# Patient Record
Sex: FEMALE | Race: WHITE
[De-identification: names, ages, dates, MRNs, and addresses within clinical notes are randomized per-mention and may not be internally consistent; named-entity substitution may affect disease eponyms.]

---

## 2017-01-17 ENCOUNTER — HOSPITAL ENCOUNTER (OUTPATIENT)
Dept: HOSPITAL 53 - M LAB | Age: 57
End: 2017-01-17
Attending: FAMILY MEDICINE
Payer: COMMERCIAL

## 2017-01-17 DIAGNOSIS — E55.9: ICD-10-CM

## 2017-01-17 DIAGNOSIS — E78.2: ICD-10-CM

## 2017-01-17 DIAGNOSIS — I10: Primary | ICD-10-CM

## 2017-01-17 DIAGNOSIS — E03.9: ICD-10-CM

## 2017-01-17 LAB
ALBUMIN SERPL BCG-MCNC: 3.7 GM/DL (ref 3.2–5.2)
ALBUMIN/GLOB SERPL: 0.95 {RATIO} (ref 1–1.93)
ALP SERPL-CCNC: 200 U/L (ref 45–117)
ALT SERPL W P-5'-P-CCNC: 124 U/L (ref 12–78)
ANION GAP SERPL CALC-SCNC: 8 MEQ/L (ref 8–16)
AST SERPL-CCNC: 118 U/L (ref 15–37)
BILIRUB SERPL-MCNC: 1.3 MG/DL (ref 0.2–1)
BUN SERPL-MCNC: 9 MG/DL (ref 7–18)
CALCIUM SERPL-MCNC: 8.4 MG/DL (ref 8.5–10.1)
CHLORIDE SERPL-SCNC: 105 MEQ/L (ref 98–107)
CHOLEST SERPL-MCNC: 303 MG/DL (ref ?–200)
CO2 SERPL-SCNC: 29 MEQ/L (ref 21–32)
CREAT SERPL-MCNC: 0.6 MG/DL (ref 0.55–1.02)
GFR SERPL CREATININE-BSD FRML MDRD: > 60 ML/MIN/{1.73_M2} (ref 51–?)
GLUCOSE SERPL-MCNC: 86 MG/DL (ref 70–105)
POTASSIUM SERPL-SCNC: 4.2 MEQ/L (ref 3.5–5.1)
PROT SERPL-MCNC: 7.6 GM/DL (ref 6.4–8.2)
SODIUM SERPL-SCNC: 142 MEQ/L (ref 136–145)
T4 FREE SERPL-MCNC: 0.93 NG/DL (ref 0.76–1.46)
TRIGL SERPL-MCNC: 106 MG/DL (ref ?–150)

## 2017-06-27 ENCOUNTER — HOSPITAL ENCOUNTER (OUTPATIENT)
Dept: HOSPITAL 53 - M LAB REF | Age: 57
End: 2017-06-27
Attending: PHYSICIAN ASSISTANT
Payer: COMMERCIAL

## 2017-06-27 DIAGNOSIS — R30.0: Primary | ICD-10-CM

## 2017-06-27 DIAGNOSIS — M54.9: ICD-10-CM

## 2017-06-27 LAB — MICROSCOPIC INDICATED? MAN: NO

## 2017-07-08 ENCOUNTER — HOSPITAL ENCOUNTER (EMERGENCY)
Dept: HOSPITAL 53 - M ED | Age: 57
Discharge: HOME | End: 2017-07-08
Payer: COMMERCIAL

## 2017-07-08 VITALS — SYSTOLIC BLOOD PRESSURE: 120 MMHG | DIASTOLIC BLOOD PRESSURE: 62 MMHG

## 2017-07-08 VITALS — HEIGHT: 67 IN | BODY MASS INDEX: 30.93 KG/M2 | WEIGHT: 197.09 LBS

## 2017-07-08 DIAGNOSIS — G56.00: ICD-10-CM

## 2017-07-08 DIAGNOSIS — G43.909: ICD-10-CM

## 2017-07-08 DIAGNOSIS — K74.60: Primary | ICD-10-CM

## 2017-07-08 DIAGNOSIS — H40.9: ICD-10-CM

## 2017-07-08 DIAGNOSIS — R25.1: ICD-10-CM

## 2017-07-08 DIAGNOSIS — M54.9: ICD-10-CM

## 2017-07-08 DIAGNOSIS — E80.6: ICD-10-CM

## 2017-07-08 LAB
ALBUMIN SERPL BCG-MCNC: 2.7 GM/DL (ref 3.2–5.2)
ALBUMIN/GLOB SERPL: 0.56 {RATIO} (ref 1–1.93)
ALP SERPL-CCNC: 288 U/L (ref 45–117)
ALT SERPL W P-5'-P-CCNC: 59 U/L (ref 12–78)
ANION GAP SERPL CALC-SCNC: 7 MEQ/L (ref 8–16)
AST SERPL-CCNC: 131 U/L (ref 15–37)
BASOPHILS # BLD AUTO: 0 K/MM3 (ref 0–0.2)
BASOPHILS NFR BLD AUTO: 0.8 % (ref 0–1)
BILIRUB CONJ SERPL-MCNC: 5.4 MG/DL (ref 0–0.2)
BILIRUB SERPL-MCNC: 7.7 MG/DL (ref 0.2–1)
BUN SERPL-MCNC: 7 MG/DL (ref 7–18)
CALCIUM SERPL-MCNC: 8.6 MG/DL (ref 8.5–10.1)
CHLORIDE SERPL-SCNC: 101 MEQ/L (ref 98–107)
CO2 SERPL-SCNC: 26 MEQ/L (ref 21–32)
CREAT SERPL-MCNC: 0.58 MG/DL (ref 0.55–1.02)
EOSINOPHIL # BLD AUTO: 0.1 K/MM3 (ref 0–0.5)
EOSINOPHIL NFR BLD AUTO: 1.5 % (ref 0–3)
ERYTHROCYTE [DISTWIDTH] IN BLOOD BY AUTOMATED COUNT: 14 % (ref 11.5–14.5)
GFR SERPL CREATININE-BSD FRML MDRD: > 60 ML/MIN/{1.73_M2} (ref 51–?)
GLUCOSE SERPL-MCNC: 115 MG/DL (ref 70–105)
INR PPP: 1.34
LARGE UNSTAINED CELL #: 0.1 K/MM3 (ref 0–0.4)
LARGE UNSTAINED CELL %: 1.2 % (ref 0–4)
LYMPHOCYTES # BLD AUTO: 0.8 K/MM3 (ref 1.5–4.5)
LYMPHOCYTES NFR BLD AUTO: 15.4 % (ref 24–44)
MCH RBC QN AUTO: 34 PG (ref 27–33)
MCHC RBC AUTO-ENTMCNC: 32.8 G/DL (ref 32–36.5)
MCV RBC AUTO: 103.7 FL (ref 80–96)
MONOCYTES # BLD AUTO: 0.3 K/MM3 (ref 0–0.8)
MONOCYTES NFR BLD AUTO: 6.8 % (ref 0–5)
NEUTROPHILS # BLD AUTO: 3.7 K/MM3 (ref 1.8–7.7)
NEUTROPHILS NFR BLD AUTO: 74.3 % (ref 36–66)
PLATELET # BLD AUTO: 192 K/MM3 (ref 150–450)
POTASSIUM SERPL-SCNC: 4.3 MEQ/L (ref 3.5–5.1)
PROT SERPL-MCNC: 7.5 GM/DL (ref 6.4–8.2)
SODIUM SERPL-SCNC: 134 MEQ/L (ref 136–145)
WBC # BLD AUTO: 5 K/MM3 (ref 4–10)

## 2017-07-08 NOTE — REP
Clinical:  Diffuse abdominal pain.

 

Technique:  Axial contrast enhanced images from the lung bases to the pubic

symphysis using 100 ml Isovue 370 intravenous contrast material with coronal and

sagittal re-formations.

 

Comparison:  10/13/2016.

 

Findings:

The liver demonstrates innumerable sub centimeter low density nodules along with

suspicion for to relatively hyper dense mass lesions measuring 4.5 cm in the

right lobe adjacent to the gallbladder fossa (image 68) and 3 cm in the medial

left lobe adjacent to the gallbladder fossa.  There is evidence for marked

ascites as well as diffuse portosystemic collateral vasculature and varices.

Findings are most characteristic of cirrhosis with portal venous hypertension and

possible underlying malignancy including hepatocellular carcinoma.  Correlation

is required.

 

Spleen, pancreas, gallbladder, bilateral adrenal glands and kidneys are normal.

The enteric system is without obstruction or acute inflammatory process.  Pelvis

demonstrates normal bladder and age-appropriate uterus/adnexa.  Abdominal aorta

and branch vessels are normal.  Musculoskeletal structures are intact.  Lung

bases demonstrate minimal basilar atelectasis.

 

Impression:

Hepatic and abdominal findings as described above most compatible with advanced

cirrhosis.  Liver lesions as described above may represent residual normal

hepatic parenchyma/large regenerating nodules versus malignancy.  Underlying

malignancy including hepatocellular carcinoma cannot be excluded.

 

 

Signed by

Yogi Glass MD 07/08/2017 02:46 P

## 2017-07-08 NOTE — ECGEPIP
Stationary ECG Study

                           [facility] - ED

                                       

                                       Test Date:    2017

Pat Name:     JAMEL HARVEY             Department:   

Patient ID:   [id number]                 Room:         -

Gender:       F                        Technician:   tavares

:          1960               Requested By: RAUL MATTSON

Order Number: [id number]     Reading MD:   Saundra Urrutia

                                 Measurements

Intervals                              Axis          

Rate:         92                       P:            31

NH:           146                      QRS:          -22

QRSD:         87                       T:            -1

QT:           371                                    

QTc:          460                                    

                           Interpretive Statements

SINUS RHYTHM

MINIMAL VOLTAGE CRITERIA FOR LVH, CONSIDER NORMAL VARIANT

POSSIBLE ANTERIOR MYOCARDIAL INFARCTION, PROBABLY OLD

NSTTW ABNORMALITY

Electronically Signed On 2017 21:26:00 EDT by Saundra Urrutia

## 2017-07-25 ENCOUNTER — HOSPITAL ENCOUNTER (OUTPATIENT)
Dept: HOSPITAL 53 - M LAB | Age: 57
End: 2017-07-25
Attending: FAMILY MEDICINE
Payer: COMMERCIAL

## 2017-07-25 DIAGNOSIS — E55.9: ICD-10-CM

## 2017-07-25 DIAGNOSIS — E78.2: ICD-10-CM

## 2017-07-25 DIAGNOSIS — I10: Primary | ICD-10-CM

## 2017-07-25 DIAGNOSIS — E70.30: ICD-10-CM

## 2017-07-25 DIAGNOSIS — E03.9: ICD-10-CM

## 2017-07-25 LAB
ALBUMIN SERPL BCG-MCNC: 2.3 GM/DL (ref 3.2–5.2)
ALBUMIN/GLOB SERPL: 0.48 {RATIO} (ref 1–1.93)
ALP SERPL-CCNC: 189 U/L (ref 45–117)
ALT SERPL W P-5'-P-CCNC: 66 U/L (ref 12–78)
ANION GAP SERPL CALC-SCNC: 11 MEQ/L (ref 8–16)
AST SERPL-CCNC: 130 U/L (ref 15–37)
BILIRUB SERPL-MCNC: 13.2 MG/DL (ref 0.2–1)
BUN SERPL-MCNC: 12 MG/DL (ref 7–18)
CALCIUM SERPL-MCNC: 8.3 MG/DL (ref 8.5–10.1)
CHLORIDE SERPL-SCNC: 95 MEQ/L (ref 98–107)
CHOLEST SERPL-MCNC: 195 MG/DL (ref ?–200)
CO2 SERPL-SCNC: 25 MEQ/L (ref 21–32)
CREAT SERPL-MCNC: 1.1 MG/DL (ref 0.55–1.02)
GFR SERPL CREATININE-BSD FRML MDRD: 54.5 ML/MIN/{1.73_M2} (ref 51–?)
GLUCOSE SERPL-MCNC: 92 MG/DL (ref 70–105)
POTASSIUM SERPL-SCNC: 4.1 MEQ/L (ref 3.5–5.1)
PROT SERPL-MCNC: 7.1 GM/DL (ref 6.4–8.2)
SODIUM SERPL-SCNC: 131 MEQ/L (ref 136–145)
T4 FREE SERPL-MCNC: 2.08 NG/DL (ref 0.76–1.46)
TRIGL SERPL-MCNC: 268 MG/DL (ref ?–150)

## 2017-07-26 ENCOUNTER — HOSPITAL ENCOUNTER (OUTPATIENT)
Dept: HOSPITAL 53 - M LAB | Age: 57
End: 2017-07-26
Attending: INTERNAL MEDICINE
Payer: COMMERCIAL

## 2017-07-26 DIAGNOSIS — K70.30: Primary | ICD-10-CM

## 2017-07-26 DIAGNOSIS — R93.0: ICD-10-CM

## 2017-07-26 LAB — INR PPP: 1.37

## 2017-07-28 ENCOUNTER — HOSPITAL ENCOUNTER (OUTPATIENT)
Dept: HOSPITAL 53 - M RADPRO | Age: 57
End: 2017-07-28
Attending: INTERNAL MEDICINE
Payer: COMMERCIAL

## 2017-07-28 DIAGNOSIS — K70.30: Primary | ICD-10-CM

## 2017-07-28 DIAGNOSIS — R93.8: ICD-10-CM

## 2017-07-28 DIAGNOSIS — Z79.899: ICD-10-CM

## 2017-07-28 DIAGNOSIS — Z88.0: ICD-10-CM

## 2017-07-28 LAB
BF DIFF IF INDICATED?: YES
CC BF DIFF EXAM: (no result)
LDH FLD L TO P-CCNC: 58 U/L
RBC ASCITES FLUID: < 10
TNC ASCITES FLUID: 195 CELLS/UL (ref 0–20)

## 2017-07-28 NOTE — REP
PARACENTESIS:

 

The procedure was performed by YOON Colvin under the direct

supervision of Dr. Khan.

 

The procedure along with its risks, benefits, and complications were discussed

with the patient prior to the procedure. Informed consent was obtained both

written and verbal.

 

The patient was identified in the ultrasound suite and placed in a supine

position. The bilateral lower quadrants were interrogated with ultrasound. The

right lower quadrant demonstrated a moderate sized fluid collection. A procedural

"time out" was performed to ensure appropriate site, procedure and correct

patient. An appropriate site was chosen for needle entry and this area was

marked, prepped and draped in the usual sterile fashion. Local infiltrative

anesthesia was achieved with 1% lidocaine. A 19-gauge Yueh centesis catheter was

advanced through the abdominal wall under continuous negative pressure until

serous fluid was aspirated. The needle was removed and the catheter was advanced.

Approximately 8150 mL of fluid was removed. The catheter was then removed.

Hemostasis was achieved and a soft dressing was applied to the entry site.

 

The patient tolerated the procedure well and had no immediate complications. She

was observed in the interventional department for approximately an hour

postprocedure and was then discharged in good condition.

 

 

Reviewed by

YOON King 07/28/2017 04:41 PEdited and Signed by

Sushant Khan MD 07/28/2017 04:47 P

## 2017-08-25 ENCOUNTER — HOSPITAL ENCOUNTER (EMERGENCY)
Dept: HOSPITAL 53 - M ED | Age: 57
Discharge: LEFT BEFORE DISCHARGE | End: 2017-08-25
Payer: COMMERCIAL

## 2017-08-25 VITALS — DIASTOLIC BLOOD PRESSURE: 55 MMHG | SYSTOLIC BLOOD PRESSURE: 92 MMHG

## 2017-08-25 VITALS — HEIGHT: 67 IN | BODY MASS INDEX: 30.03 KG/M2 | WEIGHT: 191.36 LBS

## 2017-08-25 DIAGNOSIS — Z85.05: ICD-10-CM

## 2017-08-25 DIAGNOSIS — R18.8: Primary | ICD-10-CM

## 2017-08-25 DIAGNOSIS — M54.9: ICD-10-CM

## 2017-08-25 DIAGNOSIS — I95.9: ICD-10-CM

## 2017-08-25 DIAGNOSIS — K74.60: ICD-10-CM

## 2017-08-25 LAB
ALBUMIN SERPL BCG-MCNC: 1.9 GM/DL (ref 3.2–5.2)
ALBUMIN/GLOB SERPL: 0.33 {RATIO} (ref 1–1.93)
ALP SERPL-CCNC: 219 U/L (ref 45–117)
ALT SERPL W P-5'-P-CCNC: 30 U/L (ref 12–78)
ANION GAP SERPL CALC-SCNC: 10 MEQ/L (ref 8–16)
AST SERPL-CCNC: 52 U/L (ref 15–37)
BASOPHILS # BLD AUTO: 0.1 K/MM3 (ref 0–0.2)
BASOPHILS NFR BLD AUTO: 0.9 % (ref 0–1)
BILIRUB CONJ SERPL-MCNC: 4.4 MG/DL (ref 0–0.2)
BILIRUB SERPL-MCNC: 5.6 MG/DL (ref 0.2–1)
BUN SERPL-MCNC: 34 MG/DL (ref 7–18)
CALCIUM SERPL-MCNC: 8.4 MG/DL (ref 8.5–10.1)
CHLORIDE SERPL-SCNC: 101 MEQ/L (ref 98–107)
CO2 SERPL-SCNC: 23 MEQ/L (ref 21–32)
CREAT SERPL-MCNC: 2.34 MG/DL (ref 0.55–1.02)
EOSINOPHIL # BLD AUTO: 0.2 K/MM3 (ref 0–0.5)
EOSINOPHIL NFR BLD AUTO: 2.5 % (ref 0–3)
ERYTHROCYTE [DISTWIDTH] IN BLOOD BY AUTOMATED COUNT: 13.2 % (ref 11.5–14.5)
GFR SERPL CREATININE-BSD FRML MDRD: 22.8 ML/MIN/{1.73_M2} (ref 51–?)
GLUCOSE SERPL-MCNC: 121 MG/DL (ref 70–105)
INR PPP: 1.27
LARGE UNSTAINED CELL #: 0.1 K/MM3 (ref 0–0.4)
LARGE UNSTAINED CELL %: 2.2 % (ref 0–4)
LYMPHOCYTES # BLD AUTO: 1.4 K/MM3 (ref 1.5–4.5)
LYMPHOCYTES NFR BLD AUTO: 19.6 % (ref 24–44)
MCH RBC QN AUTO: 33.4 PG (ref 27–33)
MCHC RBC AUTO-ENTMCNC: 33.6 G/DL (ref 32–36.5)
MCV RBC AUTO: 99.4 FL (ref 80–96)
MONOCYTES # BLD AUTO: 0.5 K/MM3 (ref 0–0.8)
MONOCYTES NFR BLD AUTO: 8.6 % (ref 0–5)
NEUTROPHILS # BLD AUTO: 4.1 K/MM3 (ref 1.8–7.7)
NEUTROPHILS NFR BLD AUTO: 66.1 % (ref 36–66)
PLATELET # BLD AUTO: 262 K/MM3 (ref 150–450)
POTASSIUM SERPL-SCNC: 3.9 MEQ/L (ref 3.5–5.1)
PROT SERPL-MCNC: 7.6 GM/DL (ref 6.4–8.2)
SODIUM SERPL-SCNC: 134 MEQ/L (ref 136–145)
WBC # BLD AUTO: 6.2 K/MM3 (ref 4–10)

## 2017-08-26 ENCOUNTER — HOSPITAL ENCOUNTER (INPATIENT)
Dept: HOSPITAL 53 - M ED | Age: 57
LOS: 11 days | Discharge: HOME | DRG: 280 | End: 2017-09-06
Attending: HOSPITALIST | Admitting: HOSPITALIST
Payer: COMMERCIAL

## 2017-08-26 VITALS — HEIGHT: 67 IN | WEIGHT: 184.53 LBS | BODY MASS INDEX: 28.96 KG/M2

## 2017-08-26 VITALS — SYSTOLIC BLOOD PRESSURE: 102 MMHG | DIASTOLIC BLOOD PRESSURE: 56 MMHG

## 2017-08-26 VITALS — DIASTOLIC BLOOD PRESSURE: 55 MMHG | SYSTOLIC BLOOD PRESSURE: 94 MMHG

## 2017-08-26 VITALS — DIASTOLIC BLOOD PRESSURE: 66 MMHG | SYSTOLIC BLOOD PRESSURE: 110 MMHG

## 2017-08-26 VITALS — SYSTOLIC BLOOD PRESSURE: 91 MMHG | DIASTOLIC BLOOD PRESSURE: 51 MMHG

## 2017-08-26 VITALS — SYSTOLIC BLOOD PRESSURE: 110 MMHG | DIASTOLIC BLOOD PRESSURE: 58 MMHG

## 2017-08-26 VITALS — SYSTOLIC BLOOD PRESSURE: 83 MMHG | DIASTOLIC BLOOD PRESSURE: 59 MMHG

## 2017-08-26 VITALS — DIASTOLIC BLOOD PRESSURE: 61 MMHG | SYSTOLIC BLOOD PRESSURE: 109 MMHG

## 2017-08-26 VITALS — SYSTOLIC BLOOD PRESSURE: 94 MMHG | DIASTOLIC BLOOD PRESSURE: 55 MMHG

## 2017-08-26 DIAGNOSIS — I95.1: ICD-10-CM

## 2017-08-26 DIAGNOSIS — H40.9: ICD-10-CM

## 2017-08-26 DIAGNOSIS — I10: ICD-10-CM

## 2017-08-26 DIAGNOSIS — E03.9: ICD-10-CM

## 2017-08-26 DIAGNOSIS — K70.31: Primary | ICD-10-CM

## 2017-08-26 DIAGNOSIS — N17.9: ICD-10-CM

## 2017-08-26 DIAGNOSIS — K21.9: ICD-10-CM

## 2017-08-26 DIAGNOSIS — Z88.0: ICD-10-CM

## 2017-08-26 DIAGNOSIS — K70.11: ICD-10-CM

## 2017-08-26 DIAGNOSIS — K76.7: ICD-10-CM

## 2017-08-26 DIAGNOSIS — G25.81: ICD-10-CM

## 2017-08-26 DIAGNOSIS — Z79.899: ICD-10-CM

## 2017-08-26 DIAGNOSIS — E66.9: ICD-10-CM

## 2017-08-26 DIAGNOSIS — E88.09: ICD-10-CM

## 2017-08-26 DIAGNOSIS — R19.7: ICD-10-CM

## 2017-08-26 DIAGNOSIS — F10.20: ICD-10-CM

## 2017-08-26 LAB
ALBUMIN SERPL BCG-MCNC: 1.5 GM/DL (ref 3.2–5.2)
ALBUMIN/GLOB SERPL: 0.28 {RATIO} (ref 1–1.93)
ALP SERPL-CCNC: 200 U/L (ref 45–117)
ALT SERPL W P-5'-P-CCNC: 30 U/L (ref 12–78)
ANION GAP SERPL CALC-SCNC: 11 MEQ/L (ref 8–16)
ANION GAP SERPL CALC-SCNC: 12 MEQ/L (ref 8–16)
AST SERPL-CCNC: 54 U/L (ref 15–37)
BASOPHILS # BLD AUTO: 0.1 K/MM3 (ref 0–0.2)
BASOPHILS NFR BLD AUTO: 0.8 % (ref 0–1)
BILIRUB SERPL-MCNC: 3.9 MG/DL (ref 0.2–1)
BUN SERPL-MCNC: 33 MG/DL (ref 7–18)
BUN SERPL-MCNC: 34 MG/DL (ref 7–18)
CALCIUM SERPL-MCNC: 7.6 MG/DL (ref 8.5–10.1)
CALCIUM SERPL-MCNC: 7.8 MG/DL (ref 8.5–10.1)
CHLORIDE SERPL-SCNC: 104 MEQ/L (ref 98–107)
CHLORIDE SERPL-SCNC: 105 MEQ/L (ref 98–107)
CO2 SERPL-SCNC: 18 MEQ/L (ref 21–32)
CO2 SERPL-SCNC: 23 MEQ/L (ref 21–32)
CREAT SERPL-MCNC: 2.02 MG/DL (ref 0.55–1.02)
CREAT SERPL-MCNC: 2.23 MG/DL (ref 0.55–1.02)
EOSINOPHIL # BLD AUTO: 0.1 K/MM3 (ref 0–0.5)
EOSINOPHIL NFR BLD AUTO: 1.9 % (ref 0–3)
ERYTHROCYTE [DISTWIDTH] IN BLOOD BY AUTOMATED COUNT: 12.8 % (ref 11.5–14.5)
GFR SERPL CREATININE-BSD FRML MDRD: 24.1 ML/MIN/{1.73_M2} (ref 51–?)
GFR SERPL CREATININE-BSD FRML MDRD: 27 ML/MIN/{1.73_M2} (ref 51–?)
GLUCOSE SERPL-MCNC: 104 MG/DL (ref 70–105)
GLUCOSE SERPL-MCNC: 120 MG/DL (ref 70–105)
LARGE UNSTAINED CELL #: 0.2 K/MM3 (ref 0–0.4)
LARGE UNSTAINED CELL %: 3.2 % (ref 0–4)
LYMPHOCYTES # BLD AUTO: 1.3 K/MM3 (ref 1.5–4.5)
LYMPHOCYTES NFR BLD AUTO: 17.9 % (ref 24–44)
MCH RBC QN AUTO: 33.8 PG (ref 27–33)
MCHC RBC AUTO-ENTMCNC: 34 G/DL (ref 32–36.5)
MCV RBC AUTO: 99.6 FL (ref 80–96)
METHADONE UR QL SCN: NEGATIVE
MONOCYTES # BLD AUTO: 0.7 K/MM3 (ref 0–0.8)
MONOCYTES NFR BLD AUTO: 9.6 % (ref 0–5)
NEUTROPHILS # BLD AUTO: 4.7 K/MM3 (ref 1.8–7.7)
NEUTROPHILS NFR BLD AUTO: 66.5 % (ref 36–66)
PLATELET # BLD AUTO: 206 K/MM3 (ref 150–450)
POTASSIUM SERPL-SCNC: 4 MEQ/L (ref 3.5–5.1)
POTASSIUM SERPL-SCNC: 4.6 MEQ/L (ref 3.5–5.1)
PROT SERPL-MCNC: 6.9 GM/DL (ref 6.4–8.2)
SODIUM SERPL-SCNC: 135 MEQ/L (ref 136–145)
SODIUM SERPL-SCNC: 138 MEQ/L (ref 136–145)
WBC # BLD AUTO: 7.1 K/MM3 (ref 4–10)

## 2017-08-26 RX ADMIN — LATANOPROST SCH DROP: 50 SOLUTION OPHTHALMIC at 20:46

## 2017-08-26 RX ADMIN — BRIMONIDINE TARTRATE SCH DROP: 1.5 SOLUTION OPHTHALMIC at 08:20

## 2017-08-26 RX ADMIN — Medication SCH MG: at 08:20

## 2017-08-26 RX ADMIN — SODIUM CHLORIDE SCH UNITS: 4.5 INJECTION, SOLUTION INTRAVENOUS at 20:46

## 2017-08-26 RX ADMIN — FOLIC ACID SCH MG: 1 TABLET ORAL at 08:20

## 2017-08-26 RX ADMIN — LACTULOSE SCH ML: 10 SOLUTION ORAL at 08:20

## 2017-08-26 RX ADMIN — LEVOTHYROXINE SODIUM SCH MCG: 150 TABLET ORAL at 05:28

## 2017-08-26 RX ADMIN — MULTIPLE VITAMINS W/ MINERALS TAB SCH TAB: TAB at 08:20

## 2017-08-26 RX ADMIN — BRIMONIDINE TARTRATE SCH DROP: 1.5 SOLUTION OPHTHALMIC at 15:36

## 2017-08-26 RX ADMIN — SODIUM CHLORIDE SCH UNITS: 4.5 INJECTION, SOLUTION INTRAVENOUS at 14:41

## 2017-08-26 RX ADMIN — LACTULOSE SCH ML: 10 SOLUTION ORAL at 20:45

## 2017-08-26 RX ADMIN — BETAXOLOL HYDROCHLORIDE SCH DROP: 5 SOLUTION/ DROPS OPHTHALMIC at 20:46

## 2017-08-26 RX ADMIN — BRIMONIDINE TARTRATE SCH DROP: 1.5 SOLUTION OPHTHALMIC at 20:46

## 2017-08-26 RX ADMIN — BETAXOLOL HYDROCHLORIDE SCH DROP: 5 SOLUTION/ DROPS OPHTHALMIC at 08:20

## 2017-08-26 RX ADMIN — LACTULOSE SCH ML: 10 SOLUTION ORAL at 15:36

## 2017-08-26 RX ADMIN — SODIUM CHLORIDE SCH UNITS: 4.5 INJECTION, SOLUTION INTRAVENOUS at 05:28

## 2017-08-26 NOTE — HPEPDOC
General


Date of Admission


Aug 26, 2017 at 01:55


Primary Care Physician:  JET SOFIA MD


Attending Physician:  JOSÉ DOWNING MD


Chief Complaint


The patient is a 57-year-old female admitted with a reason for visit of Acute 

Kidney Injury.


Source:  Patient, RN notes reviewed, Old records


Exam Limitations:  No limitations


Timing/Duration:  24 hours





History of Present Illness


Ms. Taylor is a 57-year-old  female who presents to [facility]'s emergency Department with hypotension.





Past medical history significant for alcoholic cirrhosis, hypothyroidism, 

hypertension, restless leg syndrome, glaucoma, nephrolithiasis, grade 1 

diastolic dysfunction, history of urogenital Candida, history of ASCUS, history 

of HPV, history of MSSA bacteremia, history of Escherichia coli urinary tract 

infections, hyperammonemia.





Patient initially presented to the emergency department on 2017 with 

ascites and hypotension. Patient's primary care provider had called patient and 

advised her to present to the hospital to have her ascites percutaneously 

drained. Patient had approximately 6 L removed during emergency department 

evaluation. According to documentation by emergency department physician 

patient refused to remain in the hospital for observation or admission for her 

hypotension. Documentation indicates that patient was informed to temporarily 

hold taking her blood pressure medications and to take her lactulose. Patient 

return to emergency department on 2017 and reported to me that she 

returned because she was "scared." Patient also admits to nonbloody diarrhea 

with 3-4 bowel movements since yesterday. She also reports a cough productive 

of white mucus, and a hard abdomen. She does report to me that approximately 2 

weeks ago she used the restroom and noted bright red blood on the toilet paper. 

She has not noticed this since then.





Besides positive review of systems list above all other review of systems are 

negative.





Hospitalist service was consulted and patient was admitted for further medical 

management.





Home Medications


Scheduled


Amlodipine Besylate (Amlodipine Besylate) 5 Mg Tab, 5 MG PO DAILY, (Reported)


Betaxolol Hcl (Betaxolol HCl) 0.5 % Sol, 1 DROP OU BID, (Reported)


Brimonidine Tartrate 0.15% (Brimonidine Tartrate) 100 Drop/5 Ml Soln, 1 DROP OU 

TID, (Reported)


Furosemide (Lasix) 40 Mg Tab, 40 MG PO DAILY, (Reported)


Lactulose (Lactulose) 10 Gm/15 Ml Sol, 30 ML PO TID, (Reported)


Latanoprost (Latanoprost) 50 Drop/2.5 Ml Soln, 1 DROP OU QHS, (Reported)


Levothyroxine Sodium (Levoxyl) 125 Mcg Tab, 150 MCG PO DAILY, (Reported)


Spironolactone (Spironolactone) 50 Mg Tab, 50 MG PO DAILY, (Reported)





Scheduled PRN


 (Flonase Allergy Relief) 50 Mcg/Act Spr, 1 SPRAY NA DAILY PRN for CONGESTION, (

Reported)


Azelastine Hydrochloride (Azelastine HCl) 137 Mcg/[place] Spr, 2 SPRAY NA DAILY 

PRN for CONGESTION, (Reported)


Hydroxyzine Pamoate (Hydroxyzine Pamoate) 25 Mg Cap, 25 MG PO TID PRN for 

ITCHING, (Reported)


Oxycodone HCl (Oxycodone HCl) 5 Mg Tab, 5 MG PO Q6H PRN for PAIN, (Reported)





Allergies


Coded Allergies:  


     Penicillins (Verified  Allergy, Unknown, RASH, 10/16/16)


 RASH PER H&P FROM '00, "AIRWAY SWELLING" ACCORDING TO MOTHER


 10/16





Past Medical History


Medical History


1. Alcoholic cirrhosis


2. Hypothyroidism


3. Hypertension


4. Gastroesophageal reflux disease


5. Restless leg syndrome


6. Glaucoma


7. Nephrolithiasis


8. Grade 1 diastolic dysfunction


9. History of urogenital Candida


10. History of ASCUS


11. History of HPV infection


12. History of MSSA bacteremia


13. History of Escherichia coli urinary tract infection


14. Hyperammonemia


Surgical History


1. Hysteroscopy and D&C secondary to impacted intrauterine device


2. Carpal tunnel release





Family History


Father: Healthy


Mother: Brain tumor


Sister: , 38, alcoholism


Brother: Healthy





Social History


Lives independently


Has 2 adult sons, one adult daughter, 4 grandsons


Collects Social Security insurance, unemployed


Denies pets in the home


Denies illicit drug use


Denies current alcohol use, quit 


Denies environmental exposures


Denies travel, domestic or international





Review of Symptoms


Constitutional:  Denies: Chills, Fever, Night Sweats, Weakness


Eyes:  Denies: Pain, Vision change


ENT:  Denies: Head Aches, Dysphagia, Sinus Congestion, Post Nasal Drip, Sore 

Throat, Epistaxis


Skin:  Denies: Rash, Lesions, Jaundice


Pulmonary:  Reports: Cough, 


   Denies: Dyspnea


Cardiovascular:  Denies: Chest Pain, Palpitations, Lt Headedness


Gastrointestinal:  Reports: Diarrhea, 


   Denies: Nausea, Vomiting, Abdominal Pain, Constipation, Melena, Hematochezia


Genitourinary:  Denies: Dysuria, Frequency, Incontinence, Hematuria


Hematologic:  Denies: Bruising, Bleeding Excessively


Musculoskeletal:  Denies: Neck Pain, Back Pain, Joint Pain


Neurological:  Denies: Weakness, Numbness





Physical Examination


General Exam:  Positive: Alert, Cooperative, No Acute Distress


Eye Exam:  Positive: PERRLA, Conjunctiva & lids normal, EOMI, Sclera icteric


ENT Exam:  Positive: Atraumatic, Tongue Midline, Nares Patent


Neck Exam:  Positive: Supple, +2 carotid pulse wo bruit, 


   Negative: JVD, thyromegaly, Lymphadenopathy


Chest Exam:  Positive: Clear to auscultation, Normal air movement


Heart Exam:  Positive: Rate Normal, Tachycardic, Normal S1, Normal S2, 


   Negative: Gallops, Murmurs, Rubs


Telemetry:  Positive: Tachycardia


Abdomen Exam:  Positive: Normal bowel sounds, BS Hyperactive, Soft, Other (

distended)


Extremity Exam:  Positive: Normal pulses, 


   Negative: Clubbing, Cyanosis, Edema, Tenderness, Swelling


Skin Exam:  Negative: Rash, Breakdown


Neuro Exam:  Positive: Normal Gait, Normal Speech, Cranial Nerves 3-12 NL





Vital Signs





Vital Signs








  Date Time  Temp Pulse Resp B/P (MAP) Pulse Ox O2 Delivery O2 Flow Rate FiO2


 


17 04:33 98.7 99 16 102/61 (75) 97   


 


17 02:04      Room Air  








Height (in):  67


Weight (kg):  86.81


BMI (kg):  30





Laboratory Data


Labs 24H


Laboratory Tests 2


17 02:49: 


Anion Gap 11, Glomerular Filtration Rate 24.1L, Blood Urea Nitrogen 34H, 

Creatinine 2.23H, Sodium Level 138, Potassium Level 4.0, Chloride Level 104, 

Carbon Dioxide Level 23, Calcium Level 7.8L


CBC/BMP


Laboratory Tests


17 02:49








Calcium Level 7.8 L





 Assessment/Plan


This is a 57-year-old  female with a past medical history significant 

for alcoholic cirrhosis, hypothyroidism, hypertension, restless leg syndrome, 

glaucoma, nephrolithiasis, grade 1 diastolic dysfunction, history of urogenital 

Candida, history of ASCUS, history of HPV, history of MSSA bacteremia, history 

of Escherichia coli urinary tract infections, hyperammonemia who presents with 

hypotension and diarrhea and found to have acute kidney injury.





Plan / VTE


VTE Prophylaxis Ordered?:  Yes (TEDs, sequential's, knee-high compression, 

heparin 5000 units)





Plan


Plan


Acute kidney injury


Likely prerenal renal. Monitor BUN and creatinine. Hold diuretics. Provide 3 

bag of 25% albumin. Check the urinary fractional excretion of sodium, creatinine

, urea nitrogen. Obtain renal ultrasound. Obtain urinalysis.


Hypotension


Obtain orthostatic blood vital signs. Hold blood pressure medication at this 

time. Monitor with vital signs every 4 hours.


Alcoholic cirrhosis


Obtain urine toxicology and ethanol level. Start thiamine, folate. Serax as 

necessary. Patient to continue with her lactulose. Could consider a 

paracentesis secondary to patient's continued ascites. Will obtain a liver 

profile. Her meld score is 40. This equates to 71.3% estimated 3 month 

mortality.


Hypothyroidism


Continue patient on current medication regimen.


Allergies


Continue patient on current medication regimen.


DVT prophylaxis


TEDs, sequential, knee-high compression, heparin 5000 units subcutaneous every 

8 hours.


Disposition


Admit: Progressive care unit


Anticipated hospitalization: Observation


Attending: Dr. Covarrubias


IVF:  Discontinue


Diet:  Continue Current (no added salt)


Activity:  Encourage Ambulation


Diagnostics:  Check Labs, Repeat Labs in AM


Anticipated Discharge:  Home











TWAN AKHTAR Aug 26, 2017 05:08

## 2017-08-27 VITALS — DIASTOLIC BLOOD PRESSURE: 60 MMHG | SYSTOLIC BLOOD PRESSURE: 100 MMHG

## 2017-08-27 VITALS — SYSTOLIC BLOOD PRESSURE: 83 MMHG | DIASTOLIC BLOOD PRESSURE: 63 MMHG

## 2017-08-27 VITALS — SYSTOLIC BLOOD PRESSURE: 113 MMHG | DIASTOLIC BLOOD PRESSURE: 63 MMHG

## 2017-08-27 VITALS — DIASTOLIC BLOOD PRESSURE: 52 MMHG | SYSTOLIC BLOOD PRESSURE: 120 MMHG

## 2017-08-27 VITALS — SYSTOLIC BLOOD PRESSURE: 108 MMHG | DIASTOLIC BLOOD PRESSURE: 56 MMHG

## 2017-08-27 VITALS — SYSTOLIC BLOOD PRESSURE: 106 MMHG | DIASTOLIC BLOOD PRESSURE: 63 MMHG

## 2017-08-27 LAB
ALBUMIN SERPL BCG-MCNC: 1.8 GM/DL (ref 3.2–5.2)
ALBUMIN/GLOB SERPL: 0.4 {RATIO} (ref 1–1.93)
ALP SERPL-CCNC: 156 U/L (ref 45–117)
ALT SERPL W P-5'-P-CCNC: 22 U/L (ref 12–78)
ANION GAP SERPL CALC-SCNC: 11 MEQ/L (ref 8–16)
ANION GAP SERPL CALC-SCNC: 9 MEQ/L (ref 8–16)
AST SERPL-CCNC: 37 U/L (ref 15–37)
BASOPHILS # BLD AUTO: 0 K/MM3 (ref 0–0.2)
BASOPHILS NFR BLD AUTO: 1 % (ref 0–1)
BILIRUB SERPL-MCNC: 3.4 MG/DL (ref 0.2–1)
BUN SERPL-MCNC: 26 MG/DL (ref 7–18)
BUN SERPL-MCNC: 27 MG/DL (ref 7–18)
CALCIUM SERPL-MCNC: 7.6 MG/DL (ref 8.5–10.1)
CALCIUM SERPL-MCNC: 7.7 MG/DL (ref 8.5–10.1)
CHLORIDE SERPL-SCNC: 106 MEQ/L (ref 98–107)
CHLORIDE SERPL-SCNC: 108 MEQ/L (ref 98–107)
CO2 SERPL-SCNC: 21 MEQ/L (ref 21–32)
CO2 SERPL-SCNC: 22 MEQ/L (ref 21–32)
CREAT SERPL-MCNC: 1.61 MG/DL (ref 0.55–1.02)
CREAT SERPL-MCNC: 1.87 MG/DL (ref 0.55–1.02)
EOSINOPHIL # BLD AUTO: 0.2 K/MM3 (ref 0–0.5)
EOSINOPHIL NFR BLD AUTO: 3.9 % (ref 0–3)
ERYTHROCYTE [DISTWIDTH] IN BLOOD BY AUTOMATED COUNT: 13.2 % (ref 11.5–14.5)
GFR SERPL CREATININE-BSD FRML MDRD: 29.5 ML/MIN/{1.73_M2} (ref 51–?)
GFR SERPL CREATININE-BSD FRML MDRD: 35.1 ML/MIN/{1.73_M2} (ref 51–?)
GLUCOSE SERPL-MCNC: 108 MG/DL (ref 70–105)
GLUCOSE SERPL-MCNC: 94 MG/DL (ref 70–105)
INR PPP: 1.32
LARGE UNSTAINED CELL #: 0.1 K/MM3 (ref 0–0.4)
LARGE UNSTAINED CELL %: 2.5 % (ref 0–4)
LYMPHOCYTES # BLD AUTO: 1.5 K/MM3 (ref 1.5–4.5)
LYMPHOCYTES NFR BLD AUTO: 23.5 % (ref 24–44)
MCH RBC QN AUTO: 33.5 PG (ref 27–33)
MCHC RBC AUTO-ENTMCNC: 33.6 G/DL (ref 32–36.5)
MCV RBC AUTO: 99.6 FL (ref 80–96)
MONOCYTES # BLD AUTO: 0.6 K/MM3 (ref 0–0.8)
MONOCYTES NFR BLD AUTO: 9.9 % (ref 0–5)
NEUTROPHILS # BLD AUTO: 3.4 K/MM3 (ref 1.8–7.7)
NEUTROPHILS NFR BLD AUTO: 59.3 % (ref 36–66)
PLATELET # BLD AUTO: 186 K/MM3 (ref 150–450)
POTASSIUM SERPL-SCNC: 3.9 MEQ/L (ref 3.5–5.1)
POTASSIUM SERPL-SCNC: 4.2 MEQ/L (ref 3.5–5.1)
PROT SERPL-MCNC: 6.3 GM/DL (ref 6.4–8.2)
SODIUM SERPL-SCNC: 137 MEQ/L (ref 136–145)
SODIUM SERPL-SCNC: 140 MEQ/L (ref 136–145)
WBC # BLD AUTO: 5.7 K/MM3 (ref 4–10)

## 2017-08-27 RX ADMIN — BETAXOLOL HYDROCHLORIDE SCH DROP: 5 SOLUTION/ DROPS OPHTHALMIC at 08:33

## 2017-08-27 RX ADMIN — LACTULOSE SCH ML: 10 SOLUTION ORAL at 16:24

## 2017-08-27 RX ADMIN — BRIMONIDINE TARTRATE SCH DROP: 1.5 SOLUTION OPHTHALMIC at 08:32

## 2017-08-27 RX ADMIN — BETAXOLOL HYDROCHLORIDE SCH DROP: 5 SOLUTION/ DROPS OPHTHALMIC at 20:03

## 2017-08-27 RX ADMIN — BRIMONIDINE TARTRATE SCH DROP: 1.5 SOLUTION OPHTHALMIC at 20:02

## 2017-08-27 RX ADMIN — MULTIPLE VITAMINS W/ MINERALS TAB SCH TAB: TAB at 08:32

## 2017-08-27 RX ADMIN — Medication SCH MG: at 08:32

## 2017-08-27 RX ADMIN — RIFAXIMIN SCH MG: 550 TABLET ORAL at 08:32

## 2017-08-27 RX ADMIN — LATANOPROST SCH DROP: 50 SOLUTION OPHTHALMIC at 20:03

## 2017-08-27 RX ADMIN — LEVOTHYROXINE SODIUM SCH MCG: 150 TABLET ORAL at 05:26

## 2017-08-27 RX ADMIN — RIFAXIMIN SCH MG: 550 TABLET ORAL at 20:03

## 2017-08-27 RX ADMIN — LACTULOSE SCH ML: 10 SOLUTION ORAL at 08:32

## 2017-08-27 RX ADMIN — FOLIC ACID SCH MG: 1 TABLET ORAL at 08:32

## 2017-08-27 RX ADMIN — LACTULOSE SCH ML: 10 SOLUTION ORAL at 20:02

## 2017-08-27 RX ADMIN — BRIMONIDINE TARTRATE SCH DROP: 1.5 SOLUTION OPHTHALMIC at 16:21

## 2017-08-27 RX ADMIN — SODIUM CHLORIDE SCH UNITS: 4.5 INJECTION, SOLUTION INTRAVENOUS at 05:26

## 2017-08-27 NOTE — ECGEPIP
Stationary ECG Study

                           [facility] - ED

                                       

                                       Test Date:    2017

Pat Name:     JAMEL HARVEY             Department:   

Patient ID:   [id number]                 Room:         -

Gender:       F                        Technician:   NALINI

:          1960               Requested By: RAUL MATTSON

Order Number: [id number]     Reading MD:   Saundra Urrutia

                                 Measurements

Intervals                              Axis          

Rate:         89                       P:            29

NC:           151                      QRS:          -12

QRSD:         85                       T:            30

QT:           381                                    

QTc:          466                                    

                           Interpretive Statements

SINUS RHYTHM

LOW QRS VOLTAGE IN PRECORDIAL LEADS

NONSPECIFIC T-WAVE ABNORMALITY

PRWP

SIMILAR 17

Electronically Signed On 2017 8:13:54 EDT by Saundra Urrutia

## 2017-08-27 NOTE — REP
RENAL ULTRASOUND:

 

HISTORY:  Acute kidney disease.

 

The right kidney measures 4.8 cm in transverse by 4.8 cm in AP by 10.8 cm in

cephalocaudal dimensions.  The left kidney measures 4 cm in transverse by 4.5 cm

in AP by 10.5 cm in cephalocaudal dimensions.  There is no hydronephrosis or

mass.  There are no filling defects in the urinary bladder.  A moderate amount of

ascites is present.

 

IMPRESSION:

 

There is a moderate amount of ascites.

 

 

Signed by

Delroy Davidson MD 08/27/2017 08:49 A

## 2017-08-27 NOTE — IPN
DATE:  08/27/2017

 

Patient continues to be orthostatic, blood pressure sitting is 83 systolic and

supine is 103.  Urine output overnight was not documented.  Current weight is 81

kg.  Patient had improvement in abdominal distention status post paracentesis.

Had a low grade temperature of 100.1.

 

Temperature 99.1, pulse 86, respiratory rate 18, blood pressure 103/63.

Generally, awake, alert, oriented to person, place and time.  Anicteric sclera.

No jaundice.  No jugular venous distention, thyromegaly, lymphadenopathy.

Lung:  Clear to auscultation.  No wheezing, rales or rhonchi.

Heart:  S1,S2.  Sinus rhythm.

Abdomen:  Soft, hyperactive bowel sound.  Decreased distention.

Extremities: No pitting edema.

 

LAB DATA:  CBC, metabolic panel have been reviewed.  Creatinine is 1.6 improved

from 2.23 on admission.  Albumin is 1.8.

 

ASSESSMENT AND PLAN:  This is a 57-year-old female with history of alcoholic

liver cirrhosis, hypothyroidism, hypertension, restless leg syndrome,

nephrolithiasis, grade 1 diastolic dysfunction, urogenital candida, history of

ascus, Human papillomavirus, Methicillin-sensitive staphylococcus aureus (MSSA),

bacteremia, E. Coli urinary tract infection and elevated ammonia level presents

to the emergency room with low blood pressure.  Patient had 6 liters removed in

the ER,  Signed out against medical advise.  Was holding her blood pressure

medications and was admitted for hypotension orthostasis.

 

CURRENT ISSUES:

1.  Decompensated liver cirrhosis secondary to alcoholism.  Patient is requiring

albumin infusion secondary to severe orthostasis and acute kidney injury.  Will

transfuse 2 units of percent albumin, monitor input and output, renal ultrasound

shows no obstruction.

 

2.  Severe hypotension secondary to decompensated liver cirrhosis.  Had a low

grade temperature.  Monitor for now of 100.4 or higher.  Will empirically 
treated

for SVP.  Await results of patients peritoneal fluid.  May benefit from Xifaxan.

 

 

3.  Acute kidney injury secondary to third spacing  into the

peritoneal area with decrease in renal perfusion and continued use of her

anti-hypertensives.

 

4.  Avoid nephrotoxins.  Renally dose all medications.

 

5.  Alcoholism.  Delirium tremens precautions, multivitamin, thiamine and 
folate.

MTDD

## 2017-08-28 VITALS — SYSTOLIC BLOOD PRESSURE: 103 MMHG | DIASTOLIC BLOOD PRESSURE: 61 MMHG

## 2017-08-28 VITALS — DIASTOLIC BLOOD PRESSURE: 52 MMHG | SYSTOLIC BLOOD PRESSURE: 90 MMHG

## 2017-08-28 VITALS — DIASTOLIC BLOOD PRESSURE: 50 MMHG | SYSTOLIC BLOOD PRESSURE: 102 MMHG

## 2017-08-28 VITALS — DIASTOLIC BLOOD PRESSURE: 59 MMHG | SYSTOLIC BLOOD PRESSURE: 98 MMHG

## 2017-08-28 VITALS — DIASTOLIC BLOOD PRESSURE: 54 MMHG | SYSTOLIC BLOOD PRESSURE: 65 MMHG

## 2017-08-28 VITALS — SYSTOLIC BLOOD PRESSURE: 108 MMHG | DIASTOLIC BLOOD PRESSURE: 64 MMHG

## 2017-08-28 VITALS — SYSTOLIC BLOOD PRESSURE: 90 MMHG | DIASTOLIC BLOOD PRESSURE: 59 MMHG

## 2017-08-28 LAB
ALBUMIN SERPL BCG-MCNC: 2 GM/DL (ref 3.2–5.2)
ALBUMIN/GLOB SERPL: 0.53 {RATIO} (ref 1–1.93)
ALP SERPL-CCNC: 140 U/L (ref 45–117)
ALT SERPL W P-5'-P-CCNC: 22 U/L (ref 12–78)
ANION GAP SERPL CALC-SCNC: 12 MEQ/L (ref 8–16)
AST SERPL-CCNC: 33 U/L (ref 15–37)
BASOPHILS # BLD AUTO: 0 K/MM3 (ref 0–0.2)
BASOPHILS NFR BLD AUTO: 0.7 % (ref 0–1)
BILIRUB SERPL-MCNC: 3.3 MG/DL (ref 0.2–1)
BUN SERPL-MCNC: 23 MG/DL (ref 7–18)
CALCIUM SERPL-MCNC: 7.4 MG/DL (ref 8.5–10.1)
CHLORIDE SERPL-SCNC: 109 MEQ/L (ref 98–107)
CO2 SERPL-SCNC: 19 MEQ/L (ref 21–32)
CREAT SERPL-MCNC: 1.53 MG/DL (ref 0.55–1.02)
EOSINOPHIL # BLD AUTO: 0.2 K/MM3 (ref 0–0.5)
EOSINOPHIL NFR BLD AUTO: 3.8 % (ref 0–3)
ERYTHROCYTE [DISTWIDTH] IN BLOOD BY AUTOMATED COUNT: 13.2 % (ref 11.5–14.5)
GFR SERPL CREATININE-BSD FRML MDRD: 37.2 ML/MIN/{1.73_M2} (ref 51–?)
GLUCOSE SERPL-MCNC: 89 MG/DL (ref 70–105)
LARGE UNSTAINED CELL #: 0.1 K/MM3 (ref 0–0.4)
LARGE UNSTAINED CELL %: 1.9 % (ref 0–4)
LYMPHOCYTES # BLD AUTO: 1.7 K/MM3 (ref 1.5–4.5)
LYMPHOCYTES NFR BLD AUTO: 30.2 % (ref 24–44)
MCH RBC QN AUTO: 33.5 PG (ref 27–33)
MCHC RBC AUTO-ENTMCNC: 33.5 G/DL (ref 32–36.5)
MCV RBC AUTO: 100.1 FL (ref 80–96)
MONOCYTES # BLD AUTO: 0.5 K/MM3 (ref 0–0.8)
MONOCYTES NFR BLD AUTO: 9.6 % (ref 0–5)
NEUTROPHILS # BLD AUTO: 2.9 K/MM3 (ref 1.8–7.7)
NEUTROPHILS NFR BLD AUTO: 53.8 % (ref 36–66)
PLATELET # BLD AUTO: 176 K/MM3 (ref 150–450)
POTASSIUM SERPL-SCNC: 3.7 MEQ/L (ref 3.5–5.1)
PROT SERPL-MCNC: 5.8 GM/DL (ref 6.4–8.2)
SODIUM SERPL-SCNC: 140 MEQ/L (ref 136–145)
WBC # BLD AUTO: 5.4 K/MM3 (ref 4–10)

## 2017-08-28 PROCEDURE — 0W9G3ZZ DRAINAGE OF PERITONEAL CAVITY, PERCUTANEOUS APPROACH: ICD-10-PCS | Performed by: RADIOLOGY

## 2017-08-28 PROCEDURE — 30233J1 TRANSFUSION OF NONAUTOLOGOUS SERUM ALBUMIN INTO PERIPHERAL VEIN, PERCUTANEOUS APPROACH: ICD-10-PCS | Performed by: HOSPITALIST

## 2017-08-28 RX ADMIN — RIFAXIMIN SCH MG: 550 TABLET ORAL at 19:56

## 2017-08-28 RX ADMIN — BRIMONIDINE TARTRATE SCH DROP: 1.5 SOLUTION OPHTHALMIC at 15:28

## 2017-08-28 RX ADMIN — BETAXOLOL HYDROCHLORIDE SCH DROP: 5 SOLUTION/ DROPS OPHTHALMIC at 19:57

## 2017-08-28 RX ADMIN — MULTIPLE VITAMINS W/ MINERALS TAB SCH TAB: TAB at 09:06

## 2017-08-28 RX ADMIN — PANTOPRAZOLE SODIUM SCH MG: 40 TABLET, DELAYED RELEASE ORAL at 12:42

## 2017-08-28 RX ADMIN — Medication SCH MG: at 09:07

## 2017-08-28 RX ADMIN — LACTULOSE SCH ML: 10 SOLUTION ORAL at 09:06

## 2017-08-28 RX ADMIN — MIDODRINE HYDROCHLORIDE SCH MG: 5 TABLET ORAL at 12:42

## 2017-08-28 RX ADMIN — LEVOTHYROXINE SODIUM SCH MCG: 150 TABLET ORAL at 05:35

## 2017-08-28 RX ADMIN — MIDODRINE HYDROCHLORIDE SCH MG: 5 TABLET ORAL at 15:27

## 2017-08-28 RX ADMIN — BRIMONIDINE TARTRATE SCH DROP: 1.5 SOLUTION OPHTHALMIC at 09:08

## 2017-08-28 RX ADMIN — LACTULOSE SCH ML: 10 SOLUTION ORAL at 19:55

## 2017-08-28 RX ADMIN — FOLIC ACID SCH MG: 1 TABLET ORAL at 09:07

## 2017-08-28 RX ADMIN — LACTULOSE SCH ML: 10 SOLUTION ORAL at 15:27

## 2017-08-28 RX ADMIN — RIFAXIMIN SCH MG: 550 TABLET ORAL at 09:06

## 2017-08-28 RX ADMIN — BRIMONIDINE TARTRATE SCH DROP: 1.5 SOLUTION OPHTHALMIC at 19:57

## 2017-08-28 RX ADMIN — LATANOPROST SCH DROP: 50 SOLUTION OPHTHALMIC at 19:57

## 2017-08-28 RX ADMIN — BETAXOLOL HYDROCHLORIDE SCH DROP: 5 SOLUTION/ DROPS OPHTHALMIC at 09:08

## 2017-08-28 NOTE — REP
Ultrasound-guided paracentesis

 

The procedure was performed under the direct supervision of Dr. Deng.

 

The risks and benefits of the procedure were explained to the patient and

informed consent was obtained.  The largest pocket of fluid was localized in the

left flank using ultrasound guidance.  The skin was prepped and draped in a

sterile fashion.  1% lidocaine was used as a local anesthetic.  An 8-[place] multi

side-hole catheter was inserted using trocar technique.  4,800 ml of yellow

colored fluid was withdrawn and discarded.

 

The the patient tolerated the procedure well and there were no immediate

complications.  After the appropriate amount of monitored convalescence the

patient was discharged from the department.

 

 

Reviewed by

YOON Dozier 08/28/2017 03:16 PSigned by

Alistair Deng MD 08/28/2017 06:46 P

## 2017-08-28 NOTE — IPN
DATE:  08/28/2017

 

Patient seen and examined at the bedside.  Chart has been reviewed.

 

No shortness of breath, chest pain, pressure, or tightness.  Still with distended

abdomen.  No bleeding, bright red blood per rectum, melena, fever or chills

overnight.  Patient remains orthostatic.  Systolic pressure is 77 when standing,

supine of 103/61.

 

Temperature 98.7, pulse 92, respiratory rate 18, blood pressure 103/61 supine,

standing 77/44.  Generally, patient is awake, alert and oriented times three,

answering questions appropriately.  No respiratory distress.  No use of accessory

muscles.  Positive icterus and mild jaundice.  Heart S1, S2.  Sinus rhythm.

Abdomen soft, distended, positive bowel sounds.  Mild fluid wave.

 

Patient's MELD score is 18.

 

LABORATORY DATA:  CBC, CMP, imaging studies have been reviewed.

 

ASSESSMENT/PLAN:  This is a 57-year-old female with history of alcohol abuse,

liver cirrhosis secondary to alcohol use, restless leg, hypothyroidism,

hypertension, nephrolithiasis, grade 1 diastolic dysfunction, urogenital candida,

history of ASCUS human papillomavirus, Methicillin-sensitive staphylococcus

aureus (MSSA) bacteremia, E. Coli urinary tract infection and elevated ammonia

level presents to the emergency room with low blood pressure status post 6 liters

removal of ascetic fluid via paracentesis due to increasing abdominal distention.

The patient signed out against medical advise and represents to the emergency

room due to persistent orthostatic hypotension.

 

CURRENT ISSUES:

1.  Decompensated liver cirrhosis secondary to chronic alcoholic disease

currently with alcoholic hepatitis.  Patient is requiring albumin infusion due to

severe orthostasis and acute kidney injury.  Will transfuse 1 more units of 5%

albumin, monitor input and output, renal ultrasound shows no obstruction.  Send

for ultrasound of the abdomen to check for ascites and paracentesis if needed.

 

2.  Severe hypotension secondary to decompensated liver cirrhosis.  Had a low

grade temperature of 100.4.  Will give Xifaxan for now.  Patient will be given

midodrine and continue with albumin infusion.

 

3.  Acute kidney injury secondary to third spacing intraperitoneal area with

decrease in renal perfusion and continued use of her home anti-hypertensives.

Her home blood pressure medications have been discontinued.  Avoid nephrotoxins.

Renally dose all medications.  Continue to transfuse one more unit of 5% albumin

solution, midodrine to stabilize the patient's blood pressure and strict intake

and output.

 

4.  Alcoholic hepatitis.  MELD score is 18.  May benefit from low dose

prednisone.  Therefore will given PPI and low dose prednisone to be tapered as an

outpatient.

 

5.  Alcoholism.  Delirium tremens precautions, multivitamin, thiamine and folate,

Serax and Ativan as needed.

## 2017-08-28 NOTE — REP
PARACENTESIS:

 

The procedure was performed by YOON Linda under the direct

supervision of Dr. Khan.

 

The procedure along with its risks, benefits and complications were discussed

with the patient prior to the procedure.  Informed consent was obtained both

verbally and written.

 

The patient was identified in the ultrasound suite and placed in the supine

position. The bilateral lower quadrants were interrogated with ultrasound.  The

right lower quadrant demonstrated a moderate sized fluid collection.  An

appropriate site was chosen for needle entry and this area was marked, prepped

and draped in the usual sterile fashion.  Procedural "time-out" was performed to

ensure that the correct patient, site and procedure were being performed.  Local

infiltrative anesthesia was achieved using 1% Xylocaine.  A 19-gauge centesis

catheter was advanced through the abdominal wall under continuous negative

pressure until serous fluid was aspirated.  The needle was removed and the

catheter was advanced.  Approximately 6800 mL of yellow- green clear fluid was

removed.  The catheter was then removed, hemostasis was achieved and a soft

dressing was applied to the entry site.  The patient tolerated the procedure well

and had no immediate complications.  She was discharged back to the emergency

room.

 

 

Reviewed by

YOON King 08/28/2017 10:19 AEdited and Signed by

Sushant Khan MD 08/28/2017 04:35 P

## 2017-08-29 VITALS — SYSTOLIC BLOOD PRESSURE: 91 MMHG | DIASTOLIC BLOOD PRESSURE: 54 MMHG

## 2017-08-29 VITALS — SYSTOLIC BLOOD PRESSURE: 70 MMHG | DIASTOLIC BLOOD PRESSURE: 50 MMHG

## 2017-08-29 VITALS — DIASTOLIC BLOOD PRESSURE: 54 MMHG | SYSTOLIC BLOOD PRESSURE: 94 MMHG

## 2017-08-29 VITALS — SYSTOLIC BLOOD PRESSURE: 101 MMHG | DIASTOLIC BLOOD PRESSURE: 60 MMHG

## 2017-08-29 VITALS — SYSTOLIC BLOOD PRESSURE: 96 MMHG | DIASTOLIC BLOOD PRESSURE: 55 MMHG

## 2017-08-29 VITALS — DIASTOLIC BLOOD PRESSURE: 59 MMHG | SYSTOLIC BLOOD PRESSURE: 103 MMHG

## 2017-08-29 VITALS — DIASTOLIC BLOOD PRESSURE: 43 MMHG | SYSTOLIC BLOOD PRESSURE: 79 MMHG

## 2017-08-29 VITALS — DIASTOLIC BLOOD PRESSURE: 54 MMHG | SYSTOLIC BLOOD PRESSURE: 91 MMHG

## 2017-08-29 VITALS — DIASTOLIC BLOOD PRESSURE: 52 MMHG | SYSTOLIC BLOOD PRESSURE: 98 MMHG

## 2017-08-29 LAB
ALBUMIN SERPL BCG-MCNC: 2.5 GM/DL (ref 3.2–5.2)
ALBUMIN/GLOB SERPL: 0.61 {RATIO} (ref 1–1.93)
ALP SERPL-CCNC: 155 U/L (ref 45–117)
ALT SERPL W P-5'-P-CCNC: 25 U/L (ref 12–78)
ANION GAP SERPL CALC-SCNC: 10 MEQ/L (ref 8–16)
AST SERPL-CCNC: 31 U/L (ref 15–37)
BASOPHILS # BLD AUTO: 0 K/MM3 (ref 0–0.2)
BASOPHILS NFR BLD AUTO: 0.1 % (ref 0–1)
BILIRUB SERPL-MCNC: 3.2 MG/DL (ref 0.2–1)
BUN SERPL-MCNC: 28 MG/DL (ref 7–18)
CALCIUM SERPL-MCNC: 7.7 MG/DL (ref 8.5–10.1)
CHLORIDE SERPL-SCNC: 109 MEQ/L (ref 98–107)
CO2 SERPL-SCNC: 19 MEQ/L (ref 21–32)
CREAT SERPL-MCNC: 1.9 MG/DL (ref 0.55–1.02)
EOSINOPHIL # BLD AUTO: 0.1 K/MM3 (ref 0–0.5)
EOSINOPHIL NFR BLD AUTO: 0.8 % (ref 0–3)
ERYTHROCYTE [DISTWIDTH] IN BLOOD BY AUTOMATED COUNT: 13.3 % (ref 11.5–14.5)
GFR SERPL CREATININE-BSD FRML MDRD: 29 ML/MIN/{1.73_M2} (ref 51–?)
GLUCOSE SERPL-MCNC: 115 MG/DL (ref 70–105)
LARGE UNSTAINED CELL #: 0.1 K/MM3 (ref 0–0.4)
LARGE UNSTAINED CELL %: 0.8 % (ref 0–4)
LYMPHOCYTES # BLD AUTO: 1.2 K/MM3 (ref 1.5–4.5)
LYMPHOCYTES NFR BLD AUTO: 11.5 % (ref 24–44)
MCH RBC QN AUTO: 33.4 PG (ref 27–33)
MCHC RBC AUTO-ENTMCNC: 32.9 G/DL (ref 32–36.5)
MCV RBC AUTO: 101.4 FL (ref 80–96)
MONOCYTES # BLD AUTO: 0.5 K/MM3 (ref 0–0.8)
MONOCYTES NFR BLD AUTO: 5.1 % (ref 0–5)
NEUTROPHILS # BLD AUTO: 8 K/MM3 (ref 1.8–7.7)
NEUTROPHILS NFR BLD AUTO: 81.7 % (ref 36–66)
PLATELET # BLD AUTO: 196 K/MM3 (ref 150–450)
POTASSIUM SERPL-SCNC: 3.9 MEQ/L (ref 3.5–5.1)
PROT SERPL-MCNC: 6.6 GM/DL (ref 6.4–8.2)
SODIUM SERPL-SCNC: 138 MEQ/L (ref 136–145)
WBC # BLD AUTO: 9.8 K/MM3 (ref 4–10)

## 2017-08-29 RX ADMIN — Medication SCH MG: at 08:41

## 2017-08-29 RX ADMIN — PANTOPRAZOLE SODIUM SCH MG: 40 TABLET, DELAYED RELEASE ORAL at 08:41

## 2017-08-29 RX ADMIN — FOLIC ACID SCH MG: 1 TABLET ORAL at 08:41

## 2017-08-29 RX ADMIN — RIFAXIMIN SCH MG: 550 TABLET ORAL at 08:42

## 2017-08-29 RX ADMIN — BRIMONIDINE TARTRATE SCH DROP: 1.5 SOLUTION OPHTHALMIC at 16:37

## 2017-08-29 RX ADMIN — BETAXOLOL HYDROCHLORIDE SCH DROP: 5 SOLUTION/ DROPS OPHTHALMIC at 08:44

## 2017-08-29 RX ADMIN — BRIMONIDINE TARTRATE SCH DROP: 1.5 SOLUTION OPHTHALMIC at 08:44

## 2017-08-29 RX ADMIN — BRIMONIDINE TARTRATE SCH DROP: 1.5 SOLUTION OPHTHALMIC at 19:58

## 2017-08-29 RX ADMIN — LATANOPROST SCH DROP: 50 SOLUTION OPHTHALMIC at 19:58

## 2017-08-29 RX ADMIN — LEVOTHYROXINE SODIUM SCH MCG: 150 TABLET ORAL at 05:52

## 2017-08-29 RX ADMIN — MIDODRINE HYDROCHLORIDE SCH MG: 5 TABLET ORAL at 16:37

## 2017-08-29 RX ADMIN — MIDODRINE HYDROCHLORIDE SCH MG: 5 TABLET ORAL at 12:23

## 2017-08-29 RX ADMIN — RIFAXIMIN SCH MG: 550 TABLET ORAL at 19:57

## 2017-08-29 RX ADMIN — LACTULOSE SCH ML: 10 SOLUTION ORAL at 16:37

## 2017-08-29 RX ADMIN — BETAXOLOL HYDROCHLORIDE SCH DROP: 5 SOLUTION/ DROPS OPHTHALMIC at 19:58

## 2017-08-29 RX ADMIN — MIDODRINE HYDROCHLORIDE SCH MG: 5 TABLET ORAL at 08:42

## 2017-08-29 RX ADMIN — MULTIPLE VITAMINS W/ MINERALS TAB SCH TAB: TAB at 08:41

## 2017-08-29 RX ADMIN — LACTULOSE SCH ML: 10 SOLUTION ORAL at 19:57

## 2017-08-29 RX ADMIN — LACTULOSE SCH ML: 10 SOLUTION ORAL at 08:41

## 2017-08-29 NOTE — IPN
DATE:  08/29/2017

 

57-year-old female seen at bedside resting she denies chest pain, nausea,

vomiting and did state that she underwent paracentesis yesterday, which felt 
that

it did help relieve the abdominal distension.  She is to receive another

transfusion of albumin today.  She continues on midodrine

 

OBJECTIVE:

Temperature is 98.2, pulse 77, respiratory rate is 17 nonlabored, blood pressure

117/59.  She does appear to be less orthostatic today, SPO2 is 97% on room air.

Paracentesis by ultrasound done yesterday was able to remove 4800 mL of yellow

colored fluid.

 

ASSESSMENT/PLAN:

1.  Decompensated liver cirrhosis secondary to chronic alcohol and alcoholic

hepatitis.  Her orthostasis does appear to be improving. Her discriminate

function was calculated 20.2 which is below 32 and does not indicate the need.

MELD score on admission was 22.  Currently her calculated MELD score is 20 and

she continues on midodrine daily albumin. I am concerned for the possibility of

hepatorenal syndrome.  Will see how she does over the next 24 hours.  We may 
need

to consider whether or not she would be a candidate for TIPS procedure.  However
,

due to the fact that she continues to drink alcohol that she would need to be

alcohol free for a minimum of 6 months before being considered for liver

transplant.

 

2.  Acute kidney injury secondary to third spacing possible hepatorenal 
syndrome.

Again she continues on albumen midodrine

 

3.  DVT prophylaxis:  Thromboembolic deterrent stockings (TEDS) and sequentials.

 

DISPOSITION:

The patient's prognosis is guarded.  I did notice that Dr. Covarrubias had started

her on  prednisone taper as well as proton pump inhibitor (PPI) with Protonix,

ceftriaxone,  multivitamin, thiamine, folic acid.  I had the opportunity to 
discuss this case further with GI and at present it is not felt that the 
patient is a candidate for TIPS. She likely has some degree of hepatorenal 
syndrome, but it appears her liver function is showing signs of improvement. 
Will continue with the current course of action.

PEYTON

## 2017-08-30 VITALS — DIASTOLIC BLOOD PRESSURE: 62 MMHG | SYSTOLIC BLOOD PRESSURE: 92 MMHG

## 2017-08-30 VITALS — DIASTOLIC BLOOD PRESSURE: 62 MMHG | SYSTOLIC BLOOD PRESSURE: 108 MMHG

## 2017-08-30 VITALS — DIASTOLIC BLOOD PRESSURE: 52 MMHG | SYSTOLIC BLOOD PRESSURE: 84 MMHG

## 2017-08-30 VITALS — DIASTOLIC BLOOD PRESSURE: 54 MMHG | SYSTOLIC BLOOD PRESSURE: 92 MMHG

## 2017-08-30 VITALS — SYSTOLIC BLOOD PRESSURE: 116 MMHG | DIASTOLIC BLOOD PRESSURE: 59 MMHG

## 2017-08-30 VITALS — SYSTOLIC BLOOD PRESSURE: 92 MMHG | DIASTOLIC BLOOD PRESSURE: 54 MMHG

## 2017-08-30 VITALS — DIASTOLIC BLOOD PRESSURE: 59 MMHG | SYSTOLIC BLOOD PRESSURE: 133 MMHG

## 2017-08-30 LAB
ALBUMIN SERPL BCG-MCNC: 2.1 GM/DL (ref 3.2–5.2)
ALBUMIN/GLOB SERPL: 0.54 {RATIO} (ref 1–1.93)
ALP SERPL-CCNC: 142 U/L (ref 45–117)
ALT SERPL W P-5'-P-CCNC: 28 U/L (ref 12–78)
ANION GAP SERPL CALC-SCNC: 11 MEQ/L (ref 8–16)
AST SERPL-CCNC: 30 U/L (ref 15–37)
BASOPHILS # BLD AUTO: 0 K/MM3 (ref 0–0.2)
BASOPHILS NFR BLD AUTO: 0.2 % (ref 0–1)
BILIRUB SERPL-MCNC: 2.6 MG/DL (ref 0.2–1)
BUN SERPL-MCNC: 30 MG/DL (ref 7–18)
CALCIUM SERPL-MCNC: 7.8 MG/DL (ref 8.5–10.1)
CHLORIDE SERPL-SCNC: 112 MEQ/L (ref 98–107)
CO2 SERPL-SCNC: 19 MEQ/L (ref 21–32)
CREAT SERPL-MCNC: 1.85 MG/DL (ref 0.55–1.02)
EOSINOPHIL # BLD AUTO: 0.1 K/MM3 (ref 0–0.5)
EOSINOPHIL NFR BLD AUTO: 0.7 % (ref 0–3)
ERYTHROCYTE [DISTWIDTH] IN BLOOD BY AUTOMATED COUNT: 13.3 % (ref 11.5–14.5)
GFR SERPL CREATININE-BSD FRML MDRD: 29.9 ML/MIN/{1.73_M2} (ref 51–?)
GLUCOSE SERPL-MCNC: 93 MG/DL (ref 70–105)
INR PPP: 1.35
LARGE UNSTAINED CELL #: 0.1 K/MM3 (ref 0–0.4)
LARGE UNSTAINED CELL %: 1.2 % (ref 0–4)
LYMPHOCYTES # BLD AUTO: 1.5 K/MM3 (ref 1.5–4.5)
LYMPHOCYTES NFR BLD AUTO: 14.7 % (ref 24–44)
MCH RBC QN AUTO: 33 PG (ref 27–33)
MCHC RBC AUTO-ENTMCNC: 33.3 G/DL (ref 32–36.5)
MCV RBC AUTO: 98.9 FL (ref 80–96)
MONOCYTES # BLD AUTO: 0.6 K/MM3 (ref 0–0.8)
MONOCYTES NFR BLD AUTO: 6.6 % (ref 0–5)
NEUTROPHILS # BLD AUTO: 7 K/MM3 (ref 1.8–7.7)
NEUTROPHILS NFR BLD AUTO: 76.5 % (ref 36–66)
PLATELET # BLD AUTO: 221 K/MM3 (ref 150–450)
POTASSIUM SERPL-SCNC: 4 MEQ/L (ref 3.5–5.1)
PROT SERPL-MCNC: 6 GM/DL (ref 6.4–8.2)
SODIUM SERPL-SCNC: 142 MEQ/L (ref 136–145)
WBC # BLD AUTO: 9.1 K/MM3 (ref 4–10)

## 2017-08-30 RX ADMIN — MIDODRINE HYDROCHLORIDE SCH MG: 5 TABLET ORAL at 16:10

## 2017-08-30 RX ADMIN — LACTULOSE SCH ML: 10 SOLUTION ORAL at 20:40

## 2017-08-30 RX ADMIN — LATANOPROST SCH DROP: 50 SOLUTION OPHTHALMIC at 20:41

## 2017-08-30 RX ADMIN — RIFAXIMIN SCH MG: 550 TABLET ORAL at 20:40

## 2017-08-30 RX ADMIN — BRIMONIDINE TARTRATE SCH DROP: 1.5 SOLUTION OPHTHALMIC at 20:41

## 2017-08-30 RX ADMIN — MULTIPLE VITAMINS W/ MINERALS TAB SCH TAB: TAB at 08:05

## 2017-08-30 RX ADMIN — MIDODRINE HYDROCHLORIDE SCH MG: 5 TABLET ORAL at 08:05

## 2017-08-30 RX ADMIN — Medication SCH MG: at 08:05

## 2017-08-30 RX ADMIN — BETAXOLOL HYDROCHLORIDE SCH DROP: 5 SOLUTION/ DROPS OPHTHALMIC at 20:41

## 2017-08-30 RX ADMIN — FOLIC ACID SCH MG: 1 TABLET ORAL at 08:05

## 2017-08-30 RX ADMIN — BRIMONIDINE TARTRATE SCH DROP: 1.5 SOLUTION OPHTHALMIC at 16:10

## 2017-08-30 RX ADMIN — LEVOTHYROXINE SODIUM SCH MCG: 150 TABLET ORAL at 05:30

## 2017-08-30 RX ADMIN — BRIMONIDINE TARTRATE SCH DROP: 1.5 SOLUTION OPHTHALMIC at 08:06

## 2017-08-30 RX ADMIN — MIDODRINE HYDROCHLORIDE SCH MG: 5 TABLET ORAL at 12:09

## 2017-08-30 RX ADMIN — LACTULOSE SCH ML: 10 SOLUTION ORAL at 16:10

## 2017-08-30 RX ADMIN — PANTOPRAZOLE SODIUM SCH MG: 40 TABLET, DELAYED RELEASE ORAL at 08:05

## 2017-08-30 RX ADMIN — LACTULOSE SCH ML: 10 SOLUTION ORAL at 08:05

## 2017-08-30 RX ADMIN — RIFAXIMIN SCH MG: 550 TABLET ORAL at 08:05

## 2017-08-30 RX ADMIN — BETAXOLOL HYDROCHLORIDE SCH DROP: 5 SOLUTION/ DROPS OPHTHALMIC at 08:05

## 2017-08-30 NOTE — IPN
DATE:  08/30/2017

 

57-year-old female seen at bedside.  She is sitting reading the paper today and

waiting for breakfast.  She denies any overnight issues.  No chest pain, nausea,

vomiting.  No abdominal pain.  No shortness of breath.

 

OBJECTIVE:

Temperature is 97.7, pulse 75 and regular, respiratory rate 18 nonlabored, blood

pressure is 92/54 with mean arterial pressure of 67, SpO2 is 97% on room air.

GENERAL:  The patient appears to be in no acute distress.  She is alert,

pleasant.  HEENT:  Unremarkable.  Eyes are not icteric.  Throat is clear.

LUNGS:  Clear.

HEART:  Regular rate and rhythm.

ABDOMEN:  Obese, soft, nontender.  Positive bowel sounds.  No masses or rebound.

EXTREMITIES:  No edema and no calf tenderness.

 

LABORATORY DATA:

White count 9.1, hemoglobin 12.1, platelets are 221,000.  Sodium is 142,

potassium 4.0, chloride 112, bicarbonate 19, anion gap 11, BUN is 30, creatinine

1.85 down from 1.9, glucose is 93, total bilirubin 2.6 down from 3.2, AST 30, ALT

28, alkaline phosphatase 142, albumin 2.1, INR 1.35.

 

ASSESSMENT AND PLAN:

1.  Decompensated liver cirrhosis secondary to chronic alcohol and alcoholic

hepatitis.  Symptomatically, she does appear to be stable.  We will go ahead and

continue midodrine and another transfusion of albumin today.  Had the opportunity

to discuss the case with gastroenterology yesterday, who did not feel that the

patient was indicated for TIPS procedure at this point and again she is showing

some gradual improvement.

 

2.  Acute kidney injury, likely secondary to third spacing from low albumin and

hepatorenal syndrome.  She will receive another dose of albumin.  Continue with

midodrine.  She has shown some gradual improvement with her creatinine.  We will

continue to follow.

 

3.  Abdominal ascites.  Currently not indicated for further paracentesis.  We

will continue to follow.

 

4.  Obesity, which complicates medical care.

 

5.  History of alcohol abuse.  We will continue to monitor for withdrawal

symptoms.  Continue on folic acid, thiamine, multivitamin daily, as well as

Atarax and Serax for anxiety, agitation.

 

6.  Alcoholic hepatitis, as indicated above.  She is continuing on a prednisone

taper, as well as rifaximin, Lactulose three times a day.  We will check an

ammonia level in the morning.

 

7.  Deep vein thrombosis (DVT) prophylaxis.  TEDs and sequential.  Encouraged to

ambulate with assistance.  Appreciate physical therapy's input.

 

DISPOSITION:  Anticipate the patient will likely be ready for home discharge by

this weekend.

## 2017-08-31 VITALS — DIASTOLIC BLOOD PRESSURE: 72 MMHG | SYSTOLIC BLOOD PRESSURE: 125 MMHG

## 2017-08-31 VITALS — DIASTOLIC BLOOD PRESSURE: 53 MMHG | SYSTOLIC BLOOD PRESSURE: 110 MMHG

## 2017-08-31 VITALS — SYSTOLIC BLOOD PRESSURE: 109 MMHG | DIASTOLIC BLOOD PRESSURE: 66 MMHG

## 2017-08-31 VITALS — DIASTOLIC BLOOD PRESSURE: 85 MMHG | SYSTOLIC BLOOD PRESSURE: 196 MMHG

## 2017-08-31 VITALS — SYSTOLIC BLOOD PRESSURE: 96 MMHG | DIASTOLIC BLOOD PRESSURE: 53 MMHG

## 2017-08-31 VITALS — SYSTOLIC BLOOD PRESSURE: 119 MMHG | DIASTOLIC BLOOD PRESSURE: 60 MMHG

## 2017-08-31 LAB
ALBUMIN SERPL BCG-MCNC: 2.2 GM/DL (ref 3.2–5.2)
ALBUMIN/GLOB SERPL: 0.52 {RATIO} (ref 1–1.93)
ALP SERPL-CCNC: 133 U/L (ref 45–117)
ALT SERPL W P-5'-P-CCNC: 33 U/L (ref 12–78)
ANION GAP SERPL CALC-SCNC: 11 MEQ/L (ref 8–16)
AST SERPL-CCNC: 34 U/L (ref 15–37)
BASOPHILS # BLD AUTO: 0 K/MM3 (ref 0–0.2)
BASOPHILS NFR BLD AUTO: 0.2 % (ref 0–1)
BILIRUB SERPL-MCNC: 2.6 MG/DL (ref 0.2–1)
BUN SERPL-MCNC: 33 MG/DL (ref 7–18)
CALCIUM SERPL-MCNC: 7.9 MG/DL (ref 8.5–10.1)
CHLORIDE SERPL-SCNC: 110 MEQ/L (ref 98–107)
CO2 SERPL-SCNC: 20 MEQ/L (ref 21–32)
CREAT SERPL-MCNC: 1.88 MG/DL (ref 0.55–1.02)
EOSINOPHIL # BLD AUTO: 0.1 K/MM3 (ref 0–0.5)
EOSINOPHIL NFR BLD AUTO: 1.1 % (ref 0–3)
ERYTHROCYTE [DISTWIDTH] IN BLOOD BY AUTOMATED COUNT: 13.2 % (ref 11.5–14.5)
GFR SERPL CREATININE-BSD FRML MDRD: 29.4 ML/MIN/{1.73_M2} (ref 51–?)
GLUCOSE SERPL-MCNC: 89 MG/DL (ref 70–105)
INR PPP: 1.27
LARGE UNSTAINED CELL #: 0.2 K/MM3 (ref 0–0.4)
LARGE UNSTAINED CELL %: 1.6 % (ref 0–4)
LYMPHOCYTES # BLD AUTO: 2.1 K/MM3 (ref 1.5–4.5)
LYMPHOCYTES NFR BLD AUTO: 19.4 % (ref 24–44)
MCH RBC QN AUTO: 33.6 PG (ref 27–33)
MCHC RBC AUTO-ENTMCNC: 33.9 G/DL (ref 32–36.5)
MCV RBC AUTO: 99.1 FL (ref 80–96)
MONOCYTES # BLD AUTO: 0.8 K/MM3 (ref 0–0.8)
MONOCYTES NFR BLD AUTO: 7.9 % (ref 0–5)
NEUTROPHILS # BLD AUTO: 6.9 K/MM3 (ref 1.8–7.7)
NEUTROPHILS NFR BLD AUTO: 69.8 % (ref 36–66)
PLATELET # BLD AUTO: 224 K/MM3 (ref 150–450)
POTASSIUM SERPL-SCNC: 4.1 MEQ/L (ref 3.5–5.1)
PROT SERPL-MCNC: 6.4 GM/DL (ref 6.4–8.2)
SODIUM SERPL-SCNC: 141 MEQ/L (ref 136–145)
WBC # BLD AUTO: 9.8 K/MM3 (ref 4–10)

## 2017-08-31 RX ADMIN — LEVOTHYROXINE SODIUM SCH MCG: 150 TABLET ORAL at 05:57

## 2017-08-31 RX ADMIN — FOLIC ACID SCH MG: 1 TABLET ORAL at 08:10

## 2017-08-31 RX ADMIN — LATANOPROST SCH DROP: 50 SOLUTION OPHTHALMIC at 20:28

## 2017-08-31 RX ADMIN — MULTIPLE VITAMINS W/ MINERALS TAB SCH TAB: TAB at 08:10

## 2017-08-31 RX ADMIN — BETAXOLOL HYDROCHLORIDE SCH DROP: 5 SOLUTION/ DROPS OPHTHALMIC at 10:16

## 2017-08-31 RX ADMIN — RIFAXIMIN SCH MG: 550 TABLET ORAL at 08:10

## 2017-08-31 RX ADMIN — LACTULOSE SCH ML: 10 SOLUTION ORAL at 20:28

## 2017-08-31 RX ADMIN — BRIMONIDINE TARTRATE SCH DROP: 1.5 SOLUTION OPHTHALMIC at 10:16

## 2017-08-31 RX ADMIN — PANTOPRAZOLE SODIUM SCH MG: 40 TABLET, DELAYED RELEASE ORAL at 08:10

## 2017-08-31 RX ADMIN — MIDODRINE HYDROCHLORIDE SCH MG: 5 TABLET ORAL at 16:00

## 2017-08-31 RX ADMIN — BETAXOLOL HYDROCHLORIDE SCH DROP: 5 SOLUTION/ DROPS OPHTHALMIC at 20:28

## 2017-08-31 RX ADMIN — RIFAXIMIN SCH MG: 550 TABLET ORAL at 20:27

## 2017-08-31 RX ADMIN — LACTULOSE SCH ML: 10 SOLUTION ORAL at 18:31

## 2017-08-31 RX ADMIN — Medication SCH MG: at 08:10

## 2017-08-31 RX ADMIN — MIDODRINE HYDROCHLORIDE SCH MG: 5 TABLET ORAL at 08:10

## 2017-08-31 RX ADMIN — BRIMONIDINE TARTRATE SCH DROP: 1.5 SOLUTION OPHTHALMIC at 20:28

## 2017-08-31 RX ADMIN — MIDODRINE HYDROCHLORIDE SCH MG: 5 TABLET ORAL at 12:21

## 2017-08-31 RX ADMIN — LACTULOSE SCH ML: 10 SOLUTION ORAL at 08:10

## 2017-08-31 RX ADMIN — BRIMONIDINE TARTRATE SCH DROP: 1.5 SOLUTION OPHTHALMIC at 18:32

## 2017-09-01 VITALS — SYSTOLIC BLOOD PRESSURE: 110 MMHG | DIASTOLIC BLOOD PRESSURE: 64 MMHG

## 2017-09-01 VITALS — SYSTOLIC BLOOD PRESSURE: 108 MMHG | DIASTOLIC BLOOD PRESSURE: 78 MMHG

## 2017-09-01 VITALS — SYSTOLIC BLOOD PRESSURE: 108 MMHG | DIASTOLIC BLOOD PRESSURE: 85 MMHG

## 2017-09-01 VITALS — SYSTOLIC BLOOD PRESSURE: 111 MMHG | DIASTOLIC BLOOD PRESSURE: 63 MMHG

## 2017-09-01 VITALS — DIASTOLIC BLOOD PRESSURE: 65 MMHG | SYSTOLIC BLOOD PRESSURE: 104 MMHG

## 2017-09-01 VITALS — DIASTOLIC BLOOD PRESSURE: 76 MMHG | SYSTOLIC BLOOD PRESSURE: 119 MMHG

## 2017-09-01 VITALS — DIASTOLIC BLOOD PRESSURE: 63 MMHG | SYSTOLIC BLOOD PRESSURE: 108 MMHG

## 2017-09-01 LAB
ALBUMIN SERPL BCG-MCNC: 2.5 GM/DL (ref 3.2–5.2)
ALBUMIN/GLOB SERPL: 0.68 {RATIO} (ref 1–1.93)
ALP SERPL-CCNC: 164 U/L (ref 45–117)
ALT SERPL W P-5'-P-CCNC: 39 U/L (ref 12–78)
ANION GAP SERPL CALC-SCNC: 10 MEQ/L (ref 8–16)
AST SERPL-CCNC: 42 U/L (ref 15–37)
BASOPHILS # BLD AUTO: 0 K/MM3 (ref 0–0.2)
BASOPHILS NFR BLD AUTO: 0.5 % (ref 0–1)
BILIRUB SERPL-MCNC: 2.4 MG/DL (ref 0.2–1)
BUN SERPL-MCNC: 37 MG/DL (ref 7–18)
CALCIUM SERPL-MCNC: 7.8 MG/DL (ref 8.5–10.1)
CHLORIDE SERPL-SCNC: 109 MEQ/L (ref 98–107)
CO2 SERPL-SCNC: 20 MEQ/L (ref 21–32)
CREAT SERPL-MCNC: 1.91 MG/DL (ref 0.55–1.02)
EOSINOPHIL # BLD AUTO: 0.3 K/MM3 (ref 0–0.5)
EOSINOPHIL NFR BLD AUTO: 2.7 % (ref 0–3)
ERYTHROCYTE [DISTWIDTH] IN BLOOD BY AUTOMATED COUNT: 13.2 % (ref 11.5–14.5)
GFR SERPL CREATININE-BSD FRML MDRD: 28.8 ML/MIN/{1.73_M2} (ref 51–?)
GLUCOSE SERPL-MCNC: 104 MG/DL (ref 70–105)
INR PPP: 1.29
LARGE UNSTAINED CELL #: 0.2 K/MM3 (ref 0–0.4)
LARGE UNSTAINED CELL %: 1.7 % (ref 0–4)
LYMPHOCYTES # BLD AUTO: 2.1 K/MM3 (ref 1.5–4.5)
LYMPHOCYTES NFR BLD AUTO: 21.4 % (ref 24–44)
MCH RBC QN AUTO: 33.2 PG (ref 27–33)
MCHC RBC AUTO-ENTMCNC: 33 G/DL (ref 32–36.5)
MCV RBC AUTO: 100.5 FL (ref 80–96)
MONOCYTES # BLD AUTO: 0.6 K/MM3 (ref 0–0.8)
MONOCYTES NFR BLD AUTO: 6.3 % (ref 0–5)
NEUTROPHILS # BLD AUTO: 6.6 K/MM3 (ref 1.8–7.7)
NEUTROPHILS NFR BLD AUTO: 67.4 % (ref 36–66)
PLATELET # BLD AUTO: 251 K/MM3 (ref 150–450)
POTASSIUM SERPL-SCNC: 4 MEQ/L (ref 3.5–5.1)
PROT SERPL-MCNC: 6.2 GM/DL (ref 6.4–8.2)
SODIUM SERPL-SCNC: 139 MEQ/L (ref 136–145)
WBC # BLD AUTO: 9.7 K/MM3 (ref 4–10)

## 2017-09-01 RX ADMIN — BETAXOLOL HYDROCHLORIDE SCH DROP: 5 SOLUTION/ DROPS OPHTHALMIC at 09:38

## 2017-09-01 RX ADMIN — BETAXOLOL HYDROCHLORIDE SCH DROP: 5 SOLUTION/ DROPS OPHTHALMIC at 20:01

## 2017-09-01 RX ADMIN — PANTOPRAZOLE SODIUM SCH MG: 40 TABLET, DELAYED RELEASE ORAL at 09:35

## 2017-09-01 RX ADMIN — Medication SCH MG: at 09:35

## 2017-09-01 RX ADMIN — LACTULOSE SCH ML: 10 SOLUTION ORAL at 09:35

## 2017-09-01 RX ADMIN — LATANOPROST SCH DROP: 50 SOLUTION OPHTHALMIC at 20:01

## 2017-09-01 RX ADMIN — MULTIPLE VITAMINS W/ MINERALS TAB SCH TAB: TAB at 09:35

## 2017-09-01 RX ADMIN — LEVOTHYROXINE SODIUM SCH MCG: 150 TABLET ORAL at 06:07

## 2017-09-01 RX ADMIN — MIDODRINE HYDROCHLORIDE SCH MG: 5 TABLET ORAL at 09:35

## 2017-09-01 RX ADMIN — BRIMONIDINE TARTRATE SCH DROP: 1.5 SOLUTION OPHTHALMIC at 20:01

## 2017-09-01 RX ADMIN — FOLIC ACID SCH MG: 1 TABLET ORAL at 09:35

## 2017-09-01 RX ADMIN — MIDODRINE HYDROCHLORIDE SCH MG: 5 TABLET ORAL at 12:25

## 2017-09-01 RX ADMIN — BRIMONIDINE TARTRATE SCH DROP: 1.5 SOLUTION OPHTHALMIC at 09:38

## 2017-09-01 RX ADMIN — MIDODRINE HYDROCHLORIDE SCH MG: 5 TABLET ORAL at 15:44

## 2017-09-01 RX ADMIN — BRIMONIDINE TARTRATE SCH DROP: 1.5 SOLUTION OPHTHALMIC at 15:44

## 2017-09-01 RX ADMIN — RIFAXIMIN SCH MG: 550 TABLET ORAL at 20:01

## 2017-09-01 RX ADMIN — RIFAXIMIN SCH MG: 550 TABLET ORAL at 09:35

## 2017-09-01 NOTE — IPN
DATE:  09/01/2017

 

57-year-old female seen at bedside.  She does appear to be a little tearful

today, a little anxious and does have some tremulousness and some asterixis, but

does not appear  to have full delirium tremens.

 

OBJECTIVE:  Temperature 98.9, pulse 85, respiratory rate 16, blood pressure

111/63, SpO2 99% on room air.

GENERAL:  The patient appears to be in no acute distress.  She is slightly

anxious and tearful.

HEENT:  Unremarkable.

LUNGS:  Clear.

HEART:  Regular rate and rhythm.

ABDOMEN:  Soft, distended with positive bowel sounds.  No rebound.

EXTREMITIES:  No edema.  No calf tenderness.

 

LABORATORY DATA:

White count is 9.7, hemoglobin 12.8, platelets 251,000.  Sodium 139, potassium

4.0, chloride 109, bicarbonate 20, anion gap 10, BUN 37, creatinine 1.91, glucose

104, AST 42, ALT 39, alkaline phosphatase 164, albumin 2.5, PT 16.4, INR is

1.29.

 

ASSESSMENT AND PLAN:

1.  Decompensated/acute liver failure secondary to alcoholic hepatitis.  Her

transaminases do appear to be improving.  Albumin showed some slight improvement.

We will go ahead and give her another transfusion of albumin today.  Encourage

current diet.

 

2.  Acute kidney injury with what appears to be hepatorenal syndrome.  She has

shown some mild improvement with her creatinine.  We will continue to follow.

 

3.  Abdominal ascites.  No indication for further paracentesis at this point.

 

4.  Diarrhea likely related to lactulose.  We will go ahead and recheck an

ammonia level on her tomorrow.

 

5.  Obesity, which complicates medical care.

 

6.  History of alcohol abuse.  We will continue to monitor for withdrawal.  She

does appear to be tremulous and anxious today.  We will give her a dose of Serax,

Atarax for breakthrough anxiety.  Continue with multivitamin, thiamine, folic

acid.

 

7.  Alcoholic hepatitis, as indicated above.  She was originally started on

prednisone taper, rifaximin.  We will hold the lactulose as indicated and check

ammonia level tomorrow.

 

8.  Deep vein thrombosis (DVT) prophylaxis.  TEDs and sequential.

 

DISPOSITION:  Continue with physical therapy.  Appreciate their input and we will

see how she does over the next 24-48 hours.

## 2017-09-02 VITALS — DIASTOLIC BLOOD PRESSURE: 62 MMHG | SYSTOLIC BLOOD PRESSURE: 98 MMHG

## 2017-09-02 VITALS — DIASTOLIC BLOOD PRESSURE: 56 MMHG | SYSTOLIC BLOOD PRESSURE: 97 MMHG

## 2017-09-02 VITALS — SYSTOLIC BLOOD PRESSURE: 102 MMHG | DIASTOLIC BLOOD PRESSURE: 62 MMHG

## 2017-09-02 VITALS — DIASTOLIC BLOOD PRESSURE: 70 MMHG | SYSTOLIC BLOOD PRESSURE: 122 MMHG

## 2017-09-02 VITALS — DIASTOLIC BLOOD PRESSURE: 61 MMHG | SYSTOLIC BLOOD PRESSURE: 111 MMHG

## 2017-09-02 VITALS — DIASTOLIC BLOOD PRESSURE: 66 MMHG | SYSTOLIC BLOOD PRESSURE: 106 MMHG

## 2017-09-02 LAB
ALBUMIN SERPL BCG-MCNC: 2.3 GM/DL (ref 3.2–5.2)
ALBUMIN/GLOB SERPL: 0.68 {RATIO} (ref 1–1.93)
ALP SERPL-CCNC: 152 U/L (ref 45–117)
ALT SERPL W P-5'-P-CCNC: 35 U/L (ref 12–78)
ANION GAP SERPL CALC-SCNC: 11 MEQ/L (ref 8–16)
AST SERPL-CCNC: 36 U/L (ref 15–37)
BILIRUB SERPL-MCNC: 2.2 MG/DL (ref 0.2–1)
BUN SERPL-MCNC: 37 MG/DL (ref 7–18)
CALCIUM SERPL-MCNC: 7.8 MG/DL (ref 8.5–10.1)
CHLORIDE SERPL-SCNC: 112 MEQ/L (ref 98–107)
CO2 SERPL-SCNC: 18 MEQ/L (ref 21–32)
CREAT SERPL-MCNC: 1.79 MG/DL (ref 0.55–1.02)
ERYTHROCYTE [DISTWIDTH] IN BLOOD BY AUTOMATED COUNT: 13.3 % (ref 11.5–14.5)
GFR SERPL CREATININE-BSD FRML MDRD: 31.1 ML/MIN/{1.73_M2} (ref 51–?)
GLUCOSE SERPL-MCNC: 112 MG/DL (ref 70–105)
MCH RBC QN AUTO: 33.4 PG (ref 27–33)
MCHC RBC AUTO-ENTMCNC: 33.4 G/DL (ref 32–36.5)
MCV RBC AUTO: 100 FL (ref 80–96)
PLATELET # BLD AUTO: 204 K/MM3 (ref 150–450)
POTASSIUM SERPL-SCNC: 3.9 MEQ/L (ref 3.5–5.1)
PROT SERPL-MCNC: 5.7 GM/DL (ref 6.4–8.2)
SODIUM SERPL-SCNC: 141 MEQ/L (ref 136–145)
WBC # BLD AUTO: 9.6 K/MM3 (ref 4–10)

## 2017-09-02 RX ADMIN — MIDODRINE HYDROCHLORIDE SCH MG: 5 TABLET ORAL at 10:30

## 2017-09-02 RX ADMIN — MULTIPLE VITAMINS W/ MINERALS TAB SCH TAB: TAB at 10:30

## 2017-09-02 RX ADMIN — FOLIC ACID SCH MG: 1 TABLET ORAL at 10:30

## 2017-09-02 RX ADMIN — LEVOTHYROXINE SODIUM SCH MCG: 150 TABLET ORAL at 05:41

## 2017-09-02 RX ADMIN — RIFAXIMIN SCH MG: 550 TABLET ORAL at 10:30

## 2017-09-02 RX ADMIN — BETAXOLOL HYDROCHLORIDE SCH DROP: 5 SOLUTION/ DROPS OPHTHALMIC at 21:23

## 2017-09-02 RX ADMIN — PANTOPRAZOLE SODIUM SCH MG: 40 TABLET, DELAYED RELEASE ORAL at 10:30

## 2017-09-02 RX ADMIN — MIDODRINE HYDROCHLORIDE SCH MG: 5 TABLET ORAL at 17:18

## 2017-09-02 RX ADMIN — BETAXOLOL HYDROCHLORIDE SCH DROP: 5 SOLUTION/ DROPS OPHTHALMIC at 10:30

## 2017-09-02 RX ADMIN — BRIMONIDINE TARTRATE SCH DROP: 1.5 SOLUTION OPHTHALMIC at 10:30

## 2017-09-02 RX ADMIN — MIDODRINE HYDROCHLORIDE SCH MG: 5 TABLET ORAL at 13:17

## 2017-09-02 RX ADMIN — RIFAXIMIN SCH MG: 550 TABLET ORAL at 21:22

## 2017-09-02 RX ADMIN — BRIMONIDINE TARTRATE SCH DROP: 1.5 SOLUTION OPHTHALMIC at 17:19

## 2017-09-02 RX ADMIN — Medication SCH MG: at 10:30

## 2017-09-02 RX ADMIN — BRIMONIDINE TARTRATE SCH DROP: 1.5 SOLUTION OPHTHALMIC at 21:23

## 2017-09-02 RX ADMIN — LATANOPROST SCH DROP: 50 SOLUTION OPHTHALMIC at 21:23

## 2017-09-02 NOTE — IPN
DATE:  09/02/2017

 

57-year-old female seen at bedside resting.  No overnight complaints or issues.

She denies chest pain, lightheadedness, shortness of breath, productive sputum

cough.  No nausea or vomiting.

 

OBJECTIVE:

Temperature is 98.2, pulse 87 and regular, respiratory rate 18, blood pressure is

98/62, SpO2  100% on room air.

GENERAL:  The patient appears to be in no acute distress.  She is alert,

pleasant.

HEENT:  Unremarkable.

LUNGS:  Clear.

HEART:  Regular rate and rhythm.

ABDOMEN:  Slightly distended.  Positive bowel sounds.  No masses.

EXTREMITIES:  No edema or calf tenderness.

 

LABORATORIES:

White count 9.6, hemoglobin 11.8, platelets 304,000.  Sodium is 141, potassium

3.9, chloride 112, bicarb 18, anion gap is 11, BUN is 37, creatinine 1.79 down

from 1.9, glucose is 112, total bilirubin 2.2, AST 36, ALT 35, alkaline

phosphatase is 152, ammonia is 47, albumin 2.3.

 

ASSESSMENT/PLAN:

1.  Acute liver failure secondary to alcoholic hepatitis.  Her transaminases are

trending downward nicely.  Her albumin however still is low, which we will give

her an albumin transfusion again today.  Continue to encourage diet.

 

2.  Hypoalbuminemia.  Albumin transfusion will be given.

 

3.  Acute kidney injury, likely hepatorenal syndrome.  Showing some improvement

today with her creatinine, will continue to follow.

 

4.  Abdominal ascites.  No indication for a paracentesis at this time.

 

5.  Diarrhea likely related to lactulose.  That appears to be improved.  Her

ammonia levels is still slightly high.  We will put her on once daily dosing of

lactulose.

 

6.  Obesity.  Complicates medical care.

 

7.  History of alcohol abuse.  Continue to monitor for withdrawal symptoms.  She

appears to be less tremulous today.  Continue with multivitamin, thiamine, folic

acid, Serax and Atarax for breakthrough anxiety.

 

8.  Alcoholic hepatitis.  As indicated above.  She was started on a prednisone

taper, rifaximin.  Again will start on once daily lactulose.

 

9.  Deep vein thrombosis (DVT) prophylaxis.  Thromboembolic deterrent stockings

(TEDS) and sequentials.

 

DISPOSITION:  She does appear to be improving slowly.  I do anticipate she will

be here may be another couple of days.

## 2017-09-03 VITALS — SYSTOLIC BLOOD PRESSURE: 110 MMHG | DIASTOLIC BLOOD PRESSURE: 66 MMHG

## 2017-09-03 VITALS — SYSTOLIC BLOOD PRESSURE: 90 MMHG | DIASTOLIC BLOOD PRESSURE: 64 MMHG

## 2017-09-03 VITALS — DIASTOLIC BLOOD PRESSURE: 64 MMHG | SYSTOLIC BLOOD PRESSURE: 108 MMHG

## 2017-09-03 VITALS — SYSTOLIC BLOOD PRESSURE: 104 MMHG | DIASTOLIC BLOOD PRESSURE: 72 MMHG

## 2017-09-03 VITALS — DIASTOLIC BLOOD PRESSURE: 63 MMHG | SYSTOLIC BLOOD PRESSURE: 105 MMHG

## 2017-09-03 VITALS — DIASTOLIC BLOOD PRESSURE: 65 MMHG | SYSTOLIC BLOOD PRESSURE: 119 MMHG

## 2017-09-03 VITALS — SYSTOLIC BLOOD PRESSURE: 108 MMHG | DIASTOLIC BLOOD PRESSURE: 59 MMHG

## 2017-09-03 LAB
ALBUMIN SERPL BCG-MCNC: 2.5 GM/DL (ref 3.2–5.2)
ALBUMIN/GLOB SERPL: 0.68 {RATIO} (ref 1–1.93)
ALP SERPL-CCNC: 147 U/L (ref 45–117)
ALT SERPL W P-5'-P-CCNC: 36 U/L (ref 12–78)
ANION GAP SERPL CALC-SCNC: 12 MEQ/L (ref 8–16)
AST SERPL-CCNC: 35 U/L (ref 15–37)
BILIRUB SERPL-MCNC: 2.3 MG/DL (ref 0.2–1)
BUN SERPL-MCNC: 39 MG/DL (ref 7–18)
CALCIUM SERPL-MCNC: 7.7 MG/DL (ref 8.5–10.1)
CHLORIDE SERPL-SCNC: 112 MEQ/L (ref 98–107)
CO2 SERPL-SCNC: 16 MEQ/L (ref 21–32)
CREAT SERPL-MCNC: 1.66 MG/DL (ref 0.55–1.02)
ERYTHROCYTE [DISTWIDTH] IN BLOOD BY AUTOMATED COUNT: 13.4 % (ref 11.5–14.5)
GFR SERPL CREATININE-BSD FRML MDRD: 33.9 ML/MIN/{1.73_M2} (ref 51–?)
GLUCOSE SERPL-MCNC: 81 MG/DL (ref 70–105)
MCH RBC QN AUTO: 32.9 PG (ref 27–33)
MCHC RBC AUTO-ENTMCNC: 33 G/DL (ref 32–36.5)
MCV RBC AUTO: 99.6 FL (ref 80–96)
PLATELET # BLD AUTO: 224 K/MM3 (ref 150–450)
POTASSIUM SERPL-SCNC: 3.8 MEQ/L (ref 3.5–5.1)
PROT SERPL-MCNC: 6.2 GM/DL (ref 6.4–8.2)
SODIUM SERPL-SCNC: 140 MEQ/L (ref 136–145)
WBC # BLD AUTO: 11.6 K/MM3 (ref 4–10)

## 2017-09-03 RX ADMIN — FOLIC ACID SCH MG: 1 TABLET ORAL at 09:31

## 2017-09-03 RX ADMIN — MULTIPLE VITAMINS W/ MINERALS TAB SCH TAB: TAB at 09:31

## 2017-09-03 RX ADMIN — BRIMONIDINE TARTRATE SCH DROP: 1.5 SOLUTION OPHTHALMIC at 16:41

## 2017-09-03 RX ADMIN — BRIMONIDINE TARTRATE SCH DROP: 1.5 SOLUTION OPHTHALMIC at 20:28

## 2017-09-03 RX ADMIN — MIDODRINE HYDROCHLORIDE SCH MG: 5 TABLET ORAL at 13:03

## 2017-09-03 RX ADMIN — LEVOTHYROXINE SODIUM SCH MCG: 150 TABLET ORAL at 05:45

## 2017-09-03 RX ADMIN — BETAXOLOL HYDROCHLORIDE SCH DROP: 5 SOLUTION/ DROPS OPHTHALMIC at 20:28

## 2017-09-03 RX ADMIN — RIFAXIMIN SCH MG: 550 TABLET ORAL at 20:27

## 2017-09-03 RX ADMIN — PANTOPRAZOLE SODIUM SCH MG: 40 TABLET, DELAYED RELEASE ORAL at 09:30

## 2017-09-03 RX ADMIN — MIDODRINE HYDROCHLORIDE SCH MG: 5 TABLET ORAL at 09:30

## 2017-09-03 RX ADMIN — BRIMONIDINE TARTRATE SCH DROP: 1.5 SOLUTION OPHTHALMIC at 09:31

## 2017-09-03 RX ADMIN — MIDODRINE HYDROCHLORIDE SCH MG: 5 TABLET ORAL at 16:41

## 2017-09-03 RX ADMIN — RIFAXIMIN SCH MG: 550 TABLET ORAL at 09:30

## 2017-09-03 RX ADMIN — LATANOPROST SCH DROP: 50 SOLUTION OPHTHALMIC at 20:27

## 2017-09-03 RX ADMIN — Medication SCH MG: at 09:30

## 2017-09-03 RX ADMIN — BETAXOLOL HYDROCHLORIDE SCH DROP: 5 SOLUTION/ DROPS OPHTHALMIC at 09:31

## 2017-09-03 NOTE — IPN
DATE:  09/03/2017

 

57-year-old female seen at bedside.  No overnight issues reported.  She is

resting comfortably.  She has had a shower today.  She has ate breakfast.  She

denies headache, lightheadedness, dizziness, chest pain, nausea, vomiting.  Feels

her abdominal distention is much improved.

 

OBJECTIVE:  Temperature 99.3, pulse 78, respiratory rate 16, blood pressure (BP)

119/65, SpO2 is 100% on room air.

GENERAL:  The patient appears to be in no acute distress, is alert and oriented.

HEENT:  Unremarkable.

LUNGS:  Clear.

HEART:  Regular rate and rhythm.

ABDOMEN:  Soft.

EXTREMITIES:  No edema.  No calf tenderness.

 

LABORATORY DATA:  White count 11.6, hemoglobin 12.4 and platelets 224,000.

Sodium 140, potassium 3.8, chloride 121, bicarbonate 16, anion gap 12, BUN 39,

creatinine 1.66 which is the best it has been since she has been here, glucose

81, AST 35, ALT 36, alkaline phosphatase 141, total bilirubin 2.3, albumin 2.5.

 

 

ASSESSMENT AND PLAN:

1.  Abdominal ascites secondary to acute liver failure related to alcoholic

hepatitis.  Her transaminases are trending downward.  Total bilirubin appears to

be much improved as well.  Will continue with her current diet and do another

albumin transfusion today.

2.  Hypoalbuminemia.  Albumin transfusion given.

3.  Acute kidney injury likely hepatorenal syndrome.  She is showing some

improvement in her creatinine.  Will continue to follow.

4.  Abdominal ascites.  No indication for paracentesis at this time.

5.  Diarrhea, likely secondary to lactulose.  This is much improved after

stopping the lactulose for a couple of days.  Will put her back on once daily

lactulose and repeat ammonia level on her tomorrow.

6.  Obesity complicates medical care.

7.  History of alcohol abuse.  Continue to monitor for withdrawal.  She appears

less tremulous than yesterday.  Continue multivitamin, thiamine, folic acid,

Serax and Ativan for breakthrough anxiety.

8.  Alcoholic hepatitis as indicated above.  She continues on prednisone taper

and Rifaximin.  Will need outpatient support.

9.  Deep vein thrombosis (DVT) prophylaxis.  Thromboembolic deterrent stockings

and sequential compression devices.

 

DISPOSITION:  Anticipate home discharge in the next 24 to 48 hours.

## 2017-09-04 VITALS — SYSTOLIC BLOOD PRESSURE: 133 MMHG | DIASTOLIC BLOOD PRESSURE: 79 MMHG

## 2017-09-04 VITALS — DIASTOLIC BLOOD PRESSURE: 82 MMHG | SYSTOLIC BLOOD PRESSURE: 131 MMHG

## 2017-09-04 VITALS — SYSTOLIC BLOOD PRESSURE: 109 MMHG | DIASTOLIC BLOOD PRESSURE: 62 MMHG

## 2017-09-04 VITALS — DIASTOLIC BLOOD PRESSURE: 67 MMHG | SYSTOLIC BLOOD PRESSURE: 118 MMHG

## 2017-09-04 VITALS — SYSTOLIC BLOOD PRESSURE: 106 MMHG | DIASTOLIC BLOOD PRESSURE: 61 MMHG

## 2017-09-04 VITALS — DIASTOLIC BLOOD PRESSURE: 71 MMHG | SYSTOLIC BLOOD PRESSURE: 114 MMHG

## 2017-09-04 VITALS — DIASTOLIC BLOOD PRESSURE: 56 MMHG | SYSTOLIC BLOOD PRESSURE: 99 MMHG

## 2017-09-04 VITALS — SYSTOLIC BLOOD PRESSURE: 118 MMHG | DIASTOLIC BLOOD PRESSURE: 70 MMHG

## 2017-09-04 VITALS — SYSTOLIC BLOOD PRESSURE: 117 MMHG | DIASTOLIC BLOOD PRESSURE: 67 MMHG

## 2017-09-04 VITALS — SYSTOLIC BLOOD PRESSURE: 111 MMHG | DIASTOLIC BLOOD PRESSURE: 59 MMHG

## 2017-09-04 LAB
ALBUMIN SERPL BCG-MCNC: 2.4 GM/DL (ref 3.2–5.2)
ALBUMIN/GLOB SERPL: 0.65 {RATIO} (ref 1–1.93)
ALP SERPL-CCNC: 139 U/L (ref 45–117)
ALT SERPL W P-5'-P-CCNC: 35 U/L (ref 12–78)
ANION GAP SERPL CALC-SCNC: 10 MEQ/L (ref 8–16)
AST SERPL-CCNC: 33 U/L (ref 15–37)
BILIRUB SERPL-MCNC: 2.1 MG/DL (ref 0.2–1)
BUN SERPL-MCNC: 39 MG/DL (ref 7–18)
CALCIUM SERPL-MCNC: 7.8 MG/DL (ref 8.5–10.1)
CHLORIDE SERPL-SCNC: 113 MEQ/L (ref 98–107)
CO2 SERPL-SCNC: 19 MEQ/L (ref 21–32)
CREAT SERPL-MCNC: 1.6 MG/DL (ref 0.55–1.02)
ERYTHROCYTE [DISTWIDTH] IN BLOOD BY AUTOMATED COUNT: 13.8 % (ref 11.5–14.5)
GFR SERPL CREATININE-BSD FRML MDRD: 35.4 ML/MIN/{1.73_M2} (ref 51–?)
GLUCOSE SERPL-MCNC: 111 MG/DL (ref 70–105)
MCH RBC QN AUTO: 33 PG (ref 27–33)
MCHC RBC AUTO-ENTMCNC: 33.5 G/DL (ref 32–36.5)
MCV RBC AUTO: 98.6 FL (ref 80–96)
PLATELET # BLD AUTO: 205 K/MM3 (ref 150–450)
POTASSIUM SERPL-SCNC: 3.6 MEQ/L (ref 3.5–5.1)
PROT SERPL-MCNC: 6.1 GM/DL (ref 6.4–8.2)
SODIUM SERPL-SCNC: 142 MEQ/L (ref 136–145)
WBC # BLD AUTO: 11.3 K/MM3 (ref 4–10)

## 2017-09-04 RX ADMIN — RIFAXIMIN SCH MG: 550 TABLET ORAL at 20:39

## 2017-09-04 RX ADMIN — MIDODRINE HYDROCHLORIDE SCH MG: 5 TABLET ORAL at 08:09

## 2017-09-04 RX ADMIN — BRIMONIDINE TARTRATE SCH DROP: 1.5 SOLUTION OPHTHALMIC at 08:10

## 2017-09-04 RX ADMIN — MIDODRINE HYDROCHLORIDE SCH MG: 5 TABLET ORAL at 16:32

## 2017-09-04 RX ADMIN — BETAXOLOL HYDROCHLORIDE SCH DROP: 5 SOLUTION/ DROPS OPHTHALMIC at 20:40

## 2017-09-04 RX ADMIN — BRIMONIDINE TARTRATE SCH DROP: 1.5 SOLUTION OPHTHALMIC at 20:39

## 2017-09-04 RX ADMIN — BETAXOLOL HYDROCHLORIDE SCH DROP: 5 SOLUTION/ DROPS OPHTHALMIC at 08:10

## 2017-09-04 RX ADMIN — PANTOPRAZOLE SODIUM SCH MG: 40 TABLET, DELAYED RELEASE ORAL at 08:09

## 2017-09-04 RX ADMIN — MULTIPLE VITAMINS W/ MINERALS TAB SCH TAB: TAB at 08:10

## 2017-09-04 RX ADMIN — FOLIC ACID SCH MG: 1 TABLET ORAL at 08:10

## 2017-09-04 RX ADMIN — RIFAXIMIN SCH MG: 550 TABLET ORAL at 08:09

## 2017-09-04 RX ADMIN — BRIMONIDINE TARTRATE SCH DROP: 1.5 SOLUTION OPHTHALMIC at 16:32

## 2017-09-04 RX ADMIN — Medication SCH MG: at 08:09

## 2017-09-04 RX ADMIN — LEVOTHYROXINE SODIUM SCH MCG: 150 TABLET ORAL at 05:40

## 2017-09-04 RX ADMIN — MIDODRINE HYDROCHLORIDE SCH MG: 5 TABLET ORAL at 12:07

## 2017-09-04 RX ADMIN — LATANOPROST SCH DROP: 50 SOLUTION OPHTHALMIC at 20:40

## 2017-09-04 NOTE — IPN
DATE OF SERVICE:  09/04/2017

 

A 57-year-old female, resting comfortably.  No overnight issues.  She is

tolerating breakfast.  She denies chest pain, nausea, vomiting, shortness of

breath.  No abdominal pain.

 

Temperature is 98.1, pulse 86, respiratory rate 17, blood pressure (BP) 118/67,

SpO2 is 98% on room air.

General:  The patient appears to be in no acute distress.  She is alert,

oriented, pleasant.

HEENT:  Unremarkable.

Lungs:  Clear.

Heart:  Regular rate and rhythm.

Abdomen is slightly protuberant but soft and nontender.  Positive bowel sounds.

Lower extremity:  No edema.  No calf tenderness.

 

LABORATORY DATA:

White count 11.3, hemoglobin 11.6, platelets 205.

 

Sodium is 142, potassium 3.6, chloride 113, bicarbonate 19, anion gap 10, BUN is

39, creatinine 1.6 down from 1.66, glucose 111, total bilirubin 2.1, AST 33, ALT

is 35, alkaline phosphatase 139, ammonia is 24, albumin 2.4.

 

ASSESSMENT AND PLAN:

 

1.  Abdominal ascites, secondary acute liver failure, alcoholic hepatitis.

Transaminases are trending towards normal.  Bilirubin is much improved.  Continue

with diet.  We will do another albumin transfusion today.

 

2.  Hypoalbuminemia.  Albumin transfusion will be given.

 

3.  Acute kidney injury, likely hepatorenal syndrome, showing some improvement.

 

4.  Abdominal ascites.  No indication for paracentesis at this time.

 

5.  Diarrhea, likely secondary to lactulose, which we have reduced her dose.

 

6.  Obesity, which complicates her medical care.

 

7.  History of alcohol abuse.  Monitor for withdrawal.  Continue on thiamine,

folic acid, multivitamin, Serax, Ativan for breakthrough symptoms.

 

8.  Alcoholic hepatitis, as indicated above.  Continue with prednisone taper and

rifaximin.

 

9.  Deep venous thrombosis (DVT) prophylaxis.  Thromboembolic deterrents (TEDs)

and sequentials.

 

DISPOSITION:  Unclear home discharge at this time until her creatinine is closer

to baseline.  Would prefer to wait until patient and family services (PFS) is

back tomorrow to make sure that she has outpatient resources available.

## 2017-09-05 VITALS — DIASTOLIC BLOOD PRESSURE: 72 MMHG | SYSTOLIC BLOOD PRESSURE: 116 MMHG

## 2017-09-05 VITALS — DIASTOLIC BLOOD PRESSURE: 88 MMHG | SYSTOLIC BLOOD PRESSURE: 106 MMHG

## 2017-09-05 VITALS — DIASTOLIC BLOOD PRESSURE: 74 MMHG | SYSTOLIC BLOOD PRESSURE: 122 MMHG

## 2017-09-05 VITALS — SYSTOLIC BLOOD PRESSURE: 108 MMHG | DIASTOLIC BLOOD PRESSURE: 63 MMHG

## 2017-09-05 VITALS — SYSTOLIC BLOOD PRESSURE: 110 MMHG | DIASTOLIC BLOOD PRESSURE: 62 MMHG

## 2017-09-05 VITALS — DIASTOLIC BLOOD PRESSURE: 58 MMHG | SYSTOLIC BLOOD PRESSURE: 107 MMHG

## 2017-09-05 VITALS — SYSTOLIC BLOOD PRESSURE: 128 MMHG | DIASTOLIC BLOOD PRESSURE: 84 MMHG

## 2017-09-05 VITALS — DIASTOLIC BLOOD PRESSURE: 76 MMHG | SYSTOLIC BLOOD PRESSURE: 122 MMHG

## 2017-09-05 VITALS — SYSTOLIC BLOOD PRESSURE: 112 MMHG | DIASTOLIC BLOOD PRESSURE: 70 MMHG

## 2017-09-05 LAB
ALBUMIN SERPL BCG-MCNC: 2.8 GM/DL (ref 3.2–5.2)
ALBUMIN/GLOB SERPL: 0.7 {RATIO} (ref 1–1.93)
ALP SERPL-CCNC: 148 U/L (ref 45–117)
ALT SERPL W P-5'-P-CCNC: 43 U/L (ref 12–78)
ANION GAP SERPL CALC-SCNC: 10 MEQ/L (ref 8–16)
AST SERPL-CCNC: 44 U/L (ref 15–37)
BILIRUB SERPL-MCNC: 2.4 MG/DL (ref 0.2–1)
BUN SERPL-MCNC: 34 MG/DL (ref 7–18)
CALCIUM SERPL-MCNC: 8.6 MG/DL (ref 8.5–10.1)
CHLORIDE SERPL-SCNC: 110 MEQ/L (ref 98–107)
CO2 SERPL-SCNC: 21 MEQ/L (ref 21–32)
CREAT SERPL-MCNC: 1.43 MG/DL (ref 0.55–1.02)
ERYTHROCYTE [DISTWIDTH] IN BLOOD BY AUTOMATED COUNT: 14 % (ref 11.5–14.5)
GFR SERPL CREATININE-BSD FRML MDRD: 40.3 ML/MIN/{1.73_M2} (ref 51–?)
GLUCOSE SERPL-MCNC: 94 MG/DL (ref 70–105)
MCH RBC QN AUTO: 32.8 PG (ref 27–33)
MCHC RBC AUTO-ENTMCNC: 33.1 G/DL (ref 32–36.5)
MCV RBC AUTO: 99.2 FL (ref 80–96)
PLATELET # BLD AUTO: 224 K/MM3 (ref 150–450)
POTASSIUM SERPL-SCNC: 3.6 MEQ/L (ref 3.5–5.1)
PROT SERPL-MCNC: 6.8 GM/DL (ref 6.4–8.2)
SODIUM SERPL-SCNC: 141 MEQ/L (ref 136–145)
WBC # BLD AUTO: 9.9 K/MM3 (ref 4–10)

## 2017-09-05 RX ADMIN — RIFAXIMIN SCH MG: 550 TABLET ORAL at 08:48

## 2017-09-05 RX ADMIN — MIDODRINE HYDROCHLORIDE SCH MG: 5 TABLET ORAL at 12:27

## 2017-09-05 RX ADMIN — BETAXOLOL HYDROCHLORIDE SCH DROP: 5 SOLUTION/ DROPS OPHTHALMIC at 08:48

## 2017-09-05 RX ADMIN — LATANOPROST SCH DROP: 50 SOLUTION OPHTHALMIC at 20:45

## 2017-09-05 RX ADMIN — FOLIC ACID SCH MG: 1 TABLET ORAL at 08:48

## 2017-09-05 RX ADMIN — BRIMONIDINE TARTRATE SCH DROP: 1.5 SOLUTION OPHTHALMIC at 17:02

## 2017-09-05 RX ADMIN — BRIMONIDINE TARTRATE SCH DROP: 1.5 SOLUTION OPHTHALMIC at 08:49

## 2017-09-05 RX ADMIN — BRIMONIDINE TARTRATE SCH DROP: 1.5 SOLUTION OPHTHALMIC at 20:45

## 2017-09-05 RX ADMIN — Medication SCH MG: at 08:48

## 2017-09-05 RX ADMIN — PANTOPRAZOLE SODIUM SCH MG: 40 TABLET, DELAYED RELEASE ORAL at 08:48

## 2017-09-05 RX ADMIN — MIDODRINE HYDROCHLORIDE SCH MG: 5 TABLET ORAL at 08:48

## 2017-09-05 RX ADMIN — MULTIPLE VITAMINS W/ MINERALS TAB SCH TAB: TAB at 08:48

## 2017-09-05 RX ADMIN — RIFAXIMIN SCH MG: 550 TABLET ORAL at 20:45

## 2017-09-05 RX ADMIN — BETAXOLOL HYDROCHLORIDE SCH DROP: 5 SOLUTION/ DROPS OPHTHALMIC at 20:45

## 2017-09-05 RX ADMIN — MIDODRINE HYDROCHLORIDE SCH MG: 5 TABLET ORAL at 17:00

## 2017-09-05 RX ADMIN — LEVOTHYROXINE SODIUM SCH MCG: 150 TABLET ORAL at 05:33

## 2017-09-05 NOTE — IPN
DATE:  09/05/2017

 

SUBJECTIVE:  The patient tells me she feels great.  She has no complaints.  She

denies any chest pain, shortness of breath, fevers, chills, nausea, vomiting, or

diarrhea.

 

OBJECTIVE:

VITAL SIGNS:  Maximum temperature (T-max) 100.1, current temperature 98.7, pulse

78, respiratory rate 17, blood pressure 110/62, oxygen saturation 98% on room

air.

GENERAL:  She is a disheveled, middle aged,  female, sitting up in bed

using her laptop.  She does not appear to be in any acute distress.

HEENT:  Cranial nerves II-XII are grossly intact.  She has moist mucous

membranes.  No elevation in central venous pressure (CVP).

CARDIOVASCULAR:  S1, S2, regular.

RESPIRATORY EXAM:  Clear.

ABDOMINAL EXAM:  Bowel sounds are present.  The abdomen is soft, it is distended,

but no tense ascites or shifting dullness, there is a positive fluid wave.

EXTREMITIES:  There is trace edema bilaterally.

 

LABORATORY STUDIES:

WBC 9.9, hemoglobin 13.1, hematocrit 39.4, platelet count 224.

 

Chemistry panel:  Sodium 141, potassium 3.6, chloride 110, bicarbonate 21, BUN

34, creatinine 1.4, continues to trend down from 2.2 at the time of admission.

AST is slightly elevated at 44, total bilirubin is elevated at 2.4, alkaline

phosphatase elevated at 148.  INR is 1.29.

 

The patient received three transfusions of albumin while here.

 

ASSESSMENT AND PLAN:  This is a 57-year-old female with decompensated liver

cirrhosis.

 

1.  Decompensated liver cirrhosis and abdominal ascites.  The patient has known

alcoholic hepatitis.  Her transaminitis has resolved to what is likely her

baseline.  She is on prednisone for an elevated discriminant factor.  She is on

midodrine likely with hypotension related to her liver cirrhosis.  She is on

rifaximin.  Serax as needed.  Her blood pressure does not tolerate a beta

blocker.

 

2.  Hypoalbuminemia.  Albumin infusions have been given.  I will hold off on any

further transfusions as she is hemodynamically stable.  This is related to liver

cirrhosis.

 

3.  Acute kidney injury, possibly prerenal azotemia, while potentially

hepatorenal syndrome, does appear to be improving however.  If her renal function

continues to improve, I will likely discharge her and recommend outpatient

followup with nephrology.  However, should it fail to improve or worsen, will

consider inpatient nephrology consultation.  A diagnosis of hepatorenal syndrome

would certainly worsen her overall prognosis.

 

4.  Abdominal ascites.  Stable.  No indication for paracentesis at this time.

 

5.  Diarrhea, resolved.

 

6.  Obesity, complicating medical care.

 

7.  History of alcohol abuse.  No signs or symptoms of withdrawal.  She is on

thiamine, folic acid, and multivitamin.

 

8.  Alcoholic hepatitis as outlined above.

 

9.  Deep venous thrombosis (DVT) prophylaxis.  She is on sequential compression

device and thromboembolism deterrents (TEDs).

 

10.  Hypothyroidism.  Continue with Synthroid.

 

11.  Seasonal allergies.  Continue with Flonase, azelastine as needed.

 

DISPOSITION:  Pending renal function.

## 2017-09-06 VITALS — DIASTOLIC BLOOD PRESSURE: 72 MMHG | SYSTOLIC BLOOD PRESSURE: 125 MMHG

## 2017-09-06 VITALS — SYSTOLIC BLOOD PRESSURE: 114 MMHG | DIASTOLIC BLOOD PRESSURE: 71 MMHG

## 2017-09-06 VITALS — SYSTOLIC BLOOD PRESSURE: 122 MMHG | DIASTOLIC BLOOD PRESSURE: 75 MMHG

## 2017-09-06 LAB
ALBUMIN SERPL BCG-MCNC: 2.6 GM/DL (ref 3.2–5.2)
ALBUMIN/GLOB SERPL: 0.67 {RATIO} (ref 1–1.93)
ALP SERPL-CCNC: 145 U/L (ref 45–117)
ALT SERPL W P-5'-P-CCNC: 43 U/L (ref 12–78)
ANION GAP SERPL CALC-SCNC: 13 MEQ/L (ref 8–16)
AST SERPL-CCNC: 44 U/L (ref 15–37)
BILIRUB SERPL-MCNC: 2.8 MG/DL (ref 0.2–1)
BUN SERPL-MCNC: 33 MG/DL (ref 7–18)
CALCIUM SERPL-MCNC: 8.1 MG/DL (ref 8.5–10.1)
CHLORIDE SERPL-SCNC: 108 MEQ/L (ref 98–107)
CO2 SERPL-SCNC: 18 MEQ/L (ref 21–32)
CREAT SERPL-MCNC: 1.39 MG/DL (ref 0.55–1.02)
ERYTHROCYTE [DISTWIDTH] IN BLOOD BY AUTOMATED COUNT: 13.9 % (ref 11.5–14.5)
GFR SERPL CREATININE-BSD FRML MDRD: 41.6 ML/MIN/{1.73_M2} (ref 51–?)
GLUCOSE SERPL-MCNC: 91 MG/DL (ref 70–105)
MCH RBC QN AUTO: 33 PG (ref 27–33)
MCHC RBC AUTO-ENTMCNC: 33.9 G/DL (ref 32–36.5)
MCV RBC AUTO: 97.4 FL (ref 80–96)
PLATELET # BLD AUTO: 211 K/MM3 (ref 150–450)
POTASSIUM SERPL-SCNC: 3.7 MEQ/L (ref 3.5–5.1)
PROT SERPL-MCNC: 6.5 GM/DL (ref 6.4–8.2)
SODIUM SERPL-SCNC: 139 MEQ/L (ref 136–145)
WBC # BLD AUTO: 12 K/MM3 (ref 4–10)

## 2017-09-06 RX ADMIN — BRIMONIDINE TARTRATE SCH DROP: 1.5 SOLUTION OPHTHALMIC at 08:32

## 2017-09-06 RX ADMIN — LEVOTHYROXINE SODIUM SCH MCG: 150 TABLET ORAL at 05:32

## 2017-09-06 RX ADMIN — BETAXOLOL HYDROCHLORIDE SCH DROP: 5 SOLUTION/ DROPS OPHTHALMIC at 08:31

## 2017-09-06 RX ADMIN — FOLIC ACID SCH MG: 1 TABLET ORAL at 08:31

## 2017-09-06 RX ADMIN — PANTOPRAZOLE SODIUM SCH MG: 40 TABLET, DELAYED RELEASE ORAL at 08:31

## 2017-09-06 RX ADMIN — MIDODRINE HYDROCHLORIDE SCH MG: 5 TABLET ORAL at 11:53

## 2017-09-06 RX ADMIN — RIFAXIMIN SCH MG: 550 TABLET ORAL at 08:31

## 2017-09-06 RX ADMIN — Medication SCH MG: at 08:31

## 2017-09-06 RX ADMIN — MULTIPLE VITAMINS W/ MINERALS TAB SCH TAB: TAB at 08:31

## 2017-09-06 RX ADMIN — MIDODRINE HYDROCHLORIDE SCH MG: 5 TABLET ORAL at 08:31

## 2017-09-06 NOTE — DSES
DATE OF ADMISSION:  08/28/2017

DATE OF DISCHARGE:  09/06/2017

 

DISCHARGE DIAGNOSIS:  Decompensated liver cirrhosis.

 

SECONDARY DIAGNOSES:

1.  Alcoholic hepatitis.

2.  Acute kidney injury.

3.  Hypoalbuminemia.

4.  Abdominal ascites.

5.  Diarrhea.

6.  History of alcohol abuse.

7.  Seasonal allergies.

 

HOSPITAL COURSE:  The patient is a 57-year-old female with a known history of

alcoholic hepatitis and alcoholic liver cirrhosis who stopped drinking early July

and presented with decompensated liver cirrhosis.  She was admitted to the

medical surgical floor and did undergo a paracentesis during her stay here.  Her

transaminitis gradually did improve.  She did receive several days of albumin

infusions which did help her renal function.  There was concern that her acute

kidney injury may be related to hepatorenal syndrome.  She had been started on

diuretics recently by a gastroenterologist in [place] which she said did not

benefit her.  With cessation of these diuretics and antihypertensives and

initiation of midodrine and prednisone for an elevated discriminant factor, her

renal function and liver function appeared to improve.  She has been seen by

physical therapy and cleared for discharge.  At this time, she does appear to be

quite stable.

 

SUBJECTIVE:  The patient has no complaints.  She tells me that she is feeling

well, better than she has in months and wants to go home.

 

OBJECTIVE:

VITAL SIGNS:  Temperature 98.8, pulse 88, respiratory rate 17, blood pressure

122/75, oxygen saturation 97% on room air.

GENERAL:  She is a disheveled, anxious appearing,  female, who appears

older than her stated age.  She is using a laptop and wearing reading glasses and

does not appear to be in any acute distress.

HEENT:  Cranial nerves II-XII are grossly intact.  She has moist mucous

membranes, poor dentition, a labile affect.

CARDIOVASCULAR EXAM:  S1, S2, regular.

RESPIRATORY EXAM:  Clear.

ABDOMINAL EXAM:  There is a positive fluid wave and shifting dullness but no

tense ascites.

EXTREMITIES:  No clubbing, cyanosis, or appreciable edema.

 

LABORATORY STUDIES:

WBC 12.0, hemoglobin 12.8, platelet count 211.

 

Chemistry panel:  Sodium 139, potassium 3.7, chloride 108, bicarbonate 18, BUN

33, creatinine 1.3, improved from 2.2 at the time of admission, and improved from

1.6 over the last 48 hours.  Her total bilirubin is elevated at 2.8, AST is

elevated at 44, alkaline phosphatase is elevated at 145.  INR is 1.29 on

09/01/2017.

 

A urinalysis was essentially unremarkable.  Urine toxicology was negative for

alcohol at the time of admission.

 

She did receive 13 infusions of albumin throughout her hospital course and did

undergo paracentesis on 08/28/2017, and a renal ultrasound on 08/26/2017, that

did not reveal any abnormality with her kidneys.

 

ASSESSMENT AND PLAN:  This is a 57-year-old female with decompensated liver

cirrhosis.

 

1.  Decompensated liver cirrhosis and ascites status post paracentesis with

improvement in her symptoms.  At this time, it does not appear as though she is

able to tolerate a diuretic regimen or significant antihypertensives.  As such,

she is on prednisone for an elevated discriminant factor and on midodrine and her

hepatic function appears to be improving.

 

2.  Acute kidney injury, likely prerenal azotemia.  She was on diuretics and

antihypertensives and she presented with an elevated creatinine.  With cessation

of these and initiation of midodrine, her kidneys did respond and her renal

function appears to be improving.  I do recommend she followup outpatient with a

referral to nephrology.  She may have early hepatorenal syndrome which would

significantly worsen her overall fairly poor prognosis.  At this point, she has

only been alcohol free for approximately 2 months and has not been a candidate

for referral for transplant evaluation.

 

3.  Hypoalbuminemia.  Her renal function did improve with albumin infusions.  We

held off on any further infusions and her renal function did continue to

improve.

 

4.  Diarrhea, likely related to lactulose.  Advised her to take the medication

and titrate for 3-4 bowel movements per day.

 

5.  Obesity, complicating care.

 

6.  History of alcohol abuse.  No evidence of withdrawal.  She was on thiamine,

folic acid, and multivitamin while hospitalized.

 

7.  Seasonal allergies.  She is on azelastine.

 

DISPOSITION:  The patient is being discharged home.  She has been cleared by

physical therapy.  She is at her functional baseline.  Her clinical status has

improved.  She is to followup with her primary care provider in 7 days and

followup with her gastroenterologist as soon as possible.  Her activity is as

tolerated.  Her diet is a 2 gram sodium.  She should consider a referral to

nephrologist.

 

MEDICATIONS AT THE TIME OF DISCHARGE:

- folic acid 1 mg daily

- midodrine 5 mg three times a day

- multivitamin one tablet daily

- prednisone 30 mg daily to complete a 28 day course and then a taper at the

discretion of her gastroenterologist

- thiamine 100 mg daily

- azelastine 137 mcg two sprays nasally as needed for congestion

- betaxolol 0.5% solution to each eye one drop twice a day

- brimonidine tartrate 0.15% one drop each eye three times a day

- Flonase 50 mcg nasally daily as needed for congestion

- hydroxyzine 25 mg three times a day as needed for itching

- lactulose 30 mL by mouth three times a day, titrate for 3-4 bowel movements per

day

- latanoprost one drop each eye at bedtime

- levothyroxine 50 mcg daily

- oxycodone 5 mg every 6 hours as needed for pain

 

She is to stop taking amlodipine, Lasix, and Aldactone for the time being.

 

Greater than 30 minutes spent organizing disposition.

## 2017-09-14 ENCOUNTER — HOSPITAL ENCOUNTER (OUTPATIENT)
Dept: HOSPITAL 53 - M RADPRO | Age: 57
End: 2017-09-14
Attending: INTERNAL MEDICINE
Payer: COMMERCIAL

## 2017-09-14 DIAGNOSIS — K70.31: Primary | ICD-10-CM

## 2017-09-14 DIAGNOSIS — Z79.899: ICD-10-CM

## 2017-09-14 DIAGNOSIS — Z88.0: ICD-10-CM

## 2017-09-14 LAB
ALBUMIN SERPL BCG-MCNC: 3 GM/DL (ref 3.2–5.2)
ALBUMIN/GLOB SERPL: 0.75 {RATIO} (ref 1–1.93)
ALP SERPL-CCNC: 182 U/L (ref 45–117)
ALT SERPL W P-5'-P-CCNC: 66 U/L (ref 12–78)
ANION GAP SERPL CALC-SCNC: 12 MEQ/L (ref 8–16)
AST SERPL-CCNC: 45 U/L (ref 15–37)
BILIRUB CONJ SERPL-MCNC: 1.7 MG/DL (ref 0–0.2)
BILIRUB SERPL-MCNC: 2.3 MG/DL (ref 0.2–1)
BUN SERPL-MCNC: 33 MG/DL (ref 7–18)
CALCIUM SERPL-MCNC: 9.1 MG/DL (ref 8.5–10.1)
CHLORIDE SERPL-SCNC: 108 MEQ/L (ref 98–107)
CO2 SERPL-SCNC: 21 MEQ/L (ref 21–32)
CREAT SERPL-MCNC: 1.15 MG/DL (ref 0.55–1.02)
ERYTHROCYTE [DISTWIDTH] IN BLOOD BY AUTOMATED COUNT: 14.9 % (ref 11.5–14.5)
GFR SERPL CREATININE-BSD FRML MDRD: 51.8 ML/MIN/{1.73_M2} (ref 51–?)
GLUCOSE SERPL-MCNC: 128 MG/DL (ref 70–105)
INR PPP: 1.23
MCH RBC QN AUTO: 31.7 PG (ref 27–33)
MCHC RBC AUTO-ENTMCNC: 31.7 G/DL (ref 32–36.5)
MCV RBC AUTO: 100.2 FL (ref 80–96)
PLATELET # BLD AUTO: 223 K/MM3 (ref 150–450)
POTASSIUM SERPL-SCNC: 4.2 MEQ/L (ref 3.5–5.1)
PROT SERPL-MCNC: 7 GM/DL (ref 6.4–8.2)
SODIUM SERPL-SCNC: 141 MEQ/L (ref 136–145)
WBC # BLD AUTO: 11.1 K/MM3 (ref 4–10)

## 2017-09-14 PROCEDURE — 80048 BASIC METABOLIC PNL TOTAL CA: CPT

## 2017-09-14 PROCEDURE — 49083 ABD PARACENTESIS W/IMAGING: CPT

## 2017-09-14 PROCEDURE — 36415 COLL VENOUS BLD VENIPUNCTURE: CPT

## 2017-09-14 PROCEDURE — 80076 HEPATIC FUNCTION PANEL: CPT

## 2017-09-14 PROCEDURE — 96374 THER/PROPH/DIAG INJ IV PUSH: CPT

## 2017-09-14 PROCEDURE — 85610 PROTHROMBIN TIME: CPT

## 2017-09-14 PROCEDURE — 85027 COMPLETE CBC AUTOMATED: CPT

## 2017-09-14 PROCEDURE — 82140 ASSAY OF AMMONIA: CPT

## 2017-09-14 NOTE — REP
Ultrasound-guided paracentesis

 

The procedure was performed under the direct supervision of Dr. Deng.

 

The risks and benefits of the procedure were explained to the patient and

informed consent was obtained.  The largest pocket of fluid was localized in the

left upper quadrant using ultrasound guidance.  The skin was prepped and draped

in a sterile fashion.  1% lidocaine was used as a local anesthetic.  An 8-[place]

multi side-hole catheter was inserted using trocar technique.  4100 ml of yellow

colored fluid withdrawn and discarded.

 

After the sterile dressing was applied over the insertion site the patient

experienced a copious amount of leakage from the insertion site.  The nurse held

pressure over the site for approximately 15 minutes and then I held pressure over

the site for another 15 minutes. We had the patient then lay on her right side

for another 15 minutes. After that time there was no apparent leaking from the

site.

 

 

 

The the patient tolerated the procedure well and there were no immediate

complications.  After the appropriate amount of monitored convalescence the

patient was discharged from the department.

 

 

Reviewed by

YOON Dozier 09/14/2017 04:12 PSigned by

Alistair Deng MD 09/14/2017 04:45 P

## 2017-09-15 ENCOUNTER — HOSPITAL ENCOUNTER (EMERGENCY)
Dept: HOSPITAL 53 - M ED | Age: 57
LOS: 1 days | Discharge: HOME | End: 2017-09-16
Payer: COMMERCIAL

## 2017-09-15 VITALS — HEIGHT: 66 IN | WEIGHT: 201.94 LBS | BODY MASS INDEX: 32.45 KG/M2

## 2017-09-15 DIAGNOSIS — Z79.899: ICD-10-CM

## 2017-09-15 DIAGNOSIS — Z79.52: ICD-10-CM

## 2017-09-15 DIAGNOSIS — Z88.0: ICD-10-CM

## 2017-09-15 DIAGNOSIS — R18.8: Primary | ICD-10-CM

## 2017-09-16 VITALS — SYSTOLIC BLOOD PRESSURE: 134 MMHG | DIASTOLIC BLOOD PRESSURE: 81 MMHG

## 2017-09-18 ENCOUNTER — HOSPITAL ENCOUNTER (OUTPATIENT)
Dept: HOSPITAL 53 - M RAD | Age: 57
End: 2017-09-18
Attending: INTERNAL MEDICINE
Payer: COMMERCIAL

## 2017-09-18 DIAGNOSIS — D64.9: ICD-10-CM

## 2017-09-18 DIAGNOSIS — E07.9: ICD-10-CM

## 2017-09-18 DIAGNOSIS — F10.21: ICD-10-CM

## 2017-09-18 DIAGNOSIS — R18.8: Primary | ICD-10-CM

## 2017-09-18 DIAGNOSIS — Z88.0: ICD-10-CM

## 2017-09-18 DIAGNOSIS — Z79.899: ICD-10-CM

## 2017-09-18 DIAGNOSIS — K74.60: ICD-10-CM

## 2017-09-18 DIAGNOSIS — Z86.59: ICD-10-CM

## 2017-09-18 DIAGNOSIS — F32.9: ICD-10-CM

## 2017-09-18 DIAGNOSIS — K73.9: ICD-10-CM

## 2017-09-18 DIAGNOSIS — F41.9: ICD-10-CM

## 2017-09-18 DIAGNOSIS — Z79.891: ICD-10-CM

## 2017-09-19 NOTE — REP
Ultrasound-guided paracentesis

 

The procedure was performed under the direct supervision of Dr. Deng.

 

The risks and benefits of the procedure were explained to the patient and

informed consent was obtained.  The largest pocket of fluid was localized in the

right flank using ultrasound guidance.  The skin was prepped and draped in a

sterile fashion.  1% lidocaine was used as a local anesthetic.  An 8-[place] multi

side-hole catheter was inserted using trocar technique.  3100 ml of yellow fluid

was withdrawn and discarded.

 

The the patient tolerated the procedure well and there were no immediate

complications.  After the appropriate amount of monitored convalescence the

patient was discharged from the department.

 

 

Reviewed by

YOON Dozier 09/18/2017 05:27 PSigned by

Sushant Khan MD 09/19/2017 01:42 P

## 2017-09-25 ENCOUNTER — HOSPITAL ENCOUNTER (OUTPATIENT)
Dept: HOSPITAL 53 - M RADPRO | Age: 57
End: 2017-09-25
Attending: FAMILY MEDICINE
Payer: COMMERCIAL

## 2017-09-25 DIAGNOSIS — Z88.0: ICD-10-CM

## 2017-09-25 DIAGNOSIS — Z79.899: ICD-10-CM

## 2017-09-25 DIAGNOSIS — R18.8: Primary | ICD-10-CM

## 2017-09-25 DIAGNOSIS — K74.60: ICD-10-CM

## 2017-09-25 PROCEDURE — 49083 ABD PARACENTESIS W/IMAGING: CPT

## 2017-09-25 PROCEDURE — 96365 THER/PROPH/DIAG IV INF INIT: CPT

## 2017-09-25 NOTE — REP
PARACENTESIS:

 

The procedure was performed by YOON Linda under the direct

supervision of Dr. Khan.

 

The procedure along with its risks benefits and complications were discussed with

the patient prior to the examination. Informed consent was obtained both verbally

and written.

 

The patient was identified in the ultrasound suite and placed in a supine

position. A procedural "time out" was performed to ensure that the correct

patient, site, and procedure were being performed. The bilateral lower quadrants

were interrogated with ultrasound. The left lower quadrant demonstrated a

moderate sized fluid pocket. An appropriate site was chosen for needle entry and

this area was marked, prepped, and draped in the usual sterile fashion. Local

infiltrative anesthesia was achieved using 1% Lidocaine. A 19-gauge centesis

catheter was advanced through the abdominal wall under continuous negative

pressure until serous fluid was aspirated. The needle was then removed and the

catheter was advanced. Approximately 7,000 mL of cloudy, green-yellow fluid were

removed. The catheter was then removed,  hemostasis was achieved and a soft

dressing was applied to the entry site. Per ordering physician request, Dermabond

was placed at the site of entry due to patient leaking fluid continually after

their last paracentesis.

 

The patient tolerated the procedure well and had no immediate complications.

 

IMPRESSION:

 

Uncomplicated left side paracentesis yielding 7,000 mL of fluid.

 

 

Reviewed by

YOON King 09/25/2017 05:58 PEdited and Signed by

Sushant Khan MD 09/26/2017 05:09 P

## 2017-09-28 ENCOUNTER — HOSPITAL ENCOUNTER (EMERGENCY)
Dept: HOSPITAL 53 - M ED | Age: 57
Discharge: HOME | End: 2017-09-28
Payer: COMMERCIAL

## 2017-09-28 VITALS — WEIGHT: 191.36 LBS | HEIGHT: 67 IN | BODY MASS INDEX: 30.03 KG/M2

## 2017-09-28 VITALS — DIASTOLIC BLOOD PRESSURE: 67 MMHG | SYSTOLIC BLOOD PRESSURE: 127 MMHG
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
## 2017-09-28 DIAGNOSIS — F10.10: ICD-10-CM

## 2017-09-28 DIAGNOSIS — K76.7: ICD-10-CM

## 2017-09-28 DIAGNOSIS — E03.9: ICD-10-CM

## 2017-09-28 DIAGNOSIS — E78.00: ICD-10-CM

## 2017-09-28 DIAGNOSIS — Z79.899: ICD-10-CM

## 2017-09-28 DIAGNOSIS — G43.909: ICD-10-CM

## 2017-09-28 DIAGNOSIS — I10: ICD-10-CM
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
## 2017-09-28 DIAGNOSIS — K74.69: ICD-10-CM

## 2017-09-28 DIAGNOSIS — T81.89XA: Primary | ICD-10-CM

## 2017-09-28 DIAGNOSIS — F33.9: ICD-10-CM

## 2017-09-28 DIAGNOSIS — Z88.0: ICD-10-CM

## 2017-09-28 DIAGNOSIS — F41.9: ICD-10-CM

## 2017-10-02 ENCOUNTER — HOSPITAL ENCOUNTER (OUTPATIENT)
Dept: HOSPITAL 53 - M RADPRO | Age: 57
End: 2017-10-02
Attending: FAMILY MEDICINE
Payer: COMMERCIAL

## 2017-10-02 DIAGNOSIS — Z88.0: ICD-10-CM

## 2017-10-02 DIAGNOSIS — R18.8: Primary | ICD-10-CM

## 2017-10-02 DIAGNOSIS — Z79.899: ICD-10-CM

## 2017-10-02 DIAGNOSIS — K74.60: ICD-10-CM

## 2017-10-02 PROCEDURE — 96365 THER/PROPH/DIAG IV INF INIT: CPT

## 2017-10-02 PROCEDURE — 49083 ABD PARACENTESIS W/IMAGING: CPT

## 2017-10-02 NOTE — REP
Ultrasound-guided paracentesis

 

The procedure was performed under the direct supervision of Dr. Deng.

 

The risks and benefits of the procedure were explained to the patient and

informed consent was obtained.  The largest pocket of fluid was localized in the

right upper quadrant using ultrasound guidance.  The skin was prepped and draped

in a sterile fashion.  1% lidocaine was used as a local anesthetic.  An 8-Slovenian

multi side-hole catheter was inserted using trocar technique.  6,550 ml of cloudy

yellow fluid was withdrawn and discarded.

 

The patient tolerated the procedure well and there were no immediate

complications.  After the appropriate amount of monitored convalescence the

patient was discharged from the department.

 

 

Reviewed by

YOON Dozier 10/02/2017 02:59 PSigned by

Alistair Deng MD 10/02/2017 05:00 P

## 2017-10-09 ENCOUNTER — HOSPITAL ENCOUNTER (OUTPATIENT)
Dept: HOSPITAL 53 - M RADPRO | Age: 57
End: 2017-10-09
Attending: INTERNAL MEDICINE
Payer: COMMERCIAL

## 2017-10-09 DIAGNOSIS — F41.9: ICD-10-CM

## 2017-10-09 DIAGNOSIS — M19.90: ICD-10-CM

## 2017-10-09 DIAGNOSIS — D64.9: ICD-10-CM

## 2017-10-09 DIAGNOSIS — F32.9: ICD-10-CM

## 2017-10-09 DIAGNOSIS — M54.6: ICD-10-CM

## 2017-10-09 DIAGNOSIS — Z86.59: ICD-10-CM

## 2017-10-09 DIAGNOSIS — R18.8: Primary | ICD-10-CM

## 2017-10-09 DIAGNOSIS — Z79.891: ICD-10-CM

## 2017-10-09 DIAGNOSIS — Z79.899: ICD-10-CM

## 2017-10-09 DIAGNOSIS — Z88.0: ICD-10-CM

## 2017-10-09 PROCEDURE — 96365 THER/PROPH/DIAG IV INF INIT: CPT

## 2017-10-09 PROCEDURE — 49083 ABD PARACENTESIS W/IMAGING: CPT

## 2017-10-09 NOTE — REP
PARACENTESIS:

 

the procedure was performed by YOON Linda under the direct

supervision of Dr. Deng.

 

The procedure along with its risks, benefits and complications were discussed

with the patient prior to the examination.  Informed consent was obtained both

verbally and written.

 

The patient was identified in the ultrasound suite and placed in the supine

position. The bilateral lower quadrants were interrogated with ultrasound.  The

right lower quadrant demonstrated a moderate-sized fluid collection.  An

appropriate site was chosen for needle entry and this area was marked, prepped

and draped in the usual sterile fashion.  A procedural time-out was performed to

ensure that the correct patient, site and procedure were being performed. Local

infiltrate of anesthesia was achieved using 1% Lidocaine.  An #8-Spanish centesis

catheter was advanced through the abdominal wall under continuous negative

pressure until serous fluid was aspirated.  The needle was removed and the

catheter was advanced.  Approximately 4000 mL of cloudy yellow fluid was removed.

The catheter was then removed. Hemostasis was achieved and a soft dressing was

applied to the entry site.

 

The patient tolerated the procedure well and had no immediate complications.

 

 

Reviewed by

YOON King 10/09/2017 11:16 AEdited and Signed by

Alistair Deng MD 10/09/2017 12:15 P

## 2017-10-13 ENCOUNTER — HOSPITAL ENCOUNTER (INPATIENT)
Dept: HOSPITAL 53 - M ED | Age: 57
LOS: 7 days | Discharge: HOME | DRG: 720 | End: 2017-10-20
Attending: HOSPITALIST | Admitting: HOSPITALIST
Payer: COMMERCIAL

## 2017-10-13 VITALS — WEIGHT: 169.76 LBS | BODY MASS INDEX: 26.64 KG/M2 | HEIGHT: 67 IN

## 2017-10-13 DIAGNOSIS — K70.31: ICD-10-CM

## 2017-10-13 DIAGNOSIS — G25.81: ICD-10-CM

## 2017-10-13 DIAGNOSIS — I50.32: ICD-10-CM

## 2017-10-13 DIAGNOSIS — I13.0: ICD-10-CM

## 2017-10-13 DIAGNOSIS — E87.2: ICD-10-CM

## 2017-10-13 DIAGNOSIS — N18.9: ICD-10-CM

## 2017-10-13 DIAGNOSIS — K65.2: ICD-10-CM

## 2017-10-13 DIAGNOSIS — K21.9: ICD-10-CM

## 2017-10-13 DIAGNOSIS — D64.9: ICD-10-CM

## 2017-10-13 DIAGNOSIS — H40.9: ICD-10-CM

## 2017-10-13 DIAGNOSIS — M79.1: ICD-10-CM

## 2017-10-13 DIAGNOSIS — N17.9: ICD-10-CM

## 2017-10-13 DIAGNOSIS — K72.90: ICD-10-CM

## 2017-10-13 DIAGNOSIS — E03.9: ICD-10-CM

## 2017-10-13 DIAGNOSIS — K76.7: ICD-10-CM

## 2017-10-13 DIAGNOSIS — Z79.899: ICD-10-CM

## 2017-10-13 DIAGNOSIS — Z88.0: ICD-10-CM

## 2017-10-13 DIAGNOSIS — R65.20: ICD-10-CM

## 2017-10-13 DIAGNOSIS — E87.6: ICD-10-CM

## 2017-10-13 DIAGNOSIS — E87.5: ICD-10-CM

## 2017-10-13 DIAGNOSIS — G89.29: ICD-10-CM

## 2017-10-13 DIAGNOSIS — A41.9: Primary | ICD-10-CM

## 2017-10-13 LAB
ALBUMIN SERPL BCG-MCNC: 2.7 GM/DL (ref 3.2–5.2)
ALBUMIN/GLOB SERPL: 0.66 {RATIO} (ref 1–1.93)
ALP SERPL-CCNC: 155 U/L (ref 45–117)
ALT SERPL W P-5'-P-CCNC: 22 U/L (ref 12–78)
ANION GAP SERPL CALC-SCNC: 9 MEQ/L (ref 8–16)
AST SERPL-CCNC: 29 U/L (ref 15–37)
BASOPHILS # BLD AUTO: 0.1 10^3/UL (ref 0–0.2)
BASOPHILS NFR BLD AUTO: 0.5 % (ref 0–1)
BILIRUB CONJ SERPL-MCNC: 1.8 MG/DL (ref 0–0.2)
BILIRUB SERPL-MCNC: 3.2 MG/DL (ref 0.2–1)
BUN SERPL-MCNC: 20 MG/DL (ref 7–18)
CALCIUM SERPL-MCNC: 8.4 MG/DL (ref 8.5–10.1)
CHLORIDE SERPL-SCNC: 107 MEQ/L (ref 98–107)
CO2 SERPL-SCNC: 20 MEQ/L (ref 21–32)
CREAT SERPL-MCNC: 1.29 MG/DL (ref 0.55–1.02)
EOSINOPHIL # BLD AUTO: 0 10^3/UL (ref 0–0.5)
EOSINOPHIL NFR BLD AUTO: 0 % (ref 0–3)
ERYTHROCYTE [DISTWIDTH] IN BLOOD BY AUTOMATED COUNT: 14.5 % (ref 11.5–14.5)
GFR SERPL CREATININE-BSD FRML MDRD: 45.3 ML/MIN/{1.73_M2} (ref 51–?)
GLUCOSE SERPL-MCNC: 118 MG/DL (ref 70–105)
IMM GRANULOCYTES NFR BLD: 0.5 % (ref 0–0)
INR PPP: 1.42
LYMPHOCYTES # BLD AUTO: 0.8 10^3/UL (ref 1.5–4.5)
LYMPHOCYTES NFR BLD AUTO: 6.1 % (ref 24–44)
MCH RBC QN AUTO: 30.9 PG (ref 27–33)
MCHC RBC AUTO-ENTMCNC: 34 G/DL (ref 32–36.5)
MCV RBC AUTO: 90.8 FL (ref 80–96)
MONOCYTES # BLD AUTO: 0.9 10^3/UL (ref 0–0.8)
MONOCYTES NFR BLD AUTO: 6.9 % (ref 0–5)
NEUTROPHILS # BLD AUTO: 11.1 10^3/UL (ref 1.8–7.7)
NEUTROPHILS NFR BLD AUTO: 86 % (ref 36–66)
NRBC BLD AUTO-RTO: 0 % (ref 0–0)
PLATELET # BLD AUTO: 297 10^3/UL (ref 150–450)
POTASSIUM SERPL-SCNC: 5.3 MEQ/L (ref 3.5–5.1)
PROT SERPL-MCNC: 6.8 GM/DL (ref 6.4–8.2)
SODIUM SERPL-SCNC: 136 MEQ/L (ref 136–145)
T3RU NFR SERPL: 38 % (ref 30–39)
T4 SERPL-MCNC: 9.5 UG/DL (ref 4.5–12)
WBC # BLD AUTO: 13 10^3/UL (ref 4–10)

## 2017-10-13 RX ADMIN — LATANOPROST SCH DROP: 50 SOLUTION OPHTHALMIC at 21:00

## 2017-10-14 VITALS — SYSTOLIC BLOOD PRESSURE: 106 MMHG | DIASTOLIC BLOOD PRESSURE: 62 MMHG

## 2017-10-14 VITALS — DIASTOLIC BLOOD PRESSURE: 69 MMHG | SYSTOLIC BLOOD PRESSURE: 119 MMHG

## 2017-10-14 VITALS — SYSTOLIC BLOOD PRESSURE: 96 MMHG | DIASTOLIC BLOOD PRESSURE: 57 MMHG

## 2017-10-14 VITALS — SYSTOLIC BLOOD PRESSURE: 98 MMHG | DIASTOLIC BLOOD PRESSURE: 56 MMHG

## 2017-10-14 VITALS — DIASTOLIC BLOOD PRESSURE: 51 MMHG | SYSTOLIC BLOOD PRESSURE: 84 MMHG

## 2017-10-14 VITALS — SYSTOLIC BLOOD PRESSURE: 98 MMHG | DIASTOLIC BLOOD PRESSURE: 60 MMHG

## 2017-10-14 LAB
ALBUMIN SERPL BCG-MCNC: 2.6 GM/DL (ref 3.2–5.2)
ALBUMIN/GLOB SERPL: 0.65 {RATIO} (ref 1–1.93)
ALP SERPL-CCNC: 133 U/L (ref 45–117)
ALT SERPL W P-5'-P-CCNC: 20 U/L (ref 12–78)
ANION GAP SERPL CALC-SCNC: 13 MEQ/L (ref 8–16)
AST SERPL-CCNC: 23 U/L (ref 15–37)
BF DIFF IF INDICATED?: YES
BILIRUB SERPL-MCNC: 3.2 MG/DL (ref 0.2–1)
BUN SERPL-MCNC: 22 MG/DL (ref 7–18)
CALCIUM SERPL-MCNC: 8.1 MG/DL (ref 8.5–10.1)
CHLORIDE SERPL-SCNC: 109 MEQ/L (ref 98–107)
CO2 SERPL-SCNC: 17 MEQ/L (ref 21–32)
CREAT SERPL-MCNC: 1.67 MG/DL (ref 0.55–1.02)
ERYTHROCYTE [DISTWIDTH] IN BLOOD BY AUTOMATED COUNT: 14.2 % (ref 11.5–14.5)
GFR SERPL CREATININE-BSD FRML MDRD: 33.7 ML/MIN/{1.73_M2} (ref 51–?)
GLUCOSE SERPL-MCNC: 217 MG/DL (ref 70–105)
GRANULOCYTES NFR FLD MANUAL: 13.2 CELLS/UL (ref 0–0)
MAGNESIUM SERPL-MCNC: 2 MG/DL (ref 1.8–2.4)
MCH RBC QN AUTO: 30.9 PG (ref 27–33)
MCHC RBC AUTO-ENTMCNC: 33.9 G/DL (ref 32–36.5)
MCV RBC AUTO: 91.3 FL (ref 80–96)
MONONUC CELLS NFR FLD: 86.8 % (ref 0–0)
NRBC BLD AUTO-RTO: 0 % (ref 0–0)
PLATELET # BLD AUTO: 264 10^3/UL (ref 150–450)
POTASSIUM SERPL-SCNC: 3.8 MEQ/L (ref 3.5–5.1)
PROT SERPL-MCNC: 6.6 GM/DL (ref 6.4–8.2)
RBC # FLD: < 2000 CELLS/UL (ref ?–2000)
SODIUM SERPL-SCNC: 139 MEQ/L (ref 136–145)
SP GR FLD REFRACTOMETRY: 1.01
WBC # BLD AUTO: 14 10^3/UL (ref 4–10)

## 2017-10-14 PROCEDURE — 0W9G3ZZ DRAINAGE OF PERITONEAL CAVITY, PERCUTANEOUS APPROACH: ICD-10-PCS | Performed by: HOSPITALIST

## 2017-10-14 RX ADMIN — SODIUM CHLORIDE SCH UNITS: 4.5 INJECTION, SOLUTION INTRAVENOUS at 21:54

## 2017-10-14 RX ADMIN — RIFAXIMIN SCH MG: 550 TABLET ORAL at 21:54

## 2017-10-14 RX ADMIN — Medication SCH MG: at 10:21

## 2017-10-14 RX ADMIN — LACTULOSE SCH ML: 10 SOLUTION ORAL at 15:02

## 2017-10-14 RX ADMIN — LACTULOSE SCH ML: 10 SOLUTION ORAL at 06:44

## 2017-10-14 RX ADMIN — OXAZEPAM SCH MG: 10 CAPSULE, GELATIN COATED ORAL at 13:07

## 2017-10-14 RX ADMIN — OXAZEPAM SCH MG: 10 CAPSULE, GELATIN COATED ORAL at 06:44

## 2017-10-14 RX ADMIN — BETAXOLOL HYDROCHLORIDE SCH DROP: 5 SOLUTION/ DROPS OPHTHALMIC at 10:20

## 2017-10-14 RX ADMIN — BRIMONIDINE TARTRATE SCH DROP: 1.5 SOLUTION OPHTHALMIC at 10:20

## 2017-10-14 RX ADMIN — LATANOPROST SCH DROP: 50 SOLUTION OPHTHALMIC at 21:54

## 2017-10-14 RX ADMIN — SODIUM CHLORIDE SCH MLS/HR: 9 INJECTION, SOLUTION INTRAVENOUS at 20:01

## 2017-10-14 RX ADMIN — OXAZEPAM SCH MG: 10 CAPSULE, GELATIN COATED ORAL at 23:27

## 2017-10-14 RX ADMIN — BRIMONIDINE TARTRATE SCH DROP: 1.5 SOLUTION OPHTHALMIC at 21:54

## 2017-10-14 RX ADMIN — BETAXOLOL HYDROCHLORIDE SCH DROP: 5 SOLUTION/ DROPS OPHTHALMIC at 21:54

## 2017-10-14 RX ADMIN — PANTOPRAZOLE SODIUM SCH MG: 40 TABLET, DELAYED RELEASE ORAL at 10:21

## 2017-10-14 RX ADMIN — RIFAXIMIN SCH MG: 550 TABLET ORAL at 10:21

## 2017-10-14 RX ADMIN — LACTULOSE SCH ML: 10 SOLUTION ORAL at 21:54

## 2017-10-14 RX ADMIN — DEXTROSE MONOHYDRATE SCH MLS/HR: 50 INJECTION, SOLUTION INTRAVENOUS at 18:34

## 2017-10-14 RX ADMIN — LEVOTHYROXINE SODIUM SCH MCG: 150 TABLET ORAL at 06:44

## 2017-10-14 RX ADMIN — MULTIPLE VITAMINS W/ MINERALS TAB SCH TAB: TAB at 10:21

## 2017-10-14 RX ADMIN — SODIUM CHLORIDE SCH UNITS: 4.5 INJECTION, SOLUTION INTRAVENOUS at 10:20

## 2017-10-14 RX ADMIN — BRIMONIDINE TARTRATE SCH DROP: 1.5 SOLUTION OPHTHALMIC at 15:02

## 2017-10-14 RX ADMIN — SODIUM CHLORIDE SCH MLS/HR: 9 INJECTION, SOLUTION INTRAVENOUS at 07:30

## 2017-10-14 RX ADMIN — FOLIC ACID SCH MG: 1 TABLET ORAL at 10:21

## 2017-10-14 RX ADMIN — OXAZEPAM SCH MG: 10 CAPSULE, GELATIN COATED ORAL at 18:34

## 2017-10-14 NOTE — CR
DATE OF CONSULTATION: 10/14/2017

 

REFERRING PHYSICIAN: Dr. Glenis Rice MD.

 

REASON FOR CONSULTATION:

Acute kidney injury in this lady with hepatic cirrhosis and recurrent ascites.

 

HISTORY OF PRESENT ILLNESS:

Ms. Taylor is a 57-year-old female with known history of alcoholic cirrhosis and

ascites requiring weekly paracentesis.  Her other medical problems include

hypothyroidism, history of nephrolithiasis, diastolic congestive heart failure,

prior history of methicillin-sensitive Staphylococcus aureus (MSSA) bacteremia

and urinary tract infections.  She is admitted due to generalized weakness and

diffuse myalgia.  She was felt to have subacute bacterial peritonitis and is

currently being treated with antibiotics.  She is also receiving intravenous (IV)

fluid due to hypotension.  A nephrology consultation was requested due to

possible hepatorenal syndrome and acute renal failure.

 

PAST MEDICAL AND SURGICAL HISTORY

Significant for

1.  History of alcoholic cirrhosis.

2.  History of recurrent ascites requiring weekly paracentesis.

3.  Hypothyroidism.

4.  Diastolic congestive heart failure.

5.  Gastroesophageal reflux disease.

6.  Restless leg syndrome.

7.  Glaucoma.

8.  History of nephrolithiasis.

9.  History of human papilloma virus infection.

10. History of MSSA bacteremia.

11. History of Escherichia (E.) coli urinary tract infection (UTI).

 

MEDICATIONS:  Her home medications include:

- betaxolol eye drops twice a day

- Flonase nasal spray as needed

- folic acid 1 mg daily

- hydroxyzine 25 mg three times a day as needed

- levothyroxine 150 mcg daily

- multivitamin tablet daily

- oxycodone as needed every six hours for pain

- spironolactone 100 mg daily

- thiamine 100 mg daily

 

ALLERGIES: The patient has allergy to PENICILLIN.

 

FAMILY HISTORY:

Mother with history of brain tumor and sister has liver disease from alcoholism

at age 38.

 

SOCIAL HISTORY:

The patient lives at home alone.  She stopped drinking in July 2017.

 

REVIEW OF SYSTEMS:

The patient is quite weak and jaundiced.  She has fever and does not feel well.

Ears, nose and throat are unremarkable.  She denies any headache at present.

Cardiovascular system is negative for dyspnea or chest pain.  She does have a

diagnosis of diastolic congestive heart failure.

Respiratory system is negative for cough or hemoptysis.

Gastrointestinal (GI) system significant for hepatic cirrhosis with recurrent

ascites.  Appetite is poor.  She denies any vomiting, rectal bleeding or black

colored stools.

Genitourinary () system is negative for dysuria or hematuria at present.  She

does have history of kidney stones and urinary tract infections.

Musculoskeletal system significant for generalized weakness and myalgia.  She

denies any leg edema.

Endocrine system is significant for hypothyroidism.

Psychosocial system negative for depression or anxiety.

Neurological system significant for restless leg syndrome and hepatic

encephalopathy.

Hematological system is negative for any excessive bleeding.

 

PHYSICAL EXAMINATION:

Middle-aged woman who looks obviously jaundiced.  She is not in any acute

distress at this time but she looks chronically ill.

VITAL SIGNS: Temperature was 102 degrees this morning, heart rate 112 per minute

and respiratory rate 20 per minute.  Blood pressure 84/50 mmHg and oxygen

saturation 97% on room air.

HEENT: Head is atraumatic.  Pupils are equal and reactive to light and sclerae

are icteric.  Oral mucosa is somewhat dry but no thrush or ulcers noted.

NECK: Neck veins are minimally distended.  There is no thyroid enlargement.

CARDIORESPIRATORY: Heart sounds are tachycardiac and lungs with slightly

diminished breath sounds at bases.

ABDOMEN:  Soft and moderately distended with ascites.  There is tenderness at the

site of paracentesis.  Bowel sounds are normal.

EXTREMITIES: Have no cyanosis or clubbing.

SKIN: Jaundiced without any rash or ulcers.

NEUROLOGIC: She is awake and able to answer simple questions.

 

LABORATORY DATA:

On admission her sodium was 136 and potassium 5.3.  BUN 20 and creatinine 1.29.

Lactic acid 2.2, total bilirubin 3.2 and ammonia level 50.  Albumin 2.7.  Her

repeat ammonia level is now 3.7.  Sodium 139 and potassium 3.9.  CO2 17, BUN 22,

creatinine 1.67.  Calcium level 8.1.  Total protein 6.6 and albumin 2.6.

 

IMAGING:

CT scan of abdomen and pelvis was done in the emergency room which showed small

left pleural effusion and mild atelectasis.  Cirrhosis of liver with moderate

amount of ascites.

MRI of brain is unremarkable.

Chest x-ray again showed small left pleural effusion and thoracic scoliosis.

 

PROBLEMS:

1. Acute kidney injury.  Most likely related to spontaneous bacterial peritonitis

(SBP) and hypotension.  She has received IV albumin and is currently receiving IV

normal saline which is appropriate.  She has elevated lactic acid level which is

most likely related to hypotension.  I agree with IV albumin and moderate amount

of IV normal saline to correct her hypotension.

2. Spontaneous bacterial peritonitis. The patient is being treated with meropenem

and vancomycin.  Will need to monitor vancomycin level.

3. Hypotension. Most likely this is related to ongoing infection.  Her

spironolactone has already been stopped.  She is being treated with IV albumin

and normal saline.  Her prognosis remains guarded.

4. 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  The

patient already had a paracentesis done.  She is currently hypotensive and agree

with IV albumin and normal saline.

 

I thank you for involving me in the care of this was meant.  I will follow her

along with you.

## 2017-10-14 NOTE — HPE
DATE OF ADMISSION:  10/14/2017

 

PRIMARY CARE PROVIDER:  Gabe Corral

 

CHIEF COMPLAINT:  Generalized weakness.  Diffuse myalgia.

 

HISTORY OF PRESENT ILLNESS:

This is a 57-year-old female patient with underlying medical history of alcoholic

cirrhosis with alcoholic hepatitis, ascites requiring paracentesis almost on a

weekly basis, hypothyroidism, hypertension, restless leg, glaucoma,

nephrolithiasis, grade 1 diastolic dysfunction, history of neurogenic candida

infection, history of human papillomavirus (HPV), methicillin-sensitive

Staphylococcus aureus (MSSA) bacteremia, E. coli urinary tract infection (UTI),

hyperammonemia, and history of hypotension, and previously had paracentesis on

Monday last week patient presented to [facility] with 24 hours of

arm and leg weakness.  As per patient the weakness migrates from one side of the

leg to the other and now involving both side of the legs and bilateral arms and

shoulders and with myalgia, pain with movement, and patient seems very anxious

and was shaking, having excess restlessness in the emergency room.  Reported not

having drinking any alcoholic beverages since July this year and denies any

illicit drug use or IV drug use.  Was found to be febrile in the emergency room.

Denies any cough, chest pain, pressure, discomfort.  Denies any abdominal pain,

lower extremity edema, dysuria, hematuria, increasing frequencies or urgencies.

Denies any headache, neck pain.  No recent travel.  Patient was a bit concerned

about possible perforation with recent paracentesis given the significant more

pain than usual and limited fluid drainage.  Patient stated that she has stopped

lactulose because she was under the impression that lactulose was supposed to

help her with diarrhea and since she stopped she has not been having any

diarrhea.  Counseling service was provided that patient used lactulose for

hepatic encephalopathy.

 

ALLERGIES:  PENICILLIN.

 

PAST MEDICAL HISTORY:

Alcoholic cirrhosis with alcoholic hepatitis, hypothyroidism, hypertension,

gastroesophageal reflux disease (GERD), restless leg syndrome, glaucoma,

nephrolithiasis, grade 1 diastolic dysfunction, history of urogenital

candidiasis, human papillomavirus with ascus, methicillin-sensitive

Staphylococcus aureus (MSSA) bacteremia, E. coli urinary tract infection (UTI),

hyperammonemia.

 

PAST SURGICAL HISTORY:

Hysteroscope with dilatation and curettage (D C), carpal tunnel.

 

FAMILY HISTORY:

Mother with brain tumor.  Sister has disease from alcoholism at age 38.

 

SOCIAL HISTORY:

Lives at home alone.  Has two sons and a daughter.  Stopped drinking July 2017.

Denies illicit drug use.  No smoking.

 

REVIEW OF SYSTEMS:

Reported generalized weakness.  Diffuse myalgia.  Fever documented in the

emergency room.  Has not had a bowel movement for the last few days.

 

HOME MEDICATION:

- Azelastine nasal spray daily as needed

- betaxolol eye drop twice a day

- Flonase nasal spray daily as needed

- folic acid 1 mg by mouth daily

- hydroxyzine 25 mg by mouth three times a day as needed

- levothyroxine 150 mcg by mouth daily

- multivitamin one tablet by mouth daily

- oxycodone 1 mg by mouth 6 hours as needed

- spironolactone 100 mg by mouth daily

- thymine 100 mg by mouth daily

 

PHYSICAL EXAMINATION:

VITAL SIGNS:  Temperature 101, pulse 105, respiration 16, blood pressure 128/76,

pulse ox 98% on room air.

GENERAL:  Patient anxious, alert, comfortable with tremors.

HEENT:  Normocephalic, atraumatic.  Poor dentition. Dry mucous membrane.

CARDIAC:  Mild tachycardia regular.  S1, S2.

PULMONARY:  Bilateral clear to auscultation

ABDOMEN: Positive for fluid wave, nontender, normal bowel sounds.

EXTREMITIES:  No edema bilateral lower extremities.

NEUROLOGIC:  Positive for tongue fasciculations and asterixis.  Limited movement

bilateral upper and lower extremities as per patient secondary to pain.  Cranial

nerve II-XII grossly intact.

 

LABORATORY:

WBC 13, hemoglobin and hematocrit 11.8/34.7, platelets 297.  Chemistry - sodium

136, potassium 5.3, chloride 107, bicarbonate 20, BUN 20, creatinine 1.29,

bilirubin 3.2, ammonia 50.

 

CT scan of the abdomen shows moderate ascites with evidence of questionable

colitis.

 

ASSESSMENT AND PLAN:

This is a 57-year-old female patient with underlying medical history of alcoholic

cirrhosis,  hypothyroidism, hypertension, restless leg, glaucoma,

nephrolithiasis, grade 1 diastolic dysfunction, history of urogenital

candidiasis, ascus, human papillomavirus, methicillin-sensitive Staphylococcus

aureus (MSSA) bacteremia, E. coli urinary tract infection (UTI), with

hyperammonemia admitted for sepsis and hepatic encephalopathy.

 

PROBLEMS:

1.    Patient with sepsis.  Patient with fevers and tachycardia with lactic

acidosis possibly due to spontaneous retroperitonitis.  We will place the patient

on meropenem and vancomycin for now.  Chest x-ray appreciated.  Followup urine

culture.  Ascites fluid shows WBC of 417, likely patient has spontaneous

bacterial peritonitis, continue antibiotics, followup cultures.  Will consider

infection disease consultation.

 

2.    Hepatic encephalopathy, patient with elevated ammonia.  Continue lactulose.

Patient on antibiotics already.  Neuro check, further work-up in terms of MRIs

has been ordered.

 

3.    Ascites.  Status post paracentesis with 2.2 liters removed.  Albumin has

been ordered.  Once the active infectious etiology has been treated we will place

the patient on Lasix.  In the meantime, we will continue to follow patient's

fluid status.

 

4.    Diffuse myalgia.  Possible etiology included the cirrhosis with hepatic

encephalopathy versus sepsis.  IV fluids for gentle hydration.  Albumin has been

ordered.  Pain medication as prescribed.  We will continue to monitor.  Work-up

in terms of MRIs and cultures have been sent.  X-ray has been sent.

 

5.    Hepatorenal syndrome with hyperkalemia.  Holding spironolactone.  IV fluids

have been ordered. We will continue to monitor.   Withholding renal consultation

for now given patient's creatinine has been at baseline.  Albumin has been

ordered.

 

6.    History of alcoholism.  Continue vitamins as ordered.  Patient's symptom is

concerning for withdrawal even though patient denies having drinking any

alcoholic beverages since July.  We will place the patient on Serax for now until

patient's heart rate and physical exam improves. Telemetry monitoring.

 

7.    Glaucoma.  Continue home medication.

 

8.    Hypothyroidism.  Thyroid function test.  Continue home medication.

 

9.    Hypertension.  We will monitor patient's blood pressure.

 

10.  Restless leg.  Continue home medication.

 

11.  History of grade 1 diastolic dysfunction.  Strict intake and output, monitor

fluid status.  Will start Lasix for ascites once the patient's active infection

improves.

 

12.  Cirrhosis.  Alcoholic cirrhosis with ascites.  Patient was a model for

end-stage liver disease (MELD) score of 19 and discriminant function of 29.

Followup INR.  Followup liver function. Counseling provided.  Lactulose,

rifaximin, will consider adding beta blockers and Lasix one the patient's active

infection improves.  Patient is already on antibiotics for suspected sepsis due

to spontaneous bacterial peritonitis.  Low salt diet.  Deep venous thrombosis

(DVT) prophylaxis.  Heparin subcutaneous.

 

DISPOSITION:

Pending further work-up clinical improvement.

## 2017-10-14 NOTE — RO
DATE OF PROCEDURE:  10/14/2017

 

PREPROCEDURE DIAGNOSIS:  Ascites with fever.

 

POSTPROCEDURE DIAGNOSIS:  Ascites with fever.

 

PROCEDURE PERFORMED:  Paracentesis.

 

SURGEON:  Dr. Glenis Rice

 

ASSISTANT:  None.

 

ANESTHESIA:  1% local lidocaine.

 

INDICATION FOR PROCEDURE:  Ascites with fever.

 

ESTIMATED BLOOD LOSS:  Minimal.

 

SEDATION:  None.

 

VENTILATION:  None.

 

FLUIDS REMOVED:  2.2 liters.

 

POSITION:  Patient was placed in the supine position in the bed with patient's

right abdomen down with pillows underneath the left side of patient's back

because prior the fluid pocket has been detected best at right lower quadrant of

the patient's abdomen.

 

DESCRIPTION OF PROCEDURE:  The site was cleaned with ChloraPrep and covered in

the usual manner.  Ultrasound was used to payal the area of fluid pocket.

Subsequently, superficial lidocaine with 1% was injected and deep lidocaine was

also injected pulling back to check for fluid return every time the needle was

advanced.  The site was nicked with a scalpel blade and traction was placed on

the skin and the needle was inserted.  Ascites fluid was obtained and catheter

was slid over the needle into the patient's abdominal cavity.  The needle was

removed.  Subsequently, fluid was collected, a total of 2.2 liters of fluids was

collected.  Blood pressure was taken every 5 minutes.  The patient did not have

any drop in blood pressure.  When the flow stopped, the catheter was removed.

Albumin has been ordered.  The patient tolerated the procedure with no

complication.  Ascites fluid was sent.  Slight cloudy, yellow ascites fluid was

obtained.  Dressing was placed. The patient tolerated the procedure with no

complication.

## 2017-10-14 NOTE — REP
Right shoulder series:  Three views.

 

History:  Trauma.

 

Findings:  The right glenohumeral and acromioclavicular joints are normally

aligned.  There is glenohumeral and acromioclavicular joint osteoarthritis with

spurring and some hypertrophy.  Periarticular soft tissues are unremarkable.  No

fracture or other traumatic abnormality is noted.

 

Impression:

 

No traumatic abnormality noted.  Osteoarthritic changes.

 

 

Signed by

Alistair Degn MD 10/14/2017 02:13 P

## 2017-10-14 NOTE — REPUSA
CLINICAL HISTORY: Suspected infection.

TECHNIQUE: Multiple axial brain CT scan sections were obtained from base to vertex without contrast a
dministration.

COMMENTS:

Comparison to prior exam performed on 4/24/2016.

The study shows normal configuration of sella turcica. There are no intra or extra-axial collections.
 There is no mass effect or midline shift. There is no evidence of hematoma formation. No hydrocephal
us is present. No abnormal calcifications are noted.

No significant abnormalities are seen either in the posterior fossa or supratentorial compartment.

The sinuses and mastoid air cells are patent.

IMPRESSION:

Unchanged exam.

No evidence of acute intracranial pathology. 

Thank you for your kind referral of this patient.

     Electronically signed by NAMITA DELAROSA MD on 10/14/2017 01:01:47 AM ET

## 2017-10-14 NOTE — REP
MRI brain without contrast:

 

History:  Confusion.

 

Comparison CT study October 14, 2017.

 

Technique:  Axial and sagittal imaging planes are utilized for T1 and T2-weighted

scans.  Sequences include spin-echo, fast spin echo, FLAIR, and diffusion

weighted sequences.

 

MRI findings:

 

Findings:  Craniocervical junction and upper cervical cord are normal in

appearance.  No bony calvarial lesion is seen.  There is no MR evidence of

significant paranasal sinus disease.  No intraorbital lesion is seen.  The

lateral, third, and  fourth ventricles are normal in size and position.  There is

no evidence of intracranial hemorrhage.  Diffusion weighted scans show no

evidence to suggest acute ischemia.  No significant white matter lesion is seen.

 

Impression:

 

No acute intracranial lesion.

 

 

Signed by

Alistair Deng MD 10/14/2017 03:28 P

## 2017-10-14 NOTE — ECGEPIP
Stationary ECG Study

                              [facility]

                                       

                                       Test Date:    2017-10-14

Pat Name:     JAMEL HARVEY             Department:   

Patient ID:   [id number]                 Room:         [place] [address]

Gender:       F                        Technician:   ELAINE

:          1960               Requested By: MOSES NEWELL 

Order Number: [id number]     Reading MD:   Syeda Willett

                                 Measurements

Intervals                              Axis          

Rate:         110                      P:            45

DC:           141                      QRS:          32

QRSD:         86                       T:            43

QT:           344                                    

QTc:          467                                    

                           Interpretive Statements

SINUS TACHYCARDIA

NONSPECIFIC T-WAVE ABNORMALITY

 

PROIR WITH LOW VOLTAGE PRECORDIAL LEADS, LEFT AXIS AND SLOWER RATE 17

 

Electronically Signed On 10- 7:54:33 EDT by Syeda Willett

## 2017-10-14 NOTE — REP
Right knee:  Two views.

 

History:  Trauma.

 

Comparison study:  March 13, 2012.

 

Findings:  There is medial compartment joint space narrowing and spur formation.

Some diffuse osteopenia is noted.  Minimal lateral compartment spur formation and

patellofemoral narrowing seen.  These changes are consistent with osteoarthritis.

No fracture or other traumatic abnormality is seen.

 

Impression:

 

No fracture noted.

 

 

Signed by

Alistair Deng MD 10/14/2017 02:13 P

## 2017-10-14 NOTE — REP
Chest x-ray:  Three views presented.

 

History:  Question pneumonia.

 

Comparison study:  December 29, 2016.

 

Findings:  There is very slight blunting of the left lateral and posterior

pleural angles indicating a small quantity of left pleural fluid.  No infiltrate

is seen.  There is a dextroconvex thoracic scoliotic curve again noted unchanged.

The lung fields are otherwise clear.  Right pleural angles are sharp.  Heart is

not enlarged.  Pulmonary vasculature is not increased.

 

Impression:

 

Small amount of left pleural fluid as a new finding.  Thoracic scoliosis

unchanged.  Otherwise no active disease.

 

 

Signed by

Alistair Deng MD 10/14/2017 02:12 P

## 2017-10-14 NOTE — PHACANCOPD
PHARMACY VANCOMYCIN DOSING


Pt Demographics


Demographics


Patient Age:57 , Weight:81.000  , Gender: female


Adjusted Body Weight


Date: 10/14/17, Adjusted Body Weight: [69.4] Kg





Events Past 24 Hours


Events Past 24 Hours:  YES: Change in CrCl (SCR INCREASE)





Vancomycin


Vancomycin Target Ranges:  15-20 mcg/ml


Vancomycin Load Y/N:  Yes


Load Dose Date Time


Vancomycin Load Dose:   2000MG      Date:    10-14     Time: 0400


Vancomycin Dose


Date: 10/14/17. Current Vancomycin Dose: [1000MG Q8H]


Intermittent Dosing?:  No





Labs


Labs











Item Value  Date Time


 


Creatinine 1.67 MG/DL H 10/14/17 0444


 


Creatinine 1.29 MG/DL H 10/13/17 2122








Micro





Microbiology


10/14/17 Blood Culture, Received


           Pending


10/14/17 Blood Culture, Received


           Pending


10/14/17 Acid Fast Stain, Received


           Pending


10/14/17 Mycobacterial Culture, Received


           Pending


10/14/17 Fungal Smear, Received


           Pending


10/14/17 Fungal Culture, Received


           Pending


10/14/17 Gram Stain, Received


           Pending


10/14/17 Body Fluid Culture, Received


           Pending


10/14/17 Respiratory Virus Panel (PCR) (GUI) - Final, Complete


Creatinine Clearance


Date:10/14/17. Creatinine Clearance: [46].


Pending Labs


Trough 10-15 @0500





Assessment and Plan


Maintaining Current Dose?:  No


Reason for dose change:  Other (SCR INCREASE)





Pharmacist Note


Pharmacist Note


Date: 10/14/17. Pharmacist note:Dosed at 1000mg q8h with a trough ordered for 10

-14 @1900.  Will continue to monitor and make adjustments as needed.





Date: 10/14/17. Pharmacist note:Scr increased overnight.  Reduced dosing to 

q12h.  Trough changed to 10-15 @0500.  Will continue to monitor and make 

adjustments as needed.











YUSUF SNIDER PHARMACY Oct 14, 2017 05:51

## 2017-10-14 NOTE — REPUSA
CLINICAL HISTORY: Abdominal pain.

TECHNIQUE: Multiple axial, sagittal and coronal CT images were obtained through the abdomen and pelvi
s without administration of oral or IV contrast material.

COMMENTS:

Small left pleural effusion.

Passive atelectatic airspace disease of the left lower lobe.

Parenchymal liver disease.

Moderate ascites.

Moderate diffuse thickening of the wall of the colon.

Fat containing umbilical hernia without incarceration.

Moderate splenomegaly.

Cirrhotic liver.

There is no intra or extrahepatic biliary ductal dilatation.

The gallbladder is within normal limits.

The pancreas is of normal contour and attenuation characteristics.

There is no evidence of adrenal mass.

The kidneys are normal in size, shape and configuration. No renal or ureteral calculi are identified.
 There is no hydroureter or hydronephrosis.

There is no evidence for appendicitis. There is no bowel wall thickening. No evidence for small or la
rge bowel obstruction.

There is no evidence of intrinsic or extrinsic bladder mass.

Images of the lung bases show no evidence of pleural or parenchymal mass.

The bony structures are free of lytic or blastic lesions.

Multilevel degenerative changes are seen involving the thoracolumbar spine.

Scattered calcifications are seen involving the aorta and major branches compatible with atherosclero
sis.

IMPRESSION: 

Small left pleural effusion.

Passive atelectatic airspace disease of the left lower lobe.

Parenchymal liver disease.

Moderate ascites.

Moderate diffuse thickening of the wall of the colon. Colitis without perforation or abscess formatio
n.

Fat containing umbilical hernia without incarceration.

Moderate splenomegaly.

Cirrhotic liver.

Thank you for your kind referral of this patient.

     Electronically signed by NAMITA DELAROSA MD on 10/14/2017 01:14:52 AM ET

## 2017-10-14 NOTE — PHACANCOPD
PHARMACY VANCOMYCIN DOSING


Pt Demographics


Demographics


Patient Age:57 , Weight:81.000  , Gender: female


Adjusted Body Weight


Date: 10/14/17, Adjusted Body Weight: [69.4] Kg





Events Past 24 Hours


Events Past 24 Hours:  NO: Dialysis, Diuretic Therapy, Change in CrCl, Fever, 

Elevation in WBC, Pending Diagnostics, Pending Procedures, Other





Vancomycin


Vancomycin Target Ranges:  15-20 mcg/ml


Vancomycin Load Y/N:  Yes


Load Dose Date Time


Vancomycin Load Dose:   2000MG      Date:    10-14     Time: 0400


Vancomycin Dose


Date: 10/14/17. Current Vancomycin Dose: [1000MG Q8H]


Intermittent Dosing?:  No





Labs


Labs











Item Value  Date Time


 


White Blood Count 13.0 10^3/uL H 10/13/17 2122


 


Creatinine 1.29 MG/DL H 10/13/17 2122


 


Blood Urea Nitrogen 20 MG/DL H 10/13/17 2122











 Vital Signs








Label Value  Date Time


 


Patient Temperature 99.9 degrees F 10/14/17 0312


 


Temperature Source Temporal 10/14/17 0312








Micro





Microbiology


10/14/17 Blood Culture, Received


           Pending


10/14/17 Blood Culture, Received


           Pending


10/14/17 Acid Fast Stain, Received


           Pending


10/14/17 Mycobacterial Culture, Received


           Pending


10/14/17 Fungal Smear, Received


           Pending


10/14/17 Fungal Culture, Received


           Pending


10/14/17 Gram Stain, Received


           Pending


10/14/17 Body Fluid Culture, Received


           Pending


10/14/17 Respiratory Virus Panel (PCR) (GUI), Received


           Pending


Creatinine Clearance


Date:10/14/17. Creatinine Clearance: [46].


Pending Labs


Trough 10-14 @1900





Assessment and Plan


Maintaining Current Dose?:  Yes


Reason for dose change:  No Dose Change





Pharmacist Note


Pharmacist Note


Date: 10/14/17. Pharmacist note:Dosed at 1000mg q8h with a trough ordered for 10

-14 @1900.  Will continue to monitor and make adjustments as needed.











YUSUF SNIDER PHARMACY Oct 14, 2017 03:31

## 2017-10-15 VITALS — SYSTOLIC BLOOD PRESSURE: 100 MMHG | DIASTOLIC BLOOD PRESSURE: 56 MMHG

## 2017-10-15 VITALS — DIASTOLIC BLOOD PRESSURE: 60 MMHG | SYSTOLIC BLOOD PRESSURE: 104 MMHG

## 2017-10-15 VITALS — SYSTOLIC BLOOD PRESSURE: 109 MMHG | DIASTOLIC BLOOD PRESSURE: 55 MMHG

## 2017-10-15 VITALS — DIASTOLIC BLOOD PRESSURE: 60 MMHG | SYSTOLIC BLOOD PRESSURE: 80 MMHG

## 2017-10-15 VITALS — SYSTOLIC BLOOD PRESSURE: 88 MMHG | DIASTOLIC BLOOD PRESSURE: 62 MMHG

## 2017-10-15 VITALS — DIASTOLIC BLOOD PRESSURE: 62 MMHG | SYSTOLIC BLOOD PRESSURE: 96 MMHG

## 2017-10-15 LAB
ALBUMIN SERPL BCG-MCNC: 3 GM/DL (ref 3.2–5.2)
ALBUMIN/GLOB SERPL: 1 {RATIO} (ref 1–1.93)
ALP SERPL-CCNC: 115 U/L (ref 45–117)
ALT SERPL W P-5'-P-CCNC: 18 U/L (ref 12–78)
ANION GAP SERPL CALC-SCNC: 10 MEQ/L (ref 8–16)
AST SERPL-CCNC: 18 U/L (ref 15–37)
BILIRUB SERPL-MCNC: 2.1 MG/DL (ref 0.2–1)
BUN SERPL-MCNC: 18 MG/DL (ref 7–18)
CALCIUM SERPL-MCNC: 7.4 MG/DL (ref 8.5–10.1)
CHLORIDE SERPL-SCNC: 110 MEQ/L (ref 98–107)
CO2 SERPL-SCNC: 19 MEQ/L (ref 21–32)
CREAT SERPL-MCNC: 1.24 MG/DL (ref 0.55–1.02)
ERYTHROCYTE [DISTWIDTH] IN BLOOD BY AUTOMATED COUNT: 14.4 % (ref 11.5–14.5)
GFR SERPL CREATININE-BSD FRML MDRD: 47.5 ML/MIN/{1.73_M2} (ref 51–?)
GLUCOSE SERPL-MCNC: 109 MG/DL (ref 70–105)
INR PPP: 1.69
MAGNESIUM SERPL-MCNC: 2 MG/DL (ref 1.8–2.4)
MCH RBC QN AUTO: 31 PG (ref 27–33)
MCHC RBC AUTO-ENTMCNC: 34.1 G/DL (ref 32–36.5)
MCV RBC AUTO: 90.8 FL (ref 80–96)
METHADONE UR QL SCN: NEGATIVE
NRBC BLD AUTO-RTO: 0 % (ref 0–0)
PLATELET # BLD AUTO: 219 10^3/UL (ref 150–450)
POTASSIUM SERPL-SCNC: 3.2 MEQ/L (ref 3.5–5.1)
PROT SERPL-MCNC: 6 GM/DL (ref 6.4–8.2)
SODIUM SERPL-SCNC: 139 MEQ/L (ref 136–145)
WBC # BLD AUTO: 9.5 10^3/UL (ref 4–10)

## 2017-10-15 RX ADMIN — LACTULOSE SCH ML: 10 SOLUTION ORAL at 09:00

## 2017-10-15 RX ADMIN — BETAXOLOL HYDROCHLORIDE SCH DROP: 5 SOLUTION/ DROPS OPHTHALMIC at 20:56

## 2017-10-15 RX ADMIN — SODIUM CHLORIDE SCH UNITS: 4.5 INJECTION, SOLUTION INTRAVENOUS at 20:55

## 2017-10-15 RX ADMIN — BETAXOLOL HYDROCHLORIDE SCH DROP: 5 SOLUTION/ DROPS OPHTHALMIC at 08:02

## 2017-10-15 RX ADMIN — PANTOPRAZOLE SODIUM SCH MG: 40 TABLET, DELAYED RELEASE ORAL at 08:02

## 2017-10-15 RX ADMIN — SODIUM CHLORIDE SCH UNITS: 4.5 INJECTION, SOLUTION INTRAVENOUS at 08:02

## 2017-10-15 RX ADMIN — LEVOTHYROXINE SODIUM SCH MCG: 150 TABLET ORAL at 05:15

## 2017-10-15 RX ADMIN — SODIUM CHLORIDE SCH MLS/HR: 9 INJECTION, SOLUTION INTRAVENOUS at 08:01

## 2017-10-15 RX ADMIN — RIFAXIMIN SCH MG: 550 TABLET ORAL at 08:02

## 2017-10-15 RX ADMIN — SODIUM CHLORIDE SCH MLS/HR: 9 INJECTION, SOLUTION INTRAVENOUS at 20:54

## 2017-10-15 RX ADMIN — RIFAXIMIN SCH MG: 550 TABLET ORAL at 20:55

## 2017-10-15 RX ADMIN — BRIMONIDINE TARTRATE SCH DROP: 1.5 SOLUTION OPHTHALMIC at 08:01

## 2017-10-15 RX ADMIN — OXAZEPAM SCH MG: 10 CAPSULE, GELATIN COATED ORAL at 11:19

## 2017-10-15 RX ADMIN — LATANOPROST SCH DROP: 50 SOLUTION OPHTHALMIC at 20:56

## 2017-10-15 RX ADMIN — LACTULOSE SCH ML: 10 SOLUTION ORAL at 20:41

## 2017-10-15 RX ADMIN — MULTIPLE VITAMINS W/ MINERALS TAB SCH TAB: TAB at 08:02

## 2017-10-15 RX ADMIN — FOLIC ACID SCH MG: 1 TABLET ORAL at 08:02

## 2017-10-15 RX ADMIN — POTASSIUM CHLORIDE SCH MLS/HR: 7.46 INJECTION, SOLUTION INTRAVENOUS at 11:20

## 2017-10-15 RX ADMIN — OXAZEPAM SCH MG: 10 CAPSULE, GELATIN COATED ORAL at 05:16

## 2017-10-15 RX ADMIN — LACTULOSE SCH ML: 10 SOLUTION ORAL at 05:15

## 2017-10-15 RX ADMIN — OXAZEPAM SCH MG: 10 CAPSULE, GELATIN COATED ORAL at 17:31

## 2017-10-15 RX ADMIN — BRIMONIDINE TARTRATE SCH DROP: 1.5 SOLUTION OPHTHALMIC at 16:47

## 2017-10-15 RX ADMIN — DEXTROSE MONOHYDRATE SCH MLS/HR: 50 INJECTION, SOLUTION INTRAVENOUS at 05:16

## 2017-10-15 RX ADMIN — LACTULOSE SCH ML: 10 SOLUTION ORAL at 06:03

## 2017-10-15 RX ADMIN — POTASSIUM CHLORIDE SCH MLS/HR: 7.46 INJECTION, SOLUTION INTRAVENOUS at 10:10

## 2017-10-15 RX ADMIN — Medication SCH MG: at 08:02

## 2017-10-15 RX ADMIN — BRIMONIDINE TARTRATE SCH DROP: 1.5 SOLUTION OPHTHALMIC at 20:55

## 2017-10-15 NOTE — IPN
DATE:  10/15/2017

 

SUBJECTIVE:  Patient is seen and examined in the room today.  Patient is still

complaining about generalized muscle aches in upper and lower extremities.

Patient also continues to have persistent resting tremors.  Since this morning,

patient has a persistent fever.

 

OBJECTIVE:

VITAL SIGNS:  Temperature 100.4, pulse 90, respirations 18, blood pressure

109/55, pulse oximetry 97% in room air.

GENERAL:  Anxious, mild to moderate distress, alert and oriented times three.

HEENT:  Normocephalic, atraumatic.  Extraocular motors grossly intact.

CARDIOVASCULAR:  Positive S1, S2, regular rate.

LUNGS:  Clear to auscultation bilaterally.

ABDOMEN:  Soft, no significant tenderness upon palpation, no rebound is noted.

Bowel sounds present.

EXTREMITIES:  No edema.  No sign of cyanosis.  Patient is noted to have

significant upper extremity resting tremors.

 

LABORATORY DATA:

WBC 9.5, hemoglobin 10.1, hematocrit 29.6, platelet count 219.

 

Sodium 139, potassium 3.2, chloride 110, carbon dioxide 19, BUN 18, creatinine

1.24, GFR 37.5, fasting glucose 109, lactic acid 1.3, calcium 7.4, magnesium 2,

total bilirubin 2.1, AST 18, ALT 18, alkaline phosphatase 115, C-reactive protein

11.7, albumin 3.

 

ASSESSMENT AND PLAN:

1.  Spontaneous peritonitis.  Patient has been treated with vancomycin and

meropenem.  We are still waiting for the ascitic fluid cultures.  Patient has

continued to have a persistent fever.  Will give patient Tylenol with caution due

to history of cirrhosis.  Patient also has acute kidney injury (BAM) at this

moment.

 

2.  Bacteremia.  Blood cultures, preliminary, is positive in both sets and both

show gram-positive organism.  Patient is on vancomycin.  Continue to follow the

vital signs.  Patient had a persistent fever.  Patient has also been having

hypotension after the paracentesis.  Patient had been receiving multiple boluses

of albumin.

 

3.  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  On the

date of admission, patient had an elevated ammonia level.  Patient has been

receiving lactulose and patient does have improvement of ammonia level.  Due to

soft blood pressures and hypokalemia, patient's lactulose regimen will be

adjusted.  At baseline, patient requires paracentesis weekly.  The last

paracentesis was done on Monday, 10/09/2017.  Patient had a repeat paracentesis

on the date of admission, which is 10/13/2017.

 

4.  Hypothyroidism.  Normal thyroid stimulating hormone (TSH) and normal free T4.

Patient on Synthroid.

 

5.  Gastroesophageal reflux disease, on Protonix.

 

6.  Hepatorenal syndrome with hyperkalemia.  Nephrology has been assisting on the

case and patient had paracentesis status post multiple albumin transfusions.

Renal function is improving.  There is also a suspicion that patient's acute

kidney injury (BAM) is from the current spontaneous bacterial peritonitis and

hypotension.  Patient has been receiving intermittent fluid bolus to help with

patient's hypotension.

 

7.  Restless leg syndrome.

 

8.  History of grade 1 diastolic dysfunction.  Currently, patient is being

treated for hypotension mainly from the spontaneous peritonitis.  Patient has

been receiving intermittent fluid resuscitation and also albumin transfusion.

Will continue to monitor for any sign of fluid overload.

 

9.  Alcoholic liver cirrhosis.  Continue to monitor INR.  Continue to follow

renal function.  On lactulose and rifaximin.

 

10.  Deep venous thrombosis (DVT) prophylaxis.  On thromboembolism deterrent

(TEDs) and sequential compression device.

## 2017-10-15 NOTE — PHACANCOPD
PHARMACY VANCOMYCIN DOSING


Pt Demographics


Demographics


Patient Age:57 , Weight:74.600  , Gender: female


Adjusted Body Weight


Date: 10/14/17, Adjusted Body Weight: [69.4] Kg





Events Past 24 Hours


Events Past 24 Hours:  NO: Dialysis, Diuretic Therapy, Change in CrCl, Fever, 

Elevation in WBC, Pending Diagnostics, Pending Procedures, Other





Vancomycin


Vancomycin Target Ranges:  15-20 mcg/ml


Vancomycin Load Y/N:  Yes


Load Dose Date Time


Vancomycin Load Dose:   2000MG      Date:    10-14     Time: 0400


Vancomycin Dose


Date: 10/15/17. Current Vancomycin Dose: [1000MG Q18H]


Intermittent Dosing?:  No





Labs


Labs











Item Value  Date Time


 


White Blood Count 9.5 10^3/uL 10/15/17 0459


 


Creatinine 1.24 MG/DL H 10/15/17 0459


 


Blood Urea Nitrogen 18 MG/DL 10/15/17 0459


 


Vancomycin Level Trough 25.4 UG/ML *H 10/15/17 0459











 Vital Signs








Label Value  Date Time


 


Patient Temperature 100.2 degrees F 10/15/17 0400


 


Temperature Source Temporal 10/15/17 0400








Micro





Microbiology


10/14/17 Blood Culture - Preliminary, Resulted


           No growth after 24 hours . All specim...


10/14/17 Blood Culture - Preliminary, Resulted


           


10/14/17 Acid Fast Stain, Received


           Pending


10/14/17 Mycobacterial Culture, Received


           Pending


10/14/17 Fungal Smear, Received


           Pending


10/14/17 Fungal Culture, Received


           Pending


10/14/17 Gram Stain - Final, Resulted


           


10/14/17 Body Fluid Culture, Resulted


           Pending


10/14/17 Respiratory Virus Panel (PCR) (GUI) - Final, Complete


Creatinine Clearance


Date:10/15/17. Creatinine Clearance: [51].


Pending Labs


Trough 10-16 @1700





Assessment and Plan


Maintaining Current Dose?:  No


Reason for dose change:  Trough too high





Pharmacist Note


Pharmacist Note


Date: 10/15/17. Pharmacist note:trough of 25.4 is above target range.  Dose 

reduced to 1000mg q18h with a trough ordered for 10-16 @1700.  will continue to 

monitor and make adjustments as needed.











YUSUF SNIDER PHARM[facility]Y Oct 15, 2017 06:26

## 2017-10-15 NOTE — IPN
DATE:  10/15/2017

 

Ms. Tayolr is seen this afternoon on her bedside.  She feels that the numbness in

her hands and feet is slightly better.  She denies any dyspnea or chest pain at

present.  She does have low grade fever.  She is currently being treated for

subacute bacterial peritonitis.

 

PHYSICAL EXAMINATION:

Temperature 100.5 degrees Fahrenheit, heart rate 94 per minute and respiratory

rate 18 per minute.  Blood pressure 109/55 mmHg and oxygen saturation 97% on room

air.  Intake and output records from yesterday showed total intake 3550 and

output 2250 mL.  Her head is atraumatic.  Neck is supple and jugular venous

distention (JVD) is only mildly elevated.  Pupils equal and reactive to light and

sclerae is icteric.  Heart sounds are regular and not tachycardiac anymore.

Lungs clear to auscultation.  Abdomen is soft and distended with ascites.  There

is no tenderness today.  Bowel sounds are present.  Extremities have no cyanosis

or clubbing.  Skin is mildly jaundiced.  There is no rash or ulcers.

Neurologically she is awake, alert and oriented times three.

 

Today's labs show WBC count 9.5, hemoglobin 10.1 and hematocrit 29.6.  Sodium 139

and potassium 3.2.  CO2 19, BUN 18 and creatinine 1.24.  Lactic acid level is

down to 1.3.  Serum ammonia level down to 27.  Total bilirubin is 2.1, C-reactive

protein 11.7, total protein 6.0 and albumin 3.0.

 

PROBLEMS:

1.  Acute kidney injury superimposed on possible chronic kidney disease.

Probably hepatorenal, however, she does have some improvement in her kidney

function.  She did receive IV fluids and IV albumin yesterday.

 

2.  Lactic acidosis most likely related to hypotension and hypovolemia.  Lactic

acid level has improved today.

 

3.  Hypokalemia most likely related to use of lactulose and poor oral intake.

The patient was given one dose of potassium chloride 40 mEq this morning.  I will

give another dose of 40 mEq this afternoon.  She is likely to require further

potassium supplement in view of ongoing loss caused by use of lactulose.

 

4.  Hepatic encephalopathy.  Ammonia level has improved and mentation is also

improved.  The patient remains on oral lactulose.

 

5.  Cirrhosis of liver with recurrent ascites.  The patient has increasing

ascites once again.  IV fluid has been stopped appropriately.  She is likely to

require paracentesis again next week.

 

6.  Anemia.  Her anemia is slightly worse.  There is no evidence of blood loss.

This most likely is related to volume expansion caused by IV fluids given.  No

intervention is indicated other than close monitoring.

## 2017-10-16 VITALS — SYSTOLIC BLOOD PRESSURE: 101 MMHG | DIASTOLIC BLOOD PRESSURE: 60 MMHG

## 2017-10-16 VITALS — SYSTOLIC BLOOD PRESSURE: 90 MMHG | DIASTOLIC BLOOD PRESSURE: 55 MMHG

## 2017-10-16 VITALS — DIASTOLIC BLOOD PRESSURE: 43 MMHG | SYSTOLIC BLOOD PRESSURE: 82 MMHG

## 2017-10-16 VITALS — SYSTOLIC BLOOD PRESSURE: 95 MMHG | DIASTOLIC BLOOD PRESSURE: 54 MMHG

## 2017-10-16 VITALS — SYSTOLIC BLOOD PRESSURE: 105 MMHG | DIASTOLIC BLOOD PRESSURE: 60 MMHG

## 2017-10-16 VITALS — SYSTOLIC BLOOD PRESSURE: 103 MMHG | DIASTOLIC BLOOD PRESSURE: 70 MMHG

## 2017-10-16 VITALS — SYSTOLIC BLOOD PRESSURE: 130 MMHG | DIASTOLIC BLOOD PRESSURE: 80 MMHG

## 2017-10-16 VITALS — DIASTOLIC BLOOD PRESSURE: 64 MMHG | SYSTOLIC BLOOD PRESSURE: 107 MMHG

## 2017-10-16 VITALS — DIASTOLIC BLOOD PRESSURE: 56 MMHG | SYSTOLIC BLOOD PRESSURE: 100 MMHG

## 2017-10-16 VITALS — SYSTOLIC BLOOD PRESSURE: 117 MMHG | DIASTOLIC BLOOD PRESSURE: 56 MMHG

## 2017-10-16 VITALS — SYSTOLIC BLOOD PRESSURE: 125 MMHG | DIASTOLIC BLOOD PRESSURE: 85 MMHG

## 2017-10-16 VITALS — SYSTOLIC BLOOD PRESSURE: 85 MMHG | DIASTOLIC BLOOD PRESSURE: 56 MMHG

## 2017-10-16 LAB
ALBUMIN SERPL BCG-MCNC: 2.7 GM/DL (ref 3.2–5.2)
ALBUMIN/GLOB SERPL: 1 {RATIO} (ref 1–1.93)
ALP SERPL-CCNC: 110 U/L (ref 45–117)
ALT SERPL W P-5'-P-CCNC: 16 U/L (ref 12–78)
ANION GAP SERPL CALC-SCNC: 9 MEQ/L (ref 8–16)
AST SERPL-CCNC: 16 U/L (ref 15–37)
BILIRUB SERPL-MCNC: 1.3 MG/DL (ref 0.2–1)
BUN SERPL-MCNC: 18 MG/DL (ref 7–18)
CALCIUM SERPL-MCNC: 7.1 MG/DL (ref 8.5–10.1)
CHLORIDE SERPL-SCNC: 113 MEQ/L (ref 98–107)
CO2 SERPL-SCNC: 18 MEQ/L (ref 21–32)
CREAT SERPL-MCNC: 1.24 MG/DL (ref 0.55–1.02)
ERYTHROCYTE [DISTWIDTH] IN BLOOD BY AUTOMATED COUNT: 14.6 % (ref 11.5–14.5)
GFR SERPL CREATININE-BSD FRML MDRD: 47.5 ML/MIN/{1.73_M2} (ref 51–?)
GLUCOSE SERPL-MCNC: 115 MG/DL (ref 70–105)
INR PPP: 1.57
MAGNESIUM SERPL-MCNC: 2 MG/DL (ref 1.8–2.4)
MCH RBC QN AUTO: 31 PG (ref 27–33)
MCHC RBC AUTO-ENTMCNC: 33.8 G/DL (ref 32–36.5)
MCV RBC AUTO: 91.7 FL (ref 80–96)
NRBC BLD AUTO-RTO: 0 % (ref 0–0)
PLATELET # BLD AUTO: 197 10^3/UL (ref 150–450)
POTASSIUM SERPL-SCNC: 4 MEQ/L (ref 3.5–5.1)
PROT SERPL-MCNC: 5.4 GM/DL (ref 6.4–8.2)
SODIUM SERPL-SCNC: 140 MEQ/L (ref 136–145)
WBC # BLD AUTO: 5.4 10^3/UL (ref 4–10)

## 2017-10-16 RX ADMIN — BRIMONIDINE TARTRATE SCH DROP: 1.5 SOLUTION OPHTHALMIC at 17:29

## 2017-10-16 RX ADMIN — BETAXOLOL HYDROCHLORIDE SCH DROP: 5 SOLUTION/ DROPS OPHTHALMIC at 08:39

## 2017-10-16 RX ADMIN — BRIMONIDINE TARTRATE SCH DROP: 1.5 SOLUTION OPHTHALMIC at 08:39

## 2017-10-16 RX ADMIN — BRIMONIDINE TARTRATE SCH DROP: 1.5 SOLUTION OPHTHALMIC at 20:16

## 2017-10-16 RX ADMIN — OXAZEPAM SCH MG: 10 CAPSULE, GELATIN COATED ORAL at 06:26

## 2017-10-16 RX ADMIN — SODIUM CHLORIDE SCH UNITS: 4.5 INJECTION, SOLUTION INTRAVENOUS at 08:38

## 2017-10-16 RX ADMIN — LACTULOSE SCH ML: 10 SOLUTION ORAL at 20:15

## 2017-10-16 RX ADMIN — LEVOTHYROXINE SODIUM SCH MCG: 150 TABLET ORAL at 06:26

## 2017-10-16 RX ADMIN — PANTOPRAZOLE SODIUM SCH MG: 40 TABLET, DELAYED RELEASE ORAL at 08:39

## 2017-10-16 RX ADMIN — SODIUM CHLORIDE SCH MLS/HR: 9 INJECTION, SOLUTION INTRAVENOUS at 08:38

## 2017-10-16 RX ADMIN — DEXTROSE MONOHYDRATE SCH MLS/HR: 50 INJECTION, SOLUTION INTRAVENOUS at 00:22

## 2017-10-16 RX ADMIN — OXAZEPAM SCH MG: 10 CAPSULE, GELATIN COATED ORAL at 17:28

## 2017-10-16 RX ADMIN — SODIUM CHLORIDE SCH UNITS: 4.5 INJECTION, SOLUTION INTRAVENOUS at 20:16

## 2017-10-16 RX ADMIN — BETAXOLOL HYDROCHLORIDE SCH DROP: 5 SOLUTION/ DROPS OPHTHALMIC at 20:16

## 2017-10-16 RX ADMIN — LATANOPROST SCH DROP: 50 SOLUTION OPHTHALMIC at 20:16

## 2017-10-16 RX ADMIN — OXAZEPAM SCH MG: 10 CAPSULE, GELATIN COATED ORAL at 12:22

## 2017-10-16 RX ADMIN — FOLIC ACID SCH MG: 1 TABLET ORAL at 08:39

## 2017-10-16 RX ADMIN — DEXTROSE MONOHYDRATE SCH MLS/HR: 50 INJECTION, SOLUTION INTRAVENOUS at 17:27

## 2017-10-16 RX ADMIN — OXAZEPAM SCH MG: 10 CAPSULE, GELATIN COATED ORAL at 00:24

## 2017-10-16 RX ADMIN — SODIUM CHLORIDE SCH MLS/HR: 9 INJECTION, SOLUTION INTRAVENOUS at 20:15

## 2017-10-16 RX ADMIN — MULTIPLE VITAMINS W/ MINERALS TAB SCH TAB: TAB at 08:39

## 2017-10-16 RX ADMIN — RIFAXIMIN SCH MG: 550 TABLET ORAL at 08:39

## 2017-10-16 RX ADMIN — Medication SCH MG: at 08:39

## 2017-10-16 RX ADMIN — RIFAXIMIN SCH MG: 550 TABLET ORAL at 20:15

## 2017-10-16 RX ADMIN — LACTULOSE SCH ML: 10 SOLUTION ORAL at 08:38

## 2017-10-16 NOTE — ECHO
DATE OF PROCEDURE:  10/16/2017

 

REFERRING PHYSICIAN: Dr. Ember Atkins

 

INDICATION: sepsis.

 

HEIGHT: 170 cm

WEIGHT: 75 kg

 

2D MEASUREMENTS:

Left atrium: 3.2 cm

Aortic root: 2.9 cm

Ventricular septum: 0.81 cm

Posterior wall: 0.82 cm

Left ventricle diastole: 4.2 cm

LVOT: 2.0 cm

Inferior vena cava: 1.3 cm

 

DOPPLER MEASUREMENTS:

Aortic valve velocity: 199 cm/s

LVOT velocity: 143 cm

LVOT VTI: 27.8 cm/s

Very mild mitral regurgitation.

Mitral E velocity: 87.4 cm/s

Mitral A velocity: 80.5 cm/s

Mitral deceleration time: 211 ms

Mild tricuspid regurgitation.

Estimated right ventricle systolic pressure 32 mmHg assuming a right atrial

pressure of 5 mmHg.

 

MITRAL ANNULAR TISSUE DOPPLER:

E prime septal: 7.9 cm/s

E prime lateral: 14.1 cm/s

 

DESCRIPTION: Rhythm was sinus. Image quality was adequate. This is a 2D, M-mode,

color flow Doppler and pulse wave Doppler examination and included mitral annular

tissue Doppler.

 

CONCLUSIONS:

1. Normal left ventricle internal dimensions and wall thickness. No regional wall

motion abnormalities of the left ventricle. Hyperdynamic LV systolic function.

LVEF of 70% by visual estimate. Normal LV diastolic function.

2. No vegetations on any of the cardiac valves.

3. Very small pericardial effusion.

4. Ascites fluid seen around the liver.

## 2017-10-16 NOTE — IPN
DATE:  10/16/2017

 

Ms. Taylor is seen this morning on her bedside.  She is sleeping, but I was able

to wake her up easily.  She reports that her numbness in the hands and feet is

improving.  She denies any nausea, vomiting, dyspnea or chest pain.

 

PHYSICAL EXAMINATION:  Temperature 98.6 degrees Fahrenheit, heart rate 88 per

minute and respiratory rate 20 per minute.  Blood pressure has been as low as

85/56 mmHg and as high as 125/85 mmHg.  Intake and output records are inaccurate

from yesterday.  Her head is atraumatic.  Ears, nose and throat are unremarkable.

Neck is supple and without jugular venous distention (JVD) or thyroid

enlargement.  Heart sounds regular and lungs with slightly diminished breath

sounds at bases.  Abdomen:  Distended with ascites and tenderness has improved.

Bowel sounds are normal.  Extremities have no cyanosis or clubbing.  Skin has

mild icterus.

 

Today's labs show WBC count 5.4, hemoglobin 9.3 and hematocrit 27.5.  Platelets

197.  Sodium 140, potassium 4.0.  BUN 18 and creatinine 1.24.

 

PROBLEMS:

1.  Acute kidney injury superimposed on chronic kidney disease.  Kidney function

has improved and stable over the last 24 hours.  Most likely her acute kidney

injury was related to hypotension and intravascular volume depletion caused by

paracentesis and subacute bacterial peritonitis.

 

2.  Recurrent ascites in the setting of cirrhosis of liver.  The patient has been

undergoing paracentesis almost a weekly basis.  At this point there is no

emergent need for paracentesis today.

 

3.  Subacute bacterial peritonitis.  The patient remains on IV antibiotics

including vancomycin and meropenem.

 

4.  Hypotension.  Most likely this is related to infective etiology, and the

patient has required IV albumin and normal saline.  At present she seems to be

getting stable.

## 2017-10-16 NOTE — PHACANCOPD
PHARMACY VANCOMYCIN DOSING


Pt Demographics


Demographics


Patient Age:57 , Weight:74.600  , Gender: female


Adjusted Body Weight


Date: 10/14/17, Adjusted Body Weight: [69.4] Kg





Vancomycin


Vancomycin Target Ranges:  15-20 mcg/ml


Vancomycin Load Y/N:  Yes


Load Dose Date Time


Vancomycin Load Dose:   2000MG      Date:    10-14     Time: 0400


Vancomycin Dose


Date: 10/15/17. Current Vancomycin Dose: [1000MG Q18H]


Intermittent Dosing?:  No





Labs


Micro





Microbiology


10/14/17 Blood Culture - Preliminary, Resulted


           


10/14/17 Blood Culture - Preliminary, Resulted


           


10/14/17 Acid Fast Stain, Received


           Pending


10/14/17 Mycobacterial Culture, Received


           Pending


10/14/17 Fungal Smear, Received


           Pending


10/14/17 Fungal Culture, Received


           Pending


10/14/17 Gram Stain - Final, Resulted


           


10/14/17 Body Fluid Culture, Resulted


           Pending


10/14/17 Respiratory Virus Panel (PCR) (GUI) - Final, Complete


           


10/15/17 Urine Culture - Final, Complete


Creatinine Clearance


Date:10/15/17. Creatinine Clearance: [51].


Pending Labs


Trough 10-16 @1700





Assessment and Plan


Maintaining Current Dose?:  No


Reason for dose change:  Trough too high





Pharmacist Note


Pharmacist Note


10/16/17: Follow-up trough today resulted at 22.3mcg/ml. Scr remains stable at 

1.24 today compared to yesterday. Will decrease vancomycin dosing to 1g IV 

Q24H. We will continue to monitor the patient and schedule another follow-up 

trough as appropriate.





Date: 10/15/17. Pharmacist note:trough of 25.4 is above target range.  Dose 

reduced to 1000mg q18h with a trough ordered for 10-16 @1700.  will continue to 

monitor and make adjustments as needed.











KEELEY RAYA PHARMACY Oct 16, 2017 18:47

## 2017-10-16 NOTE — IPN
DATE:  10/16/2017

 

SUBJECTIVE: The patient is seen and examined in the room today. The patient

stated that her generalized upper and lower extremity myalgia has been improving.

However, the patient continued to have persistent resting hand tremors. The

patient feels her mentation is improving. Denies any abdominal pain.

 

OBJECTIVE: VITAL SIGNS: Temperature 98.6, pulse 88, respiration 20, blood

pressure is 85/56, pulse oximetry 98% on room air.

GENERAL: No sign of acute distress. Fatigued. Alert and oriented times three.

HEENT: Normocephalic, atraumatic. Extraocular movements grossly intact.

CARDIOVASCULAR: Positive S1, S2, regular rate.

LUNGS: Clear to auscultation bilaterally.

ABDOMEN: Obese, soft. No significant tenderness to palpation. No rebound is

noted. Bowel sounds present.

EXTREMITIES: No edema. No sign of cyanosis. Continues to have positive icterus

and also resting hand tremors.

 

LABORATORY DATA: WBC 5.4, hemoglobin 9.3, hematocrit 27.5, platelet count is 197.

Sodium 140, potassium 4, chloride 113, carbon dioxide 18, BUN 18, creatinine

1.24, GFR 47.5, fasting glucose 115. Calcium 7.1, magnesium 2. Total bilirubin

1.3. AST 16, ALT 16, alkaline phosphatase 110. C-reactive protein is 9.39. Total

protein 5.4.

 

ASSESSMENT AND PLAN:

1. Spontaneous peritonitis. The patient is being treated with vancomycin and

meropenem. The patient may also have recurrent bacteremia. Blood cultures,

preliminary, show gram positive cocci in clusters and gram positive cocci in

chains. Continue to follow with final report. C-reactive protein is improving.

White count returned to normal range.

2. Bacteremia. Blood culture final report still pending. Preliminary show

positive for gram positive cocci in clusters. The patient is on vancomycin.

Intermittently, the patient has hypertension. The patient is receiving

intermittent IV fluid along with albumin transfusion. Since noontime today, the

patient's blood pressure started to be more stable. This time requiring frequent

albumin infusions.

3. 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 At the

baseline, the patient requires paracentesis weekly. The patient had a

paracentesis performed in the procedures suite on October 9, 2017. There was a

suspicion that the patient may have a complication from the procedure. The

patient had a repeat paracentesis performed on the date of admission. Sample was

collected during that paracentesis.

4. Hypothyroidism. Normal free T4 and T3.

5. Gastroesophageal reflux disease. On Protonix.

6. Hepatorenal syndrome with hyperkalemia. Nephrology has been assisting on the

case. The patient's renal function is improving. Electrolyte abnormalities also

show improvement.

7. Restless leg syndrome.

8. History of gray/white diastolic dysfunction. Currently the patient being

treated for complication from sepsis. The patient has continued having

intermittent hypotension from spontaneous peritonitis and bacteremia. The

patient has been receiving intermittent fluid resuscitation and albumin

transfusions. Will continue to watch patient's fluid status. Currently no sign of

fluid overload.

9. Alcohol liver cirrhosis. Moderate INR. Continue monitoring liver function. On

the day of admission, the patient had elevated ammonia level. The patient is on

scheduled lactulose with holding parameters. The patient is also on Rifaximin.

10. Deep venous thrombosis (DVT) prophylaxis. On thromboembolism deterrents

(TEDs) and sequential compression devices (SCDs).

## 2017-10-17 VITALS — DIASTOLIC BLOOD PRESSURE: 57 MMHG | SYSTOLIC BLOOD PRESSURE: 101 MMHG

## 2017-10-17 VITALS — DIASTOLIC BLOOD PRESSURE: 53 MMHG | SYSTOLIC BLOOD PRESSURE: 90 MMHG

## 2017-10-17 VITALS — DIASTOLIC BLOOD PRESSURE: 54 MMHG | SYSTOLIC BLOOD PRESSURE: 92 MMHG

## 2017-10-17 VITALS — DIASTOLIC BLOOD PRESSURE: 61 MMHG | SYSTOLIC BLOOD PRESSURE: 97 MMHG

## 2017-10-17 VITALS — DIASTOLIC BLOOD PRESSURE: 63 MMHG | SYSTOLIC BLOOD PRESSURE: 105 MMHG

## 2017-10-17 VITALS — DIASTOLIC BLOOD PRESSURE: 62 MMHG | SYSTOLIC BLOOD PRESSURE: 101 MMHG

## 2017-10-17 LAB
ALBUMIN SERPL BCG-MCNC: 2.4 GM/DL (ref 3.2–5.2)
ALBUMIN/GLOB SERPL: 0.8 {RATIO} (ref 1–1.93)
ALP SERPL-CCNC: 126 U/L (ref 45–117)
ALT SERPL W P-5'-P-CCNC: 14 U/L (ref 12–78)
ANION GAP SERPL CALC-SCNC: 9 MEQ/L (ref 8–16)
AST SERPL-CCNC: 18 U/L (ref 15–37)
BILIRUB SERPL-MCNC: 1.1 MG/DL (ref 0.2–1)
BUN SERPL-MCNC: 17 MG/DL (ref 7–18)
CALCIUM SERPL-MCNC: 7.2 MG/DL (ref 8.5–10.1)
CHLORIDE SERPL-SCNC: 113 MEQ/L (ref 98–107)
CO2 SERPL-SCNC: 17 MEQ/L (ref 21–32)
CREAT SERPL-MCNC: 1.18 MG/DL (ref 0.55–1.02)
ERYTHROCYTE [DISTWIDTH] IN BLOOD BY AUTOMATED COUNT: 14.5 % (ref 11.5–14.5)
GFR SERPL CREATININE-BSD FRML MDRD: 50.3 ML/MIN/{1.73_M2} (ref 51–?)
GLUCOSE SERPL-MCNC: 115 MG/DL (ref 70–105)
INR PPP: 1.4
MAGNESIUM SERPL-MCNC: 2 MG/DL (ref 1.8–2.4)
MCH RBC QN AUTO: 30.3 PG (ref 27–33)
MCHC RBC AUTO-ENTMCNC: 33.4 G/DL (ref 32–36.5)
MCV RBC AUTO: 90.5 FL (ref 80–96)
NRBC BLD AUTO-RTO: 0 % (ref 0–0)
PLATELET # BLD AUTO: 210 10^3/UL (ref 150–450)
POTASSIUM SERPL-SCNC: 4 MEQ/L (ref 3.5–5.1)
PROT SERPL-MCNC: 5.4 GM/DL (ref 6.4–8.2)
SODIUM SERPL-SCNC: 139 MEQ/L (ref 136–145)
WBC # BLD AUTO: 5.2 10^3/UL (ref 4–10)

## 2017-10-17 RX ADMIN — BRIMONIDINE TARTRATE SCH DROP: 1.5 SOLUTION OPHTHALMIC at 08:14

## 2017-10-17 RX ADMIN — RIFAXIMIN SCH MG: 550 TABLET ORAL at 20:34

## 2017-10-17 RX ADMIN — LEVOTHYROXINE SODIUM SCH MCG: 150 TABLET ORAL at 05:27

## 2017-10-17 RX ADMIN — LACTULOSE SCH ML: 10 SOLUTION ORAL at 08:14

## 2017-10-17 RX ADMIN — LATANOPROST SCH DROP: 50 SOLUTION OPHTHALMIC at 20:34

## 2017-10-17 RX ADMIN — RIFAXIMIN SCH MG: 550 TABLET ORAL at 08:14

## 2017-10-17 RX ADMIN — LACTULOSE SCH ML: 10 SOLUTION ORAL at 20:34

## 2017-10-17 RX ADMIN — BRIMONIDINE TARTRATE SCH DROP: 1.5 SOLUTION OPHTHALMIC at 15:41

## 2017-10-17 RX ADMIN — FOLIC ACID SCH MG: 1 TABLET ORAL at 08:14

## 2017-10-17 RX ADMIN — SODIUM CHLORIDE SCH UNITS: 4.5 INJECTION, SOLUTION INTRAVENOUS at 20:34

## 2017-10-17 RX ADMIN — SODIUM CHLORIDE SCH MLS/HR: 9 INJECTION, SOLUTION INTRAVENOUS at 08:13

## 2017-10-17 RX ADMIN — SODIUM CHLORIDE SCH UNITS: 4.5 INJECTION, SOLUTION INTRAVENOUS at 08:14

## 2017-10-17 RX ADMIN — OXAZEPAM SCH MG: 10 CAPSULE, GELATIN COATED ORAL at 00:43

## 2017-10-17 RX ADMIN — PANTOPRAZOLE SODIUM SCH MG: 40 TABLET, DELAYED RELEASE ORAL at 08:14

## 2017-10-17 RX ADMIN — MULTIPLE VITAMINS W/ MINERALS TAB SCH TAB: TAB at 08:14

## 2017-10-17 RX ADMIN — BRIMONIDINE TARTRATE SCH DROP: 1.5 SOLUTION OPHTHALMIC at 22:02

## 2017-10-17 RX ADMIN — BETAXOLOL HYDROCHLORIDE SCH DROP: 5 SOLUTION/ DROPS OPHTHALMIC at 20:34

## 2017-10-17 RX ADMIN — BETAXOLOL HYDROCHLORIDE SCH DROP: 5 SOLUTION/ DROPS OPHTHALMIC at 08:15

## 2017-10-17 RX ADMIN — OXAZEPAM SCH MG: 10 CAPSULE, GELATIN COATED ORAL at 05:29

## 2017-10-17 RX ADMIN — Medication SCH MG: at 08:14

## 2017-10-17 NOTE — CR.PDOC
[facility] Consultation


Consultation


DATE OF CONSULTATION: Oct 13, 2017 at 19:19





PRIMARY CARE PHYSICIAN: Gabe Corral MD





REFERRING PROVIDER: Andreia Rae MD





ATTENDING PHYSICIAN: Dr. Andreia Rae MD





REASON FOR CONSULTATION/CHIEF COMPLAINT: 1. Spontaneous bacterial peritonitis 

and bacteremia 





HISTORY OF PRESENT ILLNESS: 58 y/o female presented to ED with 24 hours of arm 

and leg weakness with accompanying


myalgia and pain with movement. The pt was found to be febrile in the ED and CT 

scan of the abdomen demonstrated


moderate asites and an elevated lactic acid level. She was admitted for sepsis 

, urine 


and blood cultures were drawn. Paracentesis done in ED showed WBC of 417 and as 

such pt was thought to have SBP. 


She was placed on meropenem and vancomycin but still continues to have spike in 

temperature, although her WBC count and 


inflammatory markers are decreasing.Her blood cultures came back positive for 

strep Gordonii and staph capitis





ALLERGIES: Please see below.





HOME MEDICATIONS: Please see below.





PAST MEDICAL HISTORY:


1. alcoholic cirrhosis with alcoholic hepatitis


2. hypothyroidism


3. htn


4.GERD


5. Restless leg syndrome


6. glaucoma


7.nephrolithiasis


8. CHF-grade 1 diastolic dysfunction


9. hx of urogenital candidiasis


10. hx of HPV


111. MSSA bacteremia


11. UTI-E. coli, history of


12. hyperammonemia





PAST SURGICAL HISTORY: 


1. D&C w/hysteroscope





FAMILY HISTORY:


Mother: Mother with brain tumor


Siblings:   Sister also suffers from alcoholism, began at age 38.








SOCIAL HISTORY:


Marital status and/or living arrangements: lives by herself


Children: two sons and one daughter


Tobacco use:denies 


ETOH: stopped chronic alcohol abuse 7/2017


Illicit drug use: denies


IV drug use: denies





REVIEW OF SYSTEMS:


CONSTITUTIONAL: laying in bed trying to sleep


HEENT: denies h/a or blurred vision


CARDIOVASCULAR: denies cp or racing heart


RESPIRATORY: denies SOB or cough


GENITOURINARY: denies pain with urination or blood in urine


MUSCULOSKELETAL: admits to pain in her abdomen when she receives her DVT 

prophylaxis


GASTROINTESTINAL: denies pain with BM nor blood in stool


SKIN: no rashes








PHYSICAL EXAMINATION:


VITAL SIGNS: Please see below.


GENERAL APPEARANCE: laying in bed, poor grooming, NAD


HEENT:  NCAT, EOMI, nares patent b/l


RESPIRATORY: CTA b/L, no wheezing appreciated on exam nor rales or rhonchi


CARDIOVASCULAR: normal s1 and s2, no murmurs, rubs or gallops


ABDOMEN: distended, NABSx4, no abdominal pain or d/c, no rebound ridgity or 

guarding


EXTREMITIES: no edema or cyanosis


NEUROLOGICAL: no focal deficits appreciated


PSYCHIATRIC: affect and demeanor appear flat





LABORATORY DATA: Please see below.





ASSESSMENT/PLAN: 


1. Peritonitis and bacteremia -it is interesting that her peritoneal fluid 

analysis demonstrated a monocytotic vs neutrophilic predominance, neutrophilia 

would be more consistent with the strep gordonii peritonitis. Will ask the lab 

to perform manual differential on the cell fluid to ensure this was accurate. 

She should complete a 10 day course of abx., suggest de-escalating therapy 

which is what we have done with beginning the cefazolin to cover both pathogens 

that Ms. Taylor was positive for in her blood and peritoneal fluid and d/c her 

vancomycin and meropenem. Although with the staph capitis not appearing in the 

peritoneal fluid but being present in the blood culture, this does raise the 

question of possible skin contaminant. Strep Gordonii is a normal oral GI ayana 

and the pt's dentition indeed is poor, although she denies recent dental work. 

Her white blood cell count and inflammatory markers are coming down and 

hopefully with the abx change, she will stop having spike in fever. 





2.Temperature elevation- 100.1 one day ago, have stopped vanco and meropenem 

and started cefazolin to cover both pathogens  in peritoneal and blood cultures

, WBC and inflammatory markers have decreased, will continue to monitor for 

response to abx change. 





Will continue to follow the patient and offer recommendations as clinically 

relevant.





Vital Signs/I&O





Vital Signs








  Date Time  Temp Pulse Resp B/P (MAP) Pulse Ox O2 Delivery O2 Flow Rate FiO2


 


10/17/17 12:00 99.3 80 20 101/57 (72) 100 Room Air  














I&O- Last 24 Hours up to 6 AM 


 


 10/18/17





 06:00


 


Intake Total 360 ml


 


Output Total 400 ml


 


Balance -40 ml











Laboratory Data


Labs 24H


Laboratory Tests 2


10/16/17 16:48: Vancomycin Level Trough 22.3H


10/17/17 04:58: 


Nucleated Red Blood Cells % (auto) 0.0, Prothrombin Time 17.5H, Prothromb Time 

International Ratio 1.40, Anion Gap 9, Glomerular Filtration Rate 50.3L, Blood 

Urea Nitrogen 17, Creatinine 1.18H, Sodium Level 139, Potassium Level 4.0, 

Chloride Level 113H, Carbon Dioxide Level 17L, Calcium Level 7.2L, Aspartate 

Amino Transf (AST/SGOT) 18, Alanine Aminotransferase (ALT/SGPT) 14, Alkaline 

Phosphatase 126H, Total Bilirubin 1.1H, Total Protein 5.4L, Albumin 2.4L, 

Magnesium Level 2.0, C-Reactive Protein, Quantitative 6.75H, Albumin/Globulin 

Ratio 0.80L


CBC/BMP


Laboratory Tests


10/17/17 04:58








Red Blood Count 3.17 L, Mean Corpuscular Volume 90.5, Mean Corpuscular 

Hemoglobin 30.3, Mean Corpuscular Hemoglobin Concent 33.4, Red Cell 

Distribution Width 14.5, Calcium Level 7.2 L, Aspartate Amino Transf (AST/SGOT) 

18, Alanine Aminotransferase (ALT/SGPT) 14, Alkaline Phosphatase 126 H, Total 

Bilirubin 1.1 H, Total Protein 5.4 L, Albumin 2.4 L


Microbiology





Microbiology


10/17/17 Blood Culture, Received


           Pending


10/14/17 Blood Culture - Final, Complete


           Staphylococcus Capitis


10/14/17 Blood Culture - Final, Complete


           Streptococcus Gordonii


           Staphylococcus Capitis


10/14/17 Acid Fast Stain, Received


           Pending


10/14/17 Mycobacterial Culture, Received


           Pending


10/14/17 Fungal Smear, Received


           Pending


10/14/17 Fungal Culture, Received


           Pending


10/14/17 Gram Stain - Final, Complete


           


10/14/17 Body Fluid Culture - Final, Complete


           Streptococcus Gordonii


10/14/17 Respiratory Virus Panel (PCR) (GUI) - Final, Complete


           


10/15/17 Urine Culture - Final, Complete





Allergies


Coded Allergies:  


     Penicillins (Verified  Allergy, Intermediate, RASH PER H&P, AIRWAY 

SWELLING PER MOTHER 10/16, 10/13/17)


 RASH PER H&P FROM '00, "AIRWAY SWELLING" ACCORDING TO MOTHER


 10/16





Home Medications


Scheduled


Betaxolol Hcl (Betaxolol HCl) 0.5 % Sol, 1 DROP OU BID, (Reported)


Brimonidine Tartrate 0.15% (Brimonidine Tartrate) 100 Drop/5 Ml Soln, 1 DROP OU 

TID, (Reported)


Folic Acid (Folic Acid) 1 Mg Tab, 1 MG PO DAILY, (Reported)


Latanoprost (Latanoprost) 50 Drop/2.5 Ml Soln, 1 DROP OU QHS, (Reported)


Levothyroxine Sodium (Levoxyl) 150 Mcg Tab, 150 MCG PO DAILY, (Reported)


Multivitamins *[facility] STOCKED* (Thera M Plus *[facility] STOCKED*) 1 Tab Tab, 1 TAB PO 

DAILY, (Reported)


Spironolactone (Spironolactone) 100 Mg Tab, 100 MG PO DAILY, (Reported)


Thiamine Hcl (Thiamine Hcl) 100 Mg Tab, 100 MG PO DAILY, (Reported)





Scheduled PRN


 (Flonase Allergy Relief) 50 Mcg/Act Spr, 1 SPRAY NA DAILY PRN for CONGESTION, (

Reported)


Azelastine Hydrochloride (Azelastine HCl) 137 Mcg/[place] Spr, 2 SPRAY NA DAILY 

PRN for CONGESTION, (Reported)


Hydroxyzine Pamoate (Hydroxyzine Pamoate) 25 Mg Cap, 25 MG PO TID PRN for 

ITCHING, (Reported)


Oxycodone HCl (Oxycodone HCl) 5 Mg Tab, 5 MG PO Q6H PRN for PAIN, (Reported)





GME ATTESTATION


GME ATTESTATION


My preceptor for this patient encounter was physically present in the building 

during the encounter and was fully available. As needed, all aspects of the 

patient interview, examination, medical decision making process, and medical 

care plan development were reviewed and approved by the preceptor. Preceptor is 

aware and concurs with the plan as stated in the body of this note and will 

attest to such by his/her cosignature.











ANGELO BRUNO DO Oct 17, 2017 14:38


Duah,Marylene J MD Oct 20, 2017 08:43

## 2017-10-17 NOTE — IPNPDOC
Date Seen


The patient was seen on 10/17/17.





Progress Note


SUBJECTIVE: Patient complains of fatigue otherwise she does feel well she 

denies subjective chills nausea vomiting diarrhea or worsening abdominal 

distention. The patient denies abdominal pain





OBJECTIVE


PHYSICAL EXAMINATION:


VITAL SIGNS: Please see below. 


GENERAL: Disheveled middle-aged  female appears older than stated age


HEENT: Pupils are equally round reactive to light she has moist mucous 

membranes no significant elevation in her central venous pressure


CARDIOVASCULAR: S1-S2.


RESPIRATORY: Fairly clear to auscultation good air entry.


ABDOMINAL: Striate bowel sounds present positive shifting dullness positive 

fluid wave


EXTREMITIES: cyanosis or edema she appears wasted in the extremities





LABORATORY DATA: Please see below.





MICROBIOLOGY: Please see below.





IMAGING: No new imaging





Echocardiogram: 1. Normal left ventricle internal dimensions and wall 

thickness. No regional wall


motion abnormalities of the left ventricle. Hyperdynamic LV systolic function.


LVEF of 70% by visual estimate. Normal LV diastolic function.


2. No vegetations on any of the cardiac valves.


3. Very small pericardial effusion.


4. Ascites fluid seen around the liver..





DVT prophylaxis ordered?: Heparin every 12





ASSESSMENT AND PLAN: This is a 57-year-old female with spontaneous bacterial 

peritonitis and bacteremia.





PROBLEMS:


1. Spontaneous bacterial peritonitis and bacteremia: The patient's ascites is 

currently growing Streptococcus Gordonii blood cultures are growing the same as 

well as staph capitis. I have ordered repeat blood culture this morning. The 

patient has been on vancomycin and meropenem, at this time I'll place a consult 

to infectious disease to help with antibiotic choice and duration the patient 

still continues to spike fevers, although her leukocytosis has resolved in 

inflammatory markers continue to trend down





2. Hepatic encephalopathy: And is oriented at this time she is at her baseline 

cognitive functioning continue with lactulose rifaximin titrate for 4 bowel 

movements per day.





3. Liver cirrhosis:. 2 alcohol abuse, she is on rifaximin and lactulose she has 

had some acute kidney injury related to her infection and in the past related 

to overdiuresis as such for the time being she is not on spironolactone or Lasix

, her blood pressure does not appear to tolerate a nonselective beta blocker. 

No need for further albumin infusions at this time. The patient has not had an 

alcoholic beverage since July I see no need for standing Serax this is likely 

the etiology for her fatigue and may be causing cognitive impairment at this 

time I'll discontinue Serax.





4. Acute on chronic kidney disease: Nephrology's help is greatly appreciated 

likely secondary to her infectious process her renal function continues to 

improve





5. Hypothyroidism: Continue with Synthroid





6. Gastroesophageal reflux disease: Continue with Protonix





7. Chronic pain: Continue with oxycodone she does not require this often only 

when necessary





8. Hypokalemia: Resolved





DISPOSITION: Pending resolution of her infection and antibiotics.





VS, I&O, 24H, Fishbone


Vital Signs/I&O





Vital Signs








  Date Time  Temp Pulse Resp B/P (MAP) Pulse Ox O2 Delivery O2 Flow Rate FiO2


 


10/17/17 08:19      Room Air  


 


10/17/17 08:00 98.7 80 20 92/54 (67) 97   














I&O- Last 24 Hours up to 6 AM 


 


 10/18/17





 06:00


 


Intake Total 360 ml


 


Output Total 400 ml


 


Balance -40 ml











Laboratory Data


24H LABS


Laboratory Tests 2


10/16/17 16:48: Vancomycin Level Trough 22.3H


10/17/17 04:58: 


Nucleated Red Blood Cells % (auto) 0.0, Prothrombin Time 17.5H, Prothromb Time 

International Ratio 1.40, Anion Gap 9, Glomerular Filtration Rate 50.3L, Blood 

Urea Nitrogen 17, Creatinine 1.18H, Sodium Level 139, Potassium Level 4.0, 

Chloride Level 113H, Carbon Dioxide Level 17L, Calcium Level 7.2L, Aspartate 

Amino Transf (AST/SGOT) 18, Alanine Aminotransferase (ALT/SGPT) 14, Alkaline 

Phosphatase 126H, Total Bilirubin 1.1H, Total Protein 5.4L, Albumin 2.4L, 

Magnesium Level 2.0, C-Reactive Protein, Quantitative 6.75H, Albumin/Globulin 

Ratio 0.80L


CBC/BMP


Laboratory Tests


10/17/17 04:58








Red Blood Count 3.17 L, Mean Corpuscular Volume 90.5, Mean Corpuscular 

Hemoglobin 30.3, Mean Corpuscular Hemoglobin Concent 33.4, Red Cell 

Distribution Width 14.5, Calcium Level 7.2 L, Aspartate Amino Transf (AST/SGOT) 

18, Alanine Aminotransferase (ALT/SGPT) 14, Alkaline Phosphatase 126 H, Total 

Bilirubin 1.1 H, Total Protein 5.4 L, Albumin 2.4 L


Microbiology





Microbiology


10/17/17 Blood Culture, Received


           Pending


10/14/17 Blood Culture - Final, Complete


           Staphylococcus Capitis


10/14/17 Blood Culture - Final, Complete


           Streptococcus Gordonii


           Staphylococcus Capitis


10/14/17 Acid Fast Stain, Received


           Pending


10/14/17 Mycobacterial Culture, Received


           Pending


10/14/17 Fungal Smear, Received


           Pending


10/14/17 Fungal Culture, Received


           Pending


10/14/17 Gram Stain - Final, Complete


           


10/14/17 Body Fluid Culture - Final, Complete


           Streptococcus Gordonii


10/14/17 Respiratory Virus Panel (PCR) (GUI) - Final, Complete


           


10/15/17 Urine Culture - Final, Complete











JOSÉ DOWNING MD Oct 17, 2017 12:46

## 2017-10-17 NOTE — IPN
DATE: 10/17/2017

 

Ms. Taylor is seen this morning on her bedside.  She is lying in the bed as

usual.  She reports that numbness and weakness in her hands has improved.  She

denies any nausea, vomiting, dyspnea or chest pain.  She continues to have low

grade fever again.

 

PHYSICAL EXAMINATION:

Temperature 100.2 degrees Fahrenheit this morning, heart rate 80 per minute and

respiratory rate 20 per minute.  Blood pressure 92/54 mmHg and oxygen saturation

97% on room air.  Intake recorded as 1740 and output only 450.  Head is

atraumatic.  Neck is supple and JVD is mildly elevated.  She has no oral thrush

or ulcers.  Pupils equal and reactive to light and sclera is anicteric.  Heart:

Sounds are regular and lungs clear to auscultation.  Abdomen:  Distended with

ascites.  Bowel sounds are present.  Extremities:  Have no cyanosis or clubbing.

Neurologically:  She is seems to be awake, alert and oriented times three.

 

Today's labs show WBC count 5.2, hemoglobin 9.6 and hematocrit 28.7.  Platelets

210. Sodium 139 and potassium 4.0.  CO2 17, BUN 17 and creatinine 1.18.  Total

bilirubin is down to 1.1, AST 18, ALT 14 and alkaline phosphatase 126.

 

C-reactive protein is also down to 6.75.

 

PROBLEMS:

1.  Acute kidney injury, possibly superimposed on chronic kidney disease.  Most

likely she had hypotension and hypovolemia related to acute kidney injury

following paracentesis and history of subacute bacterial peritonitis.  Kidney

function slightly improved.

 

2.  Hypotension.  Her blood pressure remains borderline and low.  Most likely

this is related to her ongoing spontaneous bacterial peritonitis (SBP).  She

remains on antibiotics.

 

3.  Anemia.  Her anemia is stable and no active bleeding.

 

4.  History of a recurrent ascites and cirrhosis.  The patient has significant

ascites and likely to require further paracentesis.  She remains at risk for

worsening kidney function with paracentesis.  I recommend to give her intravenous

albumin at the time of paracentesis.

 

5.  Spontaneous bacterial peritonitis. (SBP).  The patient is on meropenem and

her fever is only low grade.  C-reactive protein is slowly improving.  Long-term

prognosis remains guarded.

## 2017-10-18 VITALS — SYSTOLIC BLOOD PRESSURE: 103 MMHG | DIASTOLIC BLOOD PRESSURE: 57 MMHG

## 2017-10-18 VITALS — DIASTOLIC BLOOD PRESSURE: 63 MMHG | SYSTOLIC BLOOD PRESSURE: 96 MMHG

## 2017-10-18 VITALS — SYSTOLIC BLOOD PRESSURE: 100 MMHG | DIASTOLIC BLOOD PRESSURE: 68 MMHG

## 2017-10-18 LAB
ALBUMIN SERPL BCG-MCNC: 2.5 GM/DL (ref 3.2–5.2)
ALBUMIN/GLOB SERPL: 0.81 {RATIO} (ref 1–1.93)
ALP SERPL-CCNC: 138 U/L (ref 45–117)
ALT SERPL W P-5'-P-CCNC: 15 U/L (ref 12–78)
ANION GAP SERPL CALC-SCNC: 8 MEQ/L (ref 8–16)
AST SERPL-CCNC: 20 U/L (ref 15–37)
BILIRUB SERPL-MCNC: 1 MG/DL (ref 0.2–1)
BUN SERPL-MCNC: 16 MG/DL (ref 7–18)
CALCIUM SERPL-MCNC: 7.7 MG/DL (ref 8.5–10.1)
CHLORIDE SERPL-SCNC: 114 MEQ/L (ref 98–107)
CO2 SERPL-SCNC: 17 MEQ/L (ref 21–32)
CREAT SERPL-MCNC: 1.02 MG/DL (ref 0.55–1.02)
ERYTHROCYTE [DISTWIDTH] IN BLOOD BY AUTOMATED COUNT: 14.7 % (ref 11.5–14.5)
GFR SERPL CREATININE-BSD FRML MDRD: 59.5 ML/MIN/{1.73_M2} (ref 51–?)
GLUCOSE SERPL-MCNC: 91 MG/DL (ref 70–105)
INR PPP: 1.41
MAGNESIUM SERPL-MCNC: 1.9 MG/DL (ref 1.8–2.4)
MCH RBC QN AUTO: 30.1 PG (ref 27–33)
MCHC RBC AUTO-ENTMCNC: 33.8 G/DL (ref 32–36.5)
MCV RBC AUTO: 89.1 FL (ref 80–96)
NRBC BLD AUTO-RTO: 0 % (ref 0–0)
PLATELET # BLD AUTO: 216 10^3/UL (ref 150–450)
POTASSIUM SERPL-SCNC: 4 MEQ/L (ref 3.5–5.1)
PROT SERPL-MCNC: 5.6 GM/DL (ref 6.4–8.2)
SODIUM SERPL-SCNC: 139 MEQ/L (ref 136–145)
WBC # BLD AUTO: 6.8 10^3/UL (ref 4–10)

## 2017-10-18 RX ADMIN — LACTULOSE SCH ML: 10 SOLUTION ORAL at 20:42

## 2017-10-18 RX ADMIN — FOLIC ACID SCH MG: 1 TABLET ORAL at 11:05

## 2017-10-18 RX ADMIN — BETAXOLOL HYDROCHLORIDE SCH DROP: 5 SOLUTION/ DROPS OPHTHALMIC at 20:43

## 2017-10-18 RX ADMIN — BETAXOLOL HYDROCHLORIDE SCH DROP: 5 SOLUTION/ DROPS OPHTHALMIC at 11:06

## 2017-10-18 RX ADMIN — SODIUM CHLORIDE SCH UNITS: 4.5 INJECTION, SOLUTION INTRAVENOUS at 20:43

## 2017-10-18 RX ADMIN — BRIMONIDINE TARTRATE SCH DROP: 1.5 SOLUTION OPHTHALMIC at 20:43

## 2017-10-18 RX ADMIN — MULTIPLE VITAMINS W/ MINERALS TAB SCH TAB: TAB at 11:05

## 2017-10-18 RX ADMIN — SODIUM CHLORIDE SCH UNITS: 4.5 INJECTION, SOLUTION INTRAVENOUS at 11:08

## 2017-10-18 RX ADMIN — PANTOPRAZOLE SODIUM SCH MG: 40 TABLET, DELAYED RELEASE ORAL at 11:04

## 2017-10-18 RX ADMIN — RIFAXIMIN SCH MG: 550 TABLET ORAL at 20:42

## 2017-10-18 RX ADMIN — BRIMONIDINE TARTRATE SCH DROP: 1.5 SOLUTION OPHTHALMIC at 11:07

## 2017-10-18 RX ADMIN — BRIMONIDINE TARTRATE SCH DROP: 1.5 SOLUTION OPHTHALMIC at 16:08

## 2017-10-18 RX ADMIN — Medication SCH MG: at 11:04

## 2017-10-18 RX ADMIN — LACTULOSE SCH ML: 10 SOLUTION ORAL at 11:04

## 2017-10-18 RX ADMIN — LEVOTHYROXINE SODIUM SCH MCG: 150 TABLET ORAL at 05:44

## 2017-10-18 RX ADMIN — RIFAXIMIN SCH MG: 550 TABLET ORAL at 11:05

## 2017-10-18 RX ADMIN — LATANOPROST SCH DROP: 50 SOLUTION OPHTHALMIC at 20:43

## 2017-10-18 NOTE — IPNPDOC
Date Seen


The patient was seen on 10/18/17.





Progress Note


SUBJECTIVE: Patient reports feeling much better today she is much less fatigued 

she denies fevers chills nausea vomiting diarrhea or abdominal pain at this 

time 





OBJECTIVE


PHYSICAL EXAMINATION:


VITAL SIGNS: Please see below. 


GENERAL: Disheveled middle-aged  female appears older than stated age 

sitting up in bed eating breakfast she is conversant awake alert oriented 3 

she does not appear to be in any acute distress


HEENT: Pupils are equally round reactive to light she has moist mucous 

membranes no significant elevation in her central venous pressure


CARDIOVASCULAR: S1-S2.


RESPIRATORY: Fairly clear to auscultation good air entry.


ABDOMINAL: Striate bowel sounds present positive shifting dullness positive 

fluid wave tympanic distended nontender


EXTREMITIES: No cyanosis or edema she appears wasted in the extremities





LABORATORY DATA: Please see below.





MICROBIOLOGY: Please see below.





IMAGING: No new imaging





Echocardiogram: 1. Normal left ventricle internal dimensions and wall 

thickness. No regional wall


motion abnormalities of the left ventricle. Hyperdynamic LV systolic function.


LVEF of 70% by visual estimate. Normal LV diastolic function.


2. No vegetations on any of the cardiac valves.


3. Very small pericardial effusion.


4. Ascites fluid seen around the liver..





DVT prophylaxis ordered?: Heparin every 12





ASSESSMENT AND PLAN: This is a 57-year-old female with spontaneous bacterial 

peritonitis and bacteremia.





PROBLEMS:


1. Sepsis secondary to Spontaneous bacterial peritonitis and bacteremia: The 

patient's ascites is currently growing Streptococcus Gordonii blood cultures 

are growing the same as well as staph capitis. Infectious diseases help is 

greatly appreciated the patient will transition to Cefazolin to complete a 10 

day course.





2. Hepatic encephalopathy: Resolved, she is awake And is oriented at this time 

she is at her baseline cognitive functioning continue with lactulose rifaximin 

titrate for 4 bowel movements per day.





3. Liver cirrhosis:. 2' to alcohol abuse, she is on rifaximin and lactulose she 

has had some acute kidney injury related to her infection and in the past 

related to overdiuresis as such for the time being she is not on spironolactone 

or Lasix, her blood pressure does not appear to tolerate a nonselective beta 

blocker. No need for further albumin infusions at this time. The patient has 

not had an alcoholic beverage since July. Should she remained afebrile we'll 

plan for therapeutic paracentesis tomorrow and attempt potential discharge as 

early as Friday. 





4. Acute on chronic kidney disease due to sepsis: Nephrology's help is greatly 

appreciated likely secondary to her infectious process her renal function 

continues to improve





5. Hypothyroidism: Continue with Synthroid





6. Gastroesophageal reflux disease: Continue with Protonix





7. Chronic pain: Continue with oxycodone she does not require this often only 

when necessary





8. Hypokalemia: Resolved





DISPOSITION: Pending resolution of her infection, long-term prognosis is poor.





VS, I&O, 24H, Fishbone


Vital Signs/I&O





Vital Signs








  Date Time  Temp Pulse Resp B/P (MAP) Pulse Ox O2 Delivery O2 Flow Rate FiO2


 


10/18/17 10:00      Room Air  


 


10/18/17 06:00 98.9 57 16 100/68 (79) 100   














I&O- Last 24 Hours up to 6 AM 


 


 10/19/17





 06:00


 


Intake Total 240 ml


 


Output Total 0 ml


 


Balance 240 ml











Laboratory Data


24H LABS


Laboratory Tests 2


10/18/17 07:03: 


Nucleated Red Blood Cells % (auto) 0.0, Prothrombin Time 17.6H, Prothromb Time 

International Ratio 1.41, Anion Gap 8, Glomerular Filtration Rate 59.5, Blood 

Urea Nitrogen 16, Creatinine 1.02, Sodium Level 139, Potassium Level 4.0, 

Chloride Level 114H, Carbon Dioxide Level 17L, Calcium Level 7.7L, Aspartate 

Amino Transf (AST/SGOT) 20, Alanine Aminotransferase (ALT/SGPT) 15, Alkaline 

Phosphatase 138H, Total Bilirubin 1.0, Total Protein 5.6L, Albumin 2.5L, 

Magnesium Level 1.9, C-Reactive Protein, Quantitative 4.79H, Albumin/Globulin 

Ratio 0.81L


CBC/BMP


Laboratory Tests


10/18/17 07:03








Red Blood Count 3.22 L, Mean Corpuscular Volume 89.1, Mean Corpuscular 

Hemoglobin 30.1, Mean Corpuscular Hemoglobin Concent 33.8, Red Cell 

Distribution Width 14.7 H, Calcium Level 7.7 L, Aspartate Amino Transf (AST/SGOT

) 20, Alanine Aminotransferase (ALT/SGPT) 15, Alkaline Phosphatase 138 H, Total 

Bilirubin 1.0, Total Protein 5.6 L, Albumin 2.5 L


Microbiology





Microbiology


10/17/17 Blood Culture - Preliminary, Resulted


           No growth after 24 hours . All specim...


10/14/17 Blood Culture - Final, Complete


           Staphylococcus Capitis


10/14/17 Blood Culture - Final, Complete


           Streptococcus Gordonii


           Staphylococcus Capitis


10/14/17 Acid Fast Stain, Received


           Pending


10/14/17 Mycobacterial Culture, Received


           Pending


10/14/17 Fungal Smear, Received


           Pending


10/14/17 Fungal Culture, Received


           Pending


10/14/17 Gram Stain - Final, Complete


           


10/14/17 Body Fluid Culture - Final, Complete


           Streptococcus Gordonii


10/14/17 Respiratory Virus Panel (PCR) (GUI) - Final, Complete


           


10/15/17 Urine Culture - Final, Complete











JOSÉ DOWNING MD Oct 18, 2017 14:29

## 2017-10-18 NOTE — IPN
DATE OF VISIT:  10/18/2017

 

Ms. Taylor is seen this morning on her bedside.  She is looking much better and

currently sitting in the chair getting her hair fixed.  She is awake, alert and

oriented times three.  She denies any nausea, vomiting, dyspnea or chest pain.

She does have abdominal distension due to ascites.  She is currently being

treated for subacute bacterial peritonitis.  She had acute kidney injury and

hepatic encephalopathy at the time of admission which has improved.  She remains

on antibiotics for peritonitis.

 

On physical exam, temperature 98.9 degrees Fahrenheit, heart rate 80 per minute

and respiratory rate 18 per minute.  Blood pressure 100/68 mmHg and oxygen

saturation 100% on room air.  Her physical exam is essentially unchanged.  Her

head is atraumatic and neck is supple and without jugular venous distention

(JVD).  Heart sounds are regular and lungs with slightly diminished breath sounds

at bases.  Abdomen distended with ascites and nontender.  Bowel sounds present.

Extremities without cyanosis or clubbing.  Neurologically, she is awake, alert

and oriented times three.

 

Today's labs show sodium 139 and potassium 4.0.  CO2 is 17, BUN 16 and creatinine

1.02.  Total bilirubin is down to 1.0.

 

PROBLEMS:

1.  Acute renal failure most likely related to hypovolemia caused by paracentesis

and hypotension.  Kidney function has now improved.

 

2.  Hepatic encephalopathy.  Encephalopathy has also improved and her ammonia

level has decreased down to 27 on 10/15/2017.  Her liver function tests (LFTs)

have also improved.

 

3.  Anemia.  Her anemia seems to be chronic and stable.  No intervention is

indicated other than iron supplement and vitamins.

 

DISPOSITION:  From a renal standpoint, patient is doing very well with improved

kidney function.  I am signing off her case.  Please do not hesitate to call me

back should you need any further assistance.

## 2017-10-19 VITALS — DIASTOLIC BLOOD PRESSURE: 66 MMHG | SYSTOLIC BLOOD PRESSURE: 110 MMHG

## 2017-10-19 VITALS — SYSTOLIC BLOOD PRESSURE: 100 MMHG | DIASTOLIC BLOOD PRESSURE: 56 MMHG

## 2017-10-19 VITALS — DIASTOLIC BLOOD PRESSURE: 50 MMHG | SYSTOLIC BLOOD PRESSURE: 91 MMHG

## 2017-10-19 LAB
ALBUMIN SERPL BCG-MCNC: 2.5 GM/DL (ref 3.2–5.2)
ALBUMIN/GLOB SERPL: 0.81 {RATIO} (ref 1–1.93)
ALP SERPL-CCNC: 144 U/L (ref 45–117)
ALT SERPL W P-5'-P-CCNC: 12 U/L (ref 12–78)
ANION GAP SERPL CALC-SCNC: 9 MEQ/L (ref 8–16)
AST SERPL-CCNC: 18 U/L (ref 15–37)
BILIRUB SERPL-MCNC: 1.2 MG/DL (ref 0.2–1)
BUN SERPL-MCNC: 14 MG/DL (ref 7–18)
CALCIUM SERPL-MCNC: 8.2 MG/DL (ref 8.5–10.1)
CHLORIDE SERPL-SCNC: 113 MEQ/L (ref 98–107)
CO2 SERPL-SCNC: 17 MEQ/L (ref 21–32)
CREAT SERPL-MCNC: 1.05 MG/DL (ref 0.55–1.02)
ERYTHROCYTE [DISTWIDTH] IN BLOOD BY AUTOMATED COUNT: 15.1 % (ref 11.5–14.5)
GFR SERPL CREATININE-BSD FRML MDRD: 57.5 ML/MIN/{1.73_M2} (ref 51–?)
GLUCOSE SERPL-MCNC: 85 MG/DL (ref 70–105)
INR PPP: 1.4
MAGNESIUM SERPL-MCNC: 2 MG/DL (ref 1.8–2.4)
MCH RBC QN AUTO: 30.6 PG (ref 27–33)
MCHC RBC AUTO-ENTMCNC: 33.9 G/DL (ref 32–36.5)
MCV RBC AUTO: 90.1 FL (ref 80–96)
NRBC BLD AUTO-RTO: 0 % (ref 0–0)
PLATELET # BLD AUTO: 202 10^3/UL (ref 150–450)
POTASSIUM SERPL-SCNC: 3.7 MEQ/L (ref 3.5–5.1)
PROT SERPL-MCNC: 5.6 GM/DL (ref 6.4–8.2)
SODIUM SERPL-SCNC: 139 MEQ/L (ref 136–145)
WBC # BLD AUTO: 6.4 10^3/UL (ref 4–10)

## 2017-10-19 RX ADMIN — MULTIPLE VITAMINS W/ MINERALS TAB SCH TAB: TAB at 09:53

## 2017-10-19 RX ADMIN — FOLIC ACID SCH MG: 1 TABLET ORAL at 09:53

## 2017-10-19 RX ADMIN — PANTOPRAZOLE SODIUM SCH MG: 40 TABLET, DELAYED RELEASE ORAL at 09:53

## 2017-10-19 RX ADMIN — Medication SCH MG: at 09:53

## 2017-10-19 RX ADMIN — BRIMONIDINE TARTRATE SCH DROP: 1.5 SOLUTION OPHTHALMIC at 21:18

## 2017-10-19 RX ADMIN — BETAXOLOL HYDROCHLORIDE SCH DROP: 5 SOLUTION/ DROPS OPHTHALMIC at 10:00

## 2017-10-19 RX ADMIN — BRIMONIDINE TARTRATE SCH DROP: 1.5 SOLUTION OPHTHALMIC at 15:45

## 2017-10-19 RX ADMIN — CEPHALEXIN SCH MG: 500 CAPSULE ORAL at 21:17

## 2017-10-19 RX ADMIN — SODIUM CHLORIDE SCH UNITS: 4.5 INJECTION, SOLUTION INTRAVENOUS at 09:54

## 2017-10-19 RX ADMIN — BRIMONIDINE TARTRATE SCH DROP: 1.5 SOLUTION OPHTHALMIC at 10:00

## 2017-10-19 RX ADMIN — LEVOTHYROXINE SODIUM SCH MCG: 150 TABLET ORAL at 05:45

## 2017-10-19 RX ADMIN — SODIUM CHLORIDE SCH UNITS: 4.5 INJECTION, SOLUTION INTRAVENOUS at 21:18

## 2017-10-19 RX ADMIN — LACTULOSE SCH ML: 10 SOLUTION ORAL at 09:53

## 2017-10-19 RX ADMIN — RIFAXIMIN SCH MG: 550 TABLET ORAL at 21:17

## 2017-10-19 RX ADMIN — LACTULOSE SCH ML: 10 SOLUTION ORAL at 21:17

## 2017-10-19 RX ADMIN — RIFAXIMIN SCH MG: 550 TABLET ORAL at 09:52

## 2017-10-19 RX ADMIN — BETAXOLOL HYDROCHLORIDE SCH DROP: 5 SOLUTION/ DROPS OPHTHALMIC at 21:18

## 2017-10-19 RX ADMIN — LATANOPROST SCH DROP: 50 SOLUTION OPHTHALMIC at 21:18

## 2017-10-19 RX ADMIN — CEPHALEXIN SCH MG: 500 CAPSULE ORAL at 17:46

## 2017-10-19 NOTE — REP
ULTRASOUND GUIDED PARACENTESIS:

 

The procedure was performed under the direct supervision of Dr. Khan.

 

The risks and benefits of the procedure were explained to the patient and

informed consent was obtained.

 

The largest pocket of fluid was localized in the left flank using ultrasound

guidance. The skin was prepped and draped in a sterile fashion. 1% Lidocaine  was

used as a local anesthetic. An 8-[place] multi-side-hole catheter was inserted

using trocar technique. 5000 mL of yellow fluid was withdrawn and discarded.

 

The patient tolerated the procedure well and there were no immediate

complications. After the appropriate amount of monitored convalesce the patient

was discharged from the department.

 

 

Reviewed by

YOON Dozier 10/20/2017 10:39 AEdited and Signed by

Sushant Khan MD 10/23/2017 09:57 A

## 2017-10-19 NOTE — IPNPDOC
Date Seen


The patient was seen on 10/19/17.





Progress Note


SUBJECTIVE: Patient reports feeling much better today she is looking forward to 

"having belly drained" 





OBJECTIVE


PHYSICAL EXAMINATION:


VITAL SIGNS: Please see below. 


GENERAL: Disheveled middle-aged  female appears older than stated age 

sitting up in bed she is conversant awake alert oriented 3 she does not appear 

to be in any acute distress


HEENT: Pupils are equally round reactive to light she has moist mucous 

membranes no significant elevation in her central venous pressure


CARDIOVASCULAR: S1-S2.


RESPIRATORY: Fairly clear to auscultation good air entry.


ABDOMINAL: Striae bowel sounds present positive shifting dullness positive 

fluid wave tympanic distended nontender


EXTREMITIES: No cyanosis or edema she appears wasted in the extremities





LABORATORY DATA: Please see below.





MICROBIOLOGY: Please see below.





IMAGING: No new imaging





Echocardiogram: 1. Normal left ventricle internal dimensions and wall 

thickness. No regional wall


motion abnormalities of the left ventricle. Hyperdynamic LV systolic function.


LVEF of 70% by visual estimate. Normal LV diastolic function.


2. No vegetations on any of the cardiac valves.


3. Very small pericardial effusion.


4. Ascites fluid seen around the liver..





DVT prophylaxis ordered?: Heparin every 12





ASSESSMENT AND PLAN: This is a 57-year-old female with spontaneous bacterial 

peritonitis and bacteremia.





PROBLEMS:


1. Sepsis secondary to Spontaneous bacterial peritonitis and bacteremia: The 

patient's ascites is currently growing Streptococcus Gordonii blood cultures 

are growing the same as well as staph capitis. Infectious diseases help is 

greatly appreciated the patient will transition to Cefazolin to complete a 10 

day course today is day 5 of Abx.





2. Hepatic encephalopathy: Resolved, she is awake And is oriented at this time 

she is at her baseline cognitive functioning continue with lactulose rifaximin 

titrate for 4 bowel movements per day.





3. Liver cirrhosis:. 2' to alcohol abuse, she is on rifaximin and lactulose she 

has had some acute kidney injury related to her infection and in the past 

related to overdiuresis as such for the time being she is not on spironolactone 

or Lasix, her blood pressure does not appear to tolerate a nonselective beta 

blocker. No need for further albumin infusions at this time. The patient has 

not had an alcoholic beverage since July. Should she remained afebrile we'll 

plan for therapeutic paracentesis today with albumin post procedure and attempt 

potential discharge tomorrow. 





4. Acute on chronic kidney disease due to sepsis: Nephrology's help is greatly 

appreciated likely secondary to her infectious process her renal function 

continues to improve





5. Hypothyroidism: Continue with Synthroid





6. Gastroesophageal reflux disease: Continue with Protonix





7. Chronic pain: Continue with oxycodone she does not require this often only 

when necessary





8. Hypokalemia: Resolved





DISPOSITION: Pending resolution of her infection, long-term prognosis is poor.





VS, I&O, 24H, Fishbone


Vital Signs/I&O





Vital Signs








  Date Time  Temp Pulse Resp B/P (MAP) Pulse Ox O2 Delivery O2 Flow Rate FiO2


 


10/19/17 10:33      Room Air  


 


10/19/17 06:00 99.1 80 17 91/50 (64) 99   














I&O- Last 24 Hours up to 6 AM 


 


 10/20/17





 06:00


 


Intake Total 0 ml


 


Output Total 0 ml


 


Balance 0 ml











Laboratory Data


24H LABS


Laboratory Tests 2


10/19/17 06:41: 


Nucleated Red Blood Cells % (auto) 0.0, Prothrombin Time 17.5H, Prothromb Time 

International Ratio 1.40, Anion Gap 9, Glomerular Filtration Rate 57.5, Blood 

Urea Nitrogen 14, Creatinine 1.05H, Sodium Level 139, Potassium Level 3.7, 

Chloride Level 113H, Carbon Dioxide Level 17L, Calcium Level 8.2L, Aspartate 

Amino Transf (AST/SGOT) 18, Alanine Aminotransferase (ALT/SGPT) 12, Alkaline 

Phosphatase 144H, Total Bilirubin 1.2H, Total Protein 5.6L, Albumin 2.5L, 

Magnesium Level 2.0, C-Reactive Protein, Quantitative 3.83H, Albumin/Globulin 

Ratio 0.81L


CBC/BMP


Laboratory Tests


10/19/17 06:41








Red Blood Count 3.24 L, Mean Corpuscular Volume 90.1, Mean Corpuscular 

Hemoglobin 30.6, Mean Corpuscular Hemoglobin Concent 33.9, Red Cell 

Distribution Width 15.1 H, Calcium Level 8.2 L, Aspartate Amino Transf (AST/SGOT

) 18, Alanine Aminotransferase (ALT/SGPT) 12, Alkaline Phosphatase 144 H, Total 

Bilirubin 1.2 H, Total Protein 5.6 L, Albumin 2.5 L


Microbiology





Microbiology


10/17/17 Blood Culture - Preliminary, Resulted


           No Growth after 48 hours. All Specime...


10/14/17 Blood Culture - Final, Complete


           Staphylococcus Capitis


10/14/17 Blood Culture - Final, Complete


           Streptococcus Gordonii


           Staphylococcus Capitis


10/14/17 Acid Fast Stain, Received


           Pending


10/14/17 Mycobacterial Culture, Received


           Pending


10/14/17 Fungal Smear, Received


           Pending


10/14/17 Fungal Culture, Received


           Pending


10/14/17 Gram Stain - Final, Complete


           


10/14/17 Body Fluid Culture - Final, Complete


           Streptococcus Gordonii


10/14/17 Respiratory Virus Panel (PCR) (GUI) - Final, Complete


           


10/15/17 Urine Culture - Final, Complete











JOSÉ DOWNING MD Oct 19, 2017 14:13

## 2017-10-19 NOTE — IPN
DATE:  10/19/2017

 

Flora seems to be doing well. She has no new complaints. She had a paracentesis

with about 5 liters drained. Her belly is distended. No nausea, vomiting, or

diarrhea.

 

VITAL SIGNS: Temperature is 97.8, pulse 90, respirations 17, blood pressure

132/56, oxygen saturation 97 on room air.

HEART: Normal S1, S2 with no murmurs.

LUNGS: Diminished breath sounds at the bases but clear.

ABDOMEN: Soft and nontender. Obese with mild ascites.

EXTREMITIES: Trace edema.

 

LABORATORY DATA:

White count is 6.4, hemoglobin 9.9, hematocrit 29.2, platelets 202.

 

Sodium 139, potassium 3.7, chloride 113, bicarbonate 17, BUN 14, creatinine 1.05
,

glucose 85, calcium 8.2, magnesium 2. Bilirubin 1.2

ALT 12, alkaline phosphatase 144. CRP 3.83, down from 11.7.

 

Microbiology: Ascites fluid was positive for Streptococcus gordonii. Blood

culture was positive for Streptococcus gordonii and staph capitis  .

 

Spontaneous bacterial peritonitis with secondary bacteremia.

 

1.  Patient is doing well on intravenous (IV) cefazolin. She will be switched to

Keflex to finish course of treatment.

2.  Staphylococcus capitis  positive blood cultures. I suspect this

is a contaminant. I am wondering whether those two blood cultures were drawn 
from

one site and placed in two different blood culture bottles, as this is not a 
very

likely pathogen to cause peritonitis or even bacteremia.

3.  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 Patient at

baseline. She is usually scheduled to have paracentesis every week. She is at

baseline function with lactulose and rifaximin to titrate up to three bowel

movements a day.

 

PLAN: Discontinue IV cefazolin. Switch to Keflex 500 mg by mouth three times a

day to finish 7 days of treatment at home. Total 7-day prescription. Patient

anticipated discharge home in the morning.

MTDD

## 2017-10-20 VITALS — SYSTOLIC BLOOD PRESSURE: 97 MMHG | DIASTOLIC BLOOD PRESSURE: 55 MMHG

## 2017-10-20 LAB
ALBUMIN SERPL BCG-MCNC: 2.8 GM/DL (ref 3.2–5.2)
ALBUMIN/GLOB SERPL: 0.78 {RATIO} (ref 1–1.93)
ALP SERPL-CCNC: 148 U/L (ref 45–117)
ALT SERPL W P-5'-P-CCNC: 12 U/L (ref 12–78)
ANION GAP SERPL CALC-SCNC: 9 MEQ/L (ref 8–16)
AST SERPL-CCNC: 22 U/L (ref 15–37)
BILIRUB SERPL-MCNC: 1.5 MG/DL (ref 0.2–1)
BUN SERPL-MCNC: 14 MG/DL (ref 7–18)
CALCIUM SERPL-MCNC: 8.2 MG/DL (ref 8.5–10.1)
CHLORIDE SERPL-SCNC: 113 MEQ/L (ref 98–107)
CO2 SERPL-SCNC: 17 MEQ/L (ref 21–32)
CREAT SERPL-MCNC: 1.07 MG/DL (ref 0.55–1.02)
ERYTHROCYTE [DISTWIDTH] IN BLOOD BY AUTOMATED COUNT: 15 % (ref 11.5–14.5)
GFR SERPL CREATININE-BSD FRML MDRD: 56.3 ML/MIN/{1.73_M2} (ref 51–?)
GLUCOSE SERPL-MCNC: 91 MG/DL (ref 70–105)
INR PPP: 1.27
MAGNESIUM SERPL-MCNC: 2 MG/DL (ref 1.8–2.4)
MCH RBC QN AUTO: 30.3 PG (ref 27–33)
MCHC RBC AUTO-ENTMCNC: 33.1 G/DL (ref 32–36.5)
MCV RBC AUTO: 91.4 FL (ref 80–96)
NRBC BLD AUTO-RTO: 0 % (ref 0–0)
PLATELET # BLD AUTO: 225 10^3/UL (ref 150–450)
POTASSIUM SERPL-SCNC: 3.8 MEQ/L (ref 3.5–5.1)
PROT SERPL-MCNC: 6.4 GM/DL (ref 6.4–8.2)
SODIUM SERPL-SCNC: 139 MEQ/L (ref 136–145)
WBC # BLD AUTO: 7.4 10^3/UL (ref 4–10)

## 2017-10-20 RX ADMIN — Medication SCH MG: at 08:55

## 2017-10-20 RX ADMIN — CEPHALEXIN SCH MG: 500 CAPSULE ORAL at 08:55

## 2017-10-20 RX ADMIN — MULTIPLE VITAMINS W/ MINERALS TAB SCH TAB: TAB at 08:55

## 2017-10-20 RX ADMIN — BETAXOLOL HYDROCHLORIDE SCH DROP: 5 SOLUTION/ DROPS OPHTHALMIC at 08:55

## 2017-10-20 RX ADMIN — RIFAXIMIN SCH MG: 550 TABLET ORAL at 08:55

## 2017-10-20 RX ADMIN — LACTULOSE SCH ML: 10 SOLUTION ORAL at 08:55

## 2017-10-20 RX ADMIN — SODIUM CHLORIDE SCH UNITS: 4.5 INJECTION, SOLUTION INTRAVENOUS at 08:56

## 2017-10-20 RX ADMIN — LEVOTHYROXINE SODIUM SCH MCG: 150 TABLET ORAL at 05:40

## 2017-10-20 RX ADMIN — PANTOPRAZOLE SODIUM SCH MG: 40 TABLET, DELAYED RELEASE ORAL at 08:55

## 2017-10-20 RX ADMIN — FOLIC ACID SCH MG: 1 TABLET ORAL at 08:55

## 2017-10-20 RX ADMIN — BRIMONIDINE TARTRATE SCH DROP: 1.5 SOLUTION OPHTHALMIC at 08:55

## 2017-10-20 NOTE — DSES
DATE OF ADMISSION:  10/14/2017

DATE OF DISCHARGE:  10/20/2017

 

DISCHARGE DIAGNOSIS:

Hepatic encephalopathy.

 

SECONDARY DIAGNOSES:

1. Sepsis secondary to spontaneous bacterial peritonitis and bacteremia.

2. Liver cirrhosis.

3. Acute on chronic kidney disease.

4. Hypothyroidism.

5. Gastroesophageal reflux disease.

6. Chronic pain.

7. Hypokalemia.

 

PROCEDURE:  Paracentesis times two.

 

CONSULTATIONS:

1. Dr. Scott, infectious disease.

2. Dr. Olivia, nephrology.

 

HOSPITAL COURSE:

The patient is a 57-year-old female who presented with confusion. She was found

to have hepatic encephalopathy, which improved with lactulose and rifaximine,

titrated for 3-4 bowel movements per day. She was also found to have blood

cultures positive for Staphylococcus capitis, and ascites fluid positive for

Streptococcus gondii. Infectious disease was consulted and she was initially on

broad-spectrum antibiotics; however, this was narrowed. Her clinical status

improved. Her acute kidney injury resolved.

 

SUBJECTIVE:

This morning the patient reports she feels well and has no complaints. Is eager

to go home.

 

OBJECTIVE:

VITAL SIGNS: Temperature 99.5, temperature maximum (T-max) 99.5, pulse 86,

respiratory rate 18, blood pressure 97/55, oxygen saturation 99% on room air.

GENERAL: She is a disheveled  female appears older than her stated age.

She is in no acute distress.

HEENT: Cranial nerves II through XII are grossly intact. She has poor dentition.

Moist mucous membranes. No elevation of central venous pressure (CVP).

CARDIOVASCULAR: S1, S2 regular.

RESPIRATORY: Clear.

ABDOMEN: Bowel sounds positive. Abdomen is soft, is nontender. There is striae

caput medusa. No tense ascites.

EXTREMITIES: No clubbing, cyanosis or edema. In fact, they appear wasted.

 

LABORATORY DATA:

WBC 7.4, hemoglobin 10.6, platelet count 225.

Chemistry panel: Sodium 139, potassium 3.8, chloride 111, bicarbonate 17, BUN 14,

creatinine 1.0.

 

MICROBIOLOGY: As outlined above.

 

TOXICOLOGY:

At the time of admission was positive for opiates and benzodiazepines.

 

IMAGING:

The patient had two paracenteses. She also had an MRI of the brain that revealed

no acute intracranial lesions. She did have a CT scan of the abdomen and pelvis

which revealed small left pleural effusion, passive atelectatic airspace disease

in the left lower lobe, parenchymal liver disease, moderate ascites, evidence of

colitis, fat-containing umbilical hernia without incarceration, moderate

splenomegaly, cirrhotic liver.

 

ASSESSMENT AND PLAN:

This is a 57-year-old female with spontaneous bacterial peritonitis and

bacteremia.

1. Sepsis secondary to spontaneous bacterial peritonitis and bacteremia,

improving. The patient has been on cefazolin. At this time, her spectrum has been

narrowed to Keflex by infectious disease started yesterday, and they recommend

completing a seven-day prescription course, which we provided. The patient's

clinical syndrome has resolved. She is at her functional baseline, independent

with her activities of daily living (ADLs), medically stable for discharge home.

 

2. Hepatic encephalopathy, resolved. Awake, alert, and oriented times three. She

is at her baseline cognitive functioning. Continue with lactulose to achieve 3-4

bowel movements per day.

3. Alcoholic liver cirrhosis. She is on lactulose. She has had some acute kidney

injury related to her infection. Nephrology's help is greatly appreciated. This

is now resolved. She has also had acute kidney injury related to over diuresis in

the past and as such, she is not on any spironolactone or Lasix. Her blood

pressure does not appear to tolerate selective beta blocker at this time either.

She did receive several albumin infusions while she was here and also following

her most recent paracentesis. The patient has not had an alcoholic beverage since

July.  Her long-term prognosis is guarded, and follows up with gastroenterology

monthly in [place].

4. Acute on chronic kidney injury related to sepsis. Nephrology's help is greatly

appreciated. Her renal function has improved to her baseline.

5. Hypothyroidism. Continue with Synthroid.

6. Gastroesophageal reflux disease. Continue with Protonix.

7. Chronic pain. The patient is on oxycodone as needed.

8. Hypokalemia. Resolved.

 

DISPOSITION:

The patient is being discharged home. Her diet and activity are as prior to

admission. She is to return to the emergency room (ER) if symptoms worsen. She is

to followup with her primary care provider (PCP) in seven days. Followup with GI

as scheduled, and followup for her regular paracenteses which are done almost

weekly.

 

DISCHARGE MEDICATIONS:

- cephalexin 500 mg three times a day for seven days

- lactulose twice a day, titrate for 3-4 bowel movements per day

- Astelin two sprays nasally as needed for congestion

- betaxolol in each eye twice a day

- brimonidine each eye three times a day

- Flonase nasally as needed for congestion

- folic acid 1 mg daily

- hydroxyzine 25 mg three times a day as needed for itching

- latanoprost one drop each eye at bedtime

- levothyroxine 150 mcg daily

- multivitamin one tablet daily

- oxycodone 5 mg every six hours as needed for pain

- thiamine 100 mg daily

 

Greater than 30 minutes were spent organizing disposition.

## 2017-11-15 ENCOUNTER — HOSPITAL ENCOUNTER (OUTPATIENT)
Dept: HOSPITAL 53 - M LAB REF | Age: 57
End: 2017-11-15
Attending: INTERNAL MEDICINE
Payer: COMMERCIAL

## 2017-11-15 DIAGNOSIS — K70.31: Primary | ICD-10-CM

## 2017-11-15 LAB
ALBUMIN SERPL BCG-MCNC: 2.7 GM/DL (ref 3.2–5.2)
ALBUMIN/GLOB SERPL: 0.63 {RATIO} (ref 1–1.93)
ALP SERPL-CCNC: 134 U/L (ref 45–117)
ALT SERPL W P-5'-P-CCNC: 17 U/L (ref 12–78)
AST SERPL-CCNC: 24 U/L (ref 7–37)
BILIRUB CONJ SERPL-MCNC: 0.7 MG/DL (ref 0–0.2)
BILIRUB SERPL-MCNC: 1.4 MG/DL (ref 0.2–1)
PROT SERPL-MCNC: 7 GM/DL (ref 6.4–8.2)

## 2018-01-10 ENCOUNTER — HOSPITAL ENCOUNTER (OUTPATIENT)
Dept: HOSPITAL 53 - M LAB | Age: 58
End: 2018-01-10
Attending: FAMILY MEDICINE
Payer: COMMERCIAL

## 2018-01-10 ENCOUNTER — HOSPITAL ENCOUNTER (OUTPATIENT)
Dept: HOSPITAL 53 - M LAB | Age: 58
End: 2018-01-10
Attending: INTERNAL MEDICINE
Payer: COMMERCIAL

## 2018-01-10 DIAGNOSIS — E03.9: Primary | ICD-10-CM

## 2018-01-10 DIAGNOSIS — K70.31: Primary | ICD-10-CM

## 2018-01-10 LAB
AMMONIA PLAS-SCNC: 19 UMOL/L (ref ?–32)
FREE T4: 1.8 NG/DL (ref 0.76–1.46)
THYROID STIMULATING HORMONE: 0.03 UIU/ML (ref 0.36–3.74)

## 2018-01-10 PROCEDURE — 82140 ASSAY OF AMMONIA: CPT

## 2018-01-10 PROCEDURE — 84443 ASSAY THYROID STIM HORMONE: CPT

## 2018-01-19 ENCOUNTER — HOSPITAL ENCOUNTER (OUTPATIENT)
Dept: HOSPITAL 53 - M LAB REF | Age: 58
End: 2018-01-19
Attending: INTERNAL MEDICINE
Payer: COMMERCIAL

## 2018-01-19 DIAGNOSIS — K70.31: Primary | ICD-10-CM

## 2018-01-19 LAB — AMMONIA PLAS-SCNC: 38 UMOL/L (ref ?–32)

## 2018-01-22 ENCOUNTER — HOSPITAL ENCOUNTER (OUTPATIENT)
Dept: HOSPITAL 53 - M RAD | Age: 58
End: 2018-01-22
Payer: COMMERCIAL

## 2018-01-22 ENCOUNTER — HOSPITAL ENCOUNTER (OUTPATIENT)
Dept: HOSPITAL 53 - M LAB | Age: 58
End: 2018-01-22
Payer: COMMERCIAL

## 2018-01-22 DIAGNOSIS — K70.31: Primary | ICD-10-CM

## 2018-01-22 DIAGNOSIS — F10.10: ICD-10-CM

## 2018-01-22 DIAGNOSIS — K72.90: ICD-10-CM

## 2018-01-22 LAB
ALBUMIN/GLOBULIN RATIO: 0.73 (ref 1–1.93)
ALBUMIN: 2.9 GM/DL (ref 3.2–5.2)
ALKALINE PHOSPHATASE: 124 U/L (ref 45–117)
ALT SERPL W P-5'-P-CCNC: 23 U/L (ref 12–78)
ANION GAP: 10 MEQ/L (ref 8–16)
AST SERPL-CCNC: 26 U/L (ref 7–37)
BILIRUB CONJ SERPL-MCNC: 0.3 MG/DL (ref 0–0.2)
BILIRUBIN,TOTAL: 0.5 MG/DL (ref 0.2–1)
BLOOD UREA NITROGEN: 16 MG/DL (ref 7–18)
CALCIUM LEVEL: 8.6 MG/DL (ref 8.5–10.1)
CARBON DIOXIDE LEVEL: 25 MEQ/L (ref 21–32)
CHLORIDE LEVEL: 107 MEQ/L (ref 98–107)
CREATININE FOR GFR: 0.88 MG/DL (ref 0.55–1.02)
GFR SERPL CREATININE-BSD FRML MDRD: > 60 ML/MIN/{1.73_M2} (ref 51–?)
GLUCOSE, FASTING: 128 MG/DL (ref 70–105)
HEMATOCRIT: 32.8 % (ref 36–47)
HEMOGLOBIN: 11.1 G/DL (ref 12–16)
INR: 1.12
MEAN CORPUSCULAR HEMOGLOBIN: 30 PG (ref 27–33)
MEAN CORPUSCULAR HGB CONC: 33.8 G/DL (ref 32–36.5)
MEAN CORPUSCULAR VOLUME: 88.6 FL (ref 80–96)
NRBC BLD AUTO-RTO: 0 % (ref 0–0)
PARTIAL THROMBOPLASTIN TIME: 33.8 SECONDS (ref 26.8–37.9)
PLATELET COUNT, AUTOMATED: 130 10^3/UL (ref 150–450)
POTASSIUM SERUM: 3.9 MEQ/L (ref 3.5–5.1)
PROTHROMBIN TIME: 14.6 SECONDS (ref 12.4–14.5)
RED BLOOD COUNT: 3.7 10^6/UL (ref 4–5.4)
RED CELL DISTRIBUTION WIDTH: 15.3 % (ref 11.5–14.5)
SODIUM LEVEL: 142 MEQ/L (ref 136–145)
TOTAL PROTEIN: 6.9 GM/DL (ref 6.4–8.2)
WHITE BLOOD COUNT: 3.4 10^3/UL (ref 4–10)

## 2018-01-22 PROCEDURE — 80076 HEPATIC FUNCTION PANEL: CPT

## 2018-01-22 PROCEDURE — 76705 ECHO EXAM OF ABDOMEN: CPT

## 2018-01-23 LAB — ALPHA FETOPROTEIN TUMOR QUANT: 4.4 NG/ML (ref ?–8.1)

## 2018-03-30 ENCOUNTER — HOSPITAL ENCOUNTER (OUTPATIENT)
Dept: HOSPITAL 53 - M LAB | Age: 58
End: 2018-03-30
Attending: FAMILY MEDICINE
Payer: COMMERCIAL

## 2018-03-30 DIAGNOSIS — E03.9: Primary | ICD-10-CM

## 2018-03-30 LAB
ALBUMIN/GLOBULIN RATIO: 0.84 (ref 1–1.93)
ALBUMIN: 3.1 GM/DL (ref 3.2–5.2)
ALKALINE PHOSPHATASE: 119 U/L (ref 45–117)
ALT SERPL W P-5'-P-CCNC: 25 U/L (ref 12–78)
ANION GAP: 5 MEQ/L (ref 8–16)
AST SERPL-CCNC: 26 U/L (ref 7–37)
BASO #: 0 10^3/UL (ref 0–0.2)
BASO %: 1 % (ref 0–1)
BILIRUBIN,TOTAL: 1.1 MG/DL (ref 0.2–1)
BLOOD UREA NITROGEN: 18 MG/DL (ref 7–18)
CALCIUM LEVEL: 8.9 MG/DL (ref 8.5–10.1)
CARBON DIOXIDE LEVEL: 26 MEQ/L (ref 21–32)
CHLORIDE LEVEL: 113 MEQ/L (ref 98–107)
CREATININE FOR GFR: 0.8 MG/DL (ref 0.55–1.3)
EOS #: 0.2 10^3/UL (ref 0–0.5)
EOSINOPHIL NFR BLD AUTO: 4 % (ref 0–3)
FREE T4: 1.41 NG/DL (ref 0.76–1.46)
GFR SERPL CREATININE-BSD FRML MDRD: > 60 ML/MIN/{1.73_M2} (ref 51–?)
GLUCOSE, FASTING: 97 MG/DL (ref 70–100)
HEMATOCRIT: 35.2 % (ref 36–47)
HEMOGLOBIN: 12.1 G/DL (ref 12–15.5)
IMMATURE GRANULOCYTE %: 0.2 % (ref 0–3)
LYMPH #: 1.5 10^3/UL (ref 1.5–4.5)
LYMPH %: 35.7 % (ref 24–44)
MEAN CORPUSCULAR HEMOGLOBIN: 30.4 PG (ref 27–33)
MEAN CORPUSCULAR HGB CONC: 34.4 G/DL (ref 32–36.5)
MEAN CORPUSCULAR VOLUME: 88.4 FL (ref 80–96)
MONO #: 0.4 10^3/UL (ref 0–0.8)
MONO %: 8.6 % (ref 0–5)
NEUTROPHILS #: 2.1 10^3/UL (ref 1.8–7.7)
NEUTROPHILS %: 50.5 % (ref 36–66)
NRBC BLD AUTO-RTO: 0 % (ref 0–0)
PLATELET COUNT, AUTOMATED: 143 10^3/UL (ref 150–450)
POTASSIUM SERUM: 4 MEQ/L (ref 3.5–5.1)
RED BLOOD COUNT: 3.98 10^6/UL (ref 4–5.4)
RED CELL DISTRIBUTION WIDTH: 14.1 % (ref 11.5–14.5)
SODIUM LEVEL: 144 MEQ/L (ref 136–145)
THYROID STIMULATING HORMONE: 0.03 UIU/ML (ref 0.36–3.74)
TOTAL PROTEIN: 6.8 GM/DL (ref 6.4–8.2)
WHITE BLOOD COUNT: 4.2 10^3/UL (ref 4–10)

## 2018-03-30 PROCEDURE — 84443 ASSAY THYROID STIM HORMONE: CPT

## 2018-04-16 ENCOUNTER — HOSPITAL ENCOUNTER (OUTPATIENT)
Dept: HOSPITAL 53 - M LAB REF | Age: 58
End: 2018-04-16
Attending: PHYSICIAN ASSISTANT
Payer: COMMERCIAL

## 2018-04-16 DIAGNOSIS — N39.0: Primary | ICD-10-CM

## 2018-04-16 LAB
APPEARANCE, URINE: (no result)
BACTERIA UR QL AUTO: NEGATIVE
BILIRUBIN, URINE AUTO: NEGATIVE
BLOOD, URINE BLOOD: NEGATIVE
CAOX CRY URNS QL MICRO: (no result)
GLUCOSE, URINE (UA) AUTO: NEGATIVE MG/DL
KETONE, URINE AUTO: NEGATIVE MG/DL
LEUKOCYTE ESTERASE UR QL STRIP.AUTO: (no result)
MUCUS, URINE: (no result)
NITRITE, URINE AUTO: NEGATIVE
PH,URINE: 5 UNITS (ref 5–9)
PROT UR QL STRIP.AUTO: NEGATIVE MG/DL
RBC, URINE AUTO: 2 /HPF (ref 0–3)
SPECIFIC GRAVITY URINE AUTO: 1.03 (ref 1–1.03)
SQUAMOUS #/AREA URNS AUTO: 10 /HPF (ref 0–6)
UROBILINOGEN, URINE AUTO: 2 MG/DL (ref 0–2)
WBC, URINE AUTO: 4 /HPF (ref 0–3)

## 2018-07-08 ENCOUNTER — HOSPITAL ENCOUNTER (EMERGENCY)
Dept: HOSPITAL 53 - M ED | Age: 58
Discharge: HOME | End: 2018-07-08
Payer: COMMERCIAL

## 2018-07-08 DIAGNOSIS — Z53.21: Primary | ICD-10-CM

## 2018-07-08 PROCEDURE — 99284 EMERGENCY DEPT VISIT MOD MDM: CPT

## 2018-10-22 ENCOUNTER — HOSPITAL ENCOUNTER (OUTPATIENT)
Dept: HOSPITAL 53 - M RAD | Age: 58
End: 2018-10-22
Payer: COMMERCIAL

## 2018-10-22 DIAGNOSIS — K70.31: Primary | ICD-10-CM

## 2018-10-22 PROCEDURE — 76705 ECHO EXAM OF ABDOMEN: CPT

## 2019-01-04 ENCOUNTER — HOSPITAL ENCOUNTER (OUTPATIENT)
Dept: HOSPITAL 53 - M LAB | Age: 59
End: 2019-01-04
Attending: PHYSICIAN ASSISTANT
Payer: COMMERCIAL

## 2019-01-04 ENCOUNTER — HOSPITAL ENCOUNTER (OUTPATIENT)
Dept: HOSPITAL 53 - M LAB | Age: 59
End: 2019-01-04
Attending: FAMILY MEDICINE
Payer: COMMERCIAL

## 2019-01-04 DIAGNOSIS — I85.00: ICD-10-CM

## 2019-01-04 DIAGNOSIS — K72.90: ICD-10-CM

## 2019-01-04 DIAGNOSIS — K70.31: Primary | ICD-10-CM

## 2019-01-04 DIAGNOSIS — E03.9: Primary | ICD-10-CM

## 2019-01-04 LAB
ALBUMIN SERPL BCG-MCNC: 2.9 GM/DL (ref 3.2–5.2)
ALT SERPL W P-5'-P-CCNC: 47 U/L (ref 12–78)
APTT BLD: 32.3 SECONDS (ref 25.4–37.6)
BILIRUB CONJ SERPL-MCNC: 3.4 MG/DL (ref 0–0.2)
BILIRUB SERPL-MCNC: 5.6 MG/DL (ref 0.2–1)
BUN SERPL-MCNC: 12 MG/DL (ref 7–18)
CALCIUM SERPL-MCNC: 8.7 MG/DL (ref 8.5–10.1)
CHLORIDE SERPL-SCNC: 102 MEQ/L (ref 98–107)
CO2 SERPL-SCNC: 28 MEQ/L (ref 21–32)
CREAT SERPL-MCNC: 1.15 MG/DL (ref 0.55–1.3)
GFR SERPL CREATININE-BSD FRML MDRD: 51.6 ML/MIN/{1.73_M2} (ref 51–?)
GLUCOSE SERPL-MCNC: 92 MG/DL (ref 70–100)
HCT VFR BLD AUTO: 41.8 % (ref 36–47)
HGB BLD-MCNC: 14.2 G/DL (ref 12–15.5)
INR PPP: 1.3
MCH RBC QN AUTO: 34.5 PG (ref 27–33)
MCHC RBC AUTO-ENTMCNC: 34 G/DL (ref 32–36.5)
MCV RBC AUTO: 101.7 FL (ref 80–96)
PLATELET # BLD AUTO: 120 10^3/UL (ref 150–450)
POTASSIUM SERPL-SCNC: 4.2 MEQ/L (ref 3.5–5.1)
PROT SERPL-MCNC: 7.9 GM/DL (ref 6.4–8.2)
PROTHROMBIN TIME: 16.4 SECONDS (ref 12.1–14.4)
RBC # BLD AUTO: 4.11 10^6/UL (ref 4–5.4)
SODIUM SERPL-SCNC: 138 MEQ/L (ref 136–145)
T4 FREE SERPL-MCNC: 2 NG/DL (ref 0.76–1.46)
TSH SERPL DL<=0.005 MIU/L-ACNC: 1.23 UIU/ML (ref 0.36–3.74)
WBC # BLD AUTO: 4.5 10^3/UL (ref 4–10)

## 2019-01-25 ENCOUNTER — HOSPITAL ENCOUNTER (OUTPATIENT)
Dept: HOSPITAL 53 - M RAD | Age: 59
End: 2019-01-25
Attending: PHYSICIAN ASSISTANT
Payer: COMMERCIAL

## 2019-01-25 DIAGNOSIS — K72.90: ICD-10-CM

## 2019-01-25 DIAGNOSIS — K70.31: Primary | ICD-10-CM

## 2019-01-25 DIAGNOSIS — I85.00: ICD-10-CM

## 2019-01-25 LAB
ALBUMIN SERPL BCG-MCNC: 2.4 GM/DL (ref 3.2–5.2)
ALT SERPL W P-5'-P-CCNC: 38 U/L (ref 12–78)
APTT BLD: 34 SECONDS (ref 25.4–37.6)
BILIRUB CONJ SERPL-MCNC: 2.2 MG/DL (ref 0–0.2)
BILIRUB SERPL-MCNC: 3 MG/DL (ref 0.2–1)
BUN SERPL-MCNC: 10 MG/DL (ref 7–18)
CALCIUM SERPL-MCNC: 8.3 MG/DL (ref 8.5–10.1)
CHLORIDE SERPL-SCNC: 107 MEQ/L (ref 98–107)
CO2 SERPL-SCNC: 25 MEQ/L (ref 21–32)
CREAT SERPL-MCNC: 0.96 MG/DL (ref 0.55–1.3)
GFR SERPL CREATININE-BSD FRML MDRD: > 60 ML/MIN/{1.73_M2} (ref 51–?)
GLUCOSE SERPL-MCNC: 95 MG/DL (ref 70–100)
HCT VFR BLD AUTO: 39.6 % (ref 36–47)
HGB BLD-MCNC: 13.2 G/DL (ref 12–15.5)
INR PPP: 1.18
MCH RBC QN AUTO: 34 PG (ref 27–33)
MCHC RBC AUTO-ENTMCNC: 33.3 G/DL (ref 32–36.5)
MCV RBC AUTO: 102.1 FL (ref 80–96)
PLATELET # BLD AUTO: 158 10^3/UL (ref 150–450)
POTASSIUM SERPL-SCNC: 3.9 MEQ/L (ref 3.5–5.1)
PROT SERPL-MCNC: 7.4 GM/DL (ref 6.4–8.2)
PROTHROMBIN TIME: 15.2 SECONDS (ref 12.1–14.4)
RBC # BLD AUTO: 3.88 10^6/UL (ref 4–5.4)
SODIUM SERPL-SCNC: 139 MEQ/L (ref 136–145)
WBC # BLD AUTO: 5.6 10^3/UL (ref 4–10)

## 2019-01-25 NOTE — REP
ULTRASOUND ABDOMEN:?

 

Real-time sonographic evaluation of the abdomen performed.

 

The gallbladder demonstrates no evidence of intraluminal sludge or calculi, wall

thickening or pericholecystic fluid.  There is no intrahepatic or extrahepatic

biliary dilatation, common bile duct measuring 4 mm.  Liver demonstrates

coarsened heterogeneous echotexture suggesting cirrhotic changes.  There is also

an apparent recanalized and patent umbilical vein.  No definite liver mass is

seen.  The pancreas is grossly unremarkable, although not well seen due to

overlying bowel gas.  The spleen is normal in size with a length of 12.4 cm. The

kidneys are normal in size and echotexture, right kidney measuring 10.5 x 4.5 x

3.9 cm and the left kidney 11.0 x 5.4 x 4.7 cm.  There is no renal mass,

hydronephrosis or nephrolithiasis.  The abdominal aorta is normal in caliber with

no aneurysm, approximately 2.4 cm in maximum AP diameter and distally 1.5 cm.

There is no ascites.

 

IMPRESSION:

 

Heterogeneous coarsened echotexture of the liver with patent umbilical vein

compatible with cirrhosis.  No gross liver mass.  Spleen is normal in size. No

ascites.

 

 

Electronically Signed by

Sushant Khan MD 01/25/2019 12:24 P

## 2019-02-07 ENCOUNTER — HOSPITAL ENCOUNTER (OUTPATIENT)
Dept: HOSPITAL 53 - M RAD | Age: 59
End: 2019-02-07
Attending: PHYSICIAN ASSISTANT
Payer: COMMERCIAL

## 2019-02-07 DIAGNOSIS — K70.31: Primary | ICD-10-CM

## 2019-02-07 PROCEDURE — 74170 CT ABD WO CNTRST FLWD CNTRST: CPT

## 2019-02-08 NOTE — REP
Clinical:  Cirrhosis without normal alpha-fetoprotein levels.

 

Technique:  Axial precontrast, contrast enhanced, and delayed images of the

abdomen using oral (per protocol) and 100 ml Isovue 370 intravenous contrast

material with coronal and sagittal re-formations.

 

Comparison:  10/14/2017.

 

Findings:

Liver demonstrates a cirrhotic nodular contour with evidence for portal

hypertension including splenomegaly, recanalized umbilical vein, and

portosystemic collateral vessels including esophageal varices.  No focal hepatic

lesion identified.

 

Spleen, pancreas, gallbladder, bilateral adrenal glands and kidneys are normal.

Visualized enteric system is without obstruction or acute inflammatory process.

No ascites.  No free air.  No obvious intraperitoneal or retroperitoneal

adenopathy.  Abdominal aorta without aneurysm or dissection.  Surrounding

musculoskeletal structures are intact.  Lung bases are clear.

 

Impression:

1.  Findings consistent with cirrhosis and portal hypertension.  No focal hepatic

lesion identified.

2.  No obvious acute abdominal pathology appreciated.

 

 

Electronically Signed by

Yogi Glass MD 02/08/2019 05:47 A

## 2019-04-29 ENCOUNTER — HOSPITAL ENCOUNTER (OUTPATIENT)
Dept: HOSPITAL 53 - M LAB | Age: 59
End: 2019-04-29
Attending: FAMILY MEDICINE
Payer: COMMERCIAL

## 2019-04-29 DIAGNOSIS — E03.9: Primary | ICD-10-CM

## 2019-04-29 LAB
T4 FREE SERPL-MCNC: 1.41 NG/DL (ref 0.76–1.46)
TSH SERPL DL<=0.005 MIU/L-ACNC: 1.43 UIU/ML (ref 0.36–3.74)

## 2019-11-20 ENCOUNTER — HOSPITAL ENCOUNTER (INPATIENT)
Dept: HOSPITAL 53 - M ED | Age: 59
LOS: 4 days | Discharge: HOME | DRG: 229 | End: 2019-11-24
Attending: FAMILY MEDICINE | Admitting: INTERNAL MEDICINE
Payer: COMMERCIAL

## 2019-11-20 VITALS — DIASTOLIC BLOOD PRESSURE: 57 MMHG | SYSTOLIC BLOOD PRESSURE: 108 MMHG

## 2019-11-20 VITALS — BODY MASS INDEX: 29.62 KG/M2 | WEIGHT: 188.72 LBS | HEIGHT: 67 IN

## 2019-11-20 DIAGNOSIS — K70.31: ICD-10-CM

## 2019-11-20 DIAGNOSIS — D64.9: ICD-10-CM

## 2019-11-20 DIAGNOSIS — Z86.14: ICD-10-CM

## 2019-11-20 DIAGNOSIS — E03.9: ICD-10-CM

## 2019-11-20 DIAGNOSIS — I95.9: ICD-10-CM

## 2019-11-20 DIAGNOSIS — N17.9: ICD-10-CM

## 2019-11-20 DIAGNOSIS — F10.288: ICD-10-CM

## 2019-11-20 DIAGNOSIS — Z91.14: ICD-10-CM

## 2019-11-20 DIAGNOSIS — H40.9: ICD-10-CM

## 2019-11-20 DIAGNOSIS — N20.0: ICD-10-CM

## 2019-11-20 DIAGNOSIS — K21.9: ICD-10-CM

## 2019-11-20 DIAGNOSIS — Z88.0: ICD-10-CM

## 2019-11-20 DIAGNOSIS — I10: ICD-10-CM

## 2019-11-20 DIAGNOSIS — K65.2: Primary | ICD-10-CM

## 2019-11-20 DIAGNOSIS — G25.81: ICD-10-CM

## 2019-11-20 LAB
ALBUMIN SERPL BCG-MCNC: 1.6 GM/DL (ref 3.2–5.2)
ALT SERPL W P-5'-P-CCNC: 26 U/L (ref 12–78)
APTT BLD: 34.3 SECONDS (ref 25–38.4)
BILIRUB CONJ SERPL-MCNC: 3.6 MG/DL (ref 0–0.2)
BILIRUB SERPL-MCNC: 6.7 MG/DL (ref 0.2–1)
BUN SERPL-MCNC: 10 MG/DL (ref 7–18)
CALCIUM SERPL-MCNC: 8 MG/DL (ref 8.5–10.1)
CHLORIDE SERPL-SCNC: 102 MEQ/L (ref 98–107)
CO2 SERPL-SCNC: 24 MEQ/L (ref 21–32)
CREAT SERPL-MCNC: 1.69 MG/DL (ref 0.55–1.3)
GFR SERPL CREATININE-BSD FRML MDRD: 33 ML/MIN/{1.73_M2} (ref 51–?)
GLUCOSE SERPL-MCNC: 161 MG/DL (ref 70–100)
HCT VFR BLD AUTO: 37.7 % (ref 36–47)
HGB BLD-MCNC: 12.3 G/DL (ref 12–15.5)
INR PPP: 1.78
LIPASE SERPL-CCNC: 465 U/L (ref 73–393)
LYMPHOCYTES NFR BLD MANUAL: 9 % (ref 16–44)
MACROCYTES BLD QL SMEAR: (no result)
MAGNESIUM SERPL-MCNC: 1.4 MG/DL (ref 1.8–2.4)
MCH RBC QN AUTO: 35.8 PG (ref 27–33)
MCHC RBC AUTO-ENTMCNC: 32.6 G/DL (ref 32–36.5)
MCV RBC AUTO: 109.6 FL (ref 80–96)
MONOCYTES NFR BLD MANUAL: 4 % (ref 0–5)
NEUTROPHILS NFR BLD MANUAL: 85 % (ref 28–66)
PLATELET # BLD AUTO: 138 10^3/UL (ref 150–450)
PLATELET BLD QL SMEAR: NORMAL
POTASSIUM SERPL-SCNC: 4.7 MEQ/L (ref 3.5–5.1)
PROT SERPL-MCNC: 7.6 GM/DL (ref 6.4–8.2)
PROTHROMBIN TIME: 20.5 SECONDS (ref 11.8–14)
RBC # BLD AUTO: 3.44 10^6/UL (ref 4–5.4)
SODIUM SERPL-SCNC: 134 MEQ/L (ref 136–145)
WBC # BLD AUTO: 10.7 10^3/UL (ref 4–10)

## 2019-11-20 NOTE — REPVR
PROCEDURE INFORMATION: 

Exam: CT Abdomen And Pelvis With Contrast 

Exam date and time: 11/20/2019 7:00 PM 

Age: 59 years old 

Clinical history: Abdominal pain; Localized; Upper; Additional info: Upper 

abdominal pain; HX of ascites 



TECHNIQUE: 

Imaging protocol: Computed tomography of the abdomen and pelvis with 

intravenous contrast. 

Radiation optimization: All CT scans at this facility use at least one of these 

dose optimization techniques: automated exposure control; mA and/or kV 

adjustment per patient size (includes targeted exams where dose is matched to 

clinical indication); or iterative reconstruction. 

Contrast material: ISOVUE 370; Contrast volume: 100 ml; Contrast route: IV;  



COMPARISON: 

CT ABD/PEL W/IV CONTRAST ONLY 7/8/2017 2:19 PM 



FINDINGS: 



Liver: Liver surface is nodular consistent with the cirrhosis. 

Gallbladder and bile ducts: Normal. No calcified stones. No ductal dilation. 

Pancreas: Normal. No ductal dilation. 

Spleen: Normal. No splenomegaly. 

Adrenals: Normal. No mass. 

Kidneys and ureters: Normal. No hydronephrosis. 

Stomach and bowel: Unremarkable. No obstruction. No mucosal thickening. 

Appendix: No evidence of appendicitis. 

Intraperitoneal space: Moderate amount of the ascites in the abdomen and 

pelvis. 

Vasculature: Unremarkable. No abdominal aortic aneurysm. 

Lymph nodes: Unremarkable. No enlarged lymph nodes. 



Bladder: Unremarkable as visualized. 

Reproductive: Unremarkable as visualized. 

Bones/joints: Unremarkable. No acute fracture. 

Soft tissues: Recannulization of the umbilical artery. 



IMPRESSION: 

Cirrhotic liver with moderate amount of ascites.



Electronically signed by: Tony Traylor On 11/20/2019  19:58:16 PM

## 2019-11-21 VITALS — DIASTOLIC BLOOD PRESSURE: 50 MMHG | SYSTOLIC BLOOD PRESSURE: 84 MMHG

## 2019-11-21 VITALS — DIASTOLIC BLOOD PRESSURE: 57 MMHG | SYSTOLIC BLOOD PRESSURE: 99 MMHG

## 2019-11-21 VITALS — SYSTOLIC BLOOD PRESSURE: 110 MMHG | DIASTOLIC BLOOD PRESSURE: 58 MMHG

## 2019-11-21 VITALS — SYSTOLIC BLOOD PRESSURE: 90 MMHG | DIASTOLIC BLOOD PRESSURE: 51 MMHG

## 2019-11-21 VITALS — DIASTOLIC BLOOD PRESSURE: 50 MMHG | SYSTOLIC BLOOD PRESSURE: 83 MMHG

## 2019-11-21 VITALS — SYSTOLIC BLOOD PRESSURE: 88 MMHG | DIASTOLIC BLOOD PRESSURE: 48 MMHG

## 2019-11-21 VITALS — DIASTOLIC BLOOD PRESSURE: 53 MMHG | SYSTOLIC BLOOD PRESSURE: 93 MMHG

## 2019-11-21 VITALS — DIASTOLIC BLOOD PRESSURE: 52 MMHG | SYSTOLIC BLOOD PRESSURE: 90 MMHG

## 2019-11-21 VITALS — SYSTOLIC BLOOD PRESSURE: 84 MMHG | DIASTOLIC BLOOD PRESSURE: 46 MMHG

## 2019-11-21 VITALS — DIASTOLIC BLOOD PRESSURE: 52 MMHG | SYSTOLIC BLOOD PRESSURE: 82 MMHG

## 2019-11-21 VITALS — DIASTOLIC BLOOD PRESSURE: 47 MMHG | SYSTOLIC BLOOD PRESSURE: 80 MMHG

## 2019-11-21 VITALS — DIASTOLIC BLOOD PRESSURE: 48 MMHG | SYSTOLIC BLOOD PRESSURE: 84 MMHG

## 2019-11-21 VITALS — SYSTOLIC BLOOD PRESSURE: 105 MMHG | DIASTOLIC BLOOD PRESSURE: 57 MMHG

## 2019-11-21 VITALS — DIASTOLIC BLOOD PRESSURE: 58 MMHG | SYSTOLIC BLOOD PRESSURE: 112 MMHG

## 2019-11-21 VITALS — SYSTOLIC BLOOD PRESSURE: 87 MMHG | DIASTOLIC BLOOD PRESSURE: 48 MMHG

## 2019-11-21 VITALS — SYSTOLIC BLOOD PRESSURE: 101 MMHG | DIASTOLIC BLOOD PRESSURE: 57 MMHG

## 2019-11-21 VITALS — DIASTOLIC BLOOD PRESSURE: 54 MMHG | SYSTOLIC BLOOD PRESSURE: 91 MMHG

## 2019-11-21 LAB
ALBUMIN SERPL BCG-MCNC: 1.1 GM/DL (ref 3.2–5.2)
ALT SERPL W P-5'-P-CCNC: 16 U/L (ref 12–78)
APPEARANCE FLD: (no result)
APPEARANCE UR: CLEAR
BACTERIA UR QL AUTO: NEGATIVE
BILIRUB SERPL-MCNC: 5.7 MG/DL (ref 0.2–1)
BILIRUB UR QL STRIP.AUTO: NEGATIVE
BUN SERPL-MCNC: 15 MG/DL (ref 7–18)
CALCIUM SERPL-MCNC: 7.8 MG/DL (ref 8.5–10.1)
CHLORIDE SERPL-SCNC: 104 MEQ/L (ref 98–107)
CO2 SERPL-SCNC: 22 MEQ/L (ref 21–32)
COLOR PRT: YELLOW
CREAT SERPL-MCNC: 1.71 MG/DL (ref 0.55–1.3)
GFR SERPL CREATININE-BSD FRML MDRD: 32.5 ML/MIN/{1.73_M2} (ref 51–?)
GLUCOSE SERPL-MCNC: 91 MG/DL (ref 70–100)
GLUCOSE UR QL STRIP.AUTO: NEGATIVE MG/DL
HCT VFR BLD AUTO: 27.2 % (ref 36–47)
HGB BLD-MCNC: 8.8 G/DL (ref 12–15.5)
HGB UR QL STRIP.AUTO: (no result)
KETONES UR QL STRIP.AUTO: (no result) MG/DL
LEUKOCYTE ESTERASE UR QL STRIP.AUTO: NEGATIVE
MAGNESIUM SERPL-MCNC: 1.4 MG/DL (ref 1.8–2.4)
MCH RBC QN AUTO: 35.2 PG (ref 27–33)
MCHC RBC AUTO-ENTMCNC: 32.4 G/DL (ref 32–36.5)
MCV RBC AUTO: 108.8 FL (ref 80–96)
NITRITE UR QL STRIP.AUTO: NEGATIVE
PH UR STRIP.AUTO: 5 UNITS (ref 5–9)
PLATELET # BLD AUTO: 76 10^3/UL (ref 150–450)
POTASSIUM SERPL-SCNC: 4.8 MEQ/L (ref 3.5–5.1)
PROT SERPL-MCNC: 5.5 GM/DL (ref 6.4–8.2)
PROT UR QL STRIP.AUTO: NEGATIVE MG/DL
RBC # BLD AUTO: 2.5 10^6/UL (ref 4–5.4)
RBC # UR AUTO: 10 /HPF (ref 0–3)
SODIUM SERPL-SCNC: 134 MEQ/L (ref 136–145)
SP GR FLD REFRACTOMETRY: 1.01
SP GR UR STRIP.AUTO: >1.06 (ref 1–1.03)
SPECIMEN SOURCE FLD: (no result)
SQUAMOUS #/AREA URNS AUTO: 7 /HPF (ref 0–6)
TSH SERPL DL<=0.005 MIU/L-ACNC: 2.41 UIU/ML (ref 0.36–3.74)
UROBILINOGEN UR QL STRIP.AUTO: 2 MG/DL (ref 0–2)
WBC # BLD AUTO: 7 10^3/UL (ref 4–10)
WBC #/AREA URNS AUTO: 1 /HPF (ref 0–3)

## 2019-11-21 PROCEDURE — 0W9G3ZX DRAINAGE OF PERITONEAL CAVITY, PERCUTANEOUS APPROACH, DIAGNOSTIC: ICD-10-PCS | Performed by: PHYSICIAN ASSISTANT

## 2019-11-21 RX ADMIN — MULTIPLE VITAMINS W/ MINERALS TAB SCH TAB: TAB at 10:09

## 2019-11-21 RX ADMIN — FOLIC ACID SCH MG: 1 TABLET ORAL at 10:09

## 2019-11-21 RX ADMIN — MAGNESIUM SULFATE IN DEXTROSE SCH MLS/HR: 10 INJECTION, SOLUTION INTRAVENOUS at 18:00

## 2019-11-21 RX ADMIN — Medication SCH MG: at 00:19

## 2019-11-21 RX ADMIN — Medication SCH MG: at 21:56

## 2019-11-21 RX ADMIN — MAGNESIUM SULFATE IN DEXTROSE SCH MLS/HR: 10 INJECTION, SOLUTION INTRAVENOUS at 17:00

## 2019-11-21 RX ADMIN — SPIRONOLACTONE SCH MG: 25 TABLET, FILM COATED ORAL at 01:24

## 2019-11-21 RX ADMIN — CEFTRIAXONE SCH MLS/HR: 1 INJECTION, POWDER, FOR SOLUTION INTRAMUSCULAR; INTRAVENOUS at 23:27

## 2019-11-21 RX ADMIN — CEFTRIAXONE SCH MLS/HR: 1 INJECTION, POWDER, FOR SOLUTION INTRAMUSCULAR; INTRAVENOUS at 10:10

## 2019-11-21 RX ADMIN — LATANOPROST SCH DROP: 50 SOLUTION OPHTHALMIC at 21:56

## 2019-11-21 RX ADMIN — LEVOTHYROXINE SODIUM SCH MCG: 112 TABLET ORAL at 05:33

## 2019-11-21 RX ADMIN — SPIRONOLACTONE SCH MG: 25 TABLET, FILM COATED ORAL at 09:00

## 2019-11-21 RX ADMIN — FUROSEMIDE SCH MG: 10 INJECTION, SOLUTION INTRAMUSCULAR; INTRAVENOUS at 17:00

## 2019-11-21 RX ADMIN — Medication SCH MG: at 10:09

## 2019-11-21 RX ADMIN — SPIRONOLACTONE SCH MG: 25 TABLET, FILM COATED ORAL at 21:00

## 2019-11-21 RX ADMIN — FUROSEMIDE SCH MG: 10 INJECTION, SOLUTION INTRAMUSCULAR; INTRAVENOUS at 09:00

## 2019-11-21 NOTE — HPEPDOC
General


Date of Admission


Nov 20, 2019 at 21:55


Date of Service:  Nov 20, 2019


Primary Care Physician:  Meron Ramirez MD


Other Providers


Gastroenterologist: Dr. Fraser


Nephrologist: Dr. Olivia


Attending Physician:  MILLIE ACOSTA DO


Chief Complaint


The patient is a 59-year-old female admitted with a reason for visit of 

Abdominal Pain, Ascited Due To Alcoholic Cirrhosis.





History of Present Illness


59-year-old female with PMH most notable for alcoholic cirrhosis currently still

actively drinking, presented to the ER for gradually worsening abdominal pain 

over the past 1 month that has become more severe the past 2 days.  She also 

complains of fatigue, nausea, vomiting up to 5 times a day, and associated 

chills. Has noted increased abdominal girth, estimating 40 pound weight gain 

over 4 month time span. She reports she has had this on multiple occasions in 

the past, requiring frequent paracentesis on a scheduled basis in the past, but 

reportedly none over the past 1 year. She also reports noncompliance with all of

her medications over the past 1 week, reporting "I feel too tired to take them."

Of note, she still actively drinking, stating that she only binged drinks on the

weekend, , most recently a 24 ounce beer bottle last night. Denies any other 

changes in medications, diet, travel, sick contacts. In the ER, she received 

pain medicine and CT revealed moderate abdominal ascites. She will be admitted 

for therapeutic paracentesis, correction of hypervolemic state, and high risk of

SBP.





Home Medications


Scheduled


Betaxolol Hcl (Betaxolol HCl) 0.5 % Sol, 1 DROP OU BID, (Reported)


Brimonidine Tartrate (Brimonidine Tartrate) 0.2% 5ML Drops, 1 DROP OU BID, 

(Reported)


Lactulose (Lactulose) 10 Gm/15 Ml Solution, 30 GM PO BID, (Reported)


Latanoprost (Xalatan) 0.005% 2.5ML Drops, 1 DROP OU QHS, (Reported)


Levocetirizine Dihydrochloride (Levocetirizine Dihydrochloride) 5 Mg Tablet, 5 

MG PO QHS, (Reported)


Levothyroxine Sodium (Synthroid) 112 Mcg Tablet, 112 MCG PO DAILY, (Reported)


Propranolol HCl (Propranolol HCl) 20 Mg Tablet, 20 MG PO BID, (Reported)


   HOLD IF HR<60 OR SBP<100 


Spironolactone (Spironolactone) 25 Mg Tablet, 25 MG PO BID, (Reported)





Scheduled PRN


Azelastine HCl (Azelastine HCl) 0.1% Spray.pump, 2 SPRAY NARES DAILY PRN for 

NASAL CONGESTION, (Reported)


Fluticasone Propionate (Flonase Allergy Relief) 50 Mcg/Act Spr, 1 SPRAY NA DAILY

PRN for NASAL CONGESTION, (Reported)





Miscellaneous Medications


[Patient Comment]  , (Reported)


   PATIENT STATES THAT SHE TOOK NO MEDS TODAY (11/20/19) 





Allergies


Coded Allergies:  


     Penicillins (Verified  Allergy, Unknown, 11/20/19)





Past Medical History


Medical History


Alcohol cirrhosis with ascites


Hypothyroidism


GERD


Restless leg syndrome


Hypertension


Glaucoma


Nephrolithiasis


Surgical History


D&C, carpal tunnel





Family History


Mother with brain tumor


Sister alcoholic





Social History


Denies ever smoking or any illicit substances.


Actively drinking, admits to long-term daily binging, however states currently 

she is binging only on the weekends


On disability due to his spinal issues. Used to work at [employer] History


Current/History of A-Fib/PAF?:  No


Current PO Anticoag Therapy:  No





Review of Systems


Other systems


Constitutional: Denies fever, night sweats. Admits fluid wt gain & chills


ENT: Denies headaches, ear pain, dysphagia


Skin: Denies any rashes or lesions


Pulmonary: Denies dyspnea, cough, wheezing


Cardiac: Denies chest pain, palpitations, orthopnea, PND. Admits LE edema


GI: Admits nausea & vomiting, abdominal discomfort. No melena, hematochezia, 

hematemesis


Neurologic: Denies numbness/tingling





Physical Examination


Other physical findings


General exam: Alert and cooperative, A&O 3, NAD


Eye exam: PERRLA, EOMI, scleral icterus present


ENT: Atraumatic, normocephalic, poor dentition


Neck: Supple, no JVD, no carotid bruits


Cardiac: RRR, normal S1 & S2, 2/6 systolic murmur heard best left 2nd 

intercostal


Respiratory: CTAB, good air exchange, no wheezing, rhonchi, or rales


Abdomen: Distended, mildly tender throughout, distant bowel sounds. No masses, 

rebound, guarding, rigidity. +shifting dullness


Extremity: 2+ radial pulses, 3+ LE edema up to knees. No cyanosis or tenderness


Skin: Pink, warm, dry, no visible rash or lesions, mild jaundice


MSK: Strength 5/5 x4, normal tone


NEURO: no focal deficit, fully conversant appropriately


Psych: Normal mood and affect





Vital Signs





Vital Signs








  Date Time  Temp Pulse Resp B/P (MAP) Pulse Ox O2 Delivery O2 Flow Rate FiO2


 


11/21/19 01:18  79  105/57 (73)    


 


11/20/19 22:57   18  95   


 


11/20/19 17:53 97.5     Room Air  











Laboratory Data


Labs 24H


Laboratory Tests 2


11/20/19 18:18: 


Nucleated Red Blood Cells % (auto) 0.0, Neutrophils 85H, Band Neutrophils 2, 

Lymphocytes (Manual) 9L, Monocytes (Manual) 4, Macrocytosis 1+, Platelet 

Estimate NORMAL, Prothrombin Time 20.5H, Prothromb Time International Ratio 

1.78, Activated Partial Thromboplast Time 34.3, Anion Gap 8, Glomerular 

Filtration Rate 33.0L, Calcium Level 8.0L, Magnesium Level 1.4L, Total Bilirubin

6.7H, Direct Bilirubin 3.6H, Aspartate Amino Transf (AST/SGOT) 53H, Alanine 

Aminotransferase (ALT/SGPT) 26, Alkaline Phosphatase 144H, Total Protein 7.6, 

Albumin 1.6L, Albumin/Globulin Ratio 0.27L, Lipase 465H


11/20/19 18:30: 


POC Glucose (Misc Panel) 158H, POC Sodium (Misc Panel) 135L, POC Potassium (Misc

Panel) 4.5, POC Chloride (Misc Panel) 99, POC Total CO2 (Misc Panel) 22.0L, POC 

Blood Urea Nitrogen (Misc Panel 8, POC Ionized Calcium (Misc Panel) 4.4L, POC 

Creatinine (Misc Panel) 1.5H, POC Hematocrit (Misc Panel) 38.0


11/20/19 18:58: Ammonia 23


CBC/BMP


Laboratory Tests


11/20/19 18:18











 Assessment/Plan


This is a 59-year-old female with history of alcoholic cirrhosis still actively 

drinking, presenting for nausea vomiting abdominal pain. Found to have moderate 

ascites in the ER. Also noncompliant with all of her medications for at least 

the past 1 week.





Nausea, vomiting, abdominal pain


Likely 2/2 moderate ascites noted on CT & exam


Patient has had scheduled paracentesis in the past


Will schedule for paracentesis tomorrow





Decompensated alcoholic cirrhosis


Patient still actively drinking


Meld score 26. Nml mentation and ammonia


Follows with Dr. Fraser


Given high risk of SBP, start ceftriaxone. Continue lactulose


Patient has been noncompliant with meds. Hypervolemic on exam. Diuretic regimen 

added


Limit salt intake. Monitor I's and O's and daily weights with fluid status





BAM


Normal renal function at baseline.


Likely 2/2 hepatorenal syndrome


Avoid nephrotoxins, follow renal function as she is diuresed





Alcohol abuse


Patient still actively drinking


Last drink was 24oz beer bottle night before admission


CIWA in place


consider nutritional support & albumin supplementation with paracentesis





Hypothyroidism


continue Synthroid





Hypertension


continue bblocker & diuresis





Glaucoma


continue eye drops





DVT ppx: mechanical


DISPO: admit to hospitalist service. Decompensated state will require >2 midnig

hts.





Plan / VTE


VTE Prophylaxis Ordered?:  Yes





GME ATTESTATION


GME ATTESTATION


My faculty preceptor for this patient encounter was physically present during 

the encounter and was fully available. All aspects of the patient interview, 

examination, medical decision making process, and medical care plan development 

were reviewed and approved by the faculty preceptor. The faculty preceptor is 

aware and concurs with the plan as stated in the body of this note and will 

attest to such by his/her cosignature.











MEGHAN HARO DO              Nov 21, 2019 01:50

## 2019-11-21 NOTE — IPNPDOC
Date Seen


The patient was seen on 11/21/19.





Progress Note


SUBJECTIVE: Patient is a 59-year-old female with past medical history of liver 

cirrhosis. Contrary to alcoholism and who is still actively consuming alcohol 

presenting to [facility] for with abdominal pain requiring therapeutic paracentesis, 

possible SBP. Plan for paracentesis. This a.m.





OBJECTIVE


PHYSICAL EXAMINATION:


VITAL SIGNS: Please see below. 


GENERAL:  female, appears fatigued


HEENT: Normocephalic, atraumatic, dry mucous membranes, slight pallor


CARDIOVASCULAR:. S1, S2.


RESPIRATORY: clear to auscultation bilaterally.


ABDOMINAL: Full abdomen, positive fluid shift, tenderness to palpation, not 

guarding


EXTREMITIES:. Trace edema bilateral lower extremities


NEUROLOGICAL:. Able to follow instructions


PSYCHOLOGICAL: has capacity





LABORATORY DATA, IMAGING STUDIES, MICROBIOLOGY: Please see below.





DVT prophylaxis ordered?: SCDs





ASSESSMENT AND PLAN: Patient is a 59-year-old female with past medical history 

of liver cirrhosis.





PROBLEMS:


# Liver cirrhosis, requiring paracentesis: Unable to perform paracentesis due to

hypotension, we'll reattempt tomorrow, PT eval, hold diuretics at this time due 

to hypotension, resume accordingly, gave patient albumin total of 3 bottles 

11/21--we'll reevaluate tomorrow, hold lactulose at this time, patient's ammonia

level is 23, and is hypotensive


#SBP: Continue empiric antibiotic treatment


#, Hypotension, secondary to ascites pressing on the IVC, also consider anemia, 

human transfusions.


#. Anemia, likely secondary to alcohol consumption, due to decline in hemoglobin

from 12-8, we'll transfuse with 1 unit of RBC, discussed case with urology due 

to positive RBCs in urine, follow up as an outpatient, follow-up Hemoccults


#BAM: Likely secondary from prerenal etiology


#Hypothyroid: Continue home meds


DVT ppx: SCD


Full code





DISPOSITION: >2 midnights.


The court date on 11/25





VS, I&O, 24H, Fishbone


Vital Signs/I&O





Vital Signs








  Date Time  Temp Pulse Resp B/P (MAP) Pulse Ox O2 Delivery O2 Flow Rate FiO2


 


11/21/19 06:00 97.5 75 18 80/47 (58) 95   


 


11/21/19 05:08      Room Air  














I&O- Last 24 Hours up to 6 AM 


 


 11/21/19





 06:00


 


Intake Total 200 ml


 


Output Total 400 ml


 


Balance -200 ml











Laboratory Data


24H LABS


Laboratory Tests 2


11/20/19 18:18: 


Nucleated Red Blood Cells % (auto) 0.0, Neutrophils 85H, Band Neutrophils 2, 

Lymphocytes (Manual) 9L, Monocytes (Manual) 4, Macrocytosis 1+, Platelet 

Estimate NORMAL, Prothrombin Time 20.5H, Prothromb Time International Ratio 

1.78, Activated Partial Thromboplast Time 34.3, Anion Gap 8, Glomerular 

Filtration Rate 33.0L, Calcium Level 8.0L, Magnesium Level 1.4L, Total Bilirubin

6.7H, Direct Bilirubin 3.6H, Aspartate Amino Transf (AST/SGOT) 53H, Alanine 

Aminotransferase (ALT/SGPT) 26, Alkaline Phosphatase 144H, Total Protein 7.6, 

Albumin 1.6L, Albumin/Globulin Ratio 0.27L, Lipase 465H


11/20/19 18:30: 


POC Glucose (Misc Panel) 158H, POC Sodium (Misc Panel) 135L, POC Potassium (Misc

Panel) 4.5, POC Chloride (Misc Panel) 99, POC Total CO2 (Misc Panel) 22.0L, POC 

Blood Urea Nitrogen (Misc Panel 8, POC Ionized Calcium (Misc Panel) 4.4L, POC 

Creatinine (Misc Panel) 1.5H, POC Hematocrit (Misc Panel) 38.0


11/20/19 18:58: Ammonia 23


11/21/19 05:07: 


Urine Color CECELIA, Urine Appearance CLEAR, Urine pH 5.0, Urine Specific Gravity 

>1.060H, Urine Protein NEGATIVE, Urine Glucose (Auto)(UA) NEGATIVE, Urine 

Ketones (Auto) TRACEH, Urine Blood 2+H, Urine Nitrite NEGATIVE, Urine Bilirubin 

NEGATIVE, Urine Urobilinogen 2.0H, Urine Leukocyte Esterase (Auto) NEGATIVE, 

Urine WBC (Auto) 1, Urine RBC (Auto) 10H, Urine Hyaline Casts (Auto) 0, Urine 

Bacteria (Auto) NEGATIVE, Urine Squamous Epithelial Cells 7, Urine Sperm (Auto)


CBC/BMP


Laboratory Tests


11/20/19 18:18

















IKE LAUREANO MD              Nov 21, 2019 07:36

## 2019-11-22 VITALS — DIASTOLIC BLOOD PRESSURE: 57 MMHG | SYSTOLIC BLOOD PRESSURE: 97 MMHG

## 2019-11-22 VITALS — DIASTOLIC BLOOD PRESSURE: 56 MMHG | SYSTOLIC BLOOD PRESSURE: 84 MMHG

## 2019-11-22 VITALS — SYSTOLIC BLOOD PRESSURE: 103 MMHG | DIASTOLIC BLOOD PRESSURE: 59 MMHG

## 2019-11-22 VITALS — DIASTOLIC BLOOD PRESSURE: 58 MMHG | SYSTOLIC BLOOD PRESSURE: 98 MMHG

## 2019-11-22 VITALS — SYSTOLIC BLOOD PRESSURE: 92 MMHG | DIASTOLIC BLOOD PRESSURE: 56 MMHG

## 2019-11-22 VITALS — SYSTOLIC BLOOD PRESSURE: 99 MMHG | DIASTOLIC BLOOD PRESSURE: 58 MMHG

## 2019-11-22 VITALS — SYSTOLIC BLOOD PRESSURE: 96 MMHG | DIASTOLIC BLOOD PRESSURE: 51 MMHG

## 2019-11-22 VITALS — DIASTOLIC BLOOD PRESSURE: 54 MMHG | SYSTOLIC BLOOD PRESSURE: 94 MMHG

## 2019-11-22 VITALS — DIASTOLIC BLOOD PRESSURE: 52 MMHG | SYSTOLIC BLOOD PRESSURE: 94 MMHG

## 2019-11-22 VITALS — SYSTOLIC BLOOD PRESSURE: 94 MMHG | DIASTOLIC BLOOD PRESSURE: 52 MMHG

## 2019-11-22 VITALS — SYSTOLIC BLOOD PRESSURE: 80 MMHG | DIASTOLIC BLOOD PRESSURE: 49 MMHG

## 2019-11-22 VITALS — SYSTOLIC BLOOD PRESSURE: 86 MMHG | DIASTOLIC BLOOD PRESSURE: 50 MMHG

## 2019-11-22 VITALS — DIASTOLIC BLOOD PRESSURE: 59 MMHG | SYSTOLIC BLOOD PRESSURE: 103 MMHG

## 2019-11-22 VITALS — DIASTOLIC BLOOD PRESSURE: 55 MMHG | SYSTOLIC BLOOD PRESSURE: 98 MMHG

## 2019-11-22 LAB
ALBUMIN SERPL BCG-MCNC: 1.6 GM/DL (ref 3.2–5.2)
ALT SERPL W P-5'-P-CCNC: 15 U/L (ref 12–78)
BILIRUB SERPL-MCNC: 4.5 MG/DL (ref 0.2–1)
BUN SERPL-MCNC: 18 MG/DL (ref 7–18)
CALCIUM SERPL-MCNC: 7.4 MG/DL (ref 8.5–10.1)
CHLORIDE SERPL-SCNC: 104 MEQ/L (ref 98–107)
CO2 SERPL-SCNC: 25 MEQ/L (ref 21–32)
CREAT SERPL-MCNC: 1.42 MG/DL (ref 0.55–1.3)
GFR SERPL CREATININE-BSD FRML MDRD: 40.3 ML/MIN/{1.73_M2} (ref 51–?)
GLUCOSE SERPL-MCNC: 97 MG/DL (ref 70–100)
HCT VFR BLD AUTO: 26.9 % (ref 36–47)
HGB BLD-MCNC: 9.3 G/DL (ref 12–15.5)
MAGNESIUM SERPL-MCNC: 1.6 MG/DL (ref 1.8–2.4)
MCH RBC QN AUTO: 35.6 PG (ref 27–33)
MCHC RBC AUTO-ENTMCNC: 34.6 G/DL (ref 32–36.5)
MCV RBC AUTO: 103.1 FL (ref 80–96)
PLATELET # BLD AUTO: 57 10^3/UL (ref 150–450)
POTASSIUM SERPL-SCNC: 3.9 MEQ/L (ref 3.5–5.1)
PROT SERPL-MCNC: 5.6 GM/DL (ref 6.4–8.2)
RBC # BLD AUTO: 2.61 10^6/UL (ref 4–5.4)
SODIUM SERPL-SCNC: 135 MEQ/L (ref 136–145)
WBC # BLD AUTO: 5.1 10^3/UL (ref 4–10)

## 2019-11-22 RX ADMIN — Medication SCH MG: at 20:48

## 2019-11-22 RX ADMIN — FUROSEMIDE SCH MG: 10 INJECTION, SOLUTION INTRAMUSCULAR; INTRAVENOUS at 09:00

## 2019-11-22 RX ADMIN — CEFEPIME HYDROCHLORIDE SCH MLS/HR: 1 INJECTION, POWDER, FOR SOLUTION INTRAMUSCULAR; INTRAVENOUS at 22:44

## 2019-11-22 RX ADMIN — FOLIC ACID SCH MG: 1 TABLET ORAL at 09:29

## 2019-11-22 RX ADMIN — LEVOTHYROXINE SODIUM SCH MCG: 112 TABLET ORAL at 05:47

## 2019-11-22 RX ADMIN — LATANOPROST SCH DROP: 50 SOLUTION OPHTHALMIC at 20:48

## 2019-11-22 RX ADMIN — Medication SCH MG: at 09:29

## 2019-11-22 RX ADMIN — METRONIDAZOLE SCH MLS/HR: 500 INJECTION, SOLUTION INTRAVENOUS at 19:35

## 2019-11-22 RX ADMIN — MULTIPLE VITAMINS W/ MINERALS TAB SCH TAB: TAB at 09:30

## 2019-11-22 RX ADMIN — METRONIDAZOLE SCH MLS/HR: 500 INJECTION, SOLUTION INTRAVENOUS at 11:38

## 2019-11-22 RX ADMIN — SPIRONOLACTONE SCH MG: 25 TABLET, FILM COATED ORAL at 08:05

## 2019-11-22 RX ADMIN — SPIRONOLACTONE SCH MG: 25 TABLET, FILM COATED ORAL at 21:00

## 2019-11-22 RX ADMIN — FUROSEMIDE SCH MG: 10 INJECTION, SOLUTION INTRAMUSCULAR; INTRAVENOUS at 17:00

## 2019-11-22 NOTE — IPNPDOC
Date Seen


The patient was seen on 11/22/19.





Progress Note


SUBJECTIVE: Patient had no overnight events, still hypotensive. Fluid does 

reveal WBCs of greater than 9000 with a high neutrophil percentage. Antibiotics 

were changed to cefepime and Flagyl, patient is allergic to penicillin. 

Consulted ID and spoke with Dr. Gr.





OBJECTIVE


PHYSICAL EXAMINATION:


VITAL SIGNS: Please see below. 


GENERAL:  female, appears fatigued


HEENT: Normocephalic, atraumatic, dry mucous membranes, slight pallor


CARDIOVASCULAR:. S1, S2.


RESPIRATORY: clear to auscultation bilaterally.


ABDOMINAL: Full abdomen, positive fluid shift, tenderness to palpation, not gu

arding


EXTREMITIES:. Trace edema bilateral lower extremities


NEUROLOGICAL:. Able to follow instructions


PSYCHOLOGICAL: has capacity





LABORATORY DATA, IMAGING STUDIES, MICROBIOLOGY: Please see below.





DVT prophylaxis ordered?: SCDs





ASSESSMENT AND PLAN: Patient is a 59-year-old female with past medical history 

of liver cirrhosis.





PROBLEMS:


# Liver cirrhosis, requiring paracentesis: Cefepime and Flagyl started on 

11/22/2019, ID consult, PT eval, hold diuretics at this time due to hypotension,

resume accordingly, gave patient albumin total of 3 bottles 11/21, hold lactulo

se at this time, patient's ammonia level is 23, and is hypotensive, consider GI 

consult to optimize liver cirrhotic management, add albumin/banana bag 


#SBP: Continue antibiotic treatment, f/u with ID recs


#Hypotension, secondary to ascites pressing on the IVC, also consider anemia, 

human transfusions.


#. Anemia, likely secondary to alcohol consumption, due to decline in hemoglobin

from 12-8, we'll transfuse with 1 unit of RBC, discussed case with urology due 

to positive RBCs in urine, follow up as an outpatient, follow-up Hemoccults


#BAM: Likely secondary from prerenal etiology


#Hypothyroid: TSH wnl; Continue home meds


DVT ppx: SCD


Full code





DISPOSITION: >2 midnights.





VS, I&O, 24H, Fishbone


Vital Signs/I&O





Vital Signs








  Date Time  Temp Pulse Resp B/P (MAP) Pulse Ox O2 Delivery O2 Flow Rate FiO2


 


11/22/19 06:54  77  99/58    


 


11/22/19 06:00 99.2  16  96 Room Air  














I&O- Last 24 Hours up to 6 AM 


 


 11/22/19





 06:00


 


Intake Total 1313.4 ml


 


Output Total 4075 ml


 


Balance -2761.6 ml











Laboratory Data


24H LABS


Laboratory Tests 2


11/21/19 15:55: 


Body Fluid Source ASCITES, Body Fluid Color YELLOW, Body Fluid Appearance 

CLOUDY, Body Fluid Specific Gravity 1.012, Body Fluid WBC (Auto) 9988H, Body 

Fluid RBC (Auto) < 2, Body Fluid Mononuclear Cells % Auto 22.8H, Fluid 

Polymorphonuclear Cell % Auto 77.2H, Body Fluid Glucose Source ASCITES, Body 

Fluid Glucose 122, Body Fluid Protein Source ASCITES, Body Fluid Total Protein 

1.4, Body Fluid Albumin Source ASCITES, Body Fluid Albumin 0.3


11/22/19 05:46: 


Nucleated Red Blood Cells % (auto) 0.0, Anion Gap 6L, Glomerular Filtration Rate

40.3L, Calcium Level 7.4L, Magnesium Level 1.6L, Total Bilirubin 4.5H, Aspartate

Amino Transf (AST/SGOT) 21, Alanine Aminotransferase (ALT/SGPT) 15, Alkaline 

Phosphatase 78, Total Protein 5.6L, Albumin 1.6#L, Albumin/Globulin Ratio 0.40L


CBC/BMP


Laboratory Tests


11/21/19 17:23








11/22/19 05:46








Microbiology





Microbiology


11/21/19 Acid Fast Stain, Received


           Pending


11/21/19 Mycobacterial Culture, Received


           Pending


11/21/19 Fungal Smear, Received


           Pending


11/21/19 Fungal Culture, Received


           Pending


11/21/19 Gram Stain, Received


           Pending


11/21/19 Body Fluid Culture, Received


           Pending


11/21/19 Blood Culture - Preliminary, Resulted


           No growth after 24 hours . All specim...











IKE LAUREANO MD              Nov 22, 2019 08:10

## 2019-11-22 NOTE — REP
Ultrasound-guided paracentesis

 

The procedure was performed by YOON Christy, under the direct

supervision of Dr. Khan.

 

The risks and benefits of the procedure were explained to the patient and

informed consent was obtained both verbally and written.  Directly prior to the

start of the procedure, a formal timeout was completed in the procedure room.

 

Under ultrasound guidance, the largest pocket of fluid in the left flank was

localized and skin was marked. The skin was then prepped and draped in a sterile

fashion.  10 ml of 1% lidocaine was used as a local anesthetic.  Using ultrasound

guidance, an 8-[place] multi side-hole catheter was inserted using trocar

technique. A total of  3,900 mL of dark yellow colored fluid was withdrawn, 200

ml was sent to the laboratory for further analysis and the rest was discarded.

 

The patient tolerated the procedure well and there were no immediate

complications.  After the appropriate monitored convalescence the patient was

discharged from the department.

 

 

Reviewed by

YOON De Los Santos 11/21/2019 05:28 P

Electronically Signed by

Sushant Khan MD 11/22/2019 04:57 P

## 2019-11-23 VITALS — DIASTOLIC BLOOD PRESSURE: 65 MMHG | SYSTOLIC BLOOD PRESSURE: 100 MMHG

## 2019-11-23 VITALS — SYSTOLIC BLOOD PRESSURE: 90 MMHG | DIASTOLIC BLOOD PRESSURE: 50 MMHG

## 2019-11-23 VITALS — SYSTOLIC BLOOD PRESSURE: 99 MMHG | DIASTOLIC BLOOD PRESSURE: 66 MMHG

## 2019-11-23 VITALS — SYSTOLIC BLOOD PRESSURE: 100 MMHG | DIASTOLIC BLOOD PRESSURE: 66 MMHG

## 2019-11-23 VITALS — DIASTOLIC BLOOD PRESSURE: 59 MMHG | SYSTOLIC BLOOD PRESSURE: 99 MMHG

## 2019-11-23 VITALS — DIASTOLIC BLOOD PRESSURE: 58 MMHG | SYSTOLIC BLOOD PRESSURE: 98 MMHG

## 2019-11-23 VITALS — DIASTOLIC BLOOD PRESSURE: 64 MMHG | SYSTOLIC BLOOD PRESSURE: 102 MMHG

## 2019-11-23 VITALS — DIASTOLIC BLOOD PRESSURE: 49 MMHG | SYSTOLIC BLOOD PRESSURE: 90 MMHG

## 2019-11-23 VITALS — SYSTOLIC BLOOD PRESSURE: 101 MMHG | DIASTOLIC BLOOD PRESSURE: 64 MMHG

## 2019-11-23 LAB
ALBUMIN SERPL BCG-MCNC: 1.9 GM/DL (ref 3.2–5.2)
ALT SERPL W P-5'-P-CCNC: 14 U/L (ref 12–78)
BILIRUB SERPL-MCNC: 3.4 MG/DL (ref 0.2–1)
BUN SERPL-MCNC: 14 MG/DL (ref 7–18)
CALCIUM SERPL-MCNC: 7.6 MG/DL (ref 8.5–10.1)
CHLORIDE SERPL-SCNC: 106 MEQ/L (ref 98–107)
CO2 SERPL-SCNC: 25 MEQ/L (ref 21–32)
CREAT SERPL-MCNC: 1.03 MG/DL (ref 0.55–1.3)
GFR SERPL CREATININE-BSD FRML MDRD: 58.4 ML/MIN/{1.73_M2} (ref 51–?)
GLUCOSE SERPL-MCNC: 82 MG/DL (ref 70–100)
HCT VFR BLD AUTO: 29 % (ref 36–47)
HGB BLD-MCNC: 9.6 G/DL (ref 12–15.5)
MAGNESIUM SERPL-MCNC: 1.8 MG/DL (ref 1.8–2.4)
MCH RBC QN AUTO: 34.8 PG (ref 27–33)
MCHC RBC AUTO-ENTMCNC: 33.1 G/DL (ref 32–36.5)
MCV RBC AUTO: 105.1 FL (ref 80–96)
PLATELET # BLD AUTO: 68 10^3/UL (ref 150–450)
POTASSIUM SERPL-SCNC: 3.8 MEQ/L (ref 3.5–5.1)
PROT SERPL-MCNC: 5.8 GM/DL (ref 6.4–8.2)
RBC # BLD AUTO: 2.76 10^6/UL (ref 4–5.4)
SODIUM SERPL-SCNC: 137 MEQ/L (ref 136–145)
WBC # BLD AUTO: 3.7 10^3/UL (ref 4–10)

## 2019-11-23 RX ADMIN — Medication SCH MG: at 08:16

## 2019-11-23 RX ADMIN — LATANOPROST SCH DROP: 50 SOLUTION OPHTHALMIC at 20:48

## 2019-11-23 RX ADMIN — METRONIDAZOLE SCH MLS/HR: 500 INJECTION, SOLUTION INTRAVENOUS at 10:33

## 2019-11-23 RX ADMIN — METRONIDAZOLE SCH MLS/HR: 500 INJECTION, SOLUTION INTRAVENOUS at 03:15

## 2019-11-23 RX ADMIN — FUROSEMIDE SCH MG: 40 TABLET ORAL at 08:16

## 2019-11-23 RX ADMIN — SPIRONOLACTONE SCH MG: 50 TABLET ORAL at 17:58

## 2019-11-23 RX ADMIN — METRONIDAZOLE SCH MLS/HR: 500 INJECTION, SOLUTION INTRAVENOUS at 17:57

## 2019-11-23 RX ADMIN — FUROSEMIDE SCH MG: 40 TABLET ORAL at 09:41

## 2019-11-23 RX ADMIN — FOLIC ACID SCH MG: 1 TABLET ORAL at 08:16

## 2019-11-23 RX ADMIN — LEVOTHYROXINE SODIUM SCH MCG: 112 TABLET ORAL at 05:53

## 2019-11-23 RX ADMIN — SPIRONOLACTONE SCH MG: 50 TABLET ORAL at 09:41

## 2019-11-23 RX ADMIN — MULTIPLE VITAMINS W/ MINERALS TAB SCH TAB: TAB at 08:16

## 2019-11-23 RX ADMIN — SPIRONOLACTONE SCH MG: 50 TABLET ORAL at 08:16

## 2019-11-23 RX ADMIN — CEFEPIME HYDROCHLORIDE SCH MLS/HR: 1 INJECTION, POWDER, FOR SOLUTION INTRAMUSCULAR; INTRAVENOUS at 09:41

## 2019-11-23 RX ADMIN — CEFEPIME HYDROCHLORIDE SCH MLS/HR: 1 INJECTION, POWDER, FOR SOLUTION INTRAMUSCULAR; INTRAVENOUS at 22:18

## 2019-11-23 NOTE — CR
DATE OF CONSULTATION: 11/22/2019

 

REQUESTING PROVIDER:  Dr. Chakraborty, hospitalist.

 

REASON FOR CONSULTATION:  Evaluation of continuing bacterial peritonitis.

 

HISTORY OF PRESENT ILLNESS:   Flora is a 59-year-old female with a known history

of alcoholic liver cirrhosis and ascites who presented with complaints of 1 month

of abdominal bloating and 40 pound weight loss.  About 2 days prior to admission,

the patient started having increased nausea, vomiting with chills.  She was also

complaining of excessive fatigue, sleeping all the time.  She had noted increase

in abdominal girth and gained 40 pounds that she felt was related to wheat.  She

reports noncompliance with her medications for about a week, as she was too tired

to take them.  The patient continues to drink, but she states only on the

weekends.  She denies any cough.  She has mild shortness of breath.  She denies

any dysuria, hematuria, or diarrhea.

 

PAST MEDICAL HISTORY:

Significant for:

1.  Alcoholic liver disease with cirrhosis.

2.  Previous history of ascites.

3.  Hypothyroidism.

4.  Gastroesophageal reflux disease (GERD).

5.  Restless leg syndrome.

6.  Hypertension.

7.  Glaucoma.

8.  Nephrolithiasis.

9.  History of Staphylococcus aureus bacteremia in October 2016 related to an IV

line infection.

10.  Spontaneous bacterial peritonitis in October 2017.

 

PAST SURGICAL HISTORY:

1.  Dilatation and curettage.

2.  Carpal tunnel surgery.

3.  Removal of impacted IUD.

4.  Hysteroscopy done by Dr. Lopez in May 2012.

 

PHYSICAL EXAMINATION:

White female in no acute distress.

HEART:  Normal S1, S2.  No murmurs, rubs or gallops.

LUNGS:  Clear.  No wheezes, rales or rhonchi.

ABDOMEN:  Ascites, distended, mildly tender in the left lower quadrant.

BACK:  No costovertebral angle or lumbosacral tenderness.

EXTREMITIES:  Trace edema.  Tattoos on the right leg.  No calf tenderness.  No

rashes.

VITAL SIGNS:  Maximum temperature (t-max) is 99.2.

NEUROLOGIC:  Upper and lower extremity strength normal.  She is not

encephalopathic.

NECK:  Supple with no stiffness and no adenopathy.

HEENT:  Oropharynx with poor dentition but clear without thrush. Pupils are equal

and reactive.  Slightly icteric.

 

LABORATORY DATA:

White count 5.1, on admission it was 10.7, hemoglobin 9.3, hematocrit 26.9,

platelets 57.  85% neutrophils, 2% bands, 5% lymphocytes.  Sodium 135, potassium

3.9, chloride 104, bicarbonate 25, BUN 18, creatinine 1.42, glucose 97, calcium

7.4, magnesium 1.6, bilirubin is 4.5.  AST 21, ALT 15, alkaline phosphatase 78,

albumin 1.6, TSH 2.4, ammonia 23.

 

Urinalysis has 1 white cell and 10 red cells.

 

Peritoneal fluid has 9988 white cells with 77% PNMs, cloudy.

 

Abdominal ascites culture has no growth so far, gram-stain was negative.

 

IMAGING STUDIES:

CT of the abdomen and pelvis done on 11/20/2019 shows cirrhotic liver with

moderate amount of ascites.  No splenomegaly.  Normal pancreas.  No

hydronephrosis.  Kidneys and ureters normal.

 

IMPRESSION

This is a 59-year-old female with a history of alcoholic liver disease and

cirrhosis with previous history of ascites and spontaneous bacterial peritonitis

who presents with recurrent spontaneous bacterial peritonitis, last episode was

in 2017.  Stool for cultures are negative.  She is clinically doing well.  She

was initially started on IV ceftriaxone and then she was switched to cefepime and

Flagyl.  She has received three doses of albumin and has remained afebrile.

White count has improved.

 

PLAN:

Switch dose of cefepime to 1 gram every 12 hours, not 2 grams q. 24.  Continue IV

Flagyl to cover for anaerobes.  Cultures so far have been negative.  I have added

anaerobic cultures to be done.  Fungal smear and culture, AFB smear and culture

are going to be sent out as well.  Avoid use of beta blockers as there is worse

outcome in spontaneous bacterial peritonitis and not indicated.  The patient will

eventually need endoscopy and colonoscopy, these have never been done for her.

Thank you for consultation.  We will continue to follow.

## 2019-11-23 NOTE — IPNPDOC
Date Seen


The patient was seen on 11/23/19.





Progress Note


SUBJECTIVE: Gram stain, body fluid culture were negative for any growth, but 

many white blood cells, Hemoccult negative. Cultures on 11/21/2019 negative. TSH

2.4. Will follow up with infectious diseases recommendations, suspicion for 

possible oncological etiology of ascites. Blood pressure has improved.





OBJECTIVE


PHYSICAL EXAMINATION:


VITAL SIGNS: Please see below. 


GENERAL:  female in no acute distress, slight jaundice


HEENT: Normocephalic, atraumatic, dry mucous membranes, slight pallor


CARDIOVASCULAR:. S1, S2.


RESPIRATORY: clear to auscultation bilaterally.


ABDOMINAL: Full abdomen, nontenderness to palpation, not guarding, paracentesis 

site appears clean, dry and intact


EXTREMITIES:. Trace edema bilateral lower extremities


NEUROLOGICAL:. Able to follow instructions


PSYCHOLOGICAL: has capacity





LABORATORY DATA, IMAGING STUDIES, MICROBIOLOGY: Please see below.





DVT prophylaxis ordered?: SCDs





ASSESSMENT AND PLAN: Patient is a 59-year-old female with past medical history 

of liver cirrhosis.





PROBLEMS:


# Liver cirrhosis, requiring paracentesis: Cefepime and Flagyl started on 

11/22/2019, ID consult, PT eval, transition by mouth diuretics, 


-patient's ammonia level is 23, and is hypotensive, consider GI consult to 

optimize liver cirrhotic management, add albumin/banana bag 


#SBP: Continue antibiotic treatment, f/u with ID recs, avoid beta blockers 

increased risk of hepatorenal syndrome and BAM


#Hypotension, secondary to ascites pressing on the IVC, also consider anemia, 

human transfusions.


#. Anemia, likely secondary to alcohol consumption, due to decline in hemoglobin

from 12-8, we'll transfuse with 1 unit of RBC, discussed case with urology due 

to positive RBCs in urine, follow up as an outpatient, follow-up Hemoccult


#BAM: Improving, secondary from prerenal etiology


#Hypothyroid: TSH wnl; Continue home meds


DVT ppx: SCD


Full code








DISPOSITION: Possible discharge on 11/24/2019.





VS, I&O, 24H, Fishbone


Vital Signs/I&O





Vital Signs








  Date Time  Temp Pulse Resp B/P (MAP) Pulse Ox O2 Delivery O2 Flow Rate FiO2


 


11/23/19 06:00 97.3 70 16 98/65 (76) 97 Room Air  














I&O- Last 24 Hours up to 6 AM 


 


 11/23/19





 06:00


 


Intake Total 1850.0 ml


 


Output Total 1075 ml


 


Balance 775.0 ml











Laboratory Data


24H LABS


Laboratory Tests 2


11/23/19 05:51: 


Nucleated Red Blood Cells % (auto) 0.0, Immature Platelet Fraction 2.1, Anion 

Gap 6L, Glomerular Filtration Rate 58.4, Calcium Level 7.6L, Magnesium Level 

1.8, Total Bilirubin 3.4H, Aspartate Amino Transf (AST/SGOT) 28, Alanine 

Aminotransferase (ALT/SGPT) 14, Alkaline Phosphatase 87, Total Protein 5.8L, 

Albumin 1.9L, Albumin/Globulin Ratio 0.49L


CBC/BMP


Laboratory Tests


11/23/19 05:51








Microbiology





Microbiology


11/22/19 Anaerobic Culture, Received


           Pending


11/22/19 Stool Occult Blood (GUI) - Final, Complete


           


11/21/19 Acid Fast Stain, Received


           Pending


11/21/19 Mycobacterial Culture, Received


           Pending


11/21/19 Fungal Smear, Received


           Pending


11/21/19 Fungal Culture, Received


           Pending


11/21/19 Gram Stain - Final, Resulted


           


11/21/19 Body Fluid Culture - Final, Resulted


           


11/21/19 Anaerobic Culture, Resulted


           Pending


11/21/19 Blood Culture - Preliminary, Resulted


           No growth after 24 hours . All specim...











IKE LAUREANO MD              Nov 23, 2019 07:48

## 2019-11-24 VITALS — DIASTOLIC BLOOD PRESSURE: 64 MMHG | SYSTOLIC BLOOD PRESSURE: 103 MMHG

## 2019-11-24 LAB
ALBUMIN SERPL BCG-MCNC: 1.8 GM/DL (ref 3.2–5.2)
ALT SERPL W P-5'-P-CCNC: 16 U/L (ref 12–78)
BILIRUB SERPL-MCNC: 3.4 MG/DL (ref 0.2–1)
BUN SERPL-MCNC: 14 MG/DL (ref 7–18)
CALCIUM SERPL-MCNC: 7.7 MG/DL (ref 8.5–10.1)
CHLORIDE SERPL-SCNC: 104 MEQ/L (ref 98–107)
CO2 SERPL-SCNC: 24 MEQ/L (ref 21–32)
CREAT SERPL-MCNC: 1.17 MG/DL (ref 0.55–1.3)
GFR SERPL CREATININE-BSD FRML MDRD: 50.4 ML/MIN/{1.73_M2} (ref 51–?)
GLUCOSE SERPL-MCNC: 90 MG/DL (ref 70–100)
HCT VFR BLD AUTO: 30.1 % (ref 36–47)
HGB BLD-MCNC: 10.3 G/DL (ref 12–15.5)
MCH RBC QN AUTO: 35 PG (ref 27–33)
MCHC RBC AUTO-ENTMCNC: 34.2 G/DL (ref 32–36.5)
MCV RBC AUTO: 102.4 FL (ref 80–96)
PLATELET # BLD AUTO: 89 10^3/UL (ref 150–450)
POTASSIUM SERPL-SCNC: 4 MEQ/L (ref 3.5–5.1)
PROT SERPL-MCNC: 6.3 GM/DL (ref 6.4–8.2)
RBC # BLD AUTO: 2.94 10^6/UL (ref 4–5.4)
SODIUM SERPL-SCNC: 136 MEQ/L (ref 136–145)
WBC # BLD AUTO: 5 10^3/UL (ref 4–10)

## 2019-11-24 RX ADMIN — METRONIDAZOLE SCH MLS/HR: 500 INJECTION, SOLUTION INTRAVENOUS at 10:12

## 2019-11-24 RX ADMIN — CEFEPIME HYDROCHLORIDE SCH MLS/HR: 1 INJECTION, POWDER, FOR SOLUTION INTRAMUSCULAR; INTRAVENOUS at 09:32

## 2019-11-24 RX ADMIN — LEVOTHYROXINE SODIUM SCH MCG: 112 TABLET ORAL at 05:42

## 2019-11-24 RX ADMIN — SPIRONOLACTONE SCH MG: 50 TABLET ORAL at 09:30

## 2019-11-24 RX ADMIN — FUROSEMIDE SCH MG: 40 TABLET ORAL at 09:30

## 2019-11-24 RX ADMIN — FOLIC ACID SCH MG: 1 TABLET ORAL at 09:30

## 2019-11-24 RX ADMIN — METRONIDAZOLE SCH MLS/HR: 500 INJECTION, SOLUTION INTRAVENOUS at 02:58

## 2019-11-24 RX ADMIN — MULTIPLE VITAMINS W/ MINERALS TAB SCH TAB: TAB at 09:30

## 2019-11-24 NOTE — DS.PDOC
Discharge Summary


General


Date of Admission


Nov 20, 2019 at 21:55


Date of Discharge


11/24/19





Discharge Summary


PROCEDURES PERFORMED DURING STAY: Paracentesis with 3900 mL removed.





ADMITTING DIAGNOSES: 


1. SBP.





DISCHARGE DIAGNOSES:


1. SBP.





COMPLICATIONS/CHIEF COMPLAINT: Abdominal Pain, Ascites Due To Alcoholic 

Cirrhosis.





HISTORY OF PRESENT ILLNESS & HOSPITAL COURSE: Patient is a 59-year-old female 

with past medical history of liver cirrhosis, she follows a GI doctor outside of

[facility] but has not had a colonoscopy or EGD before. During hospitalization, she did

receive paracentesis on 11/21/2019 reveals a WBCs with no growth aerobic or 

anaerobic fluid, however, antibiotics were administered more than 6 hours prior 

to paracentesis, still pending acid-fast, mycobacterium, and fungal cultures. 

Albumin was administered after 3.9 L of fluid removed. Vital signs improved. 

Urinalysis did reveal hematuria, which subsequently improved. I did speak with 

urology with recommendations for outpatient follow-up if symptoms worsen. 

Hemoglobin stable. Blood cultures on 11/19 reveals stool of occult blood 

negative. Patient tolerated adjusted dosing of spironolactone and Lasix. Beta 

blocker was held due to SBP and increased risk of hepatorenal syndrome. Ammonia 

was 23, electrolytes were replaced. Patient was discharged on a 5 additional day

course of Cefdinir and Flagyl, as well as changes to spironolactone and Lasix, 

BB held and to be resumed with follow-up appointments. She is to follow-up with 

her primary care 1-2 weeks, and to follow-up with her GI specialist for EGD and 

colonoscopy evaluation. If urological symptoms worsen, she will need a urology 

referral. At discharge, she denies any headaches, lightheadedness, changes in 

vision, chest pain, shortness of breath, nausea, vomiting, abdominal pain, 

additional issues of voiding or stooling. All questions were answered. 





DISCHARGE MEDICATIONS: Please see below.


 


ALLERGIES: Please see below.





PHYSICAL EXAMINATION ON DISCHARGE:


VITAL SIGNS: Please see below.


GENERAL:  female in no acute distress, slight jaundice


HEENT: Normocephalic, atraumatic, dry mucous membranes, slight pallor


CARDIOVASCULAR:. S1, S2.


RESPIRATORY: clear to auscultation bilaterally.


ABDOMINAL: +BS, nontenderness to palpation, not guarding, paracentesis site 

appears clean, dry and intact


EXTREMITIES:. Trace edema bilateral lower extremities


NEUROLOGICAL:. Able to follow instructions


PSYCHOLOGICAL: has capacity





LABORATORY DATA: Please see below.





IMAGING: see H&P





PROGNOSIS: fair





ACTIVITY: As tolerated.





DIET: high protein





DISCHARGE PLAN: home





DISPOSITION: home.





DISCHARGE INSTRUCTIONS:


1. see above.





ITEMS TO FOLLOWUP ON ON OUTPATIENT:


1. see above.





DISCHARGE CONDITION: Stable.





TIME SPENT ON DISCHARGE: 35 minutes.





Vital Signs/I&Os





Vital Signs








  Date Time  Temp Pulse Resp B/P (MAP) Pulse Ox O2 Delivery O2 Flow Rate FiO2


 


11/24/19 06:55  78  103/64    


 


11/24/19 06:00 97.6  18  95 Room Air  














I&O- Last 24 Hours up to 6 AM 


 


 11/24/19





 05:59


 


Intake Total 3640 ml


 


Output Total 1870 ml


 


Balance 1770 ml











Laboratory Data


Labs 24H


Laboratory Tests 2


11/24/19 06:06: 


Nucleated Red Blood Cells % (auto) 0.0, Immature Platelet Fraction 1.5, Anion 

Gap 8, Glomerular Filtration Rate 50.4L, Calcium Level 7.7L, Total Bilirubin 

3.4H, Aspartate Amino Transf (AST/SGOT) 29, Alanine Aminotransferase (ALT/SGPT) 

16, Alkaline Phosphatase 93, Total Protein 6.3L, Albumin 1.8L, Albumin/Globulin 

Ratio 0.40L


CBC/BMP


Laboratory Tests


11/24/19 06:06











Microbiology





Microbiology


11/22/19 Stool Occult Blood (GUI) - Final, Complete


           


11/21/19 Acid Fast Stain, Received


           Pending


11/21/19 Mycobacterial Culture, Received


           Pending


11/21/19 Fungal Smear, Received


           Pending


11/21/19 Fungal Culture, Received


           Pending


11/21/19 Gram Stain - Final, Complete


           


11/21/19 Body Fluid Culture - Final, Complete


           


11/21/19 Anaerobic Culture - Final, Complete


           


11/21/19 Blood Culture - Preliminary, Resulted


           No Growth after 72 hours. All specime...





Discharge Medications


Scheduled


Betaxolol Hcl (Betaxolol HCl) 0.5 % Sol, 1 DROP OU BID, (Reported)


Brimonidine Tartrate (Brimonidine Tartrate) 0.2% 5ML Drops, 1 DROP OU BID, 

(Reported)


Cefdinir (Cefdinir) 300 Mg Capsule, 1 CAP PO BID


Furosemide (Furosemide) 40 Mg Tablet, 40 MG PO DAILY


Lactulose (Lactulose) 10 Gm/15 Ml Solution, 30 GM PO BID, (Reported)


Latanoprost (Xalatan) 0.005% 2.5ML Drops, 1 DROP OU QHS, (Reported)


Levocetirizine Dihydrochloride (Levocetirizine Dihydrochloride) 5 Mg Tablet, 5 

MG PO QHS, (Reported)


Levothyroxine Sodium (Synthroid) 112 Mcg Tablet, 112 MCG PO DAILY, (Reported)


Metronidazole (Flagyl) 500 Mg Tablet, 1 TAB PO TID


Spironolactone (Aldactone) 50 Mg Tablet, 50 MG PO BID@09,17





Scheduled PRN


Azelastine HCl (Azelastine HCl) 0.1% Spray.pump, 2 SPRAY NARES DAILY PRN for 

NASAL CONGESTION, (Reported)


Fluticasone Propionate (Flonase Allergy Relief) 50 Mcg/Act Spr, 1 SPRAY NA DAILY

PRN for NASAL CONGESTION, (Reported)





Allergies


Coded Allergies:  


     Penicillins (Verified  Allergy, Unknown, 11/20/19)











IKE LAUREANO MD              Nov 24, 2019 10:15

## 2019-11-29 ENCOUNTER — HOSPITAL ENCOUNTER (EMERGENCY)
Dept: HOSPITAL 53 - M ED | Age: 59
Discharge: HOME | End: 2019-11-29
Payer: COMMERCIAL

## 2019-11-29 VITALS — WEIGHT: 185.63 LBS | HEIGHT: 67 IN | BODY MASS INDEX: 29.13 KG/M2

## 2019-11-29 VITALS — SYSTOLIC BLOOD PRESSURE: 125 MMHG | DIASTOLIC BLOOD PRESSURE: 71 MMHG

## 2019-11-29 DIAGNOSIS — E07.9: ICD-10-CM

## 2019-11-29 DIAGNOSIS — K70.31: Primary | ICD-10-CM

## 2019-11-29 DIAGNOSIS — I10: ICD-10-CM

## 2019-11-29 DIAGNOSIS — Z79.899: ICD-10-CM

## 2019-11-29 DIAGNOSIS — Z88.0: ICD-10-CM

## 2019-11-29 DIAGNOSIS — H40.9: ICD-10-CM

## 2019-11-29 DIAGNOSIS — Z81.1: ICD-10-CM

## 2019-11-29 DIAGNOSIS — K21.9: ICD-10-CM

## 2019-12-03 ENCOUNTER — HOSPITAL ENCOUNTER (OUTPATIENT)
Dept: HOSPITAL 53 - M IRPRO | Age: 59
End: 2019-12-03
Payer: COMMERCIAL

## 2019-12-03 VITALS — DIASTOLIC BLOOD PRESSURE: 61 MMHG | SYSTOLIC BLOOD PRESSURE: 117 MMHG

## 2019-12-03 DIAGNOSIS — R18.8: Primary | ICD-10-CM

## 2019-12-03 DIAGNOSIS — K74.60: ICD-10-CM

## 2019-12-03 NOTE — REP
Ultrasound-guided paracentesis

 

The procedure was performed under the direct supervision of Dr. Khan.

 

The risks and benefits of the procedure were explained to the patient and

informed consent was obtained.  The largest pocket of fluid was localized in the

right flank using ultrasound guidance.  The skin was prepped and draped in a

sterile fashion.  1% lidocaine was used as a local anesthetic.  Using ultrasound

guidance an 8-Thai multi side-hole catheter was inserted using trocar

technique.  2150 ml of dark yellow fluid was withdrawn and discarded.

 

The patient tolerated the procedure well and there were no immediate

complications.  After the appropriate amount of monitored convalescence the

patient was discharged from the department.

 

 

Electronically Signed by

YOON Dozier 12/03/2019 04:10 P

Electronically Signed by

Sushant Khan MD 12/03/2019 04:50 P

## 2019-12-11 ENCOUNTER — HOSPITAL ENCOUNTER (INPATIENT)
Dept: HOSPITAL 53 - M ED | Age: 59
LOS: 3 days | Discharge: HOME | DRG: 280 | End: 2019-12-14
Attending: INTERNAL MEDICINE | Admitting: INTERNAL MEDICINE
Payer: COMMERCIAL

## 2019-12-11 VITALS — DIASTOLIC BLOOD PRESSURE: 84 MMHG | SYSTOLIC BLOOD PRESSURE: 130 MMHG

## 2019-12-11 VITALS — WEIGHT: 179.46 LBS | HEIGHT: 67 IN | BODY MASS INDEX: 28.17 KG/M2

## 2019-12-11 VITALS — DIASTOLIC BLOOD PRESSURE: 53 MMHG | SYSTOLIC BLOOD PRESSURE: 101 MMHG

## 2019-12-11 DIAGNOSIS — Z88.8: ICD-10-CM

## 2019-12-11 DIAGNOSIS — K21.9: ICD-10-CM

## 2019-12-11 DIAGNOSIS — Z88.0: ICD-10-CM

## 2019-12-11 DIAGNOSIS — K70.40: ICD-10-CM

## 2019-12-11 DIAGNOSIS — K70.31: Primary | ICD-10-CM

## 2019-12-11 DIAGNOSIS — N17.9: ICD-10-CM

## 2019-12-11 DIAGNOSIS — E87.1: ICD-10-CM

## 2019-12-11 DIAGNOSIS — H40.9: ICD-10-CM

## 2019-12-11 DIAGNOSIS — I12.9: ICD-10-CM

## 2019-12-11 DIAGNOSIS — E03.9: ICD-10-CM

## 2019-12-11 DIAGNOSIS — Z79.899: ICD-10-CM

## 2019-12-11 DIAGNOSIS — N18.3: ICD-10-CM

## 2019-12-11 DIAGNOSIS — D50.0: ICD-10-CM

## 2019-12-11 LAB
ALBUMIN SERPL BCG-MCNC: 1.8 GM/DL (ref 3.2–5.2)
ALT SERPL W P-5'-P-CCNC: 25 U/L (ref 12–78)
AMYLASE SERPL-CCNC: 79 U/L (ref 25–115)
APPEARANCE UR: (no result)
BACTERIA UR QL AUTO: (no result)
BASOPHILS # BLD AUTO: 0.1 10^3/UL (ref 0–0.2)
BASOPHILS NFR BLD AUTO: 1.6 % (ref 0–1)
BILIRUB CONJ SERPL-MCNC: 2.9 MG/DL (ref 0–0.2)
BILIRUB SERPL-MCNC: 4.7 MG/DL (ref 0.2–1)
BILIRUB UR QL STRIP.AUTO: NEGATIVE
BUN SERPL-MCNC: 38 MG/DL (ref 7–18)
CALCIUM SERPL-MCNC: 8.4 MG/DL (ref 8.5–10.1)
CHLORIDE SERPL-SCNC: 98 MEQ/L (ref 98–107)
CO2 SERPL-SCNC: 26 MEQ/L (ref 21–32)
CREAT SERPL-MCNC: 2.85 MG/DL (ref 0.55–1.3)
EOSINOPHIL # BLD AUTO: 0.2 10^3/UL (ref 0–0.5)
EOSINOPHIL NFR BLD AUTO: 3.3 % (ref 0–3)
GFR SERPL CREATININE-BSD FRML MDRD: 18 ML/MIN/{1.73_M2} (ref 51–?)
GLUCOSE SERPL-MCNC: 102 MG/DL (ref 70–100)
GLUCOSE UR QL STRIP.AUTO: NEGATIVE MG/DL
HCT VFR BLD AUTO: 32.7 % (ref 36–47)
HGB BLD-MCNC: 11.1 G/DL (ref 12–15.5)
HGB UR QL STRIP.AUTO: (no result)
INR PPP: 1.9
KETONES UR QL STRIP.AUTO: NEGATIVE MG/DL
LEUKOCYTE ESTERASE UR QL STRIP.AUTO: (no result)
LIPASE SERPL-CCNC: 324 U/L (ref 73–393)
LYMPHOCYTES # BLD AUTO: 1 10^3/UL (ref 1.5–5)
LYMPHOCYTES NFR BLD AUTO: 21.4 % (ref 24–44)
MCH RBC QN AUTO: 33.8 PG (ref 27–33)
MCHC RBC AUTO-ENTMCNC: 33.9 G/DL (ref 32–36.5)
MCV RBC AUTO: 99.7 FL (ref 80–96)
MONOCYTES # BLD AUTO: 0.7 10^3/UL (ref 0–0.8)
MONOCYTES NFR BLD AUTO: 16.5 % (ref 0–5)
MUCOUS THREADS URNS QL MICRO: (no result)
NEUTROPHILS # BLD AUTO: 2.6 10^3/UL (ref 1.5–8.5)
NEUTROPHILS NFR BLD AUTO: 56.8 % (ref 36–66)
NITRITE UR QL STRIP.AUTO: NEGATIVE
PH UR STRIP.AUTO: 5 UNITS (ref 5–9)
PLATELET # BLD AUTO: 103 10^3/UL (ref 150–450)
POTASSIUM SERPL-SCNC: 3.9 MEQ/L (ref 3.5–5.1)
PROT SERPL-MCNC: 7.2 GM/DL (ref 6.4–8.2)
PROT UR QL STRIP.AUTO: NEGATIVE MG/DL
PROTHROMBIN TIME: 21.5 SECONDS (ref 11.8–14)
RBC # BLD AUTO: 3.28 10^6/UL (ref 4–5.4)
RBC # UR AUTO: 138 /HPF (ref 0–3)
SODIUM SERPL-SCNC: 133 MEQ/L (ref 136–145)
SP GR UR STRIP.AUTO: 1.01 (ref 1–1.03)
SQUAMOUS #/AREA URNS AUTO: 2 /HPF (ref 0–6)
UROBILINOGEN UR QL STRIP.AUTO: 0.2 MG/DL (ref 0–2)
WBC # BLD AUTO: 4.5 10^3/UL (ref 4–10)
WBC #/AREA URNS AUTO: 96 /HPF (ref 0–3)

## 2019-12-11 RX ADMIN — LATANOPROST SCH DROP: 50 SOLUTION OPHTHALMIC at 21:28

## 2019-12-11 RX ADMIN — BRIMONIDINE TARTRATE SCH DROP: 1 SOLUTION/ DROPS OPHTHALMIC at 21:27

## 2019-12-11 NOTE — REP
LIMITED ABDOMINAL ULTRASOUND:

 

Limited abdominal ultrasound is performed to evaluate for ascites.   There is

moderate diffuse abdominopelvic ascites present bilaterally.

 

 

Electronically Signed by

Sushant Khan MD 12/16/2019 09:38 A

## 2019-12-11 NOTE — HPEPDOC
[facility] Medical History & Physical


Date of Admission


Dec 11, 2019


Date of Service:  Dec 11, 2019





History and Physical


CHIEF COMPLAINT: Abdominal discomfort





HISTORY OF PRESENT ILLNESS: Patient is 59F with PMH Alcoholic liver cirrhosis 

with recurrent ascites requiring intermittent paracentesis, Hypothyroidism, 

GERD, HTN presented to the ER complaining of worsening abdominal distension. She

normally follows with Dr. Fraser and Dr. Olivia as outpatient, has had 

several admissions for similar problems with BAM. Patient has no complaints 

apart from worsening abdominal distension and discomfort, last paracentesis done

12/3. Denies any fever, chills, nausea, vomiting, significant abdominal/chest 

pain. Cr noted to be elevated in ER to 2.85 from very recent Cr 1.17. 





PAST MEDICAL HISTORY:


Refer to [facility]





PAST SURGICAL HISTORY:


D and C





SOCIAL HISTORY:


Previous heavy alcohol use, last drink was reportedly about 16 days ago (the 

last several admissions prior per patient) Denies tobacco or illicit drug use. 





FAMILY HISTORY:


Mother- brain tumor





ALLERGIES: Please see below.





REVIEW OF SYSTEMS:


10 point review of system negative except as stated in [facility]





HOME MEDICATIONS: Please see below. 





PHYSICAL EXAMINATION:


General: mild distress,  Alert


Eyes: Normal sclera, EOMI, PHAM


HENT: Atraumatic, neck supple, moist mucous membranes


Cardiovascular: Normal rate, normal rhythm. 


Pulmonary: Clear to auscultation b/l, no wheezing


GI: Soft, distended, mild discomfort due to increase pressure


Skin: Warm and dry


Neuro: CN grossly intact. No focal deficits. Strengths equal b/l. 


Psych: oriented x 3





LABORATORY DATA: See below.





IMAGING: 


Abdominal US-


Limited abdominal ultrasound is performed to evaluate for ascites.   There is


moderate diffuse abdominopelvic ascites present bilaterally.





MICROBIOLOGY: Please see below. 





ASSESSMENT AND PLAN: 


1. Alcoholic liver cirrhosis with abdominal ascites


- Recurrent ascites requiring paracentesis, last completed 12/3. 


- IR consulted for paracentesis. 


- Low suspicion for SBP given afebrile, no leukocytosis, normal mentation, no 

significant abdominal tenderness. 


- Will give albumin post procedure. Hold on Abx for now. 


2. BAM


- Cr 2.85 on this admission from 1.17 recently. 


- Previous problem in the past, had been seen by nephrology inpatient as well as

follows with Dr. Olivia outpatient. 


- Nephrology consulted. 


- May have hepatorenal component. 


3. Hypothyroidism


- c/w Synthroid. 


4. glaucoma


- resume eye drops 





DVT ppx: TEDs, HSQ 


Code status: Full code





Vital Signs





Vital Signs








  Date Time  Temp Pulse Resp B/P (MAP) Pulse Ox O2 Delivery O2 Flow Rate FiO2


 


12/11/19 16:58 98.6 89 18 130/84 (99) 100 Room Air  











Laboratory Data


Labs 24H


Laboratory Tests 2


12/11/19 11:15: 


Immature Granulocyte % (Auto) 0.4, Neutrophils (%) (Auto) 56.8, Lymphocytes (%) 

(Auto) 21.4L, Monocytes (%) (Auto) 16.5H, Eosinophils (%) (Auto) 3.3H, Basophils

(%) (Auto) 1.6H, Neutrophils # (Auto) 2.6, Lymphocytes # (Auto) 1.0L, Monocytes 

# (Auto) 0.7, Eosinophils # (Auto) 0.2, Basophils # (Auto) 0.1, Nucleated Red 

Blood Cells % (auto) 0.0, Prothrombin Time 21.5H, Prothromb Time International 

Ratio 1.90, Urine Color CECELIA, Urine Appearance HAZY, Urine pH 5.0, Urine 

Specific Gravity 1.011, Urine Protein NEGATIVE, Urine Glucose (Auto)(UA) 

NEGATIVE, Urine Ketones (Auto) NEGATIVE, Urine Blood 3+H, Urine Nitrite 

NEGATIVE, Urine Bilirubin NEGATIVE, Urine Urobilinogen 0.2, Urine Leukocyte 

Esterase (Auto) TRACEH, Urine WBC (Auto) 96H, Urine RBC (Auto) 138H, Urine 

Hyaline Casts (Auto) 0, Urine Bacteria (Auto) 1+H, Urine Squamous Epithelial 

Cells 2, Urine Mucus (Auto) SMALL, Urine Sperm (Auto) , Anion Gap 9, Glomerular 

Filtration Rate 18.0L, Calcium Level 8.4L, Total Bilirubin 4.7H, Direct 

Bilirubin 2.9H, Aspartate Amino Transf (AST/SGOT) 75H, Alanine Aminotransferase 

(ALT/SGPT) 25, Alkaline Phosphatase 99, Ammonia < 10, Total Protein 7.2, Albumin

1.8L, Albumin/Globulin Ratio 0.33L, Amylase Level 79, Lipase 324


CBC/BMP


Laboratory Tests


12/11/19 11:15











Home Medications


Scheduled


Betaxolol Hcl (Betaxolol HCl) 0.5 % Sol, 1 DROP OU BID


Brimonidine Tartrate (Brimonidine Tartrate) 0.2% 5ML Drops, 1 DROP OU BID


Lactulose (Lactulose) 10 Gm/15 Ml Solution, 30 ML PO BID


Latanoprost (Xalatan) 0.005% 2.5ML Drops, 1 DROP OU QHS


Levothyroxine Sodium (Synthroid) 112 Mcg Tablet, 112 MCG PO DAILY


Spironolactone (Spironolactone) 25 Mg Tablet, 50 MG PO BID





Scheduled PRN


Fluticasone Propionate (Fluticasone Propionate) 16 Gm Spray.susp, 2 SPRAY NARES 

DAILY PRN for CONGESTION


Levocetirizine Dihydrochloride (Levocetirizine Dihydrochloride) 5 Mg Tablet, 5 

MG PO DAILY PRN for ALLERGIES





Allergies


Coded Allergies:  


     cefdinir (Verified  Allergy, Severe, THROAT CLOSES, 12/3/19)


     Penicillins (Verified  Allergy, Unknown, UNKNOWN CHILDHOOD REACTION, 

12/11/19)





A-FIB/CHADSVASC


A-FIB History


Current/History of A-Fib/PAF?:  No











DARIUSZ REYNOLDS MD                Dec 11, 2019 19:18

## 2019-12-12 VITALS — DIASTOLIC BLOOD PRESSURE: 55 MMHG | SYSTOLIC BLOOD PRESSURE: 97 MMHG

## 2019-12-12 VITALS — SYSTOLIC BLOOD PRESSURE: 113 MMHG | DIASTOLIC BLOOD PRESSURE: 63 MMHG

## 2019-12-12 VITALS — DIASTOLIC BLOOD PRESSURE: 62 MMHG | SYSTOLIC BLOOD PRESSURE: 109 MMHG

## 2019-12-12 VITALS — DIASTOLIC BLOOD PRESSURE: 65 MMHG | SYSTOLIC BLOOD PRESSURE: 112 MMHG

## 2019-12-12 VITALS — DIASTOLIC BLOOD PRESSURE: 63 MMHG | SYSTOLIC BLOOD PRESSURE: 113 MMHG

## 2019-12-12 VITALS — SYSTOLIC BLOOD PRESSURE: 112 MMHG | DIASTOLIC BLOOD PRESSURE: 65 MMHG

## 2019-12-12 VITALS — DIASTOLIC BLOOD PRESSURE: 65 MMHG | SYSTOLIC BLOOD PRESSURE: 116 MMHG

## 2019-12-12 VITALS — SYSTOLIC BLOOD PRESSURE: 116 MMHG | DIASTOLIC BLOOD PRESSURE: 64 MMHG

## 2019-12-12 VITALS — DIASTOLIC BLOOD PRESSURE: 53 MMHG | SYSTOLIC BLOOD PRESSURE: 97 MMHG

## 2019-12-12 LAB
APPEARANCE FLD: (no result)
BUN SERPL-MCNC: 36 MG/DL (ref 7–18)
CALCIUM SERPL-MCNC: 8.2 MG/DL (ref 8.5–10.1)
CHLORIDE SERPL-SCNC: 98 MEQ/L (ref 98–107)
CO2 SERPL-SCNC: 25 MEQ/L (ref 21–32)
COLOR PRT: YELLOW
CREAT SERPL-MCNC: 2.6 MG/DL (ref 0.55–1.3)
CREAT UR-MCNC: 176 MG/DL
GFR SERPL CREATININE-BSD FRML MDRD: 20.1 ML/MIN/{1.73_M2} (ref 51–?)
GLUCOSE SERPL-MCNC: 91 MG/DL (ref 70–100)
HCT VFR BLD AUTO: 28.8 % (ref 36–47)
HGB BLD-MCNC: 10 G/DL (ref 12–15.5)
MCH RBC QN AUTO: 33.8 PG (ref 27–33)
MCHC RBC AUTO-ENTMCNC: 34.7 G/DL (ref 32–36.5)
MCV RBC AUTO: 97.3 FL (ref 80–96)
PLATELET # BLD AUTO: 105 10^3/UL (ref 150–450)
POTASSIUM SERPL-SCNC: 4.2 MEQ/L (ref 3.5–5.1)
RBC # BLD AUTO: 2.96 10^6/UL (ref 4–5.4)
SODIUM SERPL-SCNC: 132 MEQ/L (ref 136–145)
SODIUM UR-SCNC: 18 MEQ/L
SP GR FLD REFRACTOMETRY: 1.01
SPECIMEN SOURCE FLD: (no result)
WBC # BLD AUTO: 3.9 10^3/UL (ref 4–10)

## 2019-12-12 PROCEDURE — 0W9G3ZZ DRAINAGE OF PERITONEAL CAVITY, PERCUTANEOUS APPROACH: ICD-10-PCS

## 2019-12-12 RX ADMIN — BRIMONIDINE TARTRATE SCH DROP: 1 SOLUTION/ DROPS OPHTHALMIC at 20:59

## 2019-12-12 RX ADMIN — LATANOPROST SCH DROP: 50 SOLUTION OPHTHALMIC at 20:59

## 2019-12-12 RX ADMIN — SODIUM CHLORIDE SCH UNITS: 4.5 INJECTION, SOLUTION INTRAVENOUS at 12:45

## 2019-12-12 RX ADMIN — SODIUM CHLORIDE SCH UNITS: 4.5 INJECTION, SOLUTION INTRAVENOUS at 05:32

## 2019-12-12 RX ADMIN — BRIMONIDINE TARTRATE SCH DROP: 1 SOLUTION/ DROPS OPHTHALMIC at 11:02

## 2019-12-12 RX ADMIN — LEVOTHYROXINE SODIUM SCH MCG: 112 TABLET ORAL at 05:32

## 2019-12-12 RX ADMIN — SODIUM CHLORIDE SCH UNITS: 4.5 INJECTION, SOLUTION INTRAVENOUS at 21:00

## 2019-12-12 NOTE — IPNPDOC
Date Seen


The patient was seen on 12/12/19.





Progress Note


SUBJECTIVE: 


Patient underwent paracentesis today with reported removal of 4350cc


Abdomen discomfort improves post procedure


Denies any other complaints


Cr 2.6 today. 





OBJECTIVE


PHYSICAL EXAMINATION:


VITAL SIGNS: Please see below. 


General: mild distress,  Alert


Eyes: Normal sclera, EOMI, PHAM


HENT: Atraumatic, neck supple, moist mucous membranes


Cardiovascular: Normal rate, normal rhythm. 


Pulmonary: Clear to auscultation b/l, no wheezing


GI: Soft, nontender


Skin: Warm and dry


Neuro: CN grossly intact. No focal deficits. Strengths equal b/l. 


Psych: oriented x 3





LABORATORY DATA, IMAGING STUDIES, MICROBIOLOGY: Please see below.





ASSESSMENT AND PLAN: 


1. Alcoholic liver cirrhosis with abdominal ascites


- Recurrent ascites requiring paracentesis, last completed 12/3. 


- s/p paracentesis 12/12 with post procedure albumin. 


- Low suspicion for SBP given afebrile, no leukocytosis, normal mentation, no 

significant abdominal tenderness. 


2. BAM


- Cr 2.85 on this admission from 1.17 recently. 


- Previous problem in the past, had been seen by nephrology inpatient as well as

follows with Dr. Olivia outpatient. 


- Nephrology following. Cr improving. 


3. Hypothyroidism


- c/w Synthroid. 


4. glaucoma


- resume eye drops 





DVT ppx: TEDs, HSQ 


Code status: Full code





VS, I&O, 24H, Fishbone


Vital Signs/I&O





Vital Signs








  Date Time  Temp Pulse Resp B/P (MAP) Pulse Ox O2 Delivery O2 Flow Rate FiO2


 


12/12/19 18:55 99.1 96 16 116/64 96 Room Air  














I&O- Last 24 Hours up to 6 AM 


 


 12/12/19





 06:00


 


Intake Total 1680 ml


 


Output Total 300 ml


 


Balance 1380 ml











Laboratory Data


24H LABS


Laboratory Tests 2


12/12/19 06:02: 


Nucleated Red Blood Cells % (auto) 0.0, Anion Gap 9, Glomerular Filtration Rate 

20.1L, Calcium Level 8.2L


12/12/19 13:48: 


Urine Color CECELIA, Urine Appearance CLOUDYH, Urine pH 5.0, Urine Specific 

Gravity 1.014, Urine Protein 1+H, Urine Glucose (UA) NEGATIVE, Urine Ketones 

NEGATIVE, Urine Blood 3+H, Urine Nitrite NEGATIVE, Urine Bilirubin NEGATIVE, 

Urine Urobilinogen 0.2, Urine Leukocyte Esterase NEGATIVE, Urine WBC (Auto) 

102H, Urine RBC (Auto) TNTCH, Urine Hyaline Casts (Auto) 4, Urine Bacteria 

(Auto) 1+H, Urine Squamous Epithelial Cells 8, Urine Sperm (Auto) 


12/12/19 13:55: 


Urine Random Creatinine 176.0, Urine Random Sodium 18


12/12/19 15:13: 


Body Fluid Source ASCITES, Body Fluid Color YELLOW, Body Fluid Appearance HAZY, 

Body Fluid Specific Gravity 1.011, Body Fluid WBC (Auto) 104H, Body Fluid RBC (

Auto) 2, Body Fluid Mononuclear Cells % Auto 95.2H, Fluid Polymorphonuclear Cell

% Auto 4.8H, Body Fluid Glucose Source ASCITES, Body Fluid Glucose 144, Body 

Fluid Protein Source ASCITES, Body Fluid Total Protein 0.9, Body Fluid Albumin 

Source ASCITES, Body Fluid Albumin 0.2


CBC/BMP


Laboratory Tests


12/12/19 06:02








Microbiology





Microbiology


12/12/19 Acid Fast Stain, Received


           Pending


12/12/19 Mycobacterial Culture, Received


           Pending


12/12/19 Fungal Smear, Received


           Pending


12/12/19 Fungal Culture, Received


           Pending


12/12/19 Gram Stain, Received


           Pending


12/12/19 Body Fluid Culture, Received


           Pending


12/12/19 Urine Culture, Received


           Pending











DARIUSZ REYNOLDS MD                Dec 12, 2019 19:13

## 2019-12-12 NOTE — CR
DATE OF CONSULTATION: 12/12/2019

 

REASON FOR CONSULTATION:  Acute renal failure in this lady with cirrhosis and

recurrent ascites.

 

HISTORY OF PRESENT ILLNESS:  Ms. Taylor is a 59-year-old female with known

history of alcoholic cirrhosis and recurrent ascites.  She has been getting

intermittent paracentesis as an outpatient and has prior history of acute on

chronic kidney disease.  Her other medical problems include hypertension,

hypothyroidism, gastroesophageal reflux disease, and anemia.  She presented to

emergency room with abdominal distension and pain yesterday and was found to have

acute kidney injury.  She was admitted last night, and a nephrology consultation

was requested.  The patient is seen this morning.

 

PAST MEDICAL HISTORY:

Significant for:

1.  History of alcoholic cirrhosis.

2.  Prior history of acute renal failure.

3.  Chronic kidney disease.

4.  Hypothyroidism.

5.  Hypertension.

6.  Gastroesophageal reflux disease.

7.  Glaucoma.

 

PAST SURGICAL HISTORY:  Significant for dilatation and curettage (D and C) .

 

PERSONAL AND SOCIAL HISTORY:  The patient has previous history of heavy alcohol

use.  She reports no alcohol intake for more than 2 weeks.  Denies any

intravenous drug use or tobacco use.

 

FAMILY HISTORY:  Negative for end-stage renal disease.  She does have history of

brain tumor in her mother.

 

REVIEW OF SYSTEMS:

The patient denies any fever or chills.  There is no history of nosebleed, sore

throat, headache, earache, or dizziness.

CARDIOVASCULAR SYSTEM:  Negative for dyspnea or chest pain.

RESPIRATORY SYSTEM:  Negative for cough or hemoptysis.

GASTROINTESTINAL SYSTEM:  Significant for history of gastroesophageal reflux

disease, cirrhosis of liver, and recurrent ascites.  She has been requiring

paracentesis almost every 2 weeks.

ENDOCRINE SYSTEM:  Negative for diabetes but does have history of hypothyroidism.

 

PSYCHOSOCIAL SYSTEM:  Negative for depression or anxiety.

NEUROLOGICAL SYSTEM:  Negative for seizures or stroke.

MUSCULOSKELETAL SYSTEM:  Negative for leg edema or any significant arthritis.

 

MEDICATIONS:

Her home medications include:

- betaxolol eyedrops 0.5% one drop both eyes twice a day

- brimonidine eyedrops 0.2% one drop both eyes twice a day

- Xalatan eyedrops 0.05% one drop both eyes at bedtime

- spironolactone 25 mg two tablets twice a day

- levothyroxine 112 mcg daily

- lactulose 10 grams by mouth daily

She also uses fluticasone spray and nasal spray as needed for congestion.

 

ALLERGIES:  She has allergy to PENICILLIN and CEFDINIR.

 

PHYSICAL EXAMINATION:  The patient is lying in the bed without any acute

distress.

Temperature 98.5 degrees Fahrenheit, heart rate 100 per minute, and respiratory

rate 18 per minute.  Blood pressure 116/65 mm of mercury, and oxygen saturation

96% on room air.  Head is atraumatic.  Neck is supple and without jugular venous

distention (JVD) or thyroid enlargement.  Pupils equal and reactive to light, and

sclerae are icteric.  Heart sounds are somewhat tachycardiac.  Lungs sound clear

to auscultation.  Abdomen is distended with ascites, and bowel sounds are

present.  Extremities have no cyanosis or clubbing.  Neurologically, she is awake

and alert at her baseline mentation.  Skin has spider angiomas on her upper

chest.

 

LABORATORY DATA:

Sodium is 133, potassium 3.9, BUN 38, and creatinine 2.85 on admission.  Total

bilirubin was 4.7, albumin 1.8.  Ammonia level less than 10.  Today her BUN is 36

and creatinine 2.6.  Sodium 132 and potassium 4.2.

 

Urinalysis on admission showed hazy appearance with 96 WBC and 138 RBC.

 

PROBLEMS:

1.  Acute kidney injury superimposed on chronic kidney disease.  She has mild

chronic kidney disease at baseline, probably early stage III.  Acute kidney

injury is most likely related to acute infection and hepatic problems.  She does

have significant ascites and has not had any recent paracentesis.  She does not

have any evidence of peripheral edema and denies any vomiting or diarrhea.  She

has been on diuretics and could be volume depleted.  I do not feel that this is

hepatorenal syndrome.  We will get urine spot sodium and creatinine.  I would

recommend to give her intravenous albumin at the time of paracentesis along with

normal saline.  Her diuretics should remain on hold.

 

2.  Hyponatremia.  Mild hyponatremia related to diuretic use and hepatic failure

in the setting of acute renal failure.  Diuretics are already on hold, and we

will watch her sodium without any intervention.  We would not like to give her

any sodium due to risk of worsening ascites.

 

3.  Cirrhosis of liver with recurrent ascites.  The patient has required

paracentesis and has significant ascites once again.  She is going to have

paracentesis today.  IV albumin should be used, and paracentesis volume should be

limited to 4 liters per session.

 

4.  Urinary tract infection (UTI).  Her urine had 96 WBC and 1+ bacteria.  I will

repeat her urinalysis and suggest to treat her for possible urinary tract

infection.

 

Thank you for involving me in the care of Ms. Taylor.  I will follow her along

with you.

## 2019-12-13 VITALS — SYSTOLIC BLOOD PRESSURE: 122 MMHG | DIASTOLIC BLOOD PRESSURE: 63 MMHG

## 2019-12-13 VITALS — SYSTOLIC BLOOD PRESSURE: 122 MMHG | DIASTOLIC BLOOD PRESSURE: 64 MMHG

## 2019-12-13 VITALS — SYSTOLIC BLOOD PRESSURE: 102 MMHG | DIASTOLIC BLOOD PRESSURE: 53 MMHG

## 2019-12-13 LAB
BUN SERPL-MCNC: 36 MG/DL (ref 7–18)
CALCIUM SERPL-MCNC: 8.3 MG/DL (ref 8.5–10.1)
CHLORIDE SERPL-SCNC: 99 MEQ/L (ref 98–107)
CO2 SERPL-SCNC: 25 MEQ/L (ref 21–32)
CREAT SERPL-MCNC: 2.17 MG/DL (ref 0.55–1.3)
GFR SERPL CREATININE-BSD FRML MDRD: 24.7 ML/MIN/{1.73_M2} (ref 51–?)
GLUCOSE SERPL-MCNC: 87 MG/DL (ref 70–100)
HCT VFR BLD AUTO: 27.6 % (ref 36–47)
HGB BLD-MCNC: 9.2 G/DL (ref 12–15.5)
MCH RBC QN AUTO: 32.9 PG (ref 27–33)
MCHC RBC AUTO-ENTMCNC: 33.3 G/DL (ref 32–36.5)
MCV RBC AUTO: 98.6 FL (ref 80–96)
PLATELET # BLD AUTO: 81 10^3/UL (ref 150–450)
POTASSIUM SERPL-SCNC: 4.1 MEQ/L (ref 3.5–5.1)
RBC # BLD AUTO: 2.8 10^6/UL (ref 4–5.4)
SODIUM SERPL-SCNC: 131 MEQ/L (ref 136–145)
WBC # BLD AUTO: 3.1 10^3/UL (ref 4–10)

## 2019-12-13 RX ADMIN — SODIUM CHLORIDE SCH UNITS: 4.5 INJECTION, SOLUTION INTRAVENOUS at 21:26

## 2019-12-13 RX ADMIN — LATANOPROST SCH DROP: 50 SOLUTION OPHTHALMIC at 21:26

## 2019-12-13 RX ADMIN — SODIUM CHLORIDE SCH UNITS: 4.5 INJECTION, SOLUTION INTRAVENOUS at 05:38

## 2019-12-13 RX ADMIN — LEVOTHYROXINE SODIUM SCH MCG: 112 TABLET ORAL at 05:38

## 2019-12-13 RX ADMIN — SODIUM CHLORIDE SCH UNITS: 4.5 INJECTION, SOLUTION INTRAVENOUS at 14:00

## 2019-12-13 RX ADMIN — BRIMONIDINE TARTRATE SCH DROP: 1 SOLUTION/ DROPS OPHTHALMIC at 09:51

## 2019-12-13 RX ADMIN — BRIMONIDINE TARTRATE SCH DROP: 1 SOLUTION/ DROPS OPHTHALMIC at 21:26

## 2019-12-13 NOTE — IPNPDOC
Date Seen


The patient was seen on 12/13/19.





Progress Note


SUBJECTIVE: 


Patient reported feeling better today. 


Afebrile overnight. Cr trending down 2.17


Denies any complaints. 





OBJECTIVE


PHYSICAL EXAMINATION:


VITAL SIGNS: Please see below. 


General: mild distress,  Alert


Eyes: Normal sclera, EOMI, PHAM


HENT: Atraumatic, neck supple, moist mucous membranes


Cardiovascular: Normal rate, normal rhythm. 


Pulmonary: Clear to auscultation b/l, no wheezing


GI: Soft, nontender


Skin: Warm and dry


Neuro: CN grossly intact. No focal deficits. Strengths equal b/l. 


Psych: oriented x 3





LABORATORY DATA, IMAGING STUDIES, MICROBIOLOGY: Please see below.





ASSESSMENT AND PLAN: 


1. Alcoholic liver cirrhosis with abdominal ascites


- Recurrent ascites requiring paracentesis, last completed 12/3. 


- s/p paracentesis 12/12 with post procedure albumin. 


- Low suspicion for SBP given afebrile, no leukocytosis, normal mentation, no 

significant abdominal tenderness. 


2. BAM


- Cr 2.85 on this admission from 1.17 recently. 


- Nephrology following. Cr improving.


- Receive small amount of IVF today.  


3. Hypothyroidism


- c/w Synthroid. 


4. glaucoma


- resume eye drops 





DVT ppx: TEDs, HSQ 


Code status: Full code





VS, I&O, 24H, Fishbone


Vital Signs/I&O





Vital Signs








  Date Time  Temp Pulse Resp B/P (MAP) Pulse Ox O2 Delivery O2 Flow Rate FiO2


 


12/13/19 14:35 98.8 98 16 122/64 (83) 95   


 


12/13/19 05:19      Room Air  














I&O- Last 24 Hours up to 6 AM 


 


 12/13/19





 06:00


 


Intake Total 2190.0 ml


 


Output Total 500 ml


 


Balance 1690.0 ml











Laboratory Data


24H LABS


Laboratory Tests 2


12/13/19 06:15: 


Nucleated Red Blood Cells % (auto) 0.0, Immature Platelet Fraction 1.1, Anion 

Gap 7L, Glomerular Filtration Rate 24.7L, Calcium Level 8.3L


CBC/BMP


Laboratory Tests


12/13/19 06:15








Microbiology





Microbiology


12/12/19 Acid Fast Stain, Received


           Pending


12/12/19 Mycobacterial Culture, Received


           Pending


12/12/19 Fungal Smear, Received


           Pending


12/12/19 Fungal Culture, Received


           Pending


12/12/19 Gram Stain - Final, Resulted


           


12/12/19 Body Fluid Culture, Resulted


           Pending


12/12/19 Urine Culture - Final, Complete











DARIUSZ REYNOLDS MD                Dec 13, 2019 19:12

## 2019-12-13 NOTE — IPN
DATE: 12/13/2019

 

Ms. Taylor is seen this morning on her bedside.  She had paracentesis done

yesterday and is feeling better abdominal distension has improved.  She denies

any nausea, vomiting, dyspnea or chest pain.

 

PHYSICAL EXAMINATION:

Temperature 98.9 degrees Fahrenheit, heart rate 96 per minute and respiratory

rate 18 per minute.  Blood pressure 102/53 mmHg and oxygen saturation 96%.

Head is atraumatic.

Neck is supple and without jugular venous distention (JVD) or thyroid

enlargement.  Heart sounds are regular and lungs clear to auscultation.

Abdomen is soft and distended with ascites.  Bowel sounds are present.

Extremities have no cyanosis or clubbing.

 

Today's labs show WBC count 3.1, hemoglobin 9.2 and hematocrit 27.6.  Sodium 131,

potassium 4.1, CO2 25, BUN 36 and creatinine 2.17.

 

PROBLEMS:

1.  Acute kidney injury superimposed on chronic kidney disease:  Slight

improvement in kidney function is noted since yesterday.  She was given IV

albumin along with paracentesis which did help.  Now she is receiving IV fluid

per my recommendation.  We hope that this will help in further improvement of her

kidney function.

 

2.  Hyponatremia related to hepatic failure and acute renal failure:  Slight

worsening of sodium level is noticed.  She is being given IV normal saline.  Her

electrolytes will be checked again tomorrow morning.

 

3.  Cirrhosis of liver with recurrent ascites:  The patient had a paracentesis

done yesterday.  She will need outpatient paracentesis scheduled every 2 weeks.

 

 

4.  Anemia:  Her anemia is chronic and slightly worse compared to yesterday.

There is no active bleeding.  We will check her CBC again tomorrow morning.

 

5.  Urinary tract infection (UTI):  Her urine culture has been reported negative

and she is asymptomatic.  She will be followed up in our office.

## 2019-12-13 NOTE — REP
Ultrasound-guided paracentesis

 

The procedure was performed under the direct supervision of Dr. Khan.

 

The risks and benefits of the procedure were explained to the patient and

informed consent was obtained.  The largest pocket of fluid was localized in the

right flank using ultrasound guidance.  The skin was prepped and draped in a

sterile fashion.  1% lidocaine was used as a local anesthetic.  An 8-Hong Konger multi

side-hole catheter was inserted using trocar technique.  4350 ml of cloudy yellow

fluid was withdrawn with a sample sent to the lab for analysis.

 

The patient tolerated the procedure well and there were no immediate

complications.  After the appropriate amount of monitored convalescence the

patient was discharged from the department.

 

 

Electronically Signed by

YOON Dozier 12/12/2019 04:34 P

Electronically Signed by

Sushant Khan MD 12/13/2019 05:09 P

## 2019-12-14 VITALS — SYSTOLIC BLOOD PRESSURE: 115 MMHG | DIASTOLIC BLOOD PRESSURE: 57 MMHG

## 2019-12-14 LAB
BUN SERPL-MCNC: 32 MG/DL (ref 7–18)
CALCIUM SERPL-MCNC: 8.3 MG/DL (ref 8.5–10.1)
CHLORIDE SERPL-SCNC: 102 MEQ/L (ref 98–107)
CO2 SERPL-SCNC: 24 MEQ/L (ref 21–32)
CREAT SERPL-MCNC: 1.74 MG/DL (ref 0.55–1.3)
GFR SERPL CREATININE-BSD FRML MDRD: 31.9 ML/MIN/{1.73_M2} (ref 51–?)
GLUCOSE SERPL-MCNC: 74 MG/DL (ref 70–100)
HCT VFR BLD AUTO: 28.8 % (ref 36–47)
HGB BLD-MCNC: 9.6 G/DL (ref 12–15.5)
MCH RBC QN AUTO: 32.8 PG (ref 27–33)
MCHC RBC AUTO-ENTMCNC: 33.3 G/DL (ref 32–36.5)
MCV RBC AUTO: 98.3 FL (ref 80–96)
PLATELET # BLD AUTO: 83 10^3/UL (ref 150–450)
POTASSIUM SERPL-SCNC: 4.1 MEQ/L (ref 3.5–5.1)
RBC # BLD AUTO: 2.93 10^6/UL (ref 4–5.4)
SODIUM SERPL-SCNC: 135 MEQ/L (ref 136–145)
WBC # BLD AUTO: 3.4 10^3/UL (ref 4–10)

## 2019-12-14 RX ADMIN — SODIUM CHLORIDE SCH UNITS: 4.5 INJECTION, SOLUTION INTRAVENOUS at 13:13

## 2019-12-14 RX ADMIN — SODIUM CHLORIDE SCH UNITS: 4.5 INJECTION, SOLUTION INTRAVENOUS at 05:31

## 2019-12-14 RX ADMIN — LEVOTHYROXINE SODIUM SCH MCG: 112 TABLET ORAL at 05:31

## 2019-12-14 RX ADMIN — BRIMONIDINE TARTRATE SCH DROP: 1 SOLUTION/ DROPS OPHTHALMIC at 08:21

## 2019-12-14 NOTE — IPN
DATE OF VISIT:  12/14/2019

 

Ms. Taylor is seen this morning on her bedside.  She reports abdominal

distention, which has increased since she had paracentesis a couple of days ago.

She denies any dyspnea or chest pain.  She has no nausea, vomiting, leg edema,

fever, or chills.

 

On physical examination, temperature 98.6 degrees Fahrenheit, heart rate 95 per

minute, and respiratory rate 17 per minute.  Blood pressure 115/57 mmHg and

oxygen saturation 96% on room air.

Head is atraumatic.  She is mildly jaundiced.

Neck is supple and without jugular venous distention (JVD) or thyroid

enlargement.

Heart sounds are tachycardiac.

Lungs clear to auscultation.

Abdomen:  Soft, nontender, and distended with ascites.  Bowel sounds are normal.

Extremities:  Without any cyanosis or clubbing.

Skin is jaundiced.

Neurologically, she is awake, alert, and oriented times three.

 

Today's laboratories show WBC count 3.4, hemoglobin 9.6 and hematocrit 28.8.

Platelets are 83,000.  Sodium 135, potassium 4.1, BUN 32, and creatinine 1.74.

Glucose 74 and calcium 8.3.

 

PROBLEMS:

 

1.  Acute kidney injury superimposed on chronic kidney disease.  Kidney function

is improving nicely, and this can be monitored now as an outpatient.  She was

given 1 liter of intravenous (IV) fluid yesterday, which has been now stopped.

Her acute kidney injury is related to her hepatic insufficiency and paracentesis.

 

 

2.  Hyponatremia.  Sodium level has improved, and no further intervention is

indicated at this point.  She should continue with moderate fluid restriction.

 

3.  Alcoholic cirrhosis with recurrent ascites.  The patient understands clearly

that she needs to completely stop drinking.  She will need scheduled paracentesis

every couple of weeks, and we will set that up as an outpatient.

 

4.  Anemia.  Her anemia is stable and does not need any intervention at this

point.

 

DISPOSITION:  From renal standpoint, the patient can be discharged to home, and

she will followup in my office within next week or so for her acute kidney injury

and also will set her up for outpatient paracentesis.

## 2019-12-14 NOTE — DS.PDOC
Discharge Summary


General


Date of Admission


Dec 11, 2019 at 14:57


Date of Discharge


12/14/19





Discharge Summary


PROCEDURES PERFORMED DURING STAY: [None].





ADMITTING DIAGNOSES: 


1. Abdominal ascites 2/2 alcoholic liver cirrhosis 


2. BAM


3. Hypothyroidism


4. Glaucoma





DISCHARGE DIAGNOSES:


1. Abdominal ascites 2/2 alcoholic liver cirrhosis 


2. BAM


3. Hypothyroidism


4. Glaucoma





COMPLICATIONS/CHIEF COMPLAINT: Acute Renal Failure.





HISTORY OF PRESENT ILLNESS:


"Patient is 59F with PMH Alcoholic liver cirrhosis with recurrent ascites 

requiring intermittent paracentesis, Hypothyroidism, GERD, HTN presented to the 

ER complaining of worsening abdominal distension. She normally follows with Dr. Fraser and Dr. Olivia as outpatient, has had several admissions for similar 

problems with BAM. Patient has no complaints apart from worsening abdominal 

distension and discomfort, last paracentesis done 12/3. Denies any fever, 

chills, nausea, vomiting, significant abdominal/chest pain. Cr noted to be 

elevated in ER to 2.85 from very recent Cr 1.17. "





HOSPITAL COURSE:


Patient was admitted and received therapeutic paracentesis with removal of 

4350cc fluids from abdomen with improvement in symptoms. She received albumin 

and IVF post procedure with significant improvement in kidney functions 

currently down to 1.7. Was followed by nephrology as well. Patient to be 

discharged to f/u PMD and Nephrology, will try to set up routine tharapeutic 

paracentesis for patient [id number] per nephro's assistance. 





DISCHARGE MEDICATIONS: Please see below.


 


ALLERGIES: Please see below.





PHYSICAL EXAMINATION ON DISCHARGE:


VITAL SIGNS: Please see below.


General: mild distress,  Alert


Eyes: Normal sclera, EOMI, PHAM


HENT: Atraumatic, neck supple, moist mucous membranes


Cardiovascular: Normal rate, normal rhythm. 


Pulmonary: Clear to auscultation b/l, no wheezing


GI: Soft, nontender, distended


Skin: Warm and dry


Neuro: CN grossly intact. No focal deficits. Strengths equal b/l. 


Psych: oriented x 3





LABORATORY DATA: Please see below.





IMAGING:


Abdominal US-


Limited abdominal ultrasound is performed to evaluate for ascites.   There is


moderate diffuse abdominopelvic ascites present bilaterally.





ACTIVITY: [As tolerated].





DIET: 


Low sodium





DISCHARGE PLAN:


f/u PMD and Nephrology


Schedule routine paracentesis with nephro's assistance 


f/u GI if possible to move appt sooner 





DISPOSITION: Home.





DISCHARGE INSTRUCTIONS:


f/u PMD and Nephrology


Schedule routine paracentesis with nephro's assistance 





ITEMS TO FOLLOWUP ON ON OUTPATIENT:


None





DISCHARGE CONDITION: [Stable].





TIME SPENT ON DISCHARGE: 35 minutes.





Vital Signs/I&Os





Vital Signs








  Date Time  Temp Pulse Resp B/P (MAP) Pulse Ox O2 Delivery O2 Flow Rate FiO2


 


12/14/19 06:00 98.6 95 17 115/57 (76) 96 Room Air  














I&O- Last 24 Hours up to 6 AM 


 


 12/14/19





 06:00


 


Intake Total 1440 ml


 


Output Total 700 ml


 


Balance 740 ml











Laboratory Data


Labs 24H


Laboratory Tests 2


12/14/19 06:18: 


Nucleated Red Blood Cells % (auto) 0.0, Immature Platelet Fraction 1.1, Anion 

Gap 9, Glomerular Filtration Rate 31.9L, Calcium Level 8.3L


CBC/BMP


Laboratory Tests


12/14/19 06:18











Microbiology





Microbiology


12/12/19 Acid Fast Stain, Received


           Pending


12/12/19 Mycobacterial Culture, Received


           Pending


12/12/19 Fungal Smear, Received


           Pending


12/12/19 Fungal Culture, Received


           Pending


12/12/19 Gram Stain - Final, Complete


           


12/12/19 Body Fluid Culture - Final, Complete


           


12/12/19 Urine Culture - Final, Complete





Discharge Medications


Scheduled


Betaxolol Hcl (Betaxolol HCl) 0.5 % Sol, 1 DROP OU BID, (Reported)


Brimonidine Tartrate (Brimonidine Tartrate) 0.2% 5ML Drops, 1 DROP OU BID, 

(Reported)


Lactulose (Lactulose) 10 Gm/15 Ml Solution, 30 ML PO BID, (Reported)


Latanoprost (Xalatan) 0.005% 2.5ML Drops, 1 DROP OU QHS, (Reported)


Levothyroxine Sodium (Synthroid) 112 Mcg Tablet, 112 MCG PO DAILY, (Reported)


Spironolactone (Spironolactone) 25 Mg Tablet, 50 MG PO BID, (Reported)





Scheduled PRN


Fluticasone Propionate (Fluticasone Propionate) 16 Gm Spray.susp, 2 SPRAY NARES 

DAILY PRN for CONGESTION, (Reported)


Levocetirizine Dihydrochloride (Levocetirizine Dihydrochloride) 5 Mg Tablet, 5 

MG PO DAILY PRN for ALLERGIES, (Reported)





Allergies


Coded Allergies:  


     cefdinir (Verified  Allergy, Severe, THROAT CLOSES, 12/3/19)


     Penicillins (Verified  Allergy, Unknown, UNKNOWN CHILDHOOD REACTION, 

12/11/19)











DARIUSZ REYNOLDS MD                Dec 14, 2019 11:19

## 2019-12-20 ENCOUNTER — HOSPITAL ENCOUNTER (OUTPATIENT)
Dept: HOSPITAL 53 - M LAB REF | Age: 59
End: 2019-12-20
Attending: NURSE PRACTITIONER
Payer: COMMERCIAL

## 2019-12-20 ENCOUNTER — HOSPITAL ENCOUNTER (OUTPATIENT)
Dept: HOSPITAL 53 - M IRPRO | Age: 59
End: 2019-12-20
Attending: INTERNAL MEDICINE
Payer: COMMERCIAL

## 2019-12-20 ENCOUNTER — HOSPITAL ENCOUNTER (OUTPATIENT)
Dept: HOSPITAL 53 - M LAB | Age: 59
End: 2019-12-20
Attending: INTERNAL MEDICINE
Payer: COMMERCIAL

## 2019-12-20 VITALS — SYSTOLIC BLOOD PRESSURE: 110 MMHG | DIASTOLIC BLOOD PRESSURE: 60 MMHG

## 2019-12-20 DIAGNOSIS — K70.31: Primary | ICD-10-CM

## 2019-12-20 DIAGNOSIS — Z88.0: ICD-10-CM

## 2019-12-20 DIAGNOSIS — Z88.1: ICD-10-CM

## 2019-12-20 LAB
ALBUMIN SERPL BCG-MCNC: 2.2 GM/DL (ref 3.2–5.2)
ALBUMIN SERPL BCG-MCNC: 2.2 GM/DL (ref 3.2–5.2)
ALT SERPL W P-5'-P-CCNC: 33 U/L (ref 12–78)
ALT SERPL W P-5'-P-CCNC: 33 U/L (ref 12–78)
APTT BLD: 34.4 SECONDS (ref 25–38.4)
BASOPHILS # BLD AUTO: 0 10^3/UL (ref 0–0.2)
BASOPHILS NFR BLD AUTO: 0.5 % (ref 0–1)
BILIRUB CONJ SERPL-MCNC: 2.6 MG/DL (ref 0–0.2)
BILIRUB SERPL-MCNC: 4.3 MG/DL (ref 0.2–1)
BILIRUB SERPL-MCNC: 4.3 MG/DL (ref 0.2–1)
BUN SERPL-MCNC: 25 MG/DL (ref 7–18)
CALCIUM SERPL-MCNC: 8.7 MG/DL (ref 8.5–10.1)
CHLORIDE SERPL-SCNC: 102 MEQ/L (ref 98–107)
CO2 SERPL-SCNC: 23 MEQ/L (ref 21–32)
CREAT SERPL-MCNC: 1.75 MG/DL (ref 0.55–1.3)
EOSINOPHIL # BLD AUTO: 0.1 10^3/UL (ref 0–0.5)
EOSINOPHIL NFR BLD AUTO: 1.4 % (ref 0–3)
GFR SERPL CREATININE-BSD FRML MDRD: 31.7 ML/MIN/{1.73_M2} (ref 51–?)
GLUCOSE SERPL-MCNC: 133 MG/DL (ref 70–100)
HCT VFR BLD AUTO: 33.4 % (ref 36–47)
HGB BLD-MCNC: 11 G/DL (ref 12–15.5)
INR PPP: 1.55
LYMPHOCYTES # BLD AUTO: 0.7 10^3/UL (ref 1.5–5)
LYMPHOCYTES NFR BLD AUTO: 19.7 % (ref 24–44)
MCH RBC QN AUTO: 33 PG (ref 27–33)
MCHC RBC AUTO-ENTMCNC: 32.9 G/DL (ref 32–36.5)
MCV RBC AUTO: 100.3 FL (ref 80–96)
MONOCYTES # BLD AUTO: 0.3 10^3/UL (ref 0–0.8)
MONOCYTES NFR BLD AUTO: 9.3 % (ref 0–5)
NEUTROPHILS # BLD AUTO: 2.5 10^3/UL (ref 1.5–8.5)
NEUTROPHILS NFR BLD AUTO: 68.6 % (ref 36–66)
PLATELET # BLD AUTO: 124 10^3/UL (ref 150–450)
POTASSIUM SERPL-SCNC: 4.5 MEQ/L (ref 3.5–5.1)
PROT SERPL-MCNC: 7.7 GM/DL (ref 6.4–8.2)
PROT SERPL-MCNC: 8.1 GM/DL (ref 6.4–8.2)
PROTHROMBIN TIME: 18.3 SECONDS (ref 11.8–14)
RBC # BLD AUTO: 3.33 10^6/UL (ref 4–5.4)
SODIUM SERPL-SCNC: 133 MEQ/L (ref 136–145)
WBC # BLD AUTO: 3.7 10^3/UL (ref 4–10)

## 2019-12-23 NOTE — REP
Ultrasound-guided paracentesis:  Subsequent.

 

History:  Hepatic ascites for therapeutic tap.

 

Procedure:  The patient was interviewed and informed consent was obtained.

Preliminary scanning identify the optimal puncture site in the left lower

quadrant.  Skin was marked.  The patient safety time-out was articulated and

agreed upon.  Utilizing aseptic precautions, 4 ml of 1% lidocaine for local

anesthetic, and ultrasound localization, an 8-Lithuanian paracentesis catheter was

placed without difficulty into the peritoneal space with trocar technique.

Freely flowing translucent yellow ascites was withdrawn, total of 3600 ml was

withdrawn and discarded.  Catheter was removed and an occlusive dressing applied.

The patient tolerated the procedure well.

 

Impression:

 

Ultrasound guided paracentesis.

 

 

Electronically Signed by

Alistair Deng MD 12/20/2019 04:15 P

## 2020-01-03 ENCOUNTER — HOSPITAL ENCOUNTER (OUTPATIENT)
Dept: HOSPITAL 53 - M LAB REF | Age: 60
End: 2020-01-03
Attending: NURSE PRACTITIONER
Payer: COMMERCIAL

## 2020-01-03 DIAGNOSIS — K70.31: Primary | ICD-10-CM

## 2020-01-03 LAB
ALBUMIN SERPL BCG-MCNC: 2 GM/DL (ref 3.2–5.2)
ALT SERPL W P-5'-P-CCNC: 25 U/L (ref 12–78)
BILIRUB CONJ SERPL-MCNC: 1.9 MG/DL (ref 0–0.2)
BILIRUB SERPL-MCNC: 3 MG/DL (ref 0.2–1)
PROT SERPL-MCNC: 7.4 GM/DL (ref 6.4–8.2)

## 2020-01-06 ENCOUNTER — HOSPITAL ENCOUNTER (OUTPATIENT)
Dept: HOSPITAL 53 - M IRPRO | Age: 60
End: 2020-01-06
Attending: INTERNAL MEDICINE
Payer: COMMERCIAL

## 2020-01-06 VITALS — DIASTOLIC BLOOD PRESSURE: 79 MMHG | SYSTOLIC BLOOD PRESSURE: 122 MMHG

## 2020-01-06 DIAGNOSIS — K70.31: Primary | ICD-10-CM

## 2020-01-06 NOTE — REP
Ultrasound-guided paracentesis

 

The procedure was performed under the direct supervision of Dr. Deng.

 

The risks and benefits of the procedure were explained to the patient and

informed consent was obtained.  The largest pocket of fluid was localized in the

left flank using ultrasound guidance.  The skin was prepped and draped in a

sterile fashion.  1% lidocaine was used as a local anesthetic.  Using ultrasound

guidance an 8-Botswanan multi side-hole catheter was inserted using trocar

technique.  2850 ml of yellow fluid was withdrawn and discarded.

 

The patient tolerated the procedure well and there were no immediate

complications.  After the appropriate amount of monitored convalescence the

patient was discharged from the department.

 

 

Electronically Signed by

YOON Dozier 01/06/2020 04:31 P

Electronically Signed by

Alistair Deng MD 01/06/2020 05:54 P

## 2020-01-16 ENCOUNTER — HOSPITAL ENCOUNTER (OUTPATIENT)
Dept: HOSPITAL 53 - M IRPRO | Age: 60
End: 2020-01-16
Attending: INTERNAL MEDICINE
Payer: COMMERCIAL

## 2020-01-16 VITALS — SYSTOLIC BLOOD PRESSURE: 131 MMHG | DIASTOLIC BLOOD PRESSURE: 66 MMHG

## 2020-01-16 DIAGNOSIS — K70.31: Primary | ICD-10-CM

## 2020-01-16 PROCEDURE — 49083 ABD PARACENTESIS W/IMAGING: CPT

## 2020-01-16 NOTE — REP
Ultrasound-guided paracentesis

 

The procedure was performed under the direct supervision of Dr. Deng.

 

The risks and benefits of the procedure were explained to the patient and

informed consent was obtained.  The largest pocket of fluid was localized in the

left flank using ultrasound guidance.  The skin was prepped and draped in a

sterile fashion.  1% lidocaine was used as a local anesthetic.  An 8-Congolese multi

side-hole catheter was inserted using trocar technique.  4250 ml of cloudy yellow

fluid was withdrawn and discarded.

 

The patient tolerated the procedure well and there were no immediate

complications.  After the appropriate amount of monitored convalescence the

patient was discharged from the department.

 

 

Electronically Signed by

YOON Dozier 01/16/2020 03:03 P

Electronically Signed by

Alistair Deng MD 01/16/2020 03:11 P

## 2020-01-17 ENCOUNTER — HOSPITAL ENCOUNTER (OUTPATIENT)
Dept: HOSPITAL 53 - M LAB REF | Age: 60
End: 2020-01-17
Attending: NURSE PRACTITIONER
Payer: COMMERCIAL

## 2020-01-17 ENCOUNTER — HOSPITAL ENCOUNTER (OUTPATIENT)
Dept: HOSPITAL 53 - M RAD | Age: 60
End: 2020-01-17
Attending: NURSE PRACTITIONER
Payer: COMMERCIAL

## 2020-01-17 DIAGNOSIS — R10.33: Primary | ICD-10-CM

## 2020-01-17 DIAGNOSIS — K70.31: Primary | ICD-10-CM

## 2020-01-17 LAB
ALBUMIN SERPL BCG-MCNC: 2.7 GM/DL (ref 3.2–5.2)
ALT SERPL W P-5'-P-CCNC: 19 U/L (ref 12–78)
BILIRUB CONJ SERPL-MCNC: 1.1 MG/DL (ref 0–0.2)
BILIRUB SERPL-MCNC: 1.8 MG/DL (ref 0.2–1)
PROT SERPL-MCNC: 6.7 GM/DL (ref 6.4–8.2)

## 2020-01-17 NOTE — REP
CT abdomen and pelvis without IV or oral contrast:

 

History:  Periumbilical pain.  The patient is 1 day status post paracentesis with

increased pain.  Firmness and ecchymosis surrounding the needle site.

 

Comparison CT study November 20, 2019.

 

CT findings:  Preliminary digital  radiograph demonstrates an unremarkable

bowel gas pattern.  There is a small to moderate left pleural effusion.  This is

new when compared with the November 20, 2019 study.  There is a mild amount of

diffuse intra-abdominal ascites.  Fluid is moderate in the pelvic reflections and

mild in the upper abdomen.  The liver has a micronodular contour consistent with

cirrhosis.  The spleen is somewhat enlarged, unchanged.  No focal hepatic or

splenic lesion is appreciated.  There is no evidence of free intraperitoneal air.

The umbilical vein is recannulated and there are upper abdominal venous

collaterals.  Small and large bowel loops are unremarkable except for

diverticulosis in the sigmoid colon.  There is a cystic area in the left ovary

again noted.  This appears improved from November 20, 2019.

 

There is diffuse edema of the anterior abdominal wall on the left side.  There is

ill-defined deep subcutaneous edema along the left lateral abdominal wall.  No

defined hematoma is seen.  The area is relatively low in density and may reflect

subcutaneous ascitic fluid leakage rather than hematoma.

 

Impression:

 

Mild to moderate ascites.  Cirrhosis pattern.  Left pleural effusion.  Diffuse

swelling and edema of the left side of the abdominal wall; question subcutaneous

fluid leakage versus cellulitis.  Possible hematoma but the area is not defined

and is fairly low density.

 

 

Electronically Signed by

Alistair Deng MD 01/17/2020 08:50 P

## 2020-01-20 ENCOUNTER — HOSPITAL ENCOUNTER (EMERGENCY)
Dept: HOSPITAL 53 - M ED | Age: 60
LOS: 1 days | Discharge: HOME | End: 2020-01-21
Payer: COMMERCIAL

## 2020-01-20 VITALS — WEIGHT: 185.19 LBS | BODY MASS INDEX: 29.07 KG/M2 | HEIGHT: 67 IN

## 2020-01-20 DIAGNOSIS — Z79.899: ICD-10-CM

## 2020-01-20 DIAGNOSIS — N28.9: ICD-10-CM

## 2020-01-20 DIAGNOSIS — K70.31: Primary | ICD-10-CM

## 2020-01-20 DIAGNOSIS — Z88.0: ICD-10-CM

## 2020-01-20 DIAGNOSIS — Z88.1: ICD-10-CM

## 2020-01-20 LAB
ALBUMIN SERPL BCG-MCNC: 2.6 GM/DL (ref 3.2–5.2)
ALT SERPL W P-5'-P-CCNC: 23 U/L (ref 12–78)
BILIRUB SERPL-MCNC: 2.7 MG/DL (ref 0.2–1)
BUN SERPL-MCNC: 12 MG/DL (ref 7–18)
CALCIUM SERPL-MCNC: 8.1 MG/DL (ref 8.5–10.1)
CHLORIDE SERPL-SCNC: 107 MEQ/L (ref 98–107)
CO2 SERPL-SCNC: 25 MEQ/L (ref 21–32)
CREAT SERPL-MCNC: 1.14 MG/DL (ref 0.55–1.3)
GFR SERPL CREATININE-BSD FRML MDRD: 51.9 ML/MIN/{1.73_M2} (ref 51–?)
GLUCOSE SERPL-MCNC: 153 MG/DL (ref 70–100)
HCT VFR BLD AUTO: 33.6 % (ref 36–47)
HGB BLD-MCNC: 10.7 G/DL (ref 12–15.5)
MCH RBC QN AUTO: 30.8 PG (ref 27–33)
MCHC RBC AUTO-ENTMCNC: 31.8 G/DL (ref 32–36.5)
MCV RBC AUTO: 96.8 FL (ref 80–96)
PLATELET # BLD AUTO: 112 10^3/UL (ref 150–450)
POTASSIUM SERPL-SCNC: 3.6 MEQ/L (ref 3.5–5.1)
PROT SERPL-MCNC: 7.2 GM/DL (ref 6.4–8.2)
RBC # BLD AUTO: 3.47 10^6/UL (ref 4–5.4)
SODIUM SERPL-SCNC: 138 MEQ/L (ref 136–145)
WBC # BLD AUTO: 4 10^3/UL (ref 4–10)

## 2020-01-21 ENCOUNTER — HOSPITAL ENCOUNTER (OUTPATIENT)
Dept: HOSPITAL 53 - M IRPOV | Age: 60
End: 2020-01-21
Attending: RADIOLOGY
Payer: COMMERCIAL

## 2020-01-21 VITALS — HEIGHT: 67 IN | WEIGHT: 183.42 LBS | BODY MASS INDEX: 28.79 KG/M2

## 2020-01-21 VITALS — SYSTOLIC BLOOD PRESSURE: 128 MMHG | DIASTOLIC BLOOD PRESSURE: 69 MMHG

## 2020-01-21 VITALS — SYSTOLIC BLOOD PRESSURE: 105 MMHG | DIASTOLIC BLOOD PRESSURE: 55 MMHG

## 2020-01-21 DIAGNOSIS — K70.31: Primary | ICD-10-CM

## 2020-01-21 DIAGNOSIS — E03.9: ICD-10-CM

## 2020-01-21 DIAGNOSIS — G25.81: ICD-10-CM

## 2020-01-21 DIAGNOSIS — K76.6: ICD-10-CM

## 2020-01-21 DIAGNOSIS — N19: ICD-10-CM

## 2020-01-21 DIAGNOSIS — I10: ICD-10-CM

## 2020-01-21 DIAGNOSIS — I85.00: ICD-10-CM

## 2020-01-21 DIAGNOSIS — R16.1: ICD-10-CM

## 2020-01-21 DIAGNOSIS — H40.9: ICD-10-CM

## 2020-01-21 DIAGNOSIS — K21.9: ICD-10-CM

## 2020-01-21 DIAGNOSIS — J91.8: ICD-10-CM

## 2020-01-21 DIAGNOSIS — N20.0: ICD-10-CM

## 2020-01-22 NOTE — IRCOV
[facility] IR Consult Office Visit


IR Consult Office Visit


DATE:  Jan 21, 2020


REASON FOR CONSULTATION/CHIEF COMPLAINT: Refractory ascites. Alcoholic 

cirrhosis. Evaluation for TIPS. 





HISTORY OF PRESENT ILLNESS: 59-year-old female with alcohol-related cirrhosis, 

renal failure and hypertension presents with refractory ascites. Patient is 

always in discomfort and currently gets paracentesis every 2 weeks. Due to her 

hepatorenal failure, she's restricted 4 L drainage each time with albumin.





She constantly feels bloated and feel she needs more frequent paracentesis. She 

is currently on Spironolactone 50 mg twice a day and lactulose 30 ML's twice a 

day. No Lasix. 





She was last admitted to the hospital in December 2019 with acute on chronic 

renal failure and hyponatremia. She was under the care of Dr. GARRETT Olivia. She has 

also been previously seen by Dr. Hilaria Scott and treated for suspected 

spontaneous bacterial peritonitis however cultures never grew any organisms. 





Patient denies hematemesis or melena. Denies prior hepatic encephalopathy. She 

states her last drink was a couple months ago. She wants to stay off for 6 

months and be evaluated for transplant. 





ALLERGIES: Please see below.





HOME MEDICATIONS: Please see below.





PAST MEDICAL HISTORY:


Alcoholic liver disease with cirrhosis





Refractory ascites





Hypothyroidism





Gastroesophageal reflux disease





Restless leg syndrome





Hypertension





Glaucoma





Nephrolithiasis





IV line infection in 2016 with staphylococcus aureus bacteremia





Peritonitis 2017; no growth





PAST SURGICAL HISTORY: 


D&C





Carpal tunnel surgery





Removal of impacted IUD





Hysteroscopy 2012





FAMILY HISTORY: Noncontributory.





SOCIAL HISTORY: States she has not drank alcohol in 2 months. Nonsmoker. Denies 

drugs.





REVIEW OF SYSTEMS: Otherwise negative. 





PHYSICAL EXAMINATION:


VITAL SIGNS: Please see below.


GENERAL APPEARANCE: Appears well. Comfortable at rest.


HEENT: No scleral icterus.


RESPIRATORY: Normal breathing at rest.


CARDIOVASCULAR: Normal rate.


ABDOMEN: Distended. Shifting dullness. Soft nontender.


EXTREMITIES: No edema.


NEUROLOGICAL: Alert and oriented. 


PSYCHIATRIC: Appropriate to circumstance.





LABORATORY DATA: 01/20/2020 hemoglobin 10.7 hematocrit 33.6 WBC 4.0 platelets 

112





Sodium 138 potassium 3.6 BUN 12 creatinine 1.14 GFR 51.9 total bilirubin 2.7 AST

28 ALT 23  ammonia 71 albumin 2.6


12/20/2019 INR 1.55





Na MELD score: 18





Imaging:





Echocardiogram 2017. Normal LV function. No indication of pulmonary 

hypertension.





I personally reviewed the CT abdomen and pelvis from January 2020 as well as the

CT abdomen pelvis with contrast from November 2019.





Most recent CT demonstrates a moderate left pleural effusion.





Contrast-enhanced CT demonstrates ascites, nodular liver with patent portal 

vein. Recanalized periumbilical vein, splenomegaly and gastroesophageal varices 

in keeping with portal hypertension.











ASSESSMENT/PLAN: 59-year-old female with alcohol-related cirrhosis with Na MELD 

score of 18, hepatorenal failure and refractory ascites. No history of 

refractory variceal bleeding. Given patient's MELD score, hepatorenal syndrome 

and lack of life-threatening refractory variceal bleeding, I think at present I 

would hold off on a TIPS procedure. I think risk versus benefit patient is more 

at risk then projected benefit. However, in terms of quality of life I do think 

she would benefit from a PleurX catheter placement. This would give her the 

freedom to drain as needed for comfort and eliminate multiple abdominal 

punctures for paracentesis. However, I recommend she comes into infusion every 

couple weeks to receive 25 % Albumin 100 ml with each drainage. Continue 

restriction to 4 L drainage at each session. We have scheduled her for the 

procedure and ongoing albumin infusions.





We can reevaluate her progress in 6 months time and consider evaluation for 

transplant.





I spent 30 minutes in consultation with the patient. 





Thank you for this referral.





Cc Dr. Fraser





Cc Dr. bD Olivia





Allergies


Coded Allergies:  


     cefdinir (Verified  Allergy, Severe, THROAT CLOSES, 12/3/19)


     Penicillins (Verified  Allergy, Unknown, UNKNOWN CHILDHOOD REACTION, 

12/11/19)





Home Medications


Scheduled


Betaxolol Hcl (Betaxolol HCl), 1 DROP OU BID, (Reported)


Brimonidine Tartrate (Brimonidine Tartrate), 1 DROP OU BID, (Reported)


Lactulose (Lactulose), 30 ML PO BID, (Reported)


Latanoprost (Xalatan), 1 DROP OU QHS, (Reported)


Levothyroxine Sodium (Synthroid), 112 MCG PO DAILY, (Reported)


Spironolactone (Spironolactone), 50 MG PO BID, (Reported)





Scheduled PRN


Fluticasone Propionate (Fluticasone Propionate), 2 SPRAY NARES DAILY PRN for 

CONGESTION, (Reported)


Levocetirizine Dihydrochloride (Levocetirizine Dihydrochloride), 5 MG PO DAILY 

PRN for ALLERGIES, (Reported)





VS, I&O, 24H, Fishbone


Vital Signs/I&O





Vital Signs








  Date Time  Temp Pulse Resp B/P (MAP) Pulse Ox O2 Delivery O2 Flow Rate FiO2


 


1/21/20 13:00 98.3 90 18 128/69 (88) 98 Room Air  

















SPIKE RAMSEY MD               Jan 22, 2020 09:05

## 2020-01-24 ENCOUNTER — HOSPITAL ENCOUNTER (OUTPATIENT)
Dept: HOSPITAL 53 - M IRPRO | Age: 60
End: 2020-01-24
Attending: RADIOLOGY
Payer: COMMERCIAL

## 2020-01-24 VITALS — SYSTOLIC BLOOD PRESSURE: 101 MMHG | DIASTOLIC BLOOD PRESSURE: 60 MMHG

## 2020-01-24 VITALS — SYSTOLIC BLOOD PRESSURE: 107 MMHG | DIASTOLIC BLOOD PRESSURE: 62 MMHG

## 2020-01-24 VITALS — SYSTOLIC BLOOD PRESSURE: 106 MMHG | DIASTOLIC BLOOD PRESSURE: 63 MMHG

## 2020-01-24 VITALS — DIASTOLIC BLOOD PRESSURE: 59 MMHG | SYSTOLIC BLOOD PRESSURE: 107 MMHG

## 2020-01-24 VITALS — SYSTOLIC BLOOD PRESSURE: 110 MMHG | DIASTOLIC BLOOD PRESSURE: 64 MMHG

## 2020-01-24 VITALS — SYSTOLIC BLOOD PRESSURE: 105 MMHG | DIASTOLIC BLOOD PRESSURE: 62 MMHG

## 2020-01-24 DIAGNOSIS — R18.8: ICD-10-CM

## 2020-01-24 DIAGNOSIS — K74.60: Primary | ICD-10-CM

## 2020-01-24 DIAGNOSIS — Z88.0: ICD-10-CM

## 2020-01-24 DIAGNOSIS — Z88.1: ICD-10-CM

## 2020-01-24 DIAGNOSIS — Z79.899: ICD-10-CM

## 2020-01-24 PROCEDURE — 99152 MOD SED SAME PHYS/QHP 5/>YRS: CPT

## 2020-01-24 PROCEDURE — 99153 MOD SED SAME PHYS/QHP EA: CPT

## 2020-01-24 PROCEDURE — 32550 INSERT PLEURAL CATH: CPT

## 2020-01-24 PROCEDURE — 82140 ASSAY OF AMMONIA: CPT

## 2020-01-24 PROCEDURE — 36415 COLL VENOUS BLD VENIPUNCTURE: CPT

## 2020-01-24 NOTE — IRHP
[facility] IR Pre-Procedure H & P


General


Date of Service:  Jan 24, 2020


Procedure:  Same Day Surgery





Interval History and Physical


I have seen the patient and reviewed last H & P performed within 30 days. 


There is no significant interval change.





History of Present Illness


Chief Complaint


The patient is a 59-year-old female admitted with a reason for visit of Ascites.


PRE-PROCEDURE DIAGNOSIS: cirrhosis





HEART: normal rate.


LUNGS: normal breathing at rest.





ASA Classification


ASA Classification:  III-Severe systemic dis.


Mallampati Score:  II


NPO:  Yes


Problems with prior sedation:  No


Obstructive Sleep Apnea:  No





Plan


moderate sedation





Allergies


Coded Allergies:  


     cefdinir (Verified  Allergy, Severe, THROAT CLOSES, 12/3/19)


     Penicillins (Verified  Allergy, Unknown, UNKNOWN CHILDHOOD REACTION, 

12/11/19)





Home Medications


Scheduled


Betaxolol Hcl (Betaxolol HCl), 1 DROP OU BID, (Reported)


Brimonidine Tartrate (Brimonidine Tartrate), 1 DROP OU BID, (Reported)


Lactulose (Lactulose), 30 ML PO BID, (Reported)


Latanoprost (Xalatan), 1 DROP OU QHS, (Reported)


Levothyroxine Sodium (Synthroid), 112 MCG PO DAILY, (Reported)


Spironolactone (Spironolactone), 50 MG PO BID, (Reported)





Scheduled PRN


Fluticasone Propionate (Fluticasone Propionate), 2 SPRAY NARES DAILY PRN for 

CONGESTION, (Reported)


Levocetirizine Dihydrochloride (Levocetirizine Dihydrochloride), 5 MG PO DAILY 

PRN for ALLERGIES, (Reported)





VS, I&O, 24H, Fishbone


Vital Signs/I&O





Vital Signs








  Date Time  Temp Pulse Resp B/P (MAP) Pulse Ox O2 Delivery O2 Flow Rate FiO2


 


1/24/20 12:00  89 16  98 Room Air  


 


1/24/20 11:36 98.0   101/60    


 


1/24/20 09:55       2 











Laboratory Data


24H LABS


Laboratory Tests 2


1/24/20 07:06: 


Ammonia 32, Ethyl Alcohol Level < 0.003











SPIKE RAMSEY MD               Jan 24, 2020 13:31

## 2020-01-24 NOTE — POST-OPPD
Postoperative Procedure Note


Date Of Procedure:  Jan 24, 2020


Time Of Procedure:  13:36


PREOPERATIVE DIAGNOSIS: cirrhosis. ascites





POSTOPERATIVE DIAGNOSIS: same





FINDINGS: ascites 





PROCEDURE: PleurX placed abdomen





SURGEON: margarito





ANESTHESIA: mod sed





ESTIMATED BLOOD LOSS: < 5 ml





COMPLICATIONS: none





POSTOPERATIVE CONDITION: stable











SPIKE RAMSEY MD               Jan 24, 2020 13:37

## 2020-01-24 NOTE — REP
IR Ultrasound and fluoroscopy-guided abdominal PleurX placement.

 

IR Ultrasound of the abdomen.

 

IR Moderate sedation.

 

Clinical information: Cirrhosis.  Refractory ascites.

 

Physician:  Dr. Clark.

 

Procedure:  The patient was advised of the benefits, risks, and alternatives of

the procedure and informed consent was obtained.

 

A time-out was performed with verification of the patient's name, MRN, site of

procedure and type of procedure to be performed.  The patient was positioned in

the supine position on the angiographic table.  The site was prepped and draped

in the usual sterile fashion.

 

Moderate sedation was performed by the physician including the presence of an

independent trained observer who assisted and monitored the patient's level of

consciousness and physiologic status.  Following the administration of fentanyl

and Versed, the physician spent 45 minutes of continuous face to face time with

the patient.

 

Limited ultrasound of the abdomen reveals moderate ascites.

 

A  radiograph reveals no gross abnormality.

 

The anticipated puncture site was identified using sonographic guidance and the

overlying skin and subcutaneous tissues were anesthetized with lidocaine.  An 18

gauge needle was inserted under ultrasound guidance into the left lower quadrant.

An Amplatz wire was advanced through the needle under fluoroscopy guidance into

the peritoneum.  The needle was removed and exchanged for a dilator, under

fluoroscopy guidance.

 

A small skin incision was made at the catheter insertion site.  A second incision

was made a small distance from original incision.  The catheter was then

subcutaneously tunneled from the exit site incision to the insertion site

incision.

 

The catheter insertion site was then serially dilated under fluoroscopy guidance

and a peel-away sheath was then placed.  The catheter was then passed through the

peel-away sheath into the peritoneal cavity and the peel away sheath was

removed.

 

Placement in the peritoneal cavity was confirmed by the return of ascetic fluid.

 

4 liters of ascitic fluid was drained.

 

The dedicated catheter drainage cap was attached.  The catheter insertion site

was closed with monocryl.  A sterile dressing was applied.

 

The patient tolerated the procedure well and was returned to the PRU in stable

condition.

 

EBL:  < 5 ml.

 

Complications:  None.

 

Conclusion:

1.  Ultrasound abdomen demonstrates moderate ascites.

2.  Successful placement of a tunneled peritoneal drainage catheter in the left

lower quadrant. The patient requires dressing changes and drainage kit for

drainage as-needed.

 

3.  Patient to present to infusion unit every 2 weeks for 100 ml 25% Albumin

infusion along with 4 liters of fluid drainage from PleurX.

 

4.  The patient is set up with [facility] will make home visits to make

sure she is able to care for her catheter.

 

Thank you for this referral.

 

 

Electronically Signed by

Airam Clark MD 01/24/2020 02:24 P

## 2020-01-26 ENCOUNTER — HOSPITAL ENCOUNTER (INPATIENT)
Dept: HOSPITAL 53 - M ED | Age: 60
LOS: 3 days | Discharge: HOME HEALTH | DRG: 248 | End: 2020-01-29
Attending: GENERAL PRACTICE | Admitting: GENERAL PRACTICE
Payer: COMMERCIAL

## 2020-01-26 VITALS — WEIGHT: 181.44 LBS | HEIGHT: 67 IN | BODY MASS INDEX: 28.48 KG/M2

## 2020-01-26 VITALS — DIASTOLIC BLOOD PRESSURE: 69 MMHG | SYSTOLIC BLOOD PRESSURE: 133 MMHG

## 2020-01-26 VITALS — SYSTOLIC BLOOD PRESSURE: 113 MMHG | DIASTOLIC BLOOD PRESSURE: 78 MMHG

## 2020-01-26 DIAGNOSIS — Z88.1: ICD-10-CM

## 2020-01-26 DIAGNOSIS — K65.2: Primary | ICD-10-CM

## 2020-01-26 DIAGNOSIS — K21.9: ICD-10-CM

## 2020-01-26 DIAGNOSIS — K76.6: ICD-10-CM

## 2020-01-26 DIAGNOSIS — B95.7: ICD-10-CM

## 2020-01-26 DIAGNOSIS — Z88.0: ICD-10-CM

## 2020-01-26 DIAGNOSIS — F10.21: ICD-10-CM

## 2020-01-26 DIAGNOSIS — E03.9: ICD-10-CM

## 2020-01-26 DIAGNOSIS — N18.3: ICD-10-CM

## 2020-01-26 DIAGNOSIS — J90: ICD-10-CM

## 2020-01-26 DIAGNOSIS — D61.818: ICD-10-CM

## 2020-01-26 DIAGNOSIS — K70.31: ICD-10-CM

## 2020-01-26 DIAGNOSIS — I85.00: ICD-10-CM

## 2020-01-26 DIAGNOSIS — Z92.89: ICD-10-CM

## 2020-01-26 DIAGNOSIS — I12.9: ICD-10-CM

## 2020-01-26 DIAGNOSIS — J98.11: ICD-10-CM

## 2020-01-26 DIAGNOSIS — Z93.1: ICD-10-CM

## 2020-01-26 LAB
ALBUMIN SERPL BCG-MCNC: 3.1 GM/DL (ref 3.2–5.2)
ALT SERPL W P-5'-P-CCNC: 18 U/L (ref 12–78)
APPEARANCE FLD: (no result)
BASOPHILS # BLD AUTO: 0 10^3/UL (ref 0–0.2)
BASOPHILS NFR BLD AUTO: 1.1 % (ref 0–1)
BILIRUB CONJ SERPL-MCNC: 1.2 MG/DL (ref 0–0.2)
BILIRUB SERPL-MCNC: 2.4 MG/DL (ref 0.2–1)
BUN SERPL-MCNC: 13 MG/DL (ref 7–18)
CALCIUM SERPL-MCNC: 8.5 MG/DL (ref 8.5–10.1)
CHLORIDE SERPL-SCNC: 107 MEQ/L (ref 98–107)
CO2 SERPL-SCNC: 27 MEQ/L (ref 21–32)
COLOR PRT: (no result)
CREAT SERPL-MCNC: 1.15 MG/DL (ref 0.55–1.3)
CRP SERPL-MCNC: 1.94 MG/DL (ref 0–0.3)
EOSINOPHIL # BLD AUTO: 0.2 10^3/UL (ref 0–0.5)
EOSINOPHIL NFR BLD AUTO: 5.6 % (ref 0–3)
ERYTHROCYTE [SEDIMENTATION RATE] IN BLOOD BY WESTERGREN METHOD: 8 MM/HR (ref 0–30)
GFR SERPL CREATININE-BSD FRML MDRD: 51.4 ML/MIN/{1.73_M2} (ref 51–?)
GLUCOSE SERPL-MCNC: 133 MG/DL (ref 70–100)
HCT VFR BLD AUTO: 35.5 % (ref 36–47)
HGB BLD-MCNC: 11.3 G/DL (ref 12–15.5)
LDH SERPL L TO P-CCNC: 185 U/L (ref 84–246)
LIPASE SERPL-CCNC: 319 U/L (ref 73–393)
LYMPHOCYTES # BLD AUTO: 0.8 10^3/UL (ref 1.5–5)
LYMPHOCYTES NFR BLD AUTO: 21.7 % (ref 24–44)
MCH RBC QN AUTO: 30.8 PG (ref 27–33)
MCHC RBC AUTO-ENTMCNC: 31.8 G/DL (ref 32–36.5)
MCV RBC AUTO: 96.7 FL (ref 80–96)
MONOCYTES # BLD AUTO: 0.4 10^3/UL (ref 0–0.8)
MONOCYTES NFR BLD AUTO: 9.8 % (ref 0–5)
NEUTROPHILS # BLD AUTO: 2.3 10^3/UL (ref 1.5–8.5)
NEUTROPHILS NFR BLD AUTO: 61.3 % (ref 36–66)
PLATELET # BLD AUTO: 113 10^3/UL (ref 150–450)
POTASSIUM SERPL-SCNC: 4 MEQ/L (ref 3.5–5.1)
PROT SERPL-MCNC: 7.2 GM/DL (ref 6.4–8.2)
RBC # BLD AUTO: 3.67 10^6/UL (ref 4–5.4)
SODIUM SERPL-SCNC: 139 MEQ/L (ref 136–145)
SP GR FLD REFRACTOMETRY: 1.01
SPECIMEN SOURCE FLD: (no result)
WBC # BLD AUTO: 3.8 10^3/UL (ref 4–10)

## 2020-01-26 RX ADMIN — LATANOPROST SCH DROP: 50 SOLUTION OPHTHALMIC at 20:09

## 2020-01-26 RX ADMIN — MORPHINE SULFATE PRN MG: 30 TABLET ORAL at 15:29

## 2020-01-26 RX ADMIN — Medication SCH MG: at 11:25

## 2020-01-26 RX ADMIN — FOLIC ACID SCH MG: 1 TABLET ORAL at 11:25

## 2020-01-26 RX ADMIN — LEVOTHYROXINE SODIUM SCH MCG: 112 TABLET ORAL at 06:00

## 2020-01-26 RX ADMIN — MORPHINE SULFATE PRN MG: 30 TABLET ORAL at 20:09

## 2020-01-26 RX ADMIN — DEXTROSE MONOHYDRATE SCH MLS/HR: 5 INJECTION INTRAVENOUS at 16:56

## 2020-01-26 RX ADMIN — SPIRONOLACTONE SCH MG: 25 TABLET, FILM COATED ORAL at 16:57

## 2020-01-26 RX ADMIN — SPIRONOLACTONE SCH MG: 25 TABLET, FILM COATED ORAL at 11:25

## 2020-01-26 NOTE — REP
CHEST, AP AND LATERAL:  01/26/2020.

 

Clinical history:  Dyspnea.

 

Comparison:  10/14/2017, lung windows from CT abdomen 01/26/2020.

 

Findings: The lungs are adequately inflated.  There is retrocardiac left lower

lobe opacity and pleural effusion.  No definite right effusion.  Left upper lung

zone and visible right lung clear.  Heart not grossly enlarged.  There is no

vascular redistribution or edema.  The aorta is mildly tortuous but follows the

dextroconvex curve of the thoracic spine.  No acute bony finding.

 

Impression:

 

1.  Retrocardiac left lower lobe opacity representing atelectasis and a left

pleural effusion.  No definite right effusion. This was also seen on CT abdomen,

including the lower chest today.

 

2.  No cardiomegaly or edema.

 

 

Electronically Signed by

Armando Gordon MD 01/26/2020 08:18 P

## 2020-01-26 NOTE — REPVR
PROCEDURE INFORMATION: 

Exam: CT Abdomen And Pelvis With Contrast 

Exam date and time: 1/26/2020 4:17 AM 

Age: 59 years old 

Clinical indication: Abdominal pain; Generalized; Prior surgery; Surgery date: 

Post-operative (0-2 days); Surgery type: Drainage placed yesterday; Additional 

info: Recent drainage cath placement, abd pain, leaking 



TECHNIQUE: 

Imaging protocol: Computed tomography of the abdomen and pelvis with 

intravenous contrast. 

Radiation optimization: All CT scans at this facility use at least one of these 

dose optimization techniques: automated exposure control; mA and/or kV 

adjustment per patient size (includes targeted exams where dose is matched to 

clinical indication); or iterative reconstruction. 

Contrast material: ISOVUE 370; Contrast volume: 100 ml; Contrast route: IV;  



COMPARISON: 

CT ABD/PEL W/IV CONTRAST ONLY 11/20/2019 6:57 PM 



FINDINGS: 

Tubes, catheters and devices: Percutaneous drain extends from the left flank to 

the right lower quadrant. 

Pleural space: Moderate to large left pleural effusion with adjacent 

compressive atelectasis. 



Liver: Cirrhosis and evidence of portal venous hypertension with extensive 

gastrohepatic ligament and periesophageal varices. 

Gallbladder and bile ducts: Normal. No calcified stones. No ductal dilation. 

Pancreas: Normal. No ductal dilation. 

Spleen: Splenomegaly. 

Adrenals: Normal. No mass. 

Kidneys and ureters: Normal. No hydronephrosis. 

Stomach and bowel: Diverticulosis of the colon. No evidence of acute 

diverticulitis. 

Appendix: No evidence of appendicitis. 

Intraperitoneal space: Small ascites. 

Vasculature: Mild atherosclerotic disease of the abdominal aorta. 

Lymph nodes: Scattered mesenteric and retroperitoneal lymph nodes. 



Bladder: Unremarkable as visualized. 

Reproductive: 3.5 x 2.5 cm left ovarian cyst with evidence of hydrosalpinx. 

Bones/joints: Facet hypertrophy in the lower lumbar spine. Mild levoscoliosis 

of the lumbar spine. 

Soft tissues: Recanalized paraumbilical vein. Marked skin induration with 

extensive abdominal wall edema along the left flank. 



IMPRESSION: 

1. Cirrhosis and evidence of portal venous hypertension with extensive 

gastrohepatic ligament and periesophageal varices. Recanalized paraumbilical 

vein. 

2. Marked skin induration with extensive abdominal wall edema along the left 

flank.

3. Moderate to large left pleural effusion with adjacent compressive 

atelectasis. 





Electronically signed by: Amado Perez On 01/26/2020  04:44:25 AM

## 2020-01-27 VITALS — SYSTOLIC BLOOD PRESSURE: 106 MMHG | DIASTOLIC BLOOD PRESSURE: 63 MMHG

## 2020-01-27 VITALS — SYSTOLIC BLOOD PRESSURE: 105 MMHG | DIASTOLIC BLOOD PRESSURE: 59 MMHG

## 2020-01-27 VITALS — SYSTOLIC BLOOD PRESSURE: 116 MMHG | DIASTOLIC BLOOD PRESSURE: 73 MMHG

## 2020-01-27 VITALS — DIASTOLIC BLOOD PRESSURE: 59 MMHG | SYSTOLIC BLOOD PRESSURE: 110 MMHG

## 2020-01-27 VITALS — DIASTOLIC BLOOD PRESSURE: 66 MMHG | SYSTOLIC BLOOD PRESSURE: 109 MMHG

## 2020-01-27 LAB
ALBUMIN SERPL BCG-MCNC: 2.5 GM/DL (ref 3.2–5.2)
ALT SERPL W P-5'-P-CCNC: 17 U/L (ref 12–78)
BASOPHILS # BLD AUTO: 0 10^3/UL (ref 0–0.2)
BASOPHILS NFR BLD AUTO: 1.1 % (ref 0–1)
BILIRUB SERPL-MCNC: 2.3 MG/DL (ref 0.2–1)
BUN SERPL-MCNC: 10 MG/DL (ref 7–18)
CALCIUM SERPL-MCNC: 8.3 MG/DL (ref 8.5–10.1)
CHLORIDE SERPL-SCNC: 110 MEQ/L (ref 98–107)
CO2 SERPL-SCNC: 28 MEQ/L (ref 21–32)
CREAT SERPL-MCNC: 0.98 MG/DL (ref 0.55–1.3)
EOSINOPHIL # BLD AUTO: 0.3 10^3/UL (ref 0–0.5)
EOSINOPHIL NFR BLD AUTO: 8.9 % (ref 0–3)
GFR SERPL CREATININE-BSD FRML MDRD: > 60 ML/MIN/{1.73_M2} (ref 51–?)
GLUCOSE SERPL-MCNC: 113 MG/DL (ref 70–100)
HCT VFR BLD AUTO: 32.6 % (ref 36–47)
HGB BLD-MCNC: 10.5 G/DL (ref 12–15.5)
LYMPHOCYTES # BLD AUTO: 0.9 10^3/UL (ref 1.5–5)
LYMPHOCYTES NFR BLD AUTO: 25.9 % (ref 24–44)
MCH RBC QN AUTO: 31.3 PG (ref 27–33)
MCHC RBC AUTO-ENTMCNC: 32.2 G/DL (ref 32–36.5)
MCV RBC AUTO: 97 FL (ref 80–96)
MONOCYTES # BLD AUTO: 0.4 10^3/UL (ref 0–0.8)
MONOCYTES NFR BLD AUTO: 12.4 % (ref 0–5)
NEUTROPHILS # BLD AUTO: 1.8 10^3/UL (ref 1.5–8.5)
NEUTROPHILS NFR BLD AUTO: 51.4 % (ref 36–66)
PLATELET # BLD AUTO: 105 10^3/UL (ref 150–450)
POTASSIUM SERPL-SCNC: 3.8 MEQ/L (ref 3.5–5.1)
PROT SERPL-MCNC: 6.1 GM/DL (ref 6.4–8.2)
RBC # BLD AUTO: 3.36 10^6/UL (ref 4–5.4)
SODIUM SERPL-SCNC: 141 MEQ/L (ref 136–145)
WBC # BLD AUTO: 3.5 10^3/UL (ref 4–10)

## 2020-01-27 RX ADMIN — MORPHINE SULFATE PRN MG: 30 TABLET ORAL at 12:56

## 2020-01-27 RX ADMIN — MORPHINE SULFATE PRN MG: 30 TABLET ORAL at 00:25

## 2020-01-27 RX ADMIN — SPIRONOLACTONE SCH MG: 25 TABLET, FILM COATED ORAL at 08:41

## 2020-01-27 RX ADMIN — DEXTROSE MONOHYDRATE SCH MLS/HR: 5 INJECTION INTRAVENOUS at 16:16

## 2020-01-27 RX ADMIN — LATANOPROST SCH DROP: 50 SOLUTION OPHTHALMIC at 20:58

## 2020-01-27 RX ADMIN — DEXTROSE MONOHYDRATE SCH MLS/HR: 5 INJECTION INTRAVENOUS at 00:25

## 2020-01-27 RX ADMIN — DEXTROSE MONOHYDRATE SCH MLS/HR: 5 INJECTION INTRAVENOUS at 08:40

## 2020-01-27 RX ADMIN — FOLIC ACID SCH MG: 1 TABLET ORAL at 08:41

## 2020-01-27 RX ADMIN — LEVOTHYROXINE SODIUM SCH MCG: 112 TABLET ORAL at 06:17

## 2020-01-27 RX ADMIN — SPIRONOLACTONE SCH MG: 25 TABLET, FILM COATED ORAL at 16:16

## 2020-01-27 RX ADMIN — Medication SCH MG: at 08:41

## 2020-01-27 NOTE — HPE
DATE OF ADMISSION:  01/26/2020

 

GASTROENTEROLOGIST:  Dr. Fraser

 

CHIEF COMPLAINT:  Abdominal pain.  Increasing abdominal distention and leakage

through the pigtail catheter in the left lower quadrant.

 

HISTORY OF PRESENTING ILLNESS:  This is a 59-year-old female with alcoholic 
liver

cirrhosis, recurring ascites with previous paracentesis, hypothyroidism, reflux,

hypertension.  Had a pigtail catheter placed in the left lower abdomen by

interventional radiologist, Dr. Clark, on Friday.  The patient said that the

dressing was falling off, increased leakage at the area, as well as stabbing 
pain

across the abdomen which was worse when she gets up and moves around or bends 
and

better when she lies still mostly on her right side.  She denies any fever,

nausea, vomiting.  Has not taken any medications for this.  Says that the pain

comes and goes and stays for a few hours, only abates for a few minutes.  The

patient denies any chills, diarrhea, constipation.  Denies dysuria, urgency,

frequency, back pain.  In the emergency room (ER), she was afebrile and was

pancytopenic.  Procalcitonin, C-reactive protein (CRP), and sedimentation rate

are pending.  Blood cultures were obtained.  Urinalysis (UA) was pending.  CT

abdomen and pelvis shows cirrhosis, portal venous hypertension with an extensive

gastrohepatic ligament and paraesophageal varices with recanalized paraumbilical

vein.  There is marked skin induration with extensive abdominal wall edema along

the left flank.  There is a moderate to large left pleural effusion with

compressive atelectasis.  Paracentesis ordered, still pending.  The patient's

blood pressure is 92/51, unable to be given diuretic.  Currently just on low-
dose

spironolactone 25 twice a day.  The patient otherwise denies any lightheadedness

or dizziness, chest pain, pressure, or tightness, dysuria, urgency, frequency,

polydipsia, polyuria, anxiety, depression, headaches, changes in vision, sore

throat, ear pain, ear discharge.  Hospitalist was called to admit for possible

spontaneous bacterial peritonitis and further evaluation of the pigtail

catheter.

 

PAST MEDICAL HISTORY:  Alcoholic liver cirrhosis with portal hypertension,

esophageal varices, hypothyroidism, reflux, hypertension.

 

PAST SURGICAL HISTORY:  Dilation and curettage (D and C), pigtail catheter

placement by Dr. Clark 01/24/2020.

 

SOCIAL HISTORY:  Lives alone.  Lives on Supplemental Security Income (SSI) for

the past 6 years.  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

No smoking.  Last drink, quit 3 months ago.  Heavy drinker in the past.  Denies

recreational drug use.  Lives alone.

 

FAMILY HISTORY:  Mother with brain tumor.

 

ALLERGIES:  To PENICILLIN and CEFDINIR.

 

REVIEW OF SYSTEMS:  Per history of present illness (HPI); 12-point system

otherwise negative.

 

PHYSICAL EXAMINATION:

Temperature 98.4, pulse 88, respiratory rate 18, blood pressure 93/51, 96% on

room air.

Generally, the patient is awake, alert, oriented times three.  Answers questions

appropriately.

Mild icterus.  Mild jaundice.

No jugular venous distention (JVD).  No thyromegaly.  No cervical

lymphadenopathy.

Moist mucous membranes with poor dentition.

LUNGS:  Diminished breath sounds on the left midway up the lung.  Right is 
clear.

 

HEART:  S1, S2, sinus rhythm.  No murmurs, rubs, or gallops.

ABDOMEN:  Is distended.  Significant fluid wave.  Left pigtail catheter with

soaked dressing.  Serous drainage.  No blood.

EXTREMITIES:  Trace edema.

 

LABORATORY DATA:

White count 3.8, hemoglobin 11, hematocrit 35, platelet count 113.

 

Sodium 139, potassium 4, chloride 107, bicarbonate 27, BUN 13, creatinine 1.15,

glucose 133, total bilirubin (T Bili) 2.4, direct bilirubin 1.2, AST 26, ALT 18,

alkaline phosphatase 123, CRP 1.94, total protein 7.2, albumin 3.1, lipase 319,

procalcitonin is pending.

 

Two sets of blood cultures are pending.

 

Ascitic fluid is pending.

 

CT abdomen and pelvis:  Liver cirrhosis.  Moderate to large left pleural 
effusion

with compressive atelectasis.  Recanalized paraumbilical vein.  Portal

hypertension with extensive gastrohepatic ligament and paraesophageal varices.

Extensive abdominal wall edema along the left flank.  Moderate to large left

pleural effusion.

 

ASSESSMENT AND PLAN:  This is a 59-year-old alcoholic liver cirrhotic patient

with recurrent ascites, status post pigtail catheter 01/24/2020, reflux,

hypertension, hypothyroidism, alcohol abuse, paraesophageal varices, and portal

venous hypertension, presents with increasing pain, swelling, and leakage at the

pigtail catheter site, now with extensive abdominal wall edema along the left

flank.

 

IMPRESSION:

 

1.  Abdominal pain, rule out spontaneous bacterial peritonitis.  The patient 
does

not have fever but with increasing pain and abdominal distention with large

amount of ascites and edema along the abdominal wall.  The patient's pigtail

catheter will need to be readjusted.  Dr. Clark will be consulted on Monday.

The patient has been given intravenous meropenem, which we will continue.  
Obtain

cultures of the ascitic fluid.

 

2.  Left pleural effusion.  Thoracentesis in the morning.  As the patient has 
low

blood pressure of 97, unable to be diuresed using Lasix.  Currently on low-dose

spironolactone twice a day.

 

3.  Chronic alcoholic liver disease with decompensation.  The patient has

recurrent ascites, possible spontaneous bacterial peritonitis (SBP).  She is

pancytopenic.  Continue with supportive care.  Check international normalized

ratio (INR).

 

4.  Pancytopenia secondary to alcoholic liver disease.  Monitor with serial

complete blood counts (CBCs).  No active signs of bleeding.  No acute indication

for red blood cell (RBC) or platelet transfusion.

 

5.  Reflux disease.  No acute symptoms.

 

6.  Hypothyroidism.  Continue on Synthroid.

 

7.  Alcohol abuse.  Continue on folic acid and thiamine.

 

8.  Deep venous thrombosis (DVT) prophylaxis.  Compression stockings.

 

CODE STATUS:  FULL CODE.

 

Diet:  2-gram-sodium.

MTDD

## 2020-01-27 NOTE — IRCOV
[facility] IR Consult Office Visit


IR Consult Office Visit


DATE:  Jan 27, 2020


REASON FOR CONSULTATION/CHIEF COMPLAINT: Admitted to the hospital status post 

Pleurx placement, complaining of increase abdominal pain. 





HISTORY OF PRESENT ILLNESS: 59-year-old female with advanced alcohol-related 

liver cirrhosis, refractory ascites and varices without history of variceal 

bleeding, is not currently candidate for TIPS or transplant. Patient had a 

Pleurx catheter placement on Friday for refractory ascites. 4 L fluid drained on

Friday with albumin. Patient presented to the hospital complaining of abdominal 

pain. Patient complaining of leaking around the catheter. No fevers or chills. 

Patient states itching has started since antibiotics were started.





ALLERGIES: Please see below.





HOME MEDICATIONS: Please see below.





PAST MEDICAL HISTORY:


Alcoholic liver disease with cirrhosis





Refractory ascites





Hypothyroidism





Gastroesophageal reflux disease





Restless leg syndrome





Hypertension





Glaucoma





Nephrolithiasis





IV line infection in 2016 with staphylococcus aureus bacteremia





Peritonitis 2017; no growth





PAST SURGICAL HISTORY: 


D&C





Carpal tunnel surgery





Removal of impacted IUD





Hysteroscopy 2012








FAMILY HISTORY: Noncontributory.





SOCIAL HISTORY: Abstained from alcohol for the last 2 months. No drugs or 

smoking.





REVIEW OF SYSTEMS: Otherwise negative





PHYSICAL EXAMINATION:


VITAL SIGNS: Please see below.


GENERAL APPEARANCE: Appears well. Comfortable at rest. No shortness of breath.


HEENT: No scleral icterus.


RESPIRATORY: Normal breathing at rest.


CARDIOVASCULAR: Normal rate.


ABDOMEN: Non-distended. Patient is less distended then prior to drainage. No 

rebound or guarding. Catheter insertion site looks clean.


EXTREMITIES: No edema.


NEUROLOGICAL: Alert and oriented. 


PSYCHIATRIC: Appropriate to circumstance.





LABORATORY DATA: 01/27/2010 hemoglobin 10.5 hematocrit 32.6 WBC 3.5 platelets 

105 sodium 141 potassium 3.8 BUN 10 creatinine 0.98


Micro-biology: Peritoneal fluid Gram stain negative. Cultures pending. 


Blood cultures negative





Imaging: I personally reviewed the CT abdomen and pelvis from 01/26/2019 post 

procedure. Pleurx catheter in expected location. No significant abdominal 

ascites. There is abdominal wall edema along the insertion site and this is  

leakage back into the soft tissues from the catheter insertion point. Small left

pleural effusion. 





ASSESSMENT/PLAN: 59-year-old female with advanced end-stage liver disease and 

refractory ascites status post Pleurx placement. Recommend abdominal binder for 

abdominal wall edema. Currently there is no significant ascites therefore no 

point in attaching the drainage catheter to drain. Wait 1 week before using the 

catheter again. Keep site covered dressed and dry. Leakage at the insertion site

can occur, until the tunnel is healed and catheter is incorporated. 





Will continue to follow





I spent 30 minutes in consultation with the patient. 





Thank you for this referral.





Allergies


Coded Allergies:  


     cefdinir (Verified  Allergy, Severe, THROAT CLOSES, 12/3/19)


     Penicillins (Verified  Allergy, Unknown, UNKNOWN CHILDHOOD REACTION, 

12/11/19)





Home Medications


Scheduled


Betaxolol Hcl (Betaxolol HCl), 1 DROP OU BID, (Reported)


Brimonidine Tartrate (Brimonidine Tartrate), 1 DROP OU BID, (Reported)


Folic Acid (Folic Acid), 1 MG PO DAILY, (Reported)


Lactulose (Lactulose), 30 ML PO BID, (Reported)


Latanoprost (Xalatan), 1 DROP OU DAILY, (Reported)


Levothyroxine Sodium (Synthroid), 112 MCG PO DAILY, (Reported)


Multivitamin (Multivitamins), 1 CAP PO DAILY, (Reported)


Spironolactone (Spironolactone), 25 MG PO BID, (Reported)


Thiamine Mononitrate (Vit B1) (Vitamin B-1), 100 MG PO DAILY, (Reported)





Scheduled PRN


Fluticasone Propionate (Fluticasone Propionate), 2 SPRAY NARES DAILY PRN for 

CONGESTION, (Reported)


Levocetirizine Dihydrochloride (Levocetirizine Dihydrochloride), 5 MG PO QHS PRN

for ALLERGIES, (Reported)





VS, I&O, [facility]


Vital Signs/I&O





Vital Signs








  Date Time  Temp Pulse Resp B/P (MAP) Pulse Ox O2 Delivery O2 Flow Rate FiO2


 


1/27/20 08:00 97.7 111 20 109/66 (80) 94 Room Air  














I&O- Last 24 Hours up to 6 AM 


 


 1/27/20





 06:00


 


Intake Total 460 ml


 


Output Total 50 ml


 


Balance 410 ml











Laboratory Data


24H LABS


Laboratory Tests 2


1/26/20 11:45: 


Body Fluid Specific Gravity 1.013, Body Fluid WBC (Auto) 306H, Body Fluid RBC 

(Auto) < 2, Body Fluid Mononuclear Cells % Auto 94.1H, Fluid Polymorphonuclear 

Cell % Auto 5.9H, Body Fluid Glucose Source PERITONEAL, Body Fluid Glucose 119, 

Body Fluid Protein Source PERITONEAL, Body Fluid Total Protein 1.2, Body Fluid 

Albumin Source PERITONEAL, Body Fluid Albumin 0.6, Peritoneal Fluid Source 

PERITONEAL, Peritoneal Fluid Color PALE YELLOW, Peritoneal Fluid Appearance 

CLOUDY


1/27/20 06:35: 


Immature Granulocyte % (Auto) 0.3, Neutrophils (%) (Auto) 51.4, Lymphocytes (%) 

(Auto) 25.9, Monocytes (%) (Auto) 12.4H, Eosinophils (%) (Auto) 8.9H, Basophils 

(%) (Auto) 1.1H, Neutrophils # (Auto) 1.8, Lymphocytes # (Auto) 0.9L, Monocytes 

# (Auto) 0.4, Eosinophils # (Auto) 0.3, Basophils # (Auto) 0.0, Nucleated Red 

Blood Cells % (auto) 0.0, Anion Gap 3L, Glomerular Filtration Rate > 60.0, 

Calcium Level 8.3L, Total Bilirubin 2.3H, Aspartate Amino Transf (AST/SGOT) 24, 

Alanine Aminotransferase (ALT/SGPT) 17, Alkaline Phosphatase 83, Total Protein 

6.1L, Albumin 2.5L, Albumin/Globulin Ratio 0.69L


CBC/BMP


Laboratory Tests


1/27/20 06:35








Microbiology





Microbiology


1/26/20 Acid Fast Stain, Ordered


          Pending


1/26/20 Mycobacterial Culture, Ordered


          Pending


1/26/20 Fungal Smear, Ordered


          Pending


1/26/20 Fungal Culture, Ordered


          Pending


1/26/20 Fungal Smear, Received


          Pending


1/26/20 Fungal Culture, Received


          Pending


1/26/20 Anaerobic Culture, Received


          Pending


1/26/20 Gram Stain - Final, Resulted


          


1/26/20 Body Fluid Culture, Resulted


          Pending


1/26/20 Blood Culture - Preliminary, Resulted


          No growth after 24 hours . All specim...


1/26/20 Blood Culture - Preliminary, Resulted


          No growth after 24 hours . All specim...











SPIKE RAMSEY MD               Jan 27, 2020 11:28

## 2020-01-28 VITALS — DIASTOLIC BLOOD PRESSURE: 63 MMHG | SYSTOLIC BLOOD PRESSURE: 119 MMHG

## 2020-01-28 VITALS — SYSTOLIC BLOOD PRESSURE: 100 MMHG | DIASTOLIC BLOOD PRESSURE: 59 MMHG

## 2020-01-28 VITALS — SYSTOLIC BLOOD PRESSURE: 102 MMHG | DIASTOLIC BLOOD PRESSURE: 57 MMHG

## 2020-01-28 LAB
ALBUMIN SERPL BCG-MCNC: 2.4 GM/DL (ref 3.2–5.2)
ALT SERPL W P-5'-P-CCNC: 16 U/L (ref 12–78)
AMYLASE FLD-CCNC: 41 U/L
APPEARANCE FLD: (no result)
BASOPHILS # BLD AUTO: 0 10^3/UL (ref 0–0.2)
BASOPHILS NFR BLD AUTO: 0.7 % (ref 0–1)
BILIRUB SERPL-MCNC: 2.3 MG/DL (ref 0.2–1)
BUN SERPL-MCNC: 13 MG/DL (ref 7–18)
CALCIUM SERPL-MCNC: 8.4 MG/DL (ref 8.5–10.1)
CHLORIDE SERPL-SCNC: 109 MEQ/L (ref 98–107)
CO2 SERPL-SCNC: 29 MEQ/L (ref 21–32)
COLOR PLR: (no result)
CREAT SERPL-MCNC: 0.91 MG/DL (ref 0.55–1.3)
EOSINOPHIL # BLD AUTO: 0.3 10^3/UL (ref 0–0.5)
EOSINOPHIL NFR BLD AUTO: 7.6 % (ref 0–3)
GFR SERPL CREATININE-BSD FRML MDRD: > 60 ML/MIN/{1.73_M2} (ref 51–?)
GLUCOSE SERPL-MCNC: 85 MG/DL (ref 70–100)
HCT VFR BLD AUTO: 31 % (ref 36–47)
HGB BLD-MCNC: 9.9 G/DL (ref 12–15.5)
LDH FLD L TO P-CCNC: 136 U/L
LYMPHOCYTES # BLD AUTO: 0.9 10^3/UL (ref 1.5–5)
LYMPHOCYTES NFR BLD AUTO: 21.7 % (ref 24–44)
MCH RBC QN AUTO: 31.3 PG (ref 27–33)
MCHC RBC AUTO-ENTMCNC: 31.9 G/DL (ref 32–36.5)
MCV RBC AUTO: 98.1 FL (ref 80–96)
MONOCYTES # BLD AUTO: 0.4 10^3/UL (ref 0–0.8)
MONOCYTES NFR BLD AUTO: 10 % (ref 0–5)
NEUTROPHILS # BLD AUTO: 2.5 10^3/UL (ref 1.5–8.5)
NEUTROPHILS NFR BLD AUTO: 59.8 % (ref 36–66)
PH FLD: 7.59 UNITS
PLATELET # BLD AUTO: 102 10^3/UL (ref 150–450)
POTASSIUM SERPL-SCNC: 4 MEQ/L (ref 3.5–5.1)
PROT SERPL-MCNC: 6.2 GM/DL (ref 6.4–8.2)
RBC # BLD AUTO: 3.16 10^6/UL (ref 4–5.4)
SODIUM SERPL-SCNC: 142 MEQ/L (ref 136–145)
SPECIMEN SOURCE FLD: (no result)
WBC # BLD AUTO: 4.1 10^3/UL (ref 4–10)

## 2020-01-28 RX ADMIN — FOLIC ACID SCH MG: 1 TABLET ORAL at 08:35

## 2020-01-28 RX ADMIN — SPIRONOLACTONE SCH MG: 25 TABLET, FILM COATED ORAL at 08:35

## 2020-01-28 RX ADMIN — LATANOPROST SCH DROP: 50 SOLUTION OPHTHALMIC at 21:44

## 2020-01-28 RX ADMIN — DEXTROSE MONOHYDRATE SCH MLS/HR: 5 INJECTION INTRAVENOUS at 00:50

## 2020-01-28 RX ADMIN — LEVOTHYROXINE SODIUM SCH MCG: 112 TABLET ORAL at 05:40

## 2020-01-28 RX ADMIN — DEXTROSE MONOHYDRATE SCH MLS/HR: 5 INJECTION INTRAVENOUS at 08:35

## 2020-01-28 RX ADMIN — Medication SCH MG: at 08:35

## 2020-01-28 RX ADMIN — SPIRONOLACTONE SCH MG: 25 TABLET, FILM COATED ORAL at 17:56

## 2020-01-28 NOTE — IPN
DATE:   01/27/2020

 

The patient complains of severe pain, 10/10 pain in the abdomen, increasing

abdominal distention and abdominal wall edema despite IV antibiotics and pain

medications.  Pigtail catheter is leaking into the dressing.  No purulence.

Serous drainage.  Afebrile overnight.  No complaints of chills, nausea, vomiting.

Pain is diffuse and rated as 11 out of 10.

 

PHYSICAL EXAMINATION:

VITAL SIGNS:  Temperature 98.9, pulse 108, respiratory rate 18, blood pressure

106/63, 94% on room air.

GENERAL:  The patient is awake, alert, oriented.  Anicteric sclerae.  No

jaundice.  No icterus.  No jugular venous distention (JVD) or thyromegaly.

LUNGS:  Decreased breath sounds on the left base.  Right is clear.

HEART:  S1, S2.  Sinus rhythm.

ABDOMEN:  Distended diffusely, tender.  No rebound or guarding.  Positive bowel

sounds.  Pigtail catheter is in the left lower quadrant with serous drainage.

EXTREMITIES:  Chronic edema.

 

LABORATORY DATA:

White count 3.5, hemoglobin 10, hematocrit 32, platelet count 105.

 

Sodium 141, potassium 3.8, chloride 110, bicarbonate 28, BUN 10, creatinine 0.98,

glucose 113.

 

Peritoneal fluid pending.

 

Blood culture with no growth.

 

IMAGING STUDIES:

01/26/2020 chest x-ray showed retrocardiac left lower lobe opacity representing

atelectasis, left pleural effusion, no definite right pleural effusion.

 

CT of the abdomen and pelvis on 01/26/2020 showed cirrhosis, portal venous

hypertension, extensive gastrohepatic ligament and periesophageal varices, skin

induration with extensive abdominal wall edema along the left flank, moderate to

large left pleural effusion with adjacent compressive atelectasis.

 

ASSESSMENT AND PLAN:   This is a 59-year-old female with a history of alcoholic

liver cirrhosis, chronic kidney disease stage III, hypothyroidism, hypertension,

reflux, and glaucoma status post pigtail catheter by interventional radiology,

presents with complaint of increasing abdominal distention, soaking of the

pigtail catheter dressing with serous drainage and worsening abdominal pain.  The

patient was admitted for possible peritonitis.

 

CT of the chest shows left pleural effusion, ascites, and abdominal wall edema.

The patient was given intravenous meropenem.  Afebrile and no chills overnight.

 

ACUTE ISSUES:

1.  Peritonitis secondary to decompensated alcoholic liver cirrhosis.  The

patient is currently on imipenem and cilastatin that is renally dosed for 7 days.

Await culture fluids.

 

2.  Recurrent ascites due to end stage liver cirrhosis with decompensation.  Dr. Clark of interventional radiology has been consulted for evaluation of pigtail

catheter, which most likely needs to be repositioned.

 

3.  Pleural effusion on the left.  THe patient is to undergo thoracentesis today.

 

 

4.  Pancytopenia secondary to end stage liver cirrhosis.  No acute indication for

red blood cell or platelet transfusion.  No lower gastrointestinal bleed.

 

5.  Deep vein thrombosis (DVT) prophylaxis.  Compression stockings.

## 2020-01-29 VITALS — SYSTOLIC BLOOD PRESSURE: 110 MMHG | DIASTOLIC BLOOD PRESSURE: 67 MMHG

## 2020-01-29 LAB
ALBUMIN SERPL BCG-MCNC: 2.1 GM/DL (ref 3.2–5.2)
ALT SERPL W P-5'-P-CCNC: 15 U/L (ref 12–78)
BASOPHILS # BLD AUTO: 0 10^3/UL (ref 0–0.2)
BASOPHILS NFR BLD AUTO: 1 % (ref 0–1)
BILIRUB SERPL-MCNC: 1.4 MG/DL (ref 0.2–1)
BUN SERPL-MCNC: 19 MG/DL (ref 7–18)
CALCIUM SERPL-MCNC: 8.1 MG/DL (ref 8.5–10.1)
CHLORIDE SERPL-SCNC: 110 MEQ/L (ref 98–107)
CO2 SERPL-SCNC: 27 MEQ/L (ref 21–32)
CREAT SERPL-MCNC: 0.97 MG/DL (ref 0.55–1.3)
EOSINOPHIL # BLD AUTO: 0.2 10^3/UL (ref 0–0.5)
EOSINOPHIL NFR BLD AUTO: 7 % (ref 0–3)
GFR SERPL CREATININE-BSD FRML MDRD: > 60 ML/MIN/{1.73_M2} (ref 51–?)
GLUCOSE SERPL-MCNC: 88 MG/DL (ref 70–100)
HCT VFR BLD AUTO: 28.1 % (ref 36–47)
HGB BLD-MCNC: 9.1 G/DL (ref 12–15.5)
LDH SERPL L TO P-CCNC: 156 U/L (ref 84–246)
LYMPHOCYTES # BLD AUTO: 0.8 10^3/UL (ref 1.5–5)
LYMPHOCYTES NFR BLD AUTO: 25.6 % (ref 24–44)
MCH RBC QN AUTO: 31.2 PG (ref 27–33)
MCHC RBC AUTO-ENTMCNC: 32.4 G/DL (ref 32–36.5)
MCV RBC AUTO: 96.2 FL (ref 80–96)
MONOCYTES # BLD AUTO: 0.3 10^3/UL (ref 0–0.8)
MONOCYTES NFR BLD AUTO: 11 % (ref 0–5)
NEUTROPHILS # BLD AUTO: 1.7 10^3/UL (ref 1.5–8.5)
NEUTROPHILS NFR BLD AUTO: 55.1 % (ref 36–66)
PLATELET # BLD AUTO: 80 10^3/UL (ref 150–450)
POTASSIUM SERPL-SCNC: 4.1 MEQ/L (ref 3.5–5.1)
PROT SERPL-MCNC: 5.5 GM/DL (ref 6.4–8.2)
RBC # BLD AUTO: 2.92 10^6/UL (ref 4–5.4)
SODIUM SERPL-SCNC: 138 MEQ/L (ref 136–145)
WBC # BLD AUTO: 3 10^3/UL (ref 4–10)

## 2020-01-29 PROCEDURE — 0W9B3ZX DRAINAGE OF LEFT PLEURAL CAVITY, PERCUTANEOUS APPROACH, DIAGNOSTIC: ICD-10-PCS | Performed by: RADIOLOGY

## 2020-01-29 RX ADMIN — FOLIC ACID SCH MG: 1 TABLET ORAL at 08:52

## 2020-01-29 RX ADMIN — LEVOTHYROXINE SODIUM SCH MCG: 112 TABLET ORAL at 05:33

## 2020-01-29 RX ADMIN — Medication SCH MG: at 08:52

## 2020-01-29 RX ADMIN — SPIRONOLACTONE SCH MG: 25 TABLET, FILM COATED ORAL at 08:52

## 2020-01-29 NOTE — REP
Clinical:  Status post thoracentesis.

 

Technique:  PA and lateral.

 

Comparison:  01/26/2020.

 

Findings:

Left lower lobe opacities suggest elements of atelectasis.  Small residual

effusion cannot definitively be excluded.  No pneumothorax.  The mediastinum and

cardiac silhouette are normal.  The right hemithorax appears clear.  Skeletal

structures are intact.

 

Impression:

Left basilar opacity suggesting elements of atelectasis and possible small

residual pleural fluid.

 

 

Electronically Signed by

Yogi Glass MD 01/29/2020 02:07 A

## 2020-01-29 NOTE — DS.PDOC
Discharge Summary


General


Date of Admission


2020 at 07:14


Date of Discharge


2020





Discharge Summary


PROCEDURES PERFORMED DURING STAY: 


Thoracentesis 2020





ADMITTING DIAGNOSES: 


1. Abdominal pain 


2. Left Pleural Effusion 


3. Chronic alcoholic liver disease with decompensation 


4. Pancytopenia secondary to alcoholic liver disease


5. Reflux disease 


6. Hypothyroidism 


7. Alcohol abuse 





DISCHARGE DIAGNOSES:


1. Peritonitis


2. Left pleural effusion


3. Chronic alcoholic liver disease 


4.  Pancytopenia secondary to alcoholic liver disease


5. Hypothyroidism  


6. Alcohol abuse





COMPLICATIONS/CHIEF COMPLAINT: Peritonitis (Acute) Generalized.





HISTORY OF PRESENT ILLNESS: "This is a 59-year-old female with alcoholic liver 

cirrhosis, recurring ascites with previous paracentesis, hypothyroidism, reflux,


hypertension.  Had a pigtail catheter placed in the left lower abdomen by


interventional radiologist, Dr. Clark, on Friday.  The patient said that the


dressing was falling off, increased leakage at the area, as well as stabbing 

pain


across the abdomen which was worse when she gets up and moves around or bends 

and


better when she lies still mostly on her right side.  She denies any fever,


nausea, vomiting.  Has not taken any medications for this.  Says that the pain


comes and goes and stays for a few hours, only abates for a few minutes.  The


patient denies any chills, diarrhea, constipation.  Denies dysuria, urgency,


frequency, back pain.  In the emergency room (ER), she was afebrile and was


pancytopenic.  Procalcitonin, C-reactive protein (CRP), and sedimentation rate


are pending.  Blood cultures were obtained.  Urinalysis (UA) was pending.  CT


abdomen and pelvis shows cirrhosis, portal venous hypertension with an extensive


gastrohepatic ligament and paraesophageal varices with recanalized paraumbilical


vein.  There is marked skin induration with extensive abdominal wall edema along


the left flank.  There is a moderate to large left pleural effusion with


compressive atelectasis.  Paracentesis ordered, still pending.  The patient's


blood pressure is 92/51, unable to be given diuretic.  Currently just on low-

dose


spironolactone 25 twice a day.  The patient otherwise denies any lightheadedness


or dizziness, chest pain, pressure, or tightness, dysuria, urgency, frequency,


polydipsia, polyuria, anxiety, depression, headaches, changes in vision, sore


throat, ear pain, ear discharge.  Hospitalist was called to admit for possible


spontaneous bacterial peritonitis and further evaluation of the pigtail


catheter."





HOSPITAL COURSE: Pt admitted to the hospital as detailed above.  Presumptive SBP

treatment instrumented due to abdominal distention with large amount of ascites 

and edema along the abdominal wall.  Pt was started on IV meropenem, and Dr. Clark consulted.  Dx of l pleural effusion - thoracentesis scheduled as pt 

unable to tolerate Lasix due ot SBP of 97.  Pigtail catheter placed by IR had 

increased serous drainage; per Dr. Clark - abdominal binders, then drain 

catheter in 1 week as there was no significant ascites when assessed.  Leakage 

resolved spontaneously during hospitalization as suggested by IR.  Thoracentesis

on 20 - 80cc fluid drained and sent for culture/stain.  Gram stain - no 

organisms seen.  AFB, TB screen, Fungal smear and culture - pending.  When 

assessed this morning, pt denied pain following the procedure or abdominal pain 

and drainage had resolved; Sx improved significantly following 2 nights stay.  

Discharged home with follow-up with IR and PCP.  





Intraperitoneal fluid did reveal Staphylococcus epidermidis from cephalexin on 

20. At this point, will continue the patient on Bactrim for 7 days until 

seen and evaluated by interventional radiology as an outpatient.





DISCHARGE MEDICATIONS: Please see below.


 


ALLERGIES: Please see below.





PHYSICAL EXAMINATION ON DISCHARGE:


VITAL SIGNS: Please see below.


General Exam:  Positive: Alert, Cooperative, No Acute Distress


Eye Exam:  Positive: Conjunctiva & lids normal, EOMI, Sclera icteric


ENT Exam:  Positive: Atraumatic, Mucous membr. moist/pink, Pharynx Normal


Neck Exam:  Positive: Supple; 


   Negative: JVD, thyromegaly


Chest Exam:  Positive: Clear to auscultation, Normal air movement


Heart Exam:  Positive: Rate Normal, Regular Rhythm, Normal S1, Normal S2, 

Murmurs (2/6 SM); 


   Negative: Gallops, Rubs


Telemetry:  Positive: No significant arrhythmia


Abdomen Exam:  Positive: BS Hypoactive, Tenderness (LLQ and RLQ), Other 

(abdomen, large, rounded and edematous); 


   Negative: Normal bowel sounds, Soft


Extremity Exam:  Negative: Clubbing, Cyanosis, Edema


Skin Exam:  Positive: Nl turgor and temperature, Breakdown


Neuro Exam:  Positive: Normal Speech, Cranial Nerves 3-12 NL


Psych Exam:  Positive: Mood NL





LABORATORY DATA: Please see below.





IMAGIN2020 Chest, 2 view PA, Lat  Impression:


Left basilar opacity suggesting elements of atelectasis and possible small


residual pleural fluid.





2020  Chest, 2 view PA, Lat  Impression:


1.  Retrocardiac left lower lobe opacity representing atelectasis and a left


pleural effusion.  No definite right effusion. This was also seen on CT abdomen,


including the lower chest today.


2.  No cardiomegaly or edema.





2020 CT ABD/PEL W/IV CONTRAST ONLY   IMPRESSION: 


1. Cirrhosis and evidence of portal venous hypertension with extensive 


gastrohepatic ligament and periesophageal varices. Recanalized paraumbilical 


vein. 


2. Marked skin induration with extensive abdominal wall edema along the left 


flank.


3. Moderate to large left pleural effusion with adjacent compressive 


atelectasis. 





ACTIVITY: [As tolerated].





DIET: [As tolerated].





DISCHARGE PLAN: Discharge home.  





DISPOSITION: Discharge Home 





DISCHARGE INSTRUCTIONS:


 Follow Up Appt: Meron Ramirez 2/4 at 2:45. Dr Clark 2/4 at 10:15           

           


   Referrals: Alcohol rehab                                                     

        


   Activity:  As Tolerated             


   Diet:  As Tolerated             


   Other Instruction: 


    Please go to the behavioral health walk in clinic for alcohol rehab.  





ITEMS TO FOLLOWUP ON ON OUTPATIENT:


see above





DISCHARGE CONDITION: [Stable].





TIME SPENT ON DISCHARGE: 33 Minutes.





Vital Signs/I&Os





Vital Signs








  Date Time  Temp Pulse Resp B/P (MAP) Pulse Ox O2 Delivery O2 Flow Rate FiO2


 


20 04:00 98.6 99 18 110/67 (81) 95 Room Air  


 


20 06:00       2.0 














I&O- Last 24 Hours up to 6 AM 


 


 20





 06:00


 


Intake Total 400 ml


 


Balance 400 ml











Laboratory Data


Labs 24H


Laboratory Tests 2


20 17:15: 


Body Fluid pH 7.589, Body Fluid pH Source PLEURAL, Body Fluid WBC (Auto) 1409H, 

Body Fluid RBC (Auto) 24, Body Fluid Mononuclear Cells % Auto 94.0H, Fluid 

Polymorphonuclear Cell % Auto 6.0H, Body Fluid Glucose Source PLEURAL, Body 

Fluid Glucose 116, Body Fluid Protein Source PLEURAL, Body Fluid Total Protein 

2.6, Body Fluid LDH Source PLEURAL, Body Fluid Lactate Dehydrogenase 136, Body 

Fluid Amylase Source PLEURAL, Body Fluid Amylase 41, Pleural Fluid Source 

PLEURAL, Pleural Fluid Color CECELIA, Pleural Fluid Appearance CLOUDY


20 07:06: 


Immature Granulocyte % (Auto) 0.3, Neutrophils (%) (Auto) 55.1, Lymphocytes (%) 

(Auto) 25.6, Monocytes (%) (Auto) 11.0H, Eosinophils (%) (Auto) 7.0H, Basophils 

(%) (Auto) 1.0, Neutrophils # (Auto) 1.7, Lymphocytes # (Auto) 0.8L, Monocytes #

 (Auto) 0.3, Eosinophils # (Auto) 0.2, Basophils # (Auto) 0.0, Nucleated Red 

Blood Cells % (auto) 0.0, Immature Platelet Fraction 0.8, Anion Gap 1L, 

Glomerular Filtration Rate > 60.0, Calcium Level 8.1L, Total Bilirubin 1.4H, 

Aspartate Amino Transf (AST/SGOT) 27, Alanine Aminotransferase (ALT/SGPT) 15, 

Alkaline Phosphatase 88, Lactate Dehydrogenase 156, Total Protein 5.5L, Albumin 

2.1L, Albumin/Globulin Ratio 0.62L


CBC/BMP


Laboratory Tests


20 07:06











Microbiology





Microbiology


20 Acid Fast Stain, Received


          Pending


20 Mycobacterial Culture, Received


          Pending


20 Fungal Smear, Received


          Pending


20 Fungal Culture, Received


          Pending


20 Gram Stain - Final, Resulted


          


20 Body Fluid Culture, Resulted


          Pending


20 Fungal Smear, Received


          Pending


20 Fungal Culture, Received


          Pending


20 Anaerobic Culture - Final, Complete


          


20 Gram Stain - Final, Complete


          


20 Body Fluid Culture - Final, Complete


          Staphylococcus Epidermidis


20 Blood Culture - Preliminary, Resulted


          No Growth after 72 hours. All specime...


20 Blood Culture - Preliminary, Resulted


          No Growth after 72 hours. All specime...





Discharge Medications


Scheduled


Betaxolol Hcl (Betaxolol HCl) 0.5 % Sol, 1 DROP OU BID, (Reported)


Brimonidine Tartrate (Brimonidine Tartrate) 0.2% 5ML Drops, 1 DROP OU BID, 

(Reported)


Folic Acid (Folic Acid) 1 Mg Tablet, 1 MG PO DAILY, (Reported)


Lactulose (Lactulose) 10 Gm/15 Ml Solution, 30 ML PO BID, (Reported)


Latanoprost (Xalatan) 0.005% 2.5ML Drops, 1 DROP OU DAILY, (Reported)


Levothyroxine Sodium (Synthroid) 112 Mcg Tablet, 112 MCG PO DAILY, (Reported)


Multivitamin (Multivitamins) 1 Each Capsule, 1 CAP PO DAILY, (Reported)


Spironolactone (Spironolactone) 25 Mg Tablet, 25 MG PO BID, (Reported)


Thiamine Mononitrate (Vit B1) (Vitamin B-1) 100 Mg Tablet, 100 MG PO DAILY, 

(Reported)





Scheduled PRN


Fluticasone Propionate (Fluticasone Propionate) 16 Gm Spray.susp, 2 SPRAY NARES 

DAILY PRN for CONGESTION, (Reported)


Levocetirizine Dihydrochloride (Levocetirizine Dihydrochloride) 5 Mg Tablet, 5 

MG PO QHS PRN for ALLERGIES, (Reported)





Allergies


Coded Allergies:  


     cefdinir (Verified  Allergy, Severe, THROAT CLOSES, 12/3/19)


     Penicillins (Verified  Allergy, Unknown, UNKNOWN CHILDHOOD REACTION, 

19)





Attending Note


Attending Note


I have reviewed the documentation and assessed the patient independently. I have

 made necessary revisions as needed. I agree with the findings, assessment and 

plan stated above.





- Time spent on discharge 35 minutes


- Continue patient reports that she feels better; still reports some 

intermittent abdominal discomfort. She denies any shortness of breath


- Physical without any adventitious lung sounds. Abdomen is generally benign - .

 There is some discomfort palpated around catheter


- Catheter entry site is without any signs of infection


- Patient has been advised to follow-up with her primary care provider as well 

as Dr. Clark within next 7 days


- Patient has been instructed to refrain from using the intraperitoneal catheter

 until 1 week from now; unless otherwise directed by interventional radiology


- Will continue with TMP-SMX for 7 days


- Patient has been advised to remain compliant with treatment plan, medications 

and return to the emergency room if she experiences any problems











ADELAIDA LIRA PA-C         2020 14:42


FRANCI BURROWS MD                2020 16:45

## 2020-01-29 NOTE — REP
LIMITED ABDOMINAL ULTRASOUND:  01/29/2020.

 

Clinical history:  Evaluate for ascites.  Abdominal distension.

 

Comparison CT abdomen pelvis 01/26/2020.

 

Findings:  Scanning through the four quadrants and both flanks demonstrates no

evidence of free fluid or generalized ascites.

 

 

Electronically Signed by

Armando Gordon MD 01/29/2020 06:33 P

## 2020-01-29 NOTE — REP
Ultrasound-guided thoracentesis

 

The procedure was performed by YOON Christy, under the direct

supervision of Dr. Khan

 

The risks and benefits of the procedure were explained to the patient and

informed consent was obtained both verbally and written.  Directly prior to the

start of the procedure, a formal timeout was completed in the exam room.

 

Pleural fluid on the left lung zone was localized using ultrasound guidance.  The

skin was prepped and draped in a sterile fashion. 8 mL of 1% lidocaine 10 mg/ml

was used as a local anesthetic.  Using ultrasound guidance, an 8-Singaporean multi

side-hole catheter was inserted and advanced into the fluid.  80 ml of red

colored fluid was withdrawn and sent to the lab for analysis.

 

The patient tolerated the procedure well and there were no immediate

complications.  After the appropriate amount of monitored convalescence, the

patient was discharged from the department.

 

 

Reviewed by

YOON De Los Santos 01/28/2020 05:48 P

Electronically Signed by

Sushant Khan MD 01/29/2020 10:23 P

## 2020-02-03 ENCOUNTER — HOSPITAL ENCOUNTER (EMERGENCY)
Dept: HOSPITAL 53 - M ED | Age: 60
Discharge: HOME | End: 2020-02-03
Payer: COMMERCIAL

## 2020-02-03 VITALS — BODY MASS INDEX: 27.18 KG/M2 | HEIGHT: 68 IN | WEIGHT: 179.37 LBS

## 2020-02-03 VITALS — SYSTOLIC BLOOD PRESSURE: 99 MMHG | DIASTOLIC BLOOD PRESSURE: 48 MMHG

## 2020-02-03 DIAGNOSIS — K76.6: ICD-10-CM

## 2020-02-03 DIAGNOSIS — I10: ICD-10-CM

## 2020-02-03 DIAGNOSIS — E03.9: ICD-10-CM
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
## 2020-02-03 DIAGNOSIS — K70.30: ICD-10-CM
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
## 2020-02-03 DIAGNOSIS — Z88.1: ICD-10-CM

## 2020-02-03 DIAGNOSIS — T85.628A: Primary | ICD-10-CM

## 2020-02-03 DIAGNOSIS — Z88.0: ICD-10-CM

## 2020-02-03 DIAGNOSIS — Z79.899: ICD-10-CM

## 2020-02-03 DIAGNOSIS — K21.9: ICD-10-CM

## 2020-02-04 ENCOUNTER — HOSPITAL ENCOUNTER (OUTPATIENT)
Dept: HOSPITAL 53 - M IRPOV | Age: 60
End: 2020-02-04
Attending: RADIOLOGY
Payer: COMMERCIAL

## 2020-02-04 VITALS — BODY MASS INDEX: 27.99 KG/M2 | WEIGHT: 178.35 LBS | HEIGHT: 67 IN

## 2020-02-04 VITALS — DIASTOLIC BLOOD PRESSURE: 56 MMHG | SYSTOLIC BLOOD PRESSURE: 127 MMHG

## 2020-02-04 DIAGNOSIS — R18.8: ICD-10-CM

## 2020-02-04 DIAGNOSIS — Z48.03: Primary | ICD-10-CM

## 2020-02-05 NOTE — IRPN
[facility] IR Progress Note


IR Progress Note


DATE:  Feb 4, 2020


FOLLOW-UP: Status post Pleurx catheter placement for refractory ascites on 

January 24. Patient inadvertently pulled the catheter out. Patient was also 

recently admitted for abdominal pain and peritoneal fluid cultures were 

negative.





ON EXAMINATION: Catheter is retracted out. Cuff is outside the body. Site 

appears nontender no redness or discharge.  





IMPRESSION: Patient inadvertently pulled out abdominal PleurX catheter. The 

catheter was removed in its entirety. A sterile dressing was applied to the 

site. Continue fortnightly paracenteses and albumin.





Thank you for this referral





Cc Dr. Fraser





Cc Dr. Db Olivia





Allergies


Coded Allergies:  


     cefdinir (Verified  Allergy, Severe, THROAT CLOSES, 12/3/19)


     Penicillins (Verified  Allergy, Unknown, UNKNOWN CHILDHOOD REACTION, 

12/11/19)





VS,Fishbone, I+O


VS, Fishbone, I+O





Vital Signs








  Date Time  Temp Pulse Resp B/P (MAP) Pulse Ox O2 Delivery O2 Flow Rate FiO2


 


2/4/20 10:15 97.4 101 18 127/56 (79) 99 Room Air  

















SPIKE RAMSEY MD                Feb 5, 2020 10:10

## 2020-02-14 ENCOUNTER — HOSPITAL ENCOUNTER (OUTPATIENT)
Dept: HOSPITAL 53 - M IRPRO | Age: 60
End: 2020-02-14
Attending: RADIOLOGY
Payer: COMMERCIAL

## 2020-02-14 ENCOUNTER — HOSPITAL ENCOUNTER (OUTPATIENT)
Dept: HOSPITAL 53 - M LAB | Age: 60
End: 2020-02-14
Attending: PHYSICIAN ASSISTANT
Payer: COMMERCIAL

## 2020-02-14 VITALS — SYSTOLIC BLOOD PRESSURE: 129 MMHG | DIASTOLIC BLOOD PRESSURE: 73 MMHG

## 2020-02-14 DIAGNOSIS — R18.8: Primary | ICD-10-CM

## 2020-02-14 DIAGNOSIS — K76.7: Primary | ICD-10-CM

## 2020-02-14 LAB
ALBUMIN SERPL BCG-MCNC: 2.6 GM/DL (ref 3.2–5.2)
ALT SERPL W P-5'-P-CCNC: 23 U/L (ref 12–78)
BILIRUB SERPL-MCNC: 2.1 MG/DL (ref 0.2–1)
BUN SERPL-MCNC: 14 MG/DL (ref 7–18)
CALCIUM SERPL-MCNC: 8 MG/DL (ref 8.5–10.1)
CHLORIDE SERPL-SCNC: 109 MEQ/L (ref 98–107)
CO2 SERPL-SCNC: 24 MEQ/L (ref 21–32)
CREAT SERPL-MCNC: 1.14 MG/DL (ref 0.55–1.3)
GFR SERPL CREATININE-BSD FRML MDRD: 51.9 ML/MIN/{1.73_M2} (ref 51–?)
GLUCOSE SERPL-MCNC: 173 MG/DL (ref 70–100)
POTASSIUM SERPL-SCNC: 3.7 MEQ/L (ref 3.5–5.1)
PROT SERPL-MCNC: 7.2 GM/DL (ref 6.4–8.2)
SODIUM SERPL-SCNC: 139 MEQ/L (ref 136–145)

## 2020-02-14 NOTE — REP
Clinical:  Ascites.

 

Technique:  Limited transabdominal ultrasound using curved array transducer.

 

Findings:

Ultrasound examination of the abdomen and pelvis demonstrates small amount of

ascites insufficient for paracentesis.

 

Impression:  Minimal amount of ascites.  Paracentesis not performed.

 

 

Electronically Signed by

Yogi Glass MD 02/14/2020 02:47 P

## 2020-03-02 ENCOUNTER — HOSPITAL ENCOUNTER (OUTPATIENT)
Dept: HOSPITAL 53 - M IRPRO | Age: 60
End: 2020-03-02
Attending: RADIOLOGY
Payer: COMMERCIAL

## 2020-03-02 DIAGNOSIS — K74.60: ICD-10-CM

## 2020-03-02 DIAGNOSIS — R18.8: Primary | ICD-10-CM

## 2020-03-02 NOTE — REP
Four-quadrant abdominal sonography survey:

 

History:  Ascites.

 

Findings:  Four-quadrant abdominal survey is performed.  There is mild ascites

seen in all four quadrants of the abdomen.  After discussion between the patient

and our proceduralist, YOON Gatica, it was decided to postpone

paracentesis.

 

 

Electronically Signed by

Alistair Deng MD 03/02/2020 08:06 P

## 2020-03-13 ENCOUNTER — HOSPITAL ENCOUNTER (OUTPATIENT)
Dept: HOSPITAL 53 - M IRPRO | Age: 60
End: 2020-03-13
Attending: RADIOLOGY
Payer: COMMERCIAL

## 2020-03-13 DIAGNOSIS — R18.8: Primary | ICD-10-CM

## 2020-03-13 NOTE — REP
FOUR QUADRANT ULTRASOUND OF THE ABDOMEN:

 

HISTORY: Assess for ascites.

 

COMPARISON: 03/02/2020 showed mild ascites.

 

Today's examination shows no significant change in the degree of ascites when

compared to the latest prior.

 

IMPRESSION:

 

No significant change.

 

 

Electronically Signed by

Ace Delgado DO 03/13/2020 03:45 P

## 2020-03-27 ENCOUNTER — HOSPITAL ENCOUNTER (OUTPATIENT)
Dept: HOSPITAL 53 - M IRPRO | Age: 60
End: 2020-03-27
Attending: RADIOLOGY
Payer: COMMERCIAL

## 2020-03-27 VITALS — SYSTOLIC BLOOD PRESSURE: 117 MMHG | DIASTOLIC BLOOD PRESSURE: 72 MMHG

## 2020-03-27 DIAGNOSIS — K74.60: Primary | ICD-10-CM

## 2020-03-27 DIAGNOSIS — J90: ICD-10-CM

## 2020-03-27 NOTE — REP
Ultrasound-guided paracentesis

 

The procedure was performed under the direct supervision of Dr. Khan.

 

The risks and benefits of the procedure were explained to the patient and

informed consent was obtained.  The largest pocket of fluid was localized in the

right lower quadrant using ultrasound guidance.  The skin was prepped and draped

in a sterile fashion.  1% lidocaine was used as a local anesthetic.  An 8-Czech

multi side-hole catheter was inserted using trocar technique.  2650 ml of yellow

fluid was withdrawn and discarded.

 

Incidental note is made of a large left pleural effusion.

 

The patient tolerated the procedure well and there were no immediate

complications.  After the appropriate amount of monitored convalescence the

patient was discharged from the department.

 

 

Electronically Signed by

YOON Dozier 03/27/2020 04:54 P

Electronically Signed by

Sushant Khan MD 03/27/2020 05:09 P

## 2020-04-10 ENCOUNTER — HOSPITAL ENCOUNTER (OUTPATIENT)
Dept: HOSPITAL 53 - M RAD | Age: 60
End: 2020-04-10
Attending: INTERNAL MEDICINE
Payer: COMMERCIAL

## 2020-04-10 ENCOUNTER — HOSPITAL ENCOUNTER (OUTPATIENT)
Dept: HOSPITAL 53 - M IRPRO | Age: 60
End: 2020-04-10
Attending: RADIOLOGY
Payer: COMMERCIAL

## 2020-04-10 DIAGNOSIS — R18.8: Primary | ICD-10-CM

## 2020-04-10 DIAGNOSIS — J90: ICD-10-CM

## 2020-04-10 DIAGNOSIS — R91.8: ICD-10-CM

## 2020-04-10 DIAGNOSIS — R06.00: Primary | ICD-10-CM

## 2020-04-10 NOTE — REP
?CHEST, TWO VIEWS:

 

Two views of the chest is performed and compared to a prior study of 01/28/2020.

 

There is a new large left pleural effusion with adjacent parenchymal opacity.

Right lung is clear.  Heart size is not well evaluated.  There is mild curvature

of the thoracic spine, convex to the right.

 

IMPRESSION:

 

New large left pleural effusion with adjacent left lung parenchymal opacity.

 

 

Electronically Signed by

Sushant Khan MD 04/10/2020 03:08 P

## 2020-04-10 NOTE — REP
LIMITED ABDOMINAL ULTRASOUND:

 

Real-time sonographic evaluation of the abdomen performed in a limited fashion to

evaluate for ascites.

 

There is minimal ascites in the region of the right flank. There is no sufficient

fluid for the therapeutic paracentesis and therefore, the paracentesis is

postponed.

 

 

Electronically Signed by

Sushant Khan MD 04/10/2020 03:01 P

## 2020-04-23 ENCOUNTER — HOSPITAL ENCOUNTER (OUTPATIENT)
Dept: HOSPITAL 53 - M CARPUL | Age: 60
End: 2020-04-23
Attending: INTERNAL MEDICINE
Payer: COMMERCIAL

## 2020-04-23 DIAGNOSIS — I35.0: ICD-10-CM

## 2020-04-23 DIAGNOSIS — I31.3: ICD-10-CM

## 2020-04-23 DIAGNOSIS — J90: ICD-10-CM

## 2020-04-23 DIAGNOSIS — R06.00: Primary | ICD-10-CM

## 2020-04-23 NOTE — ECHO
DATE OF PROCEDURE:  04/23/2020

 

YOB: 1960

AGE:  60

 

REFERRING PROVIDER:  Dr. Bull Silverio

 

PATIENT LOCATION:  Outpatient.

 

REASON FOR THE STUDY:  Shortness of breath.

 

2D MEASUREMENTS:

IVS:  0.9 cm

LV:  4.4 cm

LVPW:  0.8 cm

LA:  2.9 cm

Aorta:  2.8 cm

IVC:  1.7 cm

 

DOPPLER MEASUREMENT:

Peak velocity across the aortic valve:  1.9 meters per second

Peak velocity across the LVOT:  1.5 meters per second

Peak gradient across the aortic valve:  14 mmHg

Mean gradient across the aortic valve:  7 mmHg.

Mitral E:  1.1,  Mitral A:  0.89 with a ratio of 1.3

Maximum tricuspid valve velocity:  2.4 meters second

 

2D COMMENTS:

1.  Technically limited study due to poor acoustic window.

2.  The left ventricular size is normal as well as left ventricular wall

thickness and systolic function.  The estimated left ventricular systolic

ejection fraction is 60-65%.

3.  The left atrium appeared to be normal in limited views.  The right atrium and

the right ventricle also appeared to be normal in size.

4.  The atrial septum appeared to be normal without evidence of defect or shunt.

 

5.  A small pericardial effusion was noted, no evidence of cardiac tamponade.

Significant left pleural effusion noted, and in the pericardial fluid, there was

increased echogenicity that may be related to the position of fibrinous material.

This may be seen in the setting of malignancy.

6.  Minimally calcified aortic valve with slightly restricted leaflet motion.

Mildly calcified mitral annulus with normal anterior mitral valve leaflet motion.

Normal tricuspid valve.  The pulmonic valve and proximal pulmonary artery

branches were not well visualized.

7.  The inferior vena cava was normal in size, central venous pressure is

probably normal.

 

DOPPLER:  No significant valvular abnormalities detected but trace to mild

tricuspid regurgitation.

 

IMPRESSION:

1.  Technically limited study due to poor acoustic window.

2.  Normal global left ventricular systolic function.  Abnormal relaxation

pattern was noted across the mitral valve annulus consistent with features of

grade 2 left ventricular diastolic dysfunction.

3.  Aortic valve sclerosis with trivial aortic stenosis but no aortic

regurgitation.

4.  Isolated mitral annulus calcification.

5.  Trace to mild tricuspid regurgitation.

6.  A small pericardial effusion was noted, no evidence of cardiac tamponade.

Significant pleural effusion noted on the left side, as mentioned above.  The

patient does have a history of pleural effusion on a recent chest x-ray.

## 2020-05-08 ENCOUNTER — HOSPITAL ENCOUNTER (OUTPATIENT)
Dept: HOSPITAL 53 - M IRPRO | Age: 60
End: 2020-05-08
Attending: RADIOLOGY
Payer: COMMERCIAL

## 2020-05-08 DIAGNOSIS — R18.8: Primary | ICD-10-CM

## 2020-05-08 NOTE — REP
LIMITED ABDOMINAL ULTRASOUND:

 

Limited abdominal ultrasound performed to evaluate for ascites prior to a

scheduled paracentesis.  There is not significant ascites present.  Paracentesis

is postponed.

 

 

 

Unreviewed

## 2020-05-25 ENCOUNTER — HOSPITAL ENCOUNTER (OUTPATIENT)
Dept: HOSPITAL 53 - M LABSMTC | Age: 60
End: 2020-05-25
Attending: ANESTHESIOLOGY
Payer: COMMERCIAL

## 2020-05-25 DIAGNOSIS — Z11.59: ICD-10-CM

## 2020-05-25 DIAGNOSIS — Z01.812: Primary | ICD-10-CM

## 2020-05-27 ENCOUNTER — HOSPITAL ENCOUNTER (OUTPATIENT)
Dept: HOSPITAL 53 - M SDC | Age: 60
Discharge: HOME | End: 2020-05-27
Attending: INTERNAL MEDICINE
Payer: COMMERCIAL

## 2020-05-27 VITALS — BODY MASS INDEX: 25.87 KG/M2 | WEIGHT: 164.8 LBS | HEIGHT: 67 IN

## 2020-05-27 VITALS — DIASTOLIC BLOOD PRESSURE: 72 MMHG | SYSTOLIC BLOOD PRESSURE: 129 MMHG

## 2020-05-27 DIAGNOSIS — M89.29: ICD-10-CM

## 2020-05-27 DIAGNOSIS — J90: Primary | ICD-10-CM

## 2020-05-27 DIAGNOSIS — E03.9: ICD-10-CM

## 2020-05-27 DIAGNOSIS — K21.9: ICD-10-CM

## 2020-05-27 DIAGNOSIS — Z53.29: ICD-10-CM

## 2020-05-27 DIAGNOSIS — Z88.0: ICD-10-CM

## 2020-05-27 DIAGNOSIS — K74.60: ICD-10-CM

## 2020-05-27 DIAGNOSIS — Z88.1: ICD-10-CM

## 2020-05-27 LAB
ALBUMIN SERPL BCG-MCNC: 2.6 GM/DL (ref 3.2–5.2)
ALT SERPL W P-5'-P-CCNC: 31 U/L (ref 12–78)
APTT BLD: 35.6 SECONDS (ref 25–38.4)
BASOPHILS # BLD AUTO: 0.1 10^3/UL (ref 0–0.2)
BASOPHILS NFR BLD AUTO: 1.5 % (ref 0–1)
BILIRUB SERPL-MCNC: 3.4 MG/DL (ref 0.2–1)
BUN SERPL-MCNC: 11 MG/DL (ref 7–18)
CALCIUM SERPL-MCNC: 8.3 MG/DL (ref 8.8–10.2)
CHLORIDE SERPL-SCNC: 101 MEQ/L (ref 98–107)
CO2 SERPL-SCNC: 30 MEQ/L (ref 21–32)
CREAT SERPL-MCNC: 1.07 MG/DL (ref 0.55–1.3)
EOSINOPHIL # BLD AUTO: 0.1 10^3/UL (ref 0–0.5)
EOSINOPHIL NFR BLD AUTO: 2.4 % (ref 0–3)
GFR SERPL CREATININE-BSD FRML MDRD: 55.7 ML/MIN/{1.73_M2} (ref 45–?)
GLUCOSE SERPL-MCNC: 88 MG/DL (ref 70–100)
HCT VFR BLD AUTO: 36.6 % (ref 36–47)
HGB BLD-MCNC: 12.7 G/DL (ref 12–15.5)
INR PPP: 1.54
LDH SERPL L TO P-CCNC: 319 U/L (ref 84–246)
LYMPHOCYTES # BLD AUTO: 1.1 10^3/UL (ref 1.5–5)
LYMPHOCYTES NFR BLD AUTO: 31.6 % (ref 24–44)
MCH RBC QN AUTO: 32.2 PG (ref 27–33)
MCHC RBC AUTO-ENTMCNC: 34.7 G/DL (ref 32–36.5)
MCV RBC AUTO: 92.7 FL (ref 80–96)
MONOCYTES # BLD AUTO: 0.4 10^3/UL (ref 0–0.8)
MONOCYTES NFR BLD AUTO: 10.4 % (ref 0–5)
NEUTROPHILS # BLD AUTO: 1.8 10^3/UL (ref 1.5–8.5)
NEUTROPHILS NFR BLD AUTO: 53.8 % (ref 36–66)
PLATELET # BLD AUTO: 66 10^3/UL (ref 150–450)
POTASSIUM SERPL-SCNC: 3.4 MEQ/L (ref 3.5–5.1)
PROT SERPL-MCNC: 8.5 GM/DL (ref 6.4–8.2)
PROTHROMBIN TIME: 18.2 SECONDS (ref 11.8–14)
RBC # BLD AUTO: 3.95 10^6/UL (ref 4–5.4)
SODIUM SERPL-SCNC: 139 MEQ/L (ref 136–145)
WBC # BLD AUTO: 3.4 10^3/UL (ref 4–10)

## 2020-05-27 NOTE — RO
DATE OF PROCEDURE:  05/27/2020

 

PREOPERATIVE DIAGNOSIS:  Moderate to large left pleural effusion.

 

POSTOPERATIVE DIAGNOSIS: Small pleural effusion.

 

FINDINGS:

Under direct ultrasound of the left posterior chest there was only a small sliver

of fluid.  This was not amendable to thoracentesis, so the procedure was not

performed.  It is not surprising as the patient has been on diuretics and has

delayed her procedure for over a month and now the effusion has significantly

improved to the point where it is at this point in time not safe to perform

thoracentesis.  She will followup in my office in 1 month with repeat chest x-ray

to ensure continued improvement of the pleural effusion.

## 2020-08-21 ENCOUNTER — HOSPITAL ENCOUNTER (OUTPATIENT)
Dept: HOSPITAL 53 - M RAD | Age: 60
End: 2020-08-21
Attending: INTERNAL MEDICINE
Payer: COMMERCIAL

## 2020-08-21 DIAGNOSIS — K70.31: Primary | ICD-10-CM

## 2020-08-21 DIAGNOSIS — J90: Primary | ICD-10-CM

## 2020-08-21 LAB
ALBUMIN SERPL BCG-MCNC: 2 GM/DL (ref 3.2–5.2)
ALT SERPL W P-5'-P-CCNC: 37 U/L (ref 12–78)
BASOPHILS # BLD AUTO: 0.1 10^3/UL (ref 0–0.2)
BASOPHILS NFR BLD AUTO: 1.5 % (ref 0–1)
BILIRUB SERPL-MCNC: 6.4 MG/DL (ref 0.2–1)
BUN SERPL-MCNC: 10 MG/DL (ref 7–18)
CALCIUM SERPL-MCNC: 8.2 MG/DL (ref 8.8–10.2)
CHLORIDE SERPL-SCNC: 93 MEQ/L (ref 98–107)
CO2 SERPL-SCNC: 30 MEQ/L (ref 21–32)
CREAT SERPL-MCNC: 1.23 MG/DL (ref 0.55–1.3)
EOSINOPHIL # BLD AUTO: 0.1 10^3/UL (ref 0–0.5)
EOSINOPHIL NFR BLD AUTO: 3.4 % (ref 0–3)
GFR SERPL CREATININE-BSD FRML MDRD: 47.4 ML/MIN/{1.73_M2} (ref 45–?)
GLUCOSE SERPL-MCNC: 84 MG/DL (ref 70–100)
HCT VFR BLD AUTO: 33 % (ref 36–47)
HGB BLD-MCNC: 11.5 G/DL (ref 12–15.5)
LYMPHOCYTES # BLD AUTO: 0.9 10^3/UL (ref 1.5–5)
LYMPHOCYTES NFR BLD AUTO: 22.3 % (ref 24–44)
MCH RBC QN AUTO: 34.4 PG (ref 27–33)
MCHC RBC AUTO-ENTMCNC: 34.8 G/DL (ref 32–36.5)
MCV RBC AUTO: 98.8 FL (ref 80–96)
MONOCYTES # BLD AUTO: 0.5 10^3/UL (ref 0–0.8)
MONOCYTES NFR BLD AUTO: 11.6 % (ref 0–5)
NEUTROPHILS # BLD AUTO: 2.5 10^3/UL (ref 1.5–8.5)
NEUTROPHILS NFR BLD AUTO: 60.5 % (ref 36–66)
PLATELET # BLD AUTO: 85 10^3/UL (ref 150–450)
POTASSIUM SERPL-SCNC: 3.8 MEQ/L (ref 3.5–5.1)
PROT SERPL-MCNC: 7.6 GM/DL (ref 6.4–8.2)
RBC # BLD AUTO: 3.34 10^6/UL (ref 4–5.4)
SODIUM SERPL-SCNC: 130 MEQ/L (ref 136–145)
WBC # BLD AUTO: 4.1 10^3/UL (ref 4–10)

## 2020-09-25 ENCOUNTER — HOSPITAL ENCOUNTER (INPATIENT)
Dept: HOSPITAL 53 - M ED | Age: 60
LOS: 13 days | Discharge: HOME | DRG: 143 | End: 2020-10-08
Attending: INTERNAL MEDICINE | Admitting: STUDENT IN AN ORGANIZED HEALTH CARE EDUCATION/TRAINING PROGRAM
Payer: COMMERCIAL

## 2020-09-25 VITALS — DIASTOLIC BLOOD PRESSURE: 64 MMHG | SYSTOLIC BLOOD PRESSURE: 111 MMHG

## 2020-09-25 VITALS — WEIGHT: 164.46 LBS | HEIGHT: 67 IN | BODY MASS INDEX: 25.81 KG/M2

## 2020-09-25 VITALS — DIASTOLIC BLOOD PRESSURE: 67 MMHG | SYSTOLIC BLOOD PRESSURE: 121 MMHG

## 2020-09-25 VITALS — SYSTOLIC BLOOD PRESSURE: 124 MMHG | DIASTOLIC BLOOD PRESSURE: 62 MMHG

## 2020-09-25 VITALS — SYSTOLIC BLOOD PRESSURE: 113 MMHG | DIASTOLIC BLOOD PRESSURE: 64 MMHG

## 2020-09-25 VITALS — DIASTOLIC BLOOD PRESSURE: 71 MMHG | SYSTOLIC BLOOD PRESSURE: 121 MMHG

## 2020-09-25 VITALS — DIASTOLIC BLOOD PRESSURE: 64 MMHG | SYSTOLIC BLOOD PRESSURE: 126 MMHG

## 2020-09-25 DIAGNOSIS — K76.7: ICD-10-CM

## 2020-09-25 DIAGNOSIS — Z88.8: ICD-10-CM

## 2020-09-25 DIAGNOSIS — D53.1: ICD-10-CM

## 2020-09-25 DIAGNOSIS — H40.9: ICD-10-CM

## 2020-09-25 DIAGNOSIS — J90: Primary | ICD-10-CM

## 2020-09-25 DIAGNOSIS — F10.20: ICD-10-CM

## 2020-09-25 DIAGNOSIS — E78.5: ICD-10-CM

## 2020-09-25 DIAGNOSIS — N17.9: ICD-10-CM

## 2020-09-25 DIAGNOSIS — K70.31: ICD-10-CM

## 2020-09-25 DIAGNOSIS — E87.1: ICD-10-CM

## 2020-09-25 DIAGNOSIS — K72.90: ICD-10-CM

## 2020-09-25 DIAGNOSIS — E03.9: ICD-10-CM

## 2020-09-25 DIAGNOSIS — E46: ICD-10-CM

## 2020-09-25 DIAGNOSIS — G43.909: ICD-10-CM

## 2020-09-25 DIAGNOSIS — K70.11: ICD-10-CM

## 2020-09-25 DIAGNOSIS — I12.9: ICD-10-CM

## 2020-09-25 DIAGNOSIS — K76.6: ICD-10-CM

## 2020-09-25 DIAGNOSIS — F41.9: ICD-10-CM

## 2020-09-25 DIAGNOSIS — Z79.899: ICD-10-CM

## 2020-09-25 DIAGNOSIS — Z88.0: ICD-10-CM

## 2020-09-25 DIAGNOSIS — I85.10: ICD-10-CM

## 2020-09-25 DIAGNOSIS — N18.31: ICD-10-CM

## 2020-09-25 DIAGNOSIS — F32.9: ICD-10-CM

## 2020-09-25 LAB
ALBUMIN SERPL BCG-MCNC: 2 GM/DL (ref 3.2–5.2)
ALT SERPL W P-5'-P-CCNC: 51 U/L (ref 12–78)
ANISOCYTOSIS BLD QL SMEAR: (no result)
APPEARANCE FLD: (no result)
APTT BLD: 32.6 SECONDS (ref 24.2–38.5)
BILIRUB CONJ SERPL-MCNC: 4.4 MG/DL (ref 0–0.2)
BILIRUB SERPL-MCNC: 6.8 MG/DL (ref 0.2–1)
BUN SERPL-MCNC: 24 MG/DL (ref 7–18)
CALCIUM SERPL-MCNC: 8.6 MG/DL (ref 8.8–10.2)
CHLORIDE SERPL-SCNC: 96 MEQ/L (ref 98–107)
CK MB CFR.DF SERPL CALC: 2
CK MB SERPL-MCNC: < 1 NG/ML (ref ?–3.6)
CK SERPL-CCNC: 50 U/L (ref 26–192)
CO2 SERPL-SCNC: 23 MEQ/L (ref 21–32)
COLOR PLR: YELLOW
CREAT SERPL-MCNC: 1.38 MG/DL (ref 0.55–1.3)
EOSINOPHIL NFR BLD MANUAL: 5 % (ref 0–3)
GFR SERPL CREATININE-BSD FRML MDRD: 41.5 ML/MIN/{1.73_M2} (ref 45–?)
GLUCOSE SERPL-MCNC: 83 MG/DL (ref 70–100)
HCT VFR BLD AUTO: 34.8 % (ref 36–47)
HGB BLD-MCNC: 11.7 G/DL (ref 12–15.5)
INR PPP: 1.6
LDH FLD L TO P-CCNC: 39 U/L
LDH SERPL L TO P-CCNC: 253 U/L (ref 84–246)
LYMPHOCYTES NFR BLD MANUAL: 14 % (ref 16–44)
MAGNESIUM SERPL-MCNC: 2.2 MG/DL (ref 1.8–2.4)
MCH RBC QN AUTO: 35.3 PG (ref 27–33)
MCHC RBC AUTO-ENTMCNC: 33.6 G/DL (ref 32–36.5)
MCV RBC AUTO: 105.1 FL (ref 80–96)
MONOCYTES NFR BLD MANUAL: 8 % (ref 0–5)
MYELOBLASTS NFR BLD MANUAL: 1 % (ref 0–0)
NEUTROPHILS NFR BLD MANUAL: 70 % (ref 28–66)
NT-PRO BNP: 121 PG/ML (ref ?–125)
PLATELET # BLD AUTO: 96 10^3/UL (ref 150–450)
PLATELET BLD QL SMEAR: (no result)
POTASSIUM SERPL-SCNC: 3.4 MEQ/L (ref 3.5–5.1)
PROT SERPL-MCNC: 7.9 GM/DL (ref 6.4–8.2)
PROTHROMBIN TIME: 19.4 SECONDS (ref 12.5–14.3)
RBC # BLD AUTO: 3.31 10^6/UL (ref 4–5.4)
SODIUM SERPL-SCNC: 130 MEQ/L (ref 136–145)
SPECIMEN SOURCE FLD: (no result)
TROPONIN I SERPL-MCNC: < 0.02 NG/ML (ref ?–0.1)
VARIANT LYMPHS NFR BLD MANUAL: 2 % (ref 0–5)
WBC # BLD AUTO: 7.9 10^3/UL (ref 4–10)

## 2020-09-25 RX ADMIN — FUROSEMIDE SCH MG: 40 TABLET ORAL at 10:12

## 2020-09-25 RX ADMIN — LACTULOSE SCH ML: 10 SOLUTION ORAL at 17:26

## 2020-09-25 RX ADMIN — BRIMONIDINE TARTRATE SCH DROP: 1 SOLUTION/ DROPS OPHTHALMIC at 20:16

## 2020-09-25 RX ADMIN — LACTULOSE SCH ML: 10 SOLUTION ORAL at 20:14

## 2020-09-25 RX ADMIN — SPIRONOLACTONE SCH MG: 25 TABLET, FILM COATED ORAL at 10:12

## 2020-09-25 RX ADMIN — BETAXOLOL HYDROCHLORIDE SCH DROP: 2.8 SUSPENSION/ DROPS OPHTHALMIC at 20:16

## 2020-09-25 RX ADMIN — SPIRONOLACTONE SCH MG: 25 TABLET, FILM COATED ORAL at 20:14

## 2020-09-25 RX ADMIN — BRIMONIDINE TARTRATE SCH DROP: 1 SOLUTION/ DROPS OPHTHALMIC at 09:00

## 2020-09-25 RX ADMIN — LACTULOSE SCH ML: 10 SOLUTION ORAL at 10:11

## 2020-09-25 RX ADMIN — LATANOPROST SCH DROP: 50 SOLUTION OPHTHALMIC at 20:15

## 2020-09-25 RX ADMIN — LORATADINE SCH MG: 10 TABLET ORAL at 10:13

## 2020-09-25 RX ADMIN — LEVOTHYROXINE SODIUM SCH MCG: 112 TABLET ORAL at 06:00

## 2020-09-25 RX ADMIN — BETAXOLOL HYDROCHLORIDE SCH DROP: 2.8 SUSPENSION/ DROPS OPHTHALMIC at 09:00

## 2020-09-25 RX ADMIN — FOLIC ACID SCH MG: 1 TABLET ORAL at 10:13

## 2020-09-25 NOTE — ECGEPIP
[facility] - ED

                                       

                                       Test Date:    2020

Pat Name:     JAMEL HARVEY             Department:   

Patient ID:   [id number]                 Room:         0102

Gender:       Female                   Technician:   doris

:          1960               Requested By: MARKOS BOWER

Order Number: [id number]     Reading MD:   Elia Theodore

                                 Measurements

Intervals                              Axis          

Rate:         93                       P:            8

ID:           [id number]                      QRS:          15

QRSD:         89                       T:            51

QT:           387                                    

QTc:          481                                    

                           Interpretive Statements

SINUS RHYTHM WITH SHORT ID INTERVAL

NONSPECIFIC T-WAVE ABNORMALITY

Baseline artifact

BORDERLINE LOW VOLTAGE IN FRONTAL LEADS

Prolonged QTc interval

Similar to tracing done 17

Electronically Signed on 2020 18:03:42 EDT by Elia Theodore

## 2020-09-25 NOTE — HPEPDOC
General


Date of Admission


Sep 25, 2020 at 08:07


Date of Service:  Sep 25, 2020


Attending Physician:  ERNESTO CABRERA DO


Chief Complaint


The patient is a 60-year-old female admitted with a reason for visit of Ascites 

Dyspnea Pleural Effusion Left.


Source:  Patient


Exam Limitations:  No limitations





History of Present Illness


Ms. Taylor is a 60-year-old female with alcoholic liver cirrhosis with portal 

hypertension, esophageal varices, hypothyroidism, and hypertension who comes to 

[facility] for dyspnea found to have large left pleural effusion. 

She was last here in January 2020 with left pleural effusion as well as ascites.

During that admission she had it drained. Since then she's been on diuretics and

on a diet. As of late she tells me, she has not been able to adhere to the diet.

She has not been able to go shopping to find low salt food. She's been eating a 

lot of salty foods. About 2 weeks ago, started feel short of breath with chest 

tightness. Last night she went to sleep around 2 PM, and then she woke up at 5 

AM gasping for air. While in the ED, she saturating around 93%. Troponins were 

negative, EKG did not suggest ACS. When they got the chest x-ray, it 

demonstrated a large left pleural effusion. Then the call for admission. When I 

saw the patient, she appeared mildly dyspneic. She had some trouble completing 

some sentences. She denied any fever or chills, lightheadedness or dizziness, 

abdominal pain, or dysuria. She denies any new rashes. She reports having 

diarrhea secondary to lactulose. She tells me that she's been taking all her 

medications, but she has not been compliant with her diet due to difficulties 

shopping. Last time she was here Dr. Clark had done her procedure. I reached 

out to IR for the thoracentesis. Her abdomen is also distended, imaging also 

demonstrates ascites. She may also need a paracentesis afterwards.





Home Medications


Scheduled


Betaxolol Hcl (Betaxolol HCl) 0.5 % Sol, 1 DROP OU BID, (Reported)


Brimonidine Tartrate (Brimonidine Tartrate) 0.2% 5ML Drops, 1 DROP OU BID, 

(Reported)


Clindamycin Hcl (Cleocin HCl) 300 Mg Capsule, 300 MG PO TID, (Reported)


   FOR 10 DAYS FILLED 9/17/20 


Folic Acid (Folic Acid) 1 Mg Tablet, 1 MG PO DAILY, (Reported)


Furosemide (Furosemide) 40 Mg Tablet, 40 MG PO DAILY, (Reported)


Guaifenesin (Mucinex) 600 Mg Tab.er.12h, 600 MG PO BID, (Reported)


Lactulose (Lactulose) 10 Gm/15 Ml Solution, 30 ML PO TID, (Reported)


Latanoprost (Xalatan) 0.005% 2.5ML Drops, 1 DROP OU QHS, (Reported)


Levothyroxine Sodium (Synthroid) 112 Mcg Tablet, 112 MCG PO DAILY, (Reported)


Loratadine (Loratadine) 10 Mg Tablet, 10 MG PO DAILY, (Reported)


Spironolactone (Spironolactone) 25 Mg Tablet, 25 MG PO BID, (Reported)





Scheduled PRN


Fluticasone Propionate (Fluticasone Propionate) 16 Gm Spray.susp, 2 SPRAY NARES 

DAILY PRN for CONGESTION, (Reported)





Allergies


Coded Allergies:  


     cefdinir (Verified  Allergy, Severe, THROAT CLOSES, 5/27/20)


     Penicillins (Verified  Allergy, Unknown, UNKNOWN CHILDHOOD REACTION, 

5/27/20)





Past Medical History


Medical History


1. Glaucoma


2. Migraines


3. Hyperlipidemia


4. Hypertension next line 


5. Asthma


6. Hepatorenal syndrome 


7. Esophageal varices


8. Hemorrhoids


9. Carpal tunnel


10. Hypothyroidism


11. Anxiety 


12. Depression


13. Alcohol use


14. Cirrhosis


Surgical History


1. D&C


2. Pigtail catheter placement by Dr. Clark on 01/24/2020


3. Nasal surgery in 1978





Family History


Father: history of carcinoma


Mother: History of carcinoma, COPD





Social History


* Smoker:  Denies


Alcohol:  occationally (Last night she did a 24 ounce beer. She also drank last 

week)


Drugs:  denies





A-FIB/CHADSVASC


A-FIB History


Current/History of A-Fib/PAF?:  No





Review of Systems


Constitutional:  Denies: Chills, Fever


Eyes:  Denies: Vision change


ENT:  Denies: Head Aches


Skin:  Denies: Rash


Pulmonary:  Reports: Dyspnea


Cardiovascular:  Denies: Chest Pain


Gastrointestinal:  Reports: Diarrhea; 


   Denies: Nausea, Abdominal Pain


Genitourinary:  Denies: Dysuria


Neurological:  Denies: Numbness


Psych:  Denies: Anxiety, Depression





Physical Examination


General Exam:  Positive: Alert, Cooperative, Mild Distress


Eye Exam:  Positive: EOMI; 


   Negative: Sclera icteric


ENT Exam:  Positive: Mucous membr. moist/pink; 


   Negative: Atraumatic


Neck Exam:  Positive: Supple


Chest Exam:  Positive: Clear to auscultation


Heart Exam:  Positive: Rate Normal, Regular Rhythm


Abdomen Exam:  Positive: Normal bowel sounds, Other (distended); 


   Negative: Tenderness


Extremity Exam:  Positive: Edema (bilateral pitting edema)


Neuro Exam:  Positive: Cranial Nerves 3-12 NL


Psych Exam:  Positive: Mental status NL, Mood NL, Oriented x 3





Vital Signs





Vital Signs








  Date Time  Temp Pulse Resp B/P (MAP) Pulse Ox O2 Delivery O2 Flow Rate FiO2


 


9/25/20 09:55       2.0 


 


9/25/20 09:55 98.2 96 20 124/62 (82) 94 Room Air  











Laboratory Data


Labs 24H


Laboratory Tests 2


9/25/20 06:25: 


Immature Granulocyte % (Auto) , Neutrophils (%) (Auto) , Nucleated Red Blood 

Cells % (auto) 0.0, Neutrophils 70H, Lymphocytes (Manual) 14L, Monocytes 

(Manual) 8H, Eosinophils (Manual) 5H, Myelocytes 1H, Atypical Lymphocytes 2, 

Anisocytosis 1+, Platelet Estimate DECREASED, Immature Platelet Fraction 1.3, 

Prothrombin Time 19.4H, Prothromb Time International Ratio 1.60, Activated 

Partial Thromboplast Time 32.6, Anion Gap 11, Glomerular Filtration Rate 41.5L, 

Calcium Level 8.6L, Magnesium Level 2.2, Total Bilirubin 6.8H, Direct Bilirubin 

4.4H, Aspartate Amino Transf (AST/SGOT) 80H, Alanine Aminotransferase (ALT/SGPT)

51, Alkaline Phosphatase 136H, Total Creatine Kinase 50, Creatine Kinase MB < 

1.0, Creatine Kinase MB Relative Index 2.00, Troponin I < 0.02, NT-Pro-B-Type 

Natriuretic Peptide 121, Total Protein 7.9, Albumin 2.0L, Albumin/Globulin Ratio

0.3L


9/25/20 07:27: Lactic Acid Level 1.8


CBC/BMP


Laboratory Tests


9/25/20 06:25








Microbiology





Microbiology


9/25/20 Respiratory Virus Panel (PCR) (GUI) - Final, Complete


          


9/25/20 Blood Culture, Received


          Pending


9/25/20 Blood Culture, Received


          Pending





 Assessment/Plan


Ms. Taylor is a 60-year-old female with alcoholic liver cirrhosis with portal 

hypertension, hypothyroidism, and hypertension who is here for dyspnea secondary

to large left pleural effusion. She's been compliant with her medications and 

her diuretics. She has not been compliant with her diet due to difficulties 

shopping. She doesn't that she's been eating a lot of salty foods. Started about

2 weeks ago. She initially had dyspnea with some chest tightness. This morning 

she woke up with shortness of breath. She came into the ED to find out she had a

large left pleural effusion again. Last time it happened was January 2020. I 

reached out to IR who will do thoracentesis. We'll do studies on the pleural 

fluid. She may need a paracentesis afterwards.





Plan / VTE


VTE Prophylaxis Ordered?:  Yes





Plan


Plan


1. Large left pleural effusion secondary to alcoholic cirrhosis


She is compliant with her diuretics


She is not compliant with diet. She's been having salty foods. She has also 

been drinking alcohol.


Contacted IR for thoracocentesis.


We'll do fluid studies





2. Alcoholic cirrhosis


She has been drinking alcohol.


AST mildly elevated at 80, ALT of 51.


Doesn't look like she has active hepatitis at this time.


Her total bilirubin 6.8, albumin is 2, INR is 1.6, her PT is 19.4


Her child Israel score is 11, which is class C. 


We'll continue her diuretics and lactulose. She'll be on a 2 g sodium diet





3. Ascites


Denies abdominal pain, but she does feel like her abdomen has been distended


There is moderate to large amount of ascites





4. Glaucoma


We'll continue her eyedrops





5. Hypothyroidism


Appears euthyroid


continue her levothyroxine





6. Hypertension


She may have had a history of hypertension, but blood pressure is controlled 

now


Continue furosemide and spironolactone





7. DVT prophylaxis


Because she is going for thoracentesis, no chemical prophylaxis at this time. 

Will put her on SCDs and teds.











ERNESTO CABRERA DO               Sep 25, 2020 12:36

## 2020-09-25 NOTE — REPVR
PROCEDURE INFORMATION: 

Exam: XR Chest, 1 View 

Exam date and time: 9/25/2020 6:39 AM 

Age: 60 years old 

Clinical indication: Shortness of breath; Additional info: Dyspnea/cough 



TECHNIQUE: 

Imaging protocol: XR of the chest 

Views: 1 view. 



COMPARISON: 

CR Chest, 2 view PA, Lat 8/21/2020 8:47 AM 



FINDINGS: 

Lungs: There is near complete opacification of the left hemithorax, consistent 

with a large left pleural effusion with associated compressive atelectasis and 

consolidation. Right lung remains well aerated. 

Pleural space: No evidence of pneumothorax. 

Heart/Mediastinum: Cardiomediastinal silhouette is largely obscured. 

Bones/joints: Bones are stable. Thoracic dextroscoliosis. 



IMPRESSION: 

Near complete opacification of the left hemithorax, consistent with a large 

left pleural effusion with associated atelectasis and consolidation. This has 

significantly progressed when compared to the prior examination. 



Electronically signed by: Yogi Carter On 09/25/2020  07:14:33 AM

## 2020-09-25 NOTE — REP
ABDOMINAL RIGHT UPPER QUADRANT ULTRASOUND 



INDICATION:  Alcoholic cirrhosis with ascites. 



COMPARISON: Abdomen/pelvis CT dated 01/26/2020.  



FINDINGS: There is no cholelithiasis, gallbladder wall thickening, or 
pericholecystic fluid. There is no intrahepatic or extrahepatic biliary duct 
dilation. The common duct measures 3.7 mm in diameter.  The hepatic parenchyma 
is heterogeneous and the hepatic margin is nodular. This is compatible with the 
clinical diagnosis of cirrhosis. No hepatic masses or cysts are identified. 
There is ascites surrounding the liver. 



The pancreas is obscured by bowel gas. 



The right kidney measures 10.1 x 4.4 x 4.8 cm that is normal size. There is no 
right renal calculus, hydronephrosis, mass, or cyst. 



IMPRESSION: 

Increased echogenicity of hepatic parenchyma and the nodular hepatic margin 
compatible with the clinical diagnosis of cirrhosis. There was ascites 
surrounding the liver. I note that on the comparison CT, there were 
gastrohepatic ligament and paraesophageal varices and a re-cannulized 
paraumbilical vein.

MTDD

## 2020-09-25 NOTE — REPVR
PROCEDURE INFORMATION: 

Exam: CT Abdomen And Pelvis With Contrast 

Exam date and time: 9/25/2020 8:21 AM 

Age: 60 years old 

Clinical indication: Other: Ascites 



TECHNIQUE: 

Imaging protocol: Computed tomography of the abdomen and pelvis with 

intravenous contrast. 

Radiation optimization: All CT scans at this facility use at least one of these 

dose optimization techniques: automated exposure control; mA and/or kV 

adjustment per patient size (includes targeted exams where dose is matched to 

clinical indication); or iterative reconstruction. 

Contrast material: ISOVUE 370; Contrast volume: 100 ml; Contrast route: 

INTRAVENOUS (IV);  



COMPARISON: 

CT ABD/PEL W/IV CONTRAST ONLY 1/26/2020 4:15 AM 



FINDINGS: 

Lungs: There is complete atelectatic consolidation of the visualized left lung. 

The right lung base is clear. 

Heart: Cardiac size is normal. No pericardial effusion. There is shift of the 

heart and mediastinal structures toward the right secondary to a large left 

pleural effusion. 



Liver: Stable cirrhotic appearance of the liver. 

Gallbladder and bile ducts: No calcified gallstones. No ductal dilatation. 

Pancreas: Unremarkable. No ductal dilatation. 

Spleen: Stable splenomegaly. 

Adrenals: Unremarkable. No mass. 

Kidneys and ureters: Symmetric nephrograms. No hydronephrosis or mass. 

Stomach and bowel: Stomach is decompressed. No bowel obstruction. Colonic 

diverticulosis. No evidence of acute diverticulitis. 

Appendix: No evidence of appendicitis. 



Intraperitoneal space: Moderate to large amount of ascites which is seen 

perihepatic and perisplenic and extends along the paracolic gutters into the 

pelvis. No free air. 

Vasculature: Atherosclerosis. No abdominal aortic aneurysm. Patent portal vein 

with a recanalized umbilical vein. Distal paraesophageal and abdominopelvic 

varices. 

Lymph nodes: No significant adenopathy. 

Urinary bladder: Unremarkable as visualized. 

Reproductive: Complex cystic left adnexal mass may be in part related to a 

hydrosalpinx, however, this lesion has increased in size and now measures 6.8 x 

5.4 cm, and is concerning for a cystic ovarian lesion. 

Bones/joints: Bones are demineralized. Degenerative change within the lower 

thoracic and lumbar spine. Mild lumbar levoscoliosis. Mild degenerative change 

involving the sacroiliac joints and hip joints. No acute fracture. 

Soft tissues: Diffuse anasarca. Anterior abdominal wall and paraumbilical 

varices. Ascites extends into a right inguinal hernia. 



IMPRESSION: 

1. Cirrhosis and portal hypertension with splenomegaly and a recanalized 

umbilical vein. Distal paraesophageal and abdominopelvic varices. 

2. Moderate to large amount of ascites and diffuse anasarca. 

3. Large left pleural effusion with complete atelectatic consolidation of the 

visualized left lung and mass effect with shift of the heart and mediastinal 

structures to the right. 

4. Enlarging complex cystic left adnexal mass which may be in part related to a 

hydrosalpinx but is concerning for a cystic ovarian lesion. Recommend pelvic 

ultrasound for further characterization. 

5. Additional non-emergent findings, as discussed above. 



Electronically signed by: Yogi Carter On 09/25/2020  10:40:57 AM

## 2020-09-25 NOTE — REPVR
PROCEDURE INFORMATION: 

Exam: CT Angiography Chest With Contrast 

Exam date and time: 9/25/2020 7:15 AM 

Age: 60 years old 

Clinical indication: Other: Left pleural effusion; Additional info: Large left 

pl effusion, await resp panel 



TECHNIQUE: 

Imaging protocol: Computed tomographic angiography of the chest with 

intravenous contrast. 

3D rendering (Not supervised by radiologist): MIP and/or 3D reconstructed 

images were created by the technologist. 

Radiation optimization: All CT scans at this facility use at least one of these 

dose optimization techniques: automated exposure control; mA and/or kV 

adjustment per patient size (includes targeted exams where dose is matched to 

clinical indication); or iterative reconstruction. 

Contrast material: ISOVUE 370; Contrast volume: 100 ml; Contrast route: 

INTRAVENOUS (IV);  



COMPARISON: 

CR PORTABLE CHEST X-RAY 9/25/2020 6:30 AM 



FINDINGS: 

Pulmonary arteries: Evaluation of the pulmonary arteries is limited by the 

degree of respiratory motion. No intraluminal filling defects are identified to 

suggest pulmonary embolism. 

Aorta: Thoracic aorta is normal in caliber. No aneurysm. No dissection. 



Lungs: Lungs demonstrate respiratory motion. There is near complete atelectatic 

consolidation of the left lung with only a small aerated portion of the left 

upper lobe. Calcified granuloma within the right mid lung. No consolidation on 

the right. 

Pleural space: See "Heart" finding. 

Heart: Cardiac size is normal. No pericardial effusion. There is shift of the 

heart and mediastinal structures towards the right secondary to a large left 

pleural effusion which occupies the majority of the left hemithorax. No pleural 

effusion on the right. No pneumothorax. 

Lymph nodes: No significant adenopathy. 



Bones/joints: Bones are demineralized. Degenerative change within the thoracic 

spine with an associated scoliosis. No acute fracture. 

Soft tissues: Subcutaneous soft tissue edema, most pronounced inferiorly and 

dependent. 

Upper abdomen: See concurrent CT abdomen/pelvis, reported separately. 



IMPRESSION: 

1. No evidence of pulmonary embolism. 

2. Large left pleural effusion which occupies the majority of the left 

hemithorax with near complete atelectatic consolidation of the left lung. There 

is associated mass effect with shift of the heart and mediastinal structures 

toward the right. 

3. Additional non-emergent findings, as discussed above. 



Electronically signed by: Yogi Carter On 09/25/2020  10:57:47 AM

## 2020-09-26 VITALS — SYSTOLIC BLOOD PRESSURE: 124 MMHG | DIASTOLIC BLOOD PRESSURE: 62 MMHG

## 2020-09-26 VITALS — DIASTOLIC BLOOD PRESSURE: 67 MMHG | SYSTOLIC BLOOD PRESSURE: 117 MMHG

## 2020-09-26 VITALS — DIASTOLIC BLOOD PRESSURE: 64 MMHG | SYSTOLIC BLOOD PRESSURE: 126 MMHG

## 2020-09-26 LAB
BUN SERPL-MCNC: 19 MG/DL (ref 7–18)
CALCIUM SERPL-MCNC: 7.8 MG/DL (ref 8.8–10.2)
CHLORIDE SERPL-SCNC: 99 MEQ/L (ref 98–107)
CO2 SERPL-SCNC: 29 MEQ/L (ref 21–32)
CREAT SERPL-MCNC: 1.48 MG/DL (ref 0.55–1.3)
GFR SERPL CREATININE-BSD FRML MDRD: 38.3 ML/MIN/{1.73_M2} (ref 45–?)
GLUCOSE SERPL-MCNC: 137 MG/DL (ref 70–100)
POTASSIUM SERPL-SCNC: 3.2 MEQ/L (ref 3.5–5.1)
SODIUM SERPL-SCNC: 134 MEQ/L (ref 136–145)

## 2020-09-26 RX ADMIN — FOLIC ACID SCH MG: 1 TABLET ORAL at 09:52

## 2020-09-26 RX ADMIN — SPIRONOLACTONE SCH MG: 25 TABLET, FILM COATED ORAL at 09:51

## 2020-09-26 RX ADMIN — LACTULOSE SCH ML: 10 SOLUTION ORAL at 15:05

## 2020-09-26 RX ADMIN — LACTULOSE SCH ML: 10 SOLUTION ORAL at 20:15

## 2020-09-26 RX ADMIN — LACTULOSE SCH ML: 10 SOLUTION ORAL at 09:00

## 2020-09-26 RX ADMIN — BETAXOLOL HYDROCHLORIDE SCH DROP: 2.8 SUSPENSION/ DROPS OPHTHALMIC at 09:58

## 2020-09-26 RX ADMIN — SPIRONOLACTONE SCH MG: 25 TABLET, FILM COATED ORAL at 20:16

## 2020-09-26 RX ADMIN — LEVOTHYROXINE SODIUM SCH MCG: 112 TABLET ORAL at 06:03

## 2020-09-26 RX ADMIN — LORATADINE SCH MG: 10 TABLET ORAL at 09:52

## 2020-09-26 RX ADMIN — BRIMONIDINE TARTRATE SCH DROP: 1 SOLUTION/ DROPS OPHTHALMIC at 09:58

## 2020-09-26 RX ADMIN — BETAXOLOL HYDROCHLORIDE SCH DROP: 2.8 SUSPENSION/ DROPS OPHTHALMIC at 20:16

## 2020-09-26 RX ADMIN — FUROSEMIDE SCH MG: 40 TABLET ORAL at 09:53

## 2020-09-26 RX ADMIN — BRIMONIDINE TARTRATE SCH DROP: 1 SOLUTION/ DROPS OPHTHALMIC at 20:17

## 2020-09-26 RX ADMIN — LATANOPROST SCH DROP: 50 SOLUTION OPHTHALMIC at 20:16

## 2020-09-26 NOTE — IPNPDOC
Subjective


Date Seen


The patient was seen on 9/26/20.





Subjective


Chief Complaint/HPI


Ms. Taylor is a 60-year-old female with alcoholic liver cirrhosis with portal 

hypertension, esophageal varices, hypothyroidism, and hypertension who comes to 

[facility] for dyspnea found to have large left pleural effusion. 

Yesterday she went down to IR for thoracentesis. They're only able to draw 1 L 

of fluid off. Fluid studies does show transitive fluid. Yesterday I had a call 

from IR. They had said that they also cause a small pneumothorax. I contacted 

Dr. Wiseman who looked at the imaging with IR. It was okay, recommended that 

they remove more fluid on Monday. Otherwise this morning, she still has 

persistent dyspnea and chest tightness despite saturating well at room air. 

Denies any fever or chills, lightheadedness, abdominal pain, diarrhea, or 

dysuria.


Constitutional:  Denies: Chills, Fever


Pulmonary:  Reports: Dyspnea


Cardiovascular:  Reports: Other Symptoms (chest tightness)


Gastrointestinal:  Denies: Abdominal Pain, Diarrhea


Genitourinary:  Denies: Dysuria





Objective


Physical Examination


General Exam:  Positive: Alert, Cooperative, Mild Distress


Eye Exam:  Positive: EOMI; 


   Negative: Sclera icteric


ENT Exam:  Positive: Mucous membr. moist/pink; 


   Negative: Atraumatic


Neck Exam:  Positive: Supple


Chest Exam:  Positive: Clear to auscultation


Heart Exam:  Positive: Rate Normal, Regular Rhythm


Abdomen Exam:  Positive: Normal bowel sounds, Other (distended); 


   Negative: Tenderness


Extremity Exam:  Positive: Edema (bilateral pitting edema)


Neuro Exam:  Positive: Cranial Nerves 3-12 NL


Psych Exam:  Positive: Mental status NL, Mood NL, Oriented x 3





Assessment /Plan


Assessment


Ms. Taylor is a 60-year-old female with alcoholic liver cirrhosis with portal 

hypertension, hypothyroidism, and hypertension who is here for dyspnea secondary

to large left pleural effusion. She's been compliant with her medications and 

her diuretics. She has not been compliant with her diet due to difficulties 

shopping. She doesn't that she's been eating a lot of salty foods. Started about

2 weeks ago. She initially had dyspnea with some chest tightness. That morning 

she woke up with shortness of breath. She came into the ED to find out she had a

large left pleural effusion again.





Friday she went to IR for thoracentesis. They're only able to remove 1 L of flui

d, the fluid was transudative. There is concern for a small pneumothorax. 

Continue to monitor. Touched base with Dr. Wiseman. Recommended more fluid 

removal on Monday.





Plan/VTE


VTE Prophylaxis Ordered?:  Yes





Plan


1. Large left pleural effusion secondary to alcoholic cirrhosis


She is compliant with her diuretics


She is not compliant with diet. She's been having salty foods. She has also 

been drinking alcohol.


Contacted IR for thoracocentesis.


Fluid studies suggestive of transudative fluid. Fluid protein 1. Serum protein 

7.9. Fluid LDH 39. Serum . Serum albumin 2. Fluid albumin 0.3.


Fluid cultures pending.





2. Alcoholic cirrhosis


She has been drinking alcohol.


AST mildly elevated at 80, ALT of 51.


Doesn't look like she has active hepatitis at this time.


Her total bilirubin 6.8, albumin is 2, INR is 1.6, her PT is 19.4


Her child Israel score is 11, which is class C. 


We'll continue her diuretics and lactulose. She'll be on a 2 g sodium diet





3. Ascites


Denies abdominal pain, but she does feel like her abdomen has been distended


There is moderate to large amount of ascites





4. Glaucoma


We'll continue her eyedrops





5. Hypothyroidism


Appears euthyroid


continue her levothyroxine





6. Hypertension


She may have had a history of hypertension, but blood pressure is controlled 

now


Continue furosemide and spironolactone





7. DVT prophylaxis


SCDs and teds.





VS, I&O, 24H, Fishbone


Vital Signs/I&O





Vital Signs








  Date Time  Temp Pulse Resp B/P (MAP) Pulse Ox O2 Delivery O2 Flow Rate FiO2


 


9/26/20 14:00 98.9 92 16 117/67 (84) 96 Room Air  


 


9/26/20 09:00        














I&O- Last 24 Hours up to 6 AM 


 


 9/26/20





 06:00


 


Intake Total 920 ml


 


Output Total 350 ml


 


Balance 570 ml











Laboratory Data


Microbiology





Microbiology


9/26/20 Blood Culture, Received


          Pending


9/26/20 Blood Culture, Received


          Pending


9/25/20 Respiratory Virus Panel (PCR) (GUI) - Final, Complete


          


9/25/20 Blood Culture - Preliminary, Resulted


          


9/25/20 Blood Culture - Preliminary, Resulted


          No growth after 24 hours . All specim...


9/25/20 Gram Stain - Final, Resulted


          


9/25/20 Body Fluid Culture, Resulted


          Pending


9/25/20 Anaerobic Culture, Resulted


          Pending











ERNESTO CABRERA DO               Sep 26, 2020 18:37

## 2020-09-27 VITALS — SYSTOLIC BLOOD PRESSURE: 114 MMHG | DIASTOLIC BLOOD PRESSURE: 67 MMHG

## 2020-09-27 VITALS — DIASTOLIC BLOOD PRESSURE: 70 MMHG | SYSTOLIC BLOOD PRESSURE: 108 MMHG

## 2020-09-27 VITALS — DIASTOLIC BLOOD PRESSURE: 67 MMHG | SYSTOLIC BLOOD PRESSURE: 104 MMHG

## 2020-09-27 VITALS — DIASTOLIC BLOOD PRESSURE: 70 MMHG | SYSTOLIC BLOOD PRESSURE: 118 MMHG

## 2020-09-27 VITALS — SYSTOLIC BLOOD PRESSURE: 104 MMHG | DIASTOLIC BLOOD PRESSURE: 66 MMHG

## 2020-09-27 LAB
ALBUMIN SERPL BCG-MCNC: 1.8 GM/DL (ref 3.2–5.2)
ALT SERPL W P-5'-P-CCNC: 41 U/L (ref 12–78)
BILIRUB SERPL-MCNC: 6.8 MG/DL (ref 0.2–1)
BUN SERPL-MCNC: 19 MG/DL (ref 7–18)
CALCIUM SERPL-MCNC: 7.8 MG/DL (ref 8.8–10.2)
CHLORIDE SERPL-SCNC: 98 MEQ/L (ref 98–107)
CO2 SERPL-SCNC: 26 MEQ/L (ref 21–32)
CREAT SERPL-MCNC: 1.27 MG/DL (ref 0.55–1.3)
GFR SERPL CREATININE-BSD FRML MDRD: 45.7 ML/MIN/{1.73_M2} (ref 45–?)
GLUCOSE SERPL-MCNC: 137 MG/DL (ref 70–100)
HCT VFR BLD AUTO: 32.9 % (ref 36–47)
HGB BLD-MCNC: 11 G/DL (ref 12–15.5)
MCH RBC QN AUTO: 35.5 PG (ref 27–33)
MCHC RBC AUTO-ENTMCNC: 33.4 G/DL (ref 32–36.5)
MCV RBC AUTO: 106.1 FL (ref 80–96)
PLATELET # BLD AUTO: 77 10^3/UL (ref 150–450)
POTASSIUM SERPL-SCNC: 3.3 MEQ/L (ref 3.5–5.1)
PROT SERPL-MCNC: 7 GM/DL (ref 6.4–8.2)
RBC # BLD AUTO: 3.1 10^6/UL (ref 4–5.4)
SODIUM SERPL-SCNC: 130 MEQ/L (ref 136–145)
WBC # BLD AUTO: 7.1 10^3/UL (ref 4–10)

## 2020-09-27 RX ADMIN — BRIMONIDINE TARTRATE SCH DROP: 1 SOLUTION/ DROPS OPHTHALMIC at 09:32

## 2020-09-27 RX ADMIN — BETAXOLOL HYDROCHLORIDE SCH DROP: 2.8 SUSPENSION/ DROPS OPHTHALMIC at 21:16

## 2020-09-27 RX ADMIN — LORATADINE SCH MG: 10 TABLET ORAL at 09:31

## 2020-09-27 RX ADMIN — LACTULOSE SCH ML: 10 SOLUTION ORAL at 09:31

## 2020-09-27 RX ADMIN — LATANOPROST SCH DROP: 50 SOLUTION OPHTHALMIC at 21:16

## 2020-09-27 RX ADMIN — SPIRONOLACTONE SCH MG: 25 TABLET, FILM COATED ORAL at 09:31

## 2020-09-27 RX ADMIN — FUROSEMIDE SCH MG: 40 TABLET ORAL at 09:30

## 2020-09-27 RX ADMIN — SPIRONOLACTONE SCH MG: 25 TABLET, FILM COATED ORAL at 21:16

## 2020-09-27 RX ADMIN — LACTULOSE SCH ML: 10 SOLUTION ORAL at 15:23

## 2020-09-27 RX ADMIN — FOLIC ACID SCH MG: 1 TABLET ORAL at 09:31

## 2020-09-27 RX ADMIN — LACTULOSE SCH ML: 10 SOLUTION ORAL at 21:16

## 2020-09-27 RX ADMIN — BETAXOLOL HYDROCHLORIDE SCH DROP: 2.8 SUSPENSION/ DROPS OPHTHALMIC at 09:33

## 2020-09-27 RX ADMIN — BRIMONIDINE TARTRATE SCH DROP: 1 SOLUTION/ DROPS OPHTHALMIC at 21:17

## 2020-09-27 RX ADMIN — DIPHENHYDRAMINE HYDROCHLORIDE PRN MG: 50 INJECTION, SOLUTION INTRAMUSCULAR; INTRAVENOUS at 09:30

## 2020-09-27 RX ADMIN — Medication SCH MG: at 21:16

## 2020-09-27 RX ADMIN — LEVOTHYROXINE SODIUM SCH MCG: 112 TABLET ORAL at 05:34

## 2020-09-27 NOTE — IPNPDOC
Subjective


Date Seen


The patient was seen on 9/27/20.





Subjective


Chief Complaint/HPI


Ms. Taylor is a 60-year-old female with alcoholic liver cirrhosis with portal 

hypertension, esophageal varices, hypothyroidism, and hypertension who comes to 

[facility] for dyspnea found to have large left pleural effusion. 

She was having crawling/itching sensation after the vancomycin dose. May be a 

reaction to Vancomycin vs minor withdrawal effect from alcohol. No need for IV 

vancomycin at this time, will discontinue. Otherwise this morning, she still has

persistent dyspnea and chest tightness despite saturating well at room air. 

Denies any fever or chills, lightheadedness, abdominal pain, diarrhea, or 

dysuria.


Constitutional:  Denies: Chills, Fever


Pulmonary:  Reports: Dyspnea


Cardiovascular:  Reports: Other Symptoms (chest tightness)


Gastrointestinal:  Denies: Abdominal Pain


Genitourinary:  Denies: Dysuria





Objective


Physical Examination


General Exam:  Positive: Alert, Cooperative, Mild Distress


Eye Exam:  Positive: EOMI; 


   Negative: Sclera icteric


ENT Exam:  Positive: Mucous membr. moist/pink; 


   Negative: Atraumatic


Neck Exam:  Positive: Supple


Chest Exam:  Positive: Clear to auscultation


Heart Exam:  Positive: Rate Normal, Regular Rhythm


Abdomen Exam:  Positive: Normal bowel sounds, Other (distended); 


   Negative: Tenderness


Extremity Exam:  Positive: Edema (bilateral pitting edema)


Neuro Exam:  Positive: Cranial Nerves 3-12 NL


Psych Exam:  Positive: Mental status NL, Mood NL, Oriented x 3





Assessment /Plan


Assessment


Ms. Taylor is a 60-year-old female with alcoholic liver cirrhosis with portal 

hypertension, hypothyroidism, and hypertension who is here for dyspnea secondary

to large left pleural effusion. She's been compliant with her medications and 

her diuretics. She has not been compliant with her diet due to difficulties 

shopping. She doesn't that she's been eating a lot of salty foods. Started about

2 weeks ago. She initially had dyspnea with some chest tightness. That morning 

she woke up with shortness of breath. She came into the ED to find out she had a

large left pleural effusion again.





Friday she went to IR for thoracentesis. They're only able to remove 1 L of 

fluid, the fluid was transudative. There is concern for a small pneumothorax. 

Continue to monitor. Touched base with Dr. Wiseman. Recommended more fluid 

removal on Monday. Ordered for thoracentesis for Monday





Plan/VTE


VTE Prophylaxis Ordered?:  Yes





Plan


1. Large left pleural effusion secondary to alcoholic cirrhosis


She is compliant with her diuretics


She is not compliant with diet. She's been having salty foods. She has also 

been drinking alcohol.


Contacted IR for thoracocentesis.


Fluid studies suggestive of transudative fluid. Fluid protein 1. Serum protein 

7.9. Fluid LDH 39. Serum . Serum albumin 2. Fluid albumin 0.3.


Fluid cultures negative. Discontinued vancomycin





2. Alcoholic cirrhosis


She has been drinking alcohol.


AST mildly elevated at 80, ALT of 51.


Doesn't look like she has active hepatitis at this time.


Her total bilirubin 6.8, albumin is 2, INR is 1.6, her PT is 19.4


Her child Israel score is 11, which is class C. 


We'll continue her diuretics and lactulose. She'll be on a 2 g sodium diet. 

Added on fluid restriction 1.8L


- Added on CIWA





3. Ascites


Denies abdominal pain, but she does feel like her abdomen has been distended


There is moderate to large amount of ascites


-May need to perform paracentesis after completing thoracentesis





4. Glaucoma


We'll continue her eyedrops





5. Hypothyroidism


Appears euthyroid


continue her levothyroxine





6. Hypertension


She may have had a history of hypertension, but blood pressure is controlled n

ow


Continue furosemide and spironolactone





7. DVT prophylaxis


SCDs and teds.





VS, I&O, 24H, Fishbone


Vital Signs/I&O





Vital Signs








  Date Time  Temp Pulse Resp B/P (MAP) Pulse Ox O2 Delivery O2 Flow Rate FiO2


 


9/27/20 06:30 98.7 92 16 104/67 (79) 94 Room Air  


 


9/26/20 09:00        














I&O- Last 24 Hours up to 6 AM 


 


 9/27/20





 06:00


 


Intake Total 2225 ml


 


Output Total 725 ml


 


Balance 1500 ml











Laboratory Data


24H LABS


Laboratory Tests 2


9/26/20 18:55: 


Anion Gap 6L, Glomerular Filtration Rate 38.3L, Calcium Level 7.8L


9/27/20 05:59: 


Anion Gap 6L, Glomerular Filtration Rate 45.7, Calcium Level 7.8L, Nucleated Red

Blood Cells % (auto) 0.0, Immature Platelet Fraction 1.3, Total Bilirubin 6.8H, 

Aspartate Amino Transf (AST/SGOT) 47H, Alanine Aminotransferase (ALT/SGPT) 41, 

Alkaline Phosphatase 121H, Total Protein 7.0, Albumin 1.8L, Albumin/Globulin 

Ratio 0.3L


CBC/BMP


Laboratory Tests


9/26/20 18:55








9/27/20 05:59








Microbiology





Microbiology


9/26/20 Blood Culture - Preliminary, Resulted


          No growth after 24 hours . All specim...


9/26/20 Blood Culture - Preliminary, Resulted


          No growth after 24 hours . All specim...


9/25/20 Respiratory Virus Panel (PCR) (GUI) - Final, Complete


          


9/25/20 Blood Culture - Preliminary, Resulted


          


9/25/20 Blood Culture - Preliminary, Resulted


          No Growth after 48 hours. All Specime...


9/25/20 Gram Stain - Final, Complete


          


9/25/20 Body Fluid Culture - Final, Complete


          


9/25/20 Anaerobic Culture - Final, Complete











ERNESTO CABRERA DO               Sep 27, 2020 12:45

## 2020-09-28 VITALS — DIASTOLIC BLOOD PRESSURE: 54 MMHG | SYSTOLIC BLOOD PRESSURE: 93 MMHG

## 2020-09-28 VITALS — DIASTOLIC BLOOD PRESSURE: 64 MMHG | SYSTOLIC BLOOD PRESSURE: 105 MMHG

## 2020-09-28 VITALS — DIASTOLIC BLOOD PRESSURE: 61 MMHG | SYSTOLIC BLOOD PRESSURE: 101 MMHG

## 2020-09-28 VITALS — SYSTOLIC BLOOD PRESSURE: 90 MMHG | DIASTOLIC BLOOD PRESSURE: 50 MMHG

## 2020-09-28 VITALS — DIASTOLIC BLOOD PRESSURE: 55 MMHG | SYSTOLIC BLOOD PRESSURE: 94 MMHG

## 2020-09-28 VITALS — SYSTOLIC BLOOD PRESSURE: 111 MMHG | DIASTOLIC BLOOD PRESSURE: 67 MMHG

## 2020-09-28 VITALS — SYSTOLIC BLOOD PRESSURE: 128 MMHG | DIASTOLIC BLOOD PRESSURE: 78 MMHG

## 2020-09-28 VITALS — SYSTOLIC BLOOD PRESSURE: 114 MMHG | DIASTOLIC BLOOD PRESSURE: 54 MMHG

## 2020-09-28 VITALS — SYSTOLIC BLOOD PRESSURE: 93 MMHG | DIASTOLIC BLOOD PRESSURE: 56 MMHG

## 2020-09-28 VITALS — SYSTOLIC BLOOD PRESSURE: 115 MMHG | DIASTOLIC BLOOD PRESSURE: 70 MMHG

## 2020-09-28 VITALS — SYSTOLIC BLOOD PRESSURE: 110 MMHG | DIASTOLIC BLOOD PRESSURE: 66 MMHG

## 2020-09-28 VITALS — SYSTOLIC BLOOD PRESSURE: 93 MMHG | DIASTOLIC BLOOD PRESSURE: 51 MMHG

## 2020-09-28 VITALS — SYSTOLIC BLOOD PRESSURE: 93 MMHG | DIASTOLIC BLOOD PRESSURE: 50 MMHG

## 2020-09-28 VITALS — SYSTOLIC BLOOD PRESSURE: 97 MMHG | DIASTOLIC BLOOD PRESSURE: 55 MMHG

## 2020-09-28 VITALS — DIASTOLIC BLOOD PRESSURE: 58 MMHG | SYSTOLIC BLOOD PRESSURE: 99 MMHG

## 2020-09-28 VITALS — DIASTOLIC BLOOD PRESSURE: 55 MMHG | SYSTOLIC BLOOD PRESSURE: 101 MMHG

## 2020-09-28 VITALS — DIASTOLIC BLOOD PRESSURE: 57 MMHG | SYSTOLIC BLOOD PRESSURE: 98 MMHG

## 2020-09-28 VITALS — DIASTOLIC BLOOD PRESSURE: 56 MMHG | SYSTOLIC BLOOD PRESSURE: 98 MMHG

## 2020-09-28 VITALS — SYSTOLIC BLOOD PRESSURE: 99 MMHG | DIASTOLIC BLOOD PRESSURE: 58 MMHG

## 2020-09-28 VITALS — DIASTOLIC BLOOD PRESSURE: 53 MMHG | SYSTOLIC BLOOD PRESSURE: 94 MMHG

## 2020-09-28 LAB
ALBUMIN SERPL BCG-MCNC: 1.6 GM/DL (ref 3.2–5.2)
ALT SERPL W P-5'-P-CCNC: 34 U/L (ref 12–78)
BILIRUB SERPL-MCNC: 6.4 MG/DL (ref 0.2–1)
BUN SERPL-MCNC: 20 MG/DL (ref 7–18)
CALCIUM SERPL-MCNC: 7.9 MG/DL (ref 8.8–10.2)
CHLORIDE SERPL-SCNC: 102 MEQ/L (ref 98–107)
CO2 SERPL-SCNC: 25 MEQ/L (ref 21–32)
CREAT SERPL-MCNC: 1.55 MG/DL (ref 0.55–1.3)
GFR SERPL CREATININE-BSD FRML MDRD: 36.3 ML/MIN/{1.73_M2} (ref 45–?)
GLUCOSE SERPL-MCNC: 113 MG/DL (ref 70–100)
HCT VFR BLD AUTO: 30.8 % (ref 36–47)
HGB BLD-MCNC: 10.2 G/DL (ref 12–15.5)
MCH RBC QN AUTO: 35.3 PG (ref 27–33)
MCHC RBC AUTO-ENTMCNC: 33.1 G/DL (ref 32–36.5)
MCV RBC AUTO: 106.6 FL (ref 80–96)
PLATELET # BLD AUTO: 57 10^3/UL (ref 150–450)
POTASSIUM SERPL-SCNC: 3.8 MEQ/L (ref 3.5–5.1)
PROT SERPL-MCNC: 6.4 GM/DL (ref 6.4–8.2)
RBC # BLD AUTO: 2.89 10^6/UL (ref 4–5.4)
SODIUM SERPL-SCNC: 134 MEQ/L (ref 136–145)
WBC # BLD AUTO: 7 10^3/UL (ref 4–10)

## 2020-09-28 PROCEDURE — 0W9B3ZZ DRAINAGE OF LEFT PLEURAL CAVITY, PERCUTANEOUS APPROACH: ICD-10-PCS | Performed by: THORACIC SURGERY (CARDIOTHORACIC VASCULAR SURGERY)

## 2020-09-28 RX ADMIN — LACTULOSE SCH ML: 10 SOLUTION ORAL at 08:57

## 2020-09-28 RX ADMIN — DIPHENHYDRAMINE HYDROCHLORIDE PRN MG: 50 INJECTION, SOLUTION INTRAMUSCULAR; INTRAVENOUS at 20:23

## 2020-09-28 RX ADMIN — SPIRONOLACTONE SCH MG: 25 TABLET, FILM COATED ORAL at 08:57

## 2020-09-28 RX ADMIN — Medication SCH MG: at 20:10

## 2020-09-28 RX ADMIN — BETAXOLOL HYDROCHLORIDE SCH DROP: 2.8 SUSPENSION/ DROPS OPHTHALMIC at 08:58

## 2020-09-28 RX ADMIN — BETAXOLOL HYDROCHLORIDE SCH DROP: 2.8 SUSPENSION/ DROPS OPHTHALMIC at 09:00

## 2020-09-28 RX ADMIN — BRIMONIDINE TARTRATE SCH DROP: 1 SOLUTION/ DROPS OPHTHALMIC at 21:00

## 2020-09-28 RX ADMIN — LACTULOSE SCH ML: 10 SOLUTION ORAL at 20:09

## 2020-09-28 RX ADMIN — BETAXOLOL HYDROCHLORIDE SCH DROP: 2.8 SUSPENSION/ DROPS OPHTHALMIC at 21:00

## 2020-09-28 RX ADMIN — SODIUM CHLORIDE, PRESERVATIVE FREE SCH ML: 5 INJECTION INTRAVENOUS at 14:05

## 2020-09-28 RX ADMIN — FOLIC ACID SCH MG: 1 TABLET ORAL at 08:56

## 2020-09-28 RX ADMIN — LACTULOSE SCH ML: 10 SOLUTION ORAL at 15:53

## 2020-09-28 RX ADMIN — Medication SCH MG: at 08:57

## 2020-09-28 RX ADMIN — FUROSEMIDE SCH MG: 40 TABLET ORAL at 08:57

## 2020-09-28 RX ADMIN — LATANOPROST SCH DROP: 50 SOLUTION OPHTHALMIC at 21:00

## 2020-09-28 RX ADMIN — LEVOTHYROXINE SODIUM SCH MCG: 112 TABLET ORAL at 06:11

## 2020-09-28 RX ADMIN — MULTIPLE VITAMINS W/ MINERALS TAB SCH TAB: TAB at 08:56

## 2020-09-28 RX ADMIN — SODIUM CHLORIDE, PRESERVATIVE FREE SCH ML: 5 INJECTION INTRAVENOUS at 22:00

## 2020-09-28 RX ADMIN — SPIRONOLACTONE SCH MG: 25 TABLET, FILM COATED ORAL at 20:10

## 2020-09-28 RX ADMIN — BRIMONIDINE TARTRATE SCH DROP: 1 SOLUTION/ DROPS OPHTHALMIC at 08:58

## 2020-09-28 RX ADMIN — LORATADINE SCH MG: 10 TABLET ORAL at 08:56

## 2020-09-28 NOTE — IPNPDOC
Subjective


Date Seen


The patient was seen on 9/28/20.





Subjective


Chief Complaint/HPI


Ms. Taylor is a 60-year-old female with alcoholic liver cirrhosis with portal 

hypertension, esophageal varices, hypothyroidism, and hypertension who comes to 

[facility] for dyspnea found to have large left pleural effusion. 

Overnight, she had some tremors and crawling/itching sensation. She was given 

lorazepam. This morning, she was still complaining of dyspnea and chest 

tightness. CXR appears that she is filling up with more fluid. Consulted Dr. Wiseman for help with large left pleural effusion.


Constitutional:  Denies: Chills, Fever


Pulmonary:  Reports: Dyspnea


Cardiovascular:  Reports: Other Symptoms (chest tightness)


Gastrointestinal:  Denies: Nausea, Abdominal Pain


Genitourinary:  Denies: Dysuria





Objective


Physical Examination


General Exam:  Positive: Alert, Cooperative, Mild Distress


Eye Exam:  Positive: EOMI; 


   Negative: Sclera icteric


ENT Exam:  Positive: Mucous membr. moist/pink; 


   Negative: Atraumatic


Neck Exam:  Positive: Supple


Chest Exam:  Positive: Clear to auscultation


Heart Exam:  Positive: Rate Normal, Regular Rhythm


Abdomen Exam:  Positive: Normal bowel sounds, Other (distended); 


   Negative: Tenderness


Extremity Exam:  Positive: Edema (bilateral pitting edema)


Neuro Exam:  Positive: Cranial Nerves 3-12 NL


Psych Exam:  Positive: Mental status NL, Mood NL, Oriented x 3





Assessment /Plan


Assessment


Ms. Taylor is a 60-year-old female with alcoholic liver cirrhosis with portal 

hypertension, hypothyroidism, and hypertension who is here for dyspnea secondary

to large left pleural effusion. She's been compliant with her medications and 

her diuretics. She has not been compliant with her diet due to difficulties 

shopping. She doesn't that she's been eating a lot of salty foods. Started about

2 weeks ago. She initially had dyspnea with some chest tightness. That morning 

she woke up with shortness of breath. She came into the ED to find out she had a

large left pleural effusion again.





Friday she went to IR for thoracentesis. They're only able to remove 1 L of 

fluid, the fluid was transudative. There is concern for a small pneumothorax. 

Continue to monitor. CXR today shows that her left lung is filling up again. C

onsulted Dr. Wiseman today





Plan/VTE


VTE Prophylaxis Ordered?:  Yes





Plan


1. Large left pleural effusion secondary to alcoholic cirrhosis


She is compliant with her diuretics


She is not compliant with diet. She's been having salty foods. She has also 

been drinking alcohol.


Contacted IR for thoracocentesis since IR did it in the past (Jan 2020)


Fluid studies suggestive of transudative fluid. Fluid protein 1. Serum protein 

7.9. Fluid LDH 39. Serum . Serum albumin 2. Fluid albumin 0.3.


Fluid cultures negative. Discontinued vancomycin


-Started to fill up again over weekend. Consulted Thoracic surgery, Dr. Wiseman.

Recommendations appreciated





2. Alcoholic cirrhosis


She has been drinking alcohol.


AST mildly elevated at 80, ALT of 51.


Doesn't look like she has active hepatitis at this time.


Her total bilirubin 6.8, albumin is 2, INR is 1.6, her PT is 19.4


Her child Israel score is 11, which is class C. 


We'll continue her diuretics and lactulose. She'll be on a 2 g sodium diet. 

Added on fluid restriction 1.8L


- Added on CIWA





3. Ascites


Denies abdominal pain, but she does feel like her abdomen has been distended


There is moderate to large amount of ascites


-May need to perform paracentesis after draining lung





4. Glaucoma


We'll continue her eyedrops





5. Hypothyroidism


Appears euthyroid


continue her levothyroxine





6. Hypertension


She may have had a history of hypertension, but blood pressure is controlled no

w


Continue furosemide and spironolactone





7. DVT prophylaxis


SCDs and teds.





Dispo: Pending thoracentesis. Possible paracentesis.





VS, I&O, 24H, Fishbone


Vital Signs/I&O





Vital Signs








  Date Time  Temp Pulse Resp B/P (MAP) Pulse Ox O2 Delivery O2 Flow Rate FiO2


 


9/28/20 17:10   20     


 


9/28/20 16:00 98.1 74  93/50 (64) 96 Room Air  


 


9/28/20 13:14       4.0 














I&O- Last 24 Hours up to 6 AM 


 


 9/28/20





 06:00


 


Intake Total 1860 ml


 


Output Total 575 ml


 


Balance 1285 ml











Laboratory Data


24H LABS


Laboratory Tests 2


9/28/20 05:28: 


Nucleated Red Blood Cells % (auto) 0.0, Anion Gap 7L, Glomerular Filtration Rate

36.3L, Calcium Level 7.9L, Total Bilirubin 6.4H, Aspartate Amino Transf 

(AST/SGOT) 45H, Alanine Aminotransferase (ALT/SGPT) 34, Alkaline Phosphatase 

113, Total Protein 6.4, Albumin 1.6L, Albumin/Globulin Ratio 0.3L


CBC/BMP


Laboratory Tests


9/28/20 05:28








Microbiology





Microbiology


9/26/20 Blood Culture - Preliminary, Resulted


          No Growth after 48 hours. All Specime...


9/26/20 Blood Culture - Preliminary, Resulted


          No Growth after 48 hours. All Specime...


9/25/20 Respiratory Virus Panel (PCR) (GUI) - Final, Complete


          


9/25/20 Blood Culture - Final, Complete


          Staphylococcus Epidermidis


9/25/20 Blood Culture - Preliminary, Resulted


          No Growth after 72 hours. All specime...


9/25/20 Gram Stain - Final, Complete


          


9/25/20 Body Fluid Culture - Final, Complete


          


9/25/20 Anaerobic Culture - Final, Complete











ERNESTO CABRERA DO               Sep 28, 2020 20:17

## 2020-09-29 VITALS — DIASTOLIC BLOOD PRESSURE: 53 MMHG | SYSTOLIC BLOOD PRESSURE: 101 MMHG

## 2020-09-29 VITALS — SYSTOLIC BLOOD PRESSURE: 84 MMHG | DIASTOLIC BLOOD PRESSURE: 48 MMHG

## 2020-09-29 VITALS — SYSTOLIC BLOOD PRESSURE: 110 MMHG | DIASTOLIC BLOOD PRESSURE: 59 MMHG

## 2020-09-29 VITALS — DIASTOLIC BLOOD PRESSURE: 56 MMHG | SYSTOLIC BLOOD PRESSURE: 115 MMHG

## 2020-09-29 VITALS — DIASTOLIC BLOOD PRESSURE: 67 MMHG | SYSTOLIC BLOOD PRESSURE: 123 MMHG

## 2020-09-29 VITALS — DIASTOLIC BLOOD PRESSURE: 54 MMHG | SYSTOLIC BLOOD PRESSURE: 94 MMHG

## 2020-09-29 LAB
ALBUMIN SERPL BCG-MCNC: 1.4 GM/DL (ref 3.2–5.2)
ALT SERPL W P-5'-P-CCNC: 31 U/L (ref 12–78)
BILIRUB SERPL-MCNC: 5.9 MG/DL (ref 0.2–1)
BUN SERPL-MCNC: 22 MG/DL (ref 7–18)
CALCIUM SERPL-MCNC: 7.9 MG/DL (ref 8.8–10.2)
CHLORIDE SERPL-SCNC: 103 MEQ/L (ref 98–107)
CO2 SERPL-SCNC: 26 MEQ/L (ref 21–32)
CREAT SERPL-MCNC: 1.6 MG/DL (ref 0.55–1.3)
GFR SERPL CREATININE-BSD FRML MDRD: 35 ML/MIN/{1.73_M2} (ref 45–?)
GLUCOSE SERPL-MCNC: 95 MG/DL (ref 70–100)
HCT VFR BLD AUTO: 30.9 % (ref 36–47)
HGB BLD-MCNC: 10.4 G/DL (ref 12–15.5)
MCH RBC QN AUTO: 35.7 PG (ref 27–33)
MCHC RBC AUTO-ENTMCNC: 33.7 G/DL (ref 32–36.5)
MCV RBC AUTO: 106.2 FL (ref 80–96)
PLATELET # BLD AUTO: 61 10^3/UL (ref 150–450)
POTASSIUM SERPL-SCNC: 4.3 MEQ/L (ref 3.5–5.1)
PROT SERPL-MCNC: 6 GM/DL (ref 6.4–8.2)
RBC # BLD AUTO: 2.91 10^6/UL (ref 4–5.4)
SODIUM SERPL-SCNC: 135 MEQ/L (ref 136–145)
WBC # BLD AUTO: 8.5 10^3/UL (ref 4–10)

## 2020-09-29 RX ADMIN — SPIRONOLACTONE SCH MG: 25 TABLET, FILM COATED ORAL at 21:39

## 2020-09-29 RX ADMIN — SODIUM CHLORIDE, PRESERVATIVE FREE SCH ML: 5 INJECTION INTRAVENOUS at 21:40

## 2020-09-29 RX ADMIN — BETAXOLOL HYDROCHLORIDE SCH DROP: 2.8 SUSPENSION/ DROPS OPHTHALMIC at 21:40

## 2020-09-29 RX ADMIN — LORATADINE SCH MG: 10 TABLET ORAL at 08:14

## 2020-09-29 RX ADMIN — LATANOPROST SCH DROP: 50 SOLUTION OPHTHALMIC at 21:40

## 2020-09-29 RX ADMIN — FUROSEMIDE SCH MG: 40 TABLET ORAL at 08:14

## 2020-09-29 RX ADMIN — LACTULOSE SCH ML: 10 SOLUTION ORAL at 08:13

## 2020-09-29 RX ADMIN — LEVOTHYROXINE SODIUM SCH MCG: 112 TABLET ORAL at 05:46

## 2020-09-29 RX ADMIN — LACTULOSE SCH ML: 10 SOLUTION ORAL at 21:39

## 2020-09-29 RX ADMIN — SPIRONOLACTONE SCH MG: 25 TABLET, FILM COATED ORAL at 08:13

## 2020-09-29 RX ADMIN — BETAXOLOL HYDROCHLORIDE SCH DROP: 2.8 SUSPENSION/ DROPS OPHTHALMIC at 09:33

## 2020-09-29 RX ADMIN — FOLIC ACID SCH MG: 1 TABLET ORAL at 08:14

## 2020-09-29 RX ADMIN — DIPHENHYDRAMINE HYDROCHLORIDE PRN MG: 50 INJECTION, SOLUTION INTRAMUSCULAR; INTRAVENOUS at 09:36

## 2020-09-29 RX ADMIN — Medication SCH MG: at 21:39

## 2020-09-29 RX ADMIN — SODIUM CHLORIDE, PRESERVATIVE FREE SCH ML: 5 INJECTION INTRAVENOUS at 14:10

## 2020-09-29 RX ADMIN — BRIMONIDINE TARTRATE SCH DROP: 1 SOLUTION/ DROPS OPHTHALMIC at 09:33

## 2020-09-29 RX ADMIN — Medication SCH MG: at 08:13

## 2020-09-29 RX ADMIN — LACTULOSE SCH ML: 10 SOLUTION ORAL at 16:46

## 2020-09-29 RX ADMIN — MULTIPLE VITAMINS W/ MINERALS TAB SCH TAB: TAB at 08:14

## 2020-09-29 RX ADMIN — BRIMONIDINE TARTRATE SCH DROP: 1 SOLUTION/ DROPS OPHTHALMIC at 21:40

## 2020-09-29 RX ADMIN — SODIUM CHLORIDE, PRESERVATIVE FREE SCH ML: 5 INJECTION INTRAVENOUS at 05:49

## 2020-09-29 NOTE — REPVR
PROCEDURE INFORMATION: 

Exam: XR Chest, 2 Views 

Exam date and time: 9/29/2020 7:30 AM 

Age: 60 years old 

Clinical indication: Other: Plueral effusion; Additional info: Pleural effusion 



TECHNIQUE: 

Imaging protocol: XR of the chest 

Views: 2 views. 



COMPARISON: 

ME PORTABLE CHEST X-RAY 9/28/2020 1:27 PM 



FINDINGS: 

Lungs: Diminishing left pleural effusion with underlying lower left lung 

opacity remaining and small residual effusion. 

Pleural space: Questionable minimal superior left pneumothorax approximately 1 

mm in diameter. 

Heart/Mediastinum: Stable heart size. 

Bones/joints: Unremarkable. 

Soft tissues: Soft tissue emphysema left chest wall. A tubular structure 

superimposes the lower left chest or junction of the upper abdomen may possibly 

reflect a pleural drain requires clinical correlation. 



IMPRESSION: 

1. Reduction in the amount of left effusion with small residual effusion and 

opacity of lower left lung. 

2. Prominent left chest wall soft tissue emphysema. 

3. Minimal left pneumothorax. 



Electronically signed by: Radha Garcia On 09/29/2020  08:47:45 AM

## 2020-09-29 NOTE — CR
DATE OF CONSULTATION:   09/28/2020



REASON FOR CONSULTATION:  The patient is seen at the request of the hospitalist 
service, Dr. Gilliam for a recurrent pleural effusion in the last few days.



HISTORY OF PRESENT ILLNESS:  The patient is a 60-year-old white female with 
alcoholic liver disease and cirrhosis along with the accompanying portal 
hypertension and esophageal varices. She was drained in January of 2020 of a 
pleural effusion on the left.  She did well until a few days when she started 
becoming short of breath and another pleural effusion was noted. I was called at
that time and my suggestion was to drain her via thoracentesis as it had been a 
significant interval between her first drainage in January and now.  That was 
undertaken where they took off approximately 1400 mL.  However, today two days 
later her pleural effusion is back and almost occupying the entire chest.



It is very difficult to get a history from Mrs. Taylor as she is rather 
somnolent and not wanting to talk very much. In fact if I do talk to her she 
says that she has difficulty breathing but she is not a lot bothered by it. I 
suspect this is secondary to her mental status and somnolence.  By history in 
the medical records, she has had no fever, chills, or sweats and no dizziness or
abdominal pain.  She evidently has not been compliant with her diet. 



MEDICATIONS AT HOME:   

-   Betaxolol one drop both eyes twice a day 

-   Brimonidine one drop both eyes twice a day 

-   Clindamycin 300 mg p.o. three times daily

-   Folic acid 1 mg daily 

-   Lasix 40 mg daily 

-   Lactulose 30 mL three times a day 

-   Xalatan one drop both eyes once daily at bedtime 

-   Synthroid 112 mcg daily  

-   Loratadine 10 mg daily 

-   Spironolactone 25 mg twice a day  



ALLERGIES: CEFDINIR with shortness of breath and laryngeal spasm, PENICILLIN, 
unknown childhood reaction.



PAST MEDICAL HISTORY:  Glaucoma, migraines, hyperlipidemia, hypertension, 
asthma, hepatorenal syndrome, esophageal varices, carpal tunnel syndrome, 
hypothyroidism, alcohol abuse, and cirrhosis.



SURGICAL HISTORY:  Pigtail catheter with drainage in January 2020. Please see 
HPI. D&C in the remote past.  



HABITS:  Does not smoke, says she occasionally drinks and no illicit drugs.



REVIEW OF SYSTEMS:  Unobtainable secondary to the patient's cooperation.



FAMILY HISTORY:  Not pertinent to the urgent situation.



PHYSICAL EXAM:

Well developed somnolent white female in mild distress from shortness of breath.


Vital signs: Temperature is 98.6, heart rate 84 in sinus rhythm, respiratory 
rate of 22 without the use of accessory muscles. She is 95% saturated on room 
air and her blood pressure is 99/58.

HEENT: Head normocephalic.  Eyes: Pupils equal and reactive to light. 
Extraocular movements intact. Sclerae are icteric. Nose without deformity. Mouth
shows the mucous membranes to be thick and moist. Lips and commissures without 
lesions.  There is no thrush.

Neck is supple. There is no jugular venous distention, no subcutaneous 
emphysema. Trachea is midline. I do not appreciate lymphadenopathy or 
thyromegaly. 

Lungs show absent breath sounds on the left side with complete E:A egophony on 
the left side.  Percussion is dull on the left side.  Right lung shows 
intermittent rales and rhonchi most of which clear with coughing.    Percussion 
notes are full through the diaphragm on the left as far as I can tell through 
her obesity. 

Abdomen is soft, nontender but rather distended. I do not feel a fluid wave 
however.  She has multiple striae.  I cannot appreciate hepatomegaly through her
obesity and there is no CVA tenderness. 

Extremities show no pretibial edema, no calf tenderness, no differential 
swelling of the upper extremities.

Skin is warm and dry and perfused without cyanosis or mottling including that of
nailbeds and knees.

Neuro shows gross motor, gross sensation intact.  Cranial nerves II-XII intact. 


Psychiatric shows her to be rather somnolent and not willing to talk. She looks 
as though she has a depressed mood and affect although I cannot really be sure.



INVESTIGATIONS:  Her white count today is 7.0 with a hemoglobin and hematocrit 
of 10.2 and 30.8 with a platelet count of 57.  There is no differential on her 
today.  Chemistries show essentially normal electrolytes with a BUN and 
creatinine of 1.55, a glucose of 113, a calcium 7.9 with an albumin of 1.6.  AST
and ALT are 45 and 34 respectively with a total bilirubin of 6.4.  



Her PT/INR was 19.4 and 1.6 respectively with a PTT of 32 seconds.  Her last 
pleurocentesis on 09/25/2020 showed a glucose of 114 with an LDH of 39, total 
protein 1.0 with a corresponding total protein in the serum of 7.9 and an LDH of
253.  She had 99 white cells, 82% of which are lymphocytes and mononuclear cells
and 17% were neutrophils.  This therefore looks like a predominantly 
lymphocytic, monocytic transudative pleural effusion.  



Her chest x-ray today shows the left chest almost completely filled to the 
scapula.  There is an air fluid level at the very top of the hemithorax.  This 
is worse than it was after her thoracentesis on 09/25/2020.  She has evidently 
collected quite a bit more.  Her chest angiogram done on 09/25/2020 shows the 
large pleural effusion. There is a small to moderate pleural effusion on the 
right side.  I see a minimal amount of ascites around the spleen and liver.  
Underlying lung parenchyma on the right shows emphysematous changes.  There is a
shift of the mediastinum to the right.  The left lung is completely compressed 
except for just a small apical subsegment of the left upper lobe.  I do not see 
a pericardial effusion.  The liver has a lumpy bumpy appearance to it and looks 
to be shriveled and small.  



IMPRESSION:   

* Recurrent ascitic pleural effusion.  

* Liver failure.

* Probable hepatic encephalopathy.

* Hepatorenal syndrome.

* Portal hypertension. 

* Esophageal varices.

* Hypothyroidism. 

* Alcohol abuse.

* Hyperlipidemia. 

* Coma.



PLAN DISCUSSION:  I always try to avoid placing a PleurX catheter in cirrhotics 
as it will eventually nutritionally deplete them.  Nonetheless she has shortness
of breath with a completely compressed lung and a very large pleural effusion.  
I will therefore place the PleurX catheter.  I will only drain 1000 mL off.  I 
do suspect she has about 2 liters in there.  We will teach her the PleurX 
catheter bottle management.  



PEYTON

## 2020-09-30 VITALS — SYSTOLIC BLOOD PRESSURE: 84 MMHG | DIASTOLIC BLOOD PRESSURE: 46 MMHG

## 2020-09-30 VITALS — SYSTOLIC BLOOD PRESSURE: 115 MMHG | DIASTOLIC BLOOD PRESSURE: 54 MMHG

## 2020-09-30 VITALS — DIASTOLIC BLOOD PRESSURE: 71 MMHG | SYSTOLIC BLOOD PRESSURE: 123 MMHG

## 2020-09-30 VITALS — SYSTOLIC BLOOD PRESSURE: 107 MMHG | DIASTOLIC BLOOD PRESSURE: 68 MMHG

## 2020-09-30 VITALS — DIASTOLIC BLOOD PRESSURE: 50 MMHG | SYSTOLIC BLOOD PRESSURE: 89 MMHG

## 2020-09-30 VITALS — SYSTOLIC BLOOD PRESSURE: 102 MMHG | DIASTOLIC BLOOD PRESSURE: 55 MMHG

## 2020-09-30 LAB
ALBUMIN SERPL BCG-MCNC: 1.4 GM/DL (ref 3.2–5.2)
ALT SERPL W P-5'-P-CCNC: 28 U/L (ref 12–78)
BILIRUB SERPL-MCNC: 4.1 MG/DL (ref 0.2–1)
BUN SERPL-MCNC: 24 MG/DL (ref 7–18)
CALCIUM SERPL-MCNC: 7.7 MG/DL (ref 8.8–10.2)
CHLORIDE SERPL-SCNC: 103 MEQ/L (ref 98–107)
CO2 SERPL-SCNC: 27 MEQ/L (ref 21–32)
CREAT SERPL-MCNC: 1.69 MG/DL (ref 0.55–1.3)
GFR SERPL CREATININE-BSD FRML MDRD: 32.9 ML/MIN/{1.73_M2} (ref 45–?)
GLUCOSE SERPL-MCNC: 103 MG/DL (ref 70–100)
HCT VFR BLD AUTO: 30 % (ref 36–47)
HGB BLD-MCNC: 9.8 G/DL (ref 12–15.5)
MCH RBC QN AUTO: 34.9 PG (ref 27–33)
MCHC RBC AUTO-ENTMCNC: 32.7 G/DL (ref 32–36.5)
MCV RBC AUTO: 106.8 FL (ref 80–96)
PLATELET # BLD AUTO: 68 10^3/UL (ref 150–450)
POTASSIUM SERPL-SCNC: 3.9 MEQ/L (ref 3.5–5.1)
PROT SERPL-MCNC: 5.8 GM/DL (ref 6.4–8.2)
RBC # BLD AUTO: 2.81 10^6/UL (ref 4–5.4)
SODIUM SERPL-SCNC: 134 MEQ/L (ref 136–145)
WBC # BLD AUTO: 5.7 10^3/UL (ref 4–10)

## 2020-09-30 RX ADMIN — SPIRONOLACTONE SCH MG: 25 TABLET, FILM COATED ORAL at 21:47

## 2020-09-30 RX ADMIN — BETAXOLOL HYDROCHLORIDE SCH DROP: 2.8 SUSPENSION/ DROPS OPHTHALMIC at 09:00

## 2020-09-30 RX ADMIN — SPIRONOLACTONE SCH MG: 25 TABLET, FILM COATED ORAL at 08:59

## 2020-09-30 RX ADMIN — BETAXOLOL HYDROCHLORIDE SCH DROP: 2.8 SUSPENSION/ DROPS OPHTHALMIC at 21:47

## 2020-09-30 RX ADMIN — LACTULOSE SCH ML: 10 SOLUTION ORAL at 15:05

## 2020-09-30 RX ADMIN — FUROSEMIDE SCH MG: 40 TABLET ORAL at 08:59

## 2020-09-30 RX ADMIN — MULTIPLE VITAMINS W/ MINERALS TAB SCH TAB: TAB at 08:58

## 2020-09-30 RX ADMIN — SODIUM CHLORIDE, PRESERVATIVE FREE SCH ML: 5 INJECTION INTRAVENOUS at 15:06

## 2020-09-30 RX ADMIN — FOLIC ACID SCH MG: 1 TABLET ORAL at 08:59

## 2020-09-30 RX ADMIN — SODIUM CHLORIDE, PRESERVATIVE FREE SCH ML: 5 INJECTION INTRAVENOUS at 05:37

## 2020-09-30 RX ADMIN — LATANOPROST SCH DROP: 50 SOLUTION OPHTHALMIC at 21:48

## 2020-09-30 RX ADMIN — BRIMONIDINE TARTRATE SCH DROP: 1 SOLUTION/ DROPS OPHTHALMIC at 08:59

## 2020-09-30 RX ADMIN — LACTULOSE SCH ML: 10 SOLUTION ORAL at 21:47

## 2020-09-30 RX ADMIN — LORATADINE SCH MG: 10 TABLET ORAL at 08:58

## 2020-09-30 RX ADMIN — SODIUM CHLORIDE, PRESERVATIVE FREE SCH ML: 5 INJECTION INTRAVENOUS at 21:48

## 2020-09-30 RX ADMIN — Medication SCH MG: at 08:56

## 2020-09-30 RX ADMIN — BRIMONIDINE TARTRATE SCH DROP: 1 SOLUTION/ DROPS OPHTHALMIC at 21:47

## 2020-09-30 RX ADMIN — LACTULOSE SCH ML: 10 SOLUTION ORAL at 08:59

## 2020-09-30 RX ADMIN — LEVOTHYROXINE SODIUM SCH MCG: 112 TABLET ORAL at 05:37

## 2020-09-30 NOTE — IPN
DATE:  09/29/2020



SUBJECTIVE:  Flora is seen on the hospitalist service.  She is a patient of Dr. Meron Ramirez and sees Dr. Arley Fraser for cirrhosis/hepatology care.  She
was admitted with a large left pleural effusion, has a PleurX catheter in so she
is less short of breath since then.  She had a liter of fluid removed by 
thoracentesis by Interventional Radiology which proved to be a transudate. Dr. Wiseman from thoracic surgery has been consulted.



The patient has alcoholic hepatitis.  She is not encephalopathic.  She has not 
been compliant with salt restriction or fluid restriction as an outpatient.  She
has abdominal ascites and had abdominal paracentesis in the past. She does not 
feel that her abdomen is any more distended than typical today.  She also had a 
complex cystic left adnexal mass that will need outpatient followup. 



PHYSICAL EXAMINATION:   

Vital signs:  Blood pressure 110/59, pulse ___, respiratory rate 24, 93%  O2 
saturation. 

General: She is alert, conversant.  Speech is fluent.

HEENT: Unremarkable. 

Lungs: Decreased breath sounds at the left.

Heart regular rate and rhythm. No murmur.

Abdomen: Distended with some ascites.

Extremities show trace peripheral edema. She has no asterixis present.



LABS: White count is 8.5, hemoglobin 10.4, platelets 61 which is baseline.  
Sodium 135, potassium 4.3, BUN 22, creatinine 1.6 which is baseline. Albumin is 
low at 1.4.



IMPRESSION:

1.  Pleural effusion.  Management per thoracic surgery. Dr. Wiseman has been 
consulted and I will speak with him later today about her case.

2.  Cirrhosis.  She seems compensated.  She does have a hepatologist who she 
follows with. 

3.  Abdominal ascites.  Abdomen feels back to baseline. If she does need 
therapeutic paracentesis, she will need albumin associated with the procedure.  

4.  Hypertension, well controlled on the current regimen.

MTDD

## 2020-09-30 NOTE — ECHO
DATE OF PROCEDURE: 09/28/2020 



REFERRING PHYSICIAN: Jaret Gilliam



INDICATION:  Endocarditis, vegetation.



Gender:   Female 

Height:   170 cm

Weight:   86 kg 

 

FINDINGS:  This study is of markedly limited technical quality. None of the 
standard measurements were obtained.



There is a very large pleural effusion with echo densities present in its fluid 
content indicative of either masses or large blood clots.  The visualization of 
the heart was very limited based on poor quality views.  I can estimate grossly 
preserved LV systolic function and no significant mitral valvular disease.  
Remaining valves were not well seen.  Right-sided heart chambers were not 
visualized.  I cannot comment of absence or presence of pericardial effusion.  
Overall very limited study.

MTDD

## 2020-10-01 VITALS — SYSTOLIC BLOOD PRESSURE: 92 MMHG | DIASTOLIC BLOOD PRESSURE: 51 MMHG

## 2020-10-01 VITALS — SYSTOLIC BLOOD PRESSURE: 86 MMHG | DIASTOLIC BLOOD PRESSURE: 50 MMHG

## 2020-10-01 VITALS — SYSTOLIC BLOOD PRESSURE: 88 MMHG | DIASTOLIC BLOOD PRESSURE: 54 MMHG

## 2020-10-01 VITALS — DIASTOLIC BLOOD PRESSURE: 53 MMHG | SYSTOLIC BLOOD PRESSURE: 92 MMHG

## 2020-10-01 VITALS — SYSTOLIC BLOOD PRESSURE: 100 MMHG | DIASTOLIC BLOOD PRESSURE: 50 MMHG

## 2020-10-01 LAB
ALBUMIN SERPL BCG-MCNC: 1.3 GM/DL (ref 3.2–5.2)
ALT SERPL W P-5'-P-CCNC: 26 U/L (ref 12–78)
BILIRUB SERPL-MCNC: 4 MG/DL (ref 0.2–1)
BUN SERPL-MCNC: 21 MG/DL (ref 7–18)
CALCIUM SERPL-MCNC: 7.7 MG/DL (ref 8.8–10.2)
CHLORIDE SERPL-SCNC: 103 MEQ/L (ref 98–107)
CO2 SERPL-SCNC: 28 MEQ/L (ref 21–32)
CREAT SERPL-MCNC: 1.7 MG/DL (ref 0.55–1.3)
GFR SERPL CREATININE-BSD FRML MDRD: 32.6 ML/MIN/{1.73_M2} (ref 45–?)
GLUCOSE SERPL-MCNC: 91 MG/DL (ref 70–100)
HCT VFR BLD AUTO: 29.9 % (ref 36–47)
HGB BLD-MCNC: 10.1 G/DL (ref 12–15.5)
MCH RBC QN AUTO: 35.7 PG (ref 27–33)
MCHC RBC AUTO-ENTMCNC: 33.8 G/DL (ref 32–36.5)
MCV RBC AUTO: 105.7 FL (ref 80–96)
PLATELET # BLD AUTO: 73 10^3/UL (ref 150–450)
POTASSIUM SERPL-SCNC: 3.8 MEQ/L (ref 3.5–5.1)
PROT SERPL-MCNC: 5.6 GM/DL (ref 6.4–8.2)
RBC # BLD AUTO: 2.83 10^6/UL (ref 4–5.4)
SODIUM SERPL-SCNC: 137 MEQ/L (ref 136–145)
WBC # BLD AUTO: 5 10^3/UL (ref 4–10)

## 2020-10-01 RX ADMIN — LACTULOSE SCH ML: 10 SOLUTION ORAL at 16:28

## 2020-10-01 RX ADMIN — SPIRONOLACTONE SCH MG: 25 TABLET, FILM COATED ORAL at 08:52

## 2020-10-01 RX ADMIN — LATANOPROST SCH DROP: 50 SOLUTION OPHTHALMIC at 21:26

## 2020-10-01 RX ADMIN — FUROSEMIDE SCH MG: 40 TABLET ORAL at 08:52

## 2020-10-01 RX ADMIN — BRIMONIDINE TARTRATE SCH DROP: 1 SOLUTION/ DROPS OPHTHALMIC at 08:52

## 2020-10-01 RX ADMIN — MULTIPLE VITAMINS W/ MINERALS TAB SCH TAB: TAB at 08:52

## 2020-10-01 RX ADMIN — LEVOTHYROXINE SODIUM SCH MCG: 112 TABLET ORAL at 05:23

## 2020-10-01 RX ADMIN — SPIRONOLACTONE SCH MG: 25 TABLET, FILM COATED ORAL at 21:25

## 2020-10-01 RX ADMIN — SODIUM CHLORIDE, PRESERVATIVE FREE SCH ML: 5 INJECTION INTRAVENOUS at 05:23

## 2020-10-01 RX ADMIN — SODIUM CHLORIDE, PRESERVATIVE FREE SCH ML: 5 INJECTION INTRAVENOUS at 21:26

## 2020-10-01 RX ADMIN — FOLIC ACID SCH MG: 1 TABLET ORAL at 08:52

## 2020-10-01 RX ADMIN — SODIUM CHLORIDE, PRESERVATIVE FREE SCH ML: 5 INJECTION INTRAVENOUS at 14:27

## 2020-10-01 RX ADMIN — BETAXOLOL HYDROCHLORIDE SCH DROP: 2.8 SUSPENSION/ DROPS OPHTHALMIC at 21:25

## 2020-10-01 RX ADMIN — BETAXOLOL HYDROCHLORIDE SCH DROP: 2.8 SUSPENSION/ DROPS OPHTHALMIC at 08:52

## 2020-10-01 RX ADMIN — LACTULOSE SCH ML: 10 SOLUTION ORAL at 08:52

## 2020-10-01 RX ADMIN — LACTULOSE SCH ML: 10 SOLUTION ORAL at 21:25

## 2020-10-01 RX ADMIN — LORATADINE SCH MG: 10 TABLET ORAL at 08:52

## 2020-10-01 RX ADMIN — BRIMONIDINE TARTRATE SCH DROP: 1 SOLUTION/ DROPS OPHTHALMIC at 21:25

## 2020-10-01 NOTE — REP
PORTABLE CHEST X-RAY: SINGLE VIEW 



HISTORY: PleurX catheter insertion. 



COMPARISON STUDY: 09/28/2020 at 0940 a.m. 



FINDINGS: 

The left-sided PleurX catheter is seen coursing obliquely to terminate over the 
mediastinum at the level of the transverse aorta. There is a moderate to large 
amount of pleural fluid on the left and there is a small quantity of left apical
pneumothorax again noted. The right lung remains clear. The right pleural angle 
is sharp.



IMPRESSION: 

Hydropneumothorax on the left with a moderate amount of left pleural fluid. 
PleurX catheter noted in place.

MTDD

## 2020-10-01 NOTE — REP
TWO VIEW CHEST



COMPARISON: 09/26/2020



HISTORY: Left hydropneumothorax. 



FINDINGS:



There is no significant change in the left hydropneumothorax compared to the 
prior study. Right lung remains clear. There are no new findings.



IMPRESSION: 



Stable exam.

MTDD

## 2020-10-01 NOTE — REP
TWO-VIEW CHEST 



HISTORY: Left thoracentesis. 



FINDINGS: 

Two views of chest are performed and compared to prior study the same day. The 
patient just had a left ultrasound-guided thoracentesis. Approximately 1500 mL 
of fluid was removed. There is a small pneumothorax. There is a large amount of 
residual pleural fluid remaining. There is parenchymal opacity in the left lung.
The right lung is clear. Follow-up will be performed.

PEYTON

## 2020-10-01 NOTE — RO
DATE OF OPERATION: 09/28/2020



PREPROCEDURE DIAGNOSIS:  Recurrent ascitic pleural effusion.



POSTPROCEDURE DIAGNOSIS:  Recurrent ascitic pleural effusion.



SURGEON: Manas Wiseman MD



ASSISTANT:  



PROCEDURE:  Insertion of left PleurX tunneled catheter with moderate sedation.



ASSISTANT:  



DESCRIPTION OF PROCEDURE: After satisfactory moderate sedation was eventually 
achieved with 4 mg of Versed, the patient was prepped and draped in usual 
sterile fashion. The incision sites were previously marked guided by the CT 
scan. Entry site was infiltrated with 1% Lidocaine and a bore needle was placed 
into the pleura with production of clear fluid. Wire was then placed. The exit 
site was chosen and infiltrated with 1% Lidocaine and tunnel was infiltrated 
with 1% Lidocaine along its entire length. Incision was made on both the entry 
and exit sites. Tunnel was placed from exit to entry site and the PleurX 
catheter pulled through the tunnel. The wire tract was then dilated and peel-
away introducer placed. Peel-away introducer dilator was then removed along with
the wire and the PleurX catheter appropriately positioned. The entry site 
incision was closed with running 4-0 Monocryl subcuticular suture, covered with 
Dermabond and the catheter was anchored to the abdominal wall with 3-0 silk 
suture. 1000 mL of sotero fluid was withdrawn. It did appear a bit cloudy but it 
was slightly so. It was sent for studies to include chemistries, hematologies, 
cytologies, and bacteriologies. The remainder will be removed tomorrow in 
conjunction with teaching the patient how to use the PleurX bottles. The patient
tolerated the procedure well and chest x-ray is pending.

[facility]

## 2020-10-01 NOTE — REP
ULTRASOUND-GUIDED THORACENTESIS



The procedure was performed by Miladis Angulo [facility], under the direct 
supervision of Dr. Khan.



The risks and benefits of the procedure were explained to the patient and 
informed consent was obtained both verbally and written. Directly prior to the 
start of the procedure, a formal time-out was completed in the procedure room. 



Pleural fluid in the left lung zone was localized using ultrasound guidance. The
skin was prepped and draped in a sterile fashion. Approximately 5 mL of buffered
Lidocaine was used as a local anesthetic. A small skin nick was made and through
that nick, an 8-Singaporean multi-side holed catheter was inserted and advanced into 
the fluid. 1450 mL of solid orange fluid was withdrawn and sent to the 
laboratory for further analysis. The tube was then removed. 



The patient tolerated the procedure fairly well. A chest x-ray directly 
afterwards showed a small pneumothorax, as well as pleural fluid still within 
the left lung zone. The patient was sent back to the unit.

PEYTON

## 2020-10-01 NOTE — REP
TWO-VIEW CHEST 



HISTORY: Left thoracentesis. 



Two views of the chest are performed at 502 pm and compared to prior post-
thoracentesis chest radiographs performed 246 pm. The patient had left 
thoracentesis earlier today. There is a persistent small left pneumothorax. This
is stable. There is a persistent large amount of residual left pleural fluid 
present with adjacent left lung parenchymal opacity. The remainder of the study 
is unchanged.

MTDD

## 2020-10-01 NOTE — REP
TWO VIEW CHEST 



HISTORY: Left hydropneumothorax. 



TECHNIQUE: Two views of the chest are performed and compared to prior studies, 
most recently 09/27/2020.



FINDINGS: 

There is an increased amount of left pleural fluid, with large left effusion 
present. The left apical pneumothorax is unchanged. The right lung remains 
clear. The remainder of the study is unchanged.

MTDD

## 2020-10-01 NOTE — IPN
DATE:  09/30/2020



Flora is seen in progressive care unit (PCU). She just came back from having her
chest x-ray. I do not have the report back yet. She has a history of alcohol-
related cirrhosis. She also has a chronic kidney disease, stage III. Sees Dr. Olivia's office as an outpatient. I am concerned about the rise of creatinine. 
It has slowly been creeping up over the last few days since the thoracentesis. 
She has no chest pain or shortness of breath. She does not act encephalopathic 
today. 



PHYSICAL EXAMINATION: 

Blood pressure 123/71, pulse 100, respiratory rate 20, 97.7 degrees.

GENERAL APPEARANCE: Lying on her side trying to sleep but awakens and answers 
questions appropriately. No facial droop or weakness. 

LUNGS: Decreased breath sounds, left side.

HEART:  Regular rate and rhythm. No murmur.

ABDOMEN: Soft, nontender. No masses. 

Trace peripheral edema.

NEUROLOGIC: Nonfocal. There is no asterixis. Normal strength in both arms and 
legs.



LABORATORY DATA: 

Sodium 134, potassium 3.9, BUN 24, creatinine 1.69; it was 1.27 a few days ago. 
Albumin 1.4. 



White count 5.7, hemoglobin 9.8, platelets 68.



IMPRESSION: 

1. Left pleural effusion, status post PleurX catheter. Management per Dr. Wiseman.

2. Cirrhosis. She is not encephalopathic. She is followed by Dr. Fraser as an
outpatient.

3. Hypoalbuminemia. She has repeated abdominal taps also has had a 
thoracentesis. She will need preprocedural albumin if she has more paracenteses 
or thoracenteses.

4. Chronic kidney disease. Concerned about the slow rise of creatinine. 
Hopefully she is not developing any hepatorenal syndrome. She does see the 
nephrologist as an outpatient, and consideration could be given to consulting 
them in the hospital if her renal function continues to decline. 

5. Hypertension, well controlled on current regimen.

6. Hypotension. Continue with levothyroxine 112 mcg daily.

7. Alcoholism. She has not required any lorazepam, so will discontinue this. 
Continue thiamine and multivitamin. 



They did discuss on care rounds yesterday with dietitian about working to 
address the patient malnutrition and low albumin related to cirrhosis.

PEYTON

## 2020-10-01 NOTE — IPNPDOC
Subjective


Date Seen


The patient was seen on 10/1/20.





Subjective


Chief Complaint/HPI


Ms. Taylor is a 60-year-old female with alcoholic liver cirrhosis with portal 

hypertension, esophageal varices, hypothyroidism, and hypertension who comes to 

[facility] for dyspnea found to have large left pleural effusion. 

No events overnight. They continue to drain fluid from the catheter. Otherwise, 

this morning, she reports dyspnea that is improved from prior. Denies fever, 

chest pain, abdominal pain, or dysuria.


Constitutional:  Denies: Fever


Pulmonary:  Denies: Dyspnea


Cardiovascular:  Denies: Chest Pain


Gastrointestinal:  Denies: Nausea, Abdominal Pain


Genitourinary:  Denies: Dysuria





Objective


Physical Examination


General Exam:  Positive: Alert, Cooperative, Mild Distress


Eye Exam:  Positive: EOMI; 


   Negative: Sclera icteric


ENT Exam:  Positive: Mucous membr. moist/pink; 


   Negative: Atraumatic


Neck Exam:  Positive: Supple


Chest Exam:  Positive: Clear to auscultation


Heart Exam:  Positive: Rate Normal, Regular Rhythm


Abdomen Exam:  Positive: Normal bowel sounds, Other (distended); 


   Negative: Tenderness


Extremity Exam:  Positive: Edema (bilateral pitting edema)


Neuro Exam:  Positive: Cranial Nerves 3-12 NL


Psych Exam:  Positive: Mental status NL, Mood NL, Oriented x 3





Assessment /Plan


Assessment


Ms. Taylor is a 60-year-old female with alcoholic liver cirrhosis with portal 

hypertension, hypothyroidism, and hypertension who is here for dyspnea secondary

to large left pleural effusion. She's been compliant with her medications and 

her diuretics. She has not been compliant with her diet due to difficulties 

shopping. She doesn't that she's been eating a lot of salty foods. Started about

2 weeks ago. She initially had dyspnea with some chest tightness. That morning 

she woke up with shortness of breath. She came into the ED to find out she had a

large left pleural effusion again. Initially IR was consulted since they drained

her pleural effusion back in January 2020, but due to rapid refill, Thoracic 

surgery, Dr. Wiseman, has been consulted. Dr. Wiseman placed a PleurX catheter 

and has been successfully draining fluid.





Plan/VTE


VTE Prophylaxis Ordered?:  Yes





Plan


1. Large left pleural effusion secondary to alcoholic cirrhosis


She is compliant with her diuretics


She is not compliant with diet. She's been having salty foods. She has also 

been drinking alcohol.


Contacted IR for thoracocentesis since IR did it in the past (Jan 2020)


Fluid studies suggestive of transudative fluid. Fluid protein 1. Serum protein 

7.9. Fluid LDH 39. Serum . Serum albumin 2. Fluid albumin 0.3.


Fluid cultures negative. Discontinued vancomycin


-Started to fill up again over weekend. Consulted Thoracic surgery, Dr. Wiseman.

Recommendations appreciated


-Dr. Wiseman place PleurX catheter which has been draining fluid





2. Alcoholic cirrhosis


She has been drinking alcohol.


AST mildly elevated at 80, ALT of 51.


Doesn't look like she has active hepatitis at this time.


Her total bilirubin 6.8, albumin is 2, INR is 1.6, her PT is 19.4


Her child Israel score is 11, which is class C. 


We'll continue her diuretics and lactulose. She'll be on a 2 g sodium diet. 

Added on fluid restriction 1.8L





3. Ascites


Denies abdominal pain, but she does feel like her abdomen has been distended


There is moderate to large amount of ascites


-May need to perform paracentesis after draining lung





4. Glaucoma


We'll continue her eyedrops





5. Hypothyroidism


Appears euthyroid


continue her levothyroxine





6. Hypertension


She may have had a history of hypertension, but blood pressure is controlled 

now


Continue furosemide and spironolactone





7. BAM


-Creatinine has been steadily increasing


-Obtaining US of kidneys and urine studies today





8. DVT prophylaxis


SCDs and teds.





VS, I&O, 24H, Fishbone


Vital Signs/I&O





Vital Signs








  Date Time  Temp Pulse Resp B/P (MAP) Pulse Ox O2 Delivery O2 Flow Rate FiO2


 


10/1/20 16:58   18   Room Air  


 


10/1/20 16:00 98.2 88   96   


 


10/1/20 12:00    92/53 (66)    


 


9/30/20 18:08       4.0 














I&O- Last 24 Hours up to 6 AM 


 


 10/1/20





 06:00


 


Intake Total 1170 ml


 


Output Total 1475 ml


 


Balance -305 ml











Laboratory Data


24H LABS


Laboratory Tests 2


10/1/20 04:37: 


Nucleated Red Blood Cells % (auto) 0.0, Immature Platelet Fraction 0.9, Anion 

Gap 6L, Glomerular Filtration Rate 32.6L, Calcium Level 7.7L, Total Bilirubin 

4.0H, Aspartate Amino Transf (AST/SGOT) 36, Alanine Aminotransferase (ALT/SGPT) 

26, Alkaline Phosphatase 107, Total Protein 5.6L, Albumin 1.3L, Albumin/Globulin

Ratio 0.3L


10/1/20 11:48: Bedside Glucose (Misc Panel) 133H


CBC/BMP


Laboratory Tests


10/1/20 04:37








Microbiology





Microbiology


9/26/20 Blood Culture - Final, Complete


          NO GROWTH AFTER 5 DAYS


9/26/20 Blood Culture - Final, Complete


          NO GROWTH AFTER 5 DAYS


9/25/20 Respiratory Virus Panel (PCR) (GUI) - Final, Complete


          


9/25/20 Blood Culture - Final, Complete


          Staphylococcus Epidermidis


9/25/20 Blood Culture - Final, Complete


          NO GROWTH AFTER 5 DAYS


9/25/20 Gram Stain - Final, Complete


          


9/25/20 Body Fluid Culture - Final, Complete


          


9/25/20 Anaerobic Culture - Final, Complete











ERNESTO CABRERA DO                Oct 1, 2020 20:54

## 2020-10-01 NOTE — REPVR
PROCEDURE INFORMATION: 

Exam: XR Chest, 2 Views 

Exam date and time: 10/1/2020 8:11 AM 

Age: 60 years old 

Clinical indication: Condition or disease; Lung condition and disease; 

Pulmonary congestion; Additional info: Pleural effusion 



TECHNIQUE: 

Imaging protocol: XR of the chest 

Views: 2 views. 



COMPARISON: 

CR Chest, 2 view PA, Lat 9/29/2020 8:25 AM 



FINDINGS: 

Tubes, catheters and devices: A left basilar chest tube is again present. 



Lungs: There is again some left basilar atelectasis or airspace opacity. The 

lungs are otherwise clear. 

Pleural space: A small left pleural effusion is again present. The right 

costophrenic angle is sharp. No definite pneumothorax is identified. 

Heart/Mediastinum: The cardiomediastinal silhouette is stable in appearance 

allowing for differences in positioning. 

Bones/joints: Degenerative changes again involve the spine. 

Soft tissues: There is again gas in the soft tissues of the left chest wall. 



IMPRESSION: 

Stable radiographic appearance of the chest since 09/29/20. 



Electronically signed by: Alexi Agudelo On 10/01/2020  08:24:42 AM

## 2020-10-01 NOTE — REPVR
PROCEDURE INFORMATION: 

Exam: US Retroperitoneal Limited, Kidneys 

Exam date and time: 10/1/2020 1:38 PM 

Age: 60 years old 

Clinical indication: Condition or disease; Other: Worsening darius; Additional 

info: Bilateral kidney, worsening darius 



TECHNIQUE: 

Imaging protocol: Real-time ultrasound of the retroperitoneum with image 

documentation. Examination was focused on the kidneys. 



COMPARISON: 

LIVER US 8/21/2020 8:42 AM 



FINDINGS: 

Right kidney: The right kidney measures 9.9 x 5.3 x 4.7 cm. It appears mildly 

echogenic. No hydronephrosis or demonstrated stone, cyst or mass. 

Left kidney: The left kidney measures 9.8 x 5.1 x 4.1 cm. It appears mildly 

echogenic. No hydronephrosis or demonstrated stone, cyst or mass. 

Intraperitoneal space: Ascites is noted in the pelvis. 

Bladder: The urinary bladder was not identified. 



IMPRESSION: 

1. Mildly echogenic kidneys, suggesting medical renal disease. 

2. Urinary bladder not identified. 

3. Ascites noted in the pelvis. 



Electronically signed by: Alexi Agudelo On 10/01/2020  13:52:18 PM

## 2020-10-01 NOTE — RO
DATE OF OPERATION:  09/28/2020 



PREPROCEDURE DIAGNOSIS:  Recurrent ascitic pleural effusion. 



POSTPROCEDURE DIAGNOSIS:  Recurrent ascitic pleural effusion. 



SURGEON:  Manas Wiseman MD 



ASSISTANT:  



PROCEDURE:  Insertion of left PleurX tunneled catheter with monitored sedation.



ANESTHESIA:  



DESCRIPTION OF PROCEDURE:  After satisfactory monitored sedation, the chest tube
was disconnected from the Pleur-evac and clamped. Then, 6 mg of tPA in 50 cc of 
normal saline was then instilled in the chest. The patient was then rolled from 
side to side to distribute the tPA. The chest tube will be unclamped in four 
hours. 



The patient tolerated the procedure well. 

ANISHAD

## 2020-10-02 VITALS — DIASTOLIC BLOOD PRESSURE: 49 MMHG | SYSTOLIC BLOOD PRESSURE: 93 MMHG

## 2020-10-02 VITALS — SYSTOLIC BLOOD PRESSURE: 99 MMHG | DIASTOLIC BLOOD PRESSURE: 56 MMHG

## 2020-10-02 VITALS — DIASTOLIC BLOOD PRESSURE: 56 MMHG | SYSTOLIC BLOOD PRESSURE: 100 MMHG

## 2020-10-02 VITALS — DIASTOLIC BLOOD PRESSURE: 52 MMHG | SYSTOLIC BLOOD PRESSURE: 96 MMHG

## 2020-10-02 VITALS — SYSTOLIC BLOOD PRESSURE: 104 MMHG | DIASTOLIC BLOOD PRESSURE: 59 MMHG

## 2020-10-02 VITALS — DIASTOLIC BLOOD PRESSURE: 60 MMHG | SYSTOLIC BLOOD PRESSURE: 98 MMHG

## 2020-10-02 LAB
ALBUMIN SERPL BCG-MCNC: 1.2 GM/DL (ref 3.2–5.2)
ALT SERPL W P-5'-P-CCNC: 25 U/L (ref 12–78)
APPEARANCE UR: (no result)
BACTERIA UR QL AUTO: NEGATIVE
BILIRUB SERPL-MCNC: 3.5 MG/DL (ref 0.2–1)
BILIRUB UR QL STRIP.AUTO: NEGATIVE
BUN SERPL-MCNC: 20 MG/DL (ref 7–18)
CALCIUM SERPL-MCNC: 7.6 MG/DL (ref 8.8–10.2)
CHLORIDE SERPL-SCNC: 103 MEQ/L (ref 98–107)
CHLORIDE UR-SCNC: 39 MEQ/L
CO2 SERPL-SCNC: 27 MEQ/L (ref 21–32)
CREAT SERPL-MCNC: 1.74 MG/DL (ref 0.55–1.3)
CREAT UR-MCNC: 119 MG/DL
GFR SERPL CREATININE-BSD FRML MDRD: 31.8 ML/MIN/{1.73_M2} (ref 45–?)
GLUCOSE SERPL-MCNC: 94 MG/DL (ref 70–100)
GLUCOSE UR QL STRIP.AUTO: NEGATIVE MG/DL
HCT VFR BLD AUTO: 29.7 % (ref 36–47)
HGB BLD-MCNC: 9.8 G/DL (ref 12–15.5)
HGB UR QL STRIP.AUTO: (no result)
KETONES UR QL STRIP.AUTO: NEGATIVE MG/DL
LEUKOCYTE ESTERASE UR QL STRIP.AUTO: NEGATIVE
MCH RBC QN AUTO: 34.8 PG (ref 27–33)
MCHC RBC AUTO-ENTMCNC: 33 G/DL (ref 32–36.5)
MCV RBC AUTO: 105.3 FL (ref 80–96)
MUCOUS THREADS URNS QL MICRO: (no result)
NITRITE UR QL STRIP.AUTO: NEGATIVE
OSMOLALITY UR: 368 MOSM/KG (ref 500–800)
PH UR STRIP.AUTO: 5 UNITS (ref 5–9)
PLATELET # BLD AUTO: 74 10^3/UL (ref 150–450)
POTASSIUM 24H UR-SCNC: 28.9 MEQ/L
POTASSIUM SERPL-SCNC: 3.7 MEQ/L (ref 3.5–5.1)
PROT SERPL-MCNC: 5.7 GM/DL (ref 6.4–8.2)
PROT UR QL STRIP.AUTO: NEGATIVE MG/DL
RBC # BLD AUTO: 2.82 10^6/UL (ref 4–5.4)
RBC # UR AUTO: 3 /HPF (ref 0–3)
SODIUM SERPL-SCNC: 136 MEQ/L (ref 136–145)
SODIUM UR-SCNC: 32 MEQ/L
SP GR UR STRIP.AUTO: 1.01 (ref 1–1.03)
SQUAMOUS #/AREA URNS AUTO: 2 /HPF (ref 0–6)
UROBILINOGEN UR QL STRIP.AUTO: 4 MG/DL (ref 0–2)
UUN UR-MCNC: 463 MG/DL
WBC # BLD AUTO: 3.7 10^3/UL (ref 4–10)
WBC #/AREA URNS AUTO: 2 /HPF (ref 0–3)

## 2020-10-02 RX ADMIN — BRIMONIDINE TARTRATE SCH DROP: 1 SOLUTION/ DROPS OPHTHALMIC at 20:50

## 2020-10-02 RX ADMIN — SPIRONOLACTONE SCH MG: 25 TABLET, FILM COATED ORAL at 20:49

## 2020-10-02 RX ADMIN — MULTIPLE VITAMINS W/ MINERALS TAB SCH TAB: TAB at 08:08

## 2020-10-02 RX ADMIN — LACTULOSE SCH ML: 10 SOLUTION ORAL at 20:50

## 2020-10-02 RX ADMIN — LORATADINE SCH MG: 10 TABLET ORAL at 08:09

## 2020-10-02 RX ADMIN — LATANOPROST SCH DROP: 50 SOLUTION OPHTHALMIC at 20:50

## 2020-10-02 RX ADMIN — BETAXOLOL HYDROCHLORIDE SCH DROP: 2.8 SUSPENSION/ DROPS OPHTHALMIC at 20:50

## 2020-10-02 RX ADMIN — BRIMONIDINE TARTRATE SCH DROP: 1 SOLUTION/ DROPS OPHTHALMIC at 08:10

## 2020-10-02 RX ADMIN — LEVOTHYROXINE SODIUM SCH MCG: 112 TABLET ORAL at 05:58

## 2020-10-02 RX ADMIN — SODIUM CHLORIDE, PRESERVATIVE FREE SCH ML: 5 INJECTION INTRAVENOUS at 05:57

## 2020-10-02 RX ADMIN — FUROSEMIDE SCH MG: 40 TABLET ORAL at 08:09

## 2020-10-02 RX ADMIN — FOLIC ACID SCH MG: 1 TABLET ORAL at 08:09

## 2020-10-02 RX ADMIN — BETAXOLOL HYDROCHLORIDE SCH DROP: 2.8 SUSPENSION/ DROPS OPHTHALMIC at 08:09

## 2020-10-02 RX ADMIN — SODIUM CHLORIDE, PRESERVATIVE FREE SCH ML: 5 INJECTION INTRAVENOUS at 13:11

## 2020-10-02 RX ADMIN — SODIUM CHLORIDE, PRESERVATIVE FREE SCH ML: 5 INJECTION INTRAVENOUS at 22:09

## 2020-10-02 RX ADMIN — LACTULOSE SCH ML: 10 SOLUTION ORAL at 08:09

## 2020-10-02 RX ADMIN — SPIRONOLACTONE SCH MG: 25 TABLET, FILM COATED ORAL at 08:08

## 2020-10-02 RX ADMIN — LACTULOSE SCH ML: 10 SOLUTION ORAL at 16:32

## 2020-10-02 NOTE — IPN
DATE:  10/01/2020



My note from yesterday does not appear in the chart. Therefore, the primary care
physicians and hospitalists do not have good idea of what I am thinking.



SUBJECTIVE:  Today, Ms. Taylor is fairly miserable. She was drained today with 
an increased amount of pain. She does state that she is breathing better. She 
walked the halls and up the stairs yesterday with physical therapy, but has not 
done so today. There is still a real lack of motivation and probably underlying 
depression. 



OBJECTIVE:

VITAL SIGNS: T-max 98.4 with heart rate that ranges between 84 and 110 and in 
sinus rhythm, respiratory rate 18 to 20 without the use of accessory muscles, 
and 97 to 92% saturated on room air. Blood pressure ranges between 100/50 to 
86/50. 

INTAKE AND OUTPUT: For the past 24 hours has been recorded as 1050 in and 1475 
out for a negativity of 425 cc. The PleurX drained 700 cc yesterday and 300 cc 
today. She weighs 83.2 kilos today compared to 82.1 kilos yesterday.



PHYSICAL EXAMINATION:

LUNGS: Fairly clear with vesicular sounds. She does have some crackles on the 
left side. These are inferior and do not clear with coughing. It almost sounds 
as if she has a succession splash rather than true rhonchi or rales. Percussion 
note is full to the diaphragm.

CARDIAC: Without murmurs, clicks, gallops or murmurs. I cannot feel her PMI. S1,
S2 are normal.

ABDOMEN: Distended and tympanitic. I do not feel a fluid wave. Bowel sounds are 
positive. I cannot appreciate hepatomegaly. There is no CVA tenderness.

EXTREMITIES: Show maybe trace pretibial edema. No calf tenderness. No 
differential swelling of the upper extremities.

SKIN: Warm, dry and perfused without cyanosis or mottling including nailbeds and
knees.

NECK: Supple, there is still jugular venous distention to the mid neck. Trachea 
is midline. There is no subcutaneous emphysema. 

MOUTH: Shows her mucous membranes to be pink and moist. Lips and gums without 
lesions. No thrush.

EYES: Show her pupils to be equal and reactive, extraocular muscles intact, 
sclerae nonicteric. 

NEURO: Shows II-XII intact, normal gross motor, gross sensation intact. Gait is 
not tested.  

PSYCHIATRIC: Shows her to be awake and alert, but with a depressed mood and 
affect.



LABORATORY DATA:  White count today 5.0 with hemoglobin and hematocrit 10.1 and 
29.9 respectively and essentially unchanged from yesterday. Platelet count 
73,000 and unchanged. Chemistries today show normal electrolytes with 
BUN/creatinine 21/1.7, which continues to increase. Glucose 91 with calcium 7.7.
Total bilirubin stable at 4.0 with normal AST and ALT. Albumin 1.3. 



IMAGING STUDIES:  Her chest x-ray today shows some slight blunting of the left 
costophrenic angle. PleurX catheter is in good place. She still has some 
subcutaneous emphysema from her pneumothorax a few days ago, where she has now 
pushed out all the overlying air. 



IMPRESSION:  

1.   Recurrent left pleural effusion with rapid accumulation over two days.

2.   Liver failure.

3.   Hepatorenal syndrome. 

4.   Renal insufficiency.

5.   Esophageal varices.

6.   Portal hypertension.

7.   Hypothyroidism.

8.   Alcohol abuse.

9.   Hyperlipidemia.



PLAN/DISCUSSION:  As the drainages are so very painful to her, we will do it 
every other day. She has refused to participate in education to learn how to use
the bottles. I have encouraged her to do so as she cannot go home without being 
able to handle her drainage. We may very well be able to increase the interval 
to weekly drainages when she becomes symptomatic. The idea of doing drainages 
every day is so that she could practice with the education and teaching. We will
now extend it to every other day. 

PEYTON

## 2020-10-02 NOTE — IPNPDOC
Subjective


Date Seen


The patient was seen on 10/2/20.





Subjective


Chief Complaint/HPI


Ms. Taylor is a 60-year-old female with alcoholic liver cirrhosis with portal 

hypertension, esophageal varices, hypothyroidism, and hypertension who comes to 

[facility] for dyspnea found to have large left pleural effusion. 

She continues to complain about pain and drainage from the PleurX catheter, but 

her dyspnea has improved. Other than the chest pain from the incision site, she 

denies any fever/chills, abdominal pain, or dysuria. This afternoon, we 

discussed about paracentesis and albumin. She understands the risks and benefits

of having blood products such as albumin and is okay with consenting for blood 

products. She tells me she has had albumin in the past. I will plan for 

paracentesis for Monday


Constitutional:  Denies: Fever


Pulmonary:  Reports: Dyspnea (Improved with the drainage)


Gastrointestinal:  Denies: Abdominal Pain


Genitourinary:  Denies: Dysuria


Musculoskeletal:  Reports: Other Symptoms (Chest wall pain from incision site 

for the PleurX)





Objective


Physical Examination


General Exam:  Positive: Alert, Cooperative, Mild Distress


Eye Exam:  Positive: EOMI; 


   Negative: Sclera icteric


ENT Exam:  Positive: Mucous membr. moist/pink; 


   Negative: Atraumatic


Neck Exam:  Positive: Supple


Chest Exam:  Positive: Clear to auscultation


Heart Exam:  Positive: Rate Normal, Regular Rhythm


Abdomen Exam:  Positive: Normal bowel sounds, Other (distended); 


   Negative: Tenderness


Extremity Exam:  Positive: Edema (bilateral pitting edema)


Neuro Exam:  Positive: Cranial Nerves 3-12 NL


Psych Exam:  Positive: Mental status NL, Mood NL, Oriented x 3





Assessment /Plan


Assessment


Ms. Taylor is a 60-year-old female with alcoholic liver cirrhosis with portal 

hypertension, hypothyroidism, and hypertension who is here for dyspnea secondary

to large left pleural effusion. She's been compliant with her medications and 

her diuretics. She has not been compliant with her diet due to difficulties 

shopping. She doesn't that she's been eating a lot of salty foods. Started about

2 weeks ago. She initially had dyspnea with some chest tightness. That morning 

she woke up with shortness of breath. She came into the ED to find out she had a

large left pleural effusion again. Initially IR was consulted since they drained

her pleural effusion back in January 2020, but due to rapid refill, Thoracic 

surgery, Dr. Wiseman, has been consulted. Dr. Wiseman placed a PleurX catheter 

and has been successfully draining fluid. Due to the worsening renal function in

the setting of cirrhosis, concerned for hepatorenal syndrome. Nephrology has 

been consulted, recommendations appreciated.





Plan/VTE


VTE Prophylaxis Ordered?:  Yes





Plan


1. Large left pleural effusion secondary to alcoholic cirrhosis


She is compliant with her diuretics


She is not compliant with diet. She's been having salty foods. She has also 

been drinking alcohol.


Contacted IR for thoracocentesis since IR did it in the past (Jan 2020)


Fluid studies suggestive of transudative fluid. Fluid protein 1. Serum protein 

7.9. Fluid LDH 39. Serum . Serum albumin 2. Fluid albumin 0.3.


Fluid cultures negative. Discontinued vancomycin


-Started to fill up again over last weekend. Consulted Thoracic surgery, Dr. Wiseman. Recommendations appreciated


-Dr. Wiseman place PleurX catheter





2. Hepatorenal syndrome


-With the PleurX, she has been losing albumin gradually overtime


-As albumin has been decreasing, creatinine has been increasing


-US of kidney does not demonstrate obstruction, FEUrea is 33.4% suggesting pre-

renal disease


-Consulted nephrology for hepatorenal syndrome. Recommendations appreciated.





3. Alcoholic cirrhosis


No active hepatitis at this time.


Her total bilirubin 6.8, albumin is 2, INR is 1.6, her PT is 19.4


Her child Israel score is 11, which is class C. 


We'll continue her diuretics and lactulose. She'll be on a 2 g sodium diet and 

fluid restriction 1.8L





4. Ascites


Denies abdominal pain, but she does feel like her abdomen has been distended


There is moderate to large amount of ascites


-Scheduled IR to drain ascites on Monday 10/6/2020


-Anticipating need for albumin after paracentesis, consented for albumin and 

blood products today





5. Glaucoma


We'll continue her eyedrops





6. Hypothyroidism


Appears euthyroid


continue her levothyroxine





7. Hypertension


She may have had a history of hypertension, but blood pressure is controlled 

now


Continue furosemide and spironolactone





8. DVT prophylaxis


SCDs and teds.





VS, I&O, 24H, Fishbone


Vital Signs/I&O





Vital Signs








  Date Time  Temp Pulse Resp B/P (MAP) Pulse Ox O2 Delivery O2 Flow Rate FiO2


 


10/2/20 12:00 99.0 86 18 96/52 (67) 96 Room Air  


 


9/30/20 18:08       4.0 














I&O- Last 24 Hours up to 6 AM 


 


 10/2/20





 06:00


 


Intake Total 540 ml


 


Output Total 750 ml


 


Balance -210 ml











Laboratory Data


24H LABS


Laboratory Tests 2


10/2/20 01:40: 


Urine Color CECELIA, Urine Appearance HAZY, Urine pH 5.0, Urine Specific Gravity 

1.010, Urine Protein NEGATIVE, Urine Glucose (Auto)(UA) NEGATIVE, Urine Ketones 

(Auto) NEGATIVE, Urine Blood 1+H, Urine Nitrite NEGATIVE, Urine Bilirubin 

NEGATIVE, Urine Urobilinogen 4.0H, Urine Leukocyte Esterase (Auto) NEGATIVE, 

Urine WBC (Auto) 2, Urine RBC (Auto) 3, Urine Hyaline Casts (Auto) 12, Urine 

Bacteria (Auto) NEGATIVE, Urine Squamous Epithelial Cells 2, Urine Mucus (Auto) 

SMALL, Urine Sperm (Auto) , Urine Random Osmolality 368L, Urine Random 

Creatinine 119.0, Urine Random Sodium 32, Urine Random Potassium 28.9, Urine 

Random Chloride 39, Urine Random Urea Nitrogen 463


10/2/20 05:45: 


Nucleated Red Blood Cells % (auto) 0.0, Immature Platelet Fraction 1.0, Anion 

Gap 6L, Glomerular Filtration Rate 31.8L, Calcium Level 7.6L, Total Bilirubin 

3.5H, Aspartate Amino Transf (AST/SGOT) 43H, Alanine Aminotransferase (ALT/SGPT)

25, Alkaline Phosphatase 98, Total Protein 5.7L, Albumin 1.2L, Albumin/Globulin 

Ratio 0.3L


CBC/BMP


Laboratory Tests


10/2/20 05:45








Microbiology





Microbiology


9/26/20 Blood Culture - Final, Complete


          NO GROWTH AFTER 5 DAYS


9/26/20 Blood Culture - Final, Complete


          NO GROWTH AFTER 5 DAYS


9/25/20 Respiratory Virus Panel (PCR) (GUI) - Final, Complete


          


9/25/20 Blood Culture - Final, Complete


          Staphylococcus Epidermidis


9/25/20 Blood Culture - Final, Complete


          NO GROWTH AFTER 5 DAYS


9/25/20 Gram Stain - Final, Complete


          


9/25/20 Body Fluid Culture - Final, Complete


          


9/25/20 Anaerobic Culture - Final, Complete











ERNESTO CABRERA DO                Oct 2, 2020 15:11

## 2020-10-03 VITALS — DIASTOLIC BLOOD PRESSURE: 84 MMHG | SYSTOLIC BLOOD PRESSURE: 116 MMHG

## 2020-10-03 VITALS — DIASTOLIC BLOOD PRESSURE: 63 MMHG | SYSTOLIC BLOOD PRESSURE: 112 MMHG

## 2020-10-03 VITALS — DIASTOLIC BLOOD PRESSURE: 52 MMHG | SYSTOLIC BLOOD PRESSURE: 95 MMHG

## 2020-10-03 VITALS — SYSTOLIC BLOOD PRESSURE: 100 MMHG | DIASTOLIC BLOOD PRESSURE: 51 MMHG

## 2020-10-03 VITALS — SYSTOLIC BLOOD PRESSURE: 112 MMHG | DIASTOLIC BLOOD PRESSURE: 58 MMHG

## 2020-10-03 VITALS — DIASTOLIC BLOOD PRESSURE: 52 MMHG | SYSTOLIC BLOOD PRESSURE: 94 MMHG

## 2020-10-03 VITALS — DIASTOLIC BLOOD PRESSURE: 54 MMHG | SYSTOLIC BLOOD PRESSURE: 90 MMHG

## 2020-10-03 VITALS — SYSTOLIC BLOOD PRESSURE: 98 MMHG | DIASTOLIC BLOOD PRESSURE: 53 MMHG

## 2020-10-03 VITALS — DIASTOLIC BLOOD PRESSURE: 60 MMHG | SYSTOLIC BLOOD PRESSURE: 98 MMHG

## 2020-10-03 VITALS — DIASTOLIC BLOOD PRESSURE: 55 MMHG | SYSTOLIC BLOOD PRESSURE: 99 MMHG

## 2020-10-03 VITALS — SYSTOLIC BLOOD PRESSURE: 98 MMHG | DIASTOLIC BLOOD PRESSURE: 50 MMHG

## 2020-10-03 LAB
ALBUMIN SERPL BCG-MCNC: 1.4 GM/DL (ref 3.2–5.2)
ALT SERPL W P-5'-P-CCNC: 25 U/L (ref 12–78)
BILIRUB SERPL-MCNC: 3.4 MG/DL (ref 0.2–1)
BUN SERPL-MCNC: 20 MG/DL (ref 7–18)
CALCIUM SERPL-MCNC: 7.7 MG/DL (ref 8.8–10.2)
CHLORIDE SERPL-SCNC: 100 MEQ/L (ref 98–107)
CO2 SERPL-SCNC: 28 MEQ/L (ref 21–32)
CREAT SERPL-MCNC: 1.52 MG/DL (ref 0.55–1.3)
GFR SERPL CREATININE-BSD FRML MDRD: 37.1 ML/MIN/{1.73_M2} (ref 45–?)
GLUCOSE SERPL-MCNC: 81 MG/DL (ref 70–100)
HCT VFR BLD AUTO: 30.2 % (ref 36–47)
HGB BLD-MCNC: 10.1 G/DL (ref 12–15.5)
INR PPP: 1.68
MCH RBC QN AUTO: 34.8 PG (ref 27–33)
MCHC RBC AUTO-ENTMCNC: 33.4 G/DL (ref 32–36.5)
MCV RBC AUTO: 104.1 FL (ref 80–96)
PLATELET # BLD AUTO: 83 10^3/UL (ref 150–450)
POTASSIUM SERPL-SCNC: 3.8 MEQ/L (ref 3.5–5.1)
PROT SERPL-MCNC: 5.6 GM/DL (ref 6.4–8.2)
PROTHROMBIN TIME: 20.1 SECONDS (ref 12.5–14.3)
RBC # BLD AUTO: 2.9 10^6/UL (ref 4–5.4)
SODIUM SERPL-SCNC: 133 MEQ/L (ref 136–145)
WBC # BLD AUTO: 3.8 10^3/UL (ref 4–10)

## 2020-10-03 PROCEDURE — 30233J1 TRANSFUSION OF NONAUTOLOGOUS SERUM ALBUMIN INTO PERIPHERAL VEIN, PERCUTANEOUS APPROACH: ICD-10-PCS | Performed by: THORACIC SURGERY (CARDIOTHORACIC VASCULAR SURGERY)

## 2020-10-03 RX ADMIN — FOLIC ACID SCH MG: 1 TABLET ORAL at 08:11

## 2020-10-03 RX ADMIN — SODIUM CHLORIDE, PRESERVATIVE FREE SCH ML: 5 INJECTION INTRAVENOUS at 13:29

## 2020-10-03 RX ADMIN — SODIUM CHLORIDE, PRESERVATIVE FREE SCH ML: 5 INJECTION INTRAVENOUS at 21:50

## 2020-10-03 RX ADMIN — BETAXOLOL HYDROCHLORIDE SCH DROP: 2.8 SUSPENSION/ DROPS OPHTHALMIC at 21:49

## 2020-10-03 RX ADMIN — SODIUM CHLORIDE, PRESERVATIVE FREE SCH ML: 5 INJECTION INTRAVENOUS at 05:49

## 2020-10-03 RX ADMIN — MIDODRINE HYDROCHLORIDE SCH MG: 5 TABLET ORAL at 12:28

## 2020-10-03 RX ADMIN — PREDNISOLONE SODIUM PHOSPHATE SCH MG: 15 SOLUTION ORAL at 17:27

## 2020-10-03 RX ADMIN — MIDODRINE HYDROCHLORIDE SCH MG: 5 TABLET ORAL at 16:18

## 2020-10-03 RX ADMIN — LEVOTHYROXINE SODIUM SCH MCG: 112 TABLET ORAL at 05:49

## 2020-10-03 RX ADMIN — SPIRONOLACTONE SCH MG: 50 TABLET ORAL at 16:18

## 2020-10-03 RX ADMIN — TORSEMIDE SCH MG: 20 TABLET ORAL at 16:18

## 2020-10-03 RX ADMIN — SPIRONOLACTONE SCH MG: 25 TABLET, FILM COATED ORAL at 08:00

## 2020-10-03 RX ADMIN — BRIMONIDINE TARTRATE SCH DROP: 1 SOLUTION/ DROPS OPHTHALMIC at 08:12

## 2020-10-03 RX ADMIN — BRIMONIDINE TARTRATE SCH DROP: 1 SOLUTION/ DROPS OPHTHALMIC at 21:49

## 2020-10-03 RX ADMIN — MULTIPLE VITAMINS W/ MINERALS TAB SCH TAB: TAB at 08:11

## 2020-10-03 RX ADMIN — LORATADINE SCH MG: 10 TABLET ORAL at 08:11

## 2020-10-03 RX ADMIN — LACTULOSE SCH ML: 10 SOLUTION ORAL at 08:11

## 2020-10-03 RX ADMIN — BETAXOLOL HYDROCHLORIDE SCH DROP: 2.8 SUSPENSION/ DROPS OPHTHALMIC at 08:12

## 2020-10-03 RX ADMIN — FUROSEMIDE SCH MG: 40 TABLET ORAL at 08:00

## 2020-10-03 RX ADMIN — LATANOPROST SCH DROP: 50 SOLUTION OPHTHALMIC at 21:50

## 2020-10-03 NOTE — IPNPDOC
Subjective


Date Seen


The patient was seen on 10/3/20.





Subjective


Chief Complaint/HPI


Ms. Taylor is a 60-year-old female with alcoholic liver cirrhosis with portal 

hypertension, esophageal varices, hypothyroidism, and hypertension who comes to 

[facility] for dyspnea found to have large left pleural effusion 

as well as ascites. She has pain when they drain her PleurX catheter. Dyspnea is

improved. Otherwise denies fever/chills, abdominal pain, or dysuria.


Constitutional:  Denies: Chills, Fever


Pulmonary:  Reports: Dyspnea


Gastrointestinal:  Denies: Abdominal Pain


Genitourinary:  Denies: Dysuria


Musculoskeletal:  Reports: Other Symptoms (chest wall pain from surgical site)





Objective


Physical Examination


General Exam:  Positive: Alert, Cooperative, Mild Distress


Eye Exam:  Positive: EOMI; 


   Negative: Sclera icteric


ENT Exam:  Positive: Mucous membr. moist/pink; 


   Negative: Atraumatic


Neck Exam:  Positive: Supple


Chest Exam:  Positive: Clear to auscultation


Heart Exam:  Positive: Rate Normal, Regular Rhythm


Abdomen Exam:  Positive: Normal bowel sounds, Other (distended); 


   Negative: Tenderness


Extremity Exam:  Positive: Edema (bilateral pitting edema)


Neuro Exam:  Positive: Cranial Nerves 3-12 NL


Psych Exam:  Positive: Mental status NL, Mood NL, Oriented x 3





Assessment /Plan


Assessment


Ms. Tyalor is a 60-year-old female with alcoholic liver cirrhosis with portal 

hypertension, hypothyroidism, and hypertension who is here for dyspnea secondary

to large left pleural effusion. She's been compliant with her medications and he

r diuretics. She has not been compliant with her diet due to difficulties 

shopping. She doesn't that she's been eating a lot of salty foods. Started about

2 weeks ago. She initially had dyspnea with some chest tightness. That morning 

she woke up with shortness of breath. She came into the ED to find out she had a

large left pleural effusion again. Initially IR was consulted since they drained

her pleural effusion back in January 2020, but due to rapid refill, Thoracic 

surgery, Dr. Wiseman, has been consulted. Dr. Wiseman placed a PleurX catheter 

and has been successfully draining fluid. Due to the worsening renal function in

the setting of cirrhosis, Nephrology was consulted, recommendations appreciated.





Plan/VTE


VTE Prophylaxis Ordered?:  Yes





Plan


1. Large left pleural effusion secondary to alcoholic cirrhosis


She is compliant with her diuretics


She is not compliant with diet. She's been having salty foods. She has also 

been drinking alcohol.


Contacted IR for thoracocentesis since IR did it in the past (Jan 2020)


Fluid studies suggestive of transudative fluid. Fluid protein 1. Serum protein 

7.9. Fluid LDH 39. Serum . Serum albumin 2. Fluid albumin 0.3.


Fluid cultures negative. Discontinued vancomycin


-Started to fill up again over last weekend. Consulted Thoracic surgery, Dr. Wiseman. Recommendations appreciated


-Dr. Wiseman place PleurX catheter





2. BAM 2/2 decompensated cirrhosis


-With the PleurX, she has been losing albumin gradually overtime


-As albumin has been decreasing, creatinine has been increasing


-US of kidney does not demonstrate obstruction, FEUrea is 33.4% suggesting pre-

renal disease


-Consulted nephrology for hepatorenal syndrome. Recommendations appreciated.


-Patient is being given albumin and midodrine today





3. Decompensated alcoholic cirrhosis


Maddrey's calculated to be 36.1 (PT 20.1, PT control 13, Tbili 3.4). She is a 

candidate for steroids


-Starting Prednisolone 40mg today (not using prednisone, because prednisone 

requires liver metabolism)


-In 7 days, will need to calculate Lille score to see if steroid therapy was 

effective


We'll continue her diuretics and lactulose. She'll be on a 2 g sodium diet and 

fluid restriction 1.8L





4. Ascites


Denies abdominal pain, but she does feel like her abdomen has been distended


There is moderate to large amount of ascites


-Scheduled IR to drain ascites on Monday 10/6/2020


-Anticipating need for albumin after paracentesis, consented for albumin and 

blood products today





5. Glaucoma


We'll continue her eyedrops





6. Hypothyroidism


Appears euthyroid


continue her levothyroxine





7. Hypertension


She may have had a history of hypertension, but blood pressure is controlled 

now


Continue furosemide and spironolactone





8. DVT prophylaxis


SCDs and teds.





VS, I&O, 24H, Fishbone


Vital Signs/I&O





Vital Signs








  Date Time  Temp Pulse Resp B/P (MAP) Pulse Ox O2 Delivery O2 Flow Rate FiO2


 


10/3/20 16:19   18   Room Air  


 


10/3/20 15:38 98.7 80  100/51 98   


 


9/30/20 18:08       4.0 














I&O- Last 24 Hours up to 6 AM 


 


 10/3/20





 06:00


 


Intake Total 220 ml


 


Output Total 1025 ml


 


Balance -805 ml











Laboratory Data


24H LABS


Laboratory Tests 2


10/3/20 04:45: 


Nucleated Red Blood Cells % (auto) 0.0, Anion Gap 5L, Glomerular Filtration Rate

37.1L, Calcium Level 7.7L, Total Bilirubin 3.4H, Aspartate Amino Transf 

(AST/SGOT) 48H, Alanine Aminotransferase (ALT/SGPT) 25, Alkaline Phosphatase 

113, Total Protein 5.6L, Albumin 1.4L, Albumin/Globulin Ratio 0.3L


10/3/20 11:58: 


Prothrombin Time 20.1H, Prothromb Time International Ratio 1.68


CBC/BMP


Laboratory Tests


10/3/20 04:45








Microbiology





Microbiology


9/26/20 Blood Culture - Final, Complete


          NO GROWTH AFTER 5 DAYS


9/26/20 Blood Culture - Final, Complete


          NO GROWTH AFTER 5 DAYS


9/25/20 Respiratory Virus Panel (PCR) (GUI) - Final, Complete


          


9/25/20 Blood Culture - Final, Complete


          Staphylococcus Epidermidis


9/25/20 Blood Culture - Final, Complete


          NO GROWTH AFTER 5 DAYS


9/25/20 Gram Stain - Final, Complete


          


9/25/20 Body Fluid Culture - Final, Complete


          


9/25/20 Anaerobic Culture - Final, Complete











ERNESTO CABRERA DO                Oct 3, 2020 16:39

## 2020-10-04 VITALS — DIASTOLIC BLOOD PRESSURE: 51 MMHG | SYSTOLIC BLOOD PRESSURE: 87 MMHG

## 2020-10-04 VITALS — DIASTOLIC BLOOD PRESSURE: 55 MMHG | SYSTOLIC BLOOD PRESSURE: 94 MMHG

## 2020-10-04 VITALS — SYSTOLIC BLOOD PRESSURE: 99 MMHG | DIASTOLIC BLOOD PRESSURE: 54 MMHG

## 2020-10-04 VITALS — SYSTOLIC BLOOD PRESSURE: 87 MMHG | DIASTOLIC BLOOD PRESSURE: 42 MMHG

## 2020-10-04 VITALS — DIASTOLIC BLOOD PRESSURE: 57 MMHG | SYSTOLIC BLOOD PRESSURE: 109 MMHG

## 2020-10-04 VITALS — DIASTOLIC BLOOD PRESSURE: 59 MMHG | SYSTOLIC BLOOD PRESSURE: 102 MMHG

## 2020-10-04 VITALS — SYSTOLIC BLOOD PRESSURE: 108 MMHG | DIASTOLIC BLOOD PRESSURE: 65 MMHG

## 2020-10-04 VITALS — DIASTOLIC BLOOD PRESSURE: 55 MMHG | SYSTOLIC BLOOD PRESSURE: 90 MMHG

## 2020-10-04 VITALS — DIASTOLIC BLOOD PRESSURE: 54 MMHG | SYSTOLIC BLOOD PRESSURE: 106 MMHG

## 2020-10-04 VITALS — SYSTOLIC BLOOD PRESSURE: 96 MMHG | DIASTOLIC BLOOD PRESSURE: 55 MMHG

## 2020-10-04 VITALS — DIASTOLIC BLOOD PRESSURE: 56 MMHG | SYSTOLIC BLOOD PRESSURE: 101 MMHG

## 2020-10-04 VITALS — SYSTOLIC BLOOD PRESSURE: 96 MMHG | DIASTOLIC BLOOD PRESSURE: 50 MMHG

## 2020-10-04 VITALS — SYSTOLIC BLOOD PRESSURE: 94 MMHG | DIASTOLIC BLOOD PRESSURE: 55 MMHG

## 2020-10-04 VITALS — DIASTOLIC BLOOD PRESSURE: 55 MMHG | SYSTOLIC BLOOD PRESSURE: 95 MMHG

## 2020-10-04 VITALS — DIASTOLIC BLOOD PRESSURE: 50 MMHG | SYSTOLIC BLOOD PRESSURE: 94 MMHG

## 2020-10-04 VITALS — DIASTOLIC BLOOD PRESSURE: 57 MMHG | SYSTOLIC BLOOD PRESSURE: 98 MMHG

## 2020-10-04 VITALS — SYSTOLIC BLOOD PRESSURE: 108 MMHG | DIASTOLIC BLOOD PRESSURE: 51 MMHG

## 2020-10-04 VITALS — DIASTOLIC BLOOD PRESSURE: 57 MMHG | SYSTOLIC BLOOD PRESSURE: 104 MMHG

## 2020-10-04 VITALS — DIASTOLIC BLOOD PRESSURE: 54 MMHG | SYSTOLIC BLOOD PRESSURE: 98 MMHG

## 2020-10-04 VITALS — DIASTOLIC BLOOD PRESSURE: 54 MMHG | SYSTOLIC BLOOD PRESSURE: 95 MMHG

## 2020-10-04 VITALS — SYSTOLIC BLOOD PRESSURE: 106 MMHG | DIASTOLIC BLOOD PRESSURE: 53 MMHG

## 2020-10-04 VITALS — DIASTOLIC BLOOD PRESSURE: 63 MMHG | SYSTOLIC BLOOD PRESSURE: 113 MMHG

## 2020-10-04 VITALS — DIASTOLIC BLOOD PRESSURE: 55 MMHG | SYSTOLIC BLOOD PRESSURE: 97 MMHG

## 2020-10-04 LAB
ALBUMIN SERPL BCG-MCNC: 2.3 GM/DL (ref 3.2–5.2)
ALT SERPL W P-5'-P-CCNC: 25 U/L (ref 12–78)
BILIRUB SERPL-MCNC: 4.5 MG/DL (ref 0.2–1)
BUN SERPL-MCNC: 21 MG/DL (ref 7–18)
CALCIUM SERPL-MCNC: 8.2 MG/DL (ref 8.8–10.2)
CHLORIDE SERPL-SCNC: 101 MEQ/L (ref 98–107)
CO2 SERPL-SCNC: 27 MEQ/L (ref 21–32)
CREAT SERPL-MCNC: 1.65 MG/DL (ref 0.55–1.3)
GFR SERPL CREATININE-BSD FRML MDRD: 33.8 ML/MIN/{1.73_M2} (ref 45–?)
GLUCOSE SERPL-MCNC: 139 MG/DL (ref 70–100)
HCT VFR BLD AUTO: 29.9 % (ref 36–47)
HGB BLD-MCNC: 10 G/DL (ref 12–15.5)
MCH RBC QN AUTO: 34.8 PG (ref 27–33)
MCHC RBC AUTO-ENTMCNC: 33.4 G/DL (ref 32–36.5)
MCV RBC AUTO: 104.2 FL (ref 80–96)
PLATELET # BLD AUTO: 76 10^3/UL (ref 150–450)
POTASSIUM SERPL-SCNC: 4.3 MEQ/L (ref 3.5–5.1)
PROT SERPL-MCNC: 6.4 GM/DL (ref 6.4–8.2)
RBC # BLD AUTO: 2.87 10^6/UL (ref 4–5.4)
SODIUM SERPL-SCNC: 134 MEQ/L (ref 136–145)
WBC # BLD AUTO: 2.8 10^3/UL (ref 4–10)

## 2020-10-04 RX ADMIN — LORATADINE SCH MG: 10 TABLET ORAL at 08:34

## 2020-10-04 RX ADMIN — SODIUM CHLORIDE, PRESERVATIVE FREE SCH ML: 5 INJECTION INTRAVENOUS at 20:16

## 2020-10-04 RX ADMIN — TORSEMIDE SCH MG: 20 TABLET ORAL at 08:34

## 2020-10-04 RX ADMIN — FOLIC ACID SCH MG: 1 TABLET ORAL at 08:34

## 2020-10-04 RX ADMIN — SODIUM CHLORIDE, PRESERVATIVE FREE SCH ML: 5 INJECTION INTRAVENOUS at 06:30

## 2020-10-04 RX ADMIN — MULTIPLE VITAMINS W/ MINERALS TAB SCH TAB: TAB at 08:34

## 2020-10-04 RX ADMIN — SPIRONOLACTONE SCH MG: 50 TABLET ORAL at 16:36

## 2020-10-04 RX ADMIN — TORSEMIDE SCH MG: 20 TABLET ORAL at 16:37

## 2020-10-04 RX ADMIN — LACTULOSE SCH ML: 10 SOLUTION ORAL at 08:34

## 2020-10-04 RX ADMIN — LEVOTHYROXINE SODIUM SCH MCG: 112 TABLET ORAL at 06:29

## 2020-10-04 RX ADMIN — MIDODRINE HYDROCHLORIDE SCH MG: 5 TABLET ORAL at 16:37

## 2020-10-04 RX ADMIN — BETAXOLOL HYDROCHLORIDE SCH DROP: 2.8 SUSPENSION/ DROPS OPHTHALMIC at 08:35

## 2020-10-04 RX ADMIN — SODIUM CHLORIDE, PRESERVATIVE FREE SCH ML: 5 INJECTION INTRAVENOUS at 13:16

## 2020-10-04 RX ADMIN — PREDNISOLONE SODIUM PHOSPHATE SCH MG: 15 SOLUTION ORAL at 08:35

## 2020-10-04 RX ADMIN — BRIMONIDINE TARTRATE SCH DROP: 1 SOLUTION/ DROPS OPHTHALMIC at 20:16

## 2020-10-04 RX ADMIN — LATANOPROST SCH DROP: 50 SOLUTION OPHTHALMIC at 20:16

## 2020-10-04 RX ADMIN — SPIRONOLACTONE SCH MG: 50 TABLET ORAL at 08:34

## 2020-10-04 RX ADMIN — MIDODRINE HYDROCHLORIDE SCH MG: 5 TABLET ORAL at 11:28

## 2020-10-04 RX ADMIN — MIDODRINE HYDROCHLORIDE SCH MG: 5 TABLET ORAL at 08:34

## 2020-10-04 RX ADMIN — BETAXOLOL HYDROCHLORIDE SCH DROP: 2.8 SUSPENSION/ DROPS OPHTHALMIC at 20:16

## 2020-10-04 RX ADMIN — BRIMONIDINE TARTRATE SCH DROP: 1 SOLUTION/ DROPS OPHTHALMIC at 08:35

## 2020-10-04 NOTE — IPNPDOC
Subjective


Date Seen


The patient was seen on 10/4/20.





Subjective


Chief Complaint/HPI


Ms. Taylor is a 60-year-old female with alcoholic liver cirrhosis with portal 

hypertension, esophageal varices, hypothyroidism, and hypertension who comes to 

[facility] for dyspnea found to have large left pleural effusion 

as well as ascites. She is breathing better with the PleurX catheter, but has 

some pain associated with catheter drainage. Yesterday, she was started on 

albumin and midodrine. For an elevated Maddrey's score, she was started on 

Prednisolone. Otherwise denies fever/chills, abdominal pain, or dysuria.


Constitutional:  Denies: Chills, Fever


Pulmonary:  Reports: Dyspnea (improved)


Cardiovascular:  Reports: Other Symptoms (chest wall discomfort from PleurX 

catheter drainage)


Gastrointestinal:  Denies: Abdominal Pain


Genitourinary:  Denies: Dysuria





Objective


Physical Examination


General Exam:  Positive: Alert, Cooperative, Mild Distress


Eye Exam:  Positive: EOMI; 


   Negative: Sclera icteric


ENT Exam:  Positive: Mucous membr. moist/pink; 


   Negative: Atraumatic


Neck Exam:  Positive: Supple


Chest Exam:  Positive: Clear to auscultation


Heart Exam:  Positive: Rate Normal, Regular Rhythm


Abdomen Exam:  Positive: Normal bowel sounds, Other (distended); 


   Negative: Tenderness


Extremity Exam:  Positive: Edema (bilateral pitting edema)


Neuro Exam:  Positive: Cranial Nerves 3-12 NL


Psych Exam:  Positive: Mental status NL, Mood NL, Oriented x 3





Assessment /Plan


Assessment


Ms. Taylor is a 60-year-old female with alcoholic liver cirrhosis with portal 

hypertension, hypothyroidism, and hypertension who is here for dyspnea secondary

to large left pleural effusion. She's been compliant with her medications and 

her diuretics. She has not been compliant with her diet due to difficulties 

shopping. She doesn't that she's been eating a lot of salty foods. Started about

2 weeks ago. She initially had dyspnea with some chest tightness. That morning 

she woke up with shortness of breath. She came into the ED to find out she had a

large left pleural effusion again. Initially IR was consulted since they drained

her pleural effusion back in January 2020, but due to rapid refill, Thoracic 

surgery, Dr. Wiseman, has been consulted. Dr. Wiseman placed a PleurX catheter 

and has been successfully draining fluid. Due to the worsening renal function in

the setting of cirrhosis, Nephrology was consulted, recommendations appreciated.

Plan for paracentesis tomorrow





Plan/VTE


VTE Prophylaxis Ordered?:  Yes





Plan


1. Large left pleural effusion secondary to alcoholic cirrhosis


She is compliant with her diuretics


She is not compliant with diet. She's been having salty foods. She has also 

been drinking alcohol.


Contacted IR for thoracocentesis since IR did it in the past (Jan 2020)


Fluid studies suggestive of transudative fluid. Fluid protein 1. Serum protein 

7.9. Fluid LDH 39. Serum . Serum albumin 2. Fluid albumin 0.3.


Fluid cultures negative. Discontinued vancomycin


-Started to fill up again over last weekend. Consulted Thoracic surgery, Dr. Wiseman. Recommendations appreciated


-Dr. Wiseman place PleurX catheter





2. BAM 2/2 decompensated cirrhosis


-With the PleurX, she has been losing albumin gradually over time


-As albumin has been decreasing, creatinine has been increasing


-US of kidney does not demonstrate obstruction, FEUrea is 33.4% suggesting pre-

renal disease


-Consulted nephrology. Recommendations appreciated.


-Patient is being given albumin and midodrine





3. Decompensated alcoholic cirrhosis


Maddrey's calculated to be 36.1 (PT 20.1, PT control 13, Tbili 3.4). She is a 

candidate for steroids


-Starting Prednisolone 40mg today (not using prednisone, because prednisone 

requires liver metabolism)


-In 7 days, will need to calculate Lille score to see if steroid therapy was 

effective. If effective, can continue for 28 days before steroid tapering


We'll continue her diuretics and lactulose. She'll be on a 2 g sodium diet and 

fluid restriction 1.8L





4. Ascites


Denies abdominal pain, but she does feel like her abdomen has been distended


There is moderate to large amount of ascites


-Scheduled IR to drain ascites on Monday 10/6/2020


-Anticipating need for albumin after paracentesis, consented for albumin and 

blood products





5. Glaucoma


We'll continue her eyedrops





6. Hypothyroidism


Appears euthyroid


continue her levothyroxine





7. Hypertension


She may have had a history of hypertension, but blood pressure is controlled 

now


Continue furosemide and spironolactone





8. DVT prophylaxis


SCDs and teds.





VS, I&O, 24H, Fishbone


Vital Signs/I&O





Vital Signs








  Date Time  Temp Pulse Resp B/P (MAP) Pulse Ox O2 Delivery O2 Flow Rate FiO2


 


10/4/20 12:36 98.0 86 18 108/65 99   


 


10/4/20 12:22      Room Air  


 


9/30/20 18:08       4.0 














I&O- Last 24 Hours up to 6 AM 


 


 10/4/20





 06:00


 


Intake Total 1100.0 ml


 


Output Total 1975 ml


 


Balance -875.0 ml











Laboratory Data


24H LABS


Laboratory Tests 2


10/4/20 04:34: 


Nucleated Red Blood Cells % (auto) 0.0, Immature Platelet Fraction 1.0, Anion 

Gap 6L, Glomerular Filtration Rate 33.8L, Calcium Level 8.2L, Total Bilirubin 

4.5H, Aspartate Amino Transf (AST/SGOT) 35, Alanine Aminotransferase (ALT/SGPT) 

25, Alkaline Phosphatase 94, Total Protein 6.4, Albumin 2.3#L, Albumin/Globulin 

Ratio 0.6L


CBC/BMP


Laboratory Tests


10/4/20 04:34








Microbiology





Microbiology


9/26/20 Blood Culture - Final, Complete


          NO GROWTH AFTER 5 DAYS


9/26/20 Blood Culture - Final, Complete


          NO GROWTH AFTER 5 DAYS


9/25/20 Respiratory Virus Panel (PCR) (GUI) - Final, Complete


          


9/25/20 Blood Culture - Final, Complete


          Staphylococcus Epidermidis


9/25/20 Blood Culture - Final, Complete


          NO GROWTH AFTER 5 DAYS


9/25/20 Gram Stain - Final, Complete


          


9/25/20 Body Fluid Culture - Final, Complete


          


9/25/20 Anaerobic Culture - Final, Complete











ERNESTO CABRERA DO                Oct 4, 2020 13:11

## 2020-10-04 NOTE — REPVR
PROCEDURE INFORMATION: 

Exam: XR Chest, 2 Views 

Exam date and time: 10/4/2020 7:45 AM 

Age: 60 years old 

Clinical indication: Condition or disease; Other: Pleural effusion 



TECHNIQUE: 

Imaging protocol: XR of the chest 

Views: 2 views. 



COMPARISON: 

CR Chest, 2 view PA, Lat 10/3/2020 7:44 AM 



FINDINGS: 

Tubes, catheters and devices: Left basilar chest tube. Overlying EKG leads. 



Lungs: Stable left basilar atelectasis/consolidation. 

Pleural space: Moderate left pleural effusion appears similar in size with some 

redistribution. No evidence of pneumothorax. 

Heart/Mediastinum: Cardiomediastinal silhouette is stable. 

Bones/joints: Bones are stable. 

Soft tissues: Small amount of residual left chest wall subcutaneous emphysema. 



IMPRESSION: 

Moderate left pleural effusion with an indwelling left basilar chest tube. 

Pleural effusion appears similar in size with some redistribution. There is 

associated atelectasis/consolidation at the left base. No significant change 

when compared to 10/03/2020. 



Electronically signed by: Yogi Carter On 10/04/2020  08:19:50 AM

## 2020-10-04 NOTE — REPVR
PROCEDURE INFORMATION: 

Exam: XR Chest, 2 Views 

Exam date and time: 10/3/2020 6:00 AM 

Age: 60 years old 

Clinical indication: Cardiovascular condition or disease; Other: Pleural 

effusion 



TECHNIQUE: 

Imaging protocol: XR of the chest 

Views: 2 views. 



COMPARISON: 

CR Chest, 2 view PA, Lat 10/1/2020 8:04 AM 



FINDINGS: 

Tubes, catheters and devices: Left basilar chest tube. Overlying EKG leads. 



Lungs: Stable left basilar atelectasis/consolidation. 

Pleural space: Moderate left pleural effusion has increased in size. 

Heart/Mediastinum: Cardiomediastinal silhouette is stable. 

Bones/joints: Bones are stable. 

Soft tissues: Residual left chest wall subcutaneous emphysema. 



IMPRESSION: 

Moderate left pleural effusion with an indwelling left basilar chest tube. 

Pleural effusion has increased in size. There is associated 

atelectasis/consolidation at the left base. Residual left chest wall 

subcutaneous emphysema. 



Electronically signed by: Yogi Carter On 10/04/2020  08:39:47 AM

## 2020-10-04 NOTE — REPVR
PROCEDURE INFORMATION: 

Exam: CT Chest Without Contrast 

Exam date and time: 10/4/2020 10:45 AM 

Age: 60 years old 

Clinical indication: Device placement; Other: Status of effusion, placement of 

catheter 



TECHNIQUE: 

Imaging protocol: Computed tomography of the chest without contrast. Coronal 

and sagittal reformats were created and reviewed. 

3D rendering (Not supervised by radiologist): MIP and/or 3D reconstructed 

images were created by the technologist. 

Radiation optimization: All CT scans at this facility use at least one of these 

dose optimization techniques: automated exposure control; mA and/or kV 

adjustment per patient size (includes targeted exams where dose is matched to 

clinical indication); or iterative reconstruction. 



COMPARISON: 

CT ANGIO CHEST 9/25/2020 9:41 AM 



FINDINGS: 

Tubes, catheters and devices: Indwelling left pleural drainage catheter 

terminating in the left posterior costophrenic recess. Some of the proximal 

side holes of the left pleural drainage catheter lie at the left lateral body 

wall soft tissues. 



Thyroid: Atrophic thyroid. 

Lungs: Evaluation of the lungs is limited by motion. There has been interval 

re-expansion of the left lung. Minimal ill-defined ground-glass opacities of 

the left upper lobe. Minimal right lung atelectasis. Multiple calcified 

pulmonary nodules consistent with benign remote granulomas. 

Pleural space: No right pleural effusion. There is no significant left pleural 

effusion. Very minimal low-attenuation left posterior pleural fluid is present. 

No pneumothorax. 

Heart: Coronary arterial atherosclerotic calcifications are present. Heart size 

is within normal limits. No pericardial effusion. Diffuse hypoattenuation of 

the blood pool suggestive of anemia. 

Mediastinal space: Gastroesophageal varices redemonstrated. No mediastinal 

mass. The previously seen rightward mediastinal shift has resolved. 

Pulmonary arteries: The main pulmonary arterial trunk is not enlarged. 

Aorta: No thoracic aortic aneurysm. 

Veins: Recanalized periumbilical vein redemonstrated. 

Lymph nodes: No enlarged lymph nodes. 



Liver: Diffuse nodular hepatic contour consistent with cirrhosis 

redemonstrated. 

Spleen: Splenomegaly redemonstrated. 

Intraperitoneal space: Small ascites at the imaged upper abdomen, decreased in 

volume compared to 09/25/2020. 

Bones/joints: Degenerative spine disease. 

Soft tissues: Left body wall diffuse soft tissue emphysema. Diffuse fat 

stranding consistent with soft tissue edema is present within the left 

anterolateral lower chest and bilateral upper abdominal body wall. 



IMPRESSION: 

1. Indwelling left pleural drainage catheter. There is no significant left 

pleural effusion. 

2. There has been interval re-expansion of the left lung. Minimal left upper 

lobe airspace opacities are present, possibly representing atelectasis, edema 

or pneumonitis. 

3. Thoracic and abdominal body wall edema as described. 

4. Hepatic cirrhosis and sequela of portal hypertension are redemonstrated. 

Decreased ascites. 



Electronically signed by: Andrew Madrid On 10/04/2020  11:48:04 AM

## 2020-10-05 VITALS — SYSTOLIC BLOOD PRESSURE: 90 MMHG | DIASTOLIC BLOOD PRESSURE: 51 MMHG

## 2020-10-05 VITALS — SYSTOLIC BLOOD PRESSURE: 96 MMHG | DIASTOLIC BLOOD PRESSURE: 57 MMHG

## 2020-10-05 VITALS — SYSTOLIC BLOOD PRESSURE: 95 MMHG | DIASTOLIC BLOOD PRESSURE: 51 MMHG

## 2020-10-05 VITALS — SYSTOLIC BLOOD PRESSURE: 96 MMHG | DIASTOLIC BLOOD PRESSURE: 52 MMHG

## 2020-10-05 VITALS — SYSTOLIC BLOOD PRESSURE: 96 MMHG | DIASTOLIC BLOOD PRESSURE: 51 MMHG

## 2020-10-05 VITALS — DIASTOLIC BLOOD PRESSURE: 61 MMHG | SYSTOLIC BLOOD PRESSURE: 112 MMHG

## 2020-10-05 VITALS — SYSTOLIC BLOOD PRESSURE: 100 MMHG | DIASTOLIC BLOOD PRESSURE: 54 MMHG

## 2020-10-05 VITALS — SYSTOLIC BLOOD PRESSURE: 90 MMHG | DIASTOLIC BLOOD PRESSURE: 46 MMHG

## 2020-10-05 VITALS — DIASTOLIC BLOOD PRESSURE: 54 MMHG | SYSTOLIC BLOOD PRESSURE: 98 MMHG

## 2020-10-05 VITALS — DIASTOLIC BLOOD PRESSURE: 55 MMHG | SYSTOLIC BLOOD PRESSURE: 116 MMHG

## 2020-10-05 VITALS — DIASTOLIC BLOOD PRESSURE: 52 MMHG | SYSTOLIC BLOOD PRESSURE: 102 MMHG

## 2020-10-05 LAB
ALBUMIN SERPL BCG-MCNC: 2.5 GM/DL (ref 3.2–5.2)
ALT SERPL W P-5'-P-CCNC: 22 U/L (ref 12–78)
BILIRUB SERPL-MCNC: 3.1 MG/DL (ref 0.2–1)
BUN SERPL-MCNC: 24 MG/DL (ref 7–18)
CALCIUM SERPL-MCNC: 8 MG/DL (ref 8.8–10.2)
CHLORIDE SERPL-SCNC: 103 MEQ/L (ref 98–107)
CO2 SERPL-SCNC: 29 MEQ/L (ref 21–32)
CREAT SERPL-MCNC: 1.81 MG/DL (ref 0.55–1.3)
FOLATE SERPL-MCNC: 16.6 NG/ML (ref 5.4–?)
GFR SERPL CREATININE-BSD FRML MDRD: 30.4 ML/MIN/{1.73_M2} (ref 45–?)
GLUCOSE SERPL-MCNC: 113 MG/DL (ref 70–100)
HCT VFR BLD AUTO: 26.6 % (ref 36–47)
HGB BLD-MCNC: 9.1 G/DL (ref 12–15.5)
IRON SATN MFR SERPL: 102.4 % (ref 13.2–45)
IRON SERPL-MCNC: 87 UG/DL (ref 50–170)
MCH RBC QN AUTO: 35 PG (ref 27–33)
MCHC RBC AUTO-ENTMCNC: 34.2 G/DL (ref 32–36.5)
MCV RBC AUTO: 102.3 FL (ref 80–96)
PLATELET # BLD AUTO: 79 10^3/UL (ref 150–450)
POTASSIUM SERPL-SCNC: 4.1 MEQ/L (ref 3.5–5.1)
PROT SERPL-MCNC: 6.2 GM/DL (ref 6.4–8.2)
RBC # BLD AUTO: 2.6 10^6/UL (ref 4–5.4)
SODIUM SERPL-SCNC: 138 MEQ/L (ref 136–145)
TIBC SERPL-MCNC: 85 UG/DL (ref 250–450)
VIT B12 SERPL-MCNC: 1727 PG/ML (ref 247–911)
WBC # BLD AUTO: 8.3 10^3/UL (ref 4–10)

## 2020-10-05 RX ADMIN — SPIRONOLACTONE SCH MG: 50 TABLET ORAL at 09:00

## 2020-10-05 RX ADMIN — BRIMONIDINE TARTRATE SCH DROP: 1 SOLUTION/ DROPS OPHTHALMIC at 21:10

## 2020-10-05 RX ADMIN — LORATADINE SCH MG: 10 TABLET ORAL at 09:38

## 2020-10-05 RX ADMIN — MULTIPLE VITAMINS W/ MINERALS TAB SCH TAB: TAB at 09:38

## 2020-10-05 RX ADMIN — LEVOTHYROXINE SODIUM SCH MCG: 112 TABLET ORAL at 05:32

## 2020-10-05 RX ADMIN — TORSEMIDE SCH MG: 20 TABLET ORAL at 09:00

## 2020-10-05 RX ADMIN — MIDODRINE HYDROCHLORIDE SCH MG: 5 TABLET ORAL at 09:38

## 2020-10-05 RX ADMIN — TORSEMIDE SCH MG: 20 TABLET ORAL at 17:14

## 2020-10-05 RX ADMIN — BETAXOLOL HYDROCHLORIDE SCH DROP: 2.8 SUSPENSION/ DROPS OPHTHALMIC at 09:39

## 2020-10-05 RX ADMIN — LACTULOSE SCH ML: 10 SOLUTION ORAL at 09:37

## 2020-10-05 RX ADMIN — CIPROFLOXACIN SCH MG: 250 TABLET, FILM COATED ORAL at 06:00

## 2020-10-05 RX ADMIN — FOLIC ACID SCH MG: 1 TABLET ORAL at 09:38

## 2020-10-05 RX ADMIN — PREDNISOLONE SODIUM PHOSPHATE SCH MG: 15 SOLUTION ORAL at 09:38

## 2020-10-05 RX ADMIN — LATANOPROST SCH DROP: 50 SOLUTION OPHTHALMIC at 21:09

## 2020-10-05 RX ADMIN — SODIUM CHLORIDE, PRESERVATIVE FREE SCH ML: 5 INJECTION INTRAVENOUS at 05:32

## 2020-10-05 RX ADMIN — MIDODRINE HYDROCHLORIDE SCH MG: 5 TABLET ORAL at 12:27

## 2020-10-05 RX ADMIN — BRIMONIDINE TARTRATE SCH DROP: 1 SOLUTION/ DROPS OPHTHALMIC at 09:38

## 2020-10-05 RX ADMIN — MIDODRINE HYDROCHLORIDE SCH MG: 5 TABLET ORAL at 16:00

## 2020-10-05 RX ADMIN — SODIUM CHLORIDE, PRESERVATIVE FREE SCH ML: 5 INJECTION INTRAVENOUS at 21:09

## 2020-10-05 RX ADMIN — SPIRONOLACTONE SCH MG: 50 TABLET ORAL at 17:13

## 2020-10-05 RX ADMIN — SODIUM CHLORIDE, PRESERVATIVE FREE SCH ML: 5 INJECTION INTRAVENOUS at 14:45

## 2020-10-05 RX ADMIN — BETAXOLOL HYDROCHLORIDE SCH DROP: 2.8 SUSPENSION/ DROPS OPHTHALMIC at 21:09

## 2020-10-05 NOTE — IPNPDOC
Text Note


Date of Service


The patient was seen on 10/5/20.





NOTE


Subjective: No any acute events overnight. Patient denied fever, chills, nausea,

vomiting, shortness of breath or palpitations





Objective: 


Gen: NAD


HEENT: PERRLA, EOMI


Lungs: Clear to auscultation


CV: S1-S2


Abdomen: Mildly distended, nontender, bowel sounds present


Extremities: +1 pitting edema, no cyanosis


Neuro: Nonfocal





Patient is 60 years old female with past medical history of alcoholic liver 

cirrhosis with portal hypertension, hypothyroidism was found to have large left 

pleural effusion. Dr. Wiseman placed a PleurX catheter and has been successfully

draining fluid. 





Large left pleural effusion secondary to alcoholic cirrhosis


Pleurocentesis was done by Dr. Wiseman, Pleurx was placed


Will proceed with chest x-ray in 2 days to evaluate pleural effusion





BAM 2/2 decompensated cirrhosis


There is concern for hepatorenal syndrome


Nephrology team follows her


Continue diuretics, midodrine





Decompensated alcoholic cirrhosis


Patient is active drinker, not candidate for liver transplantation


Continue prednisolone





Ascites


IR tried to  perform paracentesis today, no fluid was available for paracentesis


Patient is in  high risk for SBP


Will start ciprofloxacin prophylactically


c/w diuretics





Glaucoma


We'll continue her eyedrops





Hypothyroidism


continue her levothyroxine





Megaloblastic anemia


Most likely secondary to alcoholism


We'll check B12 and folate, iron





VS,Fishbone, I+O


VS, Fishbone, I+O


Laboratory Tests


10/5/20 04:37











Vital Signs








  Date Time  Temp Pulse Resp B/P (MAP) Pulse Ox O2 Delivery O2 Flow Rate FiO2


 


10/5/20 12:00 97.3 78 16 100/54 (69) 99 Room Air  


 


9/30/20 18:08       4.0 














I&O- Last 24 Hours up to 6 AM 


 


 10/5/20





 06:00


 


Intake Total 1645.0 ml


 


Output Total 2300 ml


 


Balance -655.0 ml

















MILLIE ACOSTA DO             Oct 5, 2020 13:04

## 2020-10-05 NOTE — REPVR
PROCEDURE INFORMATION: 

Exam: XR Chest, 2 Views 

Exam date and time: 10/5/2020 8:09 AM 

Age: 60 years old 

Clinical indication: Shortness of breath; Additional info: Evaluation of pleurx 

and pleural effusion 



TECHNIQUE: 

Imaging protocol: XR of the chest 

Views: 2 views. 



COMPARISON: 

CT Chest without contrast 10/4/2020 10:41 AM 



FINDINGS: 

Lungs: Stable position of chest tube at the left base. 

Pleural space: No significant pneumothorax. 

Heart/Mediastinum: Unremarkable. No cardiomegaly. 

Bones/joints: Unremarkable. 

Soft tissues: Soft tissue gas along the left chest wall. 



IMPRESSION: 

Stable position of chest tube at the left base. No significant pneumothorax. 



Electronically signed by: Amado Perez On 10/05/2020  08:28:39 AM

## 2020-10-06 VITALS — SYSTOLIC BLOOD PRESSURE: 98 MMHG | DIASTOLIC BLOOD PRESSURE: 57 MMHG

## 2020-10-06 VITALS — SYSTOLIC BLOOD PRESSURE: 101 MMHG | DIASTOLIC BLOOD PRESSURE: 53 MMHG

## 2020-10-06 VITALS — SYSTOLIC BLOOD PRESSURE: 107 MMHG | DIASTOLIC BLOOD PRESSURE: 56 MMHG

## 2020-10-06 VITALS — DIASTOLIC BLOOD PRESSURE: 54 MMHG | SYSTOLIC BLOOD PRESSURE: 101 MMHG

## 2020-10-06 VITALS — DIASTOLIC BLOOD PRESSURE: 56 MMHG | SYSTOLIC BLOOD PRESSURE: 106 MMHG

## 2020-10-06 VITALS — SYSTOLIC BLOOD PRESSURE: 113 MMHG | DIASTOLIC BLOOD PRESSURE: 56 MMHG

## 2020-10-06 LAB
ALBUMIN SERPL BCG-MCNC: 2.5 GM/DL (ref 3.2–5.2)
ALT SERPL W P-5'-P-CCNC: 28 U/L (ref 12–78)
BILIRUB SERPL-MCNC: 2.7 MG/DL (ref 0.2–1)
BUN SERPL-MCNC: 29 MG/DL (ref 7–18)
CALCIUM SERPL-MCNC: 8.3 MG/DL (ref 8.8–10.2)
CHLORIDE SERPL-SCNC: 103 MEQ/L (ref 98–107)
CO2 SERPL-SCNC: 31 MEQ/L (ref 21–32)
CREAT SERPL-MCNC: 1.79 MG/DL (ref 0.55–1.3)
GFR SERPL CREATININE-BSD FRML MDRD: 30.8 ML/MIN/{1.73_M2} (ref 45–?)
GLUCOSE SERPL-MCNC: 90 MG/DL (ref 70–100)
HCT VFR BLD AUTO: 32.2 % (ref 36–47)
HGB BLD-MCNC: 10.6 G/DL (ref 12–15.5)
MCH RBC QN AUTO: 34.2 PG (ref 27–33)
MCHC RBC AUTO-ENTMCNC: 32.9 G/DL (ref 32–36.5)
MCV RBC AUTO: 103.9 FL (ref 80–96)
PLATELET # BLD AUTO: 98 10^3/UL (ref 150–450)
POTASSIUM SERPL-SCNC: 3.7 MEQ/L (ref 3.5–5.1)
PROT SERPL-MCNC: 6.5 GM/DL (ref 6.4–8.2)
RBC # BLD AUTO: 3.1 10^6/UL (ref 4–5.4)
SODIUM SERPL-SCNC: 139 MEQ/L (ref 136–145)
WBC # BLD AUTO: 8.5 10^3/UL (ref 4–10)

## 2020-10-06 RX ADMIN — LACTULOSE SCH ML: 10 SOLUTION ORAL at 09:10

## 2020-10-06 RX ADMIN — BRIMONIDINE TARTRATE SCH DROP: 1 SOLUTION/ DROPS OPHTHALMIC at 20:34

## 2020-10-06 RX ADMIN — MIDODRINE HYDROCHLORIDE SCH MG: 5 TABLET ORAL at 16:59

## 2020-10-06 RX ADMIN — BETAXOLOL HYDROCHLORIDE SCH DROP: 2.8 SUSPENSION/ DROPS OPHTHALMIC at 09:11

## 2020-10-06 RX ADMIN — MIDODRINE HYDROCHLORIDE SCH MG: 5 TABLET ORAL at 12:49

## 2020-10-06 RX ADMIN — CIPROFLOXACIN SCH MG: 250 TABLET, FILM COATED ORAL at 06:00

## 2020-10-06 RX ADMIN — SODIUM CHLORIDE, PRESERVATIVE FREE SCH ML: 5 INJECTION INTRAVENOUS at 06:00

## 2020-10-06 RX ADMIN — SODIUM CHLORIDE, PRESERVATIVE FREE SCH ML: 5 INJECTION INTRAVENOUS at 20:34

## 2020-10-06 RX ADMIN — SPIRONOLACTONE SCH MG: 50 TABLET ORAL at 16:59

## 2020-10-06 RX ADMIN — LORATADINE SCH MG: 10 TABLET ORAL at 09:09

## 2020-10-06 RX ADMIN — SODIUM CHLORIDE, PRESERVATIVE FREE SCH ML: 5 INJECTION INTRAVENOUS at 12:49

## 2020-10-06 RX ADMIN — FOLIC ACID SCH MG: 1 TABLET ORAL at 09:10

## 2020-10-06 RX ADMIN — TORSEMIDE SCH MG: 20 TABLET ORAL at 16:59

## 2020-10-06 RX ADMIN — LEVOTHYROXINE SODIUM SCH MCG: 112 TABLET ORAL at 06:00

## 2020-10-06 RX ADMIN — BRIMONIDINE TARTRATE SCH DROP: 1 SOLUTION/ DROPS OPHTHALMIC at 09:11

## 2020-10-06 RX ADMIN — LATANOPROST SCH DROP: 50 SOLUTION OPHTHALMIC at 20:34

## 2020-10-06 RX ADMIN — MIDODRINE HYDROCHLORIDE SCH MG: 5 TABLET ORAL at 09:10

## 2020-10-06 RX ADMIN — TORSEMIDE SCH MG: 20 TABLET ORAL at 09:10

## 2020-10-06 RX ADMIN — PREDNISOLONE SODIUM PHOSPHATE SCH MG: 15 SOLUTION ORAL at 09:10

## 2020-10-06 RX ADMIN — MULTIPLE VITAMINS W/ MINERALS TAB SCH TAB: TAB at 09:09

## 2020-10-06 RX ADMIN — BETAXOLOL HYDROCHLORIDE SCH DROP: 2.8 SUSPENSION/ DROPS OPHTHALMIC at 20:34

## 2020-10-06 RX ADMIN — SPIRONOLACTONE SCH MG: 50 TABLET ORAL at 09:09

## 2020-10-06 NOTE — REP
CHEST X-RAY: 2-VIEWS 



HISTORY: Pleural effusion. 



COMPARISON STUDY: 10/01/2020.



FINDINGS: 

The PleurX catheter remains in place in the left base along the diaphragm. There
is slight pleural thickening and pleural fluid in the left base. Extrathoracic 
soft tissue emphysema is again seen. Right lung remains clear. No significant 
change from the 10/01/2020 study.

MTDD

## 2020-10-06 NOTE — IPN
DATE:  10/06/2020



SUBJECTIVE:  Ms. Taylor is feeling better today and she is breathing well. She 
is a lot more awake and alert. 



Her vital signs show a T-max of 98.3 with a heart rate that ranges between 67-
71, in sinus rhythm, respiratory rate 18-16 without the use of accessory 
muscles, 98-99% saturation on room air and has a blood pressures ranging between
102/52 to 113/56.  



Her intake and output the past 24 hours has been recorded as 1170 in and 1600 
out for a negativity of 430 mL. There has been no drainage from her chest tube 
although there is some leakage around the PleurX site. Weight today is 74.5 kg 
compared to 75.7 kg yesterday.



PHYSICAL EXAMINATION: Lungs: Her lungs actually show normal vesicular sounds. 
There are a couple of scattered rhonchi which clear with coughing. Percussion is
full through the diaphragm

Cardiac: Without murmurs, click, gallops, rubs. I cannot feel her PMI. S1 and S2
normal.

Abdomen: Soft, nontender, bowel sounds are positive. There is no hepatomegaly 
that I can appreciate. There is no CVA tenderness. 

Extremities: No pretibial edema, no calf tenderness, no differential swelling of
the upper extremities.

Skin: Warm and dry and perfused without cyanosis or mottling including that of 
nailbeds and knees.

Neck: Supple. There is no jugular venous distention, no subcutaneous emphysema, 
trachea is midline. 

HEENT: Mouth shows mucous membranes to be pink and moist. Lips, gums, tongue 
show no thrush. Eyes show her pupils to be equal and reactive, extraocular 
movements are intact, sclerae still icteric, but less so. 

Neuro: CN II-XII intact with gross motor, gross sensation intact. Gait is not 
tested.  

Psychiatric: Shows her to be awake and alert, oriented times 3 with appropriate 
mood and affect and now conversational. 



LABORATORY DATA: White count today 8.5, hemoglobin and hematocrit of 10.6 and 
32.2 up from 9.1 and 26.6 yesterday. She is still receiving albumin. Her 
chemistries today showed normal electrolytes with a BUN and creatinine of 29 and
1.9 essentially unchanged from yesterday with a glucose of 90 and a calcium of 
8.3. Her albumin is up to 2.5. AST and ALT are normal with a total bilirubin of 
2.7. 



RADIOLOGY STUDIES: Her chest x-ray today shows clear costophrenic angles. I see 
no infiltrates. 



IMPRESSION:  

1.  Recurrent left pleural effusion seemingly under control.

2.  Liver failure.

3.  Hepatorenal syndrome.

5.  Renal insufficiency. 

6.  Esophageal varices.

7.  Portal hypertension. 

8.  Hypothyroidism. 

9.  Alcohol abuse.

10.  Hyperlipidemia. 



PLAN/DISCUSSION: I am very grateful that her chest x-ray does not show re-
accumulation of her pleural effusion. We will follow her a few more days and if 
the chest x-ray indeed remains clear of re-accumulation of the effusions I will 
remove her PleurX catheter. It looks as though medical therapy is actually 
working to control her effusion and her ascites. She was sent down yesterday for
a paracentesis and there was no ascites to be found.

PEYTON

## 2020-10-06 NOTE — IPNPDOC
Text Note


Date of Service


The patient was seen on 10/6/20.





NOTE


Subjective: No any acute events overnight. Minimal discharge from Pleurx


Patient denied fever, chills, nausea, vomiting, shortness of breath or 

palpitations





Objective: 


Gen: NAD


HEENT: PERRLA, EOMI


Lungs: Diminished lung sounds bilaterally


CV: S1-S2


Abdomen: Mildly distended, nontender, bowel sounds present


Extremities: +1 pitting edema, no cyanosis


Neuro: Nonfocal





Patient is 60 years old female with past medical history of alcoholic liver 

cirrhosis with portal hypertension, hypothyroidism was found to have large left 

pleural effusion. Dr. Wiseman placed a PleurX catheter and has been successfully

draining fluid. 





Large left pleural effusion secondary to alcoholic cirrhosis


Pleurocentesis was done by Dr. Wiseman, Pleurx was placed


Will proceed with chest x-ray  to evaluate pleural effusion





BAM 2/2 decompensated cirrhosis


There is concern for hepatorenal syndrome


Nephrology team follows her


Continue diuretics, midodrine





Decompensated alcoholic cirrhosis


Patient is active drinker, not candidate for liver transplantation


Continue prednisolone





Ascites


IR tried to  perform paracentesis today, no fluid was available for paracentesis


Patient is in  high risk for SBP


Will start ciprofloxacin prophylactically


c/w diuretics





Glaucoma


We'll continue her eyedrops





Hypothyroidism


continue her levothyroxine





Megaloblastic anemia


Most likely secondary to alcoholism


B12 and folate wnl





VS,Fishbone, I+O


VS, Fishbone, I+O


Laboratory Tests


10/6/20 04:43











Vital Signs








  Date Time  Temp Pulse Resp B/P (MAP) Pulse Ox O2 Delivery O2 Flow Rate FiO2


 


10/6/20 08:00 98.1 71 16 98/57 (71) 98 Room Air  


 


9/30/20 18:08       4.0 














I&O- Last 24 Hours up to 6 AM 


 


 10/6/20





 06:00


 


Intake Total 970 ml


 


Output Total 1200 ml


 


Balance -230 ml

















MILLIE ACOSTA DO             Oct 6, 2020 12:35

## 2020-10-06 NOTE — IPN
DATE:  10/04/2020



SUBJECTIVE:  Patient was seen and examined at the bedside today morning. She is 
afebrile and hemodynamically stable. She just got her repeat left-sided pleural 
tap done through the PleurX catheter, about 100 cc of fluid came out. She was 
started on higher dose of diuretic and Albuterol infusions, and with that, she 
has a better urine output now. Renal function is stable and electrolytes are 
within with optimal range. Patient was also started on prednisolone by the 
medical team after calculating her Maddrey discriminant function.



OBJECTIVE: 

VITAL SIGNS: Temperature 97.1 degrees Fahrenheit, blood pressure 109/57, pulse 
80, respiratory rate 22, saturating 98% on room air. INTAKE AND OUTPUT: Urine 
output recorded as 1.9 liter yesterday and no urine output recorded in the 
morning. Fluid from the chest wall since last night is 100 mL. Weight in the bed
scale is 77.8 kg, which is about 3 kg less than yesterday.



PHYSICAL EXAMINATION: 

GENERAL: Patient is awake, alert, and oriented x3, lying in bed, in no apparent 
distress.

HEAD AND NECK: Extraocular muscles intact. Pupils equally round and reactive to 
light. She has scleral icterus. Mucous membranes are moist. Neck is supple. 
There is no significant JVD. 

CARDIOVASCULAR: S1, S2, regular rate. No edema of the bilateral lower 
extremities. 

RESPIRATORY: Chest is clear to auscultation bilaterally. Bilateral equal air 
entry. She has a PleurX catheter on the left side. 

ABDOMEN: Soft, mildly tender to deep palpation and she has moderate amount of 
ascites.

MUSCULOSKELETAL: No clubbing or cyanosis. Pulses are 2+.

CNS: Patient has mild tremors of the bilateral upper extremities. Otherwise she 
is oriented x3 and moves extremities. 



LABORATORY REVIEW: CBC showed WBC 2.8, hemoglobin 10, platelet count 76,000. BMP
showed sodium 134, potassium 4.3, chloride 100, bicarb 27, BUN 21, creatinine 
1.6; it was 1.5 yesterday. Calcium 8.2. Total bilirubin 4.5. AST 35, ALT 25, 
alkaline phosphatase 94. Albumin 2.3. 



MICROBIOLOGY:  All the repeat cultures are negative so far.



IMAGING STUDIES:  A chest x-ray was done today morning, which showed moderate 
left pleural effusion with an indwelling left basilar chest catheter. 



CURRENT INPATIENT MEDICATIONS:  Patient's medications were all reviewed by 
myself. She was started on Torsemide 20 mg p.o. twice a day and Spironolactone 
50 mg p.o. twice a day. She is also getting I.V. albumin. No other significant 
change in the medications today as compared with yesterday.



ASSESSMENT AND PLAN:  

1.  Acute renal failure superimposed on chronic kidney disease: Patients 
creatinine is fluctuating at around 1.5 to 1.6, however because of recurrent 
left-sided effusion and ascites, she is being diuresed. Okay to continue the 
diuretic dose at this time. Continue Midodrine and albumin at this time.

2.  Hyponatremia: Patient has hypervolemic hyponatremia in the setting of 
cirrhosis. Sodium level is improving. Okay to continue diuretics. 

3.  Recurrent left-sided pleural effusion; status post left PleurX catheter: 
Patient is intermittently being drained through the PleurX catheter. Volume 
status is being optimized with Torsemide 20 mg p.o. twice a day with 
Spironolactone 50 mg p.o. twice a day.

4.  Decompensated alcoholic cirrhosis with ascites: Patient was started on 
prednisolone by the medical team yesterday. Ascitic tap will be done tomorrow 
morning. Continue lactulose once a day.

MTDD

## 2020-10-06 NOTE — IPN
DATE:  10/03/2020



SUBJECTIVE:  Ms. Taylor was fairly miserable yesterday and the same today. She 
asked not to be drained yesterday and I consented.  Today, her gown is soaked 
with pleural fluid.  She is breathing okay.  Her x-ray shows her to be re-
accumulating her fluid.  



Her vital signs show T-max of 99.0.  The heart rate ranges between 85 and 98 and
is sinus rhythm.  Respiratory rate of 18 to 20 without using accessory muscles, 
who is 94% saturating on room air.  Her blood pressures range between 94/52 to 
98/60.  Her intake and output for the past 24 hours has been recorded as 220 in 
and 800 out.  She was 80.2 kg today, 81.7 kg yesterday.  



PHYSICAL EXAMINATION:  She has decreased breath sounds in the left lower 
hemithorax.  There are scattered rhonchi throughout, all of which do not 
disappear with coughing.  Cardiac examination is without murmurs, clicks gallops
or rubs. I cannot feel her PMI.   S1, S2 are normal. Abdomen is soft, nontender,
but distended.  Extremities showed no pretibial edema. No calf tenderness.  
Superficial swelling in the upper extremities.  Skin is warm, dry and perfuse 
without cyanosis or mottling including that of nailbeds and knees Neck is 
supple.  There is jugular venous distention correction up the neck.  Trachea is 
midline and there is subcutaneous emphysema. Eyes show pupils equal and 
reactive.  Extraocular muscles are intact.  She is still anicteric.  Neuro:  II-
XII intact.  Gait is not tested.    Psychiatric shows her to be awake and alert,
a little bit depressed and frustrated mood and affect.  



Blood culture did grow Staphylococcus epidermitis..  There were gram positive 
cocci in clusters.  



Her white count is 3.8 with hemoglobin and hematocrit of 10.1 and 30.2 and a 
platelet count of 83 and stable.  Electrolytes show marginally low sodium of 133
with a BUN and creatinine 20 and 1.52.  Improved over yesterday of 1.74. Glucose
is 81 with a calcium of 7.7.  Bilirubin is 3.4 with an albumin of 1.4.  



I discussed the pleural fluid chemistry a few days ago.  It does look 
transudative and lymphocytic.  



Her chest x-ray today shows a re-accumulation of fluid in the left chest.  I see
no infiltrates on the right side.  Subcutaneous emphysema has resolved. 



IMPRESSION:  

1.   Recurrent left pleural effusion with rapid accumulation over two days prior
to placing a PleurX catheter.  

2.   Liver failure.

3.   Hepatorenal syndrome. 

4.   Renal insufficiency.

5.   Esophageal varices.

6.   Portal hypertension 

7.   Hypothyroidism.

8.   Alcohol abuse 

9.   Hyperlipemia 



DISCUSSION:  We are going to have to go back to draining her every day. Will 
premedicate her with IV morphine prior to her drainage.  We are really between a
rock and a hard place in regard to draining her.  While I appreciate that she 
has pain with drainage, we are going to have to do it every day to keep the 
fluid under control.  She is known to have leaking secondary to poor wound 
healing from the catheter site.  It is a long tunnel; however with her decreased
nutritional state it will take time  until it completely heals and seals, she 
will continue to leak from the catheter site.  The only fix to that is to 
completely drain her and keep her drained.  

PEYTON

## 2020-10-06 NOTE — CR
DATE OF CONSULTATION:  10/03/2020



REQUESTING PHYSICIAN:  Dr. Jaret Gilliam



CONSULTING PHYSICIAN:  Pasquale Coreas MD



REASON FOR CONSULTATION:  Management of volume overload in this patient with 
chronic kidney disease (CKD) stage III and liver cirrhosis.



CHIEF COMPLAINT:  Patient presented to the hospital on 09/25/2020 with 
progressive shortness of breath and abdominal ascites.



HISTORY OF PRESENT ILLNESS: Flora Taylor is a 60-year-old female with past 
medical history of alcoholic liver cirrhosis, decompensated with portal 
hypertension, esophageal varices, recurrent ascites, still actively drinking, 
last drink was the day of presentation to the hospital.  She presented to the 
hospital about 8 days ago with progressive shortness of breath despite taking 
her diuretics at home.  In the emergency room, patient was found to have a large
left-sided pleural effusion and large volume ascites.  Patient was admitted 
under the hospitalist service and cardiothoracic (CT) surgery was called on 
consult.  She got the left-sided PleurX tunneled catheter placed on 09/28/2020 
and intermittently left pleural effusion is being drained.  Patient is also 
scheduled for an ascitic tap to be done on Monday, however renal service consult
was requested for further help in the optimization of her diuretic regimen in 
the setting of chronic kidney disease.  Her creatinine has been fluctuating 
between 1.5 to 1.7.



I saw and evaluated the patient today morning at the bedside.  She was in mild 
respiratory distress and she reported that her fluid is constantly oozing from 
the left-sided PleurX site.  She reports abdominal distention and discomfort and
poor oral intake.



PAST MEDICAL HISTORY:  Past medical history of chronic kidney disease stage III,
alcoholic liver cirrhosis, chronic active alcohol abuse, hypothyroidism, 
glaucoma, hyperlipidemia, esophageal varices, hemorrhoids, anxiety and 
depression.



PAST SURGICAL HISTORY:  Status post dilatation and curettage (D and C) in the 
past, history of pigtail catheter placement by radiology in January 2020 and 
nasal surgery in the past.



ALLERGIES:  She is allergic to PENICILLIN and CEFDINIR.



FAMILY HISTORY:  Both parents have history of cancer.  No history of end-stage 
renal disease.



SOCIAL HISTORY:  She lives at home.  Drinks every day.  Denies any smoking or 
illicit drug abuse. 



REVIEW OF SYSTEMS:

Constitutional:  She denies any fevers or chills.

Eyes:  She denies any blurry vision or double vision.

Ears, nose, and throat (ENT):  She denies any dysphagia or odynophagia.

Cardiovascular:  She denies any chest pain.

Respiratory:  She does report shortness of breath and left-sided effusion.

Gastrointestinal (GI):  She reports abdominal distention and ascites and 
cirrhosis.

Genitourinary:  She reports decreased urine output.

Musculoskeletal:  She denies any muscle aches and pains.

Skin:  She denies any rashes or ulcers.

Hematological/Oncological:  She denies any easy bleeding or bruising.

Endocrine:  She reports hypothyroidism.

Central nervous system (CNS):  She denies any strokes or seizures.

All other review of systems is negative.



PHYSICAL EXAMINATION:  

General:  Patient is awake, alert, oriented times three, laying in bed, mild 
painful distress.

Vital signs:  Temperature is 99.7 degrees Fahrenheit, blood pressure 94/52, 
pulse is 85, respiratory rate of 18, saturating 95% on room air.

Head and neck exam:  Extraocular muscles intact.  Pupils equally round and 
reactive to light.  She has jaundice in the sclerae.  Mucous membranes are 
moist.  Neck is supple.  Mildly elevated jugular venous distension (JVD).

Cardiovascular:  S1, S2, regular rate.  Trace edema of the bilateral lower 
extremities.

Respiratory:  Decreased breath sounds at the bases.  Left-sided PleurX catheter 
was noted and there is some oozing of yellow-colored fluid from the PleurX 
catheter site.  

Abdomen:  Soft, distended, moderate amount of ascites is noted.  Caput medusae 
is also noted.  Liver is not palpable.

Genitourinary:  Bladder is not palpable.

Musculoskeletal:  No clubbing or cyanosis.  No significant edema of the lower 
extremities.  

Central nervous system (CNS):  No focal deficits.  Power is 5/5 in all 
extremities.  No asterixis is noted.

Skin:  Jaundice is noted and spider angiomata noted in the upper chest.



LABORATORY REVIEW:  

CBC showed WBC 3.8, hemoglobin 10.1, platelets are 83.  PT is 20.1, INR is 1.68.



Urinalysis done yesterday showed 1+ blood, urobilinogen was 4, sodium was 32, 
potassium was 28, chloride was 39, random urea nitrogen was 463.



BMP done today showed sodium 133, potassium 3.8, chloride 100, bicarbonate 28, 
BUN 20, creatinine is 1.5,  it was 1.7 yesterday, calcium 7.7, total bilirubin 
3.4, albumin 1.4.



Microbiology:  Blood culture, one out of two, sent on 09/25/2020 is growing 
Staphylococcus epidermidis.



IMAGING:  CT scan of the abdomen and pelvis done on 09/25/2020 showed cirrhosis 
and portal hypertension with splenomegaly and recanalized umbilical vein.  
Moderate to large amount of ascites and diffuse anasarca.  Large left pleural 
effusion.  Enlarging complex cystic left adnexal mass which may be related to 
hydrosalpinx. 



CURRENT INPATIENT MEDICATIONS:  Patients inpatient medications include Tylenol 
as needed, Benadryl 25 mg IV every 6 hours as needed for itching, folic acid 1 
mg by mouth daily.  She was getting furosemide 40 mg by mouth daily which I have
stopped.  She is on lactulose 30 mL by mouth three times a day, levothyroxine 
112 mcg by mouth daily, Claritin 10 mg by mouth daily.  I have started the 
patient on midodrine 5 mg by mouth three times a day.  She is on multivitamin, 
oxycodone as needed.   She was getting spironolactone 25 mg by mouth twice a day
which I have increased to 50 mg by mouth twice a day and I have started the 
patient on torsemide 20 mg by mouth twice a day.  She is also getting tramadol 
as needed.



ASSESSMENT:  60-year-old female with decompensated liver cirrhosis with portal 
hypertension, large volume ascites, and left-sided pleural effusion and chronic 
kidney disease stage III.



PLAN:  

1.  Acute kidney injury superimposed on chronic kidney disease stage III.  
Patients best baseline creatinine is 1.2, latest creatinine is higher than her 
baseline.  However, patient is significantly volume overloaded given recurrent 
left-sided effusion and large volume ascites.  She does need diuretics.  
Diuretic regimen has been adjusted as below.  However, to help perfuse her 
kidneys I have started the patient on midodrine 5 mg by mouth three times a day.



2.  Decompensated alcoholic cirrhosis with ascites and recurrent effusion.  
Albumin level is very low.  I have started the patient on albumin 25% 25 grams 
IV every 8 hours.  Diuretic regimen has been changed to torsemide 20 mg by mouth
twice a day with spironolactone 50 mg by mouth twice a day.  



3.  Hyponatremia.  Patient has hypervolemic hyponatremia.  Sodium level should 
get better with diuresis.



4.  Large volume ascites.  Patient is going to get ascitic tap done on Monday.



5.  Recurrent left-sided pleural effusion.  Patient has a PleurX catheter on the
left side.  Cardiothoracic (CT) surgery is intermittently draining it.



6.  Alcoholic hepatitis.  Patient has a Model for End-Stage Liver Disease (MELD)
score roughly of about 20 based upon the labs that are available.  Concha's 
Discriminant Function needs to be done and if needed she would be a candidate of
steroids.



7.  Hypothyroidism.  Continue current dose of levothyroxine 112 mcg by mouth 
daily.



Thank you for involving me in the care of this patient.  I shall be happy to 
follow the patient along with you tomorrow morning.

ANISHAD

## 2020-10-07 VITALS — SYSTOLIC BLOOD PRESSURE: 99 MMHG | DIASTOLIC BLOOD PRESSURE: 54 MMHG

## 2020-10-07 VITALS — SYSTOLIC BLOOD PRESSURE: 106 MMHG | DIASTOLIC BLOOD PRESSURE: 69 MMHG

## 2020-10-07 VITALS — DIASTOLIC BLOOD PRESSURE: 55 MMHG | SYSTOLIC BLOOD PRESSURE: 106 MMHG

## 2020-10-07 VITALS — SYSTOLIC BLOOD PRESSURE: 106 MMHG | DIASTOLIC BLOOD PRESSURE: 59 MMHG

## 2020-10-07 VITALS — SYSTOLIC BLOOD PRESSURE: 113 MMHG | DIASTOLIC BLOOD PRESSURE: 60 MMHG

## 2020-10-07 LAB
ALBUMIN SERPL BCG-MCNC: 2.3 GM/DL (ref 3.2–5.2)
ALT SERPL W P-5'-P-CCNC: 29 U/L (ref 12–78)
BILIRUB SERPL-MCNC: 3 MG/DL (ref 0.2–1)
BUN SERPL-MCNC: 30 MG/DL (ref 7–18)
CALCIUM SERPL-MCNC: 8 MG/DL (ref 8.8–10.2)
CHLORIDE SERPL-SCNC: 101 MEQ/L (ref 98–107)
CO2 SERPL-SCNC: 30 MEQ/L (ref 21–32)
CREAT SERPL-MCNC: 1.7 MG/DL (ref 0.55–1.3)
GFR SERPL CREATININE-BSD FRML MDRD: 32.6 ML/MIN/{1.73_M2} (ref 45–?)
GLUCOSE SERPL-MCNC: 110 MG/DL (ref 70–100)
HCT VFR BLD AUTO: 31.8 % (ref 36–47)
HGB BLD-MCNC: 10.8 G/DL (ref 12–15.5)
MCH RBC QN AUTO: 34.6 PG (ref 27–33)
MCHC RBC AUTO-ENTMCNC: 34 G/DL (ref 32–36.5)
MCV RBC AUTO: 101.9 FL (ref 80–96)
PLATELET # BLD AUTO: 86 10^3/UL (ref 150–450)
POTASSIUM SERPL-SCNC: 3.5 MEQ/L (ref 3.5–5.1)
PROT SERPL-MCNC: 6.2 GM/DL (ref 6.4–8.2)
RBC # BLD AUTO: 3.12 10^6/UL (ref 4–5.4)
SODIUM SERPL-SCNC: 137 MEQ/L (ref 136–145)
WBC # BLD AUTO: 7.8 10^3/UL (ref 4–10)

## 2020-10-07 RX ADMIN — LORATADINE SCH MG: 10 TABLET ORAL at 08:38

## 2020-10-07 RX ADMIN — SODIUM CHLORIDE, PRESERVATIVE FREE SCH ML: 5 INJECTION INTRAVENOUS at 06:36

## 2020-10-07 RX ADMIN — SODIUM CHLORIDE, PRESERVATIVE FREE SCH ML: 5 INJECTION INTRAVENOUS at 20:28

## 2020-10-07 RX ADMIN — PREDNISOLONE SODIUM PHOSPHATE SCH MG: 15 SOLUTION ORAL at 08:39

## 2020-10-07 RX ADMIN — MIDODRINE HYDROCHLORIDE SCH MG: 5 TABLET ORAL at 08:38

## 2020-10-07 RX ADMIN — SPIRONOLACTONE SCH MG: 50 TABLET ORAL at 16:29

## 2020-10-07 RX ADMIN — MIDODRINE HYDROCHLORIDE SCH MG: 5 TABLET ORAL at 12:25

## 2020-10-07 RX ADMIN — BETAXOLOL HYDROCHLORIDE SCH DROP: 2.8 SUSPENSION/ DROPS OPHTHALMIC at 08:39

## 2020-10-07 RX ADMIN — LACTULOSE SCH ML: 10 SOLUTION ORAL at 08:39

## 2020-10-07 RX ADMIN — TORSEMIDE SCH MG: 20 TABLET ORAL at 08:38

## 2020-10-07 RX ADMIN — SODIUM CHLORIDE, PRESERVATIVE FREE SCH ML: 5 INJECTION INTRAVENOUS at 14:20

## 2020-10-07 RX ADMIN — CIPROFLOXACIN SCH MG: 250 TABLET, FILM COATED ORAL at 06:36

## 2020-10-07 RX ADMIN — LEVOTHYROXINE SODIUM SCH MCG: 112 TABLET ORAL at 06:36

## 2020-10-07 RX ADMIN — FOLIC ACID SCH MG: 1 TABLET ORAL at 08:38

## 2020-10-07 RX ADMIN — SPIRONOLACTONE SCH MG: 50 TABLET ORAL at 08:37

## 2020-10-07 RX ADMIN — LATANOPROST SCH DROP: 50 SOLUTION OPHTHALMIC at 20:27

## 2020-10-07 RX ADMIN — MIDODRINE HYDROCHLORIDE SCH MG: 5 TABLET ORAL at 16:29

## 2020-10-07 RX ADMIN — BETAXOLOL HYDROCHLORIDE SCH DROP: 2.8 SUSPENSION/ DROPS OPHTHALMIC at 20:27

## 2020-10-07 RX ADMIN — BRIMONIDINE TARTRATE SCH DROP: 1 SOLUTION/ DROPS OPHTHALMIC at 20:27

## 2020-10-07 RX ADMIN — BRIMONIDINE TARTRATE SCH DROP: 1 SOLUTION/ DROPS OPHTHALMIC at 08:39

## 2020-10-07 RX ADMIN — TORSEMIDE SCH MG: 20 TABLET ORAL at 16:30

## 2020-10-07 RX ADMIN — MULTIPLE VITAMINS W/ MINERALS TAB SCH TAB: TAB at 08:38

## 2020-10-07 NOTE — IPNPDOC
Text Note


Date of Service


The patient was seen on 10/7/20.





NOTE


Subjective: No any acute events overnight. 


Patient denied fever, chills, nausea, vomiting, shortness of breath or 

palpitations





Objective: 


Gen: NAD


HEENT: PERRLA, EOMI


Lungs: Diminished lung sounds bilaterally


CV: S1-S2


Abdomen: Mildly distended, nontender, bowel sounds present


Extremities: +1 pitting edema, no cyanosis


Neuro: Nonfocal





Patient is 60 years old female with past medical history of alcoholic liver 

cirrhosis with portal hypertension, hypothyroidism was found to have large left 

pleural effusion. Dr. Wiseman placed a PleurX catheter and has been successfully

draining fluid. 





Large left pleural effusion secondary to alcoholic cirrhosis


Pleurocentesis was done by Dr. Wiseman, Pleurx was placed, removed today by Dr. Wiseman


Anticipated discharge tomorrow





BAM 2/2 decompensated cirrhosis/hepatorenal syndrome


There is concern for hepatorenal syndrome


Nephrology team follows her


Continue diuretics, midodrine





Decompensated alcoholic cirrhosis


Patient is active drinker, not candidate for liver transplantation


Continue prednisolone





Ascites


IR tried to  perform paracentesis today, no fluid was available for paracentesis


Patient is in  high risk for SBP


Will start ciprofloxacin prophylactically


c/w diuretics





Glaucoma


We'll continue her eyedrops





Hypothyroidism


continue her levothyroxine





Megaloblastic anemia


Most likely secondary to alcoholism


B12 and folate wnl





VS,Fishbone, I+O


VS, Fishbone, I+O


Laboratory Tests


10/7/20 05:35











Vital Signs








  Date Time  Temp Pulse Resp B/P (MAP) Pulse Ox O2 Delivery O2 Flow Rate FiO2


 


10/7/20 08:00 96.9 78 18 106/69 (81) 98 Room Air  














I&O- Last 24 Hours up to 6 AM 


 


 10/7/20





 05:59


 


Intake Total 620 ml


 


Output Total 700 ml


 


Balance -80 ml

















MILLIE ACOSTA DO             Oct 7, 2020 11:00

## 2020-10-07 NOTE — REP
CHEST X-RAY: 2-VIEWS 



HISTORY: Pleural effusion. 



COMPARISON: Chest x-ray 09/29/2020. 



FINDINGS: 

There is fairly extensive extrathoracic soft tissue emphysema about the left 
chest unchanged. There is pleural opacity obscuring the left hemidiaphragm and 
blunting the pleural angles indicating small pleural effusion. This is probably 
a little more prominent today. A PleurX catheter is seen curving along the 
expected location of the left hemidiaphragm. There is no evidence of 
pneumothorax. There is some mild plate-like atelectasis in the left perihilar 
region.   

MTDD

## 2020-10-08 VITALS — DIASTOLIC BLOOD PRESSURE: 62 MMHG | SYSTOLIC BLOOD PRESSURE: 117 MMHG

## 2020-10-08 VITALS — SYSTOLIC BLOOD PRESSURE: 108 MMHG | DIASTOLIC BLOOD PRESSURE: 62 MMHG

## 2020-10-08 VITALS — SYSTOLIC BLOOD PRESSURE: 133 MMHG | DIASTOLIC BLOOD PRESSURE: 76 MMHG

## 2020-10-08 LAB
ALBUMIN SERPL BCG-MCNC: 2.3 GM/DL (ref 3.2–5.2)
ALT SERPL W P-5'-P-CCNC: 33 U/L (ref 12–78)
BILIRUB SERPL-MCNC: 3.2 MG/DL (ref 0.2–1)
BUN SERPL-MCNC: 33 MG/DL (ref 7–18)
CALCIUM SERPL-MCNC: 8 MG/DL (ref 8.8–10.2)
CHLORIDE SERPL-SCNC: 101 MEQ/L (ref 98–107)
CO2 SERPL-SCNC: 29 MEQ/L (ref 21–32)
CREAT SERPL-MCNC: 1.87 MG/DL (ref 0.55–1.3)
GFR SERPL CREATININE-BSD FRML MDRD: 29.2 ML/MIN/{1.73_M2} (ref 45–?)
GLUCOSE SERPL-MCNC: 100 MG/DL (ref 70–100)
HCT VFR BLD AUTO: 33.5 % (ref 36–47)
HGB BLD-MCNC: 11.3 G/DL (ref 12–15.5)
MAGNESIUM SERPL-MCNC: 2 MG/DL (ref 1.8–2.4)
MCH RBC QN AUTO: 34.5 PG (ref 27–33)
MCHC RBC AUTO-ENTMCNC: 33.7 G/DL (ref 32–36.5)
MCV RBC AUTO: 102.1 FL (ref 80–96)
PLATELET # BLD AUTO: 101 10^3/UL (ref 150–450)
POTASSIUM SERPL-SCNC: 3.6 MEQ/L (ref 3.5–5.1)
PROT SERPL-MCNC: 6.3 GM/DL (ref 6.4–8.2)
RBC # BLD AUTO: 3.28 10^6/UL (ref 4–5.4)
SODIUM SERPL-SCNC: 138 MEQ/L (ref 136–145)
WBC # BLD AUTO: 10 10^3/UL (ref 4–10)

## 2020-10-08 RX ADMIN — MULTIPLE VITAMINS W/ MINERALS TAB SCH TAB: TAB at 09:01

## 2020-10-08 RX ADMIN — TORSEMIDE SCH MG: 20 TABLET ORAL at 09:02

## 2020-10-08 RX ADMIN — LORATADINE SCH MG: 10 TABLET ORAL at 09:02

## 2020-10-08 RX ADMIN — CIPROFLOXACIN SCH MG: 250 TABLET, FILM COATED ORAL at 05:56

## 2020-10-08 RX ADMIN — MIDODRINE HYDROCHLORIDE SCH MG: 5 TABLET ORAL at 12:02

## 2020-10-08 RX ADMIN — FOLIC ACID SCH MG: 1 TABLET ORAL at 09:01

## 2020-10-08 RX ADMIN — LEVOTHYROXINE SODIUM SCH MCG: 112 TABLET ORAL at 05:56

## 2020-10-08 RX ADMIN — SPIRONOLACTONE SCH MG: 50 TABLET ORAL at 09:01

## 2020-10-08 RX ADMIN — BRIMONIDINE TARTRATE SCH DROP: 1 SOLUTION/ DROPS OPHTHALMIC at 09:03

## 2020-10-08 RX ADMIN — SODIUM CHLORIDE, PRESERVATIVE FREE SCH ML: 5 INJECTION INTRAVENOUS at 05:56

## 2020-10-08 RX ADMIN — BETAXOLOL HYDROCHLORIDE SCH DROP: 2.8 SUSPENSION/ DROPS OPHTHALMIC at 09:03

## 2020-10-08 RX ADMIN — PREDNISOLONE SODIUM PHOSPHATE SCH MG: 15 SOLUTION ORAL at 09:00

## 2020-10-08 RX ADMIN — MIDODRINE HYDROCHLORIDE SCH MG: 5 TABLET ORAL at 09:01

## 2020-10-08 RX ADMIN — LACTULOSE SCH ML: 10 SOLUTION ORAL at 09:00

## 2020-10-08 NOTE — IPN
DATE:  10/07/2020

 

SUBJECTIVE:  Ms. Taylor is feeling a bit better.  She is breathing well.  She is
still leaking through the PleurX catheter tract.  Her chest x-ray shows her 
lungs fully expanded to the chest wall and there is no reaccumulation of the 
pleural effusion. 



Her vital signs show a maximum temperature (T-max) of 98.6 with a heart rate 
that ranges between 78-79 in sinus rhythm, respiratory rate that is constant at 
18 and is 96-98% saturated on room air. She has blood pressures ranging between 
106/69 and 113/60.



Her intake and output the past 24 hours has been recorded as 620 in and 1000 out
for a negativity of 380 mL.  Weight is pending on her today.



PHYSICAL EXAMINATION:   

Her lungs are rather remarkably clear.  There are some scattered rhonchi which 
clear with coughing.  Percussion notes are full through the diaphragm.

Cardiac exam is without murmurs, clicks, gallops, or rubs. I cannot feel her 
PMI. S1, S2 are normal. 

Abdomen is soft, nontender. Bowel sounds are positive. There is no hepatomegaly 
that I could feel through her obesity. There is no CVA tenderness. 

Extremities show no pretibial edema, no calf tenderness, no differential 
swelling of the upper extremities.

Skin is warm, dry, and perfused without cyanosis or mottling including that of 
nailbeds and knees.

Neck is supple. There is no jugular venous distention, no subcutaneous 
emphysema.  Trachea is midline. 

Mouth shows her mucous membranes to be pink and moist. Lips, gums, tongue show 
no thrush. Eyes show her pupils to be equal and reactive. Extraocular movements 
are intact. Sclerae are still mildly icteric.  

Neuro shows CN II-XII intact with gross motor, gross sensation intact.  Gait is 
not tested.  

Psychiatric shows her to be awake and alert and oriented times three with 
appropriate mood and affect and conversational. 



Her white count today is 7.8 with a hemoglobin and hematocrit of 10.8 and 31.8 
respectively.  Platelet count is 86 and stable.  Her electrolytes are normal 
today with a BUN and creatinine of 30 and 1.70, unchanged from yesterday with a 
glucose of 110 and a calcium of 8.0 with a corresponding albumin of 2.3. Her AST
and ALT have normalized and her total bilirubin is 3.0. 



Her chest x-ray today as noted above shows maybe some slight blunting of the 
left costophrenic angle but not very much.  Lungs fully expanded to the chest 
wall.  I see no infiltrates.



IMPRESSION:  

1.  Recurrent pleural effusion seemingly under control.

2.  Liver failure.

3.  Hepatorenal syndrome.

4.  Renal insufficiency.

5.  Esophageal varices.

6.  Portal hypertension. 

7.  Hypothyroidism. 

8.  Alcohol abuse.

9.  Hyperlipidemia. 



PLAN DISCUSSION:  We have not drained her for about four days and there is no 
reaccumulation of the fluid. I will therefore remove her PleurX catheter. I did 
note that when removing the PleurX catheter, there was absolutely no healing to 
Velcro ring.  I suspect this is indicative of her poor healing ability.  It 
looks as though medical therapy is controlling her ascitic effusions both above 
and below the diaphragm.

PEYTON

## 2020-10-08 NOTE — REP
ABDOMINAL SONOGRAPHIC SURVEY



HISTORY: Patient referred for paracentesis. 



FINDINGS:



Bilateral flank and upper and lower abdominal sonography shows no visible 
ascites. Paracentesis was deferred.

MTDD

## 2020-10-08 NOTE — DS.PDOC
Discharge Summary


General


Date of Admission


Sep 25, 2020 at 08:07


Date of Discharge


10/8/20





Discharge Summary


PROCEDURES PERFORMED DURING STAY: [None].





ADMITTING DIAGNOSES: 


Ascites


Decompensated alcoholic cirrhosis


Large left pleural effusion secondary to alcoholic cirrhosis


BAM 2/2 decompensated cirrhosis/hepatorenal syndrome


Glaucoma


Megaloblastic anemia


Hypothyroidism





DISCHARGE DIAGNOSES:


Ascites


Decompensated alcoholic cirrhosis


Large left pleural effusion secondary to alcoholic cirrhosis


BAM 2/2 decompensated cirrhosis/hepatorenal syndrome


Glaucoma


Megaloblastic anemia


Hypothyroidism


COMPLICATIONS/CHIEF COMPLAINT: Ascites Dyspnea Pleural Effusion Left.





HISTORY OF PRESENT ILLNESS: 


Patient is 60 years old female with past medical history of alcoholic liver 

cirrhosis with portal hypertension, hypothyroidism was found to have large left 

pleural effusion. Dr. Wiseman placed a PleurX catheter and has been successfully

 draining fluid. 





HOSPITAL COURSE: During hospital stay following issue addressed





Large left pleural effusion secondary to alcoholic cirrhosis


Pleurocentesis was done by Dr. Wiseman, Pleurx was placed, then removed by Dr. Wiseman








BAM 2/2 decompensated cirrhosis/hepatorenal syndrome


There is concern for hepatorenal syndrome


Nephrology team follows her


Continue diuretics, midodrine





Decompensated alcoholic cirrhosis


Patient is active drinker, not candidate for liver transplantation


Continue prednisolone





Ascites


IR tried to  perform paracentesis, no fluid was available for paracentesis


Patient is in  high risk for SBP


 ciprofloxacin prophylactically


c/w diuretics





Glaucoma


We'll continue her eyedrops





Hypothyroidism


continue her levothyroxine





Megaloblastic anemia


Most likely secondary to alcoholism


B12 and folate wnl








DISCHARGE MEDICATIONS: Please see below.


 


ALLERGIES: Please see below.





PHYSICAL EXAMINATION ON DISCHARGE:


VITAL SIGNS: Please see below.


Gen: NAD


HEENT: PERRLA, EOMI


Lungs: Diminished lung sounds bilaterally


CV: S1-S2


Abdomen: Mildly distended, nontender, bowel sounds present


Extremities: +1 pitting edema, no cyanosis


Neuro: Nonfocal





LABORATORY DATA: Please see below.





IMAGING: 


Exam: CT Abdomen And Pelvis With Contrast 


Exam date and time: 9/25/2020 8:21 AM 


Age: 60 years old 


Clinical indication: Other: Ascites 





TECHNIQUE: 


Imaging protocol: Computed tomography of the abdomen and pelvis with 


intravenous contrast. 


Radiation optimization: All CT scans at this facility use at least one of these 


dose optimization techniques: automated exposure control; mA and/or kV 


adjustment per patient size (includes targeted exams where dose is matched to 


clinical indication); or iterative reconstruction. 


Contrast material: ISOVUE 370; Contrast volume: 100 ml; Contrast route: 


INTRAVENOUS (IV);  





COMPARISON: 


CT ABD/PEL W/IV CONTRAST ONLY 1/26/2020 4:15 AM 





FINDINGS: 


Lungs: There is complete atelectatic consolidation of the visualized left lung. 


The right lung base is clear. 


Heart: Cardiac size is normal. No pericardial effusion. There is shift of the 


heart and mediastinal structures toward the right secondary to a large left 


pleural effusion. 





Liver: Stable cirrhotic appearance of the liver. 


Gallbladder and bile ducts: No calcified gallstones. No ductal dilatation. 


Pancreas: Unremarkable. No ductal dilatation. 


Spleen: Stable splenomegaly. 


Adrenals: Unremarkable. No mass. 


Kidneys and ureters: Symmetric nephrograms. No hydronephrosis or mass. 


Stomach and bowel: Stomach is decompressed. No bowel obstruction. Colonic 


diverticulosis. No evidence of acute diverticulitis. 


Appendix: No evidence of appendicitis. 





Intraperitoneal space: Moderate to large amount of ascites which is seen 


perihepatic and perisplenic and extends along the paracolic gutters into the 


pelvis. No free air. 


Vasculature: Atherosclerosis. No abdominal aortic aneurysm. Patent portal vein 


with a recanalized umbilical vein. Distal paraesophageal and abdominopelvic 


varices. 


Lymph nodes: No significant adenopathy. 


Urinary bladder: Unremarkable as visualized. 


Reproductive: Complex cystic left adnexal mass may be in part related to a 


hydrosalpinx, however, this lesion has increased in size and now measures 6.8 x 


5.4 cm, and is concerning for a cystic ovarian lesion. 


Bones/joints: Bones are demineralized. Degenerative change within the lower 


thoracic and lumbar spine. Mild lumbar levoscoliosis. Mild degenerative change 


involving the sacroiliac joints and hip joints. No acute fracture. 


Soft tissues: Diffuse anasarca. Anterior abdominal wall and paraumbilical 


varices. Ascites extends into a right inguinal hernia. 





IMPRESSION: 


1. Cirrhosis and portal hypertension with splenomegaly and a recanalized 


umbilical vein. Distal paraesophageal and abdominopelvic varices. 


2. Moderate to large amount of ascites and diffuse anasarca. 


3. Large left pleural effusion with complete atelectatic consolidation of the 


visualized left lung and mass effect with shift of the heart and mediastinal 


structures to the right. 


4. Enlarging complex cystic left adnexal mass which may be in part related to a 


hydrosalpinx but is concerning for a cystic ovarian lesion. Recommend pelvic 


ultrasound for further characterization. 


5. Additional non-emergent findings, as discussed above. 








PROGNOSIS: guarded


ACTIVITY: [As tolerated].





DIET: regular





DISPOSITION: 01 Home, Self-Care.





DISCHARGE INSTRUCTIONS:


Stop drinking alcohol





ITEMS TO FOLLOWUP ON ON OUTPATIENT:


With GI team in 3-5 days





DISCHARGE CONDITION: [Stable].





TIME SPENT ON DISCHARGE: Greater than 40 minutes.





Vital Signs/I&Os





Vital Signs








  Date Time  Temp Pulse Resp B/P (MAP) Pulse Ox O2 Delivery O2 Flow Rate FiO2


 


10/8/20 08:00 97.7 98 20 133/76 (95) 97 Room Air  














I&O- Last 24 Hours up to 6 AM 


 


 10/8/20





 06:00


 


Intake Total 1380 ml


 


Output Total 2450 ml


 


Balance -1070 ml











Laboratory Data


Labs 24H


Laboratory Tests 2


10/8/20 05:29: 


Nucleated Red Blood Cells % (auto) 0.0, Anion Gap 8, Glomerular Filtration Rate 

29.2L, Calcium Level 8.0L, Magnesium Level 2.0, Total Bilirubin 3.2H, Aspartate 

Amino Transf (AST/SGOT) 37, Alanine Aminotransferase (ALT/SGPT) 33, Alkaline P

hosphatase 128H, Total Protein 6.3L, Albumin 2.3L, Albumin/Globulin Ratio 0.6L


10/8/20 08:57: Lab Scanned Report Miscellaneous Lab


CBC/BMP


Laboratory Tests


10/8/20 05:29











Discharge Medications


Scheduled


Betaxolol Hcl (Betaxolol HCl) 0.5 % Sol, 1 DROP OU BID, (Reported)


Brimonidine Tartrate (Brimonidine Tartrate) 0.2% 5ML Drops, 1 DROP OU BID, 

(Reported)


Ciprofloxacin HCl (Cipro) 250 Mg Tablet, 250 MG PO DAILY@06


Folic Acid (Folic Acid) 1 Mg Tablet, 1 MG PO DAILY, (Reported)


Guaifenesin (Mucinex) 600 Mg Tab.er.12h, 600 MG PO BID, (Reported)


Lactulose (Lactulose) 10 Gm/15 Ml Solution, 30 ML PO TID, (Reported)


Latanoprost (Xalatan) 0.005% 2.5ML Drops, 1 DROP OU QHS, (Reported)


Levothyroxine Sodium (Synthroid) 112 Mcg Tablet, 112 MCG PO DAILY, (Reported)


Loratadine (Loratadine) 10 Mg Tablet, 10 MG PO DAILY, (Reported)


Midodrine HCl (Midodrine HCl) 5 Mg Tablet, 5 MG PO 08,12,16


Multivitamins (Thera M Plus Tablet) 1 Each Tablet, 1 TAB PO DAILY


Prednisolone (Prednisolone Sodium Phosphate) 15 Mg/5 Ml Solution, 40 MG PO DAILY


Spironolactone (Aldactone) 50 Mg Tablet, 50 MG PO BID@09,17


Torsemide (Torsemide) 20 Mg Tablet, 20 MG PO BID@09,17





Scheduled PRN


Fluticasone Propionate (Fluticasone Propionate) 16 Gm Spray.susp, 2 SPRAY NARES 

DAILY PRN for CONGESTION, (Reported)





Allergies


Coded Allergies:  


     cefdinir (Verified  Allergy, Severe, THROAT CLOSES, 5/27/20)


     Penicillins (Verified  Allergy, Unknown, UNKNOWN CHILDHOOD REACTION, 

5/27/20)











MILLIE ACOSTA DO             Oct 8, 2020 14:09

## 2020-10-09 NOTE — IPN
DATE:  10/06/2020



SUBJECTIVE:  Flora is seen and examined this morning at the bedside. She 
continues to feel well, has not had any new shortness of breath or dyspnea when 
she gets up and walks to the bathroom. Denies leg edema. Has not been having any
significant drainage from the PleurX catheter. Labs show stable renal function. 



PHYSICAL EXAMINATION:  

VITAL SIGNS: Temperature 98.2, pulse 62, respiratory rate 18, blood pressure 
106/56, saturating 96% on room air. Review of I and Os shows negative 430 mL. 
Weight on the bed scale today is 74.5 kg.

GENERAL: The patient is seen lying in bed. The bed is flat. She is comfortable 
and not orthopneic. 

HEENT: Extraocular muscle are intact. Tongue is moist.

NECK: Supple. Jugular veins are not elevated.

HEART: Regular S1 and S2. 

LUNGS:  Clear air entry bilaterally. No crackle or wheeze.

ABDOMEN: Soft, obese and nontender. 

EXTREMITIES: Negative for any pitting edema or cyanosis.

SKIN: Normal temperature and turgor.



LABS: Sodium 139, potassium 3.7, BUN 29, creatinine 1.7. Transferrin saturation 
of 100%. Hemoglobin 10.6, platelets 98,000. 



INPATIENT MEDICATIONS: Reviewed by myself, continues on combination 
spironolactone and Torsemide, was also started on Ciprofloxacin per the primary 
service yesterday, 250 mg p.o. daily. 



PROBLEMS:

1.  BAM secondary to decompensated cirrhosis. Patient has been satisfactorily 
diuresed. There is no recurrent pleural effusion nor ascites. Her volume status 
is optimized. She is on combination torsemide and spironolactone. Her renal 
function has been stable with creatinine at 1.7 which may be a new baseline 
while he volume status is compensated. 

2.  Cirrhosis. Patient has been satisfactorily diuresed, continue combination 
diuretics, Torsemide and spironolactone. Albumin was discontinued. She is on 
Prednisolone as per primary service. She also continues on Midodrine and her 
blood pressures are acceptable. She should continue to fluid restrict as an 
outpatient.

3.  Large left pleural effusion status post PleurX catheter. The patient has 
been doing well on combination diuretics and effusion has not recurred and she 
is followed by Thoracic Surgery and her catheter will likely be discontinued. 

[facility]

## 2020-10-09 NOTE — IPN
DATE:  10/05/2020



SUBJECTIVE:  Flora is seen and examined at the bedside. She denies any overnight
events or complaints. Denies shortness of breath. Has not been dyspneic when she
gets up. She has not been having any significant discharge from the pleural 
catheter. 



PHYSICAL EXAMINATION:  

VITAL SIGNS: Temperature 98.4, pulse 62, respiratory rate 17, blood pressure is 
112/61, saturating 97% on room air. Review of  I and Os yesterday shows 
negative 300 mL, weight on the bed scale today 75.7 kg which his decreased from 
prior.

GENERAL: The patient is seen lying in bed, awake, alert and oriented, 
comfortable in no apparent distress. Extraocular muscles are intact. 

HEENT: Tongue is moist. 

NECK: Supple. Jugular veins are not elevated.

HEART: Heart sounds are regular, S1 and S2. 

LUNGS: Symmetric air entry bilaterally. No crackles or wheezing.

ABDOMEN: Soft and there is no shift in dullness.

EXTREMITIES: Negative for edema.

SKIN: Normal temperature and turgor. 



LABS:  White count 8.3, hemoglobin 9.1, platelets 79,000. Sodium 138, potassium 
4.1, bicarbonate 29, BUN 24, creatinine 1.8. 



Chest x-ray October 5th shows stable position of chest tube at the left base and
no pneumothorax. 



INPATIENT MEDICATIONS: Reviewed by myself and she continues on Torsemide 20 mg 
p.o. twice daily with spironolactone 50 mg twice daily. She was started on 
Prednisolone 40 mg p.o. daily per the primary team along with Lactulose. The 
remainder of medications are unchanged from prior. 



PROBLEMS:

1.  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 Continue 
present dose of diuretics. Imaging shows no significant ascites and her effusion
has not recurred. Her volume status is nicely compensated and we will keep an 
eye on the renal function. She continues as well on Midodrine but I have 
discontinued albumin at this time. 

2.  Hypervolemic hyponatremia related to cirrhosis. Patient has been diuresed 
and serum sodium has not normalized. Continue combination Torsemide and 
spironolactone.

3.  Recurrent left sided pleural effusion status post left PleurX catheter. She 
is no longer having much drainage and her volume status is now well-compensated 
with Torsemide and spironolactone and I am making no changes to the doses today.


MTDD

## 2020-10-09 NOTE — IPN
DATE:  10/08/2020



SUBJECTIVE:  Ms. Taylor is feeling better today. She is bright and alert. She is
essentially ready for discharge. Her wound is dry and has not drained very much.




PHYSICAL EXAMINATION:  Her vital signs show a T-max of 98.9 with a heart rate 
that ranges between 73 and 98 in a sinus rhythm. Respiratory rate of 18-20 with 
the use of accessory muscles. She is 97% saturated on room air. Her blood 
pressure is ranging between 108/62 to 133/76. 



Her intake and output over the past 24 hours has been recorded as 1080 in and 
1350 out for a negativity of 270 mL. She weighs 74.6 kg today compared to 74.5 
kg on 10/6. 



On physical examination, her lungs show equal breath sounds on either side with 
normal vesicular sounds. Percussion notes are full through the diaphragm. 
Cardiac exam is without murmurs, clicks, gallops, or rubs. I cannot feel her 
PMI. S1 and S2 are normal. Abdomen was soft, nontender. Bowel sounds are 
positive. I cannot appreciate any hepatomegaly. There is no CVA tenderness. 
Extremities show trace pretibial edema. No calf tenderness. No differential 
swelling of the upper extremities. Skin is warm, dry, and perfused without 
cyanosis or mottling including that of the nailbeds and knees. Neck is supple. 
There is no jugular venous distention. No subcutaneous emphysema. Trachea is 
midline. Mouth shows her mucous membranes to be pink and moist. Lips and 
commissures without lesions or thrush. Eyes shows her pupils to be equal and 
reactive. Her sclera is still icteric. Extraocular muscles were intact. Neuro 
shows II-XII intact with gross motor and gross sensation intact. Gait is not 
tested. Psychiatric showed her to be awake, alert, and oriented x3 with 
appropriate mood and affect and conversational. 



LABORATORY DATA: Her white count today is 10.0 with a hemoglobin and hematocrit 
of 11.3 and 33.5 respectively. Platelet count was 101. There is no differential.
Her electrolytes are normal with a BUN and creatinine of 33 and 1.87, which 
continues to increase most likely secondary to diuresis, however. Glucose is 100
with a calcium 8.0 and a corresponding albumin of 2.3. Total bilirubin is 3.2, 
and her AST and ALT are normal. 



Her chest x-ray yesterday shows her lungs fully expanded to the chest wall with 
some minor and trace costophrenic angle blunting on the left side. There is no 
chest x-ray on her today. 



IMPRESSION: 

1.  Recurrent pleural effusions, now under control with medical therapy after a 
PleurX catheter placement.

2.  Liver failure.

3.  Hepatorenal syndrome.

4.  Renal insufficiency.

5.  Esophageal varices.

6.  Portal hypertension.

7.  Hypothyroidism.

8.  Alcohol abuse.

9.  Hyperlipidemia.



PLAN AND DISCUSSION: I have no objection to her going home today. Her wound is 
dry and clean. I removed her dressing. She will not need follow up with me. She 
is going to be seeing Dr. Olivia in the next week. I have had a talk with her 
regarding her alcohol use. While she was quite teary and wants to stop, I am not
sure whether she will be able to. I have indicated to her that she was very sick
and quite precarious this last admission and may not have as happy an ending if 
she continues to drink on her next admission. 

MTDD

## 2020-10-09 NOTE — IPN
DATE:  10/07/2020



SUBJECTIVE:  The patient is seen and examined this morning at the bedside. 
Denies any overnight complaints. Reports she has been getting up and walking to 
the bathroom and has not been lightheaded or dizzy and denies any new dyspnea on
exertion. Her PleurX catheter is going to be discontinued this morning. 



PHYSICAL EXAMINATION:  

VITAL SIGNS: Temperature is 98.6, pulse is 79, respiratory rate 18, blood 
pressure is 113/60, saturating 96% on room air. Review of I and Os shows 
negative 380 mL in the past 24 hours. Weight on the bed scale today was not 
recorded.

GENERAL: The patient was seen lying in bed. Head of the bed is flat. She is not 
orthopneic. 

HEENT: Extraocular muscles are intact. Tongue is moist. 

NECK: Supple. 

CHEST: Catheter present on the left side.

HEART: Regular. 

EXTREMITIES: There is no peripheral edema or cyanosis.

ABDOMEN: Soft, obese, nontender.

NEUROLOGIC: She is oriented x3, interactive and conversational.



LABS:  Sodium 137, potassium 3.5, BUN 30, creatinine 1.7. Hemoglobin 10.8. 



INPATIENT MEDICATIONS:  Patient received a dose of potassium 40 mEq p.o. x1 
today. The remainder of medications are unchanged from prior.



PROBLEMS:

1.  Decompensated cirrhosis causing acute kidney injury. Since the patient has 
been diuresed, her creatinine has been around 1.5 to 1.7 and I feel this may be 
her new baseline when her volume status is optimized. Please continue current 
diuretics, Torsemide and Spironolactone and she should continue with fluid 
restriction in the outpatient setting along with Midodrine as well.

2.  Large left pleural effusion secondary to cirrhosis status post 
pleurocentesis and PleurX catheter. Her effusion has not recurred. Thoracic 
Surgery will be discontinuing the catheter. 

3.  Hyponatremia. It has resolved, it was secondary to decompensated fluid 
status and cirrhosis. Continue current diuretics; torsemide and spironolactone. 
Continue fluid restriction.

MTDD

## 2020-10-13 NOTE — REP
CHEST X-RAY: TWO VIEWS 



HISTORY: Evaluation of PleurX catheter and pleural effusion. 



COMPARISON: Chest x-ray 10/06/2020 and 10/05/2020. 



FINDINGS: 

PleurX catheter remains in place along the left diaphragm at the left base. 
There is only minimal blunting of the left lateral pleural angle. Posterior 
pleural angles appear to be sharp. A small quantity of extrathoracic soft tissue
emphysema is visible posteriorly. Cardiomediastinal silhouette is unchanged and 
unremarkable. No infiltrate is seen.   



IMPRESSION: 

PleurX catheter in good position.   

MTDD

## 2020-10-13 NOTE — REP
CHEST X-RAY: TWO VIEWS



HISTORY: Evaluation of PleurX catheter and pleural effusion.



COMPARISON: Comparison chest x-ray October 5, 2020, and October 4, 2020.



FINDINGS:



A left base PleurX drainage catheter remains in place along the diaphragm. There
is slight blunting of the left lateral pleural angle, unchanged from the 
previous days study but improved from October 4, 2020. There is no evidence of 
pneumothorax. No infiltrate is seen. Right pleural angles remain sharp. 

MTDD

## 2020-10-14 NOTE — REP
TWO-VIEW CHEST 



COMPARISON: 09/25/2020. 



HISTORY: Pneumothorax after left thoracentesis. 



TECHNIQUE: Two views of the chest are performed and compared to prior study.



FINDINGS: 

The small left pneumothorax is unchanged compared to the prior study. Large left
effusion is unchanged. Parenchymal opacity in the left lung is also stable. 
Right lung remains clear. Heart and mediastinum are grossly unchanged.



IMPRESSION: 

Stable exam.

MTDD

## 2020-10-27 ENCOUNTER — HOSPITAL ENCOUNTER (OUTPATIENT)
Dept: HOSPITAL 53 - M RAD | Age: 60
End: 2020-10-27
Attending: INTERNAL MEDICINE
Payer: COMMERCIAL

## 2020-10-27 ENCOUNTER — HOSPITAL ENCOUNTER (OUTPATIENT)
Dept: HOSPITAL 53 - M LAB | Age: 60
End: 2020-10-27
Attending: INTERNAL MEDICINE
Payer: COMMERCIAL

## 2020-10-27 DIAGNOSIS — K70.31: Primary | ICD-10-CM

## 2020-10-27 DIAGNOSIS — J90: Primary | ICD-10-CM

## 2020-10-27 LAB
ALBUMIN SERPL BCG-MCNC: 2.8 GM/DL (ref 3.2–5.2)
ALT SERPL W P-5'-P-CCNC: 80 U/L (ref 12–78)
BASOPHILS # BLD AUTO: 0.1 10^3/UL (ref 0–0.2)
BASOPHILS NFR BLD AUTO: 0.6 % (ref 0–1)
BILIRUB SERPL-MCNC: 7.6 MG/DL (ref 0.2–1)
BUN SERPL-MCNC: 51 MG/DL (ref 7–18)
CALCIUM SERPL-MCNC: 9.5 MG/DL (ref 8.8–10.2)
CHLORIDE SERPL-SCNC: 87 MEQ/L (ref 98–107)
CO2 SERPL-SCNC: 30 MEQ/L (ref 21–32)
CREAT SERPL-MCNC: 1.98 MG/DL (ref 0.55–1.3)
EOSINOPHIL # BLD AUTO: 0.2 10^3/UL (ref 0–0.5)
EOSINOPHIL NFR BLD AUTO: 2.8 % (ref 0–3)
GFR SERPL CREATININE-BSD FRML MDRD: 27.4 ML/MIN/{1.73_M2} (ref 45–?)
GLUCOSE SERPL-MCNC: 104 MG/DL (ref 70–100)
HCT VFR BLD AUTO: 40.6 % (ref 36–47)
HGB BLD-MCNC: 13.9 G/DL (ref 12–15.5)
LYMPHOCYTES # BLD AUTO: 1 10^3/UL (ref 1.5–5)
LYMPHOCYTES NFR BLD AUTO: 12 % (ref 24–44)
MCH RBC QN AUTO: 33.2 PG (ref 27–33)
MCHC RBC AUTO-ENTMCNC: 34.2 G/DL (ref 32–36.5)
MCV RBC AUTO: 96.9 FL (ref 80–96)
MONOCYTES # BLD AUTO: 0.9 10^3/UL (ref 0–0.8)
MONOCYTES NFR BLD AUTO: 11.1 % (ref 0–5)
NEUTROPHILS # BLD AUTO: 5.7 10^3/UL (ref 1.5–8.5)
NEUTROPHILS NFR BLD AUTO: 71.6 % (ref 36–66)
PLATELET # BLD AUTO: 86 10^3/UL (ref 150–450)
POTASSIUM SERPL-SCNC: 4.4 MEQ/L (ref 3.5–5.1)
PROT SERPL-MCNC: 7.6 GM/DL (ref 6.4–8.2)
RBC # BLD AUTO: 4.19 10^6/UL (ref 4–5.4)
SODIUM SERPL-SCNC: 125 MEQ/L (ref 136–145)
WBC # BLD AUTO: 7.9 10^3/UL (ref 4–10)

## 2020-10-28 NOTE — REP
INDICATION:

PLEURAL EFFUSION



COMPARISON:

10/07/2020



TECHNIQUE:

PA and lateral.



FINDINGS:

A large left pleural effusion near completely opacifies the left hemithorax and causes

associated contralateral mediastinal shift.  Findings have progressively and

significantly increased from prior examination.  The right hemithorax is relatively

clear.  The skeletal structures appear intact.  The mediastinum is incompletely

evaluated due to overlying opacity.



IMPRESSION:

Large left pleural effusion near completely opacifying the left hemithorax with

associated contralateral mediastinal shift.  Findings have significantly increased

since prior examination.





<Electronically signed by Yogi Glass > 10/28/20 0312

## 2020-11-05 ENCOUNTER — HOSPITAL ENCOUNTER (OUTPATIENT)
Dept: HOSPITAL 53 - M IRPRO | Age: 60
End: 2020-11-05
Attending: INTERNAL MEDICINE
Payer: COMMERCIAL

## 2020-11-05 VITALS — DIASTOLIC BLOOD PRESSURE: 55 MMHG | SYSTOLIC BLOOD PRESSURE: 106 MMHG

## 2020-11-05 DIAGNOSIS — F32.9: ICD-10-CM

## 2020-11-05 DIAGNOSIS — K21.9: ICD-10-CM

## 2020-11-05 DIAGNOSIS — R06.00: ICD-10-CM

## 2020-11-05 DIAGNOSIS — K74.60: ICD-10-CM

## 2020-11-05 DIAGNOSIS — J90: Primary | ICD-10-CM

## 2020-11-05 DIAGNOSIS — E03.9: ICD-10-CM

## 2020-11-05 DIAGNOSIS — Z88.0: ICD-10-CM

## 2020-11-05 LAB
AMYLASE FLD-CCNC: 31 U/L
APPEARANCE FLD: (no result)
APTT BLD: 34 SECONDS (ref 24.2–38.5)
COLOR PLR: YELLOW
INR PPP: 1.52
LDH FLD L TO P-CCNC: 51 U/L
PH FLD: > 7.8 UNITS
PLATELET # BLD AUTO: 113 10^3/UL (ref 150–450)
PROTHROMBIN TIME: 18.6 SECONDS (ref 12.5–14.3)
SPECIMEN SOURCE FLD: (no result)

## 2020-11-05 NOTE — REP
INDICATION:

POST  LEFT THORA, 2  VIEW.



COMPARISON:

10/27/2020.



TECHNIQUE:

Two views of the chest are performed.



FINDINGS:

The patient had left thoracentesis today.  There is significant decrease in the amount

of left pleural fluid, with mild residual blunting of the left costophrenic angle.

There is mild patchy atelectasis or infiltrate left lung base.  There is no

pneumothorax.  The heart is normal in size.  Mediastinal silhouette is unremarkable.



IMPRESSION:

Significant decrease in left pleural fluid compared to prior study.  No pneumothorax.

Minimal residual left pleural fluid or thickening, and very mild left base

atelectasis/infiltrate.





<Electronically signed by Sushant Khan > 11/05/20 [id number]

## 2020-11-05 NOTE — REP
INDICATION:

LARGE LEFT PLEURAL EFFUSION.



COMPARISON:

None.



TECHNIQUE:

The procedure was performed under the direct supervision of Dr. Khan.



The risks and benefits of the procedure were explained to the patient and informed

consent was obtained.



The left pleural effusion was localized using ultrasound guidance.  The skin was

prepped and draped in a sterile fashion.  1% lidocaine was used as a local anesthetic.

An 8 Japanese multi side hole catheter was inserted using trocar technique.  2175 mL of

yellow fluid was withdrawn with a sample sent to the lab for analysis.



The patient tolerated the procedure well and there were no immediate complications.

After the appropriate amount of monitored convalescence, the patient was discharged

from the department.





FINDINGS:

None



IMPRESSION:

Technically successful ultrasound-guided left thoracentesis yielding 2175 mL of yellow

fluid.



<Electronically signed by Surya Alvarado > 11/05/20 1536

<Electronically signed by Sushant Khan > 11/05/20 1640

## 2020-11-18 ENCOUNTER — HOSPITAL ENCOUNTER (OUTPATIENT)
Dept: HOSPITAL 53 - M RAD | Age: 60
End: 2020-11-18
Attending: INTERNAL MEDICINE
Payer: COMMERCIAL

## 2020-11-18 ENCOUNTER — HOSPITAL ENCOUNTER (OUTPATIENT)
Dept: HOSPITAL 53 - M LAB | Age: 60
End: 2020-11-18
Attending: INTERNAL MEDICINE
Payer: COMMERCIAL

## 2020-11-18 DIAGNOSIS — J90: Primary | ICD-10-CM

## 2020-11-18 DIAGNOSIS — K70.31: Primary | ICD-10-CM

## 2020-11-18 LAB
ALBUMIN SERPL BCG-MCNC: 2.2 GM/DL (ref 3.2–5.2)
ALT SERPL W P-5'-P-CCNC: 44 U/L (ref 12–78)
BILIRUB SERPL-MCNC: 4.2 MG/DL (ref 0.2–1)
BUN SERPL-MCNC: 69 MG/DL (ref 7–18)
CALCIUM SERPL-MCNC: 8.8 MG/DL (ref 8.8–10.2)
CHLORIDE SERPL-SCNC: 92 MEQ/L (ref 98–107)
CO2 SERPL-SCNC: 25 MEQ/L (ref 21–32)
CREAT SERPL-MCNC: 2.49 MG/DL (ref 0.55–1.3)
GFR SERPL CREATININE-BSD FRML MDRD: 21 ML/MIN/{1.73_M2} (ref 45–?)
GLUCOSE SERPL-MCNC: 118 MG/DL (ref 70–100)
POTASSIUM SERPL-SCNC: 4.2 MEQ/L (ref 3.5–5.1)
PROT SERPL-MCNC: 6.9 GM/DL (ref 6.4–8.2)
SODIUM SERPL-SCNC: 128 MEQ/L (ref 136–145)

## 2020-11-18 NOTE — REP
INDICATION:

PLEURAL EFFUSION, NOT ELSEWHERE CLASSIFIED, LAB 1ST!!



COMPARISON:

11/05/2020, 10/27/2020



TECHNIQUE:

PA and lateral.



FINDINGS:

Large left pleural effusion minimally decreased compared to 10/27/2020.  A left apical

pneumothorax cannot definitively be excluded.  Right hemithorax is clear.  Skeletal

structures are grossly intact.



IMPRESSION:

Large left pleural effusion minimally decreased from prior examination.  Associated

small left pneumothorax cannot be excluded.





<Electronically signed by Yogi Glass > 11/18/20 [id number]

## 2020-11-25 ENCOUNTER — HOSPITAL ENCOUNTER (INPATIENT)
Dept: HOSPITAL 53 - M ED | Age: 60
LOS: 10 days | Discharge: HOME | DRG: 280 | End: 2020-12-05
Attending: INTERNAL MEDICINE | Admitting: INTERNAL MEDICINE
Payer: COMMERCIAL

## 2020-11-25 VITALS — SYSTOLIC BLOOD PRESSURE: 100 MMHG | DIASTOLIC BLOOD PRESSURE: 53 MMHG

## 2020-11-25 VITALS — SYSTOLIC BLOOD PRESSURE: 87 MMHG | DIASTOLIC BLOOD PRESSURE: 47 MMHG

## 2020-11-25 VITALS — DIASTOLIC BLOOD PRESSURE: 53 MMHG | SYSTOLIC BLOOD PRESSURE: 94 MMHG

## 2020-11-25 VITALS — DIASTOLIC BLOOD PRESSURE: 54 MMHG | SYSTOLIC BLOOD PRESSURE: 88 MMHG

## 2020-11-25 VITALS — SYSTOLIC BLOOD PRESSURE: 111 MMHG | DIASTOLIC BLOOD PRESSURE: 53 MMHG

## 2020-11-25 VITALS — DIASTOLIC BLOOD PRESSURE: 45 MMHG | SYSTOLIC BLOOD PRESSURE: 90 MMHG

## 2020-11-25 VITALS — SYSTOLIC BLOOD PRESSURE: 83 MMHG | DIASTOLIC BLOOD PRESSURE: 51 MMHG

## 2020-11-25 VITALS — SYSTOLIC BLOOD PRESSURE: 94 MMHG | DIASTOLIC BLOOD PRESSURE: 42 MMHG

## 2020-11-25 VITALS — SYSTOLIC BLOOD PRESSURE: 96 MMHG | DIASTOLIC BLOOD PRESSURE: 54 MMHG

## 2020-11-25 VITALS — SYSTOLIC BLOOD PRESSURE: 78 MMHG | DIASTOLIC BLOOD PRESSURE: 45 MMHG

## 2020-11-25 VITALS — DIASTOLIC BLOOD PRESSURE: 48 MMHG | SYSTOLIC BLOOD PRESSURE: 82 MMHG

## 2020-11-25 VITALS — DIASTOLIC BLOOD PRESSURE: 52 MMHG | SYSTOLIC BLOOD PRESSURE: 97 MMHG

## 2020-11-25 VITALS — SYSTOLIC BLOOD PRESSURE: 93 MMHG | DIASTOLIC BLOOD PRESSURE: 52 MMHG

## 2020-11-25 VITALS — SYSTOLIC BLOOD PRESSURE: 87 MMHG | DIASTOLIC BLOOD PRESSURE: 50 MMHG

## 2020-11-25 VITALS — DIASTOLIC BLOOD PRESSURE: 54 MMHG | SYSTOLIC BLOOD PRESSURE: 101 MMHG

## 2020-11-25 VITALS — SYSTOLIC BLOOD PRESSURE: 91 MMHG | DIASTOLIC BLOOD PRESSURE: 56 MMHG

## 2020-11-25 VITALS — DIASTOLIC BLOOD PRESSURE: 55 MMHG | SYSTOLIC BLOOD PRESSURE: 91 MMHG

## 2020-11-25 VITALS — SYSTOLIC BLOOD PRESSURE: 84 MMHG | DIASTOLIC BLOOD PRESSURE: 46 MMHG

## 2020-11-25 VITALS — BODY MASS INDEX: 27.37 KG/M2 | HEIGHT: 67 IN | WEIGHT: 174.39 LBS

## 2020-11-25 VITALS — SYSTOLIC BLOOD PRESSURE: 102 MMHG | DIASTOLIC BLOOD PRESSURE: 56 MMHG

## 2020-11-25 VITALS — SYSTOLIC BLOOD PRESSURE: 103 MMHG | DIASTOLIC BLOOD PRESSURE: 47 MMHG

## 2020-11-25 VITALS — DIASTOLIC BLOOD PRESSURE: 57 MMHG | SYSTOLIC BLOOD PRESSURE: 103 MMHG

## 2020-11-25 VITALS — DIASTOLIC BLOOD PRESSURE: 58 MMHG | SYSTOLIC BLOOD PRESSURE: 117 MMHG

## 2020-11-25 VITALS — SYSTOLIC BLOOD PRESSURE: 92 MMHG | DIASTOLIC BLOOD PRESSURE: 53 MMHG

## 2020-11-25 DIAGNOSIS — Z79.899: ICD-10-CM

## 2020-11-25 DIAGNOSIS — I95.9: ICD-10-CM

## 2020-11-25 DIAGNOSIS — Z88.8: ICD-10-CM

## 2020-11-25 DIAGNOSIS — F41.9: ICD-10-CM

## 2020-11-25 DIAGNOSIS — F32.9: ICD-10-CM

## 2020-11-25 DIAGNOSIS — N18.9: ICD-10-CM

## 2020-11-25 DIAGNOSIS — I85.10: ICD-10-CM

## 2020-11-25 DIAGNOSIS — D63.1: ICD-10-CM

## 2020-11-25 DIAGNOSIS — E78.5: ICD-10-CM

## 2020-11-25 DIAGNOSIS — Z88.0: ICD-10-CM

## 2020-11-25 DIAGNOSIS — J94.8: ICD-10-CM

## 2020-11-25 DIAGNOSIS — K85.20: ICD-10-CM

## 2020-11-25 DIAGNOSIS — K72.00: ICD-10-CM

## 2020-11-25 DIAGNOSIS — E87.2: ICD-10-CM

## 2020-11-25 DIAGNOSIS — J45.909: ICD-10-CM

## 2020-11-25 DIAGNOSIS — D69.6: ICD-10-CM

## 2020-11-25 DIAGNOSIS — I12.9: ICD-10-CM

## 2020-11-25 DIAGNOSIS — E72.20: ICD-10-CM

## 2020-11-25 DIAGNOSIS — J90: ICD-10-CM

## 2020-11-25 DIAGNOSIS — E87.5: ICD-10-CM

## 2020-11-25 DIAGNOSIS — E86.1: ICD-10-CM

## 2020-11-25 DIAGNOSIS — K70.31: Primary | ICD-10-CM

## 2020-11-25 DIAGNOSIS — N17.9: ICD-10-CM

## 2020-11-25 LAB
ALBUMIN SERPL BCG-MCNC: 2 GM/DL (ref 3.2–5.2)
ALT SERPL W P-5'-P-CCNC: 38 U/L (ref 12–78)
AMYLASE FLD-CCNC: 31 U/L
AMYLASE SERPL-CCNC: 147 U/L (ref 25–115)
AMYLASE SERPL-CCNC: 150 U/L (ref 25–115)
APPEARANCE FLD: (no result)
APTT BLD: 35.4 SECONDS (ref 24.2–38.5)
BASOPHILS # BLD AUTO: 0.1 10^3/UL (ref 0–0.2)
BASOPHILS NFR BLD AUTO: 0.9 % (ref 0–1)
BILIRUB CONJ SERPL-MCNC: 2 MG/DL (ref 0–0.2)
BILIRUB SERPL-MCNC: 3.1 MG/DL (ref 0.2–1)
BUN SERPL-MCNC: 71 MG/DL (ref 7–18)
CALCIUM SERPL-MCNC: 8.7 MG/DL (ref 8.8–10.2)
CHLORIDE SERPL-SCNC: 99 MEQ/L (ref 98–107)
CHOLEST FLD-MCNC: < 50 MG/DL
CO2 SERPL-SCNC: 21 MEQ/L (ref 21–32)
COLOR PLR: YELLOW
CREAT SERPL-MCNC: 2.38 MG/DL (ref 0.55–1.3)
EOSINOPHIL # BLD AUTO: 0.1 10^3/UL (ref 0–0.5)
EOSINOPHIL NFR BLD AUTO: 1.3 % (ref 0–3)
ETHANOL SERPL-MCNC: < 0.003 % (ref 0–0.01)
GFR SERPL CREATININE-BSD FRML MDRD: 22.1 ML/MIN/{1.73_M2} (ref 45–?)
GLUCOSE SERPL-MCNC: 169 MG/DL (ref 70–100)
HCT VFR BLD AUTO: 32.6 % (ref 36–47)
HGB BLD-MCNC: 10.8 G/DL (ref 12–15.5)
INR PPP: 1.4
LDH FLD L TO P-CCNC: 38 U/L
LIPASE SERPL-CCNC: 792 U/L (ref 73–393)
LIPASE SERPL-CCNC: 798 U/L (ref 73–393)
LYMPHOCYTES # BLD AUTO: 0.8 10^3/UL (ref 1.5–5)
LYMPHOCYTES NFR BLD AUTO: 13.9 % (ref 24–44)
MCH RBC QN AUTO: 31.9 PG (ref 27–33)
MCHC RBC AUTO-ENTMCNC: 33.1 G/DL (ref 32–36.5)
MCV RBC AUTO: 96.2 FL (ref 80–96)
MONOCYTES # BLD AUTO: 0.6 10^3/UL (ref 0–0.8)
MONOCYTES NFR BLD AUTO: 9.9 % (ref 0–5)
NEUTROPHILS # BLD AUTO: 4.1 10^3/UL (ref 1.5–8.5)
NEUTROPHILS NFR BLD AUTO: 73.3 % (ref 36–66)
PH FLD: 7.79 UNITS
PLATELET # BLD AUTO: 124 10^3/UL (ref 150–450)
POTASSIUM SERPL-SCNC: 4.7 MEQ/L (ref 3.5–5.1)
PROT SERPL-MCNC: 6.3 GM/DL (ref 6.4–8.2)
PROTHROMBIN TIME: 17.5 SECONDS (ref 12.5–14.3)
RBC # BLD AUTO: 3.39 10^6/UL (ref 4–5.4)
SODIUM SERPL-SCNC: 131 MEQ/L (ref 136–145)
SPECIMEN SOURCE FLD: (no result)
TRIGL FLD-MCNC: 117 MG/DL
WBC # BLD AUTO: 5.5 10^3/UL (ref 4–10)

## 2020-11-25 PROCEDURE — 0W9B3ZZ DRAINAGE OF LEFT PLEURAL CAVITY, PERCUTANEOUS APPROACH: ICD-10-PCS | Performed by: THORACIC SURGERY (CARDIOTHORACIC VASCULAR SURGERY)

## 2020-11-25 RX ADMIN — LEVALBUTEROL HYDROCHLORIDE SCH MG: 1.25 SOLUTION, CONCENTRATE RESPIRATORY (INHALATION) at 20:00

## 2020-11-25 RX ADMIN — SODIUM CHLORIDE SCH MLS/HR: 9 INJECTION, SOLUTION INTRAVENOUS at 21:47

## 2020-11-25 RX ADMIN — BRIMONIDINE TARTRATE SCH DROP: 1.5 SOLUTION OPHTHALMIC at 21:47

## 2020-11-25 RX ADMIN — DOCUSATE SODIUM SCH MG: 100 CAPSULE, LIQUID FILLED ORAL at 21:45

## 2020-11-25 RX ADMIN — LACTULOSE SCH ML: 10 SOLUTION ORAL at 21:45

## 2020-11-25 RX ADMIN — LATANOPROST SCH DROP: 50 SOLUTION OPHTHALMIC at 21:47

## 2020-11-25 RX ADMIN — DEXTROSE MONOHYDRATE SCH MG: 50 INJECTION, SOLUTION INTRAVENOUS at 21:45

## 2020-11-25 NOTE — HPEPDOC
[facility] Medical History & Physical


Date of Admission


Nov 25, 2020


Date of Service:  Nov 25, 2020





History and Physical


Chief complaint:


Presented to the ER at the direction of pulmonology for shortness of breath





History of present illness:


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 

of Hepatic encephalopathy, Ascites, Esophageal varices, Recurrent L ascitic 

pleural effusion), HTN, Hypothyroidism, DLP, Asthma, Anxiety / Depression, who 

presented to the emergency room at the [facility] of pulmonology, shortness of 

breath and left lung whiteout.





Patient had follow-up with her pulmonologist today for shortness of breath in 

the office. Patient had received an x-ray that had revealed complete whiteout of

her left lung field. Patient was sent to the emergency room for further 

evaluation.





Currently patient reports that shes been expressing progressive shortness of 

breath with some pressure on her left side. Patient reports a cough without any 

production of sputum. Denies any fevers or chills. Denies any leg swelling.





Patient reports no nausea or vomiting. Does report some pain in her left upper 

abdomen. Denies any constipation or diarrhea. Denies any urinary discomfort. Her

last bowel movement was yesterday evening without any evidence of blood or dark 

stool.





Patient reports that she has been compliant with her diuretic therapy.





Her last meal was this morning at 11 AM.





Past Medical History:


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 


- Hx of Hepatic encephalopathy / Ascites / Esophageal varices / Recurrent L 

ascitic pleural effusion


HTN


Hypothyroidism


DLP


Asthma


Anxiety / Depression





Past Surgical History:


D&C


Pigtail catheter placement by Dr. Clark 1/24/2020


Insertion of left PleurX tunneled catheter with moderate sedation on 9/28/2020





Allergies:


See below 





Medications:


See below





Family History:


- Mother with history of brain cancer and father with a history of cancer





Social History: 


- Denies the use of tobacco or illicit drugs; patient reports that she quit 

drinking alcohol since September


- Denies recent travel or sick contacts


- Lives alone 


- Occupation; patient reports that she used to work at [employer]





Review of Systems:


10 point review of systems complete, all negative otherwise stated in HPI





Physical exam:


- Vitals: BP [117/61], HR [95], RR [28], Sat [98%RA], Temp [97.1F]


- General: Lying in bed, Appears short of breath but speaking in full sentences,

 AAOx3


- HEENT: NC, AT, PERRLA


- CVS: RRR, +S1S2


- Lungs: Difficult appreciate any lung sounds on the left lung field, right side

 does not reveal any rhonchi, wheezing or crackles


- Abdomen: Soft, Non-distended, left upper quadrant tenderness


- Extremities: No lower extremity edema, No calf tenderness


- Neuro: No focal motor or sensory deficit


- Skin: No visible rashes 





Labs:


See below 





Imaging:


See below





EKG: 


See below





Assessment and Plan: 


Shortness of breath - likely 2/2 left pleural effusion - likely 2/2 ascitic 

effusion 


- Patient presented to the emergency room from her pulmonologist office after an

 x-ray had revealed left lung whiteout


- Patient is hemodynamically stable and afebrile


- Physical reveals no appreciable lung sounds in the left lung field


- No leukocytosis


- Will get CT chest / abdomen / pelvis


- Consulted cardiothoracic surgery, Dr. Wsieman; plan for intervention and 

evacuation of left effusion


- Will place patient in ICU 





Chest pain - likely 2/2 effusion 


- Patient reports left-sided chest pain, atypical in nature, likely secondary to

 effusion


- EKG does not reveal any ischemic changes and is consistent with priors on 

record


- c/w Telemetry monitoring 





LUQ pain 


- Hemodynamically stable


- No signs of ascites


- Amylase / Lipase slightly elevated, however not 3x upper limit of normal to s

uggest pancreatitis 


- Will check CT abdomen / pelvis





BAM on CKD - likely 2/2 hepatorenal syndrome 


- Cr baseline of 1.7-1.8


- Cr currently higher than baseline 


- Will continue with Midodrine / Torsemide / Spironolactone





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 


- Multiple complications including; Hepatic encephalopathy / Ascites / 

Esophageal varices / Recurrent L ascitic pleural effusion


- Will continue with Lactulose





Hx of Alcoholism


- c/w Folic acid, Multivitamins 


- Will add Thiamine 





Hypothyroidism


- c/w Levothyroxine 





DLP





Asthma


- No evidence of exacerbation


- Not on inhaled therapy at home 





Anxiety / Depression


- c/w Hydroxyzine 





GI prophylaxis


- Will start Protonix 





DVT prophylaxis 


- Will start TEDs/Sequentials





Vital Signs





Vital Signs








  Date Time  Temp Pulse Resp B/P (MAP) Pulse Ox O2 Delivery O2 Flow Rate FiO2


 


11/25/20 16:00  95 28 117/61 (79) 98 Room Air  


 


11/25/20 15:33 97.1       











Laboratory Data


Labs 24H


Laboratory Tests 2


11/25/20 15:46: 


Immature Granulocyte % (Auto) 0.7, Neutrophils (%) (Auto) 73.3H, Lymphocytes (%)

 (Auto) 13.9L, Monocytes (%) (Auto) 9.9H, Eosinophils (%) (Auto) 1.3, Basophils 

(%) (Auto) 0.9, Neutrophils # (Auto) 4.1, Lymphocytes # (Auto) 0.8L, Monocytes #

 (Auto) 0.6, Eosinophils # (Auto) 0.1, Basophils # (Auto) 0.1, Nucleated Red 

Blood Cells % (auto) 0.0, Prothrombin Time 17.5H, Prothromb Time International 

Ratio 1.40, Activated Partial Thromboplast Time 35.4, Anion Gap 11, Glomerular 

Filtration Rate 22.1L, Calcium Level 8.7L, Total Bilirubin 3.1H, Direct 

Bilirubin 2.0H, Aspartate Amino Transf (AST/SGOT) 43H, Alanine Aminotransferase 

(ALT/SGPT) 38, Alkaline Phosphatase 155H, Total Protein 6.3L, Albumin 2.0L, 

Albumin/Globulin Ratio 0.5L, Amylase Level 150H, Lipase 798H, Ethyl Alcohol 

Level < 0.003


11/25/20 17:36: 


CBC/BMP


Laboratory Tests


11/25/20 15:46











Home Medications


Scheduled


Betaxolol Hcl (Betaxolol HCl) 0.5 % Sol, 1 DROP OU BID


Brimonidine Tartrate (Brimonidine Tartrate) 0.2% 5ML Drops, 1 DROP OU BID


Cholecalciferol (Vitamin D3) (Vitamin D3) 1,000 Unit Tablet, 1,000 UNITS PO 

DAILY


Folic Acid (Folic Acid) 1 Mg Tablet, 1 MG PO DAILY


Lactulose (Lactulose) 10 Gm/15 Ml Solution, 30 ML PO BID


Latanoprost (Xalatan) 0.005% 2.5ML Drops, 1 DROP OU QHS


Levothyroxine Sodium (Synthroid) 112 Mcg Tablet, 112 MCG PO DAILY


Midodrine HCl (Midodrine HCl) 5 Mg Tablet, 5 MG PO TID


   0800/1200/1600 


Multivitamins (Thera M Plus Tablet) 1 Each Tablet, 1 TAB PO DAILY


Spironolactone (Spironolactone) 50 Mg Tablet, 50 MG PO BID


Torsemide (Torsemide) 20 Mg Tablet, 20 MG PO BID





Scheduled PRN


Hydroxyzine HCl (Hydroxyzine HCl) 25 Mg Tablet, 25 MG PO QHS PRN for SLEEP


Loratadine (Loratadine) 10 Mg Tablet, 10 MG PO DAILY PRN for ALLERGIES





Allergies


Coded Allergies:  


     cefdinir (Verified  Allergy, Severe, THROAT CLOSES, 5/27/20)


     Penicillins (Verified  Allergy, Unknown, UNKNOWN CHILDHOOD REACTION, 

5/27/20)











FRANCI BURROWS MD                Nov 25, 2020 17:47

## 2020-11-25 NOTE — REPVR
PROCEDURE INFORMATION: 

Exam: CT Abdomen And Pelvis Without Contrast 

Exam date and time: 11/25/2020 5:29 PM 

Age: 60 years old 

Clinical indication: Abdominal pain; Localized; Right upper quadrant (ruq); 

Additional info: Ruq pain 



TECHNIQUE: 

Imaging protocol: Computed tomography of the abdomen and pelvis without 

contrast. 

Radiation optimization: All CT scans at this facility use at least one of these 

dose optimization techniques: automated exposure control; mA and/or kV 

adjustment per patient size (includes targeted exams where dose is matched to 

clinical indication); or iterative reconstruction. 



COMPARISON: 

CT ABD PELVIS WITH CONTRAST 9/25/2020 9:41 AM 



FINDINGS: 

Liver: The liver has a grossly nodular contour and there is relative 

hypertrophy of the caudate lobe, consistent with end-stage cirrhosis. No focal 

abnormality.

Gallbladder and bile ducts: The gallbladder is incompletely distended. This is 

most likely related to incomplete fasting. Clinical correlation to exclude 

gallbladder pathology suggested. 

Pancreas: Normal. No ductal dilation. 

Spleen: Borderline splenomegaly. 

Adrenal glands: Normal. No mass. 

Kidneys and ureters: Normal. No hydronephrosis. 

Stomach and bowel: Mild diverticulosis is present in the distal colon. No 

diverticulitis. 

Appendix: No evidence of appendicitis. 



Intraperitoneal space: There is a mild-to-moderate amount of free 

intraperitoneal fluid present. 

Vasculature: The aortoiliac vessels demonstrate mild atherosclerotic 

calcification. 

Lymph nodes: Unremarkable. No enlarged lymph nodes. 

Urinary bladder: Unremarkable as visualized. 

Reproductive: Unremarkable as visualized. 

Bones/joints: Unremarkable. No acute fracture. 

Soft tissues: There is a small bowel containing umbilical hernia. No definitive 

evidence of incarceration however findings should be correlated with clinical 

exam. There is mild soft tissue edema demonstrated in the abdominal wall, 

flanks and buttock regions consistent with anasarca. 



IMPRESSION: 

1. The liver has a grossly nodular contour and there is relative hypertrophy of 

the caudate lobe, consistent with end-stage cirrhosis. 

2. Borderline splenomegaly. 

3. There is a mild-to-moderate amount of free intraperitoneal fluid present. 

4. The gallbladder is incompletely distended. This is most likely related to 

incomplete fasting. Clinical correlation to exclude gallbladder pathology 

suggested. 

5. There is a small bowel containing umbilical hernia. No definitive evidence 

of incarceration however findings should be correlated with clinical exam. 

6. Mild anasarca. 

7. Mild diverticulosis is present in the distal colon. No diverticulitis. 



Electronically signed by: Marcelino Rondon On 11/25/2020  18:04:03 PM

## 2020-11-25 NOTE — REP
INDICATION:

S/p pigtail placement



COMPARISON:

11/18/2020



TECHNIQUE:

Portable AP view of the chest



FINDINGS:

Pigtail catheter at the left lung base.  Large left pleural effusion similar to prior

examination.  Visualized aerated lung fields are relatively clear.  No obvious

pneumothorax.



IMPRESSION:

Pigtail catheter at the left lung base.

Large left pleural effusion essentially unchanged.





<Electronically signed by Yogi Glass > 11/25/20 2044

## 2020-11-25 NOTE — REPVR
PROCEDURE INFORMATION: 

Exam: CT Chest Without Contrast; Diagnostic 

Exam date and time: 11/25/2020 5:29 PM 

Age: 60 years old 

Clinical indication: Shortness of breath; Additional info: SOB 



TECHNIQUE: 

Imaging protocol: Diagnostic computed tomography of the chest without contrast. 

Radiation optimization: All CT scans at this facility use at least one of these 

dose optimization techniques: automated exposure control; mA and/or kV 

adjustment per patient size (includes targeted exams where dose is matched to 

clinical indication); or iterative reconstruction. 



COMPARISON: 

CT Chest without contrast 10/4/2020 10:41 AM 



FINDINGS: 

Lungs: Small patchy infiltrates demonstrated in the apical segment on the 

right, findings of uncertain significance. Infection to be excluded clinically. 

Atelectasis of the left lung with a rounded configuration demonstrated in the 

left lower lobe measuring 2.9 x 4.2 x 3.7 cm, which may suggest a mass. 

Notably, a mass was not demonstrated on the prior examination favoring 

atelectasis. 

Pleural space: Interval development of a large left pleural effusion in 

comparison to the prior study of 10/04/2020. Heart mediastinal structures 

shifted to the right secondary to mass effect from the left pleural effusion. 

Heart: Unremarkable. No cardiomegaly. No pericardial effusion. 

Aorta: Unremarkable. No aortic aneurysm. 

Lymph nodes: Unremarkable. No enlarged lymph nodes. 

Diaphragm: Elevated right hemidiaphragm. 



Bones/joints: Dextroscoliosis. 

Soft tissues: Unremarkable. 



IMPRESSION: 

1. Interval development of a large left pleural effusion in comparison to the 

prior study of 10/04/2020. 

2. Small patchy infiltrates demonstrated in the apical segment on the right, 

findings of uncertain significance. Infection to be excluded clinically. 

3. Atelectasis of the left lung with a rounded configuration demonstrated in 

the left lower lobe measuring 2.9 x 4.2 x 3.7 cm, which may suggest a mass. 

Notably, a mass was not demonstrated on the prior examination favoring 

atelectasis. 



Electronically signed by: Marcelino Rondon On 11/25/2020  17:58:24 PM

## 2020-11-26 VITALS — DIASTOLIC BLOOD PRESSURE: 52 MMHG | SYSTOLIC BLOOD PRESSURE: 83 MMHG

## 2020-11-26 VITALS — DIASTOLIC BLOOD PRESSURE: 52 MMHG | SYSTOLIC BLOOD PRESSURE: 84 MMHG

## 2020-11-26 VITALS — DIASTOLIC BLOOD PRESSURE: 54 MMHG | SYSTOLIC BLOOD PRESSURE: 94 MMHG

## 2020-11-26 VITALS — DIASTOLIC BLOOD PRESSURE: 54 MMHG | SYSTOLIC BLOOD PRESSURE: 92 MMHG

## 2020-11-26 VITALS — DIASTOLIC BLOOD PRESSURE: 55 MMHG | SYSTOLIC BLOOD PRESSURE: 97 MMHG

## 2020-11-26 VITALS — SYSTOLIC BLOOD PRESSURE: 90 MMHG | DIASTOLIC BLOOD PRESSURE: 53 MMHG

## 2020-11-26 VITALS — DIASTOLIC BLOOD PRESSURE: 59 MMHG | SYSTOLIC BLOOD PRESSURE: 101 MMHG

## 2020-11-26 VITALS — DIASTOLIC BLOOD PRESSURE: 49 MMHG | SYSTOLIC BLOOD PRESSURE: 82 MMHG

## 2020-11-26 VITALS — DIASTOLIC BLOOD PRESSURE: 46 MMHG | SYSTOLIC BLOOD PRESSURE: 84 MMHG

## 2020-11-26 VITALS — DIASTOLIC BLOOD PRESSURE: 66 MMHG | SYSTOLIC BLOOD PRESSURE: 110 MMHG

## 2020-11-26 VITALS — SYSTOLIC BLOOD PRESSURE: 94 MMHG | DIASTOLIC BLOOD PRESSURE: 52 MMHG

## 2020-11-26 VITALS — SYSTOLIC BLOOD PRESSURE: 83 MMHG | DIASTOLIC BLOOD PRESSURE: 51 MMHG

## 2020-11-26 VITALS — DIASTOLIC BLOOD PRESSURE: 54 MMHG | SYSTOLIC BLOOD PRESSURE: 89 MMHG

## 2020-11-26 VITALS — SYSTOLIC BLOOD PRESSURE: 88 MMHG | DIASTOLIC BLOOD PRESSURE: 53 MMHG

## 2020-11-26 VITALS — SYSTOLIC BLOOD PRESSURE: 93 MMHG | DIASTOLIC BLOOD PRESSURE: 50 MMHG

## 2020-11-26 VITALS — DIASTOLIC BLOOD PRESSURE: 49 MMHG | SYSTOLIC BLOOD PRESSURE: 90 MMHG

## 2020-11-26 VITALS — DIASTOLIC BLOOD PRESSURE: 55 MMHG | SYSTOLIC BLOOD PRESSURE: 93 MMHG

## 2020-11-26 VITALS — DIASTOLIC BLOOD PRESSURE: 55 MMHG | SYSTOLIC BLOOD PRESSURE: 101 MMHG

## 2020-11-26 VITALS — SYSTOLIC BLOOD PRESSURE: 94 MMHG | DIASTOLIC BLOOD PRESSURE: 55 MMHG

## 2020-11-26 VITALS — SYSTOLIC BLOOD PRESSURE: 101 MMHG | DIASTOLIC BLOOD PRESSURE: 56 MMHG

## 2020-11-26 VITALS — DIASTOLIC BLOOD PRESSURE: 48 MMHG | SYSTOLIC BLOOD PRESSURE: 83 MMHG

## 2020-11-26 VITALS — SYSTOLIC BLOOD PRESSURE: 90 MMHG | DIASTOLIC BLOOD PRESSURE: 51 MMHG

## 2020-11-26 VITALS — SYSTOLIC BLOOD PRESSURE: 97 MMHG | DIASTOLIC BLOOD PRESSURE: 55 MMHG

## 2020-11-26 VITALS — SYSTOLIC BLOOD PRESSURE: 91 MMHG | DIASTOLIC BLOOD PRESSURE: 55 MMHG

## 2020-11-26 VITALS — SYSTOLIC BLOOD PRESSURE: 84 MMHG | DIASTOLIC BLOOD PRESSURE: 50 MMHG

## 2020-11-26 VITALS — DIASTOLIC BLOOD PRESSURE: 63 MMHG | SYSTOLIC BLOOD PRESSURE: 128 MMHG

## 2020-11-26 VITALS — DIASTOLIC BLOOD PRESSURE: 51 MMHG | SYSTOLIC BLOOD PRESSURE: 88 MMHG

## 2020-11-26 VITALS — DIASTOLIC BLOOD PRESSURE: 64 MMHG | SYSTOLIC BLOOD PRESSURE: 107 MMHG

## 2020-11-26 VITALS — DIASTOLIC BLOOD PRESSURE: 47 MMHG | SYSTOLIC BLOOD PRESSURE: 85 MMHG

## 2020-11-26 VITALS — DIASTOLIC BLOOD PRESSURE: 53 MMHG | SYSTOLIC BLOOD PRESSURE: 94 MMHG

## 2020-11-26 VITALS — DIASTOLIC BLOOD PRESSURE: 58 MMHG | SYSTOLIC BLOOD PRESSURE: 107 MMHG

## 2020-11-26 VITALS — SYSTOLIC BLOOD PRESSURE: 84 MMHG | DIASTOLIC BLOOD PRESSURE: 42 MMHG

## 2020-11-26 VITALS — SYSTOLIC BLOOD PRESSURE: 92 MMHG | DIASTOLIC BLOOD PRESSURE: 54 MMHG

## 2020-11-26 VITALS — SYSTOLIC BLOOD PRESSURE: 87 MMHG | DIASTOLIC BLOOD PRESSURE: 48 MMHG

## 2020-11-26 VITALS — SYSTOLIC BLOOD PRESSURE: 72 MMHG | DIASTOLIC BLOOD PRESSURE: 40 MMHG

## 2020-11-26 VITALS — SYSTOLIC BLOOD PRESSURE: 105 MMHG | DIASTOLIC BLOOD PRESSURE: 59 MMHG

## 2020-11-26 VITALS — SYSTOLIC BLOOD PRESSURE: 97 MMHG | DIASTOLIC BLOOD PRESSURE: 52 MMHG

## 2020-11-26 VITALS — DIASTOLIC BLOOD PRESSURE: 49 MMHG | SYSTOLIC BLOOD PRESSURE: 88 MMHG

## 2020-11-26 VITALS — DIASTOLIC BLOOD PRESSURE: 41 MMHG | SYSTOLIC BLOOD PRESSURE: 83 MMHG

## 2020-11-26 VITALS — SYSTOLIC BLOOD PRESSURE: 92 MMHG | DIASTOLIC BLOOD PRESSURE: 55 MMHG

## 2020-11-26 VITALS — DIASTOLIC BLOOD PRESSURE: 61 MMHG | SYSTOLIC BLOOD PRESSURE: 100 MMHG

## 2020-11-26 VITALS — SYSTOLIC BLOOD PRESSURE: 94 MMHG | DIASTOLIC BLOOD PRESSURE: 56 MMHG

## 2020-11-26 VITALS — SYSTOLIC BLOOD PRESSURE: 77 MMHG | DIASTOLIC BLOOD PRESSURE: 42 MMHG

## 2020-11-26 VITALS — DIASTOLIC BLOOD PRESSURE: 51 MMHG | SYSTOLIC BLOOD PRESSURE: 86 MMHG

## 2020-11-26 VITALS — SYSTOLIC BLOOD PRESSURE: 86 MMHG | DIASTOLIC BLOOD PRESSURE: 48 MMHG

## 2020-11-26 VITALS — SYSTOLIC BLOOD PRESSURE: 85 MMHG | DIASTOLIC BLOOD PRESSURE: 56 MMHG

## 2020-11-26 VITALS — DIASTOLIC BLOOD PRESSURE: 50 MMHG | SYSTOLIC BLOOD PRESSURE: 82 MMHG

## 2020-11-26 VITALS — DIASTOLIC BLOOD PRESSURE: 50 MMHG | SYSTOLIC BLOOD PRESSURE: 75 MMHG

## 2020-11-26 VITALS — SYSTOLIC BLOOD PRESSURE: 79 MMHG | DIASTOLIC BLOOD PRESSURE: 46 MMHG

## 2020-11-26 VITALS — DIASTOLIC BLOOD PRESSURE: 49 MMHG | SYSTOLIC BLOOD PRESSURE: 84 MMHG

## 2020-11-26 VITALS — DIASTOLIC BLOOD PRESSURE: 54 MMHG | SYSTOLIC BLOOD PRESSURE: 90 MMHG

## 2020-11-26 VITALS — DIASTOLIC BLOOD PRESSURE: 41 MMHG | SYSTOLIC BLOOD PRESSURE: 76 MMHG

## 2020-11-26 VITALS — DIASTOLIC BLOOD PRESSURE: 54 MMHG | SYSTOLIC BLOOD PRESSURE: 101 MMHG

## 2020-11-26 VITALS — SYSTOLIC BLOOD PRESSURE: 75 MMHG | DIASTOLIC BLOOD PRESSURE: 37 MMHG

## 2020-11-26 VITALS — SYSTOLIC BLOOD PRESSURE: 85 MMHG | DIASTOLIC BLOOD PRESSURE: 54 MMHG

## 2020-11-26 VITALS — SYSTOLIC BLOOD PRESSURE: 87 MMHG | DIASTOLIC BLOOD PRESSURE: 51 MMHG

## 2020-11-26 VITALS — DIASTOLIC BLOOD PRESSURE: 55 MMHG | SYSTOLIC BLOOD PRESSURE: 88 MMHG

## 2020-11-26 VITALS — DIASTOLIC BLOOD PRESSURE: 55 MMHG | SYSTOLIC BLOOD PRESSURE: 92 MMHG

## 2020-11-26 VITALS — SYSTOLIC BLOOD PRESSURE: 86 MMHG | DIASTOLIC BLOOD PRESSURE: 53 MMHG

## 2020-11-26 VITALS — DIASTOLIC BLOOD PRESSURE: 53 MMHG | SYSTOLIC BLOOD PRESSURE: 89 MMHG

## 2020-11-26 VITALS — SYSTOLIC BLOOD PRESSURE: 99 MMHG | DIASTOLIC BLOOD PRESSURE: 56 MMHG

## 2020-11-26 VITALS — SYSTOLIC BLOOD PRESSURE: 100 MMHG | DIASTOLIC BLOOD PRESSURE: 57 MMHG

## 2020-11-26 VITALS — SYSTOLIC BLOOD PRESSURE: 85 MMHG | DIASTOLIC BLOOD PRESSURE: 50 MMHG

## 2020-11-26 VITALS — DIASTOLIC BLOOD PRESSURE: 45 MMHG | SYSTOLIC BLOOD PRESSURE: 89 MMHG

## 2020-11-26 VITALS — SYSTOLIC BLOOD PRESSURE: 90 MMHG | DIASTOLIC BLOOD PRESSURE: 46 MMHG

## 2020-11-26 VITALS — SYSTOLIC BLOOD PRESSURE: 95 MMHG | DIASTOLIC BLOOD PRESSURE: 51 MMHG

## 2020-11-26 VITALS — SYSTOLIC BLOOD PRESSURE: 83 MMHG | DIASTOLIC BLOOD PRESSURE: 45 MMHG

## 2020-11-26 VITALS — SYSTOLIC BLOOD PRESSURE: 80 MMHG | DIASTOLIC BLOOD PRESSURE: 46 MMHG

## 2020-11-26 VITALS — DIASTOLIC BLOOD PRESSURE: 52 MMHG | SYSTOLIC BLOOD PRESSURE: 95 MMHG

## 2020-11-26 VITALS — DIASTOLIC BLOOD PRESSURE: 60 MMHG | SYSTOLIC BLOOD PRESSURE: 100 MMHG

## 2020-11-26 VITALS — DIASTOLIC BLOOD PRESSURE: 52 MMHG | SYSTOLIC BLOOD PRESSURE: 87 MMHG

## 2020-11-26 VITALS — DIASTOLIC BLOOD PRESSURE: 45 MMHG | SYSTOLIC BLOOD PRESSURE: 78 MMHG

## 2020-11-26 VITALS — SYSTOLIC BLOOD PRESSURE: 98 MMHG | DIASTOLIC BLOOD PRESSURE: 51 MMHG

## 2020-11-26 VITALS — SYSTOLIC BLOOD PRESSURE: 100 MMHG | DIASTOLIC BLOOD PRESSURE: 58 MMHG

## 2020-11-26 VITALS — DIASTOLIC BLOOD PRESSURE: 55 MMHG | SYSTOLIC BLOOD PRESSURE: 100 MMHG

## 2020-11-26 VITALS — DIASTOLIC BLOOD PRESSURE: 49 MMHG | SYSTOLIC BLOOD PRESSURE: 98 MMHG

## 2020-11-26 VITALS — DIASTOLIC BLOOD PRESSURE: 49 MMHG | SYSTOLIC BLOOD PRESSURE: 92 MMHG

## 2020-11-26 VITALS — SYSTOLIC BLOOD PRESSURE: 85 MMHG | DIASTOLIC BLOOD PRESSURE: 47 MMHG

## 2020-11-26 VITALS — DIASTOLIC BLOOD PRESSURE: 62 MMHG | SYSTOLIC BLOOD PRESSURE: 119 MMHG

## 2020-11-26 VITALS — SYSTOLIC BLOOD PRESSURE: 83 MMHG | DIASTOLIC BLOOD PRESSURE: 52 MMHG

## 2020-11-26 VITALS — DIASTOLIC BLOOD PRESSURE: 55 MMHG | SYSTOLIC BLOOD PRESSURE: 96 MMHG

## 2020-11-26 VITALS — DIASTOLIC BLOOD PRESSURE: 55 MMHG | SYSTOLIC BLOOD PRESSURE: 87 MMHG

## 2020-11-26 VITALS — SYSTOLIC BLOOD PRESSURE: 93 MMHG | DIASTOLIC BLOOD PRESSURE: 51 MMHG

## 2020-11-26 VITALS — SYSTOLIC BLOOD PRESSURE: 82 MMHG | DIASTOLIC BLOOD PRESSURE: 46 MMHG

## 2020-11-26 VITALS — SYSTOLIC BLOOD PRESSURE: 100 MMHG | DIASTOLIC BLOOD PRESSURE: 55 MMHG

## 2020-11-26 VITALS — DIASTOLIC BLOOD PRESSURE: 53 MMHG | SYSTOLIC BLOOD PRESSURE: 97 MMHG

## 2020-11-26 VITALS — DIASTOLIC BLOOD PRESSURE: 54 MMHG | SYSTOLIC BLOOD PRESSURE: 98 MMHG

## 2020-11-26 LAB
ALBUMIN SERPL BCG-MCNC: 1.6 GM/DL (ref 3.2–5.2)
ALT SERPL W P-5'-P-CCNC: 31 U/L (ref 12–78)
AMYLASE SERPL-CCNC: 95 U/L (ref 25–115)
BASOPHILS # BLD AUTO: 0.1 10^3/UL (ref 0–0.2)
BASOPHILS NFR BLD AUTO: 0.8 % (ref 0–1)
BILIRUB SERPL-MCNC: 3.9 MG/DL (ref 0.2–1)
BUN SERPL-MCNC: 35 MG/DL (ref 7–18)
BUN SERPL-MCNC: 60 MG/DL (ref 7–18)
CALCIUM SERPL-MCNC: 7.2 MG/DL (ref 8.8–10.2)
CALCIUM SERPL-MCNC: 7.2 MG/DL (ref 8.8–10.2)
CHLORIDE SERPL-SCNC: 109 MEQ/L (ref 98–107)
CHLORIDE SERPL-SCNC: 109 MEQ/L (ref 98–107)
CO2 SERPL-SCNC: 19 MEQ/L (ref 21–32)
CO2 SERPL-SCNC: 21 MEQ/L (ref 21–32)
CREAT SERPL-MCNC: 1.91 MG/DL (ref 0.55–1.3)
CREAT SERPL-MCNC: 2.2 MG/DL (ref 0.55–1.3)
EOSINOPHIL # BLD AUTO: 0.1 10^3/UL (ref 0–0.5)
EOSINOPHIL NFR BLD AUTO: 1.8 % (ref 0–3)
GFR SERPL CREATININE-BSD FRML MDRD: 24.2 ML/MIN/{1.73_M2} (ref 45–?)
GFR SERPL CREATININE-BSD FRML MDRD: 28.5 ML/MIN/{1.73_M2} (ref 45–?)
GLUCOSE SERPL-MCNC: 133 MG/DL (ref 70–100)
GLUCOSE SERPL-MCNC: 81 MG/DL (ref 70–100)
HCT VFR BLD AUTO: 29.5 % (ref 36–47)
HGB BLD-MCNC: 9.6 G/DL (ref 12–15.5)
LIPASE SERPL-CCNC: 407 U/L (ref 73–393)
LYMPHOCYTES # BLD AUTO: 1.1 10^3/UL (ref 1.5–5)
LYMPHOCYTES NFR BLD AUTO: 16.8 % (ref 24–44)
MAGNESIUM SERPL-MCNC: 2 MG/DL (ref 1.8–2.4)
MAGNESIUM SERPL-MCNC: 2 MG/DL (ref 1.8–2.4)
MCH RBC QN AUTO: 31.3 PG (ref 27–33)
MCHC RBC AUTO-ENTMCNC: 32.5 G/DL (ref 32–36.5)
MCV RBC AUTO: 96.1 FL (ref 80–96)
MONOCYTES # BLD AUTO: 0.8 10^3/UL (ref 0–0.8)
MONOCYTES NFR BLD AUTO: 12.7 % (ref 0–5)
NEUTROPHILS # BLD AUTO: 4.4 10^3/UL (ref 1.5–8.5)
NEUTROPHILS NFR BLD AUTO: 67.3 % (ref 36–66)
PLATELET # BLD AUTO: 108 10^3/UL (ref 150–450)
POTASSIUM SERPL-SCNC: 4.4 MEQ/L (ref 3.5–5.1)
POTASSIUM SERPL-SCNC: 4.6 MEQ/L (ref 3.5–5.1)
PROT SERPL-MCNC: 5.3 GM/DL (ref 6.4–8.2)
RBC # BLD AUTO: 3.07 10^6/UL (ref 4–5.4)
SODIUM SERPL-SCNC: 135 MEQ/L (ref 136–145)
SODIUM SERPL-SCNC: 136 MEQ/L (ref 136–145)
WBC # BLD AUTO: 6.5 10^3/UL (ref 4–10)

## 2020-11-26 RX ADMIN — BRIMONIDINE TARTRATE SCH DROP: 1.5 SOLUTION OPHTHALMIC at 21:22

## 2020-11-26 RX ADMIN — FOLIC ACID SCH MG: 1 TABLET ORAL at 07:55

## 2020-11-26 RX ADMIN — MAGNESIUM HYDROXIDE SCH ML: 400 SUSPENSION ORAL at 07:55

## 2020-11-26 RX ADMIN — LACTULOSE SCH ML: 10 SOLUTION ORAL at 21:23

## 2020-11-26 RX ADMIN — SODIUM CHLORIDE SCH MLS/HR: 9 INJECTION, SOLUTION INTRAVENOUS at 04:19

## 2020-11-26 RX ADMIN — DOCUSATE SODIUM SCH MG: 100 CAPSULE, LIQUID FILLED ORAL at 07:55

## 2020-11-26 RX ADMIN — LEVALBUTEROL HYDROCHLORIDE SCH MG: 1.25 SOLUTION, CONCENTRATE RESPIRATORY (INHALATION) at 14:00

## 2020-11-26 RX ADMIN — LEVOTHYROXINE SODIUM SCH MCG: 112 TABLET ORAL at 06:44

## 2020-11-26 RX ADMIN — MIDODRINE HYDROCHLORIDE SCH MG: 5 TABLET ORAL at 12:11

## 2020-11-26 RX ADMIN — DEXTROSE MONOHYDRATE SCH MG: 50 INJECTION, SOLUTION INTRAVENOUS at 18:41

## 2020-11-26 RX ADMIN — SODIUM CHLORIDE SCH MLS/HR: 0.9 INJECTION, SOLUTION INTRAVENOUS at 11:53

## 2020-11-26 RX ADMIN — LEVALBUTEROL HYDROCHLORIDE SCH MG: 1.25 SOLUTION, CONCENTRATE RESPIRATORY (INHALATION) at 07:28

## 2020-11-26 RX ADMIN — MIDODRINE HYDROCHLORIDE SCH MG: 5 TABLET ORAL at 16:14

## 2020-11-26 RX ADMIN — SODIUM CHLORIDE SCH MLS/HR: 9 INJECTION, SOLUTION INTRAVENOUS at 16:15

## 2020-11-26 RX ADMIN — LEVALBUTEROL HYDROCHLORIDE SCH MG: 1.25 SOLUTION, CONCENTRATE RESPIRATORY (INHALATION) at 02:06

## 2020-11-26 RX ADMIN — DOCUSATE SODIUM SCH MG: 100 CAPSULE, LIQUID FILLED ORAL at 21:23

## 2020-11-26 RX ADMIN — LACTULOSE SCH ML: 10 SOLUTION ORAL at 06:43

## 2020-11-26 RX ADMIN — SODIUM CHLORIDE SCH MLS/HR: 9 INJECTION, SOLUTION INTRAVENOUS at 08:01

## 2020-11-26 RX ADMIN — SODIUM CHLORIDE SCH MLS/HR: 9 INJECTION, SOLUTION INTRAVENOUS at 12:14

## 2020-11-26 RX ADMIN — MIDODRINE HYDROCHLORIDE SCH MG: 5 TABLET ORAL at 07:56

## 2020-11-26 RX ADMIN — BRIMONIDINE TARTRATE SCH DROP: 1.5 SOLUTION OPHTHALMIC at 10:45

## 2020-11-26 RX ADMIN — Medication SCH UNITS: at 07:56

## 2020-11-26 RX ADMIN — SODIUM CHLORIDE SCH MLS/HR: 0.9 INJECTION, SOLUTION INTRAVENOUS at 03:45

## 2020-11-26 RX ADMIN — MULTIPLE VITAMINS W/ MINERALS TAB SCH TAB: TAB at 07:57

## 2020-11-26 RX ADMIN — LACTULOSE SCH ML: 10 SOLUTION ORAL at 14:31

## 2020-11-26 RX ADMIN — SODIUM CHLORIDE SCH MLS/HR: 0.9 INJECTION, SOLUTION INTRAVENOUS at 18:42

## 2020-11-26 RX ADMIN — LEVALBUTEROL HYDROCHLORIDE SCH MG: 1.25 SOLUTION, CONCENTRATE RESPIRATORY (INHALATION) at 19:44

## 2020-11-26 RX ADMIN — LATANOPROST SCH DROP: 50 SOLUTION OPHTHALMIC at 21:22

## 2020-11-26 NOTE — REP
INDICATION:

pleural effusion



COMPARISON:

11/25/2020



TECHNIQUE:

PA and lateral.



FINDINGS:

Pigtail catheter in the left base remains stable.  The previously noted large left

pleural effusion is significantly decreased and the left lower lobe now demonstrates a

small pneumothorax along with small amount of pleural fluid and scattered patchy

opacities.



Right hemithorax is relatively stable/clear.  The cardiac silhouette is within normal

limits.  The skeletal structures are intact.



IMPRESSION:

Significantly decreased left pleural effusion with small residual pleural fluid and

small pneumothorax.  Patchy opacities in the left mid to lower lung zone are now

visualized.





<Electronically signed by Yogi Glass > 11/26/20 [id number]

## 2020-11-26 NOTE — ECGEPIP
[facility] - ED

                                       

                                       Test Date:    2020

Pat Name:     JAMEL HARVEY             Department:   

Patient ID:   [id number]                 Room:         -

Gender:       Female                   Technician:   GAMAL

:          1960               Requested By: Wilder QUARLES

Order Number: [id number]     Reading MD:   Saundra Urrutia

                                 Measurements

Intervals                              Axis          

Rate:         96                       P:            24

KY:           118                      QRS:          17

QRSD:         85                       T:            49

QT:           374                                    

QTc:          475                                    

                           Interpretive Statements

SINUS RHYTHM WITH SHORT [place] INTERVAL

LOW VOLTAGE LIMB

PRWP

NSTTW abnormalities

SIMILAR 20

Electronically Signed on 2020 7:36:32 EST by Saundra Urrutia

## 2020-11-26 NOTE — IPNPDOC
Text Note


Date of Service


The patient was seen on 11/26/20.





NOTE


Subjective:


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 

of Hepatic encephalopathy, Ascites, Esophageal varices, Recurrent L ascitic 

pleural effusion), HTN, Hypothyroidism, DLP, Asthma, Anxiety / Depression, who 

presented to the emergency room at the direction of pulmonology, shortness of 

breath and left lung whiteout. Patient had follow-up with her pulmonologist 

today for shortness of breath in the office. Patient had received an x-ray that 

had revealed complete whiteout of her left lung field. Patient was sent to the 

emergency room for further evaluation.





Patient was seen and examined at the bedside. Patient reports her breathing is 

doing better. She denies any chest pain, shortness of breath or palpitations. 

Reports a mild cough without any expectoration. Denies any nausea, vomiting, 

reports mild upper abdominal pain. Denies any diarrhea, or urinary discomfort.





Objective:


Vitals (See below)


General: Lying in bed, appears comfortable, AAOx3


HEENT: NC, AT


CVS: +S1S2


Lungs: There is improved aeration of the left lung field. There is no 

appreciable rhonchi, wheezing or crackles


Abdomen: Soft, nondistended. Mild epigastric tenderness


Extremities: No significant edema, - Calf tenderness





Assessment and plan:


Shortness of breath - likely 2/2 left pleural effusion - likely 2/2 ascitic 

effusion 


- Patient presented to the emergency room from her pulmonologist office after an

x-ray had revealed left lung whiteout


- Patient is hypotensive this morning; 


- Physical reveals no appreciable lung sounds in the left lung field


- No leukocytosis


- CT chest 11/26: 1. Interval development of a large left pleural effusion in 

comparison to the prior study of 10/04/2020. 2. Small patchy infiltrates 

demonstrated in the apical segment on the right, findings of uncertain 

significance. Infection to be excluded clinically. 3. Atelectasis of the left 

lung with a rounded configuration demonstrated in the left lower lobe measuring 

2.9 x 4.2 x 3.7 cm, which may suggest a mass. Notably, a mass was not 

demonstrated on the prior examination favoring atelectasis. 


- Dr. Wiseman on consult; s/p Catheter placement and continued draiange


- Patient remains negative fluid balanced


- c/w Albumin infusion / Midodrine (outpatient dose); will taper of 

Phenylephrine  





s/p Chest pain - likely 2/2 effusion 


- Patient reports left-sided chest pain, atypical in nature, likely secondary to

effusion


- EKG does not reveal any ischemic changes and is consistent with priors on re

cord


- c/w Telemetry monitoring 





Epigastric / LUQ pain - possibly 2/2 acute pancreatitis 


- Clinically patient denies any nausea, vomiting


- Physical with slight epigastric tenderness noted


- Amylase and lipase slightly elevated, however not 3x upper limit of normal to 

suggest pancreatitis - has improved


- CT imaging without any inflammatorty ch


- Hemodynamically stable


- No signs of ascites


- Amylase / Lipase 


- CT abdomen / pelvis 11/26: 1. The liver has a grossly nodular contour and 

there is relative hypertrophy of the caudate lobe, consistent with end-stage 

cirrhosis. 2. Borderline splenomegaly. 3. There is a mild-to-moderate amount of 

free intraperitoneal fluid present. 4. The gallbladder is incompletely 

distended. This is most likely related to incomplete fasting. Clinical correlati

on to exclude gallbladder pathology suggested. 5. There is a small bowel 

containing umbilical hernia. No definitive evidence 


of incarceration however findings should be correlated with clinical exam. 6. 

Mild anasarca. 7. Mild diverticulosis is present in the distal colon. No 

diverticulitis. 


- Will start Diet today; clear liquids and advance as tolerated





BAM on CKD - likely 2/2 hepatorenal syndrome 


- Cr baseline of 1.7-1.8


- Cr has returned to baseline 


- Patient has remained in negative fluid balance since she has had a catheter 

placed


- c/w Midodrine and Albumin 


- Will hold Torsemide / Spironolactone





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 


- Multiple complications including; Hepatic encephalopathy / Ascites / 

Esophageal varices / Recurrent L ascitic pleural effusion


- c/w Lactulose





Hx of Alcoholism


- c/w Folic acid, Thiamine, Multivitamins 





Hypothyroidism


- c/w Levothyroxine 





DLP





Asthma


- No evidence of exacerbation


- Not on inhaled therapy at home 





Anxiety / Depression


- c/w Hydroxyzine 





GI prophylaxis


- c/w Protonix 





DVT prophylaxis 


- c/w TEDs/Sequentials





Disposition:


- Awaiting clinical improvement





VS,Fishbone, I+O


VS, Fishbone, I+O


Laboratory Tests


11/25/20 15:46








11/26/20 05:07











Vital Signs








  Date Time  Temp Pulse Resp B/P (MAP) Pulse Ox O2 Delivery O2 Flow Rate FiO2


 


11/26/20 07:15  79  84/46 (59) 94 Room Air  


 


11/26/20 07:14   18     


 


11/26/20 06:31 98.3       


 


11/25/20 19:30       2.0 











l


I&O- Last 24 Hours up to 6 AM 


 


 11/26/20





 06:00


 


Intake Total 5338.0 ml


 


Output Total 4855 ml


 


Balance 483.0 ml

















FRANCI BURROWS MD                Nov 26, 2020 09:52

## 2020-11-27 VITALS — SYSTOLIC BLOOD PRESSURE: 91 MMHG | DIASTOLIC BLOOD PRESSURE: 52 MMHG

## 2020-11-27 VITALS — DIASTOLIC BLOOD PRESSURE: 56 MMHG | SYSTOLIC BLOOD PRESSURE: 101 MMHG

## 2020-11-27 VITALS — DIASTOLIC BLOOD PRESSURE: 41 MMHG | SYSTOLIC BLOOD PRESSURE: 78 MMHG

## 2020-11-27 VITALS — DIASTOLIC BLOOD PRESSURE: 40 MMHG | SYSTOLIC BLOOD PRESSURE: 66 MMHG

## 2020-11-27 VITALS — DIASTOLIC BLOOD PRESSURE: 56 MMHG | SYSTOLIC BLOOD PRESSURE: 103 MMHG

## 2020-11-27 VITALS — SYSTOLIC BLOOD PRESSURE: 99 MMHG | DIASTOLIC BLOOD PRESSURE: 54 MMHG

## 2020-11-27 VITALS — SYSTOLIC BLOOD PRESSURE: 73 MMHG | DIASTOLIC BLOOD PRESSURE: 46 MMHG

## 2020-11-27 VITALS — DIASTOLIC BLOOD PRESSURE: 57 MMHG | SYSTOLIC BLOOD PRESSURE: 94 MMHG

## 2020-11-27 VITALS — DIASTOLIC BLOOD PRESSURE: 54 MMHG | SYSTOLIC BLOOD PRESSURE: 94 MMHG

## 2020-11-27 VITALS — DIASTOLIC BLOOD PRESSURE: 39 MMHG | SYSTOLIC BLOOD PRESSURE: 77 MMHG

## 2020-11-27 VITALS — DIASTOLIC BLOOD PRESSURE: 52 MMHG | SYSTOLIC BLOOD PRESSURE: 96 MMHG

## 2020-11-27 VITALS — SYSTOLIC BLOOD PRESSURE: 94 MMHG | DIASTOLIC BLOOD PRESSURE: 56 MMHG

## 2020-11-27 VITALS — SYSTOLIC BLOOD PRESSURE: 116 MMHG | DIASTOLIC BLOOD PRESSURE: 62 MMHG

## 2020-11-27 VITALS — DIASTOLIC BLOOD PRESSURE: 51 MMHG | SYSTOLIC BLOOD PRESSURE: 91 MMHG

## 2020-11-27 VITALS — SYSTOLIC BLOOD PRESSURE: 91 MMHG | DIASTOLIC BLOOD PRESSURE: 55 MMHG

## 2020-11-27 VITALS — DIASTOLIC BLOOD PRESSURE: 54 MMHG | SYSTOLIC BLOOD PRESSURE: 102 MMHG

## 2020-11-27 VITALS — SYSTOLIC BLOOD PRESSURE: 101 MMHG | DIASTOLIC BLOOD PRESSURE: 52 MMHG

## 2020-11-27 VITALS — DIASTOLIC BLOOD PRESSURE: 52 MMHG | SYSTOLIC BLOOD PRESSURE: 104 MMHG

## 2020-11-27 VITALS — SYSTOLIC BLOOD PRESSURE: 86 MMHG | DIASTOLIC BLOOD PRESSURE: 50 MMHG

## 2020-11-27 VITALS — DIASTOLIC BLOOD PRESSURE: 37 MMHG | SYSTOLIC BLOOD PRESSURE: 69 MMHG

## 2020-11-27 VITALS — DIASTOLIC BLOOD PRESSURE: 55 MMHG | SYSTOLIC BLOOD PRESSURE: 92 MMHG

## 2020-11-27 VITALS — SYSTOLIC BLOOD PRESSURE: 101 MMHG | DIASTOLIC BLOOD PRESSURE: 59 MMHG

## 2020-11-27 VITALS — DIASTOLIC BLOOD PRESSURE: 59 MMHG | SYSTOLIC BLOOD PRESSURE: 102 MMHG

## 2020-11-27 VITALS — DIASTOLIC BLOOD PRESSURE: 53 MMHG | SYSTOLIC BLOOD PRESSURE: 94 MMHG

## 2020-11-27 VITALS — DIASTOLIC BLOOD PRESSURE: 52 MMHG | SYSTOLIC BLOOD PRESSURE: 90 MMHG

## 2020-11-27 VITALS — SYSTOLIC BLOOD PRESSURE: 83 MMHG | DIASTOLIC BLOOD PRESSURE: 54 MMHG

## 2020-11-27 VITALS — SYSTOLIC BLOOD PRESSURE: 114 MMHG | DIASTOLIC BLOOD PRESSURE: 57 MMHG

## 2020-11-27 VITALS — SYSTOLIC BLOOD PRESSURE: 104 MMHG | DIASTOLIC BLOOD PRESSURE: 56 MMHG

## 2020-11-27 VITALS — DIASTOLIC BLOOD PRESSURE: 53 MMHG | SYSTOLIC BLOOD PRESSURE: 96 MMHG

## 2020-11-27 VITALS — SYSTOLIC BLOOD PRESSURE: 87 MMHG | DIASTOLIC BLOOD PRESSURE: 54 MMHG

## 2020-11-27 VITALS — DIASTOLIC BLOOD PRESSURE: 52 MMHG | SYSTOLIC BLOOD PRESSURE: 92 MMHG

## 2020-11-27 VITALS — SYSTOLIC BLOOD PRESSURE: 101 MMHG | DIASTOLIC BLOOD PRESSURE: 55 MMHG

## 2020-11-27 VITALS — DIASTOLIC BLOOD PRESSURE: 51 MMHG | SYSTOLIC BLOOD PRESSURE: 85 MMHG

## 2020-11-27 VITALS — SYSTOLIC BLOOD PRESSURE: 95 MMHG | DIASTOLIC BLOOD PRESSURE: 44 MMHG

## 2020-11-27 VITALS — SYSTOLIC BLOOD PRESSURE: 106 MMHG | DIASTOLIC BLOOD PRESSURE: 66 MMHG

## 2020-11-27 VITALS — DIASTOLIC BLOOD PRESSURE: 50 MMHG | SYSTOLIC BLOOD PRESSURE: 85 MMHG

## 2020-11-27 VITALS — DIASTOLIC BLOOD PRESSURE: 56 MMHG | SYSTOLIC BLOOD PRESSURE: 100 MMHG

## 2020-11-27 VITALS — DIASTOLIC BLOOD PRESSURE: 53 MMHG | SYSTOLIC BLOOD PRESSURE: 103 MMHG

## 2020-11-27 VITALS — SYSTOLIC BLOOD PRESSURE: 90 MMHG | DIASTOLIC BLOOD PRESSURE: 54 MMHG

## 2020-11-27 VITALS — SYSTOLIC BLOOD PRESSURE: 90 MMHG | DIASTOLIC BLOOD PRESSURE: 51 MMHG

## 2020-11-27 VITALS — DIASTOLIC BLOOD PRESSURE: 55 MMHG | SYSTOLIC BLOOD PRESSURE: 95 MMHG

## 2020-11-27 VITALS — DIASTOLIC BLOOD PRESSURE: 50 MMHG | SYSTOLIC BLOOD PRESSURE: 94 MMHG

## 2020-11-27 VITALS — DIASTOLIC BLOOD PRESSURE: 41 MMHG | SYSTOLIC BLOOD PRESSURE: 72 MMHG

## 2020-11-27 VITALS — SYSTOLIC BLOOD PRESSURE: 92 MMHG | DIASTOLIC BLOOD PRESSURE: 55 MMHG

## 2020-11-27 VITALS — DIASTOLIC BLOOD PRESSURE: 57 MMHG | SYSTOLIC BLOOD PRESSURE: 112 MMHG

## 2020-11-27 VITALS — DIASTOLIC BLOOD PRESSURE: 54 MMHG | SYSTOLIC BLOOD PRESSURE: 93 MMHG

## 2020-11-27 VITALS — DIASTOLIC BLOOD PRESSURE: 49 MMHG | SYSTOLIC BLOOD PRESSURE: 86 MMHG

## 2020-11-27 VITALS — SYSTOLIC BLOOD PRESSURE: 109 MMHG | DIASTOLIC BLOOD PRESSURE: 54 MMHG

## 2020-11-27 VITALS — SYSTOLIC BLOOD PRESSURE: 81 MMHG | DIASTOLIC BLOOD PRESSURE: 52 MMHG

## 2020-11-27 VITALS — SYSTOLIC BLOOD PRESSURE: 105 MMHG | DIASTOLIC BLOOD PRESSURE: 58 MMHG

## 2020-11-27 VITALS — DIASTOLIC BLOOD PRESSURE: 51 MMHG | SYSTOLIC BLOOD PRESSURE: 95 MMHG

## 2020-11-27 VITALS — DIASTOLIC BLOOD PRESSURE: 43 MMHG | SYSTOLIC BLOOD PRESSURE: 74 MMHG

## 2020-11-27 VITALS — DIASTOLIC BLOOD PRESSURE: 52 MMHG | SYSTOLIC BLOOD PRESSURE: 87 MMHG

## 2020-11-27 VITALS — DIASTOLIC BLOOD PRESSURE: 54 MMHG | SYSTOLIC BLOOD PRESSURE: 98 MMHG

## 2020-11-27 VITALS — SYSTOLIC BLOOD PRESSURE: 120 MMHG | DIASTOLIC BLOOD PRESSURE: 65 MMHG

## 2020-11-27 VITALS — SYSTOLIC BLOOD PRESSURE: 107 MMHG | DIASTOLIC BLOOD PRESSURE: 55 MMHG

## 2020-11-27 VITALS — DIASTOLIC BLOOD PRESSURE: 55 MMHG | SYSTOLIC BLOOD PRESSURE: 98 MMHG

## 2020-11-27 VITALS — SYSTOLIC BLOOD PRESSURE: 90 MMHG | DIASTOLIC BLOOD PRESSURE: 50 MMHG

## 2020-11-27 VITALS — SYSTOLIC BLOOD PRESSURE: 92 MMHG | DIASTOLIC BLOOD PRESSURE: 43 MMHG

## 2020-11-27 VITALS — SYSTOLIC BLOOD PRESSURE: 109 MMHG | DIASTOLIC BLOOD PRESSURE: 53 MMHG

## 2020-11-27 VITALS — SYSTOLIC BLOOD PRESSURE: 192 MMHG | DIASTOLIC BLOOD PRESSURE: 119 MMHG

## 2020-11-27 VITALS — DIASTOLIC BLOOD PRESSURE: 55 MMHG | SYSTOLIC BLOOD PRESSURE: 102 MMHG

## 2020-11-27 VITALS — SYSTOLIC BLOOD PRESSURE: 105 MMHG | DIASTOLIC BLOOD PRESSURE: 56 MMHG

## 2020-11-27 VITALS — DIASTOLIC BLOOD PRESSURE: 54 MMHG | SYSTOLIC BLOOD PRESSURE: 86 MMHG

## 2020-11-27 VITALS — DIASTOLIC BLOOD PRESSURE: 73 MMHG | SYSTOLIC BLOOD PRESSURE: 133 MMHG

## 2020-11-27 VITALS — DIASTOLIC BLOOD PRESSURE: 62 MMHG | SYSTOLIC BLOOD PRESSURE: 86 MMHG

## 2020-11-27 VITALS — SYSTOLIC BLOOD PRESSURE: 85 MMHG | DIASTOLIC BLOOD PRESSURE: 52 MMHG

## 2020-11-27 VITALS — SYSTOLIC BLOOD PRESSURE: 95 MMHG | DIASTOLIC BLOOD PRESSURE: 55 MMHG

## 2020-11-27 VITALS — DIASTOLIC BLOOD PRESSURE: 56 MMHG | SYSTOLIC BLOOD PRESSURE: 93 MMHG

## 2020-11-27 VITALS — DIASTOLIC BLOOD PRESSURE: 41 MMHG | SYSTOLIC BLOOD PRESSURE: 73 MMHG

## 2020-11-27 VITALS — SYSTOLIC BLOOD PRESSURE: 105 MMHG | DIASTOLIC BLOOD PRESSURE: 55 MMHG

## 2020-11-27 VITALS — SYSTOLIC BLOOD PRESSURE: 88 MMHG | DIASTOLIC BLOOD PRESSURE: 51 MMHG

## 2020-11-27 VITALS — DIASTOLIC BLOOD PRESSURE: 51 MMHG | SYSTOLIC BLOOD PRESSURE: 88 MMHG

## 2020-11-27 VITALS — DIASTOLIC BLOOD PRESSURE: 52 MMHG | SYSTOLIC BLOOD PRESSURE: 94 MMHG

## 2020-11-27 VITALS — SYSTOLIC BLOOD PRESSURE: 97 MMHG | DIASTOLIC BLOOD PRESSURE: 53 MMHG

## 2020-11-27 VITALS — DIASTOLIC BLOOD PRESSURE: 52 MMHG | SYSTOLIC BLOOD PRESSURE: 100 MMHG

## 2020-11-27 VITALS — DIASTOLIC BLOOD PRESSURE: 50 MMHG | SYSTOLIC BLOOD PRESSURE: 90 MMHG

## 2020-11-27 VITALS — SYSTOLIC BLOOD PRESSURE: 93 MMHG | DIASTOLIC BLOOD PRESSURE: 52 MMHG

## 2020-11-27 VITALS — SYSTOLIC BLOOD PRESSURE: 73 MMHG | DIASTOLIC BLOOD PRESSURE: 47 MMHG

## 2020-11-27 VITALS — SYSTOLIC BLOOD PRESSURE: 117 MMHG | DIASTOLIC BLOOD PRESSURE: 81 MMHG

## 2020-11-27 VITALS — SYSTOLIC BLOOD PRESSURE: 108 MMHG | DIASTOLIC BLOOD PRESSURE: 53 MMHG

## 2020-11-27 VITALS — SYSTOLIC BLOOD PRESSURE: 85 MMHG | DIASTOLIC BLOOD PRESSURE: 47 MMHG

## 2020-11-27 VITALS — DIASTOLIC BLOOD PRESSURE: 59 MMHG | SYSTOLIC BLOOD PRESSURE: 95 MMHG

## 2020-11-27 LAB
ALBUMIN SERPL BCG-MCNC: 2.5 GM/DL (ref 3.2–5.2)
ALT SERPL W P-5'-P-CCNC: 26 U/L (ref 12–78)
AMYLASE SERPL-CCNC: 86 U/L (ref 25–115)
BASOPHILS # BLD AUTO: 0 10^3/UL (ref 0–0.2)
BASOPHILS NFR BLD AUTO: 0.5 % (ref 0–1)
BILIRUB SERPL-MCNC: 3.6 MG/DL (ref 0.2–1)
BUN SERPL-MCNC: 50 MG/DL (ref 7–18)
CALCIUM SERPL-MCNC: 7.5 MG/DL (ref 8.8–10.2)
CHLORIDE SERPL-SCNC: 110 MEQ/L (ref 98–107)
CO2 SERPL-SCNC: 22 MEQ/L (ref 21–32)
CREAT SERPL-MCNC: 2.25 MG/DL (ref 0.55–1.3)
EOSINOPHIL # BLD AUTO: 0.2 10^3/UL (ref 0–0.5)
EOSINOPHIL NFR BLD AUTO: 1.9 % (ref 0–3)
GFR SERPL CREATININE-BSD FRML MDRD: 23.6 ML/MIN/{1.73_M2} (ref 45–?)
GLUCOSE SERPL-MCNC: 113 MG/DL (ref 70–100)
HCT VFR BLD AUTO: 31 % (ref 36–47)
HGB BLD-MCNC: 10.1 G/DL (ref 12–15.5)
LIPASE SERPL-CCNC: 484 U/L (ref 73–393)
LYMPHOCYTES # BLD AUTO: 0.9 10^3/UL (ref 1.5–5)
LYMPHOCYTES NFR BLD AUTO: 11.5 % (ref 24–44)
MAGNESIUM SERPL-MCNC: 2.2 MG/DL (ref 1.8–2.4)
MCH RBC QN AUTO: 32.2 PG (ref 27–33)
MCHC RBC AUTO-ENTMCNC: 32.6 G/DL (ref 32–36.5)
MCV RBC AUTO: 98.7 FL (ref 80–96)
MONOCYTES # BLD AUTO: 1 10^3/UL (ref 0–0.8)
MONOCYTES NFR BLD AUTO: 12.1 % (ref 0–5)
NEUTROPHILS # BLD AUTO: 5.8 10^3/UL (ref 1.5–8.5)
NEUTROPHILS NFR BLD AUTO: 73.5 % (ref 36–66)
PLATELET # BLD AUTO: 109 10^3/UL (ref 150–450)
POTASSIUM SERPL-SCNC: 5.2 MEQ/L (ref 3.5–5.1)
PROT SERPL-MCNC: 5.7 GM/DL (ref 6.4–8.2)
RBC # BLD AUTO: 3.14 10^6/UL (ref 4–5.4)
SODIUM SERPL-SCNC: 137 MEQ/L (ref 136–145)
WBC # BLD AUTO: 7.9 10^3/UL (ref 4–10)

## 2020-11-27 PROCEDURE — 02HV33Z INSERTION OF INFUSION DEVICE INTO SUPERIOR VENA CAVA, PERCUTANEOUS APPROACH: ICD-10-PCS | Performed by: INTERNAL MEDICINE

## 2020-11-27 RX ADMIN — DEXTROSE MONOHYDRATE SCH MLS/HR: 50 INJECTION, SOLUTION INTRAVENOUS at 14:25

## 2020-11-27 RX ADMIN — LEVALBUTEROL HYDROCHLORIDE SCH MG: 1.25 SOLUTION, CONCENTRATE RESPIRATORY (INHALATION) at 01:11

## 2020-11-27 RX ADMIN — LATANOPROST SCH DROP: 50 SOLUTION OPHTHALMIC at 21:20

## 2020-11-27 RX ADMIN — MAGNESIUM HYDROXIDE SCH ML: 400 SUSPENSION ORAL at 08:35

## 2020-11-27 RX ADMIN — MIDODRINE HYDROCHLORIDE SCH MG: 5 TABLET ORAL at 08:35

## 2020-11-27 RX ADMIN — SODIUM CHLORIDE SCH MLS/HR: 0.9 INJECTION, SOLUTION INTRAVENOUS at 01:24

## 2020-11-27 RX ADMIN — LACTULOSE SCH ML: 10 SOLUTION ORAL at 13:39

## 2020-11-27 RX ADMIN — BRIMONIDINE TARTRATE SCH DROP: 1.5 SOLUTION OPHTHALMIC at 08:35

## 2020-11-27 RX ADMIN — LACTULOSE SCH ML: 10 SOLUTION ORAL at 05:00

## 2020-11-27 RX ADMIN — MIDODRINE HYDROCHLORIDE SCH MG: 5 TABLET ORAL at 13:39

## 2020-11-27 RX ADMIN — LEVALBUTEROL HYDROCHLORIDE SCH MG: 1.25 SOLUTION, CONCENTRATE RESPIRATORY (INHALATION) at 08:32

## 2020-11-27 RX ADMIN — DOCUSATE SODIUM SCH MG: 100 CAPSULE, LIQUID FILLED ORAL at 21:20

## 2020-11-27 RX ADMIN — DOCUSATE SODIUM SCH MG: 100 CAPSULE, LIQUID FILLED ORAL at 08:35

## 2020-11-27 RX ADMIN — Medication SCH UNITS: at 08:35

## 2020-11-27 RX ADMIN — SODIUM CHLORIDE SCH MLS/HR: 0.9 INJECTION, SOLUTION INTRAVENOUS at 07:42

## 2020-11-27 RX ADMIN — MIDODRINE HYDROCHLORIDE SCH MG: 5 TABLET ORAL at 17:36

## 2020-11-27 RX ADMIN — LACTULOSE SCH ML: 10 SOLUTION ORAL at 21:20

## 2020-11-27 RX ADMIN — SODIUM CHLORIDE SCH MLS/HR: 0.9 INJECTION, SOLUTION INTRAVENOUS at 14:48

## 2020-11-27 RX ADMIN — FOLIC ACID SCH MG: 1 TABLET ORAL at 08:35

## 2020-11-27 RX ADMIN — DEXTROSE MONOHYDRATE SCH MG: 50 INJECTION, SOLUTION INTRAVENOUS at 17:36

## 2020-11-27 RX ADMIN — MULTIPLE VITAMINS W/ MINERALS TAB SCH TAB: TAB at 08:35

## 2020-11-27 RX ADMIN — LEVOTHYROXINE SODIUM SCH MCG: 112 TABLET ORAL at 05:00

## 2020-11-27 RX ADMIN — BRIMONIDINE TARTRATE SCH DROP: 1.5 SOLUTION OPHTHALMIC at 21:20

## 2020-11-27 NOTE — CCN
CRITICAL CARE NOTE



DATE:  11/27/2020 



SUBJECTIVE: Flora is known for having alcoholic cirrhosis. Presented to the

hospital with a large pleural effusion requiring drainage and now has a

pigtail. Unfortunately, it does appear that her acidic fluid readily goes to

the chest. She has had close to 9 liters drained from her chest in the past two

days. Her blood pressure is soft. On my arrival, she is on Harshad-Synephrine. I

turned that off while I was in the room and her systolic blood pressure

remained 103; however, this morning, her blood pressure was 94/50 with a mean

arterial pressure of 65 on Harshad-Synephrine. If she requires vasopressors would

prefer central line placement with Levophed if needed. Given her history of

liver failure, she may simply be living at a lower pressure; however, she is

starting to have renal failure and her creatinine went up to 2.25. Therefore,

we need to be careful about her renal perfusion. Oxygen saturation is now 95%

on room air. She is able to lay flat without shortness of breath. She has no

pain currently. No chest pain. The patient denies any drinking since September. 



OBJECTIVE:

CURRENT VITAL SIGNS: Temperature 98.7, pulse 82, respiratory rate 18, blood

pressure 103/65. Oxygen saturation is 95% on room air. 

INTAKE/OUTPUT: 576 in and 1.6 liters out over the past 12 hours. 

HEENT: Sclerae clear, anicteric. Pupils equal and reactive to light. Mucous

membranes are moist without lesions. Tongue is midline. Mucous membranes appear

moist. 

NECK: Supple. No tracheal deviation or mass. 

LYMPH: No cervical, supraclavicular, or axillary adenopathy. 

CARDIAC: Distant S1, S2 without audible murmur, rub, or gallop. No elevated

JVP. No systemic edema.

PULMONARY: Clear to auscultation currently. There is a chest tube draining

pleural fluid at about 300 mL every two to three hours.

ABDOMEN: Soft, nontender, and nondistended. I am unable to palpate her liver. 

EXTREMITIES: No cyanosis, clubbing, or edema.

NEUROLOGIC: She does have some tremulousness. 

MUSCULOSKELETAL: No asterixis. Some muscle wasting. 

SKIN: Pale without rashes, jaundice, or bruising. 



LABORATORY DATA: Shows a sodium of 157, potassium 5.2, chloride 110, bicarb 22,

BUN 50, creatinine 2.25 with an albumin of 2.5. White blood cell count 7.9,

hemoglobin 10.1 with a platelet count of 109,000. No growth from the pleural

fluid. The patient was COVID negative not surprisingly. There were some white

blood cells within the pleural fluid and it was mononuclear predominance.

Glucose was normal. Total protein was low at 0.86 going along with acidic

fluid. Albumin was 0.2, LDH 38, amylase 31. Amylase and lipase are starting to

trend down some. Albumin is up to 2.5. Ethyl alcohol on admission was

unmeasurable. 



ASSESSMENT:

1.  Pleural effusion from ascites. Tends to become severe in nature quickly

    after thoracentesis. Blood pressure is low and starting to show some renal

    insufficiency; therefore, diuresis will be a delicate balance. 

2.  Hypotension. No evidence of septic shock. Hypotension likely related to

    liver disease. I have held the Harshad-Synephrine now to see how she does. She

    has been started on midodrine. Depending on her volume status, may require

    diuresis now and then; however, we will closely monitor blood pressure and

    symptoms with urine output. 

3.  Liver failure, the core etiology of her medical illness. She has been on

    lactulose; however, ammonia level has not been obtained. I am curious to see

    what this may be. Initial amylase and lipase were elevated. Alkaline

    phosphatase was elevated. Her bilirubin was elevated. INR was 1.4. Overall,

    may be a candidate for transjugular intrahepatic portosystemic shunt (TIPS)

    and may proceed to liver transplant if she remains alcohol-free in the next

    six months. Will recommend that consideration for liver transplant unless

    gastroenterology (GI) has seen any contraindications to doing so.



Overall, the patient's prognosis remains guarded. We had quite an extensive

discussion. She wishes to have all efforts made towards saving her life. She

has already consented to a central line and dialysis, if needed. She states at

this point in time, she wants full efforts towards saving her life including

all intensive care unit (ICU) management, mechanical ventilation,

cardiopulmonary resuscitation (CPR), in addition to other emergent urgent

procedures. 



CRITICAL CARE TIME: One hour and 18 minutes. This excludes all procedures.

## 2020-11-27 NOTE — IPNPDOC
Text Note


Date of Service


The patient was seen on 11/27/20.





NOTE


Subjective:


Patient seen and examined at bedside. No acute overnight events reported. 

Patient has no new medical complaints this morning.





Objective:


Vitals (See below)


General: Lying in bed, appears comfortable, tremulous


HEENT: NC, AT


CVS: +S1S2


Lungs: CTA B/L right lung fields, diminished breath sounds left lung fields


Abdomen: Soft, nondistended. Mild epigastric tenderness


Extremities: No significant edema, - Calf tenderness





A/P:





60F with PMHx cirrhosis 2/2 ETOH (Hx of Hepatic encephalopathy, Ascites, 

Esophageal varices, Recurrent L ascitic pleural effusion), HTN, Hypothyroidism, 

DLP, Asthma, Anxiety / Depression, who presented to the emergency room on 

instructions from her pulmonologist for shortness of breath and left lung 

whiteout. Patient had follow-up with her pulmonologist today for shortness of 

breath in the office, and an x-ray that had revealed complete whiteout of her 

left lung field. 





#SOB


- likely 2/2 left pleural effusion, complicated with ascitic effusion 


- Dr. Wiseman on consult; s/p Catheter placement and continued drainage


- c/w Albumin infusion / Midodrine (outpatient dose); will taper of 

Phenylephrine  


- No leukocytosis


- CT chest 11/26: 1. Interval development of a large left pleural effusion in 

comparison to the prior study of 10/04/2020. 2. Small patchy infiltrates 

demonstrated in the apical segment on the right, findings of uncertain 

significance. Infection to be excluded clinically. 3. Atelectasis of the left 

lung with a rounded configuration demonstrated in the left lower lobe measuring 

2.9 x 4.2 x 3.7 cm, which may suggest a mass. Notably, a mass was not 

demonstrated on the prior examination favoring atelectasis. 





#s/p Chest pain


- resolved - likely 2/2 effusion 


- Patient reports left-sided chest pain, atypical in nature, likely secondary to

effusion


- no evidence for ACS - ECG no acute findings, no troponinemia


- c/w Telemetry monitoring 





#Epigastric / LUQ pain - possibly 2/2 acute pancreatitis 


- Clinically patient denies any nausea, vomiting


- Physical with slight epigastric tenderness noted


- Amylase and lipase slightly elevated, however not 3x upper limit of normal to 

suggest pancreatitis - has improved


- CT imaging without any inflammatory changes


- Hemodynamically stable


- CT abdomen / pelvis 11/26: 1. The liver has a grossly nodular contour and 

there is relative hypertrophy of the caudate lobe, consistent with end-stage 

cirrhosis. 2. Borderline splenomegaly. 3. There is a mild-to-moderate amount of 

free intraperitoneal fluid present. 4. The gallbladder is incompletely 

distended. This is most likely related to incomplete fasting. Clinical 

correlation to exclude gallbladder pathology suggested. 5. There is a small 

bowel containing umbilical hernia. No definitive evidence 


of incarceration however findings should be correlated with clinical exam. 6. 

Mild anasarca. 7. Mild diverticulosis is present in the distal colon. No 

diverticulitis. 





#BAM/CKD


- nephrology c/s pending


- Cr baseline of 1.7-1.8


- UA pending


- receiving Midodrine and Albumin 


- Will hold Torsemide / Spironolactone





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 


- Multiple complications including; Hepatic encephalopathy / Ascites / 

Esophageal varices / Recurrent L ascitic pleural effusion


- c/w Lactulose





#Hx of Alcoholism


- c/w Folic acid, Thiamine, Multivitamins 





#Hypothyroidism


- c/w Levothyroxine 





#DLP





#Asthma


- No evidence of exacerbation


- Not on inhaled therapy at home 





#Anxiety / Depression


- c/w Hydroxyzine 





#GI prophylaxis


- c/w Protonix 





#DVT prophylaxis 


- c/w TEDs/Sequentials





VS,Fishbone, I+O


VS, Fishbone, I+O


Laboratory Tests


11/26/20 14:50








11/27/20 04:42











Vital Signs








  Date Time  Temp Pulse Resp B/P (MAP) Pulse Ox O2 Delivery O2 Flow Rate FiO2


 


11/27/20 06:00  82  94/50 (65) 95 Room Air  


 


11/27/20 05:45   18     


 


11/27/20 05:15 98.7       


 


11/26/20 12:43       2.0 














I&O- Last 24 Hours up to 6 AM 


 


 11/27/20





 06:00


 


Intake Total 4694.0 ml


 


Output Total 5575 ml


 


Balance -881.0 ml

















NATALYA LUCAS MD              Nov 27, 2020 08:26

## 2020-11-27 NOTE — CR
CONSULTATION





REQUESTING PHYSICIAN: Delroy Blackman M.D.  



REASON FOR CONSULTATION:  Acute kidney injury superimposed on chronic kidney

disease. 



HISTORY OF PRESENT ILLNESS:  Ms. Taylor is a 60-year-old female with multiple

chronic medical problems including a history of alcoholic cirrhosis with

recurrent ascites, history of recurrent left pleural effusion with

diaphragmatic defect leaving ascites accumulating in her chest, history of

hypertension, hypothyroidism, asthma, anxiety and depression, and chronic

kidney disease. She was seen by her pulmonologist due to shortness of breath

and a chest x-ray showed complete whiteout of her left lung due to which she

was sent to the emergency room and got admitted. She has a chest catheter

placed with drainage of her left pleural effusion and is now being seen by Dr. Wiseman for possible chest tube placement. In any event, the patient has also

developed worsening kidney function, though she has received IV fluids and IV

albumin. A nephrology consultation was requested this morning and the patient

is seen in the intensive care unit.



PAST MEDICAL HISTORY:

1.  Alcoholic cirrhosis with recurrent ascites. 

2.  Recurrent left pleural effusion with ascetic fluid.

3.  Hepatic encephalopathy and esophageal varices.

4.  Hypothyroidism.

5.  Dyslipidemia.

6.  Asthma.

7.  Anxiety and depression. 



PAST SURGICAL HISTORY:

1.  Dilation and curettage (D&C).

2.  Pigtail catheter placement in her left chest.

3.  Insertion of left PleurX catheter. 



HOME MEDICATIONS:

- betaxolol eye drops b.i.d. 

- brimonidine eye drops b.i.d. 

- vitamin D 1000 units daily 

- folic acid 1 mg daily 

- lactulose 10 g/15 mL, 30 mL b.i.d. 

- Levothyroxine 112 mcg daily 

- midodrine 5 mg t.i.d. 

- multivitamin one tablet daily

- Spironolactone 50 mg b.i.d. 

- torsemide 20 mg b.i.d. 



ALLERGIES: PENICILLIN AND CEFDINIR.



PERSONAL AND SOCIAL HISTORY: The patient has a long history of alcohol abuse,

which she quit in September. She reports no alcohol intake since 09/25/2020.

She denies any recreational drug use.



FAMILY HISTORY: Negative for kidney problems. 



REVIEW OF SYSTEMS: She denies any fevers or chills. Ears, nose, and throat are

unremarkable. Cardiovascular: Significant for shortness of breath, which was

mostly related to her pleural effusion. She denies any chest pain. Respiratory:

Significant for large left pleural effusion compressing her left lung

completely. Gastrointestinal: Significant for ascites due to cirrhosis. She

also has a history of esophageal varices. Genitourinary: Negative for dysuria

or hematuria. Endocrine: Significant for hypothyroidism and no history of

diabetes. Hematologic: Significant for anemia. She denies any easy bruising or

excessive bleeding. Psychosocial: Significant for depression and anxiety.

Neurological: Significant for prior history of hepatic encephalopathy. At

present, she seems to be grossly intact. Skin: Negative for rash or ulcers. 



PHYSICAL EXAMINATION:

GENERAL: The patient is awake and alert without any acute distress at present. 

VITAL SIGNS: Temperature 98.7 degrees Fahrenheit, heart rate 82 per minute, and

respiratory rate 18 per minute. Blood pressure 94/50 mmHg and oxygen saturation

95% on room air. 

HEAD: Atraumatic. 

NECK: Supple. JVD not abnormally elevated. She has no oral thrush or ulcers. 

HEART: Sounds are regular.

LUNGS: Diminished breath sounds particularly on the left side. She also has

diminished breath sounds at the right base. 

ABDOMEN: Soft and mild ascites are present. There is no tenderness. 

EXTREMITIES: Without any cyanosis or clubbing. 

NEUROLOGIC: She is awake and alert. She has mild tremors of her upper

extremities.  



LABORATORY DATA:  Sodium today 137, potassium 5.2, CO2 of 22, BUN 50, and

creatinine 2.25. Glucose 113 and calcium 7.5. Yesterday her BUN was 60 and

creatinine 1.9. Her amylase level is 86 and lipase 484. Total protein 5.7 and

albumin 2.5. Hemoglobin is 10.1 and hematocrit 31, platelets are 109,000. 



IMAGING DATA:  She had an abdominal and pelvic CT scan done, which did show

mild ascites and large pleural effusion. She has severe cirrhosis of her liver.

moderate ascites was noted. 



PROBLEMS:

1.  Acute kidney injury superimposed on chronic kidney disease. Most likely

    this is prerenal as she did have a large volume of pleural fluid drained.

    She was also on diuretic at home. Currently she is not receiving any

    Spironolactone. She does have borderline low blood pressure despite the use

    of midodrine. I will recommend IV fluid along with IV albumin, which is

    likely to help with her acute kidney injury. 

2.  Recurrent ascites and pleural effusion. The patient had complete whiteout

    of her left lung due to a large left pleural effusion, which has been

    actually partially drained. Dr. Wiseman is evaluating for possible chest

    tube placement. Ascites is not significant enough to require urgent

    paracentesis at this point. 

3.  Cirrhosis of liver. This is advanced cirrhosis related to alcohol use. The

    patient claims that she has been alcohol-free since September 25. I will

    defer to critical care to consider for possible liver transplant referral. 

4.  Anemia and thrombocytopenia most likely related to her chronic kidney and

    liver disease. At present, she does not have any urgent need for a

    transfusion. 



Thank you for involving me in the care of Ms. Taylor. Nephrologist service will

follow her along with you. 

[place]

## 2020-11-27 NOTE — REP
INDICATION:

pleural effusion



COMPARISON:

11/26/2020, 11/18/2020



TECHNIQUE:

PA and lateral.



FINDINGS:

Pigtail catheter at the left base is again noted and the left pleural effusion is

considerably improved as compared with 11/18/2020.  Small amount of pleural gas at the

left apex is suggested.  Left lower lobe infiltrates and possible right perihilar

infiltrates are appreciated.  Mediastinum and cardiac silhouette are stable.



IMPRESSION:

1.  Decreased left pleural effusion suggested.

2.  Left lower lobe and possible right perihilar infiltrates remain prominent.





<Electronically signed by Yogi Glass > 11/27/20 [id number]

## 2020-11-27 NOTE — REP
INDICATION:

S/P Central Line Placement



COMPARISON:

None.



TECHNIQUE:

Portable AP view of the chest



FINDINGS:

Right IJ line with tip in the SVC.

Pigtail catheter at the left base again noted.



Mediastinum and cardiac silhouette are within normal limits. Subtle left mid to lower

lobe airspace disease appears improved.  No obvious significant residual pleural

effusion or pneumothorax identified.



IMPRESSION:

1.  Right IJ line with tip in the SVC.  No pneumothorax.

2.  Subtle patchy left lower lobe opacities appear improved.





<Electronically signed by Yogi Glass > 11/27/20 [id number]

## 2020-11-28 VITALS — SYSTOLIC BLOOD PRESSURE: 150 MMHG | DIASTOLIC BLOOD PRESSURE: 65 MMHG

## 2020-11-28 VITALS — DIASTOLIC BLOOD PRESSURE: 54 MMHG | SYSTOLIC BLOOD PRESSURE: 92 MMHG

## 2020-11-28 VITALS — SYSTOLIC BLOOD PRESSURE: 89 MMHG | DIASTOLIC BLOOD PRESSURE: 53 MMHG

## 2020-11-28 VITALS — SYSTOLIC BLOOD PRESSURE: 92 MMHG | DIASTOLIC BLOOD PRESSURE: 54 MMHG

## 2020-11-28 VITALS — SYSTOLIC BLOOD PRESSURE: 99 MMHG | DIASTOLIC BLOOD PRESSURE: 54 MMHG

## 2020-11-28 VITALS — DIASTOLIC BLOOD PRESSURE: 52 MMHG | SYSTOLIC BLOOD PRESSURE: 93 MMHG

## 2020-11-28 VITALS — SYSTOLIC BLOOD PRESSURE: 106 MMHG | DIASTOLIC BLOOD PRESSURE: 53 MMHG

## 2020-11-28 VITALS — DIASTOLIC BLOOD PRESSURE: 50 MMHG | SYSTOLIC BLOOD PRESSURE: 91 MMHG

## 2020-11-28 VITALS — DIASTOLIC BLOOD PRESSURE: 51 MMHG | SYSTOLIC BLOOD PRESSURE: 94 MMHG

## 2020-11-28 VITALS — SYSTOLIC BLOOD PRESSURE: 86 MMHG | DIASTOLIC BLOOD PRESSURE: 54 MMHG

## 2020-11-28 VITALS — SYSTOLIC BLOOD PRESSURE: 83 MMHG | DIASTOLIC BLOOD PRESSURE: 53 MMHG

## 2020-11-28 VITALS — SYSTOLIC BLOOD PRESSURE: 92 MMHG | DIASTOLIC BLOOD PRESSURE: 55 MMHG

## 2020-11-28 VITALS — SYSTOLIC BLOOD PRESSURE: 82 MMHG | DIASTOLIC BLOOD PRESSURE: 52 MMHG

## 2020-11-28 VITALS — DIASTOLIC BLOOD PRESSURE: 54 MMHG | SYSTOLIC BLOOD PRESSURE: 86 MMHG

## 2020-11-28 VITALS — DIASTOLIC BLOOD PRESSURE: 51 MMHG | SYSTOLIC BLOOD PRESSURE: 89 MMHG

## 2020-11-28 VITALS — SYSTOLIC BLOOD PRESSURE: 103 MMHG | DIASTOLIC BLOOD PRESSURE: 59 MMHG

## 2020-11-28 VITALS — SYSTOLIC BLOOD PRESSURE: 90 MMHG | DIASTOLIC BLOOD PRESSURE: 51 MMHG

## 2020-11-28 VITALS — DIASTOLIC BLOOD PRESSURE: 52 MMHG | SYSTOLIC BLOOD PRESSURE: 87 MMHG

## 2020-11-28 VITALS — DIASTOLIC BLOOD PRESSURE: 57 MMHG | SYSTOLIC BLOOD PRESSURE: 81 MMHG

## 2020-11-28 VITALS — DIASTOLIC BLOOD PRESSURE: 61 MMHG | SYSTOLIC BLOOD PRESSURE: 102 MMHG

## 2020-11-28 VITALS — DIASTOLIC BLOOD PRESSURE: 55 MMHG | SYSTOLIC BLOOD PRESSURE: 102 MMHG

## 2020-11-28 VITALS — SYSTOLIC BLOOD PRESSURE: 95 MMHG | DIASTOLIC BLOOD PRESSURE: 53 MMHG

## 2020-11-28 VITALS — DIASTOLIC BLOOD PRESSURE: 55 MMHG | SYSTOLIC BLOOD PRESSURE: 94 MMHG

## 2020-11-28 VITALS — DIASTOLIC BLOOD PRESSURE: 55 MMHG | SYSTOLIC BLOOD PRESSURE: 99 MMHG

## 2020-11-28 VITALS — SYSTOLIC BLOOD PRESSURE: 91 MMHG | DIASTOLIC BLOOD PRESSURE: 53 MMHG

## 2020-11-28 VITALS — DIASTOLIC BLOOD PRESSURE: 52 MMHG | SYSTOLIC BLOOD PRESSURE: 84 MMHG

## 2020-11-28 VITALS — SYSTOLIC BLOOD PRESSURE: 100 MMHG | DIASTOLIC BLOOD PRESSURE: 58 MMHG

## 2020-11-28 VITALS — DIASTOLIC BLOOD PRESSURE: 57 MMHG | SYSTOLIC BLOOD PRESSURE: 91 MMHG

## 2020-11-28 VITALS — SYSTOLIC BLOOD PRESSURE: 119 MMHG | DIASTOLIC BLOOD PRESSURE: 57 MMHG

## 2020-11-28 VITALS — DIASTOLIC BLOOD PRESSURE: 59 MMHG | SYSTOLIC BLOOD PRESSURE: 105 MMHG

## 2020-11-28 VITALS — DIASTOLIC BLOOD PRESSURE: 52 MMHG | SYSTOLIC BLOOD PRESSURE: 91 MMHG

## 2020-11-28 VITALS — DIASTOLIC BLOOD PRESSURE: 53 MMHG | SYSTOLIC BLOOD PRESSURE: 91 MMHG

## 2020-11-28 VITALS — SYSTOLIC BLOOD PRESSURE: 95 MMHG | DIASTOLIC BLOOD PRESSURE: 61 MMHG

## 2020-11-28 VITALS — DIASTOLIC BLOOD PRESSURE: 61 MMHG | SYSTOLIC BLOOD PRESSURE: 101 MMHG

## 2020-11-28 VITALS — SYSTOLIC BLOOD PRESSURE: 89 MMHG | DIASTOLIC BLOOD PRESSURE: 52 MMHG

## 2020-11-28 VITALS — DIASTOLIC BLOOD PRESSURE: 55 MMHG | SYSTOLIC BLOOD PRESSURE: 101 MMHG

## 2020-11-28 VITALS — DIASTOLIC BLOOD PRESSURE: 56 MMHG | SYSTOLIC BLOOD PRESSURE: 95 MMHG

## 2020-11-28 VITALS — SYSTOLIC BLOOD PRESSURE: 96 MMHG | DIASTOLIC BLOOD PRESSURE: 50 MMHG

## 2020-11-28 VITALS — DIASTOLIC BLOOD PRESSURE: 63 MMHG | SYSTOLIC BLOOD PRESSURE: 150 MMHG

## 2020-11-28 VITALS — DIASTOLIC BLOOD PRESSURE: 54 MMHG | SYSTOLIC BLOOD PRESSURE: 94 MMHG

## 2020-11-28 VITALS — DIASTOLIC BLOOD PRESSURE: 49 MMHG | SYSTOLIC BLOOD PRESSURE: 84 MMHG

## 2020-11-28 VITALS — SYSTOLIC BLOOD PRESSURE: 93 MMHG | DIASTOLIC BLOOD PRESSURE: 48 MMHG

## 2020-11-28 VITALS — SYSTOLIC BLOOD PRESSURE: 102 MMHG | DIASTOLIC BLOOD PRESSURE: 59 MMHG

## 2020-11-28 VITALS — DIASTOLIC BLOOD PRESSURE: 54 MMHG | SYSTOLIC BLOOD PRESSURE: 97 MMHG

## 2020-11-28 VITALS — SYSTOLIC BLOOD PRESSURE: 89 MMHG | DIASTOLIC BLOOD PRESSURE: 50 MMHG

## 2020-11-28 VITALS — DIASTOLIC BLOOD PRESSURE: 53 MMHG | SYSTOLIC BLOOD PRESSURE: 95 MMHG

## 2020-11-28 VITALS — DIASTOLIC BLOOD PRESSURE: 57 MMHG | SYSTOLIC BLOOD PRESSURE: 118 MMHG

## 2020-11-28 VITALS — SYSTOLIC BLOOD PRESSURE: 99 MMHG | DIASTOLIC BLOOD PRESSURE: 50 MMHG

## 2020-11-28 VITALS — SYSTOLIC BLOOD PRESSURE: 95 MMHG | DIASTOLIC BLOOD PRESSURE: 52 MMHG

## 2020-11-28 VITALS — DIASTOLIC BLOOD PRESSURE: 52 MMHG | SYSTOLIC BLOOD PRESSURE: 88 MMHG

## 2020-11-28 VITALS — DIASTOLIC BLOOD PRESSURE: 58 MMHG | SYSTOLIC BLOOD PRESSURE: 108 MMHG

## 2020-11-28 VITALS — DIASTOLIC BLOOD PRESSURE: 53 MMHG | SYSTOLIC BLOOD PRESSURE: 82 MMHG

## 2020-11-28 VITALS — SYSTOLIC BLOOD PRESSURE: 82 MMHG | DIASTOLIC BLOOD PRESSURE: 53 MMHG

## 2020-11-28 VITALS — DIASTOLIC BLOOD PRESSURE: 54 MMHG | SYSTOLIC BLOOD PRESSURE: 91 MMHG

## 2020-11-28 VITALS — DIASTOLIC BLOOD PRESSURE: 55 MMHG | SYSTOLIC BLOOD PRESSURE: 98 MMHG

## 2020-11-28 VITALS — SYSTOLIC BLOOD PRESSURE: 92 MMHG | DIASTOLIC BLOOD PRESSURE: 53 MMHG

## 2020-11-28 VITALS — SYSTOLIC BLOOD PRESSURE: 93 MMHG | DIASTOLIC BLOOD PRESSURE: 50 MMHG

## 2020-11-28 VITALS — SYSTOLIC BLOOD PRESSURE: 103 MMHG | DIASTOLIC BLOOD PRESSURE: 55 MMHG

## 2020-11-28 VITALS — DIASTOLIC BLOOD PRESSURE: 50 MMHG | SYSTOLIC BLOOD PRESSURE: 87 MMHG

## 2020-11-28 VITALS — DIASTOLIC BLOOD PRESSURE: 52 MMHG | SYSTOLIC BLOOD PRESSURE: 97 MMHG

## 2020-11-28 VITALS — SYSTOLIC BLOOD PRESSURE: 91 MMHG | DIASTOLIC BLOOD PRESSURE: 54 MMHG

## 2020-11-28 VITALS — DIASTOLIC BLOOD PRESSURE: 53 MMHG | SYSTOLIC BLOOD PRESSURE: 88 MMHG

## 2020-11-28 VITALS — DIASTOLIC BLOOD PRESSURE: 61 MMHG | SYSTOLIC BLOOD PRESSURE: 150 MMHG

## 2020-11-28 VITALS — DIASTOLIC BLOOD PRESSURE: 58 MMHG | SYSTOLIC BLOOD PRESSURE: 105 MMHG

## 2020-11-28 VITALS — DIASTOLIC BLOOD PRESSURE: 68 MMHG | SYSTOLIC BLOOD PRESSURE: 114 MMHG

## 2020-11-28 VITALS — SYSTOLIC BLOOD PRESSURE: 76 MMHG | DIASTOLIC BLOOD PRESSURE: 49 MMHG

## 2020-11-28 VITALS — SYSTOLIC BLOOD PRESSURE: 85 MMHG | DIASTOLIC BLOOD PRESSURE: 53 MMHG

## 2020-11-28 VITALS — DIASTOLIC BLOOD PRESSURE: 49 MMHG | SYSTOLIC BLOOD PRESSURE: 95 MMHG

## 2020-11-28 VITALS — DIASTOLIC BLOOD PRESSURE: 52 MMHG | SYSTOLIC BLOOD PRESSURE: 90 MMHG

## 2020-11-28 VITALS — DIASTOLIC BLOOD PRESSURE: 51 MMHG | SYSTOLIC BLOOD PRESSURE: 90 MMHG

## 2020-11-28 VITALS — DIASTOLIC BLOOD PRESSURE: 57 MMHG | SYSTOLIC BLOOD PRESSURE: 100 MMHG

## 2020-11-28 VITALS — DIASTOLIC BLOOD PRESSURE: 53 MMHG | SYSTOLIC BLOOD PRESSURE: 90 MMHG

## 2020-11-28 VITALS — SYSTOLIC BLOOD PRESSURE: 90 MMHG | DIASTOLIC BLOOD PRESSURE: 52 MMHG

## 2020-11-28 VITALS — SYSTOLIC BLOOD PRESSURE: 95 MMHG | DIASTOLIC BLOOD PRESSURE: 54 MMHG

## 2020-11-28 VITALS — SYSTOLIC BLOOD PRESSURE: 103 MMHG | DIASTOLIC BLOOD PRESSURE: 56 MMHG

## 2020-11-28 VITALS — DIASTOLIC BLOOD PRESSURE: 51 MMHG | SYSTOLIC BLOOD PRESSURE: 96 MMHG

## 2020-11-28 VITALS — SYSTOLIC BLOOD PRESSURE: 87 MMHG | DIASTOLIC BLOOD PRESSURE: 54 MMHG

## 2020-11-28 VITALS — DIASTOLIC BLOOD PRESSURE: 53 MMHG | SYSTOLIC BLOOD PRESSURE: 85 MMHG

## 2020-11-28 VITALS — DIASTOLIC BLOOD PRESSURE: 55 MMHG | SYSTOLIC BLOOD PRESSURE: 103 MMHG

## 2020-11-28 LAB
ALBUMIN SERPL BCG-MCNC: 1.5 GM/DL (ref 3.2–5.2)
ALT SERPL W P-5'-P-CCNC: 25 U/L (ref 12–78)
BASOPHILS # BLD AUTO: 0.1 10^3/UL (ref 0–0.2)
BASOPHILS NFR BLD AUTO: 0.9 % (ref 0–1)
BILIRUB SERPL-MCNC: 3.9 MG/DL (ref 0.2–1)
BUN SERPL-MCNC: 44 MG/DL (ref 7–18)
CALCIUM SERPL-MCNC: 8.1 MG/DL (ref 8.8–10.2)
CHLORIDE SERPL-SCNC: 112 MEQ/L (ref 98–107)
CO2 SERPL-SCNC: 19 MEQ/L (ref 21–32)
CREAT SERPL-MCNC: 2.01 MG/DL (ref 0.55–1.3)
EOSINOPHIL # BLD AUTO: 0.2 10^3/UL (ref 0–0.5)
EOSINOPHIL NFR BLD AUTO: 3.4 % (ref 0–3)
GFR SERPL CREATININE-BSD FRML MDRD: 26.9 ML/MIN/{1.73_M2} (ref 45–?)
GLUCOSE SERPL-MCNC: 114 MG/DL (ref 70–100)
HCT VFR BLD AUTO: 29.8 % (ref 36–47)
HGB BLD-MCNC: 9.9 G/DL (ref 12–15.5)
LYMPHOCYTES # BLD AUTO: 1.1 10^3/UL (ref 1.5–5)
LYMPHOCYTES NFR BLD AUTO: 17.2 % (ref 24–44)
MAGNESIUM SERPL-MCNC: 2.5 MG/DL (ref 1.8–2.4)
MCH RBC QN AUTO: 31.7 PG (ref 27–33)
MCHC RBC AUTO-ENTMCNC: 33.2 G/DL (ref 32–36.5)
MCV RBC AUTO: 95.5 FL (ref 80–96)
MONOCYTES # BLD AUTO: 0.8 10^3/UL (ref 0–0.8)
MONOCYTES NFR BLD AUTO: 12.8 % (ref 0–5)
NEUTROPHILS # BLD AUTO: 4.2 10^3/UL (ref 1.5–8.5)
NEUTROPHILS NFR BLD AUTO: 65.2 % (ref 36–66)
PLATELET # BLD AUTO: 101 10^3/UL (ref 150–450)
POTASSIUM SERPL-SCNC: 4.6 MEQ/L (ref 3.5–5.1)
PROT SERPL-MCNC: 5.3 GM/DL (ref 6.4–8.2)
RBC # BLD AUTO: 3.12 10^6/UL (ref 4–5.4)
SODIUM SERPL-SCNC: 138 MEQ/L (ref 136–145)
WBC # BLD AUTO: 6.4 10^3/UL (ref 4–10)

## 2020-11-28 RX ADMIN — DEXTROSE MONOHYDRATE SCH MG: 50 INJECTION, SOLUTION INTRAVENOUS at 18:14

## 2020-11-28 RX ADMIN — MULTIPLE VITAMINS W/ MINERALS TAB SCH TAB: TAB at 09:24

## 2020-11-28 RX ADMIN — LACTULOSE SCH ML: 10 SOLUTION ORAL at 06:15

## 2020-11-28 RX ADMIN — LATANOPROST SCH DROP: 50 SOLUTION OPHTHALMIC at 20:12

## 2020-11-28 RX ADMIN — MIDODRINE HYDROCHLORIDE SCH MG: 5 TABLET ORAL at 16:48

## 2020-11-28 RX ADMIN — DOCUSATE SODIUM SCH MG: 100 CAPSULE, LIQUID FILLED ORAL at 09:00

## 2020-11-28 RX ADMIN — DEXTROSE MONOHYDRATE SCH MLS/HR: 50 INJECTION, SOLUTION INTRAVENOUS at 04:36

## 2020-11-28 RX ADMIN — LEVOTHYROXINE SODIUM SCH MCG: 112 TABLET ORAL at 06:15

## 2020-11-28 RX ADMIN — FOLIC ACID SCH MG: 1 TABLET ORAL at 09:24

## 2020-11-28 RX ADMIN — BRIMONIDINE TARTRATE SCH DROP: 1.5 SOLUTION OPHTHALMIC at 20:12

## 2020-11-28 RX ADMIN — BRIMONIDINE TARTRATE SCH DROP: 1.5 SOLUTION OPHTHALMIC at 09:24

## 2020-11-28 RX ADMIN — MAGNESIUM HYDROXIDE SCH ML: 400 SUSPENSION ORAL at 09:00

## 2020-11-28 RX ADMIN — Medication SCH UNITS: at 09:24

## 2020-11-28 RX ADMIN — DEXTROSE MONOHYDRATE SCH MLS/HR: 50 INJECTION, SOLUTION INTRAVENOUS at 14:50

## 2020-11-28 RX ADMIN — MIDODRINE HYDROCHLORIDE SCH MG: 5 TABLET ORAL at 09:24

## 2020-11-28 RX ADMIN — MIDODRINE HYDROCHLORIDE SCH MG: 5 TABLET ORAL at 13:27

## 2020-11-28 NOTE — IPNPDOC
Text Note


Date of Service


The patient was seen on 11/28/20.





NOTE


Subjective:


Patient seen and examined at bedside. No acute overnight events reported. 

Patient has no new medical complaints this morning.





Objective:


Vitals (See below)


General: Lying in bed, appears comfortable, tremulous


HEENT: NC, AT


CVS: +S1S2


Lungs: CTA B/L right lung fields, diminished breath sounds left lung fields, 

chest tube in place left chest


Abdomen: Soft, nondistended. Mild epigastric tenderness


Extremities: No significant edema, - Calf tenderness





A/P:





60F with PMHx cirrhosis 2/2 ETOH (Hx of Hepatic encephalopathy, Ascites, 

Esophageal varices, Recurrent L ascitic pleural effusion), HTN, Hypothyroidism, 

DLP, Asthma, Anxiety / Depression, who presented to the emergency room on 

instructions from her pulmonologist for shortness of breath and left lung whi

teout. Patient had follow-up with her pulmonologist today for shortness of 

breath in the office, and an x-ray that had revealed complete whiteout of her 

left lung field. 





#SOB


- likely 2/2 left pleural effusion, complicated with ascitic effusion 


- Dr. Wiseman on consult; s/p Catheter placement and continued drainage


- c/w Albumin infusion / Midodrine (outpatient dose)


- No leukocytosis


- CT chest 11/26: 1. Interval development of a large left pleural effusion in 

comparison to the prior study of 10/04/2020. 2. Small patchy infiltrates 

demonstrated in the apical segment on the right, findings of uncertain 

significance. Infection to be excluded clinically. 3. Atelectasis of the left 

lung with a rounded configuration demonstrated in the left lower lobe measuring 

2.9 x 4.2 x 3.7 cm, which may suggest a mass. Notably, a mass was not 

demonstrated on the prior examination favoring atelectasis. 





#hypotension


- still requiring presser support - levophed at 3mcg


- albumin infusion





#hyperammonemia


- continue lactulose





#s/p Chest pain


- resolved - likely 2/2 effusion 


- Patient reports left-sided chest pain, atypical in nature, likely secondary to

effusion


- no evidence for ACS - ECG no acute findings, no troponinemia


- c/w Telemetry monitoring 





#Epigastric / LUQ pain - possibly 2/2 acute pancreatitis 


- Clinically patient denies any nausea, vomiting


- Physical with slight epigastric tenderness noted


- Amylase and lipase slightly elevated, however not 3x upper limit of normal to 

suggest pancreatitis - has improved


- CT imaging without any inflammatory changes


- Hemodynamically stable


- CT abdomen / pelvis 11/26: 1. The liver has a grossly nodular contour and 

there is relative hypertrophy of the caudate lobe, consistent with end-stage 

cirrhosis. 2. Borderline splenomegaly. 3. There is a mild-to-moderate amount of 

free intraperitoneal fluid present. 4. The gallbladder is incompletely 

distended. This is most likely related to incomplete fasting. Clinical 

correlation to exclude gallbladder pathology suggested. 5. There is a small 

bowel containing umbilical hernia. No definitive evidence 


of incarceration however findings should be correlated with clinical exam. 6. 

Mild anasarca. 7. Mild diverticulosis is present in the distal colon. No 

diverticulitis. 





#BAM/CKD


- nephrology c/s appreciated


- Cr baseline of 1.7-1.8


- receiving Midodrine and Albumin 


- Will hold Torsemide / Spironolactone





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 


- Multiple complications including; Hepatic encephalopathy / Ascites / 

Esophageal varices / Recurrent L ascitic pleural effusion


- c/w Lactulose





#Hx of Alcoholism


- c/w Folic acid, Thiamine, Multivitamins 





#Hypothyroidism


- c/w Levothyroxine 





#DLP





#Asthma


- No evidence of exacerbation


- Not on inhaled therapy at home 





#Anxiety / Depression


- c/w Hydroxyzine 





#GI prophylaxis


- c/w Protonix 





#DVT prophylaxis 


- c/w TEDs/Sequentials





VS,Fishbone, I+O


VS, Fishbone, I+O


Laboratory Tests


11/28/20 04:17











Vital Signs








  Date Time  Temp Pulse Resp B/P (MAP) Pulse Ox O2 Delivery O2 Flow Rate FiO2


 


11/28/20 11:20 99.6 88 18 93/50 97 Room Air  


 


11/27/20 12:32       2.0 














I&O- Last 24 Hours up to 6 AM 


 


 11/28/20





 06:00


 


Intake Total 1378 ml


 


Output Total 3755 ml


 


Balance -2377 ml

















NATALYA LUCAS MD              Nov 28, 2020 11:49

## 2020-11-28 NOTE — REP
INDICATION:

pleural effusion



COMPARISON:

11/27/2020, 11/26/2020



TECHNIQUE:

PA and lateral.



FINDINGS:

Pigtail catheter again noted at the left lung base.  Near complete resolution to the

left effusion is suggested with suspected small residual left apical pneumothorax.

Bilateral perihilar and left basilar atelectasis is suggested.



Right IJ line with tip in the SVC/right atrium.  Cardiac silhouette is within normal

limits and stable.  Skeletal structures are intact.



IMPRESSION:

1.  Near complete resolution to the left pleural effusion.  Small residual left apical

pneumothorax possible.

2.  Bilateral perihilar and left basilar atelectasis.





<Electronically signed by Yogi Glass > 11/28/20 [id number]

## 2020-11-28 NOTE — CCN
CRITICAL CARE NOTE



DATE:  11/28/2020



Critical care time 54 minutes. This excludes all procedures.



SUBJECTIVE: Flora remains in liver failure with hypotension. Overnight the

patient states she has been feeling well. She has had bowel movements. She

continues to have high output from chest tube; she has had 2.8 liters out

overnight. No fevers or chills. Denies any pain. No chest discomfort. She is

tolerating food. Remains on Levophed 3 mcg per minute.



OBJECTIVE:

VITAL SIGNS: Temperature 99.0, pulse 83, respiratory rate 17, blood pressure

90/51 on Levophed with mean arterial pressure of 64. Oxygen saturation 97% on

room air.

GENERAL: The patient awake, conversant, is tremulous.

HEENT: Sclerae slightly icteric. Pupils equal and reactive to light. Mucous

membranes moist. Tongue midline.

NECK: Supple, no tracheal deviation or mass. JVP is elevated. Right IJ is in

place without surrounding erythema or exudate.

LYMPHS: No cervical, supraclavicular or axillary adenopathy.

CARDIAC: Regular S1, S2 without audible murmur, rub or gallop. No systemic

edema. There is elevated JVP as mentioned above.

PULMONARY: Decreased breath sounds throughout without wheezes, rhonchi or

rales. No dullness to percussion. No accessory muscle use. Pigtail is draining

from the left chest. Yellow pleural fluid.

ABDOMEN: Soft, nontender, non-distended, no discernible hepatomegaly, no

splenomegaly. No masses or hernia.

EXTREMITIES: No cyanosis or clubbing.

NEUROLOGIC: Tremulousness as mentioned above but no unilateral weakness. No

evidence of seizure activity.

MUSCULOSKELETAL: Significant muscle wasting, no evidence of trauma.



LABORATORY EVALUATION: White blood cell count 6.4, hemoglobin 9.9, hematocrit

29.8, platelet count 101. Sodium 138, potassium 4.6, chloride 112, bicarb 19,

BUN 44 with creatinine 2.0. Ammonia is elevated 128.



Chest x-ray shows resolution of pneumothorax. Pigtail is in place. No other

infiltrate, mass or lesion. No significant cardiomegaly. Costophrenic angles

are fairly clear except for minimal blunting of the left costophrenic angle.



IMPRESSION/PLAN:

1.  Pleural effusion from liver failure ascites. It appears that her ascites

    accumulates more in the left pleural space than it does in the abdomen.

    Chest tube output very high. Will likely continue to be high. Albumin is

    very low today at 1.5. I will transfuse albumin today, especially if she

    continues to have output through her chest tube. Overall the patient will

    need some definitive treatment whether it is liver transplant. She recently

    stopped drinking therefore will take six months from that time. TIPS

    procedure may bridge her to this. However, I have a feeling we will need to

    schedule thoracentesis with albumin transfusion at least every 2-3 weeks.

2.  Hyperammonemia being addressed by primary team with lactulose.

3.  Renal failure. Creatinine slightly improved. Acidosis slightly worse today.

4.  DVT prophylaxis with TEDs and sequentials.

5.  Anemia. No indication for transfusion at this point in time, no signs or

    symptoms of acute bleeding.

6.  GI prophylaxis with Protonix.



Overall the patient's prognosis is guarded because of the severity of her liver

failure.

## 2020-11-29 VITALS — SYSTOLIC BLOOD PRESSURE: 72 MMHG | DIASTOLIC BLOOD PRESSURE: 40 MMHG

## 2020-11-29 VITALS — DIASTOLIC BLOOD PRESSURE: 58 MMHG | SYSTOLIC BLOOD PRESSURE: 103 MMHG

## 2020-11-29 VITALS — SYSTOLIC BLOOD PRESSURE: 119 MMHG | DIASTOLIC BLOOD PRESSURE: 68 MMHG

## 2020-11-29 VITALS — DIASTOLIC BLOOD PRESSURE: 51 MMHG | SYSTOLIC BLOOD PRESSURE: 83 MMHG

## 2020-11-29 VITALS — DIASTOLIC BLOOD PRESSURE: 60 MMHG | SYSTOLIC BLOOD PRESSURE: 108 MMHG

## 2020-11-29 VITALS — DIASTOLIC BLOOD PRESSURE: 50 MMHG | SYSTOLIC BLOOD PRESSURE: 91 MMHG

## 2020-11-29 VITALS — SYSTOLIC BLOOD PRESSURE: 118 MMHG | DIASTOLIC BLOOD PRESSURE: 57 MMHG

## 2020-11-29 VITALS — SYSTOLIC BLOOD PRESSURE: 98 MMHG | DIASTOLIC BLOOD PRESSURE: 58 MMHG

## 2020-11-29 VITALS — DIASTOLIC BLOOD PRESSURE: 53 MMHG | SYSTOLIC BLOOD PRESSURE: 88 MMHG

## 2020-11-29 VITALS — SYSTOLIC BLOOD PRESSURE: 112 MMHG | DIASTOLIC BLOOD PRESSURE: 57 MMHG

## 2020-11-29 VITALS — SYSTOLIC BLOOD PRESSURE: 108 MMHG | DIASTOLIC BLOOD PRESSURE: 64 MMHG

## 2020-11-29 VITALS — SYSTOLIC BLOOD PRESSURE: 90 MMHG | DIASTOLIC BLOOD PRESSURE: 50 MMHG

## 2020-11-29 VITALS — DIASTOLIC BLOOD PRESSURE: 54 MMHG | SYSTOLIC BLOOD PRESSURE: 88 MMHG

## 2020-11-29 VITALS — DIASTOLIC BLOOD PRESSURE: 58 MMHG | SYSTOLIC BLOOD PRESSURE: 105 MMHG

## 2020-11-29 VITALS — DIASTOLIC BLOOD PRESSURE: 48 MMHG | SYSTOLIC BLOOD PRESSURE: 86 MMHG

## 2020-11-29 VITALS — DIASTOLIC BLOOD PRESSURE: 55 MMHG | SYSTOLIC BLOOD PRESSURE: 87 MMHG

## 2020-11-29 VITALS — SYSTOLIC BLOOD PRESSURE: 114 MMHG | DIASTOLIC BLOOD PRESSURE: 57 MMHG

## 2020-11-29 VITALS — DIASTOLIC BLOOD PRESSURE: 49 MMHG | SYSTOLIC BLOOD PRESSURE: 79 MMHG

## 2020-11-29 VITALS — DIASTOLIC BLOOD PRESSURE: 51 MMHG | SYSTOLIC BLOOD PRESSURE: 85 MMHG

## 2020-11-29 VITALS — SYSTOLIC BLOOD PRESSURE: 113 MMHG | DIASTOLIC BLOOD PRESSURE: 60 MMHG

## 2020-11-29 VITALS — DIASTOLIC BLOOD PRESSURE: 61 MMHG | SYSTOLIC BLOOD PRESSURE: 111 MMHG

## 2020-11-29 VITALS — SYSTOLIC BLOOD PRESSURE: 88 MMHG | DIASTOLIC BLOOD PRESSURE: 51 MMHG

## 2020-11-29 VITALS — DIASTOLIC BLOOD PRESSURE: 44 MMHG | SYSTOLIC BLOOD PRESSURE: 76 MMHG

## 2020-11-29 VITALS — SYSTOLIC BLOOD PRESSURE: 81 MMHG | DIASTOLIC BLOOD PRESSURE: 49 MMHG

## 2020-11-29 VITALS — SYSTOLIC BLOOD PRESSURE: 80 MMHG | DIASTOLIC BLOOD PRESSURE: 49 MMHG

## 2020-11-29 VITALS — DIASTOLIC BLOOD PRESSURE: 69 MMHG | SYSTOLIC BLOOD PRESSURE: 112 MMHG

## 2020-11-29 VITALS — DIASTOLIC BLOOD PRESSURE: 52 MMHG | SYSTOLIC BLOOD PRESSURE: 97 MMHG

## 2020-11-29 VITALS — DIASTOLIC BLOOD PRESSURE: 45 MMHG | SYSTOLIC BLOOD PRESSURE: 80 MMHG

## 2020-11-29 VITALS — SYSTOLIC BLOOD PRESSURE: 85 MMHG | DIASTOLIC BLOOD PRESSURE: 51 MMHG

## 2020-11-29 VITALS — SYSTOLIC BLOOD PRESSURE: 95 MMHG | DIASTOLIC BLOOD PRESSURE: 55 MMHG

## 2020-11-29 VITALS — SYSTOLIC BLOOD PRESSURE: 82 MMHG | DIASTOLIC BLOOD PRESSURE: 52 MMHG

## 2020-11-29 VITALS — DIASTOLIC BLOOD PRESSURE: 54 MMHG | SYSTOLIC BLOOD PRESSURE: 98 MMHG

## 2020-11-29 VITALS — SYSTOLIC BLOOD PRESSURE: 84 MMHG | DIASTOLIC BLOOD PRESSURE: 51 MMHG

## 2020-11-29 VITALS — DIASTOLIC BLOOD PRESSURE: 50 MMHG | SYSTOLIC BLOOD PRESSURE: 81 MMHG

## 2020-11-29 VITALS — SYSTOLIC BLOOD PRESSURE: 81 MMHG | DIASTOLIC BLOOD PRESSURE: 51 MMHG

## 2020-11-29 VITALS — SYSTOLIC BLOOD PRESSURE: 76 MMHG | DIASTOLIC BLOOD PRESSURE: 45 MMHG

## 2020-11-29 VITALS — SYSTOLIC BLOOD PRESSURE: 90 MMHG | DIASTOLIC BLOOD PRESSURE: 55 MMHG

## 2020-11-29 VITALS — DIASTOLIC BLOOD PRESSURE: 45 MMHG | SYSTOLIC BLOOD PRESSURE: 81 MMHG

## 2020-11-29 VITALS — SYSTOLIC BLOOD PRESSURE: 114 MMHG | DIASTOLIC BLOOD PRESSURE: 60 MMHG

## 2020-11-29 VITALS — SYSTOLIC BLOOD PRESSURE: 84 MMHG | DIASTOLIC BLOOD PRESSURE: 48 MMHG

## 2020-11-29 VITALS — SYSTOLIC BLOOD PRESSURE: 108 MMHG | DIASTOLIC BLOOD PRESSURE: 58 MMHG

## 2020-11-29 VITALS — DIASTOLIC BLOOD PRESSURE: 53 MMHG | SYSTOLIC BLOOD PRESSURE: 90 MMHG

## 2020-11-29 VITALS — SYSTOLIC BLOOD PRESSURE: 98 MMHG | DIASTOLIC BLOOD PRESSURE: 57 MMHG

## 2020-11-29 VITALS — DIASTOLIC BLOOD PRESSURE: 51 MMHG | SYSTOLIC BLOOD PRESSURE: 92 MMHG

## 2020-11-29 VITALS — DIASTOLIC BLOOD PRESSURE: 56 MMHG | SYSTOLIC BLOOD PRESSURE: 130 MMHG

## 2020-11-29 VITALS — SYSTOLIC BLOOD PRESSURE: 94 MMHG | DIASTOLIC BLOOD PRESSURE: 59 MMHG

## 2020-11-29 VITALS — SYSTOLIC BLOOD PRESSURE: 104 MMHG | DIASTOLIC BLOOD PRESSURE: 55 MMHG

## 2020-11-29 VITALS — SYSTOLIC BLOOD PRESSURE: 95 MMHG | DIASTOLIC BLOOD PRESSURE: 51 MMHG

## 2020-11-29 VITALS — DIASTOLIC BLOOD PRESSURE: 66 MMHG | SYSTOLIC BLOOD PRESSURE: 111 MMHG

## 2020-11-29 VITALS — SYSTOLIC BLOOD PRESSURE: 103 MMHG | DIASTOLIC BLOOD PRESSURE: 61 MMHG

## 2020-11-29 VITALS — DIASTOLIC BLOOD PRESSURE: 58 MMHG | SYSTOLIC BLOOD PRESSURE: 93 MMHG

## 2020-11-29 VITALS — SYSTOLIC BLOOD PRESSURE: 83 MMHG | DIASTOLIC BLOOD PRESSURE: 53 MMHG

## 2020-11-29 VITALS — DIASTOLIC BLOOD PRESSURE: 52 MMHG | SYSTOLIC BLOOD PRESSURE: 84 MMHG

## 2020-11-29 VITALS — SYSTOLIC BLOOD PRESSURE: 93 MMHG | DIASTOLIC BLOOD PRESSURE: 53 MMHG

## 2020-11-29 VITALS — SYSTOLIC BLOOD PRESSURE: 83 MMHG | DIASTOLIC BLOOD PRESSURE: 50 MMHG

## 2020-11-29 VITALS — SYSTOLIC BLOOD PRESSURE: 91 MMHG | DIASTOLIC BLOOD PRESSURE: 54 MMHG

## 2020-11-29 VITALS — DIASTOLIC BLOOD PRESSURE: 61 MMHG | SYSTOLIC BLOOD PRESSURE: 125 MMHG

## 2020-11-29 VITALS — DIASTOLIC BLOOD PRESSURE: 54 MMHG | SYSTOLIC BLOOD PRESSURE: 84 MMHG

## 2020-11-29 VITALS — SYSTOLIC BLOOD PRESSURE: 105 MMHG | DIASTOLIC BLOOD PRESSURE: 60 MMHG

## 2020-11-29 VITALS — SYSTOLIC BLOOD PRESSURE: 103 MMHG | DIASTOLIC BLOOD PRESSURE: 57 MMHG

## 2020-11-29 VITALS — DIASTOLIC BLOOD PRESSURE: 56 MMHG | SYSTOLIC BLOOD PRESSURE: 105 MMHG

## 2020-11-29 VITALS — DIASTOLIC BLOOD PRESSURE: 57 MMHG | SYSTOLIC BLOOD PRESSURE: 99 MMHG

## 2020-11-29 VITALS — SYSTOLIC BLOOD PRESSURE: 78 MMHG | DIASTOLIC BLOOD PRESSURE: 50 MMHG

## 2020-11-29 VITALS — DIASTOLIC BLOOD PRESSURE: 57 MMHG | SYSTOLIC BLOOD PRESSURE: 101 MMHG

## 2020-11-29 VITALS — DIASTOLIC BLOOD PRESSURE: 60 MMHG | SYSTOLIC BLOOD PRESSURE: 102 MMHG

## 2020-11-29 VITALS — DIASTOLIC BLOOD PRESSURE: 53 MMHG | SYSTOLIC BLOOD PRESSURE: 82 MMHG

## 2020-11-29 VITALS — DIASTOLIC BLOOD PRESSURE: 46 MMHG | SYSTOLIC BLOOD PRESSURE: 76 MMHG

## 2020-11-29 VITALS — SYSTOLIC BLOOD PRESSURE: 83 MMHG | DIASTOLIC BLOOD PRESSURE: 51 MMHG

## 2020-11-29 VITALS — SYSTOLIC BLOOD PRESSURE: 97 MMHG | DIASTOLIC BLOOD PRESSURE: 55 MMHG

## 2020-11-29 VITALS — SYSTOLIC BLOOD PRESSURE: 88 MMHG | DIASTOLIC BLOOD PRESSURE: 52 MMHG

## 2020-11-29 VITALS — DIASTOLIC BLOOD PRESSURE: 52 MMHG | SYSTOLIC BLOOD PRESSURE: 105 MMHG

## 2020-11-29 VITALS — SYSTOLIC BLOOD PRESSURE: 85 MMHG | DIASTOLIC BLOOD PRESSURE: 54 MMHG

## 2020-11-29 VITALS — DIASTOLIC BLOOD PRESSURE: 54 MMHG | SYSTOLIC BLOOD PRESSURE: 86 MMHG

## 2020-11-29 VITALS — SYSTOLIC BLOOD PRESSURE: 99 MMHG | DIASTOLIC BLOOD PRESSURE: 58 MMHG

## 2020-11-29 VITALS — DIASTOLIC BLOOD PRESSURE: 50 MMHG | SYSTOLIC BLOOD PRESSURE: 79 MMHG

## 2020-11-29 VITALS — SYSTOLIC BLOOD PRESSURE: 98 MMHG | DIASTOLIC BLOOD PRESSURE: 62 MMHG

## 2020-11-29 VITALS — DIASTOLIC BLOOD PRESSURE: 56 MMHG | SYSTOLIC BLOOD PRESSURE: 114 MMHG

## 2020-11-29 VITALS — SYSTOLIC BLOOD PRESSURE: 80 MMHG | DIASTOLIC BLOOD PRESSURE: 52 MMHG

## 2020-11-29 VITALS — SYSTOLIC BLOOD PRESSURE: 100 MMHG | DIASTOLIC BLOOD PRESSURE: 57 MMHG

## 2020-11-29 VITALS — DIASTOLIC BLOOD PRESSURE: 61 MMHG | SYSTOLIC BLOOD PRESSURE: 120 MMHG

## 2020-11-29 VITALS — SYSTOLIC BLOOD PRESSURE: 77 MMHG | DIASTOLIC BLOOD PRESSURE: 45 MMHG

## 2020-11-29 VITALS — SYSTOLIC BLOOD PRESSURE: 86 MMHG | DIASTOLIC BLOOD PRESSURE: 50 MMHG

## 2020-11-29 VITALS — SYSTOLIC BLOOD PRESSURE: 106 MMHG | DIASTOLIC BLOOD PRESSURE: 60 MMHG

## 2020-11-29 VITALS — DIASTOLIC BLOOD PRESSURE: 54 MMHG | SYSTOLIC BLOOD PRESSURE: 87 MMHG

## 2020-11-29 VITALS — SYSTOLIC BLOOD PRESSURE: 117 MMHG | DIASTOLIC BLOOD PRESSURE: 58 MMHG

## 2020-11-29 VITALS — DIASTOLIC BLOOD PRESSURE: 52 MMHG | SYSTOLIC BLOOD PRESSURE: 90 MMHG

## 2020-11-29 VITALS — SYSTOLIC BLOOD PRESSURE: 87 MMHG | DIASTOLIC BLOOD PRESSURE: 50 MMHG

## 2020-11-29 VITALS — DIASTOLIC BLOOD PRESSURE: 55 MMHG | SYSTOLIC BLOOD PRESSURE: 89 MMHG

## 2020-11-29 VITALS — DIASTOLIC BLOOD PRESSURE: 57 MMHG | SYSTOLIC BLOOD PRESSURE: 95 MMHG

## 2020-11-29 VITALS — DIASTOLIC BLOOD PRESSURE: 53 MMHG | SYSTOLIC BLOOD PRESSURE: 87 MMHG

## 2020-11-29 VITALS — DIASTOLIC BLOOD PRESSURE: 51 MMHG | SYSTOLIC BLOOD PRESSURE: 87 MMHG

## 2020-11-29 VITALS — DIASTOLIC BLOOD PRESSURE: 55 MMHG | SYSTOLIC BLOOD PRESSURE: 136 MMHG

## 2020-11-29 VITALS — SYSTOLIC BLOOD PRESSURE: 98 MMHG | DIASTOLIC BLOOD PRESSURE: 55 MMHG

## 2020-11-29 LAB
ALBUMIN SERPL BCG-MCNC: 2.6 GM/DL (ref 3.2–5.2)
ALT SERPL W P-5'-P-CCNC: 21 U/L (ref 12–78)
AMYLASE SERPL-CCNC: 104 U/L (ref 25–115)
BASOPHILS # BLD AUTO: 0 10^3/UL (ref 0–0.2)
BASOPHILS NFR BLD AUTO: 0.6 % (ref 0–1)
BILIRUB SERPL-MCNC: 3.7 MG/DL (ref 0.2–1)
BUN SERPL-MCNC: 37 MG/DL (ref 7–18)
CALCIUM SERPL-MCNC: 8.1 MG/DL (ref 8.8–10.2)
CHLORIDE SERPL-SCNC: 110 MEQ/L (ref 98–107)
CO2 SERPL-SCNC: 20 MEQ/L (ref 21–32)
CREAT SERPL-MCNC: 1.61 MG/DL (ref 0.55–1.3)
EOSINOPHIL # BLD AUTO: 0.2 10^3/UL (ref 0–0.5)
EOSINOPHIL NFR BLD AUTO: 3.9 % (ref 0–3)
GFR SERPL CREATININE-BSD FRML MDRD: 34.8 ML/MIN/{1.73_M2} (ref 45–?)
GLUCOSE SERPL-MCNC: 131 MG/DL (ref 70–100)
HCT VFR BLD AUTO: 27.8 % (ref 36–47)
HGB BLD-MCNC: 9.4 G/DL (ref 12–15.5)
LIPASE SERPL-CCNC: 359 U/L (ref 73–393)
LYMPHOCYTES # BLD AUTO: 1.1 10^3/UL (ref 1.5–5)
LYMPHOCYTES NFR BLD AUTO: 21.2 % (ref 24–44)
MAGNESIUM SERPL-MCNC: 2.3 MG/DL (ref 1.8–2.4)
MCH RBC QN AUTO: 32.2 PG (ref 27–33)
MCHC RBC AUTO-ENTMCNC: 33.8 G/DL (ref 32–36.5)
MCV RBC AUTO: 95.2 FL (ref 80–96)
MONOCYTES # BLD AUTO: 0.6 10^3/UL (ref 0–0.8)
MONOCYTES NFR BLD AUTO: 10.7 % (ref 0–5)
NEUTROPHILS # BLD AUTO: 3.3 10^3/UL (ref 1.5–8.5)
NEUTROPHILS NFR BLD AUTO: 63.2 % (ref 36–66)
PLATELET # BLD AUTO: 103 10^3/UL (ref 150–450)
POTASSIUM SERPL-SCNC: 4.2 MEQ/L (ref 3.5–5.1)
PROT SERPL-MCNC: 5.1 GM/DL (ref 6.4–8.2)
RBC # BLD AUTO: 2.92 10^6/UL (ref 4–5.4)
SODIUM SERPL-SCNC: 136 MEQ/L (ref 136–145)
WBC # BLD AUTO: 5.1 10^3/UL (ref 4–10)

## 2020-11-29 RX ADMIN — BRIMONIDINE TARTRATE SCH DROP: 1.5 SOLUTION OPHTHALMIC at 20:15

## 2020-11-29 RX ADMIN — MULTIPLE VITAMINS W/ MINERALS TAB SCH TAB: TAB at 09:10

## 2020-11-29 RX ADMIN — DEXTROSE MONOHYDRATE SCH MLS/HR: 50 INJECTION, SOLUTION INTRAVENOUS at 15:20

## 2020-11-29 RX ADMIN — Medication SCH UNITS: at 09:10

## 2020-11-29 RX ADMIN — LATANOPROST SCH DROP: 50 SOLUTION OPHTHALMIC at 20:15

## 2020-11-29 RX ADMIN — MIDODRINE HYDROCHLORIDE SCH MG: 5 TABLET ORAL at 16:51

## 2020-11-29 RX ADMIN — MAGNESIUM HYDROXIDE SCH ML: 400 SUSPENSION ORAL at 09:00

## 2020-11-29 RX ADMIN — DEXTROSE MONOHYDRATE SCH MG: 50 INJECTION, SOLUTION INTRAVENOUS at 18:10

## 2020-11-29 RX ADMIN — FOLIC ACID SCH MG: 1 TABLET ORAL at 09:10

## 2020-11-29 RX ADMIN — BRIMONIDINE TARTRATE SCH DROP: 1.5 SOLUTION OPHTHALMIC at 09:11

## 2020-11-29 RX ADMIN — MIDODRINE HYDROCHLORIDE SCH MG: 5 TABLET ORAL at 13:04

## 2020-11-29 RX ADMIN — MIDODRINE HYDROCHLORIDE SCH MG: 5 TABLET ORAL at 09:11

## 2020-11-29 RX ADMIN — LACTULOSE SCH ML: 10 SOLUTION ORAL at 09:00

## 2020-11-29 RX ADMIN — LACTULOSE SCH ML: 10 SOLUTION ORAL at 16:51

## 2020-11-29 RX ADMIN — LEVOTHYROXINE SODIUM SCH MCG: 112 TABLET ORAL at 06:26

## 2020-11-29 NOTE — IPN
NEPHROLOGY PROGRESS NOTE



DATE:  11/29/2020 05:13:00 pm



SUBJECTIVE:  The patient was seen and examined at the bedside today morning in

the ICU. She received about 750 mL of 5% albumin yesterday. She continues to

have left sided pigtail catheter in the left thoracic cavity attached to

suctioning. Renal function is better today as compared with yesterday.

Creatinine is improved to 1.6. She denies any acute active complaints at this

time.



OBJECTIVE: 

VITAL SIGNS: Temperature is 97.8 degrees Fahrenheit, blood pressure 97/55,

pulse is 78, respiratory rate of 16, saturating 98% on room air. 



Intake and Output  urine output recorded as 1.1 liters yesterday, 620 mL so far

today since overnight. 



Left sided chest tube drainage is 1.6 liters so far. 



Weight on the bed scale is 74 .4 kg.



PHYSICAL EXAMINATION:  

GENERAL APPEARANCE: The patient is awake, alert, oriented x3, laying in bed,

weak and cachectic, bitemporal wasting. . 

HEAD AND NECK:  Mucous membranes are moist. Neck is supple. There is no jugular

venous distention. 

CARDIOVASCULAR: S1, S2, regular rate.

EXTREMITIES:  No edema of the bilateral lower extremities. 

RESPIRATORY: Chest is clear to auscultation bilaterally. Bilaterally currently

no rales or rhonchi. She has a left sided pigtail catheter which is attached to

suctioning. 

ABDOMEN: Soft, positive bowel sounds. No ascites is noted. 

MUSCULOSKELETAL: No clubbing, no cyanosis. Pulses are 2+. 

CNS: No focal deficits. Power is 5/5 in bilateral upper extremities. No

asterixis is noted at this time. 



LAB REVIEW: CBC showed a WBC 5.1, hemoglobin 9.4, platelets are 103. 



BMP showed sodium 136, potassium 4.2, chloride 110, bicarbonate is 20, BUN 37,

creatinine is 1.6  it was 2.01 yesterday. Magnesium 2.3. Total bilirubin is

3.7. Albumin is 2.6. 

CURRENT INPATIENT MEDICATIONS: The patient's medications were all reviewed by

myself. I have restarted the patient on 5% albumin at 75 mL an hour for a total

of one liter. She continues to be on Midodrine and when I saw her in the

morning, she was on a low dose of Levophed.  



ASSESSMENT AND PLAN:  

1.  Acute renal failure  it is secondary to volume depletion. She is draining

    out a lot from the left sided chest tube. She received 5% albumin yesterday

    which helped improve her renal function. I have renewed the albumin order

    today and she would get a total of one liter of 5% albumin which is equal

    to 50 grams of albumin. 

2.  Hypotension  she is currently on a low dose of Levophed. Continue current

    dose of Midodrine as well, and as mentioned above, she is receiving albumin

    as well.

3.  Metabolic acidosis  it is slowly getting better with improvement in the

    renal function and improvement in the volume status. 

4.  Decompensated alcoholic cirrhosis and recurrent ascites  the patient's

    ascites has communication with the left sided thoracic cavity and because of

    that, she requires frequent thoracentesis. Currently she has a pigtail

    catheter and she is being managed by thoracic surgery. On discharge she will

    probably need intermittent thoracentesis almost every 2 weeks.

## 2020-11-29 NOTE — IPN
PROGRESS NOTE



DATE:  11/28/2020



SUBJECTIVE:  The patient was seen and examined at the bedside today morning in

the ICU.  She was on Levophed infusion at 3 mcg when I saw her.  The patient is

otherwise awake and alert.  She was getting 5% albumin infusion IV in the

morning.  She still has a lot of output from the left-sided pigtail catheter in

the chest. Urine output is optimal.  Renal function is stable and slightly

improving.  Creatinine has improved from 2.2 to 2.01 today morning.  



OBJECTIVE:  Vital signs:  Temperature is 97.3 degrees Fahrenheit, blood

pressure is 89/51, pulse is 86, respiratory rate of 16, saturating 99% on room

air.  Intake and output:  Urine output recorded is almost 1.3 liters yesterday.

Chest tube drainage yesterday was 2.8 liters. So far today, urine output is 1

liter and chest tube drainage is 2.6 liters.  Weight in the bed scale is 75.5

kg, which is 4 kg below her weight since yesterday.



PHYSICAL EXAMINATION:  General:  The patient is awake, alert and oriented times

2, laying in bed, in no apparent distress.  Head and neck examination:  

Extraocular muscles are intact.  Pupils equally round and reactive to light.

Mucous membranes are moist. She has bitemporal wasting. Neck is supple.  There

is no significant jugular venous distention (JVD).  Cardiovascular:  S1, S2

irregular rate.  No edema of the bilateral lower extremities. Respiratory: 

Chest is clear to auscultation, right side.  On the left side, there is mildly

decreased breath sounds on the left side and she has a pigtail catheter, which

is attached to underwater seal and suctioning.  Abdomen:  Soft, positive bowel

sounds.  No significant organomegaly or ascites was noted.  Genitourinary: 

Bladder is not palpable.  Musculoskeletal:  No clubbing or cyanosis.  CNS:  No

focal deficit.  Power is 5/5 in bilateral upper extremities.  



LABORATORY REVIEW:  Complete blood count (CBC) showed a WBC 6.4, hemoglobin

9.9, platelets 101.  Basic metabolic panel (BMP) showed sodium 138, potassium

4.6, chloride 112, bicarbonate is 19, BUN 44, creatinine is 2, glucose is 114,

calcium is 8.1, magnesium is 2.5. Total bilirubin 3.9, AST 29, ALT 25, alkaline

phosphatase is 103, albumin is 1.5.  



MICROBIOLOGY:  All the cultures are negative so far.



IMAGING:  Chest x-ray was done today morning, which showed near complete

resolution of the left pleural effusion.  There was small residual left apical

pneumothorax and there was bilateral perihilar and left basilar atelectasis.  



CURRENT INPATIENT MEDICATIONS:  The patient's medications were all reviewed by

myself.  She was getting IV 5% albumin, 250 cc infusion.  When I saw her, she

was on Levophed at 3 mcg.  She is on folic acid 1 mg by mouth daily. She is on

lactulose 30 ml by mouth q 8 hours, but I have decreased it to once a day

because she had already had 3 bowel movements today morning when I saw her. 

She is on levothyroxine 112 mcg by mouth daily, midodrine 10 mg by mouth three

times a day, multivitamins, Milk of Magnesia and Protonix.  No other

significant change in the medications today as compared with yesterday.  Since

she is on lactulose, I have stopped her Colace.



ASSESSMENT/PLAN:

  1.  Acute renal failure.  The patient has cirrhosis and ascites.  Clinically,

      she is being treated as hepatorenal; however, I believe this patient is

      prerenal because she had large volume of pleural fluid drained and this

      pleural fluid is actually coming from ascites that has communication with

      the left-sided pleural cavity though the diaphragm and ascites is in turn

      because of portal hypertension, so the type of fluid we are giving to the

      patient is going through her vascular system into the abdomen and then

      into the left pleural cavity and goes out through the pigtail catheter. 

      I have started the patient on infusion of 5% albumin at 100 cc an hour

      for a total of 5 hours and I have discussed with medical team to possibly

      do intermittent and drainage of the left-sided pleural effusion, rather

      than the continuous drainage because I believe this is going to make the

      patient hypovolemic.  

  2.  Hypotension.  The patient is getting 5% albumin for volume support.  She

      is also on Levophed. Continue the midodrine at this time as well.  Since

      the patient is receiving Levophed, there is no need of octreotide

      infusion and this regimen will help improve the patient's renal function

      as well; and again as mentioned above, I believe we should stop doing the

      continuous suctioning of the left-sided pigtail catheter.  

  3.  Metabolic acidosis, it is secondary to acute renal failure.  Acidosis

      should get better once the renal function starts improving.

  4.  Elevated ammonia levels. The patient clinically is not in hepatic

      encephalopathy.  She already had three bowel movements. Lactulose dose is

      being decreased to once a day only and I would recommend preventing volume

      depletion in this patient.  

  5.  Decompensated alcoholic cirrhosis and recurrent ascites.  The patient

      claims to be sober since September 25, which is only 2 months from today.

       She is not a candidate for liver transplant at this time.  However, her

      case can be discussed with hepatology center at [place].  



Total critical care time spent in the management of this patient today morning

in the ICU excluding all the procedures was 40 minutes.

MTDD

## 2020-11-29 NOTE — IPNPDOC
Text Note


Date of Service


The patient was seen on 11/29/20.





NOTE


Subjective:


Patient seen and examined at bedside. No acute overnight events reported. 

Patient has no new medical complaints this morning.





Objective:


Vitals (See below)


General: Lying in bed, appears comfortable, tremulous


HEENT: NC, AT


CVS: +S1S2


Lungs: CTA B/L right lung fields, diminished breath sounds left lung fields, 

left pigtail cath in place


Abdomen: Soft, nondistended. Mild epigastric tenderness


Extremities: No significant edema, - Calf tenderness





A/P:





60F with PMHx cirrhosis 2/2 ETOH (Hx of Hepatic encephalopathy, Ascites, 

Esophageal varices, Recurrent L ascitic pleural effusion), HTN, Hypothyroidism, 

DLP, Asthma, Anxiety / Depression, who presented to the emergency room on 

instructions from her pulmonologist for shortness of breath and left lung 

whiteout. Patient had follow-up with her pulmonologist today for shortness of 

breath in the office, and an x-ray that had revealed complete whiteout of her 

left lung field. 





#SOB


- likely 2/2 left pleural effusion, complicated with ascitic effusion 


- Dr. Wiseman on consult; s/p Catheter placement and continued drainage


- c/w Albumin infusion / Midodrine (outpatient dose)


- No leukocytosis


- CT chest 11/26: 1. Interval development of a large left pleural effusion in 

comparison to the prior study of 10/04/2020. 2. Small patchy infiltrates 

demonstrated in the apical segment on the right, findings of uncertain 

significance. Infection to be excluded clinically. 3. Atelectasis of the left 

lung with a rounded configuration demonstrated in the left lower lobe measuring 

2.9 x 4.2 x 3.7 cm, which may suggest a mass. Notably, a mass was not 

demonstrated on the prior examination favoring atelectasis. 





#hypotension


- still requiring presser support - levophed at 3mcg


- albumin infusion, midodrine





#hyperammonemia


- continue lactulose





#s/p Chest pain


- resolved - likely 2/2 effusion 


- Patient reports left-sided chest pain, atypical in nature, likely secondary to

effusion


- no evidence for ACS - ECG no acute findings, no troponinemia


- c/w Telemetry monitoring 





#Epigastric / LUQ pain - possibly 2/2 acute pancreatitis 


- Clinically patient denies any nausea, vomiting


- Physical with slight epigastric tenderness noted


- Amylase and lipase slightly elevated, however not 3x upper limit of normal to 

suggest pancreatitis - has improved


- CT imaging without any inflammatory changes


- Hemodynamically stable


- CT abdomen / pelvis 11/26: 1. The liver has a grossly nodular contour and 

there is relative hypertrophy of the caudate lobe, consistent with end-stage 

cirrhosis. 2. Borderline splenomegaly. 3. There is a mild-to-moderate amount of 

free intraperitoneal fluid present. 4. The gallbladder is incompletely 

distended. This is most likely related to incomplete fasting. Clinical 

correlation to exclude gallbladder pathology suggested. 5. There is a small 

bowel containing umbilical hernia. No definitive evidence 


of incarceration however findings should be correlated with clinical exam. 6. 

Mild anasarca. 7. Mild diverticulosis is present in the distal colon. No di

verticulitis. 





#BAM/CKD


- nephrology c/s appreciated


- Cr baseline of 1.7-1.8


- receiving Midodrine and Albumin 


- Will hold Torsemide / Spironolactone





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 


- Multiple complications including; Hepatic encephalopathy / Ascites / 

Esophageal varices / Recurrent L ascitic pleural effusion


- c/w Lactulose





#Hx of Alcoholism


- c/w Folic acid, Thiamine, Multivitamins 





#Hypothyroidism


- c/w Levothyroxine 





#DLP





#Asthma


- No evidence of exacerbation


- Not on inhaled therapy at home 





#Anxiety / Depression


- c/w Hydroxyzine 





#GI prophylaxis


- c/w Protonix 





#DVT prophylaxis 


- c/w TEDs/Sequentials





VS,Fishbone, I+O


VS, Fishbone, I+O


Laboratory Tests


11/29/20 04:34











Vital Signs








  Date Time  Temp Pulse Resp B/P (MAP) Pulse Ox O2 Delivery O2 Flow Rate FiO2


 


11/29/20 17:15  77  100/57 (71) 100   


 


11/29/20 17:00      Room Air  


 


11/29/20 16:30 98.3  18     


 


11/27/20 12:32       2.0 














I&O- Last 24 Hours up to 6 AM 


 


 11/29/20





 06:00


 


Intake Total 2552.4 ml


 


Output Total 3830 ml


 


Balance -1277.6 ml

















NATALYA LUCAS MD              Nov 29, 2020 19:14

## 2020-11-30 VITALS — SYSTOLIC BLOOD PRESSURE: 100 MMHG | DIASTOLIC BLOOD PRESSURE: 56 MMHG

## 2020-11-30 VITALS — SYSTOLIC BLOOD PRESSURE: 95 MMHG | DIASTOLIC BLOOD PRESSURE: 57 MMHG

## 2020-11-30 VITALS — SYSTOLIC BLOOD PRESSURE: 87 MMHG | DIASTOLIC BLOOD PRESSURE: 52 MMHG

## 2020-11-30 VITALS — DIASTOLIC BLOOD PRESSURE: 52 MMHG | SYSTOLIC BLOOD PRESSURE: 97 MMHG

## 2020-11-30 VITALS — SYSTOLIC BLOOD PRESSURE: 103 MMHG | DIASTOLIC BLOOD PRESSURE: 58 MMHG

## 2020-11-30 VITALS — DIASTOLIC BLOOD PRESSURE: 69 MMHG | SYSTOLIC BLOOD PRESSURE: 125 MMHG

## 2020-11-30 VITALS — SYSTOLIC BLOOD PRESSURE: 100 MMHG | DIASTOLIC BLOOD PRESSURE: 59 MMHG

## 2020-11-30 VITALS — SYSTOLIC BLOOD PRESSURE: 104 MMHG | DIASTOLIC BLOOD PRESSURE: 59 MMHG

## 2020-11-30 VITALS — DIASTOLIC BLOOD PRESSURE: 54 MMHG | SYSTOLIC BLOOD PRESSURE: 92 MMHG

## 2020-11-30 VITALS — SYSTOLIC BLOOD PRESSURE: 125 MMHG | DIASTOLIC BLOOD PRESSURE: 63 MMHG

## 2020-11-30 VITALS — DIASTOLIC BLOOD PRESSURE: 67 MMHG | SYSTOLIC BLOOD PRESSURE: 99 MMHG

## 2020-11-30 VITALS — DIASTOLIC BLOOD PRESSURE: 60 MMHG | SYSTOLIC BLOOD PRESSURE: 109 MMHG

## 2020-11-30 VITALS — SYSTOLIC BLOOD PRESSURE: 112 MMHG | DIASTOLIC BLOOD PRESSURE: 63 MMHG

## 2020-11-30 VITALS — SYSTOLIC BLOOD PRESSURE: 90 MMHG | DIASTOLIC BLOOD PRESSURE: 55 MMHG

## 2020-11-30 VITALS — DIASTOLIC BLOOD PRESSURE: 57 MMHG | SYSTOLIC BLOOD PRESSURE: 104 MMHG

## 2020-11-30 VITALS — DIASTOLIC BLOOD PRESSURE: 60 MMHG | SYSTOLIC BLOOD PRESSURE: 99 MMHG

## 2020-11-30 VITALS — SYSTOLIC BLOOD PRESSURE: 113 MMHG | DIASTOLIC BLOOD PRESSURE: 81 MMHG

## 2020-11-30 VITALS — DIASTOLIC BLOOD PRESSURE: 59 MMHG | SYSTOLIC BLOOD PRESSURE: 90 MMHG

## 2020-11-30 VITALS — DIASTOLIC BLOOD PRESSURE: 52 MMHG | SYSTOLIC BLOOD PRESSURE: 91 MMHG

## 2020-11-30 VITALS — DIASTOLIC BLOOD PRESSURE: 59 MMHG | SYSTOLIC BLOOD PRESSURE: 110 MMHG

## 2020-11-30 VITALS — SYSTOLIC BLOOD PRESSURE: 97 MMHG | DIASTOLIC BLOOD PRESSURE: 54 MMHG

## 2020-11-30 VITALS — DIASTOLIC BLOOD PRESSURE: 55 MMHG | SYSTOLIC BLOOD PRESSURE: 85 MMHG

## 2020-11-30 VITALS — SYSTOLIC BLOOD PRESSURE: 112 MMHG | DIASTOLIC BLOOD PRESSURE: 64 MMHG

## 2020-11-30 VITALS — DIASTOLIC BLOOD PRESSURE: 64 MMHG | SYSTOLIC BLOOD PRESSURE: 121 MMHG

## 2020-11-30 VITALS — SYSTOLIC BLOOD PRESSURE: 100 MMHG | DIASTOLIC BLOOD PRESSURE: 78 MMHG

## 2020-11-30 VITALS — DIASTOLIC BLOOD PRESSURE: 60 MMHG | SYSTOLIC BLOOD PRESSURE: 103 MMHG

## 2020-11-30 VITALS — SYSTOLIC BLOOD PRESSURE: 92 MMHG | DIASTOLIC BLOOD PRESSURE: 52 MMHG

## 2020-11-30 VITALS — SYSTOLIC BLOOD PRESSURE: 114 MMHG | DIASTOLIC BLOOD PRESSURE: 64 MMHG

## 2020-11-30 VITALS — DIASTOLIC BLOOD PRESSURE: 50 MMHG | SYSTOLIC BLOOD PRESSURE: 94 MMHG

## 2020-11-30 VITALS — DIASTOLIC BLOOD PRESSURE: 58 MMHG | SYSTOLIC BLOOD PRESSURE: 122 MMHG

## 2020-11-30 VITALS — DIASTOLIC BLOOD PRESSURE: 73 MMHG | SYSTOLIC BLOOD PRESSURE: 125 MMHG

## 2020-11-30 VITALS — DIASTOLIC BLOOD PRESSURE: 57 MMHG | SYSTOLIC BLOOD PRESSURE: 111 MMHG

## 2020-11-30 VITALS — SYSTOLIC BLOOD PRESSURE: 117 MMHG | DIASTOLIC BLOOD PRESSURE: 63 MMHG

## 2020-11-30 VITALS — SYSTOLIC BLOOD PRESSURE: 96 MMHG | DIASTOLIC BLOOD PRESSURE: 50 MMHG

## 2020-11-30 VITALS — SYSTOLIC BLOOD PRESSURE: 116 MMHG | DIASTOLIC BLOOD PRESSURE: 56 MMHG

## 2020-11-30 VITALS — DIASTOLIC BLOOD PRESSURE: 56 MMHG | SYSTOLIC BLOOD PRESSURE: 110 MMHG

## 2020-11-30 VITALS — SYSTOLIC BLOOD PRESSURE: 97 MMHG | DIASTOLIC BLOOD PRESSURE: 53 MMHG

## 2020-11-30 VITALS — DIASTOLIC BLOOD PRESSURE: 54 MMHG | SYSTOLIC BLOOD PRESSURE: 81 MMHG

## 2020-11-30 VITALS — DIASTOLIC BLOOD PRESSURE: 63 MMHG | SYSTOLIC BLOOD PRESSURE: 124 MMHG

## 2020-11-30 VITALS — SYSTOLIC BLOOD PRESSURE: 105 MMHG | DIASTOLIC BLOOD PRESSURE: 60 MMHG

## 2020-11-30 VITALS — DIASTOLIC BLOOD PRESSURE: 58 MMHG | SYSTOLIC BLOOD PRESSURE: 101 MMHG

## 2020-11-30 VITALS — SYSTOLIC BLOOD PRESSURE: 93 MMHG | DIASTOLIC BLOOD PRESSURE: 54 MMHG

## 2020-11-30 VITALS — SYSTOLIC BLOOD PRESSURE: 110 MMHG | DIASTOLIC BLOOD PRESSURE: 59 MMHG

## 2020-11-30 VITALS — SYSTOLIC BLOOD PRESSURE: 90 MMHG | DIASTOLIC BLOOD PRESSURE: 53 MMHG

## 2020-11-30 VITALS — DIASTOLIC BLOOD PRESSURE: 60 MMHG | SYSTOLIC BLOOD PRESSURE: 126 MMHG

## 2020-11-30 VITALS — DIASTOLIC BLOOD PRESSURE: 56 MMHG | SYSTOLIC BLOOD PRESSURE: 104 MMHG

## 2020-11-30 VITALS — DIASTOLIC BLOOD PRESSURE: 58 MMHG | SYSTOLIC BLOOD PRESSURE: 107 MMHG

## 2020-11-30 VITALS — DIASTOLIC BLOOD PRESSURE: 52 MMHG | SYSTOLIC BLOOD PRESSURE: 96 MMHG

## 2020-11-30 VITALS — DIASTOLIC BLOOD PRESSURE: 58 MMHG | SYSTOLIC BLOOD PRESSURE: 112 MMHG

## 2020-11-30 VITALS — DIASTOLIC BLOOD PRESSURE: 61 MMHG | SYSTOLIC BLOOD PRESSURE: 93 MMHG

## 2020-11-30 VITALS — SYSTOLIC BLOOD PRESSURE: 136 MMHG | DIASTOLIC BLOOD PRESSURE: 63 MMHG

## 2020-11-30 VITALS — SYSTOLIC BLOOD PRESSURE: 111 MMHG | DIASTOLIC BLOOD PRESSURE: 65 MMHG

## 2020-11-30 VITALS — DIASTOLIC BLOOD PRESSURE: 53 MMHG | SYSTOLIC BLOOD PRESSURE: 88 MMHG

## 2020-11-30 VITALS — SYSTOLIC BLOOD PRESSURE: 106 MMHG | DIASTOLIC BLOOD PRESSURE: 61 MMHG

## 2020-11-30 VITALS — SYSTOLIC BLOOD PRESSURE: 96 MMHG | DIASTOLIC BLOOD PRESSURE: 56 MMHG

## 2020-11-30 VITALS — DIASTOLIC BLOOD PRESSURE: 58 MMHG | SYSTOLIC BLOOD PRESSURE: 106 MMHG

## 2020-11-30 VITALS — DIASTOLIC BLOOD PRESSURE: 56 MMHG | SYSTOLIC BLOOD PRESSURE: 117 MMHG

## 2020-11-30 VITALS — DIASTOLIC BLOOD PRESSURE: 55 MMHG | SYSTOLIC BLOOD PRESSURE: 104 MMHG

## 2020-11-30 VITALS — SYSTOLIC BLOOD PRESSURE: 124 MMHG | DIASTOLIC BLOOD PRESSURE: 63 MMHG

## 2020-11-30 VITALS — SYSTOLIC BLOOD PRESSURE: 93 MMHG | DIASTOLIC BLOOD PRESSURE: 55 MMHG

## 2020-11-30 VITALS — DIASTOLIC BLOOD PRESSURE: 55 MMHG | SYSTOLIC BLOOD PRESSURE: 98 MMHG

## 2020-11-30 VITALS — DIASTOLIC BLOOD PRESSURE: 57 MMHG | SYSTOLIC BLOOD PRESSURE: 102 MMHG

## 2020-11-30 VITALS — DIASTOLIC BLOOD PRESSURE: 51 MMHG | SYSTOLIC BLOOD PRESSURE: 93 MMHG

## 2020-11-30 VITALS — SYSTOLIC BLOOD PRESSURE: 107 MMHG | DIASTOLIC BLOOD PRESSURE: 57 MMHG

## 2020-11-30 VITALS — SYSTOLIC BLOOD PRESSURE: 102 MMHG | DIASTOLIC BLOOD PRESSURE: 55 MMHG

## 2020-11-30 VITALS — SYSTOLIC BLOOD PRESSURE: 121 MMHG | DIASTOLIC BLOOD PRESSURE: 62 MMHG

## 2020-11-30 VITALS — SYSTOLIC BLOOD PRESSURE: 108 MMHG | DIASTOLIC BLOOD PRESSURE: 59 MMHG

## 2020-11-30 VITALS — SYSTOLIC BLOOD PRESSURE: 72 MMHG | DIASTOLIC BLOOD PRESSURE: 45 MMHG

## 2020-11-30 VITALS — DIASTOLIC BLOOD PRESSURE: 59 MMHG | SYSTOLIC BLOOD PRESSURE: 115 MMHG

## 2020-11-30 VITALS — SYSTOLIC BLOOD PRESSURE: 103 MMHG | DIASTOLIC BLOOD PRESSURE: 56 MMHG

## 2020-11-30 VITALS — SYSTOLIC BLOOD PRESSURE: 111 MMHG | DIASTOLIC BLOOD PRESSURE: 64 MMHG

## 2020-11-30 VITALS — SYSTOLIC BLOOD PRESSURE: 97 MMHG | DIASTOLIC BLOOD PRESSURE: 60 MMHG

## 2020-11-30 VITALS — SYSTOLIC BLOOD PRESSURE: 99 MMHG | DIASTOLIC BLOOD PRESSURE: 57 MMHG

## 2020-11-30 VITALS — SYSTOLIC BLOOD PRESSURE: 105 MMHG | DIASTOLIC BLOOD PRESSURE: 62 MMHG

## 2020-11-30 VITALS — SYSTOLIC BLOOD PRESSURE: 108 MMHG | DIASTOLIC BLOOD PRESSURE: 58 MMHG

## 2020-11-30 VITALS — SYSTOLIC BLOOD PRESSURE: 112 MMHG | DIASTOLIC BLOOD PRESSURE: 58 MMHG

## 2020-11-30 VITALS — DIASTOLIC BLOOD PRESSURE: 58 MMHG | SYSTOLIC BLOOD PRESSURE: 102 MMHG

## 2020-11-30 VITALS — SYSTOLIC BLOOD PRESSURE: 104 MMHG | DIASTOLIC BLOOD PRESSURE: 55 MMHG

## 2020-11-30 VITALS — DIASTOLIC BLOOD PRESSURE: 66 MMHG | SYSTOLIC BLOOD PRESSURE: 88 MMHG

## 2020-11-30 VITALS — DIASTOLIC BLOOD PRESSURE: 51 MMHG | SYSTOLIC BLOOD PRESSURE: 90 MMHG

## 2020-11-30 VITALS — DIASTOLIC BLOOD PRESSURE: 58 MMHG | SYSTOLIC BLOOD PRESSURE: 84 MMHG

## 2020-11-30 VITALS — DIASTOLIC BLOOD PRESSURE: 64 MMHG | SYSTOLIC BLOOD PRESSURE: 141 MMHG

## 2020-11-30 VITALS — DIASTOLIC BLOOD PRESSURE: 66 MMHG | SYSTOLIC BLOOD PRESSURE: 112 MMHG

## 2020-11-30 VITALS — SYSTOLIC BLOOD PRESSURE: 142 MMHG | DIASTOLIC BLOOD PRESSURE: 74 MMHG

## 2020-11-30 VITALS — SYSTOLIC BLOOD PRESSURE: 113 MMHG | DIASTOLIC BLOOD PRESSURE: 73 MMHG

## 2020-11-30 VITALS — SYSTOLIC BLOOD PRESSURE: 102 MMHG | DIASTOLIC BLOOD PRESSURE: 59 MMHG

## 2020-11-30 VITALS — SYSTOLIC BLOOD PRESSURE: 160 MMHG | DIASTOLIC BLOOD PRESSURE: 70 MMHG

## 2020-11-30 VITALS — SYSTOLIC BLOOD PRESSURE: 113 MMHG | DIASTOLIC BLOOD PRESSURE: 60 MMHG

## 2020-11-30 VITALS — SYSTOLIC BLOOD PRESSURE: 117 MMHG | DIASTOLIC BLOOD PRESSURE: 57 MMHG

## 2020-11-30 VITALS — SYSTOLIC BLOOD PRESSURE: 105 MMHG | DIASTOLIC BLOOD PRESSURE: 69 MMHG

## 2020-11-30 VITALS — SYSTOLIC BLOOD PRESSURE: 92 MMHG | DIASTOLIC BLOOD PRESSURE: 55 MMHG

## 2020-11-30 VITALS — DIASTOLIC BLOOD PRESSURE: 65 MMHG | SYSTOLIC BLOOD PRESSURE: 125 MMHG

## 2020-11-30 VITALS — DIASTOLIC BLOOD PRESSURE: 60 MMHG | SYSTOLIC BLOOD PRESSURE: 100 MMHG

## 2020-11-30 LAB
ALBUMIN SERPL BCG-MCNC: 3.2 GM/DL (ref 3.2–5.2)
ALBUMIN SERPL BCG-MCNC: 3.5 GM/DL (ref 3.2–5.2)
ALT SERPL W P-5'-P-CCNC: 21 U/L (ref 12–78)
ALT SERPL W P-5'-P-CCNC: 21 U/L (ref 12–78)
APTT BLD: 40.8 SECONDS (ref 24.2–38.5)
BASE EXCESS BLDA CALC-SCNC: -4 MMOL/L (ref -2–2)
BASOPHILS # BLD AUTO: 0 10^3/UL (ref 0–0.2)
BASOPHILS NFR BLD AUTO: 0.7 % (ref 0–1)
BILIRUB SERPL-MCNC: 4 MG/DL (ref 0.2–1)
BILIRUB SERPL-MCNC: 4.1 MG/DL (ref 0.2–1)
BUN SERPL-MCNC: 34 MG/DL (ref 7–18)
BUN SERPL-MCNC: 35 MG/DL (ref 7–18)
CALCIUM SERPL-MCNC: 8.5 MG/DL (ref 8.8–10.2)
CALCIUM SERPL-MCNC: 8.5 MG/DL (ref 8.8–10.2)
CHLORIDE SERPL-SCNC: 111 MEQ/L (ref 98–107)
CHLORIDE SERPL-SCNC: 113 MEQ/L (ref 98–107)
CO2 BLDA CALC-SCNC: 19.4 MEQ/L (ref 23–31)
CO2 SERPL-SCNC: 21 MEQ/L (ref 21–32)
CO2 SERPL-SCNC: 21 MEQ/L (ref 21–32)
CREAT SERPL-MCNC: 1.53 MG/DL (ref 0.55–1.3)
CREAT SERPL-MCNC: 1.66 MG/DL (ref 0.55–1.3)
EOSINOPHIL # BLD AUTO: 0.2 10^3/UL (ref 0–0.5)
EOSINOPHIL NFR BLD AUTO: 2.9 % (ref 0–3)
GFR SERPL CREATININE-BSD FRML MDRD: 33.5 ML/MIN/{1.73_M2} (ref 45–?)
GFR SERPL CREATININE-BSD FRML MDRD: 36.9 ML/MIN/{1.73_M2} (ref 45–?)
GLUCOSE SERPL-MCNC: 116 MG/DL (ref 70–100)
GLUCOSE SERPL-MCNC: 90 MG/DL (ref 70–100)
HCO3 BLDA-SCNC: 18.6 MEQ/L (ref 22–26)
HCO3 STD BLDA-SCNC: 21.2 MEQ/L (ref 22–26)
HCT VFR BLD AUTO: 28.3 % (ref 36–47)
HCT VFR BLD AUTO: 28.4 % (ref 36–47)
HGB BLD-MCNC: 9.4 G/DL (ref 12–15.5)
HGB BLD-MCNC: 9.8 G/DL (ref 12–15.5)
INR PPP: 1.8
LYMPHOCYTES # BLD AUTO: 1 10^3/UL (ref 1.5–5)
LYMPHOCYTES NFR BLD AUTO: 18.2 % (ref 24–44)
MAGNESIUM SERPL-MCNC: 2.3 MG/DL (ref 1.8–2.4)
MCH RBC QN AUTO: 31 PG (ref 27–33)
MCH RBC QN AUTO: 32.1 PG (ref 27–33)
MCHC RBC AUTO-ENTMCNC: 33.2 G/DL (ref 32–36.5)
MCHC RBC AUTO-ENTMCNC: 34.5 G/DL (ref 32–36.5)
MCV RBC AUTO: 93.1 FL (ref 80–96)
MCV RBC AUTO: 93.4 FL (ref 80–96)
MONOCYTES # BLD AUTO: 0.7 10^3/UL (ref 0–0.8)
MONOCYTES NFR BLD AUTO: 11.9 % (ref 0–5)
NEUTROPHILS # BLD AUTO: 3.7 10^3/UL (ref 1.5–8.5)
NEUTROPHILS NFR BLD AUTO: 65.9 % (ref 36–66)
PCO2 BLDA: 26.1 MMHG (ref 35–45)
PH BLDA: 7.47 UNITS (ref 7.35–7.45)
PLATELET # BLD AUTO: 100 10^3/UL (ref 150–450)
PLATELET # BLD AUTO: 108 10^3/UL (ref 150–450)
PO2 BLDA: 121.3 MMHG (ref 75–100)
POTASSIUM SERPL-SCNC: 4.7 MEQ/L (ref 3.5–5.1)
POTASSIUM SERPL-SCNC: 5.1 MEQ/L (ref 3.5–5.1)
PROT SERPL-MCNC: 5.6 GM/DL (ref 6.4–8.2)
PROT SERPL-MCNC: 6 GM/DL (ref 6.4–8.2)
PROTHROMBIN TIME: 21.3 SECONDS (ref 12.5–14.3)
RBC # BLD AUTO: 3.03 10^6/UL (ref 4–5.4)
RBC # BLD AUTO: 3.05 10^6/UL (ref 4–5.4)
SAO2 % BLDA: 98.8 % (ref 95–99)
SODIUM SERPL-SCNC: 138 MEQ/L (ref 136–145)
SODIUM SERPL-SCNC: 140 MEQ/L (ref 136–145)
WBC # BLD AUTO: 5.4 10^3/UL (ref 4–10)
WBC # BLD AUTO: 5.5 10^3/UL (ref 4–10)

## 2020-11-30 RX ADMIN — MIDODRINE HYDROCHLORIDE SCH MG: 5 TABLET ORAL at 16:27

## 2020-11-30 RX ADMIN — MAGNESIUM HYDROXIDE SCH ML: 400 SUSPENSION ORAL at 09:19

## 2020-11-30 RX ADMIN — MULTIPLE VITAMINS W/ MINERALS TAB SCH TAB: TAB at 09:19

## 2020-11-30 RX ADMIN — BRIMONIDINE TARTRATE SCH DROP: 1.5 SOLUTION OPHTHALMIC at 09:24

## 2020-11-30 RX ADMIN — MIDODRINE HYDROCHLORIDE SCH MG: 5 TABLET ORAL at 13:38

## 2020-11-30 RX ADMIN — Medication SCH UNITS: at 09:19

## 2020-11-30 RX ADMIN — MIDODRINE HYDROCHLORIDE SCH MG: 5 TABLET ORAL at 09:20

## 2020-11-30 RX ADMIN — FOLIC ACID SCH MG: 1 TABLET ORAL at 09:19

## 2020-11-30 RX ADMIN — LATANOPROST SCH DROP: 50 SOLUTION OPHTHALMIC at 21:25

## 2020-11-30 RX ADMIN — SODIUM CHLORIDE SCH UNITS: 4.5 INJECTION, SOLUTION INTRAVENOUS at 10:33

## 2020-11-30 RX ADMIN — LACTULOSE SCH ML: 10 SOLUTION ORAL at 13:38

## 2020-11-30 RX ADMIN — DEXTROSE MONOHYDRATE SCH MG: 50 INJECTION, SOLUTION INTRAVENOUS at 18:12

## 2020-11-30 RX ADMIN — CIPROFLOXACIN SCH MLS/HR: 2 INJECTION, SOLUTION INTRAVENOUS at 06:14

## 2020-11-30 RX ADMIN — CIPROFLOXACIN SCH MLS/HR: 2 INJECTION, SOLUTION INTRAVENOUS at 18:12

## 2020-11-30 RX ADMIN — LACTULOSE SCH ML: 10 SOLUTION ORAL at 09:19

## 2020-11-30 RX ADMIN — DEXTROSE MONOHYDRATE SCH MLS/HR: 50 INJECTION, SOLUTION INTRAVENOUS at 16:28

## 2020-11-30 RX ADMIN — BRIMONIDINE TARTRATE SCH DROP: 1.5 SOLUTION OPHTHALMIC at 21:25

## 2020-11-30 RX ADMIN — SODIUM CHLORIDE SCH UNITS: 4.5 INJECTION, SOLUTION INTRAVENOUS at 21:25

## 2020-11-30 RX ADMIN — LACTULOSE SCH ML: 10 SOLUTION ORAL at 21:24

## 2020-11-30 RX ADMIN — LEVOTHYROXINE SODIUM SCH MCG: 112 TABLET ORAL at 05:47

## 2020-11-30 NOTE — IPNPDOC
Text Note


Date of Service


The patient was seen on 11/30/20.





NOTE


Subjective: Patient seen and examined at bedside. Was very confused and leth

argic last night and was having myoclonic jerks and tremor. CT head was 

negative, Ammonia was up to 262. Was given lactulose enema with ammonia down to 

62 today. Had 1 large bowel movement last night. This am awake and alert and 

knows in the hospital. No sure about the date. She was able to hold cup of water

and drink with a straw without any coughing. Patient does not offer any new 

medical complaints this morning. Chest tube in place drained 500 cc overnight. 

Cath in place 





Objective:


Vitals (See below)


General: Lying in bed, appears comfortable, tremors. 


HEENT: NC, AT, dry mucous membranes, anicteric eyes. 


CVS: +S1S2, rate normal, no rub or murmur or gallop.


Lungs: CTA B/L right lung fields, diminished breath sounds left lung fields, 

left pigtail cath in place


Abdomen: Soft, mildly distended. Mild epigastric tenderness, bowel sounds 

present. 


Extremities: No significant edema, - Calf tenderness





Labs and Radiology : Reviewed. 





A/P:





60F with PMHx cirrhosis 2/2 ETOH (Hx of Hepatic encephalopathy, Ascites, 

Esophageal varices, Recurrent L ascitic pleural effusion), HTN, Hypothyroidism, 

DLP, Asthma, Anxiety / Depression, who presented to the emergency room on 

instructions from her pulmonologist for shortness of breath and left lung 

whiteout. Patient had follow-up with her pulmonologist today for shortness of 

breath in the office, and an x-ray that had revealed complete whiteout of her 

left lung field. She was admitted for large left sided Pleural effusion. 





Acute Hepatic Encephalopathy


Ammonia better after lactulose enema


increased dose of lactulose to achieve 2 to 3 bowel movements daily


Cipro bid. 


Continue albumin. 





Left pleural effusion/ transudative


Due to Hepatic hydrothorax 


Dr. Wiseman on consult; s/p Catheter placement and continued drainage


CT chest 11/26: 1. Interval development of a large left pleural effusion in 

comparison to the prior study of 10/04/2020. 2. Small patchy infiltrates 

demonstrated in the apical segment on the right, findings of uncertain 

significance. Infection to be excluded clinically. 3. Atelectasis of the left 

lung with a rounded configuration demonstrated in the left lower lobe measuring 

2.9 x 4.2 x 3.7 cm, which may suggest a mass. Notably, a mass was not 

demonstrated on the prior examination favoring atelectasis. 





Hypotension


Probably has some chronic hypotension from cirrhosis. 


still requiring presser support - levophed at 3mcg


albumin infusion, midodrine





s/p Left Chest pain


resolved - likely 2/2 effusion 


no evidence for ACS - ECG no acute findings, no troponinemia





Epigastric / LUQ pain 


resolved


probably due to gastritis/ duodenitis +/-ascites and enlarged left lobe of 

liver. 


Elevated lipase probably due to duodenitis and BAM. 


I do not think patient had any pancreatitis. 


CT abdomen / pelvis 11/26: 1. The liver has a grossly nodular contour and there 

is relative hypertrophy of the caudate lobe, consistent with end-stage 

cirrhosis. 2. Borderline splenomegaly. 3. There is a mild-to-moderate amount of 

free intraperitoneal fluid present. 4. The gallbladder is incompletely di

stended. This is most likely related to incomplete fasting. Clinical correlation

to exclude gallbladder pathology suggested. 5. There is a small bowel containing

umbilical hernia. No definitive evidence 


of incarceration however findings should be correlated with clinical exam. 6. 

Mild anasarca. 7. Mild diverticulosis is present in the distal colon. No 

diverticulitis. 





BAM/CKD


Possibly hepatorenal Vs Prerenal


improved with levophed, albumin and midodrine. 


nephrology c/s appreciated


Cr baseline of 1.7-1.8


Will hold Torsemide / Spironolactone





Decompensated Cirrhosis 2/2 ETOH 


Multiple complications including; Hepatic encephalopathy / Ascites / Esophageal 

varices / Recurrent L ascitic pleural effusion/Thrombocytopenia.


c/w Lactulose





Hx of Alcoholism


c/w Folic acid, Thiamine, Multivitamins 





Hypothyroidism


c/w Levothyroxine 





DLP





Asthma


No evidence of exacerbation


Not on inhaled therapy at home 





Anxiety / Depression


c/w Hydroxyzine 





GI prophylaxis


c/w Protonix 





DVT prophylaxis 


c/w TEDs/Sequentials





VS,Fishbone, I+O


VS, Fishbone, I+O


Laboratory Tests


11/30/20 00:51








11/30/20 05:44











Vital Signs








  Date Time  Temp Pulse Resp B/P (MAP) Pulse Ox O2 Delivery O2 Flow Rate FiO2


 


11/30/20 12:15  80  102/55 (71) 98   


 


11/30/20 12:00 97.8  18   Room Air  


 


11/27/20 12:32       2.0 














I&O- Last 24 Hours up to 6 AM 


 


 11/30/20





 06:00


 


Intake Total 1295.55 ml


 


Output Total 2240 ml


 


Balance -944.45 ml

















CONRADO GORDON MD                   Nov 30, 2020 12:46

## 2020-11-30 NOTE — IPN
NEPHROLOGY PROGRESS NOTE



DATE:  11/30/2020 



SUBJECTIVE:  The patient was seen and examined at the bedside today morning.

Last 24 hour events are noted. The patient was encephalopathic last night

because she had not had a bowel movement yesterday and she was not receiving

Lactulose. She needed rectal administration of Lactulose overnight. The patient

also got 5% albumin yesterday. Urine output is good. Renal function is actually

improving. Creatinine is down to 1.5. She continues to have left sided pigtail

catheter attached to suctioning and she is still requiring low dose of

Levophed. 



OBJECTIVE: 

VITAL SIGNS: Temperature is 98.5 degrees Fahrenheit, blood pressure 85/55,

pulse of 73, respiratory rate of 20, saturating 99% on room air. 



Intake and Output  urine output recorded as one liter yesterday. 640 mL so far

today since overnight. 



Chest tube drainage so far is one liter. 



Weight on the bed scale is 73.5 kg which is one kg below her weight since

yesterday. 



PHYSICAL EXAMINATION:  

GENERAL APPEARANCE: The patient is awake, alert, oriented x2, slightly confused

today as compared with yesterday.  

HEAD AND NECK:  Pupils are equally round and reactive to light. Mucous

membranes are moist. Neck is supple. There is no jugular venous distention. 

CARDIOVASCULAR: S1, S2, regular rate.

EXTREMITIES:  No edema of the bilateral lower extremities. 

RESPIRATORY: Chest is clear to auscultation bilaterally. Mildly decreased

breath sounds at the left base and she has a left sided pigtail catheter.  

ABDOMEN: Soft, positive bowel sounds. I could not appreciate any jugular venous

distention. There is no organomegaly. 

GENITOURINARY: ________________ is not palpable. No hernia was noted.

MUSCULOSKELETAL: No clubbing, no cyanosis. Pulses are 2+. 

CNS: The patient has mild asterixis. Because of her _______________

encephalopathy, otherwise she is able to commands and move extremities.



LAB REVIEW: CBC showed a WBC of 5.5, hemoglobin 9.8, platelets are 100. 

BMP showed sodium 140, potassium 4.7, chloride 113, bicarbonate 21, BUN 34,

creatinine is 1.5  it was 1.6 yesterday. Calcium 8.5, magnesium 2.3, total

bilirubin is 4.1. Ammonia level was 262 last night and 63 today morning.

Albumin is 3.2. 



Pleural fluid cytology is negative for malignancy.



IMAGING: A chest x-ray was done today morning which showed a small left

effusion and atelectasis increased from prior.  



CURRENT INPATIENT MEDICATIONS: The patient's medications were all reviewed by

myself. She has been started on Ciprofloxacin 400 mg IV q. 12 hourly. She

continues to be on Levophed. Her Lactulose dose has been increased to 30 mL

p.o. q. 8 hourly. She was given a dose of Lactulose 300 mL per rectal times one

dose. She continues to be on Midodrine 10 mg p.o. three times daily. No other

significant change in the medications today as compared with yesterday.  



ASSESSMENT AND PLAN:  

1.  Acute renal failure  it was secondary to volume depletion and aggressive

    left sided chest tube drainage. She responded well to 5% albumin. Renal

    function is stable. The patient can get p.r.n. albumin for volume support.

    Administration of IV normal saline would just worsen her ascites and left

    sided effusion. 

2.  Hypertension - The patient is still requiring Levophed and Midodrine.

    Continue current dose. There are no signs of infection. However since the

    patient was encephalopathic last night, we will cover for possible SBP. She

    was started on Fluoroquinolone.

3.  Decompensated alcoholic cirrhosis with hepatic encephalopathy  the

    patient's Lactulose has been increased and repeat ammonia level is getting

    better.

## 2020-11-30 NOTE — IPN
PROGRESS NOTE



DATE:  11/27/2020 05:13:00 pm  



SUBJECTIVE:  Ms. Taylor is still on phenylephrine, she is alert and awake,

however. 



Her vital signs show a T-max of 99.1 with a heart rate ranging between 75 and

98 and is sinus rhythm, respiratory rate of 18 to 22 without use of accessory

muscles who is 98% saturated on 2 liters nasal cannula, blood pressures range

between 192/109, 266/40. 



Her intake and output for the past 24 hours has been recorded as 9082 in and

3950 out for a positivity of 5132. Her urine output has been only 500 ml and

she has put out 3450 ml out of the chest tube. There is no air leak. She weighs

79.6 kg today, 80.7 kg yesterday. She has taken in 1050 ml of oral intake and

7700 ml in IV intake. 



PHYSICAL EXAMINATION:  

LUNGS: Her lungs show equal breath sounds on either side with just some faint

rhonchi in the left base, percussion notes are full to diaphragm. 

CARDIAC: Without murmurs, clicks, gallops or rubs. I cannot feel a PMI.  S1 and

S2 are normal. 

ABDOMEN: Soft, nontender. Bowel sounds are positive. There is no

hepatosplenomegaly. No CVA tenderness. 

EXTREMITIES: No pretibial edema. No calf tenderness. No differential swelling

of the upper extremities. 

SKIN: Warm, dry and perfused without cyanosis or mottling including the

nailbeds and knees. 

NECK: Supple. There is no jugular venous distention. No subcutaneous emphysema.

Trachea is midline. Mouth shows the mucous membranes to be pink and moist. Lips

and gums without lesions or thrush. She does have some blood staining on her

upper teeth. I do not see an active bleeding source. 

EYES: Pupils to be equal and reactive to light. Extraocular movements are

intact. Her sclera are mildly icteric. 

NEUROLOGIC:  Cranial nerves II-XII are intact, Normal gross motor, gross

sensation intact. 

PSYCHIATRIC: Shows her to be awake, alert, and oriented x3 with appropriate

mood and affect and conversational. 



LABORATORY DATA:  Her white count is 7.9 with a hemoglobin and hematocrit of

10.1 and 31.0 respectively with a platelet count of 109,000. Differential

showed 73% neutrophils, 11% lymphocytes, 12% monocytes, no immature forms or

toxic granulations. 



Her chemistries show a potassium of 5.2 with a BUN and creatinine of 50 and

2.25.  Glucose is 113 with a calcium of 7.5 and a corresponding albumin of 2.5

after being transfused 350 ml of albumin. I discussed her pleural fluid

analysis yesterday, it showed to be transudative. 



Her total bilirubin is 3.6 with AST and ALT of 30 and 26 respectively. Amylase

and lipase are 86 and 484 respectively with a lipase having climbed from 407

yesterday. Amylase is now normalized. 



Her chest x-ray shows her lung fully expands to the chest wall. It looks to be

a slight infiltrative process in the middle lung field consistent with

post-expansion atelectasis. 



IMPRESSION:

  1.  Recurrent left sided pleural effusion. 

  2.  Cirrhosis.

  3.  Portal hypertension.

  4.  Esophageal varices. 

  5.  Pancreatitis.

  6.  History of alcohol abuse. 

  7.  Hypothyroidism. 

  8.  Anxiety/depression. 

  9.  Acute renal failure on top of chronic renal failure, probably hepatorenal

      syndrome. 



PLAN/DISCUSSION: It is notable that her upper gastric pain is now resolved. I

think that her pancreatitis is resolving. She still has extraordinary amounts

out of the chest tube. Her cirrhosis was under control in September. I am not

sure if it is going to be able to be controlled again. She states that she has

been abstinent from alcohol since September. If she continues to do so,

explorations could be made as to transplantation. It is my understanding that

usual a transplant center wants the candidate to be abstinent for six months.

She is one-third of the way there. Her alcohol level on admission was

negligible being less than 0.003. She is being transfused albumin to increase

her __ oncotic pressure which I solely agree. Her mean blood pressures are

about 60 which is satisfactory for organ perfusion. I will continue the chest

catheter. As discussed yesterday, she does not tolerate a PleurX catheter

secondary to what she perceives as excruciating pain.

## 2020-11-30 NOTE — IPN
PROGRESS NOTE





DATE:  11/29/2020 05:13:00 pm



SUBJECTIVE:  Ms. Taylor is lying comfortably in bed. She states she is

breathing well and not at all short of breath. She has put out another

extraordinary amount of volume from her chest tube at 3000 mL. 



PHYSICAL EXAMINATION:   

Her vital signs show a maximum temperature (T-max) of 99.7 with a heart rate

that ranges between  in sinus rhythm, respiratory rate that is constant

at 20 who is % saturated on room air and has blood pressures ranging 

between 172/40 to 136/55.

Her intake and output the past 24 hours has been recorded as 2558 in and 4120

out for a negativity of 1562 mL. Most of the output has been through her chest

tube at 3000 mL.  She has put out 1120 mL of urine.  She is taking in 1470 mL

in by mouth intake.

On physical examination her lungs show occasional rhonchi which clear with

coughing.    Percussion notes are full through the diaphragm. I hear no

wheezing. 

Cardiac exam is without murmurs, clicks, gallops, or rubs. I cannot feel her

PMI. S1, S2 are normal. 

Abdomen is soft, nontender. Bowel sounds are positive. There is no hepatomegaly

or CVA tenderness. 

Extremities show no pretibial edema, no calf tenderness, no differential

swelling of the upper extremities. 

Skin is warm and dry and perfused without cyanosis or mottling including that

of nailbeds and knees.

Neck is supple. There is no jugular venous distention, no subcutaneous

emphysema.  Trachea is midline. 

Mouth shows the mucous membranes to be pink and moist. Lips and gums show no

lesions, no thrush. 

Eyes show the pupils to be equal and reactive. Extraocular movements are

intact. Sclerae are nonicteric.  

Neuro shows II-XII intact with normal gross motor, gross sensation intact. 

Gait is not tested.  

Psychiatric shows her to be awake and alert and oriented times three with

appropriate mood and affect and conversational. 



Her chest x-ray again today shows complete clearing of her right lung. The

infiltrate that was there yesterday is improved in the mid lung field. That was

probably post-expansion pulmonary edema.  Catheter looks to be in good place. 

There is no midline shift.  Diaphragms are nearly equal level.  Her white count

today is 5.1 with a hemoglobin and hematocrit of 9.4 and 27.8, slightly down

from yesterday's of 9.9 and 29.8. Platelet count is 108.  



Her chemistries this morning show essentially normal electrolytes except for a

marginally total CO2 of 20.  BUN and creatinine are 37 and 1.61 which are

improved from 44 and 2.01 yesterday.  Glucose is 131 with a calcium of 8.1 and

a corresponding albumin of 2.6 after a transfusion of albumin.  



IMPRESSION:   

1.  Recurrent left pleural effusion secondary to #2.

2.  Alcoholic cirrhosis.

3.  Portal hypertension. 

4.  Esophageal varices.

5.  Hypothyroidism. 

6.  Prior hypertension, now hypotensive on Levophed.

7.  Pancreatitis. It should be noted her amylase and lipase have now normalized

    to 104 and 359. 



PLAN/DISCUSSION:  We are still left with the extraordinary output from her

chest tube. As I have noted before, it took about ten days to get her cirrhosis

and hepatorenal syndrome under control on her last admission in September.  I

am somewhat encouraged by her creatinine coming down.  Hopefully as her kidney

function improves, she will transition her diuresis from her pleural surfaces

to her kidneys.  I have looked for a diaphragmatic discontinuity on her prior

CT scan and I cannot find one although there may be some small pores that have

opened up and are transudating ascitic fluid into her chest.  As I have also

noted before she does not tolerate a PleurX catheter because of extraordinarily

perceived pain.  I am loathe to undertake a talc pleurodesis on her as at least

in my hands talc pleurodesis have always been problematic in ascitic effusions

versus malignant effusions.

## 2020-11-30 NOTE — REPVR
PROCEDURE INFORMATION: 

Exam: CT Head Without Contrast 

Exam date and time: 11/30/2020 12:37 AM 

Age: 60 years old 

Clinical indication: Pain; Headache; Additional info: R/O stroke 



TECHNIQUE: 

Imaging protocol: Computed tomography of the head without contrast. 

Radiation optimization: All CT scans at this facility use at least one of these 

dose optimization techniques: automated exposure control; mA and/or kV 

adjustment per patient size (includes targeted exams where dose is matched to 

clinical indication); or iterative reconstruction. 



COMPARISON: 

CT Head without contrast 10/14/2017 12:24 AM 



FINDINGS: 

Brain: Mild decreased attenuation of the supratentorial white matter is likely 

secondary to chronic microvascular ischemia. No acute intracranial hemorrhage, 

midline shift or mass effect. 

Cerebral ventricles: No hydrocephalus. 

Bones/joints: Unremarkable. No acute fracture. 

Paranasal sinuses: Visualized sinuses are unremarkable. No fluid levels. 

Mastoid air cells: Partial opacification of the left mastoid air cells. 

Soft tissues: Unremarkable. 



IMPRESSION: 

No acute intracranial abnormality. 



Electronically signed by: Jose M Bauer On 11/30/2020  00:56:29 AM

## 2020-11-30 NOTE — IPN
PROGRESS NOTE





DATE:  11/28/2020 05:13:00 pm



SUBJECTIVE:  Ms. Taylor is awake and alert and comfortable. She is breathing

comfortably. Nonetheless she is still pouring out fluid from her chest

catheter. She no longer has any abdominal pain.



PHYSICAL EXAMINATION:   

Her vital signs show a maximum temperature (T-max) of 99.6, heart rate that

ranges between 73-98 in sinus rhythm, respiratory rate of 16-20 without the use

of accessory muscles who is % saturated on room air. Her blood pressure

is ranging between 76/49 to 150/65.

Her intake and output the past 24 hours has been recorded as 1518 in and 4130

out for a negativity of 2600 mL. She has put out 2840 mL from the chest tube

and she weighs 75.5 kg today compared to 79.6 kg yesterday. She put out 1290 mL

in urine. 

On physical examination she has equal breath sounds on either side. I hear some

faint inspiratory rales at the very end of inspiration particularly on the left

side.  Percussion notes are full through the diaphragm.

Cardiac exam is without murmurs, clicks, gallops, or rubs. I cannot feel her

PMI. S1, S2 are normal. 

Abdomen is soft, nontender. Bowel sounds are positive. There is no

hepatomegaly. No CVA tenderness. 

Extremities show no pretibial edema, no calf tenderness, no differential

swelling of the upper extremities.

Skin is warm and dry and perfused without cyanosis or mottling including that

of nailbeds and knees.

Neck is supple. There is no jugular venous distention, no subcutaneous

emphysema.  Trachea is midline. 

Mouth shows the mucous membranes to be pink and moist. Lips and gums show there

is no thrush. Yesterday I noted gingival bleeding and today it looks as though

she may have gingivitis having caused that.

Eyes show the pupils to be equal and reactive. Extraocular movements are

intact. Sclerae are slightly icteric. 

Neuro shows II-XII intact with normal gross motor, gross sensation intact. 

Gait is not tested.  

Psychiatric shows her to be awake, alert and oriented times three with

appropriate mood and affect and conversational. 



Her white count today is 6.4 with a hemoglobin and hematocrit of 9.9 and 29.8,

essentially unchanged from yesterday's of 10.1 and 31.0. Platelet count is 101

and stable and differential shows 66% neutrophils, 17% lymphocytes, 12%

monocytes. There were no immature forms, no toxic granulations. 



Her electrolytes show a total CO2 of 19 with a BUN and creatinine of 44 and

2.01. She continues to decrease.  Glucose is 114 with a calcium of 8.1 and a

corresponding albumin of 1.5. Albumin is down from 2.5 yesterday after having

been transfused over 350 mL of 25% albumin.  Her amylase and glucose are not

determined today.



Her chest x-ray shows her lungs fully expand to the chest wall. Heart size is

normal. Costophrenic angles are sharp.  Chest catheter is in good place on the

lateral film. 



IMPRESSION:  

1.  Recurrent left pleural effusion.

2.  Alcoholic cirrhosis.

3.  Portal hypertension. 

4.  Esophageal varices.

5.  Hypothyroidism. 

6.  Gastroesophageal reflux disease. 

7.  Pancreatitis seemingly improved clinically. 

8.  Hepatorenal syndrome.

9.  Acute renal insufficiency on top of chronic renal disease. 

10.  History of alcoholism.

11.  Anxiety and depression. 



PLAN AND DISCUSSION:  She is still putting out way too much from the chest tube

for me to remove it. As I discussed in previous notes, we are between a rock

and a hard place. I would normally place a PleurX catheter but she was so

intolerant of it last time and I do not think that is a possibility.  Her

cirrhosis was eventually brought under control after about ten days in the

hospital and hopefully the same can be done this hospitalization. We will

continue to monitor her chest x-rays.

## 2020-11-30 NOTE — REP
INDICATION:

pleural effusion



COMPARISON:

11/29/2020



TECHNIQUE:

PA and lateral.



FINDINGS:

Right IJ line in the SVC.

Pigtail catheter in the left base remains stable.

Left basilar atelectasis and small left pleural effusion increased from prior.



IMPRESSION:

Small left effusion and atelectasis increased from prior.





<Electronically signed by Yogi Glass > 11/30/20 [id number]

## 2020-11-30 NOTE — IPNPDOC
Text Note


Date of Service


The patient was seen on 11/30/20.





NOTE


TIME OF SERVICE 1220AM





I was called to the patient's bedside by her RN Michelle lin the patient had a 

change in her neuro exam and mental status. 


During the early part of the shift the patient had been more alert and following

commands, but in reassessment she was noted to have what appeared to be a sunken

in right eye, diffuse tremors affecting her face, arms and legs, appeared 

distracted and was inconsistent in following commands.





At the time of my evaluation the patient denied having pain, but did not answer 

all of my questions or follow all of my requests. She knew that we were at "[facility]" but didn't answer when asked the date or who the President is and a

ppeared distracted at times





HR 85, RR 20, /56, O2 96%, glucose 110





HEENT: left eye appears to be bludging out when compared to left, left pupil is 

larger than right, but both pupils are reactive to light


NEURO: she has tremors affecting her arms and legs / she is able to hold both 

arms up in the air, a long with the left leg but didn't lift the right leg up





#Encephalopathy


Possibly 2/2 elevated ammonia /bc of anisocoria will r/o CVA


Per chart review she didnt' have any documented BMs yesterday 


She received lactulose on the 25th 1 dose, 26th 3 doses, 27th 3 doses, 28th 1 

dose and 29th 1 dose


Plan: stat CT head / rectal lactulose





VS,Fishbone, I+O


VS, Fishbone, I+O


Laboratory Tests


11/29/20 04:34











Vital Signs








  Date Time  Temp Pulse Resp B/P (MAP) Pulse Ox O2 Delivery O2 Flow Rate FiO2


 


11/29/20 22:45  82  105/52 (69) 98   


 


11/29/20 21:45 98.5  18   Room Air  


 


11/27/20 12:32       2.0 














I&O- Last 24 Hours up to 6 AM 


 


 11/30/20





 06:00


 


Intake Total 978.35 ml


 


Output Total 1725 ml


 


Balance -746.65 ml

















RG VALDOVINOS MD                Nov 30, 2020 00:31

## 2020-12-01 VITALS — DIASTOLIC BLOOD PRESSURE: 56 MMHG | SYSTOLIC BLOOD PRESSURE: 89 MMHG

## 2020-12-01 VITALS — DIASTOLIC BLOOD PRESSURE: 56 MMHG | SYSTOLIC BLOOD PRESSURE: 91 MMHG

## 2020-12-01 VITALS — SYSTOLIC BLOOD PRESSURE: 88 MMHG | DIASTOLIC BLOOD PRESSURE: 52 MMHG

## 2020-12-01 VITALS — SYSTOLIC BLOOD PRESSURE: 114 MMHG | DIASTOLIC BLOOD PRESSURE: 56 MMHG

## 2020-12-01 VITALS — SYSTOLIC BLOOD PRESSURE: 106 MMHG | DIASTOLIC BLOOD PRESSURE: 54 MMHG

## 2020-12-01 VITALS — SYSTOLIC BLOOD PRESSURE: 100 MMHG | DIASTOLIC BLOOD PRESSURE: 57 MMHG

## 2020-12-01 VITALS — DIASTOLIC BLOOD PRESSURE: 52 MMHG | SYSTOLIC BLOOD PRESSURE: 89 MMHG

## 2020-12-01 VITALS — SYSTOLIC BLOOD PRESSURE: 106 MMHG | DIASTOLIC BLOOD PRESSURE: 57 MMHG

## 2020-12-01 VITALS — DIASTOLIC BLOOD PRESSURE: 51 MMHG | SYSTOLIC BLOOD PRESSURE: 88 MMHG

## 2020-12-01 VITALS — DIASTOLIC BLOOD PRESSURE: 55 MMHG | SYSTOLIC BLOOD PRESSURE: 89 MMHG

## 2020-12-01 VITALS — DIASTOLIC BLOOD PRESSURE: 56 MMHG | SYSTOLIC BLOOD PRESSURE: 103 MMHG

## 2020-12-01 VITALS — SYSTOLIC BLOOD PRESSURE: 95 MMHG | DIASTOLIC BLOOD PRESSURE: 57 MMHG

## 2020-12-01 VITALS — SYSTOLIC BLOOD PRESSURE: 99 MMHG | DIASTOLIC BLOOD PRESSURE: 58 MMHG

## 2020-12-01 VITALS — SYSTOLIC BLOOD PRESSURE: 102 MMHG | DIASTOLIC BLOOD PRESSURE: 62 MMHG

## 2020-12-01 VITALS — SYSTOLIC BLOOD PRESSURE: 124 MMHG | DIASTOLIC BLOOD PRESSURE: 63 MMHG

## 2020-12-01 VITALS — SYSTOLIC BLOOD PRESSURE: 128 MMHG | DIASTOLIC BLOOD PRESSURE: 61 MMHG

## 2020-12-01 VITALS — DIASTOLIC BLOOD PRESSURE: 57 MMHG | SYSTOLIC BLOOD PRESSURE: 105 MMHG

## 2020-12-01 VITALS — SYSTOLIC BLOOD PRESSURE: 94 MMHG | DIASTOLIC BLOOD PRESSURE: 55 MMHG

## 2020-12-01 VITALS — SYSTOLIC BLOOD PRESSURE: 91 MMHG | DIASTOLIC BLOOD PRESSURE: 57 MMHG

## 2020-12-01 VITALS — SYSTOLIC BLOOD PRESSURE: 91 MMHG | DIASTOLIC BLOOD PRESSURE: 50 MMHG

## 2020-12-01 VITALS — DIASTOLIC BLOOD PRESSURE: 55 MMHG | SYSTOLIC BLOOD PRESSURE: 102 MMHG

## 2020-12-01 VITALS — SYSTOLIC BLOOD PRESSURE: 98 MMHG | DIASTOLIC BLOOD PRESSURE: 55 MMHG

## 2020-12-01 VITALS — SYSTOLIC BLOOD PRESSURE: 91 MMHG | DIASTOLIC BLOOD PRESSURE: 56 MMHG

## 2020-12-01 VITALS — DIASTOLIC BLOOD PRESSURE: 52 MMHG | SYSTOLIC BLOOD PRESSURE: 87 MMHG

## 2020-12-01 VITALS — DIASTOLIC BLOOD PRESSURE: 58 MMHG | SYSTOLIC BLOOD PRESSURE: 93 MMHG

## 2020-12-01 VITALS — DIASTOLIC BLOOD PRESSURE: 52 MMHG | SYSTOLIC BLOOD PRESSURE: 85 MMHG

## 2020-12-01 VITALS — SYSTOLIC BLOOD PRESSURE: 101 MMHG | DIASTOLIC BLOOD PRESSURE: 56 MMHG

## 2020-12-01 VITALS — SYSTOLIC BLOOD PRESSURE: 97 MMHG | DIASTOLIC BLOOD PRESSURE: 55 MMHG

## 2020-12-01 VITALS — DIASTOLIC BLOOD PRESSURE: 59 MMHG | SYSTOLIC BLOOD PRESSURE: 94 MMHG

## 2020-12-01 VITALS — DIASTOLIC BLOOD PRESSURE: 59 MMHG | SYSTOLIC BLOOD PRESSURE: 113 MMHG

## 2020-12-01 VITALS — SYSTOLIC BLOOD PRESSURE: 90 MMHG | DIASTOLIC BLOOD PRESSURE: 55 MMHG

## 2020-12-01 VITALS — DIASTOLIC BLOOD PRESSURE: 55 MMHG | SYSTOLIC BLOOD PRESSURE: 96 MMHG

## 2020-12-01 VITALS — DIASTOLIC BLOOD PRESSURE: 56 MMHG | SYSTOLIC BLOOD PRESSURE: 90 MMHG

## 2020-12-01 VITALS — SYSTOLIC BLOOD PRESSURE: 112 MMHG | DIASTOLIC BLOOD PRESSURE: 58 MMHG

## 2020-12-01 VITALS — SYSTOLIC BLOOD PRESSURE: 91 MMHG | DIASTOLIC BLOOD PRESSURE: 54 MMHG

## 2020-12-01 VITALS — SYSTOLIC BLOOD PRESSURE: 86 MMHG | DIASTOLIC BLOOD PRESSURE: 46 MMHG

## 2020-12-01 VITALS — SYSTOLIC BLOOD PRESSURE: 108 MMHG | DIASTOLIC BLOOD PRESSURE: 57 MMHG

## 2020-12-01 VITALS — SYSTOLIC BLOOD PRESSURE: 96 MMHG | DIASTOLIC BLOOD PRESSURE: 59 MMHG

## 2020-12-01 VITALS — DIASTOLIC BLOOD PRESSURE: 61 MMHG | SYSTOLIC BLOOD PRESSURE: 118 MMHG

## 2020-12-01 LAB
ALBUMIN SERPL BCG-MCNC: 2.9 GM/DL (ref 3.2–5.2)
ALT SERPL W P-5'-P-CCNC: 23 U/L (ref 12–78)
BASOPHILS # BLD AUTO: 0 10^3/UL (ref 0–0.2)
BASOPHILS NFR BLD AUTO: 0.4 % (ref 0–1)
BILIRUB SERPL-MCNC: 4.3 MG/DL (ref 0.2–1)
BUN SERPL-MCNC: 32 MG/DL (ref 7–18)
CALCIUM SERPL-MCNC: 8.7 MG/DL (ref 8.8–10.2)
CHLORIDE SERPL-SCNC: 113 MEQ/L (ref 98–107)
CO2 SERPL-SCNC: 20 MEQ/L (ref 21–32)
CREAT SERPL-MCNC: 1.79 MG/DL (ref 0.55–1.3)
EOSINOPHIL # BLD AUTO: 0.2 10^3/UL (ref 0–0.5)
EOSINOPHIL NFR BLD AUTO: 3.5 % (ref 0–3)
GFR SERPL CREATININE-BSD FRML MDRD: 30.8 ML/MIN/{1.73_M2} (ref 45–?)
GLUCOSE SERPL-MCNC: 92 MG/DL (ref 70–100)
HCT VFR BLD AUTO: 29 % (ref 36–47)
HGB BLD-MCNC: 9.7 G/DL (ref 12–15.5)
LYMPHOCYTES # BLD AUTO: 1 10^3/UL (ref 1.5–5)
LYMPHOCYTES NFR BLD AUTO: 19.9 % (ref 24–44)
MAGNESIUM SERPL-MCNC: 2.4 MG/DL (ref 1.8–2.4)
MCH RBC QN AUTO: 31.3 PG (ref 27–33)
MCHC RBC AUTO-ENTMCNC: 33.4 G/DL (ref 32–36.5)
MCV RBC AUTO: 93.5 FL (ref 80–96)
MONOCYTES # BLD AUTO: 0.7 10^3/UL (ref 0–0.8)
MONOCYTES NFR BLD AUTO: 13 % (ref 0–5)
NEUTROPHILS # BLD AUTO: 3.2 10^3/UL (ref 1.5–8.5)
NEUTROPHILS NFR BLD AUTO: 62.6 % (ref 36–66)
PLATELET # BLD AUTO: 97 10^3/UL (ref 150–450)
POTASSIUM SERPL-SCNC: 5.1 MEQ/L (ref 3.5–5.1)
PROT SERPL-MCNC: 5.4 GM/DL (ref 6.4–8.2)
RBC # BLD AUTO: 3.1 10^6/UL (ref 4–5.4)
SODIUM SERPL-SCNC: 140 MEQ/L (ref 136–145)
WBC # BLD AUTO: 5.2 10^3/UL (ref 4–10)

## 2020-12-01 RX ADMIN — MIDODRINE HYDROCHLORIDE SCH MG: 5 TABLET ORAL at 08:55

## 2020-12-01 RX ADMIN — DEXTROSE MONOHYDRATE SCH MG: 50 INJECTION, SOLUTION INTRAVENOUS at 17:54

## 2020-12-01 RX ADMIN — MULTIPLE VITAMINS W/ MINERALS TAB SCH TAB: TAB at 08:55

## 2020-12-01 RX ADMIN — BRIMONIDINE TARTRATE SCH DROP: 1.5 SOLUTION OPHTHALMIC at 08:55

## 2020-12-01 RX ADMIN — MIDODRINE HYDROCHLORIDE SCH MG: 5 TABLET ORAL at 12:10

## 2020-12-01 RX ADMIN — LACTULOSE SCH ML: 10 SOLUTION ORAL at 14:00

## 2020-12-01 RX ADMIN — CIPROFLOXACIN SCH MLS/HR: 2 INJECTION, SOLUTION INTRAVENOUS at 05:48

## 2020-12-01 RX ADMIN — BRIMONIDINE TARTRATE SCH DROP: 1.5 SOLUTION OPHTHALMIC at 20:01

## 2020-12-01 RX ADMIN — MAGNESIUM HYDROXIDE SCH ML: 400 SUSPENSION ORAL at 08:54

## 2020-12-01 RX ADMIN — SODIUM CHLORIDE SCH UNITS: 4.5 INJECTION, SOLUTION INTRAVENOUS at 08:55

## 2020-12-01 RX ADMIN — MIDODRINE HYDROCHLORIDE SCH MG: 5 TABLET ORAL at 17:19

## 2020-12-01 RX ADMIN — LACTULOSE SCH ML: 10 SOLUTION ORAL at 05:47

## 2020-12-01 RX ADMIN — LACTULOSE SCH ML: 10 SOLUTION ORAL at 21:17

## 2020-12-01 RX ADMIN — LATANOPROST SCH DROP: 50 SOLUTION OPHTHALMIC at 20:01

## 2020-12-01 RX ADMIN — LEVOTHYROXINE SODIUM SCH MCG: 112 TABLET ORAL at 05:48

## 2020-12-01 RX ADMIN — CIPROFLOXACIN SCH MLS/HR: 2 INJECTION, SOLUTION INTRAVENOUS at 17:54

## 2020-12-01 RX ADMIN — FOLIC ACID SCH MG: 1 TABLET ORAL at 08:55

## 2020-12-01 NOTE — REP
INDICATION:

pleural effusion



COMPARISON:

11/30/2020



TECHNIQUE:

PA and lateral.



FINDINGS:

Right IJ line with tip in the SVC.  Mediastinum and cardiac silhouette are

stable/within normal limits.  Right hemithorax is clear.  Pigtail catheter identified

at the periphery of the left hemithorax and a small to moderate hydropneumothorax

appears slightly increased from prior examination along with left lower lobe

atelectasis.



IMPRESSION:

1. Small to moderate left hydropneumothorax which appears slightly increased from

prior examination.

2. Evolving left basilar atelectasis.







<Electronically signed by Yogi Glass > 12/01/20 0800

## 2020-12-01 NOTE — IPN
PROGRESS NOTE



DATE:  12/01/2020



SUBJECTIVE:  I attended Flora at bedside. She still seems encephalopathic. Has

some confusion, speech is slower. She is sitting at bedside, off vasopressor

therapy at this point in time. She offers no complaints, however there is some

impairment of her mental status. 



PHYSICAL EXAMINATION:

VITAL SIGNS: Temperature is 98.6, pulse is 94, respiratory rate is 20, blood

pressure is 94/55 with a mean arterial pressure of 68, oxygen saturation is 99%

on room air. I and O's: 1271 in, 2985 out, net negative 1713. Chest tube output

was 2155. 

GENERAL: Awake, alert, oriented to person and place, but not current events. 

HEENT: Sclera are slightly icteric. Mucous membranes are moist. Pupils are

equal and reactive, previously yesterday were dilated. 

NECK: Supple. No tracheal deviation or mass. Right IJ in place without

surrounding erythema or exudate. 

CARDIAC: Regular S1 and S2 without audible murmur, rub or gallop. No elevated

JVP. No systemic edema. 

PULMONARY: Decreased breath sounds at the left base, otherwise clear to

auscultation without rhonchi, rales or wheezes. No dullness on percussion. No

accessory muscle use.

ABDOMEN: Soft, nontender, nondistended. No hepatosplenomegaly. No masses or

hernia.

EXTREMITIES: No cyanosis, clubbing or edema. 

: Urine is dark.

Chest tube has light-clear yellow pleural fluid. 

MUSCULOSKELETAL: There is significant muscle wasting. No evidence of recent

trauma. 



LABORATORY DATA: White blood cell count 5.2, hemoglobin 9.7, platelet count

97,000. Sodium 140, potassium 5.1, chloride 113, bicarbonate 20, BUN 32 and

creatinine is up to 1.79. Cytology for pleural fluid is negative. 



ASSESSMENT:

  1.  Liver failure with hepatic encephalopathy, chronic pleural effusion. At

      this point in time I am not optimistic that she will be able to do well.

      At this point in time, would recommend drainage every 2 weeks with albumin

      therapy until liver transplant workup can be performed, however the

      patient just stopped drinking in September. I am not sure TIPS procedure

      would be helpful. 

  2.  Thrombocytopenia likely from liver failure.

  3.  Hypoalbuminemia, likely from recurrent drainage and liver failure. 

  4.  Hepatic encephalopathy. Will continue Lactulose to maintain bowel

      movements, at least 2-3 bowel movements a day. Overall prognosis extremely

      poor, even though the patient still continues to put out through the chest

      tube, I am not sure if this will decrease over time. It may be worth

      clamping the tube to prevent further hypoalbuminemia.

## 2020-12-01 NOTE — IPN
PROGRESS NOTE



DATE:  12/01/2020 



SUBJECTIVE:  I did not see Mrs. Taylor yesterday but she was still putting out

large amounts from her chest catheter. She is now off the Levophed. She did

have a change in her mental status the night before and was found to have a

very elevated ammonia and thought to be part and parcel of an hepatic

encephalopathy. Today she is more clear. Pain is being well controlled at the

chest catheter insertion site.



PHYSICAL EXAMINATION:   

Her vital signs show a T-max of 98.6 with a heart rate that ranges between 84

and 101 in sinus rhythm, a respiratory rate of 20-22 without use of accessory

muscles. She is % saturated on room air and blood pressure is ranging

between 85/52 to 124/63. 

Her intake and output the past 24 hours has been recorded as 1271 in and 2985

out for a negativity of 1700 mL.  Most of her output has been from the chest

tube of 2155 mL. Chest tube output the day before was 1920 mL.  She is recorded

as only having 160 mL in the last 11 hours.  I hope that all goes well for

reducing the amount of fluid coming from the tube. Her weight today is 73.2 kg

compared to 73.5 kg yesterday.  She only put out however 830 mL in urine

yesterday.

On physical examination she has equal breath sounds on either side. There are

some inspiratory rales but faint at both bases.  Percussion notes are full

through the diaphragm. I do not hear any E:A egophony. Chest catheter is in

good place.

Cardiac exam is without murmurs, clicks, gallops, or rubs. I cannot feel her

PMI. S1, S2 are normal.  

Abdomen is soft, nontender. Bowel sounds are positive. There is no

hepatomegaly. No CVA tenderness.  There is no epigastric tenderness. 

Extremities show trace pretibial edema, no calf tenderness, no differential

swelling of the upper extremities.

Skin is warm and dry and perfused without cyanosis or mottling including that

of nailbeds and knees.

Neck is supple. There is no jugular venous distention, no subcutaneous

emphysema.  Trachea is midline.  

Mouth shows the mucous membranes to be pink and moist. Lips and gums show no

lesions. There is no thrush. She has severe gingivitis. 

Eyes show her pupils to be equal, round, and reactive. Extraocular movements

are intact. Sclerae are slightly icteric. 

Neuro shows II-XII intact with normal gross motor, gross sensation intact. She

does have a generalized tremor particularly in her hands and legs.

Psychiatric shows her to be awake and alert, oriented times three with

appropriate mood and affect and can answer questions. 



Her white count today is 5.5 with a hemoglobin and hematocrit of 9.7 and 29.0

respectively.  Platelet count is 97. Differential shows 62% neutrophils, 19%

lymphocytes, 13% monocytes. There were no immature forms or toxic granulations. 



Her electrolytes show a sodium of 140 with a potassium of 5.1 and marginally

low total CO2 of 20. BUN and creatinine are 32 and 1.79 slightly up from 34 and

1.53 yesterday. Glucose is 92 with a calcium of 8.7 and a corresponding albumin

of 2.9.  She has been transfused another 1750 mL of 5% albumin since 11/28 on

top of the 350 mL of 25% albumin prior to that.  



Her chest x-ray shows the lungs fully expanded to the chest wall.  There seems

to be a little bit of an opaque haze in the left lower hemithorax.  The lateral

film shows a possible consolidation and/or pleural effusion. It is not a very

good lateral film however. This is changed from her film of 11/29. I am having

difficulty seeing the actual placement of the pigtail catheter today on the

lateral film.  I do think that it is in the chest. 



IMPRESSION:   

1.  Recurrent left pleural effusion, transudative secondary to #2.

2.  Alcoholic cirrhosis.

3.  Esophageal varices.

4.  Portal hypertension. 

5.  Hypothyroidism. 

6.  Gastroesophageal reflux disease. 

7.  Hepatic encephalopathy.

8.  Pancreatitis, clinically improved. 

9.  Hepatorenal syndrome.

10.  Acute renal insufficiency on top of chronic renal disease. 

11.  History of alcoholism.

12.  Anxiety and depression. 



PLAN/DISCUSSION:  As I have noted before we really are between a rock and a

hard place with regard to the chest tube catheter. I am hoping that the 160 mL

recorded this morning augurs well for decreasing output. However, the chest

x-ray is suspicious for a recurrence of the pleural effusion. We will continue

to follow it. I am loathe to take her to the operating room to undertake a talc

pleurodesis as at least in my hands, talc pleurodeses have not been very

effective in cirrhotic disease unlike in malignant disease.  We will continue

her lung expansion therapy. I think the shaking is a manifestation of her

encephalopathy. I have also noted before that she claims to have been abstinent

from drinking for the past two to three months and if she continues so, we

might want to look into referring her to a transplant center for a liver

transplant. That is really her only viable option for long term success.

## 2020-12-01 NOTE — IPN
NEPHROLOGY PROGRESS NOTE



DATE:  12/1/2020



SUBJECTIVE:  Patient was seen and examined at the bedside today morning in the

ICU. Patient is afebrile, blood pressures are soft. She still has left-sided

pigtail catheter attached to underwater seal. Renal function is slightly worse.

Creatinine bumped up from 1.5 to 1.79. She did not receive any fluids or I.V.

albumin yesterday. She continues to be on lactulose for hepatic encephalopathy.



OBJECTIVE: 

VITAL SIGNS: Temperature 99.4 degrees Fahrenheit, blood pressure 94/59, pulse

78, respiratory rate 20, saturating 99% on room air. 

INTAKE/OUTPUT: Urine output recorded as 830 mL yesterday, 395 mL so far today.

Chest tube drainage from the left side is 970 mL. Weight in the bed scale is

73.2 kg.



PHYSICAL EXAMINATION:

GENERAL: Patient is awake, alert, oriented x2, laying in bed, in no apparent

distress. 

HEAD AND NECK:  Extraocular muscles intact. She has bitemporal wasting. Appears

cachectic. Mucous membranes are moist. Neck is supple. There is no JVD. 

CARDIOVASCULAR: S1, S2, regular rate. No edema of the bilateral lower

extremities. 

RESPIRATORY: Decreased breath sounds at the bases. Left-sided pigtail catheter

attached to the underwater seal. 

ABDOMEN: Soft, positive bowel sounds. No organomegaly.

MUSCULOSKELETAL: No clubbing or cyanosis. Pulses are 2+. 

CNS: No focal deficits. She has mild asterixis.



LAB REVIEW: CBC showed WBC 5.2, hemoglobin 9.7, platelets 97,000. BMP showed

sodium 140, potassium 5.1, chloride 113, bicarb 20, BUN 32, creatinine 1.79.

Calcium 8.7, magnesium 2.4. Ammonia 85. Albumin 2.9.



CURRENT INPATIENT MEDICATIONS: Patient's medications were all reviewed by

myself. There is no significant change in the medications. She continues to be

on I.V. Ciprofloxacin. I have ordered another dose of I.V. albumin.

Levothyroxine has been stopped. She continues to be on Midodrine 10 mg p.o.

three times a day.



ASSESSMENT AND PLAN:

  1.  Acute renal failure: It is secondary to combination of dehydration,

      hypotension and alcoholic cirrhosis. Her renal function improved after

      I.V. albumin, however, she did not receive any albumin yesterday and

      creatinine has bumped up again. I have ordered another dose of 5% albumin,

      total of 250 cc. 

  2.  Hypotension: Levophed has been weaned off, continue current dose of

      Midodrine.

  3.  Left-sided pleural effusion: Patient has a left-sided pigtail catheter

      attached to underwater seal, decision to clamp the catheter is up to CT

      surgery.

## 2020-12-01 NOTE — IPNPDOC
Text Note


Date of Service


The patient was seen on 12/1/20.





NOTE


Subjective: Patient seen and examined at bedside. Sitting up in chair tryin to 

feed herself, very tremulous. Has been off levophed from this morning, Pig tail 

catheter remains in place. 





Objective:


Vitals (See below)


General: NAD, appears comfortable, tremors. 


HEENT: NC, AT, dry mucous membranes, anicteric eyes. 


CVS: +S1S2, rate normal, no rub or murmur or gallop.


Lungs: CTA B/L right lung fields, diminished breath sounds left lung fields, 

left pigtail cath in place


Abdomen: Soft, mildly distended. Mild epigastric tenderness, bowel sounds 

present. 


Extremities: No significant edema, - Calf tenderness


Neuro: Awake but somnolent, flap present. 





Labs and Radiology : Reviewed. 





A/P: 60F with PMHx cirrhosis 2/2 ETOH (Hx of Hepatic encephalopathy, Ascites, 

Esophageal varices, Recurrent L ascitic pleural effusion), HTN, Hypothyroidism, 

DLP, Asthma, Anxiety / Depression, who presented to the emergency room on 

instructions from her pulmonologist for shortness of breath and left lung 

whiteout. Patient had follow-up with her pulmonologist today for shortness of 

breath in the office, and an x-ray that had revealed complete whiteout of her 

left lung field. She was admitted for large left sided Pleural effusion. 





Acute Hepatic Encephalopathy


Ammonia better after lactulose enema


increased dose of lactulose to achieve 2 to 3 bowel movements daily


Cipro bid. 


Continue albumin. 





Left pleural effusion/ transudative


Due to Hepatic hydrothorax 


Dr. Wiseman on consult; s/p Catheter placement and continued drainage


CT chest 11/26: 1. Interval development of a large left pleural effusion in 

comparison to the prior study of 10/04/2020. 2. Small patchy infiltrates 

demonstrated in the apical segment on the right, findings of uncertain signif

icance. Infection to be excluded clinically. 3. Atelectasis of the left lung 

with a rounded configuration demonstrated in the left lower lobe measuring 2.9 x

4.2 x 3.7 cm, which may suggest a mass. Notably, a mass was not demonstrated on 

the prior examination favoring atelectasis. 





Hypotension


Probably has some chronic hypotension from cirrhosis. 


off levophed now 


On albumin infusion, midodrine





s/p Left Chest pain


resolved - likely 2/2 effusion 


no evidence for ACS - ECG no acute findings, no troponinemia





Epigastric / LUQ pain 


resolved


probably due to gastritis/ duodenitis +/-ascites and enlarged left lobe of 

liver. 


Elevated lipase probably due to duodenitis and BAM. 


I do not think patient had any pancreatitis. 


CT abdomen / pelvis 11/26: 1. The liver has a grossly nodular contour and there 

is relative hypertrophy of the caudate lobe, consistent with end-stage 

cirrhosis. 2. Borderline splenomegaly. 3. There is a mild-to-moderate amount of 

free intraperitoneal fluid present. 4. The gallbladder is incompletely 

distended. This is most likely related to incomplete fasting. Clinical 

correlation to exclude gallbladder pathology suggested. 5. There is a small 

bowel containing umbilical hernia. No definitive evidence 


of incarceration however findings should be correlated with clinical exam. 6. 

Mild anasarca. 7. Mild diverticulosis is present in the distal colon. No 

diverticulitis. 





BAM/CKD


Possibly hepatorenal Vs Prerenal


improved with levophed, albumin and midodrine. 


nephrology c/s appreciated


Cr baseline of 1.7-1.8


Will hold Torsemide / Spironolactone





Decompensated Cirrhosis 2/2 ETOH 


Multiple complications including; Hepatic encephalopathy / Ascites / Esophageal 

varices / Recurrent L ascitic pleural effusion/Thrombocytopenia.


c/w Lactulose





Hx of Alcoholism


c/w Folic acid, Thiamine, Multivitamins 





Hypothyroidism


c/w Levothyroxine 





DLP





Asthma


No evidence of exacerbation


Not on inhaled therapy at home 





Anxiety / Depression


c/w Hydroxyzine 





GI prophylaxis


c/w Protonix 





DVT prophylaxis 


c/w TEDs/Sequentials


will not give heparin due to thrombocytopenia.





VS,Bene, I+O


VS, Bene, I+O


Laboratory Tests


12/1/20 05:15











Vital Signs








  Date Time  Temp Pulse Resp B/P (MAP) Pulse Ox O2 Delivery O2 Flow Rate FiO2


 


12/1/20 12:00 99.4 78 20 94/59 (71) 99 Room Air  


 


11/27/20 12:32       2.0 














I&O- Last 24 Hours up to 6 AM 


 


 12/1/20





 07:00


 


Intake Total 1337.7 ml


 


Output Total 2980 ml


 


Balance -1642.3 ml

















CONRADO GORDON MD                    Dec 1, 2020 13:34

## 2020-12-02 VITALS — SYSTOLIC BLOOD PRESSURE: 100 MMHG | DIASTOLIC BLOOD PRESSURE: 57 MMHG

## 2020-12-02 VITALS — DIASTOLIC BLOOD PRESSURE: 50 MMHG | SYSTOLIC BLOOD PRESSURE: 92 MMHG

## 2020-12-02 VITALS — DIASTOLIC BLOOD PRESSURE: 52 MMHG | SYSTOLIC BLOOD PRESSURE: 100 MMHG

## 2020-12-02 VITALS — DIASTOLIC BLOOD PRESSURE: 53 MMHG | SYSTOLIC BLOOD PRESSURE: 98 MMHG

## 2020-12-02 VITALS — SYSTOLIC BLOOD PRESSURE: 96 MMHG | DIASTOLIC BLOOD PRESSURE: 55 MMHG

## 2020-12-02 VITALS — SYSTOLIC BLOOD PRESSURE: 84 MMHG | DIASTOLIC BLOOD PRESSURE: 44 MMHG

## 2020-12-02 VITALS — SYSTOLIC BLOOD PRESSURE: 118 MMHG | DIASTOLIC BLOOD PRESSURE: 62 MMHG

## 2020-12-02 VITALS — SYSTOLIC BLOOD PRESSURE: 121 MMHG | DIASTOLIC BLOOD PRESSURE: 71 MMHG

## 2020-12-02 VITALS — SYSTOLIC BLOOD PRESSURE: 99 MMHG | DIASTOLIC BLOOD PRESSURE: 57 MMHG

## 2020-12-02 LAB
ALBUMIN SERPL BCG-MCNC: 2.9 GM/DL (ref 3.2–5.2)
ALT SERPL W P-5'-P-CCNC: 23 U/L (ref 12–78)
BASOPHILS # BLD AUTO: 0 10^3/UL (ref 0–0.2)
BASOPHILS NFR BLD AUTO: 0.7 % (ref 0–1)
BILIRUB SERPL-MCNC: 4.2 MG/DL (ref 0.2–1)
BUN SERPL-MCNC: 37 MG/DL (ref 7–18)
CALCIUM SERPL-MCNC: 8.4 MG/DL (ref 8.8–10.2)
CHLORIDE SERPL-SCNC: 107 MEQ/L (ref 98–107)
CO2 SERPL-SCNC: 21 MEQ/L (ref 21–32)
CREAT SERPL-MCNC: 2.14 MG/DL (ref 0.55–1.3)
EOSINOPHIL # BLD AUTO: 0.3 10^3/UL (ref 0–0.5)
EOSINOPHIL NFR BLD AUTO: 4.7 % (ref 0–3)
GFR SERPL CREATININE-BSD FRML MDRD: 25 ML/MIN/{1.73_M2} (ref 45–?)
GLUCOSE SERPL-MCNC: 89 MG/DL (ref 70–100)
HCT VFR BLD AUTO: 29.1 % (ref 36–47)
HGB BLD-MCNC: 9.9 G/DL (ref 12–15.5)
LYMPHOCYTES # BLD AUTO: 1.2 10^3/UL (ref 1.5–5)
LYMPHOCYTES NFR BLD AUTO: 22.4 % (ref 24–44)
MAGNESIUM SERPL-MCNC: 2.5 MG/DL (ref 1.8–2.4)
MCH RBC QN AUTO: 32.1 PG (ref 27–33)
MCHC RBC AUTO-ENTMCNC: 34 G/DL (ref 32–36.5)
MCV RBC AUTO: 94.5 FL (ref 80–96)
MONOCYTES # BLD AUTO: 0.7 10^3/UL (ref 0–0.8)
MONOCYTES NFR BLD AUTO: 13 % (ref 0–5)
NEUTROPHILS # BLD AUTO: 3.2 10^3/UL (ref 1.5–8.5)
NEUTROPHILS NFR BLD AUTO: 58.7 % (ref 36–66)
PLATELET # BLD AUTO: 100 10^3/UL (ref 150–450)
POTASSIUM SERPL-SCNC: 5.2 MEQ/L (ref 3.5–5.1)
PROT SERPL-MCNC: 5.7 GM/DL (ref 6.4–8.2)
RBC # BLD AUTO: 3.08 10^6/UL (ref 4–5.4)
SODIUM SERPL-SCNC: 133 MEQ/L (ref 136–145)
WBC # BLD AUTO: 5.5 10^3/UL (ref 4–10)

## 2020-12-02 RX ADMIN — SODIUM CHLORIDE, PRESERVATIVE FREE SCH ML: 5 INJECTION INTRAVENOUS at 22:12

## 2020-12-02 RX ADMIN — SODIUM CHLORIDE, PRESERVATIVE FREE SCH ML: 5 INJECTION INTRAVENOUS at 14:02

## 2020-12-02 RX ADMIN — MIDODRINE HYDROCHLORIDE SCH MG: 5 TABLET ORAL at 11:47

## 2020-12-02 RX ADMIN — BRIMONIDINE TARTRATE SCH DROP: 1.5 SOLUTION OPHTHALMIC at 09:10

## 2020-12-02 RX ADMIN — MIDODRINE HYDROCHLORIDE SCH MG: 5 TABLET ORAL at 16:01

## 2020-12-02 RX ADMIN — CIPROFLOXACIN SCH MLS/HR: 2 INJECTION, SOLUTION INTRAVENOUS at 05:34

## 2020-12-02 RX ADMIN — MAGNESIUM HYDROXIDE SCH ML: 400 SUSPENSION ORAL at 09:10

## 2020-12-02 RX ADMIN — FOLIC ACID SCH MG: 1 TABLET ORAL at 09:10

## 2020-12-02 RX ADMIN — BRIMONIDINE TARTRATE SCH DROP: 1.5 SOLUTION OPHTHALMIC at 20:59

## 2020-12-02 RX ADMIN — LATANOPROST SCH DROP: 50 SOLUTION OPHTHALMIC at 20:59

## 2020-12-02 RX ADMIN — LEVOTHYROXINE SODIUM SCH MCG: 112 TABLET ORAL at 05:34

## 2020-12-02 RX ADMIN — LACTULOSE SCH ML: 10 SOLUTION ORAL at 05:34

## 2020-12-02 RX ADMIN — MIDODRINE HYDROCHLORIDE SCH MG: 5 TABLET ORAL at 09:10

## 2020-12-02 RX ADMIN — LACTULOSE SCH ML: 10 SOLUTION ORAL at 20:59

## 2020-12-02 RX ADMIN — MULTIPLE VITAMINS W/ MINERALS TAB SCH TAB: TAB at 09:10

## 2020-12-02 RX ADMIN — DEXTROSE MONOHYDRATE SCH MG: 50 INJECTION, SOLUTION INTRAVENOUS at 18:27

## 2020-12-02 NOTE — REP
INDICATION:

pleural effusion.



COMPARISON:

Comparison radiographs December 1, 2020.



TECHNIQUE:

Two views..



FINDINGS:

There is a pigtail percutaneous drainage catheter in place in the left posterior

pleural angle.  Decreased left pleural effusion.  There is still some blunting of the

left lateral and posterior pleural angles.  Right pleural angles appear sharp.

Increased density is seen in the left base obscuring the left diaphragm on the frontal

radiograph.  Infiltrate versus effusion.  This is unchanged.  Right lung is clear.  A

right internal jugular central venous line is seen in place terminating in the

expected location of the superior vena cava.  Monitoring electrodes are visible.

There is



IMPRESSION:

Pigtail drainage catheter in the left posterior pleural space.  Decreased left

effusion.  Question left base infiltrate versus fluid.  Right IJ line.  A tiny apical

pleural air collectiona is againaaaaaaaa seen..





<Electronically signed by Noam Deng > 12/02/20 0964

## 2020-12-02 NOTE — CR
CONSULTATION



DATE OF CONSULTATION:  11/25/2020.



CONSULTATION REQUESTED BY:  Dr. Cade of the hospitalist service.



REASON FOR CONSULTATION:  Shortness of breath and a large left-sided pleural

effusion.



HISTORY OF PRESENT ILLNESS:  The patient is now a 60-year-old white female with

alcoholic liver disease and cirrhosis along with accompanying portal

hypertension and esophageal varices. She was seen in the hospital at the end of

September, where she was found to have a very large pleural effusion, which was

initially drained with a PleurX catheter. The PleurX catheter was quite

problematic in that it was extraordinarily painful for her every time we

drained her.  The pain perception was quite exaggerated. After an extensive

stay in the hospital, her ascites and liver failure were finally brought under

control and following the x-rays, we drained the PleurX catheter, the pleural

effusion resolved itself. Because of the extraordinary pain, the PleurX

catheter was removed. 



In the last week or so, Ms. Taylor is again complaining of increased shortness

of breath. She gets short of breath lying down, but she does not awake at night

short of breath. She has had a cough over the last few days, but none before.

There has been no fever or sweats, but she says that she is always cold. In

fact, she is shivering when I am speaking to her. There is no sputum

production. She states that she is swallowing well. The only pain she complains

of is pain under her left breast when she takes a deep breath. It is mild to

moderate. 



She was seen by Dr. Silverio this afternoon for increasing shortness of breath and

a chest x-ray was obtained, which showed almost complete opacification of the

left hemithorax. She was therefore sent to the Emergency Room.



PAST MEDICAL HISTORY:  The above cirrhosis with portal hypertension and

esophageal varices along with hepatorenal syndrome, carpal tunnel syndrome,

hypothyroidism, alcohol abuse, glaucoma, migraines, hyperlipidemia and

hypertension along with asthma.



HABITS:  Does not smoke. She states she has had no alcohol since September. No

illicit drugs.



ALLERGIES: Cefdinir with shortness of breath and laryngospasm. Penicillin 

unknown childhood reaction.



PAST SURGICAL HISTORY:  The above PleurX catheter and a prior pigtail catheter

drainage in January 2020. She has had a D and C in the remote past.



TRAVEL HISTORY:  [place] to the [place] or [place]. No foreign

travel other than to [place]. She has only been out of state to [place] in

the remote past. 



EXPOSURES:  No dogs, birds, cats at home. No prior known exposure to

tuberculosis.



OCCUPATIONAL HISTORY:  She used to work at [employer]. No asbestos exposure.



FAMILY HISTORY:  Not pertinent to the acute situation. 



MEDICATIONS AT HOME:  

  1.  Betaxolol OU b.i.d.

  2.  Folic acid 1 mg daily.

  3.  Hydroxyzine 25 mg q.h.s. p.r.n. sleep.

  4.  Lactulose 30 cc b.i.d.

  5.  Xalatan one drop OU q.h.s.

  6.  Synthroid 112 mcg daily.

  7.  Loratadine 10 mg p.r.n. "allergies."

  8.  Midodrine 5 mg p.o. t.i.d. 

  9.  Multivitamins one daily.

  10.Spironolactone 50 mg p.o. b.i.d.

  11.Torsemide 20 mg p.o. b.i.d. 



REVIEW OF SYSTEMS: 

CONSTITUTIONAL: See HPI.

EYES: Without amorous fugax, without diplopia. She has been jaundice in the

past.

NOSE: With occasional epistaxis when she blows her nose. 

MOUTH: Has her own teeth.

RESPIRATORY: See HPI.

CARDIAC: See HPI, without prior myocardial infarctions. Without intermittent

claudication.

GI: Without nausea, vomiting, diarrhea, constipation, melena, hematochezia or

hematemesis.

: Without dysuria, hematuria or prior renal stones.

ENDOCRINE: With hypothyroidism. Without diabetes.

NEUROLOGIC: Without paresthesias, paralyses or prior seizures.

PSYCHIATRIC: Without pathological depression, psychoses or anxieties.



PHYSICAL EXAMINATION:

GENERAL: A well developed, well-nourished, chronically ill, 60-year-old white

female in moderate distress with shortness of breath.

VITAL SIGNS: Temperature 97.1, heart rate 100 in sinus rhythm, respiratory rate

28 without the use of accessory muscles, 96% saturated on room air, blood

pressure 117/58.

EYES: Pupils equal, round and reactive to light. Extraocular movements intact.

Sclerae nonicteric.

NOSE: Without deformity.

MOUTH: Mucous membranes pink and moist. Lips and gums without lesions. No

thrush. She has her own teeth.

NECK: Supple. There is no jugular venous distention. No subcutaneous emphysema.

Trachea is midline. There is no thyromegaly or lymphadenopathy.

LUNGS: Show markedly decreased breath sounds on the left with a dull percussion

on the left. She has full E:A egophony on the left. Her right side shows normal

vesicular sounds without wheezes, rhonchi or rales. Percussion note is full to

the diaphragm on the right.

CARDIAC: Shows 2/6 systolic ejection murmur heard at the right upper sternal

border. I cannot feel her PMI. S1, S2 are normal. 

ABDOMEN: Soft, nontender. Bowel sounds are positive. No CVA tenderness. I

cannot appreciate hepatomegaly. She does have some tenderness in the

epigastrium to deep palpation.

EXTREMITIES:  Show no pretibial edema, no calf tenderness. No differential

swelling of the upper extremities.

SKIN: Warm, dry and perfused without cyanosis or mottling including that of the

nail beds and knees. 

NEURO: Shows II-XII intact. No gross motor, gross sensation intact. Gait is not

tested.

PSYCHIATRIC: She is awake, alert and oriented x3 with appropriate mood and

affect. 



INVESTIGATIONS: White count today 5.5 with hemoglobin 10.8 and hematocrit 32

respectively, platelet count 124,000. Differential shows 17% neutrophils, 13%

lymphocytes, 9% monocytes. Her hemoglobin and hematocrit has been stable since

September. Chemistries show sodium 131 with BUN and creatinine 71 and 2.38,

which is a little bit above her baseline when she was in the hospital in

September and discharge was 1.60. Glucose 169 with calcium 8.7. Lactic acid

2.1. Total bilirubin 3.1 with direct component of 2.0. AST and ALT 43 and 38

respectively. Albumin 2.0 with calcium 8.7. Ammonia has not been drawn. Amylase

and lipase 147 and 792 respectively.



IMAGING STUDIES:  Her chest x-ray shows almost complete opacification of the

left side with a shift to the right. Her chest CT shows almost completely

opacified left hemithorax with compressed upper and lower lobes. This again is

shift to the right. She has a small amount of pleural fluid on the right side.

There is very little ascites, although there is just a little bit around the

spleen. Her appendix looks a little boggy to my eyes, without the normal

indentations that are seen most of the time. Her liver is contracted and lumpy,

bumpy and nobly throughout, consistent with cirrhosis.



IMPRESSION:  

  1.  Cirrhosis.

  2.  Portal hypertension.

  3.  History of esophageal varices.

  4.  Recurrent left pleural effusion.

  5.  Small right pleural effusion.

  6.  Pancreatitis.

  7.  Hypothyroidism.

  8.  Asthma.

  9.  Anxiety/depression.

  10.History of hypertension.



PLAN/DISCUSSION:  I will place a pigtail catheter. Her experience with a PleurX

catheter which would chronically drain her was so bad and so painful that I am

________ to repeat that experience. It was very clear that upon discharge in

September that her cirrhosis was under control and that over the last two

months, it is now again out of control. I do not know whether this is due to

her, although she does say there has been no drinking. I think that she needs

to be followed very closely; almost on a weekly basis as an outpatient. She

needs to be under the care of a real physician and not a PA or nurse

practitioner. If her cirrhosis is not controlled, she will be in and out of the

hospital continuously. I cannot emphasize that this is going to take intensive

outpatient monitoring and treatment in order to keep this pleural effusion at

bay.  It looks as though she does have pancreatitis and she should be treated

as such. I doubt if this is a sympathetic pancreatic fusion, although we will

test for amylase in the pleural fluid.

## 2020-12-02 NOTE — CCN
CRITICAL CARE NOTE



DATE:  11/25/2020 05:13:00 pm



SUBJECTIVE: Mrs. Taylor was seen and examined this morning at bedside.

Overnight, the patient was noted to have an altered mental status. She had

 an ammonia level demonstrating 262. Additionally, the night covering

provider had ordered a head CT due to altered mental status which was otherwise

normal. She was given rectal lactulose. This morning her mentation is improved,

although she does appear somewhat more loopy. Other than that, the patient does

have some complaints of lower back pain. 



OBJECTIVE:

Vital signs: Temperature 98.5, T-max 99.7, pulse 73, respiratory rate 20, blood

pressure 95/57, pulse oximetry 98% on room air. 

General: Patient is awake, alert. She is oriented although she appears somewhat

confused at times. She does appear frail and ill.

HEENT: Atraumatic, normocephalic. There is some scleral icterus present. Mucous

membranes are pink and moist. She has a left internal central venous catheter

in place. 

Cardiovascular: Normal S1 and S2 with a regular rate and rhythm. There is a 2/6

systolic ejection murmur present. 

Pulmonary: Patient has decreased breath sounds in the bases. There is poor

inspiratory effort. She has some mild rhonchi although no crackles or wheezes. 

Abdomen: Patient's abdomen is soft. It is nondistended. It is nontender. No

rebound tenderness or guarding. There is somewhat more hyperactive bowel

sounds. 

Extremities: No edema. Full and equal pulses bilateral upper and lower

extremities. 

Neurologic: No focal neurological deficits. Patient follows commands

appropriately. 



LABORATORY DATA: Hematology: White blood cell 5.5, hemoglobin 9.8, hematocrit

28.4, platelet count 100.



Chemistry: Sodium 140, potassium 4.7, chloride 113, carbon dioxide 21, BUN 34,

creatinine 1.53, fasting glucose 90, calcium 8.5, magnesium 2.3, total

bilirubin 4.1, AST 21, ALT 21, alkaline phosphatase 83, ammonia level 63, total

protein 5.6, albumin 3.2.



Pleural fluid cultures negative for any growth. 



ASSESSMENT/PLAN: Mrs. Taylor is a 60-year-old female who presented to [facility] with shortness of breath and was found to have a large pleural

effusion with a chest tube placed and drainage. The pleural effusion was felt

to be due to her liver failure. During hospitalization the patient developed

hypotension likely from the fluid drainage. She has been placed on vasopressors

and remains in the ICU.

1.  Pleural effusion from liver cirrhosis/ascites. Patient had a large pleural

    effusion which was drained with a chest tube. Her chest tube remains in

    place. Currently, she has drained out 1350 overnight. Since her

    hospitalization, she has put out more than 13 liters. She has had seven

    transfusions of albumin since her initiation of hospitalization. Likely her

    pleural effusions will continue to accumulate given her severity of her

    liver disease. 

2.  Hepatic encephalopathy. Last night patient was noted to develop some

    altered mental status. Her ammonia level yesterday was 262. She received

    lactulose per rectum. Currently down to 63. Have increased the lactulose

    dosing today. Have increased it to t.i.d. dosing with titrations of two to

    three bowel movements a day. 

3.  Liver cirrhosis with ascites. The patient has alcoholic liver cirrhosis.

    Her MELD score is 22 points. CPT score of 11. At this time, she had stopped

    drinking back in September. She may qualify for liver transplantation if she

    can make it six months without using alcohol. However, at this point given

    her severity of her liver cirrhosis, she may be better suited for CMO.

    Patient was started on ciprofloxacin for SBP prophylaxis. 

4.  Acute kidney injury. Patient presented with acute kidney injury with a

    creatinine of 2.38. Currently improving, creatinine 1.53. Will continue to

    monitor. 

5.  Anemia. Patient's hemoglobin was 9.8, currently stable. Will continue to

    monitor. 

6.  Thrombocytopenia likely secondary to liver cirrhosis. Will continue to

    monitor. 

7.  DVT prophylaxis. Patient will be placed on heparin subcu b.i.d. 

8.  GI prophylaxis. Currently on Protonix. 



Critical care time 44 minutes. 



I, Bull Silverio, personally examined the patient and agree with the history and 
physical exam as outlined above.  I performed an independent history and bedside
exam during rounds.

[place]

## 2020-12-02 NOTE — RO
OPERATIVE NOTE



DATE OF OPERATION:  11/27/2020



Dictated by Roni Holman DO, Internal Medicine Resident PGY-3 in

conjunction with attending Bull Silverio D.O. of Pulmonary Critical Care

Medicine. 



PROCEDURE: Right internal jugular vein central venous catheter placement. 



INDICATIONS: Hypotension requiring pressor support 



PROCEDURE : Roni Holman D.O. 



ATTENDING PHYSICIAN: Bull Silverio D.O. who was present throughout the procedure

and participated in key components of the procedure. 



PROCEDURE SUMMARY: A time-out was performed. My hands were washed immediately

prior to the procedure. I wore a surgical, cap, mask, protective wear, full

gown, and sterile gloves throughout the procedure. The patient was placed in

Trendelenburg. The right chest region was prepped using chlorhexidine scrub and

draped in a sterile fashion using a full drape and sterile probe cover. The

medial and lateral heads of the sternocleidomastoid were identified, as was the

carotid pulse. Internal jugular vein was identified using ultrasound.

Anesthesia was achieved over the vein using 1% Lidocaine. Using real-time

out-of-plane guidance, the introducer needle was inserted into the internal

jugular vein under direct ultrasound visualization. Venous blood was withdrawn,

the syringe was removed, and a guidewire was advanced into the introducer

needle. The guidewire was visualized in the internal jugular vein by

ultrasound. A small incision was made at the skin surface with a scalpel, and

the introducer needle was exchanged for a dilator over the guidewire. After

appropriate dilation was obtained, the dilator was exchanged over the wire for

a central venous catheter. The wire was removed and the catheter was sutured in

place at 15 cm. Sterile SorbaView shield was placed over the catheter insertion

site. The patient tolerated the procedure without any hemodynamic compromise.

At the time of procedure completion all ports were aspirated and flushed

properly. A postprocedure chest x-ray was obtained and demonstrated adequate

placement of the central venous catheter without complication.  



I, Bull Silverio, agree with the above.  I was at bedside during the procedure and 
assisted with key critical parts such as advancing the needle into the IJ.



[facility]

## 2020-12-02 NOTE — IPNPDOC
Text Note


Date of Service


The patient was seen on 12/2/20.





NOTE


Subjective: Patient seen and examined at bedside. Sitting up in chair more awake

and oriented today. Pig tail catheter remains in place draining large amounts of

fluid. 





Objective:


Vitals (See below)


General: NAD, appears comfortable, tremors. 


HEENT: NC, AT, dry mucous membranes, anicteric eyes. 


CVS: +S1S2, rate normal, no rub or murmur or gallop.


Lungs: CTA B/L right lung fields, diminished breath sounds left lung fields, 

left pigtail cath in place


Abdomen: Soft, mildly distended. Mild epigastric tenderness, bowel sounds 

present. 


Extremities: No significant edema, - Calf tenderness


Neuro: Awake but somnolent, flap present. 





Labs and Radiology : Reviewed. 





A/P: 60F with PMHx cirrhosis 2/2 ETOH (Hx of Hepatic encephalopathy, Ascites, 

Esophageal varices, Recurrent L ascitic pleural effusion), HTN, Hypothyroidism, 

DLP, Asthma, Anxiety / Depression, who presented to the emergency room on 

instructions from her pulmonologist for shortness of breath and left lung 

whiteout. Patient had follow-up with her pulmonologist today for shortness of 

breath in the office, and an x-ray that had revealed complete whiteout of her 

left lung field. She was admitted for large left sided Pleural effusion. 





Acute Hepatic Encephalopathy


improved


Lactulose to achieve 2 to 3 bowel movements daily


worsening renal function will change cipro to rifaximin. 


Continue albumin. 





Left pleural effusion/ transudative


Due to Hepatic hydrothorax 


Dr. Wiseman on consult; s/p Catheter placement and continued drainage


CT chest 11/26: 1. Interval development of a large left pleural effusion in 

comparison to the prior study of 10/04/2020. 2. Small patchy infiltrates 

demonstrated in the apical segment on the right, findings of uncertain 

significance. Infection to be excluded clinically. 3. Atelectasis of the left 

lung with a rounded configuration demonstrated in the left lower lobe measuring 

2.9 x 4.2 x 3.7 cm, which may suggest a mass. Notably, a mass was not 

demonstrated on the prior examination favoring atelectasis. 





Hypotension


Probably has some chronic hypotension from cirrhosis and ongoing loss through 

the pig tail catheter. 


off levophed now 


cont albumin infusion, midodrine


Will allow MAP of 50 to 55. 





s/p Left Chest pain


resolved - likely 2/2 effusion 


no evidence for ACS - ECG no acute findings, no troponinemia





Epigastric / LUQ pain 


resolved


probably due to gastritis/ duodenitis +/-ascites and enlarged left lobe of 

liver. 


Elevated lipase probably due to duodenitis and BAM. 


I do not think patient had any pancreatitis. 


CT abdomen / pelvis 11/26: 1. The liver has a grossly nodular contour and there 

is relative hypertrophy of the caudate lobe, consistent with end-stage 

cirrhosis. 2. Borderline splenomegaly. 3. There is a mild-to-moderate amount of 

free intraperitoneal fluid present. 4. The gallbladder is incompletely distende

d. This is most likely related to incomplete fasting. Clinical correlation to 

exclude gallbladder pathology suggested. 5. There is a small bowel containing 

umbilical hernia. No definitive evidence 


of incarceration however findings should be correlated with clinical exam. 6. 

Mild anasarca. 7. Mild diverticulosis is present in the distal colon. No 

diverticulitis. 





BAM/CKD


worse again possibly prerenal at this time due to diarrhea, loss in the chest 

tube


possibly has baseline hepatorenal syndrome also. 


On albumin and midodrine. 


nephrology c/s appreciated


Cr baseline of 1.7-1.8


Will hold Torsemide / Spironolactone





Hyperkalemia


will monitor and treat as needed. 





Decompensated Cirrhosis 2/2 ETOH 


Multiple complications including; Hepatic encephalopathy / Ascites / Esophageal 

varices / Recurrent L ascitic pleural effusion/Thrombocytopenia/ Coagulopathy 

with INR of 1.8


At this time MELD- Na score is 28 with estimated 27% -32% mortality in 30 days. 


She has only stopped drinking in Sept 2020 so will not be a candidate for liver 

tx yet. 


c/w Lactulose, albumin, rifaximin





Hx of Alcoholism


c/w Folic acid, Thiamine, Multivitamins 





Hypothyroidism


c/w Levothyroxine 





DLP





Asthma


No evidence of exacerbation


Not on inhaled therapy at home 





Anxiety / Depression


c/w Hydroxyzine 





GI prophylaxis


c/w Protonix 





DVT prophylaxis 


c/w TEDs/Sequentials


will not give heparin due to thrombocytopenia.





VS,Fishbone, I+O


VS, Fishbone, I+O


Laboratory Tests


12/2/20 05:09











Vital Signs








  Date Time  Temp Pulse Resp B/P (MAP) Pulse Ox O2 Delivery O2 Flow Rate FiO2


 


12/2/20 07:24 97.5 86 20 99/57 (71) 100 Room Air  


 


11/27/20 12:32       2.0 














I&O- Last 24 Hours up to 6 AM 


 


 12/2/20





 06:00


 


Intake Total 3140.0 ml


 


Output Total 1615 ml


 


Balance 1525.0 ml

















CONRADO GORDON MD                    Dec 2, 2020 08:41

## 2020-12-03 VITALS — SYSTOLIC BLOOD PRESSURE: 96 MMHG | DIASTOLIC BLOOD PRESSURE: 54 MMHG

## 2020-12-03 VITALS — DIASTOLIC BLOOD PRESSURE: 48 MMHG | SYSTOLIC BLOOD PRESSURE: 87 MMHG

## 2020-12-03 VITALS — DIASTOLIC BLOOD PRESSURE: 56 MMHG | SYSTOLIC BLOOD PRESSURE: 98 MMHG

## 2020-12-03 VITALS — SYSTOLIC BLOOD PRESSURE: 113 MMHG | DIASTOLIC BLOOD PRESSURE: 57 MMHG

## 2020-12-03 VITALS — DIASTOLIC BLOOD PRESSURE: 64 MMHG | SYSTOLIC BLOOD PRESSURE: 118 MMHG

## 2020-12-03 VITALS — DIASTOLIC BLOOD PRESSURE: 60 MMHG | SYSTOLIC BLOOD PRESSURE: 107 MMHG

## 2020-12-03 LAB
ALBUMIN SERPL BCG-MCNC: 2.9 GM/DL (ref 3.2–5.2)
ALT SERPL W P-5'-P-CCNC: 23 U/L (ref 12–78)
BILIRUB SERPL-MCNC: 2.4 MG/DL (ref 0.2–1)
BUN SERPL-MCNC: 35 MG/DL (ref 7–18)
CALCIUM SERPL-MCNC: 8.6 MG/DL (ref 8.8–10.2)
CHLORIDE SERPL-SCNC: 109 MEQ/L (ref 98–107)
CO2 SERPL-SCNC: 23 MEQ/L (ref 21–32)
CREAT SERPL-MCNC: 2.17 MG/DL (ref 0.55–1.3)
GFR SERPL CREATININE-BSD FRML MDRD: 24.6 ML/MIN/{1.73_M2} (ref 45–?)
GLUCOSE SERPL-MCNC: 116 MG/DL (ref 70–100)
HCT VFR BLD AUTO: 26.9 % (ref 36–47)
HGB BLD-MCNC: 9.1 G/DL (ref 12–15.5)
MCH RBC QN AUTO: 32.2 PG (ref 27–33)
MCHC RBC AUTO-ENTMCNC: 33.8 G/DL (ref 32–36.5)
MCV RBC AUTO: 95.1 FL (ref 80–96)
PLATELET # BLD AUTO: 93 10^3/UL (ref 150–450)
POTASSIUM SERPL-SCNC: 5 MEQ/L (ref 3.5–5.1)
PROT SERPL-MCNC: 5.1 GM/DL (ref 6.4–8.2)
RBC # BLD AUTO: 2.83 10^6/UL (ref 4–5.4)
SODIUM SERPL-SCNC: 138 MEQ/L (ref 136–145)
WBC # BLD AUTO: 4.9 10^3/UL (ref 4–10)

## 2020-12-03 RX ADMIN — DEXTROSE MONOHYDRATE SCH MG: 50 INJECTION, SOLUTION INTRAVENOUS at 17:34

## 2020-12-03 RX ADMIN — FOLIC ACID SCH MG: 1 TABLET ORAL at 08:13

## 2020-12-03 RX ADMIN — OCTREOTIDE ACETATE SCH MCG: 100 INJECTION, SOLUTION INTRAVENOUS; SUBCUTANEOUS at 21:19

## 2020-12-03 RX ADMIN — LEVOTHYROXINE SODIUM SCH MCG: 112 TABLET ORAL at 05:16

## 2020-12-03 RX ADMIN — MULTIPLE VITAMINS W/ MINERALS TAB SCH TAB: TAB at 08:13

## 2020-12-03 RX ADMIN — OCTREOTIDE ACETATE SCH MCG: 100 INJECTION, SOLUTION INTRAVENOUS; SUBCUTANEOUS at 14:56

## 2020-12-03 RX ADMIN — LATANOPROST SCH DROP: 50 SOLUTION OPHTHALMIC at 21:19

## 2020-12-03 RX ADMIN — BRIMONIDINE TARTRATE SCH DROP: 1.5 SOLUTION OPHTHALMIC at 08:13

## 2020-12-03 RX ADMIN — SODIUM CHLORIDE, PRESERVATIVE FREE SCH ML: 5 INJECTION INTRAVENOUS at 14:28

## 2020-12-03 RX ADMIN — LACTULOSE SCH ML: 10 SOLUTION ORAL at 21:00

## 2020-12-03 RX ADMIN — SODIUM CHLORIDE, PRESERVATIVE FREE SCH ML: 5 INJECTION INTRAVENOUS at 21:20

## 2020-12-03 RX ADMIN — LACTULOSE SCH ML: 10 SOLUTION ORAL at 08:13

## 2020-12-03 RX ADMIN — BRIMONIDINE TARTRATE SCH DROP: 1.5 SOLUTION OPHTHALMIC at 21:19

## 2020-12-03 RX ADMIN — MIDODRINE HYDROCHLORIDE SCH MG: 5 TABLET ORAL at 08:13

## 2020-12-03 RX ADMIN — MIDODRINE HYDROCHLORIDE SCH MG: 5 TABLET ORAL at 12:07

## 2020-12-03 RX ADMIN — SODIUM CHLORIDE, PRESERVATIVE FREE SCH ML: 5 INJECTION INTRAVENOUS at 05:16

## 2020-12-03 RX ADMIN — MAGNESIUM HYDROXIDE SCH ML: 400 SUSPENSION ORAL at 08:12

## 2020-12-03 RX ADMIN — MIDODRINE HYDROCHLORIDE SCH MG: 5 TABLET ORAL at 15:39

## 2020-12-03 NOTE — IPN
NEPHROLOGY PROGRESS NOTE



DATE:  12/3/2020 



SUBJECTIVE:  Patient was seen and examined at the bedside. She was actually

sitting in the sofa today and playing on her phone. She still has the

left-sided pigtail catheter attached to underwater seal and she has an

indwelling Braun catheter. She does not have much urine output and there is no

significant improvement in the renal function. Creatinine is the same as

yesterday at 2.1. 



OBJECTIVE: 

VITAL SIGNS: Temperature 97.3 degrees Fahrenheit, blood pressure 113/57, pulse

89, respiratory rate 20, saturating 99% on room air. 

INTAKE/OUTPUT: Urine output recorded as 675 mL yesterday and so far today 350

mL. Weight in the bed scale is 77.5 kg.



PHYSICAL EXAMINATION:

GENERAL: Patient is awake, alert, oriented x3, sitting up in the sofa, in no

apparent distress. 

HEAD/NECK:  Extraocular muscles intact. Pupils equally round and reactive to

light. Mucous membranes are moist. Neck is supple. There is no JVD. 

CARDIOVASCULAR: S1, S2, regular rate. No edema of the bilateral lower

extremities. 

RESPIRATORY: Decreased breath sounds at the bases. She has a left-sided pigtail

catheter attached to underwater seal. 

ABDOMEN: Soft, positive bowel sounds. No organomegaly. 

MUSCULOSKELETAL: No clubbing or cyanosis. 

CNS: No focal deficits. No asterixis noted.



LAB REVIEW: CBC showed WBC 4.9, hemoglobin 9.1, platelets 93,000. BMP showed

sodium 138, potassium 5, chloride 109, bicarb 23, BUN 35, creatinine 2.1.

Calcium 8.6, total bilirubin 2.4. Albumin 2.9; it was 2.9 yesterday as well.



CURRENT INPATIENT MEDICATIONS: Patient's medications were all reviewed by

myself. She continues to be on Midodrine 10 mg p.o. three times a day. I have

started the patient on Octreotide 100 mcg subcutaneously every 8 hours. 



ASSESSMENT AND PLAN:

  1.  Acute renal failure: Patient's creatinine is higher than what it was a

      few days ago. She is already on Midodrine. She already received albumin

      yesterday. I have started her on Octreotide. If renal function does not

      get better, then she will be started on every 8 hour albumin infusions.

  2.  Left-sided pleural effusion: It is associated with patient's recurrent

      ascites and portal hypertension. I recommend clamping the left-sided

      pigtail catheter and doing thoracentesis intermittently.

  3.  Anemia and chronic kidney disease: Hemoglobin is stable. No need of

      Aranesp administration at this time. 

  4.  Alcoholic cirrhosis with hepatic encephalopathy: Continue lactulose and

      Rifaximin at this time.

## 2020-12-03 NOTE — IPN
NEPHROLOGY PROGRESS NOTE



DATE:  12/02/2020 



SUBJECTIVE:  The patient was seen and examined at the bedside today morning.

She is afebrile, hemodynamically stable. She still has left sided pigtail

catheter attached to underwater seal. Renal function is worse today as compared

with yesterday. Creatinine has bumped up to 2.1 today. She is oliguric

according to the intake and output. 



OBJECTIVE: 

VITAL SIGNS:: Temperature is 98 degrees Fahrenheit, blood pressure is 92/50,

pulse is 84, respiratory rate of 20, saturating 99% on room air. 

INTAKE AND OUTPUT: Urine output recorded according to the intake and output is

395 mL yesterday, 175 mL so far today since overnight.  Output from the left

sided pigtail catheter is 610 mL, weight on the bed scale is not available. 

GENERAL: The patient is awake, alert and oriented x3, laying in bed, in no

apparent distress. 

HEAD AND NECK: Extraocular muscles intact. Patient has bitemporal wasting.

Mucous membranes are moist. Neck is supple. There is no JVD. 

CARDIOVASCULAR: S1 and S2, regular rate. No edema of the bilateral lower

extremities. 

RESPIRATORY: Decreased breath sounds at the bases. She has a left sided pigtail

catheter attached to the underwater seal. 

ABDOMEN: Soft, positive bowel sounds, nontender. No hepatosplenomegaly. 

MUSCULOSKELETAL:  No clubbing or cyanosis. Pulses are 2+. 

CNS: No focal deficit. Power is 5/5 in all extremities. She has no asterixis. 



LABORATORY DATA: CBC showed a WBC of 5.5, hemoglobin 9.9, platelets are

100,000. BMP showed a sodium of 133, potassium 5.2, chloride 107, bicarbonate

21, BUN 37, creatinine is 2.1. Magnesium is 2.5. Albumin is 2.9. 



CURRENT INPATIENT MEDICATIONS: The patient's medications were all reviewed by

myself. I ordered two more bags of 5% albumin, 250 mL each today because of the

patient's worsening renal function. Ciprofloxacin has been stopped. Lactulose

has been decreased to 30 mL p.o. q. 12 hourly. 



ASSESSMENT AND PLAN:

  1.  Acute renal failure superimposed on chronic kidney disease. Patient's

      renal function is worse again. I have ordered another dose of 5% albumin

      as mentioned above. We shall have to continue to replace her with albumin

      to save her renal function.

  2.  Left sided pleural effusion. Patient has a left sided pigtail catheter

      attached to underwater seal. I recommend cutting down the left sided chest

      drainage because it is affecting her renal function now. Management is as

      per CT Surgery. 

  3.  Hypotension, patient continues to be on Midodrine, blood pressure is

      stable. IV albumin will also help improve blood pressures. 

  4.  Hepatic encephalopathy. Patient continues to be on Lactulose, the dose

      has been decreased. IV Ciprofloxacin has been stopped now.

## 2020-12-03 NOTE — RO
OPERATIVE NOTE



DATE OF OPERATION:  11/25/2020



PREPROCEDURE DIAGNOSIS:  Left pleural effusion, ascites, cirrhosis.



POSTPROCEDURE DIAGNOSIS:  Left pleural effusion, ascites, cirrhosis.



PROCEDURE:  Insertion of posterior pigtail catheter left side.



SURGEON:  Manas Wiseman M.D.



PROCEDURE: Patient's back was prepped and draped in usual sterile fashion and

landmarks were ascertained. The rib at the level of the xiphoid was chosen in

the equivalent of the midclavicular line well away from the midline and away

from the aorta. Skin, subcutaneous tissue and pleura were infiltrated with

1% Lidocaine. An incision was made. A pigtail catheter was advanced until fluid

could be aspirated. It was advanced more and the pigtail placed within the

catheter. It was connected to a Braun bag and specimens were collected. They

were sent for direct cytologies, hematologies, chemistries and bacteriologies.

The fluid was sotero, but cloudy. It almost looked chylous except for its sotero

color. Patient tolerated the procedure well. The pigtail catheter was connected

to the Pleur-evac and she will drain over the next few hours. Initially, 1,200

cc was obtained. Chest x-ray is pending.

[facility]

## 2020-12-03 NOTE — REP
INDICATION:

pleural effusion



COMPARISON:

12/02/2020



TECHNIQUE:

PA and lateral.



FINDINGS:

The left hydropneumothorax is considerably increased from prior examination.  Lateral

view suggest the possibility that the pigtail catheter has been pulled out of the

pleural cavity and possibly within the subcutaneous tissues along the posterior

thorax.  Correlation is required.



Underlying left lower lobe atelectasis/consolidations are suggested.  The right

hemithorax is clear.  Right IJ line with tip in the SVC again noted.  Skeletal

structures are intact.



IMPRESSION:

Moderate left hydropneumothorax considerably increased from prior examination.  This

may be due to poor positioning of the pigtail catheter and correlation is required.





<Electronically signed by Yogi Glass > 12/03/20 [id number]

## 2020-12-03 NOTE — IPN
PROGRESS NOTE



DATE:  11/29/2020



Flora remains in intensive care unit (ICU) requiring minimal vasopressive

therapy to maintain a mean arterial pressure greater than 60.  Because she is

on vasopressors, has not been ambulating.  She overall feels better, still has

high output from her chest tube.  Flora remains on low dose Levophed.  She has

not been ambulating because of the fact that she is on vasopressive therapy. 

Still has high output from her chest tube.  We now feel that she had prerenal

azotemia from excessive volume loss.  Now that she is better perfused and

obtaining albumin her renal function has improved.  Unfortunately, she still

has high output from her chest tube and over the past 24 hours has had three

liters out.  I do not see where there is an end in sight to this.  The patient

herself has had a PleurX catheter in the abdomen and a PleurX catheter in the

chest before, both unsuccessful for different reasons.  Overall, prognosis is

very guarded.  Patient feels well, is eating.



PHYSICAL EXAMINATION:  

Temperature is 98.1, pulse is 86, respiratory rate is 18, blood pressure is

90/52 with a mean arterial pressure of 65, oxygen saturation is 97% on room air.

GENERAL:  Patient is awake, alert, oriented, no acute distress.

HEENT:  Sclerae clear, minimally anicteric.  Pupils equal, round, and reactive

to light.  Mucous membranes are moist without lesions.  Tongue is midline.  

NECK:  Supple.  No tracheal deviation.  Right internal jugular (IJ) is in place

without surrounding erythema or exudate.

LYMPHATICS:  No cervical, supraclavicular, or axillary adenopathy.

CARDIAC:  Regular, S1, S2, without audible murmur, rub, or gallop.  No elevated

jugular venous pressure (JVP).  No discernible edema.

PULMONARY:  Clear to auscultation except for a few rales at the left base.

ABDOMEN:  Soft, nontender, nondistended.  No hepatosplenomegaly.  No masses or

hernia.

EXTREMITIES:  No cyanosis, clubbing, or edema.

MUSCULOSKELETAL:  Some muscle wasting.  No evidence of joint effusion or

fracture.



LABORATORY EVLAUATION:  

Shows white blood cell count 5.1, hemoglobin 9.4, hematocrit of 27.8, platelet

count of 103.  



Chemistry:  Sodium is 136, potassium 4.2, chloride 110, bicarbonate of 20, BUN

of 37, creatinine of 1.16, fasting glucose of 131, calcium is 8.1, total

bilirubin is still elevated at 3.7, albumin is 2.6.



IMPRESSION:  Pleural effusion from ascites with recurrent drainage.  I do not

believe the drainage will decrease, is, at this point in time, unable to

control portal hypertension due to hypotension.  Patient having prerenal

azotemia from high output requiring low dose Levophed and consistent albumin

administration.  At this point in time, it may be better to clamp the tube to

prevent further volume loss.  She may require intermittent thoracentesis such

as every 2 weeks with albumin administration.  At this point in time, the

therapy is, I feel, almost futile unless she would be able to be bridged to a

transplant.  Greatly appreciate Dr. Manas Wiseman and Dr. Coreas's care. 

Will continue to follow.

## 2020-12-03 NOTE — IPNPDOC
Text Note


Date of Service


The patient was seen on 12/3/20.





NOTE


Subjective: Patient seen and examined at bedside. Sitting up in chair more awake

and oriented today. Pig tail catheter remains in place draining large amounts of

fluid. 





Objective:


Vitals (See below)


General: NAD, appears comfortable, tremors. 


HEENT: NC, AT, dry mucous membranes, anicteric eyes. 


CVS: +S1S2, rate normal, no rub or murmur or gallop.


Lungs: CTA B/L right lung fields, diminished breath sounds left lung fields, 

left pigtail cath in place


Abdomen: Soft, mildly distended. Mild epigastric tenderness, bowel sounds 

present. 


Extremities: No significant edema, - Calf tenderness


Neuro: Awake but somnolent, flap present. 





Labs and Radiology : Reviewed. 





Radiology:


CT chest 11/26: 1. Interval development of a large left pleural effusion in 

comparison to the prior study of 10/04/2020. 2. Small patchy infiltrates 

demonstrated in the apical segment on the right, findings of uncertain 

significance. Infection to be excluded clinically. 3. Atelectasis of the left 

lung with a rounded configuration demonstrated in the left lower lobe measuring 

2.9 x 4.2 x 3.7 cm, which may suggest a mass. Notably, a mass was not 

demonstrated on the prior examination favoring atelectasis. 





CT abdomen / pelvis 11/26: 1. The liver has a grossly nodular contour and there 

is relative hypertrophy of the caudate lobe, consistent with end-stage 

cirrhosis. 2. Borderline splenomegaly. 3. There is a mild-to-moderate amount of 

free intraperitoneal fluid present. 4. The gallbladder is incompletely 

distended. This is most likely related to incomplete fasting. Clinical 

correlation to exclude gallbladder pathology suggested. 5. There is a small 

bowel containing umbilical hernia. No definitive evidence 


of incarceration however findings should be correlated with clinical exam. 6. 

Mild anasarca. 7. Mild diverticulosis is present in the distal colon. No 

diverticulitis. 





A/P: 60F with PMHx cirrhosis 2/2 ETOH (Hx of Hepatic encephalopathy, Ascites, 

Esophageal varices, Recurrent L ascitic pleural effusion), HTN, Hypothyroidism, 

DLP, Asthma, Anxiety / Depression, who presented to the emergency room on 

instructions from her pulmonologist for shortness of breath and left lung wh

iteout. Patient had follow-up with her pulmonologist today for shortness of 

breath in the office, and an x-ray that had revealed complete whiteout of her 

left lung field. She was admitted for large left sided Pleural effusion. 





Acute Hepatic Encephalopathy


improved


Lactulose to achieve 2 to 3 bowel movements daily, rifaximin.


Continue albumin. 





Left  Hepatic hydrothorax s/p left chest pain 


s/p Catheter placement and continued drainage 


CXR worse today compared to last 2 days 





Hypotension


Probably has some chronic hypotension from cirrhosis and ongoing volume loss 

through the pig tail catheter. 


cont albumin infusion, midodrine


Will allow MAP of 50 to 55. 





Epigastric / LUQ pain 


resolved


probably due to gastritis/ duodenitis +/-ascites and enlarged left lobe of 

liver. 


Elevated lipase probably due to duodenitis and BAM. 


I do not think patient had any pancreatitis. 





BAM/CKD


worse again possibly prerenal at this time due to diarrhea, loss in the chest 

tube


possibly has baseline hepatorenal syndrome also. 


On albumin and midodrine. 


nephrology c/s appreciated


Cr baseline of 1.7-1.8


Will hold Torsemide / Spironolactone





Hyperkalemia


will monitor and treat as needed. 





Decompensated Cirrhosis 2/2 ETOH 


Multiple complications including; Hepatic encephalopathy / Ascites / Esophageal 

varices / Recurrent L ascitic pleural effusion/Thrombocytopenia/ Coagulopathy 

with INR of 1.8


At this time MELD- Na score is 28 with estimated 27% -32% mortality in 30 days. 


She has only stopped drinking in Sept 2020 so will not be a candidate for liver 

tx yet. 


c/w Lactulose, albumin, rifaximin





Hx of Alcoholism


c/w Folic acid, Thiamine, Multivitamins 





Hypothyroidism


c/w Levothyroxine 





DLP





Asthma


No evidence of exacerbation


Not on inhaled therapy at home 





Anxiety / Depression


c/w Hydroxyzine 





GI prophylaxis


c/w Protonix 





DVT prophylaxis 


c/w TEDs/Sequentials


will not give heparin due to thrombocytopenia.





VS,Fishbone, I+O


VS, Fishbone, I+O


Laboratory Tests


12/3/20 05:22








12/3/20 05:23











Vital Signs








  Date Time  Temp Pulse Resp B/P (MAP) Pulse Ox O2 Delivery O2 Flow Rate FiO2


 


12/3/20 08:00 97.9 94 18 96/54 (68) 100 Room Air  


 


11/27/20 12:32       2.0 














I&O- Last 24 Hours up to 6 AM 


 


 12/3/20





 06:00


 


Intake Total 1130.0 ml


 


Output Total 1060 ml


 


Balance 70.0 ml

















CONRADO GORDON MD                    Dec 3, 2020 10:31

## 2020-12-03 NOTE — IPN
PROGRESS NOTE

DATE:  11/26/2020 05:13:00 pm



SUBJECTIVE:  Ms. Taylor had a significantly eventful evening with hypotension

and increased chest tube output. I was called at approximately 2:00 a.m.

informing me of the increased chest tube output and her hypotension, and my

instructions were to give her more fluid. I was asked whether a central line

was necessary and I declined as she was 2,000 cc negative from the chest tube

and seeing that she was cerotic. She was leaking fluid from her cirrhosis. She

has been line resuscitated and has been placed on Midodrine to maintain her

blood pressure. Today, she is awake and alert and is found to be appropriate.



PHYSICAL EXAMINATION:

VITAL SIGNS: T-max 99.7 with heart rate that ranges between 72 and 92 and in

sinus rhythm, respiratory rate 16 to 22 without use of accessory muscles, who

is 98% to 95% saturated on 2 liters nasal cannula, and blood pressure ranging

between 72/40 to 107/58. 

INTAKE AND OUTPUT: Today 8,000 in and 1,925 out for a positivity of 6,000 cc.

She has now put out 1,500 cc from the chest tube after putting out 4,300

yesterday. She continues on I.V. Phenylephrine. She has put out 425 cc of

urine. 

LUNGS: She now shows equal breath sounds on either side, but she has coarse

rhonchi particularly on the left. Percussion note is full to the diaphragm. 

CARDIAC: Shows a systolic ejection murmur at the right upper sternal border. I

cannot feel her PMI. S1, S2 are normal. The murmur is about a 2/6.

ABDOMEN: Soft. Pain in the epigastrium, but better than it was last night.

Bowel sounds are positive. She is having flatus. I cannot appreciate

hepatomegaly. No evidence of any calf tenderness. 

EXTREMITIES: Show no pretibial edema. No calf tenderness. No differential

swelling of the upper extremities. 

SKIN: Warm, dry, and perfuse without cyanosis or mottling including that of the

nail beds and knees. She has increased veins over her lower chest wall in the

midline in and around the epigastrium.

NECK: Supple. There is no jugular venous distention. No subcutaneous emphysema.

Trachea is midline. Mouth shows the mucous membranes to be pink and moist. Lips

and gums without lesions or thrush.

EYES: Show her pupils to be equal and reactive. Extraocular movements are

intact. Sclerae shows some mild icterus. 

NEUROLOGIC: Shows II-XII intact. Normal gross motor, gross sensation intact.

Gait is not tested. 

PSYCHIATRIC: Shows her to be awake, alert, and oriented x3 with appropriate

mood and affect and conversational. 



LABORATORY DATA: Her hemoglobin and hematocrit today 9.6 and 29.5; slightly

down from 10.8 and 32.6; probably secondary to hemodilution. White count 6.5

with platelet count  108,000. Differential shows 67% neutrophils, 16%

lymphocytes, 12% monocytes. There are no immature forms or toxic granulations.



Her chemistries this morning show nearly normal electrolytes with BUN and

creatinine 60 and 1.91. Glucose 81, calcium 7.2. Total bilirubin 3.9 with AST

and ALT 30 and 31 respectively. Albumin 1.6 with amylase and lipase which were

both down to 95 and 407 respectively from 147 and 792 yesterday. It should be

noted that on admission her ethanol level was less than 0.003. 



Her pleural fluid has come back with a pH of 7.7 with a glucose of 133 and LDH

of 38. She has 77 white cells, 87% are mononuclear lymphocytes and 12% are

neutrophils. This looks like a transudative normoglycemic pleural effusion. She

did test COVID negative.



IMAGING STUDIES:  Her chest x-ray is much improved today, it is done PA/lateral

with only narrow blunted left costophrenic angle. The chest catheter looks to

be in good place. The trachea is back to the midline. There is no

subcutaneous emphysema. 



IMPRESSION:  

  1.  Recurrent pleural effusion left side transudative secondary to her

      cirrhosis.

  2.  Cirrhosis.

  3.  Portal hypertension.

  4.  Esophageal varices.

  5.  Hypotension.

  6.  Volume depletion.

  7.  Pancreatitis.

  8.  Renal insufficiency.

  9.  Hypothyroidism.

  10.History of alcoholism.

  11.Anxiety/depression.



PLAN/DISCUSSION:  I told her to keep her chest tube catheter in until it stops

draining. On her last admission she spent over a week in the hospital until the

cirrhosis was brought under control. I suspect this is going to be a repeat of

last admission. As I noted in yesterday's note, she is going to have to be

followed very closely as an outpatient in order to prevent her from continually

returning back to the hospital with this recurrent pleural effusion. As also

noted yesterday, her tolerance of a PleurX catheter last time was minimal with

excruciating pain particularly upon drainage. She will not accept another

PleurX catheter. She is being treated with volume resuscitation along with

albumin replacement and alpha-agonist blood pressure control both orally and

I.V. At some point in time, the volume resuscitation should be decreased with

more emphasis on alpha-agonist treatment.

[place]

## 2020-12-03 NOTE — IPN
PROGRESS NOTE



DATE:  12/03/2020 



SUBJECTIVE:  Mrs. Talyor has now been transferred to the PCU. She is sitting

comfortably in a chair and she states that she is not short of breath. 

Nonetheless her chest x-ray shows that she is starting to reaccumulate fluid

which I suspected two days ago. The chest catheter looks to be out of the chest.



PHYSICAL EXAMINATION:   

Her vital signs show a T-max of 98.6 with a heart rate that ranges between

67-94 in a sinus rhythm, respiratory rate of 16-20 without the use of accessory

muscles who is % saturated on room air and has blood pressures ranging

between 87/48 to 113/57. 

Her intake and output the past 24 hours has been recorded as 1330 in and 1335

out for near equality.  She has put out 660 mL from the chest tube in the last

24 hours, and only 20 mL in the last 12 hours. There is no air leak. 

Her weight today is 77.5 kg compared to 73.2 kg yesterday.

On physical examination she has markedly decreased breath sounds in the left

hemithorax.  Percussion note is dull in the left hemithorax. She has normal

vesicular sounds on the right.

Cardiac exam is without murmurs, clicks, gallops, or rubs. I cannot feel her

PMI. S1, S2 are normal. 

Abdomen is soft, nontender. Bowel sounds are positive. There is no

hepatomegaly. No CVA tenderness. 

Extremities show no pretibial edema, no calf tenderness, no differential

swelling of the upper extremities. 

Skin is warm and dry and perfused without cyanosis or mottling including that

of nailbeds and knees. 

Neck is supple. There is no jugular venous distention, no subcutaneous

emphysema.  Trachea is midline.  

Mouth shows the mucous membranes to be pink and moist. Lips and gums show no

lesions, no thrush. 

Eyes show her pupils to be equal, round, and reactive. Extraocular motions are

intact. Sclerae are slightly icteric.  

Neuro shows II-XII intact with normal gross motor, gross sensation intact. Gait

is not tested.  

Psychiatric shows her to be awake and alert and oriented times three with

appropriate mood and affect and conversational. 



Her white count today is 4.9 with a hemoglobin and hematocrit of 9.1 and 26.9

down from 9.9 and 29.1 yesterday. Platelet count is 93and stable.  Chemistries

today show essentially normal electrolytes with a BUN and creatinine of 35 and

2.17 which now has continued to go up from 1.79 on December 1st. Calcium is 8.6

with an albumin of 2.9.  Total bilirubin is 2.4 with a normal AST and ALT both

of which are 23. There are no blood gasses on her today. 



Her chest x-ray today again shows a diffuse haze in the left hemithorax.  Her

lateral view shows reaccumulation of the pleural fluid.  Catheter looks to be

out of the chest. 



IMPRESSION:   

1.  Recurrent left pleural effusion transudative secondary to #2.

2.  Alcoholic cirrhosis.

3.  Esophageal varices.

4.  Portal hypertension. 

5.  Hypothyroidism. 

6.  Gastroesophageal reflux disease. 

7.  Hepatic encephalopathy, improved.

8.  Pancreatitis, improved.

9.  Hepatorenal syndrome.

10.  Acute renal insufficiency on top of chronic renal disease. 

11.  History of alcoholism.

12.  Anxiety and depression. 



PLAN AND DISCUSSION:  As her pigtail catheter is now out, I will remove it. Dr. Silverio and I discussed her on December 1st.  Our therapeutic options are limited

and Dr. Silverio is going to arrange for a thoracentesis as an outpatient every

two weeks to keep her at least comfortable. I would again recommend that we

entertain sending her to a transplant center for evaluation.  While she has not

passed the six month payal of abstinence, she is on her way to it. The only

permanent fix to this problem is going to be a liver transplant. She is

relatively young being 60 years old.

## 2020-12-04 VITALS — SYSTOLIC BLOOD PRESSURE: 89 MMHG | DIASTOLIC BLOOD PRESSURE: 49 MMHG

## 2020-12-04 VITALS — DIASTOLIC BLOOD PRESSURE: 58 MMHG | SYSTOLIC BLOOD PRESSURE: 98 MMHG

## 2020-12-04 VITALS — DIASTOLIC BLOOD PRESSURE: 53 MMHG | SYSTOLIC BLOOD PRESSURE: 105 MMHG

## 2020-12-04 VITALS — SYSTOLIC BLOOD PRESSURE: 93 MMHG | DIASTOLIC BLOOD PRESSURE: 52 MMHG

## 2020-12-04 VITALS — DIASTOLIC BLOOD PRESSURE: 69 MMHG | SYSTOLIC BLOOD PRESSURE: 109 MMHG

## 2020-12-04 VITALS — DIASTOLIC BLOOD PRESSURE: 53 MMHG | SYSTOLIC BLOOD PRESSURE: 98 MMHG

## 2020-12-04 VITALS — SYSTOLIC BLOOD PRESSURE: 104 MMHG | DIASTOLIC BLOOD PRESSURE: 60 MMHG

## 2020-12-04 LAB
BUN SERPL-MCNC: 33 MG/DL (ref 7–18)
CALCIUM SERPL-MCNC: 8.1 MG/DL (ref 8.8–10.2)
CHLORIDE SERPL-SCNC: 111 MEQ/L (ref 98–107)
CO2 SERPL-SCNC: 21 MEQ/L (ref 21–32)
CREAT SERPL-MCNC: 1.68 MG/DL (ref 0.55–1.3)
GFR SERPL CREATININE-BSD FRML MDRD: 33.1 ML/MIN/{1.73_M2} (ref 45–?)
GLUCOSE SERPL-MCNC: 71 MG/DL (ref 70–100)
HCT VFR BLD AUTO: 27.7 % (ref 36–47)
HGB BLD-MCNC: 9 G/DL (ref 12–15.5)
MCH RBC QN AUTO: 31.6 PG (ref 27–33)
MCHC RBC AUTO-ENTMCNC: 32.5 G/DL (ref 32–36.5)
MCV RBC AUTO: 97.2 FL (ref 80–96)
PLATELET # BLD AUTO: 79 10^3/UL (ref 150–450)
POTASSIUM SERPL-SCNC: 5.2 MEQ/L (ref 3.5–5.1)
RBC # BLD AUTO: 2.85 10^6/UL (ref 4–5.4)
SODIUM SERPL-SCNC: 138 MEQ/L (ref 136–145)
WBC # BLD AUTO: 4.5 10^3/UL (ref 4–10)

## 2020-12-04 RX ADMIN — MIDODRINE HYDROCHLORIDE SCH MG: 5 TABLET ORAL at 15:52

## 2020-12-04 RX ADMIN — SODIUM CHLORIDE SCH ML: 9 INJECTION, SOLUTION INTRAMUSCULAR; INTRAVENOUS; SUBCUTANEOUS at 15:52

## 2020-12-04 RX ADMIN — LATANOPROST SCH DROP: 50 SOLUTION OPHTHALMIC at 20:50

## 2020-12-04 RX ADMIN — BRIMONIDINE TARTRATE SCH DROP: 1.5 SOLUTION OPHTHALMIC at 08:28

## 2020-12-04 RX ADMIN — FOLIC ACID SCH MG: 1 TABLET ORAL at 08:28

## 2020-12-04 RX ADMIN — MIDODRINE HYDROCHLORIDE SCH MG: 5 TABLET ORAL at 12:04

## 2020-12-04 RX ADMIN — MIDODRINE HYDROCHLORIDE SCH MG: 5 TABLET ORAL at 08:28

## 2020-12-04 RX ADMIN — OCTREOTIDE ACETATE SCH MCG: 100 INJECTION, SOLUTION INTRAVENOUS; SUBCUTANEOUS at 06:02

## 2020-12-04 RX ADMIN — BRIMONIDINE TARTRATE SCH DROP: 1.5 SOLUTION OPHTHALMIC at 20:50

## 2020-12-04 RX ADMIN — MULTIPLE VITAMINS W/ MINERALS TAB SCH TAB: TAB at 08:28

## 2020-12-04 RX ADMIN — LACTULOSE SCH ML: 10 SOLUTION ORAL at 08:28

## 2020-12-04 RX ADMIN — Medication SCH UNITS: at 20:50

## 2020-12-04 RX ADMIN — Medication SCH UNITS: at 15:53

## 2020-12-04 RX ADMIN — LEVOTHYROXINE SODIUM SCH MCG: 112 TABLET ORAL at 06:02

## 2020-12-04 RX ADMIN — SODIUM CHLORIDE, PRESERVATIVE FREE SCH ML: 5 INJECTION INTRAVENOUS at 06:02

## 2020-12-04 RX ADMIN — DEXTROSE MONOHYDRATE SCH MG: 50 INJECTION, SOLUTION INTRAVENOUS at 18:04

## 2020-12-04 RX ADMIN — LACTULOSE SCH ML: 10 SOLUTION ORAL at 20:50

## 2020-12-04 RX ADMIN — SODIUM CHLORIDE SCH ML: 9 INJECTION, SOLUTION INTRAMUSCULAR; INTRAVENOUS; SUBCUTANEOUS at 20:50

## 2020-12-04 RX ADMIN — MAGNESIUM HYDROXIDE SCH ML: 400 SUSPENSION ORAL at 08:28

## 2020-12-04 NOTE — IPNPDOC
Text Note


Date of Service


The patient was seen on 12/4/20.





NOTE


Subjective: Patient seen and examined at bedside. Denies any SOB, Pig tail re

moved yesterday. Fluid has reaccumulated on the left side up to the middle of 

the chest. CXR same as yesterday. No fever or chills, Patient alert and 

oriented. Reports that she had several large bowel movements at night. 





Objective:


Vitals (See below)


General: NAD, appears comfortable, laying down flat on bed on the left side. 


HEENT: NC, AT, dry mucous membranes, anicteric eyes. 


CVS: +S1S2, rate normal, no rub or murmur or gallop.


Lungs: CTA B/L right lung fields, diminished breath sounds left lung fields, 

Dull to percussion. 


Abdomen: Soft, mildly distended. Mild epigastric tenderness, bowel sounds 

present. 


Extremities: No significant edema, - Calf tenderness


Neuro: Awake, alert and oriented. 





Labs and Radiology : Reviewed. 





Radiology:


CT chest 11/26: 1. Interval development of a large left pleural effusion in 

comparison to the prior study of 10/04/2020. 2. Small patchy infiltrates 

demonstrated in the apical segment on the right, findings of uncertain 

significance. Infection to be excluded clinically. 3. Atelectasis of the left 

lung with a rounded configuration demonstrated in the left lower lobe measuring 

2.9 x 4.2 x 3.7 cm, which may suggest a mass. Notably, a mass was not 

demonstrated on the prior examination favoring atelectasis. 





CT abdomen / pelvis 11/26: 1. The liver has a grossly nodular contour and there 

is relative hypertrophy of the caudate lobe, consistent with end-stage 

cirrhosis. 2. Borderline splenomegaly. 3. There is a mild-to-moderate amount of 

free intraperitoneal fluid present. 4. The gallbladder is incompletely 

distended. This is most likely related to incomplete fasting. Clinical 

correlation to exclude gallbladder pathology suggested. 5. There is a small 

bowel containing umbilical hernia. No definitive evidence 


of incarceration however findings should be correlated with clinical exam. 6. 

Mild anasarca. 7. Mild diverticulosis is present in the distal colon. No 

diverticulitis. 





A/P: 60F with PMHx cirrhosis 2/2 ETOH (Hx of Hepatic encephalopathy, Ascites, 

Esophageal varices, Recurrent L ascitic pleural effusion), HTN, Hypothyroidism, 

DLP, Asthma, Anxiety / Depression, who presented to the emergency room on 

instructions from her pulmonologist for shortness of breath and left lung 

whiteout. Patient had follow-up with her pulmonologist today for shortness of 

breath in the office, and an x-ray that had revealed complete whiteout of her 

left lung field. She was admitted for large left sided Pleural effusion. 





Acute Hepatic Encephalopathy


improved


Lactulose to achieve 2 to 3 bowel movements daily, rifaximin.





Left  Hepatic hydrothorax s/p pig tail drinage for several days. 


Has reaccumulated after pig tail removal . No SOB. 


will need out thoracocentesis atleast every 2 weeks to keep her breathing comf

ortable. Dr Silverio will set it up. 





Hypotension


Probably has some chronic hypotension from cirrhosis and volume loss through the

pig tail catheter. 


Now that pig tail drainage has been stopped Bp better


cont midodrine


Will allow MAP of 50 to 55. 





BAM/CKD


Hypovolemia with hepatorenal syndrome.


improved. may be due to stopping th pig tail cath loss vs starting on 

octreotide. 


On albumin and midodrine and octreotide. 


nephrology c/s appreciated


Cr baseline of 1.7-1.8


Will hold Torsemide / Spironolactone





Hyperkalemia


will monitor and treat as needed. 





Decompensated Cirrhosis 2/2 ETOH 


Multiple complications including; Hepatic encephalopathy / Ascites / Esophageal 

varices / Recurrent Left transudative pleural effusion/Thrombocytopenia/ 

Coagulopathy with INR of 1.8


At this time MELD- Na score is 28 with estimated 27% -32% mortality in 30 days. 


She has only stopped drinking in Sept 2020 so will not be a candidate for liver 

tx yet. 


c/w Lactulose, albumin, rifaximin





Epigastric / LUQ pain 


resolved


probably due to gastritis/ duodenitis +/-ascites and enlarged left lobe of 

liver. 


Elevated lipase probably due to duodenitis and BAM. 


I do not think patient had any pancreatitis. 





Hx of Alcoholism


c/w Folic acid, Thiamine, Multivitamins 





Hypothyroidism


c/w Levothyroxine 





DLP





Asthma


No evidence of exacerbation


Not on inhaled therapy at home 





Anxiety / Depression


c/w Hydroxyzine 





GI prophylaxis


c/w Protonix 





DVT prophylaxis 


c/w TEDs/Sequentials


will not give heparin due to thrombocytopenia.





VS,Fishbone, I+O


VS, Fishbone, I+O


Laboratory Tests


12/4/20 06:10











Vital Signs








  Date Time  Temp Pulse Resp B/P (MAP) Pulse Ox O2 Delivery O2 Flow Rate FiO2


 


12/4/20 07:48 97.3 108 18 109/69 (82) 96 Room Air  














I&O- Last 24 Hours up to 6 AM 


 


 12/4/20





 06:00


 


Intake Total 1180 ml


 


Output Total 370 ml


 


Balance 810 ml

















CONRADO GORDON MD                    Dec 4, 2020 10:58

## 2020-12-04 NOTE — REP
INDICATION:

pleural effusion



COMPARISON:

12/03/2020



TECHNIQUE:

PA and lateral.



FINDINGS:

Moderate left hydropneumothorax is again identified and essentially unchanged.  The

previously noted pigtail catheter has been completely removed.  Right hemithorax is

well aerated and clear.  Right IJ line with tip in the SVC.



IMPRESSION:

Moderate left hydropneumothorax unchanged from recent prior examination.





<Electronically signed by Yogi Glass > 12/04/20 0801

## 2020-12-05 VITALS — DIASTOLIC BLOOD PRESSURE: 55 MMHG | SYSTOLIC BLOOD PRESSURE: 97 MMHG

## 2020-12-05 VITALS — SYSTOLIC BLOOD PRESSURE: 98 MMHG | DIASTOLIC BLOOD PRESSURE: 48 MMHG

## 2020-12-05 VITALS — DIASTOLIC BLOOD PRESSURE: 59 MMHG | SYSTOLIC BLOOD PRESSURE: 101 MMHG

## 2020-12-05 VITALS — DIASTOLIC BLOOD PRESSURE: 69 MMHG | SYSTOLIC BLOOD PRESSURE: 103 MMHG

## 2020-12-05 LAB
BUN SERPL-MCNC: 34 MG/DL (ref 7–18)
CALCIUM SERPL-MCNC: 7.9 MG/DL (ref 8.8–10.2)
CHLORIDE SERPL-SCNC: 110 MEQ/L (ref 98–107)
CO2 SERPL-SCNC: 22 MEQ/L (ref 21–32)
CREAT SERPL-MCNC: 1.65 MG/DL (ref 0.55–1.3)
GFR SERPL CREATININE-BSD FRML MDRD: 33.8 ML/MIN/{1.73_M2} (ref 45–?)
GLUCOSE SERPL-MCNC: 86 MG/DL (ref 70–100)
HCT VFR BLD AUTO: 27.1 % (ref 36–47)
HGB BLD-MCNC: 8.8 G/DL (ref 12–15.5)
MCH RBC QN AUTO: 31.7 PG (ref 27–33)
MCHC RBC AUTO-ENTMCNC: 32.5 G/DL (ref 32–36.5)
MCV RBC AUTO: 97.5 FL (ref 80–96)
PLATELET # BLD AUTO: 93 10^3/UL (ref 150–450)
POTASSIUM SERPL-SCNC: 4.7 MEQ/L (ref 3.5–5.1)
RBC # BLD AUTO: 2.78 10^6/UL (ref 4–5.4)
SODIUM SERPL-SCNC: 137 MEQ/L (ref 136–145)
WBC # BLD AUTO: 5.2 10^3/UL (ref 4–10)

## 2020-12-05 RX ADMIN — BRIMONIDINE TARTRATE SCH DROP: 1.5 SOLUTION OPHTHALMIC at 08:38

## 2020-12-05 RX ADMIN — MIDODRINE HYDROCHLORIDE SCH MG: 5 TABLET ORAL at 08:36

## 2020-12-05 RX ADMIN — SODIUM CHLORIDE SCH ML: 9 INJECTION, SOLUTION INTRAMUSCULAR; INTRAVENOUS; SUBCUTANEOUS at 06:03

## 2020-12-05 RX ADMIN — SODIUM CHLORIDE SCH ML: 9 INJECTION, SOLUTION INTRAMUSCULAR; INTRAVENOUS; SUBCUTANEOUS at 14:00

## 2020-12-05 RX ADMIN — FOLIC ACID SCH MG: 1 TABLET ORAL at 08:36

## 2020-12-05 RX ADMIN — MIDODRINE HYDROCHLORIDE SCH MG: 5 TABLET ORAL at 12:25

## 2020-12-05 RX ADMIN — Medication SCH UNITS: at 14:00

## 2020-12-05 RX ADMIN — LEVOTHYROXINE SODIUM SCH MCG: 112 TABLET ORAL at 06:03

## 2020-12-05 RX ADMIN — MULTIPLE VITAMINS W/ MINERALS TAB SCH TAB: TAB at 08:36

## 2020-12-05 RX ADMIN — Medication SCH UNITS: at 06:03

## 2020-12-05 RX ADMIN — MAGNESIUM HYDROXIDE SCH ML: 400 SUSPENSION ORAL at 08:36

## 2020-12-05 RX ADMIN — LACTULOSE SCH ML: 10 SOLUTION ORAL at 08:37

## 2020-12-05 NOTE — IPN
NEPHROLOGY PROGRESS NOTE



DATE:  12/4/2020



SUBJECTIVE:  Patient was seen and examined at the bedside today morning. She is

afebrile and hemodynamically stable. Her left-sided chest tube was removed

yesterday. Her renal function is significantly better. Creatinine is improved

from 2.1 to 1.6 today. She still has an indwelling Braun catheter.



OBJECTIVE: 

VITAL SIGNS: Temperature 98.2 degrees Fahrenheit, blood pressure 98/53, pulse

82, respiratory rate 18, saturating 98% on room air. 

INTAKE/OUTPUT: Urine output recorded as 650 mL. Weight in the bed scale is 77.6

kg.



PHYSICAL EXAMINATION:

GENERAL: Patient is awake, alert, oriented x3, sitting up in bed, in no

apparent distress. 

HEAD AND NECK:  Extraocular muscles intact. Pupils equally round and reactive

to light. Mucous membranes are moist. Neck is supple. There is no JVD. 

CARDIOVASCULAR: S1, S2, regular rate. No edema of the bilateral lower

extremities. 

RESPIRATORY: Decreased breath sounds at the left base all the way up to the

left mid lung zone. 

ABDOMEN: Soft, positive bowel sounds, nontender. 

MUSCULOSKELETAL: No clubbing or cyanosis. Pulses are 2+. 

CNS: No focal deficit. Power is 5/5 in all extremities.



LAB REVIEW: CBC showed WBC 4.5, hemoglobin 9, platelets 79,000. BMP showed

sodium 138, potassium 5.2, chloride 111, bicarb 21, BUN 33, creatinine 1.6; it

was 2.1 yesterday. 



CURRENT INPATIENT MEDICATIONS: Patient's medications were all reviewed by

myself. I have stopped her Octreotide now. No other change in the medications

today as compared with yesterday.



ASSESSMENT AND PLAN:

  1.  Acute renal failure superimposed on chronic kidney disease: Renal

      function is significantly better with use of Octreotide and removal of the

      left-sided chest tube. Persistent drainage from the chest tube was causing

      volume depletion. Octreotide injection is being stopped now. Braun

      catheter can be removed.

  2.  Left-sided pleural effusion: It is associated with ascites and portal

      hypertension. Catheter has been removed. Patient will get intermittent

      left-sided thoracentesis almost every two weeks. 

  3.  Alcohol cirrhosis and hepatic encephalopathy: It is controlled with

      current dose of Rifaximin and lactulose. 



DISPOSITION: Patient is optimized from nephrology standpoint to be discharged

home if cleared by physical therapy. She would need to follow-up with

nephrology within two weeks after discharge from the hospital.

## 2020-12-06 NOTE — DS.PDOC
Discharge Summary


General


Date of Admission


Nov 25, 2020 at 17:13


Date of Discharge


12/5/20





Discharge Summary


PROCEDURES PERFORMED DURING STAY: 


Pig tail catheter placement on the left. 





DISCHARGE DIAGNOSES:


Left sided persistent hepatic hydrothorax which did not improve after pigtail 

catheter for several days.


Decompensated alcoholic cirrhosis


Acute hepatic encephalopathy


BAM on CKD


Hypovolemia with Hepatorenal syndrome


Thrombocytopenia


coagulopathy


Portal hypertension with esophageal varices 


Chronic Hypotension


Hypothyroid


HLD


Asthma


Depression


Anxiety





COMPLICATIONS/CHIEF COMPLAINT: Ascites Due To Alcoholic Cirrhosis,Hepatorenal 

Syn.





HOSPITAL COURSE: 60F with PMHx cirrhosis 2/2 ETOH (Hx of Hepatic encephalopathy,

Ascites, Esophageal varices, Recurrent L ascitic pleural effusion), HTN, Hypot

hyroidism, DLP, Asthma, Anxiety / Depression, who presented to the emergency 

room on instructions from her pulmonologist for shortness of breath and left 

lung whiteout. Patient had follow-up with her pulmonologist today for shortness 

of breath in the office, and an x-ray that had revealed complete whiteout of her

left lung field. She was admitted for large left sided Pleural effusion. 





Acute Hepatic Encephalopathy


improved


Lactulose to achieve 2 to 3 bowel movements daily, rifaximin.





Left  Hepatic hydrothorax s/p pig tail drinage for several days. 


Has reaccumulated after pig tail removal . No SOB. 


will need out thoracocentesis at least every 2 weeks to keep her breathing 

comfortable. Dr Silverio will set it up. 





Hypotension


Probably has some chronic hypotension from cirrhosis and volume loss through the

pig tail catheter. 


Now that pig tail drainage has been stopped Bp better


cont midodrine





BAM/CKD


Hypovolemia with hepatorenal syndrome.


improved. may be due to stopping the pig tail cath loss vs starting on 

octreotide. 


nephrology c/s appreciated


Cr at baseline of 1.7-1.8


Will hold Torsemide / Spironolactone for now


Follow up with Nephrology.





Hyperkalemia


will monitor and treat as needed. 





Decompensated Cirrhosis 2/2 ETOH 


Multiple complications including; Hepatic encephalopathy / Ascites / Esophageal 

varices / Recurrent Left transudative pleural effusion/Thrombocytopenia/ 

Coagulopathy with INR of 1.8


At this time MELD- Na score is 28 with estimated 27% -32% mortality in 30 days. 


She has only stopped drinking in Sept 2020 so will not be a candidate for liver 

tx yet. 


c/w Lactulose, albumin, rifaximin





Epigastric / LUQ pain 


resolved


probably due to gastritis/ duodenitis +/-ascites and enlarged left lobe of 

liver. 


Elevated lipase probably due to duodenitis and BAM. 


I do not think patient had any pancreatitis. 





Hx of Alcoholism


c/w Folic acid, Thiamine, Multivitamins 





Hypothyroidism


c/w Levothyroxine 





DLP





Asthma


No evidence of exacerbation


Not on inhaled therapy at home 





Anxiety / Depression


c/w Hydroxyzine 





GI prophylaxis


c/w Protonix 





DISCHARGE MEDICATIONS: Please see below.


 


ALLERGIES: Please see below.





PHYSICAL EXAMINATION ON DISCHARGE:


VITAL SIGNS: Please see below.


General: NAD, appears comfortable, laying down flat on bed on the left side. 


HEENT: NC, AT, dry mucous membranes, anicteric eyes. 


CVS: +S1S2, rate normal, no rub or murmur or gallop.


Lungs: CTA B/L right lung fields, diminished breath sounds left lung fields, 

Dull to percussion. 


Abdomen: Soft, mildly distended. Mild epigastric tenderness, bowel sounds 

present. 


Extremities: No significant edema, - Calf tenderness


Neuro: Awake, alert and oriented. 





LABORATORY DATA: Please see below.





Radiology:


CT chest 11/26: 1. Interval development of a large left pleural effusion in 

comparison to the prior study of 10/04/2020. 2. Small patchy infiltrates 

demonstrated in the apical segment on the right, findings of uncertain 

significance. Infection to be excluded clinically. 3. Atelectasis of the left 

lung with a rounded configuration demonstrated in the left lower lobe measuring 

2.9 x 4.2 x 3.7 cm, which may suggest a mass. Notably, a mass was not 

demonstrated on the prior examination favoring atelectasis. 





CT abdomen / pelvis 11/26: 1. The liver has a grossly nodular contour and there 

is relative hypertrophy of the caudate lobe, consistent with end-stage 

cirrhosis. 2. Borderline splenomegaly. 3. There is a mild-to-moderate amount of 

free intraperitoneal fluid present. 4. The gallbladder is incompletely 

distended. This is most likely related to incomplete fasting. Clinical 

correlation to exclude gallbladder pathology suggested. 5. There is a small 

bowel containing umbilical hernia. No definitive evidence 


of incarceration however findings should be correlated with clinical exam. 6. 

Mild anasarca. 7. Mild diverticulosis is present in the distal colon. No 

divertic





ACTIVITY: [As tolerated].





DIET: 2 gm sodium, fluid restriction 1.8 liters. 





DISCHARGE PLAN: Home





DISCHARGE INSTRUCTIONS:


Follow up Dr Silverio for outpatient pleural tap


Follow up Renal in 1 week





DISCHARGE CONDITION: [Stable].





TIME SPENT ON DISCHARGE: 35 minutes.





Vital Signs/I&Os





Vital Signs








  Date Time  Temp Pulse Resp B/P (MAP) Pulse Ox O2 Delivery O2 Flow Rate FiO2


 


12/5/20 07:33 97.6 97 18 97/55 (69) 99 Room Air  














I&O- Last 24 Hours up to 6 AM 


 


 12/5/20





 06:00


 


Intake Total 1440 ml


 


Output Total 975 ml


 


Balance 465 ml











Laboratory Data


Labs 24H


Laboratory Tests 2


12/5/20 06:01: 


Nucleated Red Blood Cells % (auto) 0.0, Anion Gap 5L, Glomerular Filtration Rate

 33.8L, Calcium Level 7.9L


CBC/BMP


Laboratory Tests


12/5/20 06:01











Microbiology





Microbiology


11/25/20 Acid Fast Stain, Received


           Pending


11/25/20 Mycobacterial Culture, Received


           Pending


11/25/20 Fungal Smear, Received


           Pending


11/25/20 Fungal Culture, Received


           Pending


11/25/20 Gram Stain - Final, Complete


           


11/25/20 Anaerobic Culture - Final, Complete


           


11/25/20 Body Fluid Culture - Final, Complete





Discharge Medications


Scheduled


Betaxolol Hcl (Betaxolol HCl) 0.5 % Sol, 1 DROP OU BID, (Reported)


Brimonidine Tartrate (Brimonidine Tartrate) 0.2% 5ML Drops, 1 DROP OU BID, 

(Reported)


Cholecalciferol (Vitamin D3) (Vitamin D3) 1,000 Unit Tablet, 1,000 UNITS PO 

DAILY, (Reported)


Folic Acid (Folic Acid) 1 Mg Tablet, 1 MG PO DAILY, (Reported)


Lactulose (Lactulose) 10 Gm/15 Ml Solution, 30 ML PO BID, (Reported)


Latanoprost (Xalatan) 0.005% 2.5ML Drops, 1 DROP OU QHS, (Reported)


Levothyroxine Sodium (Synthroid) 112 Mcg Tablet, 112 MCG PO DAILY, (Reported)


Midodrine HCl (Midodrine HCl) 5 Mg Tablet, 10 MG PO TID


   0800/1200/1600 


Multivitamins (Thera M Plus Tablet) 1 Each Tablet, 1 TAB PO DAILY, (Reported)


Rifaximin (Xifaxan) 200 Mg Tablet, 400 MG PO BID


Spironolactone (Spironolactone) 50 Mg Tablet, 25 MG PO BID


Torsemide (Torsemide) 20 Mg Tablet, 20 MG PO DAILY





Scheduled PRN


Hydroxyzine HCl (Hydroxyzine HCl) 25 Mg Tablet, 25 MG PO QHS PRN for SLEEP, 

(Reported)


Loratadine (Loratadine) 10 Mg Tablet, 10 MG PO DAILY PRN for ALLERGIES, 

(Reported)





Allergies


Coded Allergies:  


     cefdinir (Verified  Allergy, Severe, THROAT CLOSES, 5/27/20)


     Penicillins (Verified  Allergy, Unknown, UNKNOWN CHILDHOOD REACTION, 

5/27/20)











CONRADO GORDON MD                    Dec 5, 2020 11:33

## 2020-12-06 NOTE — IPN
PROGRESS NOTE



DATE:  12/05/2020



SUBJECTIVE:  Patient was seen and examined at the bedside today morning.  She

is afebrile, hemodynamically stable.  She is feeling better.  Renal function is

stable since yesterday.  Hyperkalemia has improved. 



OBJECTIVE:

Vital signs:  Temperature is 96.9 degrees Fahrenheit, blood pressure 103/69,

pulse is 100, respiratory rate of 18, saturating 98% on room air.

Intake and output:  Urine output recorded is 750 mL yesterday, 325 mL so far

today.  Weight in the bed scale is 79.1 kg.



PHYSICAL EXAMINATION: 

General:  Patient is awake, alert, oriented times three, sitting up in the bed,

no apparent distress.

Head and neck exam:  Extraocular muscles intact.  Pupils equally round and

reactive to light.  Mucous membranes are moist.  Neck is supple.  There is no

significant jugular venous distension (JVD).

Cardiovascular:  S1, S2, regular rate.  No edema of the bilateral lower

extremities.

Respiratory:  Decreased breath sounds at the left base all the way up to the

mid lung zone.  

Abdomen:  Soft, positive bowel sounds, nontender.

Musculoskeletal:  No clubbing or cyanosis.  Pulses are 2+.

Central nervous system (CNS):  No focal deficits.  Power is 5/5 in all

extremities.



LABORATORY REVIEW:  

CBC showed WBC of 5.2, hemoglobin 8.8, platelets are 93.  



BMP showed sodium 137, potassium 4.7, chloride 110, bicarbonate 22, BUN 34,

creatinine is 1.65.



IMAGING:  Chest x-ray was done today which showed extensive pleural parenchymal

opacity persistent on the left.



CURRENT INPATIENT MEDICATIONS:  Patient's medications were all reviewed by

myself.  Because of portal hypertension and recurrent ascites associated with

left-sided pleural effusion, she has been started on spironolactone 25 mg by

mouth twice a day and torsemide 20 mg by mouth daily.  

ASSESSMENT AND PLAN:  

1.  Acute renal failure superimposed on chronic kidney disease.  Renal function

is stable.  Creatinine is 1.6 which is the same as yesterday.  I am going to

introduce a low dose of diuretics as mentioned above.



2.  Alcoholic cirrhosis with recurrent left-sided pleural effusion.  Patient

had a pigtail catheter placed and it kept on draining and that affected the

renal function.  Catheter has been removed.  Patient will now get intermittent

thoracentesis as outpatient.



3.  Hypotension.  Patient is responding well to current dose of midodrine 10 mg

by mouth three times a day.



4.  Hepatic encephalopathy.  It is controlled with current dose of lactulose

and rifaximin.  



5.  Disposition.  Patient's renal function is stable.  She has been started on

a low dose of diuretics now.  She will need to followup with nephrology service

within 1-2 weeks after discharge from the hospital.

## 2020-12-18 ENCOUNTER — HOSPITAL ENCOUNTER (OUTPATIENT)
Dept: HOSPITAL 53 - M IRPRO | Age: 60
End: 2020-12-18
Attending: INTERNAL MEDICINE
Payer: COMMERCIAL

## 2020-12-18 VITALS — DIASTOLIC BLOOD PRESSURE: 52 MMHG | SYSTOLIC BLOOD PRESSURE: 94 MMHG

## 2020-12-18 DIAGNOSIS — J90: Primary | ICD-10-CM

## 2020-12-18 LAB
APPEARANCE FLD: (no result)
COLOR PLR: YELLOW
SPECIMEN SOURCE FLD: (no result)

## 2020-12-18 PROCEDURE — 96365 THER/PROPH/DIAG IV INF INIT: CPT

## 2020-12-18 PROCEDURE — 87205 SMEAR GRAM STAIN: CPT

## 2020-12-18 PROCEDURE — 32555 ASPIRATE PLEURA W/ IMAGING: CPT

## 2020-12-18 PROCEDURE — 89051 BODY FLUID CELL COUNT: CPT

## 2020-12-18 PROCEDURE — 87070 CULTURE OTHR SPECIMN AEROBIC: CPT

## 2020-12-18 NOTE — REP
INDICATION:

PLEURAL EFFUSION



COMPARISON:

None.



TECHNIQUE:

The procedure was performed by Miladis Angulo [facility],  under the direct

supervision of Dr. Deng



The risks and benefits of the procedure were explained to the patient and an informed

consent was obtained both verbally and written.  Directly prior to the start of the

procedure a formal time-out was completed in the procedure room.



Pleural fluid in left lung zone was localized using ultrasound guidance.  The skin was

prepped and draped in a sterile fashion.  Eleven ML of buffered lidocaine was used as

a local anesthetic. Using ultrasound guidance an 8-Kyrgyz multi side-hole catheter was

inserted using trocar technique.



FINDINGS:

Two thousand one hundred mL mL of solid yellow colored fluid was withdrawn,

approximately 1500 mL were sent to the laboratory for further analysis.



The patient tolerated the procedure well and there were no immediate complications.

After the appropriate amount of monitored convalescence, the patient was discharged

from the department.



IMPRESSION:

Ultrasound-guided thoracentesis with removal of 2100 mL of solid yellow pleural fluid.





<Electronically signed by Miladis Angulo > 12/18/20 1433

<Electronically signed by Noam Deng > 12/18/20 1610

## 2020-12-18 NOTE — REP
INDICATION:

POST LEFT THORA, 2 VIEW.



COMPARISON:

Comparison chest x-ray December 5, 2020..



TECHNIQUE:

Two views.



FINDINGS:

Post left Thora centesis radiographs demonstrate improved left pleural effusion.

There is slight blunting of the left lateral and left posterior pleural angles.  There

is a dextroconvex curve again noted in the thoracic spine.  Right lung is clear.  No

infiltrate is noted on the left.  There is no evidence of pneumothorax.



IMPRESSION:

Improved left pleural effusion.  No complication seen.





<Electronically signed by Noam Deng > 12/18/20 0989

## 2020-12-30 ENCOUNTER — HOSPITAL ENCOUNTER (OUTPATIENT)
Dept: HOSPITAL 53 - M IRPRO | Age: 60
End: 2020-12-30
Attending: INTERNAL MEDICINE
Payer: COMMERCIAL

## 2020-12-30 VITALS — SYSTOLIC BLOOD PRESSURE: 109 MMHG | DIASTOLIC BLOOD PRESSURE: 55 MMHG

## 2020-12-30 DIAGNOSIS — J90: Primary | ICD-10-CM

## 2020-12-30 LAB
APPEARANCE FLD: (no result)
COLOR PLR: YELLOW
SPECIMEN SOURCE FLD: (no result)

## 2020-12-30 PROCEDURE — 87070 CULTURE OTHR SPECIMN AEROBIC: CPT

## 2020-12-30 PROCEDURE — 96365 THER/PROPH/DIAG IV INF INIT: CPT

## 2020-12-30 PROCEDURE — 96361 HYDRATE IV INFUSION ADD-ON: CPT

## 2020-12-30 PROCEDURE — 32555 ASPIRATE PLEURA W/ IMAGING: CPT

## 2020-12-30 PROCEDURE — 89051 BODY FLUID CELL COUNT: CPT

## 2020-12-30 PROCEDURE — 87205 SMEAR GRAM STAIN: CPT

## 2020-12-30 NOTE — REP
INDICATION:

POST LEFT THORA, 2 VIEW.



COMPARISON:

Comparison chest x-ray December 18, 2020..



TECHNIQUE:

PA and lateral views.



FINDINGS:

There is no evidence of pneumothorax.  There is persistent mild blunting of the left

lateral and posterior pleural angles.  Right pleural angles are sharp.  No infiltrate

is seen.  Heart size is unchanged.



IMPRESSION:





No complications seen.  Small left pleural effusion persists.





<Electronically signed by Noam Deng > 12/30/20 0442

## 2020-12-31 NOTE — REP
INDICATION:

PLEURAL EFFUSION.



COMPARISON:

None.



TECHNIQUE:

The procedure was performed under the direct supervision of Dr. Deng.



The risks and benefits of the procedure were explained to the patient and informed

consent was obtained.



The left pleural effusion was localized using ultrasound guidance.  The skin was

prepped and draped in a sterile fashion.  1% lidocaine was used as a local anesthetic.

An 8 Vincentian multi side hole catheter was inserted using trocar technique.  1760 mL of

cloudy yellow fluid was withdrawn and discarded.



FINDINGS:

None



IMPRESSION:

Ultrasound-guided left pleural effusion yielding 1760 mL of cloudy yellow fluid.



<Electronically signed by Surya Alvarado > 12/30/20 1623

<Electronically signed by Noam Deng > 12/31/20 [id number]

## 2021-01-13 ENCOUNTER — HOSPITAL ENCOUNTER (OUTPATIENT)
Dept: HOSPITAL 53 - M IRPRO | Age: 61
End: 2021-01-13
Attending: INTERNAL MEDICINE
Payer: COMMERCIAL

## 2021-01-13 VITALS — DIASTOLIC BLOOD PRESSURE: 60 MMHG | SYSTOLIC BLOOD PRESSURE: 101 MMHG

## 2021-01-13 DIAGNOSIS — J90: Primary | ICD-10-CM

## 2021-01-13 LAB
APPEARANCE FLD: (no result)
COLOR PLR: (no result)
SPECIMEN SOURCE FLD: (no result)

## 2021-01-13 PROCEDURE — 87070 CULTURE OTHR SPECIMN AEROBIC: CPT

## 2021-01-13 PROCEDURE — 89051 BODY FLUID CELL COUNT: CPT

## 2021-01-13 PROCEDURE — 32555 ASPIRATE PLEURA W/ IMAGING: CPT

## 2021-01-13 PROCEDURE — 96365 THER/PROPH/DIAG IV INF INIT: CPT

## 2021-01-13 PROCEDURE — 87205 SMEAR GRAM STAIN: CPT

## 2021-01-13 NOTE — REP
INDICATION:

POST LEFT THORA, 2 VIEW.



COMPARISON:

12/30/2020.



TECHNIQUE:

Two views of the chest are performed.



FINDINGS:

The patient had left thoracentesis today.  There is a moderate to large left pleural

effusion with no evidence of pneumothorax.  Right lung is clear.



IMPRESSION:

No pneumothorax status post left thoracentesis.  Moderate to large amount of residual

left pleural fluid.





<Electronically signed by Sushant Khan > 01/13/21 131

## 2021-01-13 NOTE — REP
INDICATION:

LEFT PLEURAL EFFUSION.



COMPARISON:

None.



TECHNIQUE:

The procedure was performed under the direct supervision of Dr. Khan.



The risks and benefits of the procedure were explained to the patient and informed

consent was obtained.



The left pleural effusion was localized using ultrasound guidance.  The skin was

prepped and draped in a sterile fashion.  1% lidocaine was used as a local anesthetic.

An 8 [place] multi side hole catheter was inserted using trocar technique.  1480 cc of

sotero colored fluid was withdrawn with a sample sent to the lab for analysis.



The patient tolerated the procedure well and there were no immediate complications.

After the appropriate amount to monitor convalescence the patient was discharged from

the department.



FINDINGS:

None



IMPRESSION:

Ultrasound-guided left thoracentesis yielding 1480 cc of sotero colored fluid.



<Electronically signed by Surya Alvarado > 01/13/21 1542

<Electronically signed by Sushant Khan > 01/13/21 2000

## 2021-01-16 ENCOUNTER — HOSPITAL ENCOUNTER (EMERGENCY)
Dept: HOSPITAL 53 - M ED | Age: 61
Discharge: HOME | End: 2021-01-16
Payer: COMMERCIAL

## 2021-01-16 VITALS — DIASTOLIC BLOOD PRESSURE: 52 MMHG | SYSTOLIC BLOOD PRESSURE: 148 MMHG

## 2021-01-16 VITALS — WEIGHT: 176.37 LBS | BODY MASS INDEX: 27.68 KG/M2 | HEIGHT: 67 IN

## 2021-01-16 DIAGNOSIS — K85.90: ICD-10-CM

## 2021-01-16 DIAGNOSIS — N39.0: Primary | ICD-10-CM

## 2021-01-16 DIAGNOSIS — J90: ICD-10-CM

## 2021-01-16 DIAGNOSIS — I10: ICD-10-CM

## 2021-01-16 DIAGNOSIS — Z88.1: ICD-10-CM

## 2021-01-16 DIAGNOSIS — K70.31: ICD-10-CM

## 2021-01-16 DIAGNOSIS — Z88.0: ICD-10-CM

## 2021-01-16 DIAGNOSIS — R16.1: ICD-10-CM

## 2021-01-16 DIAGNOSIS — Z79.899: ICD-10-CM

## 2021-01-16 DIAGNOSIS — F17.200: ICD-10-CM

## 2021-01-16 DIAGNOSIS — K57.30: ICD-10-CM

## 2021-01-16 DIAGNOSIS — N83.292: ICD-10-CM

## 2021-01-16 LAB
ALBUMIN SERPL BCG-MCNC: 2.4 GM/DL (ref 3.2–5.2)
ALT SERPL W P-5'-P-CCNC: 29 U/L (ref 12–78)
AMYLASE SERPL-CCNC: 112 U/L (ref 25–115)
BASOPHILS # BLD AUTO: 0.1 10^3/UL (ref 0–0.2)
BASOPHILS NFR BLD AUTO: 0.8 % (ref 0–1)
BILIRUB CONJ SERPL-MCNC: 1 MG/DL (ref 0–0.2)
BILIRUB SERPL-MCNC: 2.7 MG/DL (ref 0.2–1)
BUN SERPL-MCNC: 34 MG/DL (ref 7–18)
CALCIUM SERPL-MCNC: 8.2 MG/DL (ref 8.8–10.2)
CHLORIDE SERPL-SCNC: 99 MEQ/L (ref 98–107)
CO2 SERPL-SCNC: 27 MEQ/L (ref 21–32)
CREAT SERPL-MCNC: 1.82 MG/DL (ref 0.55–1.3)
EOSINOPHIL # BLD AUTO: 0.2 10^3/UL (ref 0–0.5)
EOSINOPHIL NFR BLD AUTO: 3.4 % (ref 0–3)
ETHANOL SERPL-MCNC: < 0.003 % (ref 0–0.01)
GFR SERPL CREATININE-BSD FRML MDRD: 30.2 ML/MIN/{1.73_M2} (ref 45–?)
GLUCOSE SERPL-MCNC: 163 MG/DL (ref 70–100)
HCT VFR BLD AUTO: 33.4 % (ref 36–47)
HGB BLD-MCNC: 10.9 G/DL (ref 12–15.5)
INR PPP: 1.5
LIPASE SERPL-CCNC: 504 U/L (ref 73–393)
LYMPHOCYTES # BLD AUTO: 1.1 10^3/UL (ref 1.5–5)
LYMPHOCYTES NFR BLD AUTO: 18.5 % (ref 24–44)
MCH RBC QN AUTO: 29.1 PG (ref 27–33)
MCHC RBC AUTO-ENTMCNC: 32.6 G/DL (ref 32–36.5)
MCV RBC AUTO: 89.1 FL (ref 80–96)
MONOCYTES # BLD AUTO: 0.5 10^3/UL (ref 0–0.8)
MONOCYTES NFR BLD AUTO: 8.5 % (ref 0–5)
NEUTROPHILS # BLD AUTO: 4.2 10^3/UL (ref 1.5–8.5)
NEUTROPHILS NFR BLD AUTO: 68.5 % (ref 36–66)
PLATELET # BLD AUTO: 132 10^3/UL (ref 150–450)
POTASSIUM SERPL-SCNC: 4.9 MEQ/L (ref 3.5–5.1)
PROT SERPL-MCNC: 6.6 GM/DL (ref 6.4–8.2)
PROTHROMBIN TIME: 18.4 SECONDS (ref 12.5–14.3)
RBC # BLD AUTO: 3.75 10^6/UL (ref 4–5.4)
SODIUM SERPL-SCNC: 132 MEQ/L (ref 136–145)
WBC # BLD AUTO: 6.1 10^3/UL (ref 4–10)

## 2021-01-16 PROCEDURE — 83690 ASSAY OF LIPASE: CPT

## 2021-01-16 PROCEDURE — 36415 COLL VENOUS BLD VENIPUNCTURE: CPT

## 2021-01-16 PROCEDURE — 99284 EMERGENCY DEPT VISIT MOD MDM: CPT

## 2021-01-16 PROCEDURE — 80048 BASIC METABOLIC PNL TOTAL CA: CPT

## 2021-01-16 PROCEDURE — 81001 URINALYSIS AUTO W/SCOPE: CPT

## 2021-01-16 PROCEDURE — 85025 COMPLETE CBC W/AUTO DIFF WBC: CPT

## 2021-01-16 PROCEDURE — 80076 HEPATIC FUNCTION PANEL: CPT

## 2021-01-16 PROCEDURE — 74176 CT ABD & PELVIS W/O CONTRAST: CPT

## 2021-01-16 PROCEDURE — 82150 ASSAY OF AMYLASE: CPT

## 2021-01-16 PROCEDURE — 87186 SC STD MICRODIL/AGAR DIL: CPT

## 2021-01-16 PROCEDURE — 85610 PROTHROMBIN TIME: CPT

## 2021-01-16 PROCEDURE — 87088 URINE BACTERIA CULTURE: CPT

## 2021-01-16 NOTE — REPVR
PROCEDURE INFORMATION: 

Exam: CT Abdomen And Pelvis Without Contrast 

Exam date and time: 1/16/2021 10:05 PM 

Age: 60 years old 

Clinical indication: Condition or disease; Ascites; Additional info: Ascities 



TECHNIQUE: 

Imaging protocol: Computed tomography of the abdomen and pelvis without 

contrast. 

Radiation optimization: All CT scans at this facility use at least one of these 

dose optimization techniques: automated exposure control; mA and/or kV 

adjustment per patient size (includes targeted exams where dose is matched to 

clinical indication); or iterative reconstruction. 



COMPARISON: 

CT ABD PELVIS W/O CONTRAST 2020-11-25 17:21 



FINDINGS: 

Limitations: Study is limited by the absence of contrast. 



Pleural space: Moderate to large left-sided pleural effusion. 



Scattered mild colonic diverticulosis.  2.5 cm simple benign left ovarian cyst.



Liver: Advanced liver cirrhosis. 

Gallbladder and bile ducts: Normal. No calcified stones. No ductal dilation. 

Pancreas: Normal. No ductal dilation. 

Spleen: Splenomegaly. 

Adrenal glands: Normal. No mass. 

Kidneys and ureters: Normal. No hydronephrosis. 

Stomach and bowel: Unremarkable. No obstruction. No mucosal thickening. 

Appendix: No evidence of appendicitis. 



Intraperitoneal space: Mesenteric edema. Mesenteric edema. Moderate ascites. 

Vasculature: Portal hypertension. 

Lymph nodes: Unremarkable. No enlarged lymph nodes. 

Urinary bladder: Unremarkable as visualized. 

Reproductive: Unremarkable as visualized. 

Bones/joints: Unremarkable. No acute fracture. 

Soft tissues: Recanalized umbilical vein. Umbilical varices. 



IMPRESSION: 

1. Advanced liver cirrhosis. 

2. Moderate to large left-sided pleural effusion. 

3. Splenomegaly. 

4. Moderate ascites. 



Electronically signed by: Elia Armenta On 01/16/2021  22:30:19 PM

## 2021-01-16 NOTE — CCD
Continuity of Care Document (CCD)

                             Created on: 2020



[facility]

External Reference #: [id number]

: 1960

Sex: Female



Demographics





                          Address                   [address] [place]  [address]

 

                          Home Phone                [phone]

 

                          Preferred Language        Unknown

 

                          Marital Status            Unknown

 

                          Rastafari Affiliation     Latter day (Non-Adventism, Non

-Specific)

 

                          Race                      White

 

                          Ethnic Group              Not  or 





Author





                          Organization              Unknown

 

                          Address                   Unknown

 

                          Phone                     Unavailable







Care Team Providers





                    Care Team Member Name Role                Phone

 

                    [facility] - Gastro[facility]

TM                [phone]

 

                    [facility]         AUTM                [phone]

 

                    [facility]M               

 [phone]

 

                    [facility] - [facility] Health AUTM            

    [phone]







Problems





                    Active Problems     Provider            Date

 

                    Essential hypertension Gabe Corral M.D. Onset: 

 

                    Hypothyroidism      Gabe Corral M.D. Onset: 2011

 

                    Vitamin D deficiency Gabe Corral M.D. Onset: 

 

                    Neck pain           Gabe Corral M.D. Onset: 2011

 

                    Generalized anxiety disorder Gabe Corral M.D. Onse

t: 2011

 

                    Allergic rhinitis   Gabe Corral M.D. Onset: 2012

 

                    Degeneration of lumbar intervertebral disc Benigno Corral M.D. Onset: 

2013

 

                    Alcoholic cirrhosis Gabe Corral M.D. Onset: 2018

 

                    Esophageal varices without bleeding Gabe Corral M. D. Onset: 2018







Social History





                Type            Date            Description     Comments

 

                Birth Sex                       Unknown          

 

                Tobacco Use     Start: Unknown  Never Smoked Cigarettes  

 

                Tobacco Use     Start: Unknown  Never Used Smokeless Tobacco  

 

                ETOH Use                        Consumed 4 Alcoholic Beverages P

er Day  

 

                Tobacco Use     Start: Unknown  Patient has never smoked  

 

                Recreational Drug Use                 Denies Drug Use  

 

                Smoking Status  Reviewed: 10/26/20 Patient has never smoked  

 

                Exercise Type/Frequency                 Exercises sporadically  

 

                Tattoo/Piercing                 Tattoo          3 

 

                Tattoo/Piercing                 Pierced ears    x2 

 

                Sun Exposure                    Minimum amount of sun exposure  

 

                Sun Exposure                    Uses sunscreen  Occasionally 

 

                Seat Belt/Car Seat                 Always uses seat belt  

 

                Bike Helmet                     Never           Does not bike ri

de. 

 

                Smoke Alarms                    Yes              

 

                Smoke Alarms                    Carbon Monoxide Detector: Yes  







Allergies, Adverse Reactions, Alerts





             Active Allergies Reaction     Severity     Comments     Date

 

             Penicillin                                          2004

 

             Cefdinir     Difficulty swallowing, Itching, throat felt itchy. Sev

ere                    2019







Medications





           Active Medications SIG        Qnty       Indications Ordering Provide

r Date

 

                          Sertraline HCL                     50mg Tablets       

            1 by mouth 

every day       30tabs          F32.1           Gayla Ya FNP 10/26/2020

 

                          Hydroxyzine HCL                     25mg Tablets      

             1 -2 tab by 

mouth as needed before bed 30tabs          F32.1           Gayla Ya FNP 

10/26/2020

 

                          Mucinex                     600mg Tablets ER 12HR     

              1 by mouth 

twice a day                                     Unknown         10/08/2020

 

                          Midodrine HCL                     5mg Tablets         

          1 tablet three 

times a day ( 8 am, 12 n,4 pm) 90tabs                          Gayla Ya FNP 10/08/2020

 

                          Torsemide                     20mg Tablets            

       take 1 tablet twice

a day (9 Am/5 PM)                                 Unknown         10/08/2020

 

                                        Lactulose Encephalopathy                

     10GM/15ML Solution                 

             take 30ml by mouth three times a day 2700units                 Gayla Araujo FNP 

2020

 

                          Thiamine HCL                     100mg Tablets        

           1 by mouth 

every day                                       Unknown         2020

 

                                        Fluticasone Propionate Nasal Spray      

               50mcg/Act Suspension     

             2 sprays each nostril once a day                           Unknown 

     2019

 

                          Latanoprost                     0.005% Solution       

            one drop in 

each eye daily at at bedtime                                 Unknown         

 

                          Betaxolol HCL                     0.5% Solution       

            1 drop each 

eye twice a day.                                 Unknown         2019

 

                          Brimonidine Tartrate                     0.2% Solution

                   one 

gtt. each eye twice a day                                 Unknown         2019

 

                          Vitamin D                     1000Unit Tablets        

           1 by mouth 

every day       100tabs                         Meron Ramirez M.D. 

019

 

                          Levothyroxine Sodium                     112mcg Tablet

s                   1 by 

mouth every day 90tabs          E03.9           Meron Ramirez M.D. 

018

 

                                        Wrist Splint/Cock-Up/Left/Canvas/Medium 

                     Misc               

                dx: carpal tunnel syndrome, duration 12 months, prognosis good 1

units                          

Gabe Corral M.D.              2018

 

                                        Wrist Splint/Cock-Up/Right/Canvas/Medium

                      Misc              

                                        wear splint every night for carpal tunne

l syndrome, prognosis good, duration

99 months       1units                          Gabe Corral M.D. 

 

                                        Levocetirizine Dihydrochloride          

           5mg Tablets                  

                1 tab by mouth every evening for allergies 90tabs          J30.9

           Meron Ramirez M.D.

                                        2015

 

                          Ketotifen Fumarate                     0.025% Solution

                   1 drop 

both eyes twice a day for allergy symptoms 5ml                             Unkno

wn         

 

                          Folic Acid                     1mg Tablets            

       1 by mouth every 

day             90tabs                          Meron Ramirez M.D. 

000

 

           Multivitamin Adult                      Tablets                      

                              Unknown    



 

                                        History Medications

 

                          Ciprofloxacin HCL                     250mg Tablets   

                1 tab by 

mouth once a day at 6 Am                                 Unknown         10/08/2

020 - 10/22/2020

 

                          Spironolactone                     50mg Tablets       

            take one 

tablet by mouth twice a day  (9 Am/5 PM)                                 Genie Tabares PA-C 10/08/2020 - 

2020







Immunizations





             CPT Code     Status       Date         Vaccine      Lot #

 

             [id number]        Given        2019   Pneumococcal Vaccine [id number]

 

                [id number]           Given           10/29/2018      Influenza Virus 

Vaccine, Quadrivalent,age 3 and 

up,multidose vial                       [id number]

 

             40494        Given        2017   Influenza Vaccination  

 

             44649        Given        2015   Influenza Vaccination  

 

             65309        Given        2013   Influenza Vaccination  

 

             96269        Given        04/15/2013   Zostavax      

 

             53887        Given        2012   Influenza Vaccination [id number]

C

 

             71611        Given        10/31/2011   Influenza Vaccination/preser

vative free [id number]

 

             50253        Given        2010   Adacel 11 Yrs or older 

BA

 

             31883        Given        10/04/2010   Influenza Vaccination [id number]

A

 

             29055        Given        2009   Influenza Vaccination [id number]

A

 

             13779        Given        2005   Tetnus Toxoid Im Or Jet Inje

ction Use  







Vital Signs





                Date            Vital           Result          Comment

 

                10/26/2020  3:20pm BP Systolic     105 mmHg         

 

                    BP Diastolic        63 mmHg              

 

                    Heart Rate          108 /min             

 

                    Body Temperature    97.4 F             

 

                    Respiratory Rate    20 /min              

 

                    Height              67.0 inches         5'7"

 

                    Weight              169.50 lb            

 

                    O2 % BldC Oximetry  98 %                 

 

                    Peak Expiratory Flow Rate 364                 Estimated Peak

 Flow Rate

 

                    Ideal Body Weight   135 lb               

 

                    BMI (Body Mass Index) 26.5 kg/m2           

 

                10/19/2020  3:07pm BP Systolic     114 mmHg         

 

                    BP Diastolic        71 mmHg              

 

                    Heart Rate          101 /min             

 

                    Body Temperature    97.3 F             

 

                    Respiratory Rate    18 /min              

 

                    Height              67.0 inches         5'7"

 

                    Weight              166.12 lb            

 

                    O2 % BldC Oximetry  100 %                

 

                    Peak Expiratory Flow Rate 364                 Estimated Peak

 Flow Rate

 

                    Ideal Body Weight   135 lb               

 

                    BMI (Body Mass Index) 26.0 kg/m2           







Results





        Test    Acquired Date Facility Test    Result  H/L     Range   Note

 

                    Comprehensive Metabolic Profil 2020          [facility] [place]

[place] [address] [phone] Glucose, Fasting 118 mg/dL  High             

 

             Blood Urea Nitrogen 69 mg/dL     High         7-18          

 

             Creatinine For GFR 2.49 mg/dL   High         0.55-1.30     

 

             Glomerular Filtration Rate 21.0         Low          >45          1

 

             Sodium Level 128 mEq/L    Low          136-145       

 

             Potassium Serum 4.2 mEq/L    Normal       3.5-5.1       

 

             Chloride Level 92 mEq/L     Low                  

 

             Carbon Dioxide Level 25 mEq/L     Normal       21-32         

 

             Anion Gap    11 mEq/L     Normal       8-16          

 

             Calcium Level 8.8 mg/dL    Normal       8.8-10.2      

 

             Ast/Sgot     45 U/L       High         7-37          

 

             Alt/SGPT     44 U/L       Normal       12-78         

 

             Alkaline Phosphatase 186 U/L      High                 

 

             Bilirubin,Total 4.2 mg/dL    High         0.2-1.0       

 

             Total Protein 6.9 GM/DL    Normal       6.4-8.2       

 

             Albumin      2.2 GM/DL    Low          3.2-5.2       

 

             Albumin/Globulin Ratio 0.5          Low          1.2-2.2       

 

                    Laboratory test finding 2020          [facility] [place] 

[place] [address] [phone] Platelet Count, Automated 113 10     Low        150-45

0     

 

                    PT & Aptt           2020          [place] [address] [phone] Prothrombin Time 18.6 seconds High       12.5-14.3   

 

             Inr          1.52         Normal                    2

 

             Partial Thromboplastin Time 34.0 seconds Normal       24.2-38.5    

 

 

                    Cell Count Pleural Fluid 2020          Patient [place]

 [place] [address]

           [phone] Source, Body Fluid PLEURAL    Normal                 

 

             Pleural FL Color YELLOW       Normal       Colorless     

 

             Appearance, Body Fluid CLOUDY       Normal       Clear         

 

             WBC Body Fluid 114 /uL      High         0-10          

 

             RBC Body Fluid 4 10         Normal       <2            

 

             BF Mononuclear Cell % 91.2 %       High         0-0           

 

             BF Polymorphonuclear Cell % 8.8 %        High         0-0          

 

 

                    TP Body Fluid       2020          [place] [address]

           [phone] Total Protein, Body Fluid 0.7 g/dL   Normal     Not Es

tablished  

 

             Source, Body Fluid Tot Protein PLEURAL      Normal                 

    

 

                    LDH Body Fluid      2020          [place] [address]

           [phone] LDH, Body Fluid 51 U/L     Normal     Not Established 

 

 

             Source, Body Fluid LDH PLEURAL      Normal                     

 

                    Glucose Body Fluid  2020          [place] [address]

           [phone] Glucose, Body Fluid 131 mg/dL  Normal     Not Establis

hed  

 

             Source, Body Fluid Glucose PLEURAL      Normal                     

 

                    Amylase Body Fluid  2020          [facility] [place] [id number] [phone] Amylase, Body Fluid 31 U/L     Normal     Not Establis

hed  

 

             Source, Body Fluid Amylase PLEURAL      Normal                     

 

                    PH, Body Fluid      2020          [place] [address]

           [phone] PH Body Fluid > 7.800 units Normal     Not Established

  

 

             Source, Body Fluid pH PLEURAL      Normal                     

 

                    Body Fluid Culture And GS 2020          [facility]

e [place] [id number] [phone] Gram Stain (SEE NOTE)  Normal                3

 

             Body Fluid Culture **************** <SEE NOTE>                     

       4

 

                    Laboratory test finding 2020          [facility] 

[place] [id number] [phone] Anaerobic Culture **************** <SEE NOTE>         

               5

 

                    PT & Aptt           2020          [place] [address]

           [phone] Prothrombin Time 19.4 seconds High       12.5-14.3   

 

             Inr          1.60         Normal                    6

 

             Partial Thromboplastin Time 32.6 seconds Normal       24.2-38.5    

 

 

                    Laboratory test finding 2020          [facility] [place] 

[place] [address] [phone] NT-Pro  pg/mL  Normal     <125        

 

             Magnesium Level 2.2 mg/dL    Normal       1.8-2.4       

 

                    Basic Metabolic Profile 2020          [facility] [place] 

[place] [address] [phone] Glucose, Fasting 83 mg/dL   Normal           

 

             Blood Urea Nitrogen 24 mg/dL     High         7-18          

 

             Creatinine For GFR 1.38 mg/dL   High         0.55-1.30     

 

             Glomerular Filtration Rate 41.5         Low          >45          7

 

             Sodium Level 130 mEq/L    Low          136-145       

 

             Potassium Serum 3.4 mEq/L    Low          3.5-5.1       

 

             Chloride Level 96 mEq/L     Low                  

 

             Carbon Dioxide Level 23 mEq/L     Normal       21-32         

 

             Anion Gap    11 mEq/L     Normal       8-16          

 

             Calcium Level 8.6 mg/dL    Low          8.8-10.2      

 

                    Liver Profile       2020          [place] [address] [phone] Ast/Sgot   80 U/L     High       7-37        

 

             Alt/SGPT     51 U/L       Normal       12-78         

 

             Alkaline Phosphatase 136 U/L      High                 

 

             Bilirubin,Total 6.8 mg/dL    High         0.2-1.0       

 

             Bilirubin,Direct 4.4 mg/dL    High         0.0-0.2       

 

             Total Protein 7.9 GM/DL    Normal       6.4-8.2       

 

             Albumin      2.0 GM/DL    Low          3.2-5.2       

 

             Albumin/Globulin Ratio 0.3          Low          1.2-2.2       

 

                    Cardiac Marker Panel 2020          [place] [address] [phone] CPK Creatine Phosphokinase 50 U/L     Normal     26-19

2      

 

             CK-MB Value Mass < 1.0 NG/ML  Normal       <3.6          

 

             MB/CK Relative Index 2.00         Normal       < Or =4      8

 

             Troponin I   < 0.02 NG/ML Normal       < 0.10       9

 

                    Laboratory test finding 2020          [facility] [place] 

[place] [address] [phone] Platelet Estimate DECREASED  Normal     Normal      

 

             Immature Platelet Fraction 1.3 %        Normal       0.0-9.59      

 

                    Differential        2020          [place] [address] [phone] Neutrophils 70 %       High       28-66       

 

             Lymphocytes  14 %         Low          16-44         

 

             Monocytes    8 %          High         0-5           

 

             Eosinophils  5 %          High         0-3           

 

             Myelocytes   1 %          High         0-0           

 

             Atypical Lymph 2 %          Normal       0-5           

 

             Anisocytosis 1+           Normal                     

 

                    CBC With Differential 2020          [place] [address] [phone] White Blood Count 7.9 10     Normal     4.0-10.0    

 

             Red Blood Count 3.31 10      Low          4.00-5.40     

 

             Hemoglobin   11.7 g/dL    Low          12.0-15.5     

 

             Hematocrit   34.8 %       Low          36.0-47.0     

 

             Mean Corpuscular Volume 105.1 fl     High         80.0-96.0     

 

             Mean Corpuscular Hemoglobin 35.3 pg      High         27.0-33.0    

 

 

             Mean Corpuscular HGB Conc 33.6 g/dL    Normal       32.0-36.5     

 

             Red Cell Distribution Width 14.6 %       High         11.5-14.5    

 

 

             Platelet Count, Automated 96 10        Low          150-450       

 

             Nucleated Red Blood Cell % 0.0 %        Normal       0-0           

 

                    PT & Aptt           2020          [place] [address] [phone] Prothrombin Time 18.2 seconds High       11.8-14.0   

 

             Inr          1.54         Normal                    10

 

             Partial Thromboplastin Time 35.6 seconds Normal       25.0-38.4    

 

 

                    CBC With Differential 2020          [place] [address] [phone] White Blood Count 3.4 10     Low        4.0-10.0    

 

             Red Blood Count 3.95 10      Low          4.00-5.40     

 

             Hemoglobin   12.7 g/dL    Normal       12.0-15.5     

 

             Hematocrit   36.6 %       Normal       36.0-47.0     

 

             Mean Corpuscular Volume 92.7 fl      Normal       80.0-96.0     

 

             Mean Corpuscular Hemoglobin 32.2 pg      Normal       27.0-33.0    

 

 

             Mean Corpuscular HGB Conc 34.7 g/dL    Normal       32.0-36.5     

 

             Red Cell Distribution Width 13.6 %       Normal       11.5-14.5    

 

 

             Platelet Count, Automated 66 10        Low          150-450      11

 

             Neutrophils % 53.8 %       Normal       36.0-66.0     

 

             Lymph %      31.6 %       Normal       24.0-44.0     

 

             Mono %       10.4 %       High         0.0-5.0       

 

             Eos %        2.4 %        Normal       0.0-3.0       

 

             Baso %       1.5 %        High         0.0-1.0       

 

             Immature Granulocyte % 0.3 %        Normal       0-3.0         

 

             Nucleated Red Blood Cell % 0.0 %        Normal       0-0           

 

             Neutrophils # 1.8 10       Normal       1.5-8.5       

 

             Lymph #      1.1 10       Low          1.5-5.0       

 

             Mono #       0.4 10       Normal       0.0-0.8       

 

             Eos #        0.1 10       Normal       0.0-0.5       

 

             Baso #       0.1 10       Normal       0.0-0.2       

 

                    Laboratory test finding 2020          [facility] 

Center

[place] [address] [phone] Immature Platelet Fraction 1.4 %      Normal     0.0-9

.59   12

 

                    Comprehensive Metabolic Profil 2020          [facility]

[place] [address] [phone] Glucose, Fasting 88 mg/dL   Normal           

 

             Blood Urea Nitrogen 11 mg/dL     Normal       7-18          

 

             Creatinine For GFR 1.07 mg/dL   Normal       0.55-1.30     

 

             Glomerular Filtration Rate 55.7         Normal       >45          1

3

 

             Sodium Level 139 mEq/L    Normal       136-145       

 

             Potassium Serum 3.4 mEq/L    Low          3.5-5.1       

 

             Chloride Level 101 mEq/L    Normal               

 

             Carbon Dioxide Level 30 mEq/L     Normal       21-32         

 

             Anion Gap    8 mEq/L      Normal       8-16          

 

             Calcium Level 8.3 mg/dL    Low          8.8-10.2      

 

             Ast/Sgot     60 U/L       High         7-37          

 

             Alt/SGPT     31 U/L       Normal       12-78         

 

             Alkaline Phosphatase 187 U/L      High                 

 

             Bilirubin,Total 3.4 mg/dL    High         0.2-1.0       

 

             Total Protein 8.5 GM/DL    High         6.4-8.2       

 

             Albumin      2.6 GM/DL    Low          3.2-5.2       

 

             Albumin/Globulin Ratio 0.4          Low          1.2-2.2       

 

                    Laboratory test finding 2020          [facility] 

[place]

[place] [address]

           [phone] LDH Lactate Dehydrogenase 319 U/L    High       

     14







                          1                         Units are mL/min/1.73 m2



Chronic Kidney Disease Staging per NK[facility]:



Stage I & II   GFR >=60       Normal to Mildly Decreased

Stage III      GFR 30-59      Moderately Decreased

Stage IV       GFR 15-29      Severely Decreased

Stage V        GFR <15        Very Little GFR Left

ESRD           GFR <15 on RRT



 

                          2                         THERAPUTIC HUMAN INR VALUES

INDICATIONS                      NORMAL RANGES

PROPHYLAXIS/TREATMENT OF:

VENOUS THROMBOSIS                2.0-3.0

PULMONARY EMBOLISM               2.0-3.0

PREVENTION OF SYSTEMIC EMBOLISM FROM:

TISSUE HEART VALVES              2.0-3.0

ACUTE MYOCARDIAL INFARCTION      2.0-3.0

VALVULAR HEART DISEASE           2.0-3.0

ATRIAL FIBRILLATION              2.0-3.0

MECHANICAL VALVES(HIGH RISK)     2.5-3.5

RECURRENT MYOCARDIAL INFARCTION  2.5-3.5



 

                          3                         FEW WBCS

NO ORGANISMS SEEN





 

                          4                         ****************************

*********************

If aerobic or anaerobic growth is detected within

the next 7-21 days, an addendum will follow.

                                        .***************************************

********.



FULL REPORT IN LAB NOTES ([facility] and [facility]).

NO GROWTH AEROBICALLY





 

                          5                         ****************************

*********************

If anaerobic or aerobic growth is detected within

the next 7-21 days, an addendum will follow.

                                        .***************************************

********.



FULL REPORT IN LAB NOTES ([facility] and [facility]).

NO GROWTH ANAEROBICALLY





 

                          6                         THERAPUTIC HUMAN INR VALUES

INDICATIONS                      NORMAL RANGES

PROPHYLAXIS/TREATMENT OF:

VENOUS THROMBOSIS                2.0-3.0

PULMONARY EMBOLISM               2.0-3.0

PREVENTION OF SYSTEMIC EMBOLISM FROM:

TISSUE HEART VALVES              2.0-3.0

ACUTE MYOCARDIAL INFARCTION      2.0-3.0

VALVULAR HEART DISEASE           2.0-3.0

ATRIAL FIBRILLATION              2.0-3.0

MECHANICAL VALVES(HIGH RISK)     2.5-3.5

RECURRENT MYOCARDIAL INFARCTION  2.5-3.5



 

                          7                         Units are mL/min/1.73 m2



Chronic Kidney Disease Staging per NKF:



Stage I & II   GFR >=60       Normal to Mildly Decreased

Stage III      GFR 30-59      Moderately Decreased

Stage IV       GFR 15-29      Severely Decreased

Stage V        GFR <15        Very Little GFR Left

ESRD           GFR <15 on RRT



 

                          8                         DIAGNOSIS CRITERIA

MMB ng/ml       Relative Index (RI)

NON-AMI               < or = 5               N/A

GRAY ZONE              > 5                < or = 4

AMI                    > 5                   > 4



 

                          9                         Troponin I Reference Interva

l for [employer] LOCI:



                                        99th Percentile= 0.00-0.045 ng/ml



Risk Stratification:

<= 0.10 ng/ml   Decreased Risk for Adverse Clinical

Events.

                                        0.10-1.50 ng/ml   Increased Risk for Adv

erse Clinical

Events. Evaluation of additional

criterion and/or repeat testing in 2-6

hours is suggested to rule out myocardial

damage.

>= 1.50 ng/ml   Indicative of Myocardial Injury.



 

                          10                        THERAPUTIC HUMAN INR VALUES

INDICATIONS                      NORMAL RANGES

PROPHYLAXIS/TREATMENT OF:

VENOUS THROMBOSIS                2.0-3.0

PULMONARY EMBOLISM               2.0-3.0

PREVENTION OF SYSTEMIC EMBOLISM FROM:

TISSUE HEART VALVES              2.0-3.0

ACUTE MYOCARDIAL INFARCTION      2.0-3.0

VALVULAR HEART DISEASE           2.0-3.0

ATRIAL FIBRILLATION              2.0-3.0

MECHANICAL VALVES(HIGH RISK)     2.5-3.5

RECURRENT MYOCARDIAL INFARCTION  2.5-3.5



 

                          11                        Scan Verified machine result

s

PLATELET COUNT LESS THAN 100, AND NEW OCCURANCE OR





 

                          12                        By: SEARO

Time: 0925



 

                          13                        Units are mL/min/1.73 m2



Chronic Kidney Disease Staging per NKF:



Stage I & II   GFR >=60       Normal to Mildly Decreased

Stage III      GFR 30-59      Moderately Decreased

Stage IV       GFR 15-29      Severely Decreased

Stage V        GFR <15        Very Little GFR Left

ESRD           GFR <15 on RRT



 

                          14                        By: JT

Time: 926

By: JT Time: 926









Procedures





                Date            Code            Description     Status

 

                2018      [id number]        Mammogram       Completed

 

                2016      [id number]        Mammogram       Completed

 

                2015      [id number]        Mammogram       Completed

 

                2014      [id number]        Mammogram       Completed







Medical Devices





                                        Description

 

                                        No Information Available







Encounters





           Type       Date       Location   Provider   Dx         Diagnosis

 

           Office Visit 10/26/2020  3:15p Main Office PleGayla alonso, FNP K70.3

1     Alcoholic

cirrhosis of liver with ascites

 

                          K76.7                     Hepatorenal syndrome

 

                          J91.8                     Pleural effusion in other co

nditions classified elsewhere

 

                          E03.9                     Hypothyroidism, unspecified

 

                          I10                       Essential (primary) hyperten

nikolay

 

                          F32.1                     Major depressive disorder, s

zakia episode, moderate

 

           Office Visit 10/19/2020  2:45p Main Office PleskachGayla, FNP K70.3

1     Alcoholic

cirrhosis of liver with ascites

 

                          K76.7                     Hepatorenal syndrome

 

                          J91.8                     Pleural effusion in other co

nditions classified elsewhere

 

                          E03.9                     Hypothyroidism, unspecified

 

                          I10                       Essential (primary) hyperten

nikolay







Assessments





                Date            Code            Description     Provider

 

                10/26/2020      K70.31          Alcoholic cirrhosis of liver wit

h ascites PleSherif alonsoy, FNP

 

                10/26/2020      K76.7           Hepatorenal syndrome Plegavin M

neisha, FNP

 

                10/26/2020      J91.8           Pleural effusion in other condit

ions classified elsewhere 

PleGayla alonso, FNP

 

                10/26/2020      E03.9           Hypothyroidism, unspecified Ples

kachGayla, FNP

 

                10/26/2020      I10             Essential (primary) hypertension

 Pleskach Gayla, FNP

 

                10/26/2020      F32.1           Major depressive disorder, singl

e episode, moderate Pleskfabrizio 

Gayla, FNP

 

                10/19/2020      K70.31          Alcoholic cirrhosis of liver wit

h ascites Pleskach Gayla, FNP

 

                10/19/2020      K76.7           Hepatorenal syndrome Pleskach, M

neisha, FNP

 

                10/19/2020      J91.8           Pleural effusion in other condit

ions classified elsewhere 

Plegavin OLENA Shukla

 

                10/19/2020      E03.9           Hypothyroidism, unspecified Ples

Gayla arnold FNP

 

                10/19/2020      I10             Essential (primary) hypertension

 Gayla Ya FNP







Plan of Treatment

Future Appointment(s):* 2020  3:45 pm - Gayla Ya FNP at Main 
  Office

10/26/2020 - Gayla Ya FNP* K70.31 Alcoholic cirrhosis of liver with 
  ascites

* K76.7 Hepatorenal syndrome

* J91.8 Pleural effusion in other conditions classified elsewhere

* E03.9 Hypothyroidism, unspecified* Comments:* continue levothyroxine





* I10 Essential (primary) hypertension* Comments:* controlled, continue current 
  medications





* F32.1 Major depressive disorder, single episode, moderate* New Medication:* 
  Sertraline HCL 50 mg - 1 by mouth every day

* Hydroxyzine HCL 25 mg - 1 -2 tab by mouth as needed before bed



* Follow up:* one month



* Recommendations:* take one half tablet of sertraline daily for one week then a
  whole tablet daily.









Functional Status





                Functional Condition Comment         Date            Status

 

                Glasses         Reading                         Active

 

                Independent with all ADL's                                 Activ

e







Mental Status





                Mental Condition Comment         Date            Status

 

                None            poor reader                     Active







Referrals





                Refer to Dr     Reason for Referral Status          Appt Date

 

                [facility] ANXIET[facility]         Sent            



 

                                        [address] [place] [address]

 

                                        [phone]

## 2021-01-16 NOTE — CCD
Continuity of Care Document (CCD)

                             Created on: 2020



[facility]

External Reference #: [id number]

: 1960

Sex: Female



Demographics





                          Address                   [address] [place]  [address]

 

                          Home Phone                [phone]

 

                          Preferred Language        Unknown

 

                          Marital Status            Unknown

 

                          Episcopal Affiliation     Restorationist (Non-Alevism, Non

-Specific)

 

                          Race                      White

 

                          Ethnic Group              Not  or 





Author





                          Organization              Unknown

 

                          Address                   Unknown

 

                          Phone                     Unavailable







Care Team Providers





                    Care Team Member Name Role                Phone

 

                    [facility] - Gastro[facility]

TM                [phone]

 

                    [facility]         AUTM                [phone]

 

                    [facility]M               

 [phone]

 

                    [facility] - Mental Health AUTM            

    [phone]







Problems





                    Active Problems     Provider            Date

 

                    Essential hypertension Gabe Corral M.D. Onset: 

 

                    Hypothyroidism      Gabe Corral M.D. Onset: 2011

 

                    Vitamin D deficiency Gabe Corral M.D. Onset: 

 

                    Neck pain           Gabe Corral M.D. Onset: 2011

 

                    Generalized anxiety disorder Gabe Corral M.D. Onse

t: 2011

 

                    Allergic rhinitis   Gabe Corral M.D. Onset: 2012

 

                    Degeneration of lumbar intervertebral disc Benigno Corral M.D. Onset: 

2013

 

                    Alcoholic cirrhosis Gabe Corral M.D. Onset: 2018

 

                    Esophageal varices without bleeding Gabe Corral M. D. Onset: 2018







Social History





                Type            Date            Description     Comments

 

                Birth Sex                       Unknown          

 

                Tobacco Use     Start: Unknown  Never Smoked Cigarettes  

 

                Tobacco Use     Start: Unknown  Never Used Smokeless Tobacco  

 

                ETOH Use                        Consumed 4 Alcoholic Beverages P

er Day  

 

                Tobacco Use     Start: Unknown  Patient has never smoked  

 

                Recreational Drug Use                 Denies Drug Use  

 

                Smoking Status  Reviewed: 20 Patient has never smoked  

 

                Exercise Type/Frequency                 Exercises sporadically  

 

                Tattoo/Piercing                 Tattoo          3 

 

                Tattoo/Piercing                 Pierced ears    x2 

 

                Sun Exposure                    Minimum amount of sun exposure  

 

                Sun Exposure                    Uses sunscreen  Occasionally 

 

                Seat Belt/Car Seat                 Always uses seat belt  

 

                Bike Helmet                     Never           Does not bike ri

de. 

 

                Smoke Alarms                    Yes              

 

                Smoke Alarms                    Carbon Monoxide Detector: Yes  







Allergies, Adverse Reactions, Alerts





             Active Allergies Reaction     Severity     Comments     Date

 

             Penicillin                                          2004

 

             Cefdinir     Difficulty swallowing, Itching, throat felt itchy. Sev

ere                    2019







Medications





           Active Medications SIG        Qnty       Indications Ordering Provide

r Date

 

           Rifaximin Tablets 200 mg tab - take 2 tabs (400 mg) twice a day      

                 Unknown    

2020

 

                          Spironolactone                     50mg Tablets       

            take one half 

tablet (25 mg) by mouth twice a day                                 Unknown     

    2020

 

                          Allergy Relief Loratadine                     10mg Tab

lets                   1 

tab once a day as needed for allergy symptoms                                 Un

known         2020

 

                          Hydroxyzine HCL                     25mg Tablets      

             1 -2 tab by 

mouth as needed before bed 30tabs          F32.1           Gayla Ya FNP 

10/26/2020

 

                          Mucinex                     600mg Tablets ER 12HR     

              1 by mouth 

twice a day                                     Unknown         10/08/2020

 

                          Midodrine HCL                     5mg Tablets         

          1 tablet three 

times a day ( 8 am, 12 n,4 pm) 90tabs                          Gayla Ya FNP 10/08/2020

 

                          Torsemide                     20mg Tablets            

       take 1 tablet twice

a day (9 Am/5 PM)                                 Unknown         10/08/2020

 

                                        Lactulose Encephalopathy                

     10GM/15ML Solution                 

             take 30ml by mouth three times a day 2700units                 Gayla Araujo FNP 

2020

 

                          Thiamine HCL                     100mg Tablets        

           1 by mouth 

every day                                       Unknown         2020

 

                                        Fluticasone Propionate Nasal Spray      

               50mcg/Act Suspension     

             2 sprays each nostril once a day                           Unknown 

     2019

 

                          Latanoprost                     0.005% Solution       

            one drop in 

each eye daily at at bedtime                                 Unknown         

 

                          Betaxolol HCL                     0.5% Solution       

            1 drop each 

eye twice a day.                                 Unknown         2019

 

                          Brimonidine Tartrate                     0.2% Solution

                   one 

gtt. each eye twice a day                                 Unknown         2019

 

                          Vitamin D                     1000Unit Tablets        

           1 by mouth 

every day       100tabs                         Meron Ramirez M.D. 

019

 

                          Levothyroxine Sodium                     112mcg Tablet

s                   1 by 

mouth every day 90tabs          E03.9           Meron Ramirez M.D. 

018

 

                                        Wrist Splint/Cock-Up/Left/Canvas/Medium 

                     Misc               

                dx: carpal tunnel syndrome, duration 12 months, prognosis good 1

units                          

Gabe Corral M.D.              2018

 

                                        Wrist Splint/Cock-Up/Right/Canvas/Medium

                      Misc              

                                        wear splint every night for carpal tunne

l syndrome, prognosis good, duration

99 months       1units                          Gabe Corral M.D. 

 

                                        Levocetirizine Dihydrochloride          

           5mg Tablets                  

                1 tab by mouth every evening for allergies 90tabs          J30.9

           Meron Ramirez M.D.

                                        2015

 

                          Ketotifen Fumarate                     0.025% Solution

                   1 drop 

both eyes twice a day for allergy symptoms 5ml                             Unkno

wn         

 

                          Folic Acid                     1mg Tablets            

       1 by mouth every 

day             90tabs                          Meron Ramirez M.D. 

000

 

           Multivitamin Adult                      Tablets                      

                              Unknown    



 

                          Xifaxan                     200mg Tablets             

      1 by mouth twice a 

day             60tabs                          Gayla Ya FNP 

 

                                        History Medications

 

                          Sertraline HCL                     50mg Tablets       

            1 by mouth 

every day       30tabs          F32.1           Gayla Ya FNP 10/26/2020 

- 2020

 

                          Ciprofloxacin HCL                     250mg Tablets   

                1 tab by 

mouth once a day at 6 Am                                 Unknown         10/08/2

020 - 10/22/2020

 

                          Spironolactone                     50mg Tablets       

            take one 

tablet by mouth twice a day  (9 Am/5 PM)                                 Genie Tabares PA-C 10/08/2020 - 

2020







Immunizations





             CPT Code     Status       Date         Vaccine      Lot #

 

             [id number]        Given        2019   Pneumococcal Vaccine [id number]

 

                65527           Given           10/29/2018      Influenza Virus 

Vaccine, Quadrivalent,age 3 and 

up,multidose vial                       [id number]

 

             70359        Given        2017   Influenza Vaccination  

 

             64410        Given        2015   Influenza Vaccination  

 

             20993        Given        2013   Influenza Vaccination  

 

             31991        Given        04/15/2013   Zostavax      

 

             47961        Given        2012   Influenza Vaccination [id number]

C

 

             45476        Given        10/31/2011   Influenza Vaccination/preser

vative free [id number]

 

             10701        Given        2010   Adacel 11 Yrs or older 

BA

 

             87937        Given        10/04/2010   Influenza Vaccination [id number]

A

 

             96826        Given        2009   Influenza Vaccination [id number]

A

 

             49365        Given        2005   Tetnus Toxoid Im Or Jet Inje

ction Use  







Vital Signs





                Date            Vital           Result          Comment

 

                10/26/2020  3:20pm BP Systolic     105 mmHg         

 

                    BP Diastolic        63 mmHg              

 

                    Heart Rate          108 /min             

 

                    Body Temperature    97.4 F             

 

                    Respiratory Rate    20 /min              

 

                    Height              67.0 inches         5'7"

 

                    Weight              169.50 lb            

 

                    O2 % BldC Oximetry  98 %                 

 

                    Peak Expiratory Flow Rate 364                 Estimated Peak

 Flow Rate

 

                    Ideal Body Weight   135 lb               

 

                    BMI (Body Mass Index) 26.5 kg/m2           

 

                10/19/2020  3:07pm BP Systolic     114 mmHg         

 

                    BP Diastolic        71 mmHg              

 

                    Heart Rate          101 /min             

 

                    Body Temperature    97.3 F             

 

                    Respiratory Rate    18 /min              

 

                    Height              67.0 inches         5'7"

 

                    Weight              166.12 lb            

 

                    O2 % BldC Oximetry  100 %                

 

                    Peak Expiratory Flow Rate 364                 Estimated Peak

 Flow Rate

 

                    Ideal Body Weight   135 lb               

 

                    BMI (Body Mass Index) 26.0 kg/m2           







Results





        Test    Acquired Date Facility Test    Result  H/L     Range   Note

 

                    Cell Count Pleural Fluid 2020          Patient [place]

 [place] [address] [phone] Source, Body Fluid PLEURAL    Normal                 

 

             Pleural FL Color YELLOW       Normal       Colorless     

 

             Appearance, Body Fluid CLOUDY       Normal       Clear         

 

             WBC Body Fluid 124 /uL      High         0-10          

 

             RBC Body Fluid < 2 10       Normal       <2            

 

             BF Mononuclear Cell % 93.6 %       High         0-0           

 

             BF Polymorphonuclear Cell % 6.4 %        High         0-0          

 

 

                    Body Fluids Culture And Gram Stain 2020          [facility]

[place] [address] [phone] Gram Stain (SEE NOTE)  Normal                1

 

             Body Fluid Culture **************** <SEE NOTE>                     

       2

 

                    Laboratory test finding 2020          Patient [place] 

[place] [address] [phone] Albumin 25% TRANSFUSED PRODU <SEE NOTE>               

         3

 

                    CBC With Differential 2020          [place] 

[place] [address] [phone] White Blood Count 5.5 10     Normal     4.0-10.0    

 

             Red Blood Count 3.39 10      Low          4.00-5.40     

 

             Hemoglobin   10.8 g/dL    Low          12.0-15.5     

 

             Hematocrit   32.6 %       Low          36.0-47.0     

 

             Mean Corpuscular Volume 96.2 fl      High         80.0-96.0     

 

             Mean Corpuscular Hemoglobin 31.9 pg      Normal       27.0-33.0    

 

 

             Mean Corpuscular HGB Conc 33.1 g/dL    Normal       32.0-36.5     

 

             Red Cell Distribution Width 13.8 %       Normal       11.5-14.5    

 

 

             Platelet Count, Automated 124 10       Low          150-450       

 

             Neutrophils % 73.3 %       High         36.0-66.0     

 

             Lymph %      13.9 %       Low          24.0-44.0     

 

             Mono %       9.9 %        High         0.0-5.0       

 

             Eos %        1.3 %        Normal       0.0-3.0       

 

             Baso %       0.9 %        Normal       0.0-1.0       

 

             Immature Granulocyte % 0.7 %        Normal       0-3.0         

 

             Nucleated Red Blood Cell % 0.0 %        Normal       0-0           

 

             Neutrophils # 4.1 10       Normal       1.5-8.5       

 

             Lymph #      0.8 10       Low          1.5-5.0       

 

             Mono #       0.6 10       Normal       0.0-0.8       

 

             Eos #        0.1 10       Normal       0.0-0.5       

 

             Baso #       0.1 10       Normal       0.0-0.2       

 

                    PT & Aptt           2020          [place] [address] [phone] Prothrombin Time 17.5 seconds High       12.5-14.3   

 

             Inr          1.40         Normal                    4

 

             Partial Thromboplastin Time 35.4 seconds Normal       24.2-38.5    

 

 

                    Liver Profile       2020          [place] [address] [phone] Ast/Sgot   43 U/L     High       7-37        

 

             Alt/SGPT     38 U/L       Normal       12-78         

 

             Alkaline Phosphatase 155 U/L      High                 

 

             Bilirubin,Total 3.1 mg/dL    High         0.2-1.0       

 

             Bilirubin,Direct 2.0 mg/dL    High         0.0-0.2       

 

             Total Protein 6.3 GM/DL    Low          6.4-8.2       

 

             Albumin      2.0 GM/DL    Low          3.2-5.2       

 

             Albumin/Globulin Ratio 0.5          Low          1.2-2.2       

 

                    Basic Metabolic Profile 2020          Patient Service 

Center

[place] [address] [phone] Glucose, Fasting 169 mg/dL  High             

 

             Blood Urea Nitrogen 71 mg/dL     High         7-18          

 

             Creatinine For GFR 2.38 mg/dL   High         0.55-1.30     

 

             Glomerular Filtration Rate 22.1         Low          >45          5

 

             Sodium Level 131 mEq/L    Low          136-145       

 

             Potassium Serum 4.7 mEq/L    Normal       3.5-5.1       

 

             Chloride Level 99 mEq/L     Normal               

 

             Carbon Dioxide Level 21 mEq/L     Normal       21-32         

 

             Anion Gap    11 mEq/L     Normal       8-16          

 

             Calcium Level 8.7 mg/dL    Low          8.8-10.2      

 

                    Laboratory test finding 2020          Patient Service 

[place], [place] [id number] [phone] Ethyl Alcohol (Ethanol) < 0.003 %  Normal     0.000-0.

010  

 

                    Comprehensive Metabolic Profil 2020          [place] [address] [phone] Glucose, Fasting 118 mg/dL  High             

 

             Blood Urea Nitrogen 69 mg/dL     High         7-18          

 

             Creatinine For GFR 2.49 mg/dL   High         0.55-1.30     

 

             Glomerular Filtration Rate 21.0         Low          >45          6

 

             Sodium Level 128 mEq/L    Low          136-145       

 

             Potassium Serum 4.2 mEq/L    Normal       3.5-5.1       

 

             Chloride Level 92 mEq/L     Low                  

 

             Carbon Dioxide Level 25 mEq/L     Normal       21-32         

 

             Anion Gap    11 mEq/L     Normal       8-16          

 

             Calcium Level 8.8 mg/dL    Normal       8.8-10.2      

 

             Ast/Sgot     45 U/L       High         7-37          

 

             Alt/SGPT     44 U/L       Normal       12-78         

 

             Alkaline Phosphatase 186 U/L      High                 

 

             Bilirubin,Total 4.2 mg/dL    High         0.2-1.0       

 

             Total Protein 6.9 GM/DL    Normal       6.4-8.2       

 

             Albumin      2.2 GM/DL    Low          3.2-5.2       

 

             Albumin/Globulin Ratio 0.5          Low          1.2-2.2       

 

                    Culture Fungus Misc 2020          [place] [address]

           [phone] Fungal Smear Testing performe <SEE NOTE>              

          7

 

             Fungal Culture Other Source Testing performe <SEE NOTE>            

                8

 

                    Acid Fast Smear & Culture(Afb) Sendout 2020          P

atient Service [place] [id number] [phone] Afb Smear  Testing performe <SEE NOTE>                

        9

 

             Afb Culture  Testing performe <SEE NOTE>                           

 10

 

                    Laboratory test finding 2020          Patient Service 

[place] [id number] [phone] Anaerobic Culture **************** <SEE NOTE>         

               11

 

                    Body Fluid Culture And GS 2020          Patient Servic

e [place] [id number] [phone] Gram Stain (SEE NOTE)  Normal                12

 

             Body Fluid Culture **************** <SEE NOTE>                     

       13

 

                    PH, Body Fluid      2020          Patient Service [place] [id number] [phone] PH Body Fluid > 7.800 units Normal     Not Established

  

 

             Source, Body Fluid pH PLEURAL      Normal                     

 

                    Amylase Body Fluid  2020          Patient Service [place] [id number] [phone] Amylase, Body Fluid 31 U/L     Normal     Not Establis

hed  

 

             Source, Body Fluid Amylase PLEURAL      Normal                     

 

                    Glucose Body Fluid  2020          Patient Service [place] [id number] [phone] Glucose, Body Fluid 131 mg/dL  Normal     Not Establis

hed  

 

             Source, Body Fluid Glucose PLEURAL      Normal                     

 

                    LDH Body Fluid      2020          Patient Service [place] [id number] [phone] LDH, Body Fluid 51 U/L     Normal     Not Established 

 

 

             Source, Body Fluid LDH PLEURAL      Normal                     

 

                    TP Body Fluid       2020          Patient Service [place] [id number] [phone] Total Protein, Body Fluid 0.7 g/dL   Normal     Not Es

tablished  

 

             Source, Body Fluid Tot Protein PLEURAL      Normal                 

    

 

                    Cell Count Pleural Fluid 2020          Patient Service

 [place] [address] [phone] Source, Body Fluid PLEURAL    Normal                 

 

             Pleural FL Color YELLOW       Normal       Colorless     

 

             Appearance, Body Fluid CLOUDY       Normal       Clear         

 

             WBC Body Fluid 114 /uL      High         0-10          

 

             RBC Body Fluid 4 10         Normal       <2            

 

             BF Mononuclear Cell % 91.2 %       High         0-0           

 

             BF Polymorphonuclear Cell % 8.8 %        High         0-0          

 

 

                    PT & Aptt           2020          Patient Service [facility]

er

[place] [id number] [phone] Prothrombin Time 18.6 seconds High       12.5-14.3   

 

             Inr          1.52         Normal                    14

 

             Partial Thromboplastin Time 34.0 seconds Normal       24.2-38.5    

 

 

                    Laboratory test finding 2020          Patient Service 

[place] [address] [phone] Platelet Count, Automated 113 10     Low        150-45

0     

 

                    CBC With Differential 2020          Patient Service Ce

nter

[place] [address] [phone] White Blood Count 7.9 10     Normal     4.0-10.0    

 

             Red Blood Count 3.31 10      Low          4.00-5.40     

 

             Hemoglobin   11.7 g/dL    Low          12.0-15.5     

 

             Hematocrit   34.8 %       Low          36.0-47.0     

 

             Mean Corpuscular Volume 105.1 fl     High         80.0-96.0     

 

             Mean Corpuscular Hemoglobin 35.3 pg      High         27.0-33.0    

 

 

             Mean Corpuscular HGB Conc 33.6 g/dL    Normal       32.0-36.5     

 

             Red Cell Distribution Width 14.6 %       High         11.5-14.5    

 

 

             Platelet Count, Automated 96 10        Low          150-450       

 

             Nucleated Red Blood Cell % 0.0 %        Normal       0-0           

 

                    Differential        2020          [place] [address] [phone] Neutrophils 70 %       High       28-66       

 

             Lymphocytes  14 %         Low          16-44         

 

             Monocytes    8 %          High         0-5           

 

             Eosinophils  5 %          High         0-3           

 

             Myelocytes   1 %          High         0-0           

 

             Atypical Lymph 2 %          Normal       0-5           

 

             Anisocytosis 1+           Normal                     

 

                    Laboratory test finding 2020          Patient Service 

[place] [address] [phone] Platelet Estimate DECREASED  Normal     Normal      

 

             Immature Platelet Fraction 1.3 %        Normal       0.0-9.59      

 

                    Cardiac Marker Panel 2020          [place] [address] [phone] CPK Creatine Phosphokinase 50 U/L     Normal     26-19

2      

 

             CK-MB Value Mass < 1.0 NG/ML  Normal       <3.6          

 

             MB/CK Relative Index 2.00         Normal       < Or =4      15

 

             Troponin I   < 0.02 NG/ML Normal       < 0.10       16

 

                    Liver Profile       2020          [place] [address] [phone] Ast/Sgot   80 U/L     High       7-37        

 

             Alt/SGPT     51 U/L       Normal       12-78         

 

             Alkaline Phosphatase 136 U/L      High                 

 

             Bilirubin,Total 6.8 mg/dL    High         0.2-1.0       

 

             Bilirubin,Direct 4.4 mg/dL    High         0.0-0.2       

 

             Total Protein 7.9 GM/DL    Normal       6.4-8.2       

 

             Albumin      2.0 GM/DL    Low          3.2-5.2       

 

             Albumin/Globulin Ratio 0.3          Low          1.2-2.2       

 

                    Basic Metabolic Profile 2020          Patient [place] 

[place] [address] [phone] Glucose, Fasting 83 mg/dL   Normal           

 

             Blood Urea Nitrogen 24 mg/dL     High         7-18          

 

             Creatinine For GFR 1.38 mg/dL   High         0.55-1.30     

 

             Glomerular Filtration Rate 41.5         Low          >45          1

7

 

             Sodium Level 130 mEq/L    Low          136-145       

 

             Potassium Serum 3.4 mEq/L    Low          3.5-5.1       

 

             Chloride Level 96 mEq/L     Low                  

 

             Carbon Dioxide Level 23 mEq/L     Normal       21-32         

 

             Anion Gap    11 mEq/L     Normal       8-16          

 

             Calcium Level 8.6 mg/dL    Low          8.8-10.2      

 

                    Laboratory test finding 2020          Patient Service 

[place] [address] [phone] NT-Pro  pg/mL  Normal     <125        

 

             Magnesium Level 2.2 mg/dL    Normal       1.8-2.4       

 

                    PT & Aptt           2020          [place] [address] [phone] Prothrombin Time 19.4 seconds High       12.5-14.3   

 

             Inr          1.60         Normal                    18

 

             Partial Thromboplastin Time 32.6 seconds Normal       24.2-38.5    

 







                          1                         FEW RBCS

FEW WBCS

NO ORGANISMS SEEN





 

                          2                         ****************************

*********************

If aerobic or anaerobic growth is detected within

the next 7-21 days, an addendum will follow.

                                        .***************************************

********.



FULL REPORT IN LAB NOTES ([facility]).

NO GROWTH AEROBICALLY





 

                          3                         TRANSFUSED PRODUCT: ALBUMIN 

25%                         COUNT: 1



 

                          4                         THERAPUTIC HUMAN INR VALUES

INDICATIONS                      NORMAL RANGES

PROPHYLAXIS/TREATMENT OF:

VENOUS THROMBOSIS                2.0-3.0

PULMONARY EMBOLISM               2.0-3.0

PREVENTION OF SYSTEMIC EMBOLISM FROM:

TISSUE HEART VALVES              2.0-3.0

ACUTE MYOCARDIAL INFARCTION      2.0-3.0

VALVULAR HEART DISEASE           2.0-3.0

ATRIAL FIBRILLATION              2.0-3.0

MECHANICAL VALVES(HIGH RISK)     2.5-3.5

RECURRENT MYOCARDIAL INFARCTION  2.5-3.5



 

                          5                         Units are mL/min/1.73 m2



Chronic Kidney Disease Staging per [facility]:



Stage I & II   GFR >=60       Normal to Mildly Decreased

Stage III      GFR 30-59      Moderately Decreased

Stage IV       GFR 15-29      Severely Decreased

Stage V        GFR <15        Very Little GFR Left

ESRD           GFR <15 on RRT



 

                          6                         Units are mL/min/1.73 m2



Chronic Kidney Disease Staging per [facility]:



Stage I & II   GFR >=60       Normal to Mildly Decreased

Stage III      GFR 30-59      Moderately Decreased

Stage IV       GFR 15-29      Severely Decreased

Stage V        GFR <15        Very Little GFR Left

ESRD           GFR <15 on RRT



 

                          7                         Testing performed at [facility] . Report copy to follow

on a separate form. 20 REF LAB#:[id number]



IAIN/Calcofluor preparatio     No fungus observed.





 

                          8                         Testing performed at [facility] . Report copy to follow

on a separate form. 20 REF LAB#:[id number]



FUNGUS CULTURE [facility]        No Yeast or Mold Isolated after 4 weeks.





 

                          9                         Testing performed at [facility] lab . Report copy to follow

on a separate form. 20 REF LAB#:[id number]



Due to limited sensitivity, smear results should

be used as an adjunct in evaluating patient tuberculosis

status. Cultural examination is highly recommended

for clinical diagnosis.





AFB sm REF LAB concentra      NEGATIVE





 

                          10                        Testing performed at [facility] lab . Report copy to follow

on a separate form. 20 REF LAB#:[id number]



FULL REPORT IN LAB NOTES ([facility]).

No Acid-Fast Bacilli Isolated after 6 Weeks.





 

                          11                        ****************************

*********************

If anaerobic or aerobic growth is detected within

the next 7-21 days, an addendum will follow.

                                        .***************************************

********.



FULL REPORT IN LAB NOTES ([facility]).

NO GROWTH ANAEROBICALLY





 

                          12                        FEW WBCS

NO ORGANISMS SEEN





 

                          13                        ****************************

*********************

If aerobic or anaerobic growth is detected within

the next 7-21 days, an addendum will follow.

                                        .***************************************

********.



FULL REPORT IN LAB NOTES ([facility]).

NO GROWTH AEROBICALLY





 

                          14                        THERAPUTIC HUMAN INR VALUES

INDICATIONS                      NORMAL RANGES

PROPHYLAXIS/TREATMENT OF:

VENOUS THROMBOSIS                2.0-3.0

PULMONARY EMBOLISM               2.0-3.0

PREVENTION OF SYSTEMIC EMBOLISM FROM:

TISSUE HEART VALVES              2.0-3.0

ACUTE MYOCARDIAL INFARCTION      2.0-3.0

VALVULAR HEART DISEASE           2.0-3.0

ATRIAL FIBRILLATION              2.0-3.0

MECHANICAL VALVES(HIGH RISK)     2.5-3.5

RECURRENT MYOCARDIAL INFARCTION  2.5-3.5



 

                          15                        DIAGNOSIS CRITERIA

MMB ng/ml       Relative Index (RI)

NON-AMI               < or = 5               N/A

GRAY ZONE              > 5                < or = 4

AMI                    > 5                   > 4



 

                          16                        Troponin I Reference Interva

l for [employer] LOCI:



                                        99th Percentile= 0.00-0.045 ng/ml



Risk Stratification:

<= 0.10 ng/ml   Decreased Risk for Adverse Clinical

Events.

                                        0.10-1.50 ng/ml   Increased Risk for Adv

erse Clinical

Events. Evaluation of additional

criterion and/or repeat testing in 2-6

hours is suggested to rule out myocardial

damage.

>= 1.50 ng/ml   Indicative of Myocardial Injury.



 

                          17                        Units are mL/min/1.73 m2



Chronic Kidney Disease Staging per NK[facility]:



Stage I & II   GFR >=60       Normal to Mildly Decreased

Stage III      GFR 30-59      Moderately Decreased

Stage IV       GFR 15-29      Severely Decreased

Stage V        GFR <15        Very Little GFR Left

ESRD           GFR <15 on RRT



 

                          18                        THERAPUTIC HUMAN INR VALUES

INDICATIONS                      NORMAL RANGES

PROPHYLAXIS/TREATMENT OF:

VENOUS THROMBOSIS                2.0-3.0

PULMONARY EMBOLISM               2.0-3.0

PREVENTION OF SYSTEMIC EMBOLISM FROM:

TISSUE HEART VALVES              2.0-3.0

ACUTE MYOCARDIAL INFARCTION      2.0-3.0

VALVULAR HEART DISEASE           2.0-3.0

ATRIAL FIBRILLATION              2.0-3.0

MECHANICAL VALVES(HIGH RISK)     2.5-3.5

RECURRENT MYOCARDIAL INFARCTION  2.5-3.5









Procedures





                Date            Code            Description     Status

 

                    2020          55161               Brief Emotional/Beha

v Assessment W/ Scoring Doc Per Standard 

Inst                                    Completed

 

                2018      [id number]        Mammogram       Completed

 

                2016      [id number]        Mammogram       Completed

 

                2015      [id number]        Mammogram       Completed

 

                2014      [id number]        Mammogram       Completed







[employer] Devices





                                        Description

 

                                        No Information Available







Encounters





           Type       Date       Location   Provider   Dx         Diagnosis

 

           Office Visit 2020 11:45a Main Office Gayla Ya FNP K70.3

1     Alcoholic

cirrhosis of liver with ascites

 

                          K76.7                     Hepatorenal syndrome

 

                          J91.8                     Pleural effusion in other co

nditions classified elsewhere

 

                          Z13.89                    Encounter for screening for 

other disorder

 

           Office Visit 10/26/2020  3:15p Main Office Gayla Ya FNP K70.3

1     Alcoholic

cirrhosis of liver with ascites

 

                          K76.7                     Hepatorenal syndrome

 

                          J91.8                     Pleural effusion in other co

nditions classified elsewhere

 

                          E03.9                     Hypothyroidism, unspecified

 

                          I10                       Essential (primary) hyperten

nioklay

 

                          F32.1                     Major depressive disorder, s

zakia episode, moderate

 

           Office Visit 10/19/2020  2:45p Main Office PleGayla alonso, [facility] K70.3

1     Alcoholic

cirrhosis of liver with ascites

 

                          K76.7                     Hepatorenal syndrome

 

                          J91.8                     Pleural effusion in other co

nditions classified elsewhere

 

                          E03.9                     Hypothyroidism, unspecified

 

                          I10                       Essential (primary) hyperten

nikolay







Assessments





                Date            Code            Description     Provider

 

                2020      K70.31          Alcoholic cirrhosis of liver wit

h ascites Gayla Ya, [facility]

 

                2020      K76.7           Hepatorenal syndrome KALPESH Ya, [facility]

 

                2020      J91.8           Pleural effusion in other condit

ions classified elsewhere 

Gayla Ya, [facility]

 

                2020      Z13.89          Encounter for screening for othe

r disorder Gayla Ya, 

[facility]

 

                10/26/2020      K70.31          Alcoholic cirrhosis of liver wit

h ascites Gayla Ya, [facility]

 

                10/26/2020      K76.7           Hepatorenal syndrome KALPESH Ya

neisha, [facility]

 

                10/26/2020      J91.8           Pleural effusion in other condit

ions classified elsewhere 

Gayla Ya, [facility]

 

                10/26/2020      E03.9           Hypothyroidism, unspecified Ples

Gayla arnold, [facility]

 

                10/26/2020      I10             Essential (primary) hypertension

 Gayla Ya, [facility]

 

                10/26/2020      F32.1           Major depressive disorder, singl

e episode, moderate PleskGayla dinh, [facility]

 

                10/19/2020      K70.31          Alcoholic cirrhosis of liver wit

h ascites Gayla Ya, [facility]

 

                10/19/2020      K76.7           Hepatorenal syndrome Plegavin M

neisha, [facility]

 

                10/19/2020      J91.8           Pleural effusion in other condit

ions classified elsewhere 

Gayla Ya, [facility]

 

                10/19/2020      E03.9           Hypothyroidism, unspecified Ples

kach Gayla, [facility]

 

                10/19/2020      I10             Essential (primary) hypertension

 Gayla Ya FNP







Plan of Treatment

Future Appointment(s):* 2021 11:30 am - Gayla Ya FNP at Main 
  Office

2020 - Gayla Ya FNP* K70.31 Alcoholic cirrhosis of liver with 
  ascites* Comments:* spoke with patient at length regarding importance of f/u 
  with pulmonology and nephrology.  She does not seem to understand the severity
  of her disease, she may possibly be in denial.  She is certainly not ready to 
  talk about hospice or DNR.  I encouraged her to call both pulmonary and 
  nephrology today to make follow up appointments



* Follow up:* one month





* K76.7 Hepatorenal syndrome

* J91.8 Pleural effusion in other conditions classified elsewhere

* Z13.89 Encounter for screening for other disorder





Functional Status





                Functional Condition Comment         Date            Status

 

                Glasses         Reading                         Active

 

                Independent with all ADL's                                 Activ

e







Mental Status





                Mental Condition Comment         Date            Status

 

                None            poor reader                     Active







Referrals





                Refer to      Reason for Referral Status          Appt Date

 

                [facility] ANXIETY         Closed          



 

                                        [address] [place] [address]

 

                                        [phone]

## 2021-01-16 NOTE — CCD
Continuity of Care Document (CCD)

                             Created on: 2021



[employer]

External Reference #: [id number]

: 1960

Sex: Female



Demographics





                          Address                   [address] [place] [address]  [address]

 

                          Home Phone                [phone]

 

                          Preferred Language        Unknown

 

                          Marital Status            Unknown

 

                          Cheondoism Affiliation     Mu-ism (Non-Yarsanism, Non

-Specific)

 

                          Race                      White

 

                          Ethnic Group              Not  or 





Author





                          Author                    [facility]

 

                          Organization              Unknown

 

                          Address                   [address]

[place]  [address]



 

                          Phone                     [phone]







Care Team Providers





                    Care Team Member Name Role                Phone

 

                    [facility]

TM                [phone]

 

                    [facility]         AUTM                [phone]

 

                    [facility] AUTM               

 [phone]

 

                    [facility] - [facility] AUTM            

    [phone]







Problems





                    Active Problems     Provider            Date

 

                    Essential hypertension Gabe Corral M.D. Onset: 

 

                    Hypothyroidism      Gabe Corral M.D. Onset: 2011

 

                    Vitamin D deficiency Gabe Corral M.D. Onset: 

 

                    Neck pain           Gabe Corral M.D. Onset: 2011

 

                    Generalized anxiety disorder Gabe Corral M.D. Onse

t: 2011

 

                    Allergic rhinitis   Gabe Corral M.D. Onset: 2012

 

                    Degeneration of lumbar intervertebral disc Benigno Corral M.D. Onset: 

2013

 

                    Alcoholic cirrhosis Gabe Corral M.D. Onset: 2018

 

                    Esophageal varices without bleeding Gabe Corral M. D. Onset: 2018







Social History





                Type            Date            Description     Comments

 

                Birth Sex                       Unknown          

 

                Tobacco Use     Start: Unknown  Never Smoked Cigarettes  

 

                Tobacco Use     Start: Unknown  Never Used Smokeless Tobacco  

 

                ETOH Use                        Consumed 4 Alcoholic Beverages P

er Day  

 

                Tobacco Use     Start: Unknown  Patient has never smoked  

 

                Recreational Drug Use                 Denies Drug Use  

 

                Smoking Status  Reviewed: 21 Patient has never smoked  

 

                Exercise Type/Frequency                 Exercises sporadically  

 

                Tattoo/Piercing                 Tattoo          3 

 

                Tattoo/Piercing                 Pierced ears    x2 

 

                Sun Exposure                    Minimum amount of sun exposure  

 

                Sun Exposure                    Uses sunscreen  Occasionally 

 

                Seat Belt/Car Seat                 Always uses seat belt  

 

                Bike Helmet                     Never           Does not bike ri

de. 

 

                Smoke Alarms                    Yes              

 

                Smoke Alarms                    Carbon Monoxide Detector: Yes  







Allergies, Adverse Reactions, Alerts





             Active Allergies Reaction     Severity     Comments     Date

 

             Penicillin                                          2004

 

             Cefdinir     Difficulty swallowing, Itching, throat felt itchy. Sev

ere                    2019







Medications





           Active Medications SIG        Qnty       Indications Ordering Provide

r Date

 

           Rifaximin Tablets 200 mg tab - take 2 tabs (400 mg) twice a day      

                 Unknown    

2020

 

                          Spironolactone                     50mg Tablets       

            take one half 

tablet (25 mg) by mouth twice a day                                 Unknown     

    2020

 

                          Allergy Relief Loratadine                     10mg Tab

lets                   1 

tab once a day as needed for allergy symptoms                                 Un

known         2020

 

                          Hydroxyzine HCL                     25mg Tablets      

             1 -2 tab by 

mouth as needed before bed 30tabs          F32.1           Gayla Ya FNP 

10/26/2020

 

                          Mucinex                     600mg Tablets ER 12HR     

              1 by mouth 

twice a day                                     Unknown         10/08/2020

 

                          Midodrine HCL                     5mg Tablets         

          1 tablet three 

times a day ( 8 am, 12 n,4 pm) 90tabs                          Gayla Ya FNP 10/08/2020

 

                          Torsemide                     20mg Tablets            

       take 1 tablet twice

a day (9 Am/5 PM)                                 Unknown         10/08/2020

 

                                        Lactulose Encephalopathy                

     10GM/15ML Solution                 

             take 30ml by mouth three times a day 2700units                 Gayla Araujo FNP 

2020

 

                          Thiamine HCL                     100mg Tablets        

           1 by mouth 

every day                                       Unknown         2020

 

                                        Fluticasone Propionate Nasal Spray      

               50mcg/Act Suspension     

             2 sprays each nostril once a day                           Unknown 

     2019

 

                          Brimonidine Tartrate                     0.2% Solution

                   one 

gtt. each eye twice a day                                 Unknown         2019

 

                          Betaxolol HCL                     0.5% Solution       

            1 drop each 

eye twice a day.                                 Unknown         2019

 

                          Latanoprost                     0.005% Solution       

            one drop in 

each eye daily at at bedtime                                 Unknown         

 

                          Vitamin D                     1000Unit Tablets        

           1 by mouth 

every day       100tabs                         Meron Ramirez M.D. 

019

 

                          Levothyroxine Sodium                     112mcg Tablet

s                   1 by 

mouth every day 90tabs          E03.9           Meron Ramirez M.D. 

018

 

                                        Wrist Splint/Cock-Up/Left/Canvas/Medium 

                     Misc               

                dx: carpal tunnel syndrome, duration 12 months, prognosis good 1

units                          

Gabe Corral M.D.              2018

 

                                        Wrist Splint/Cock-Up/Right/Canvas/Medium

                      Misc              

                                        wear splint every night for carpal tunne

l syndrome, prognosis good, duration

99 months       1units                          Gabe Corral M.D. 

 

                                        Levocetirizine Dihydrochloride          

           5mg Tablets                  

                1 tab by mouth every evening for allergies 90tabs          J30.9

           Meron Ramirez M.D.

                                        2015

 

                          Ketotifen Fumarate                     0.025% Solution

                   1 drop 

both eyes twice a day for allergy symptoms 5ml                             Unkno

wn         

 

                          Folic Acid                     1mg Tablets            

       1 by mouth every 

day             90tabs                          Meron Ramirez M.D. 0

000

 

           Multivitamin Adult                      Tablets                      

                              Unknown    



 

                          Xifaxan                     200mg Tablets             

      1 by mouth twice a 

day             60tabs                          Gayla Ya FNP 

 

                                        Potassium Chloride Alma Rosa ER              

       20Meq Tablets ER                 

             1 by mouth every day                           Unknown      0

000

 

                                        History Medications

 

                          Sertraline HCL                     50mg Tablets       

            1 by mouth 

every day       30tabs          F32.1           Gayla Ya FNP 10/26/2020 

- 2020

 

                          Ciprofloxacin HCL                     250mg Tablets   

                1 tab by 

mouth once a day at 6 Am                                 Unknown         10/08/2

020 - 10/22/2020

 

                          Spironolactone                     50mg Tablets       

            take one 

tablet by mouth twice a day  (9 Am/5 PM)                                 Genie Tabares PA-C 10/08/2020 - 

2020







Immunizations





             CPT Code     Status       Date         Vaccine      Lot #

 

             [id number]        Given        2019   Pneumococcal Vaccine [id number]

 

                [id number]           Given           10/29/2018      Influenza Virus 

Vaccine, Quadrivalent,age 3 and 

up,multidose vial                       [id number]

 

             09863        Given        2017   Influenza Vaccination  

 

             20967        Given        2015   Influenza Vaccination  

 

             13310        Given        2013   Influenza Vaccination  

 

             33617        Given        04/15/2013   Zostavax      

 

             68951        Given        2012   Influenza Vaccination [id number]

C

 

             39317        Given        10/31/2011   Influenza Vaccination/preser

vative free [id number]

 

             34182        Given        2010   Adacel 11 Yrs or older 

BA

 

             43346        Given        10/04/2010   Influenza Vaccination [id number]

A

 

             03342        Given        2009   Influenza Vaccination [id number]

A

 

             18642        Given        2005   Tetnus Toxoid Im Or Jet Inje

ction Use  







Vital Signs





                Date            Vital           Result          Comment

 

                2021 11:40am BP Systolic     105 mmHg         

 

                    BP Diastolic        66 mmHg              

 

                    Heart Rate          94 /min              

 

                    Body Temperature    97.1 F             

 

                    Respiratory Rate    20 /min              

 

                    Height              67.0 inches         5'7"

 

                    Weight              176.12 lb            

 

                    O2 % BldC Oximetry  99 %                 

 

                    Peak Expiratory Flow Rate 364                 Estimated Peak

 Flow Rate

 

                    Ideal Body Weight   135 lb               

 

                    BMI (Body Mass Index) 27.6 kg/m2           

 

                10/26/2020  3:20pm BP Systolic     105 mmHg         

 

                    BP Diastolic        63 mmHg              

 

                    Heart Rate          108 /min             

 

                    Body Temperature    97.4 F             

 

                    Respiratory Rate    20 /min              

 

                    Height              67.0 inches         5'7"

 

                    Weight              169.50 lb            

 

                    O2 % BldC Oximetry  98 %                 

 

                    Peak Expiratory Flow Rate 364                 Estimated Peak

 Flow Rate

 

                    Ideal Body Weight   135 lb               

 

                    BMI (Body Mass Index) 26.5 kg/m2           







Results





        Test    Acquired Date Facility Test    Result  H/L     Range   Note

 

                    Cell Count Pleural Fluid 2020          [facility]

 Center

[place] [address] [phone] Source, Body Fluid PLEURAL    Normal                 

 

             Pleural FL Color YELLOW       Normal       Colorless     

 

             Appearance, Body Fluid CLOUDY       Normal       Clear         

 

             WBC Body Fluid 101 /uL      High         0-10          

 

             RBC Body Fluid 2 10         Normal       <2            

 

             BF Mononuclear Cell % 94.0 %       High         0-0           

 

             BF Polymorphonuclear Cell % 6.0 %        High         0-0          

 

 

                    Body Fluid Culture And GS 2020          Patient Servic

e Center

[place] [address] [phone] Gram Stain (SEE NOTE)  Normal                1

 

             Body Fluid Culture **************** <SEE NOTE>                     

       2

 

                    Laboratory test finding 2020          Patient Service 

[place] [address]

           [phone] Albumin 25% TRANSFUSED PRODU <SEE NOTE>               

         3

 

                    Cell Count Pleural Fluid 2020          Patient [place]

 [place] [address]

           [phone] Source, Body Fluid PLEURAL    Normal                 

 

             Pleural FL Color YELLOW       Normal       Colorless     

 

             Appearance, Body Fluid CLOUDY       Normal       Clear         

 

             WBC Body Fluid 124 /uL      High         0-10          

 

             RBC Body Fluid < 2 10       Normal       <2            

 

             BF Mononuclear Cell % 93.6 %       High         0-0           

 

             BF Polymorphonuclear Cell % 6.4 %        High         0-0          

 

 

                    Body Fluids Culture And Gram Stain 2020          [facility]

[place] [address]

           [phone] Gram Stain (SEE NOTE)  Normal                4

 

             Body Fluid Culture **************** <SEE NOTE>                     

       5

 

                    Laboratory test finding 2020          [place] [address]

           [phone] Albumin 25% TRANSFUSED PRODU <SEE NOTE>               

         6

 

                    Laboratory test finding 2020          [place] [address]

           [phone] Ethyl Alcohol (Ethanol) < 0.003 %  Normal     0.000-0.

010  

 

                    Basic Metabolic Profile 2020          [place] [address]

           [phone] Glucose, Fasting 169 mg/dL  High             

 

             Blood Urea Nitrogen 71 mg/dL     High         7-18          

 

             Creatinine For GFR 2.38 mg/dL   High         0.55-1.30     

 

             Glomerular Filtration Rate 22.1         Low          >45          7

 

             Sodium Level 131 mEq/L    Low          136-145       

 

             Potassium Serum 4.7 mEq/L    Normal       3.5-5.1       

 

             Chloride Level 99 mEq/L     Normal               

 

             Carbon Dioxide Level 21 mEq/L     Normal       21-32         

 

             Anion Gap    11 mEq/L     Normal       8-16          

 

             Calcium Level 8.7 mg/dL    Low          8.8-10.2      

 

                    Liver Profile       2020          [place] [address] [phone] Ast/Sgot   43 U/L     High       7-37        

 

             Alt/SGPT     38 U/L       Normal       12-78         

 

             Alkaline Phosphatase 155 U/L      High                 

 

             Bilirubin,Total 3.1 mg/dL    High         0.2-1.0       

 

             Bilirubin,Direct 2.0 mg/dL    High         0.0-0.2       

 

             Total Protein 6.3 GM/DL    Low          6.4-8.2       

 

             Albumin      2.0 GM/DL    Low          3.2-5.2       

 

             Albumin/Globulin Ratio 0.5          Low          1.2-2.2       

 

                    PT & Aptt           2020          Patient Service Cent

er

[facility] [place] [address] [phone] Prothrombin Time 17.5 seconds High       12.5-14.3   

 

             Inr          1.40         Normal                    8

 

             Partial Thromboplastin Time 35.4 seconds Normal       24.2-38.5    

 

 

                    CBC With Differential 2020          Patient Service Ce

nter

[place] [address] [phone] White Blood Count 5.5 10     Normal     4.0-10.0    

 

             Red Blood Count 3.39 10      Low          4.00-5.40     

 

             Hemoglobin   10.8 g/dL    Low          12.0-15.5     

 

             Hematocrit   32.6 %       Low          36.0-47.0     

 

             Mean Corpuscular Volume 96.2 fl      High         80.0-96.0     

 

             Mean Corpuscular Hemoglobin 31.9 pg      Normal       27.0-33.0    

 

 

             Mean Corpuscular HGB Conc 33.1 g/dL    Normal       32.0-36.5     

 

             Red Cell Distribution Width 13.8 %       Normal       11.5-14.5    

 

 

             Platelet Count, Automated 124 10       Low          150-450       

 

             Neutrophils % 73.3 %       High         36.0-66.0     

 

             Lymph %      13.9 %       Low          24.0-44.0     

 

             Mono %       9.9 %        High         0.0-5.0       

 

             Eos %        1.3 %        Normal       0.0-3.0       

 

             Baso %       0.9 %        Normal       0.0-1.0       

 

             Immature Granulocyte % 0.7 %        Normal       0-3.0         

 

             Nucleated Red Blood Cell % 0.0 %        Normal       0-0           

 

             Neutrophils # 4.1 10       Normal       1.5-8.5       

 

             Lymph #      0.8 10       Low          1.5-5.0       

 

             Mono #       0.6 10       Normal       0.0-0.8       

 

             Eos #        0.1 10       Normal       0.0-0.5       

 

             Baso #       0.1 10       Normal       0.0-0.2       

 

                    Comprehensive Metabolic Profil 2020          Patient [place]

[place] [address] [phone] Glucose, Fasting 118 mg/dL  High             

 

             Blood Urea Nitrogen 69 mg/dL     High         7-18          

 

             Creatinine For GFR 2.49 mg/dL   High         0.55-1.30     

 

             Glomerular Filtration Rate 21.0         Low          >45          9

 

             Sodium Level 128 mEq/L    Low          136-145       

 

             Potassium Serum 4.2 mEq/L    Normal       3.5-5.1       

 

             Chloride Level 92 mEq/L     Low                  

 

             Carbon Dioxide Level 25 mEq/L     Normal       21-32         

 

             Anion Gap    11 mEq/L     Normal       8-16          

 

             Calcium Level 8.8 mg/dL    Normal       8.8-10.2      

 

             Ast/Sgot     45 U/L       High         7-37          

 

             Alt/SGPT     44 U/L       Normal       12-78         

 

             Alkaline Phosphatase 186 U/L      High                 

 

             Bilirubin,Total 4.2 mg/dL    High         0.2-1.0       

 

             Total Protein 6.9 GM/DL    Normal       6.4-8.2       

 

             Albumin      2.2 GM/DL    Low          3.2-5.2       

 

             Albumin/Globulin Ratio 0.5          Low          1.2-2.2       

 

                    Culture Fungus Misc 2020          [facility] Cent

[place] [address] [phone] Fungal Smear Testing performe <SEE NOTE>              

          10

 

             Fungal Culture Other Source Testing performe <SEE NOTE>            

                11

 

                    Acid Fast Smear & Culture(Afb) Sendout 2020          P

[place] [address] [phone] Afb Smear  Testing performe <SEE NOTE>                

        12

 

             Afb Culture  Testing performe <SEE NOTE>                           

 13

 

                    Laboratory test finding 2020          Patient [place] 

[place] [address] [phone] Anaerobic Culture **************** <SEE NOTE>         

               14

 

                    Body Fluid Culture And GS 2020          Patient Servic

e Center

[place] [address] [phone] Gram Stain (SEE NOTE)  Normal                15

 

             Body Fluid Culture **************** <SEE NOTE>                     

       16

 

                    PH, Body Fluid      2020          [place] [address] [phone] PH Body Fluid > 7.800 units Normal     Not Established

  

 

             Source, Body Fluid pH PLEURAL      Normal                     

 

                    Amylase Body Fluid  2020          Patient Service [place] [id number] [phone] Amylase, Body Fluid 31 U/L     Normal     Not Establis

hed  

 

             Source, Body Fluid Amylase PLEURAL      Normal                     

 

                    Glucose Body Fluid  2020          Patient Service [place] [id number] [phone] Glucose, Body Fluid 131 mg/dL  Normal     Not Establis

hed  

 

             Source, Body Fluid Glucose PLEURAL      Normal                     

 

                    LDH Body Fluid      2020          [place] [address]

           [phone] LDH, Body Fluid 51 U/L     Normal     Not Established 

 

 

             Source, Body Fluid LDH PLEURAL      Normal                     

 

                    TP Body Fluid       2020          Patient Service [place] [id number] [phone] Total Protein, Body Fluid 0.7 g/dL   Normal     Not Es

tablished  

 

             Source, Body Fluid Tot Protein PLEURAL      Normal                 

    

 

                    Cell Count Pleural Fluid 2020          Patient [place]

 [place] [address] [phone] Source, Body Fluid PLEURAL    Normal                 

 

             Pleural FL Color YELLOW       Normal       Colorless     

 

             Appearance, Body Fluid CLOUDY       Normal       Clear         

 

             WBC Body Fluid 114 /uL      High         0-10          

 

             RBC Body Fluid 4 10         Normal       <2            

 

             BF Mononuclear Cell % 91.2 %       High         0-0           

 

             BF Polymorphonuclear Cell % 8.8 %        High         0-0          

 

 

                    PT & Aptt           2020          [place] [address] [phone] Prothrombin Time 18.6 seconds High       12.5-14.3   

 

             Inr          1.52         Normal                    17

 

             Partial Thromboplastin Time 34.0 seconds Normal       24.2-38.5    

 

 

                    Laboratory test finding 2020          Patient Service 

[place] [address] [phone] Platelet Count, Automated 113 10     Low        150-45

0     

 

                    CBC With Differential 2020          Patient Service Ce

nter

[place] [address] [phone] White Blood Count 7.9 10     Normal     4.0-10.0    

 

             Red Blood Count 3.31 10      Low          4.00-5.40     

 

             Hemoglobin   11.7 g/dL    Low          12.0-15.5     

 

             Hematocrit   34.8 %       Low          36.0-47.0     

 

             Mean Corpuscular Volume 105.1 fl     High         80.0-96.0     

 

             Mean Corpuscular Hemoglobin 35.3 pg      High         27.0-33.0    

 

 

             Mean Corpuscular HGB Conc 33.6 g/dL    Normal       32.0-36.5     

 

             Red Cell Distribution Width 14.6 %       High         11.5-14.5    

 

 

             Platelet Count, Automated 96 10        Low          150-450       

 

             Nucleated Red Blood Cell % 0.0 %        Normal       0-0           

 

                    Differential        2020          [place] [address]

           [phone] Neutrophils 70 %       High       28-66       

 

             Lymphocytes  14 %         Low          16-44         

 

             Monocytes    8 %          High         0-5           

 

             Eosinophils  5 %          High         0-3           

 

             Myelocytes   1 %          High         0-0           

 

             Atypical Lymph 2 %          Normal       0-5           

 

             Anisocytosis 1+           Normal                     

 

                    Laboratory test finding 2020          Patient Service 

[place] [address]

           [phone] Platelet Estimate DECREASED  Normal     Normal      

 

             Immature Platelet Fraction 1.3 %        Normal       0.0-9.59      

 

                    Cardiac Marker Panel 2020          [place] [address] [phone] CPK Creatine Phosphokinase 50 U/L     Normal     26-19

2      

 

             CK-MB Value Mass < 1.0 NG/ML  Normal       <3.6          

 

             MB/CK Relative Index 2.00         Normal       < Or =4      18

 

             Troponin I   < 0.02 NG/ML Normal       < 0.10       19

 

                    Liver Profile       2020          [place] [address]

           [phone] Ast/Sgot   80 U/L     High       7-37        

 

             Alt/SGPT     51 U/L       Normal       12-78         

 

             Alkaline Phosphatase 136 U/L      High                 

 

             Bilirubin,Total 6.8 mg/dL    High         0.2-1.0       

 

             Bilirubin,Direct 4.4 mg/dL    High         0.0-0.2       

 

             Total Protein 7.9 GM/DL    Normal       6.4-8.2       

 

             Albumin      2.0 GM/DL    Low          3.2-5.2       

 

             Albumin/Globulin Ratio 0.3          Low          1.2-2.2       

 

                    Basic Metabolic Profile 2020          [place] 

[place] [address] [phone] Glucose, Fasting 83 mg/dL   Normal           

 

             Blood Urea Nitrogen 24 mg/dL     High         7-18          

 

             Creatinine For GFR 1.38 mg/dL   High         0.55-1.30     

 

             Glomerular Filtration Rate 41.5         Low          >45          2

0

 

             Sodium Level 130 mEq/L    Low          136-145       

 

             Potassium Serum 3.4 mEq/L    Low          3.5-5.1       

 

             Chloride Level 96 mEq/L     Low                  

 

             Carbon Dioxide Level 23 mEq/L     Normal       21-32         

 

             Anion Gap    11 mEq/L     Normal       8-16          

 

             Calcium Level 8.6 mg/dL    Low          8.8-10.2      

 

                    Laboratory test finding 2020          [place] 

[place] [address] [phone] NT-Pro  pg/mL  Normal     <125        

 

             Magnesium Level 2.2 mg/dL    Normal       1.8-2.4       

 

                    PT & Aptt           2020          [place] [address] [phone] Prothrombin Time 19.4 seconds High       12.5-14.3   

 

             Inr          1.60         Normal                    21

 

             Partial Thromboplastin Time 32.6 seconds Normal       24.2-38.5    

 







                          1                         FEW WBCS

NO ORGANISMS SEEN





 

                          2                         ****************************

*********************

If aerobic or anaerobic growth is detected within

the next 7-21 days, an addendum will follow.

                                        .***************************************

********.



FULL REPORT IN LAB NOTES ([facility] and [facility]).

NO GROWTH AEROBICALLY





 

                          3                         TRANSFUSED PRODUCT: ALBUMIN 

25%                         COUNT: 1



 

                          4                         FEW RBCS

FEW WBCS

NO ORGANISMS SEEN





 

                          5                         ****************************

*********************

If aerobic or anaerobic growth is detected within

the next 7-21 days, an addendum will follow.

                                        .***************************************

********.



FULL REPORT IN LAB NOTES ([facility] and [facility]).

NO GROWTH AEROBICALLY





 

                          6                         TRANSFUSED PRODUCT: ALBUMIN 

25%                         COUNT: 1



 

                          7                         Units are mL/min/1.73 m2



Chronic Kidney Disease Staging per NK[facility]:



Stage I & II   GFR >=60       Normal to Mildly Decreased

Stage III      GFR 30-59      Moderately Decreased

Stage IV       GFR 15-29      Severely Decreased

Stage V        GFR <15        Very Little GFR Left

ESRD           GFR <15 on RRT



 

                          8                         THERAPUTIC HUMAN INR VALUES

INDICATIONS                      NORMAL RANGES

PROPHYLAXIS/TREATMENT OF:

VENOUS THROMBOSIS                2.0-3.0

PULMONARY EMBOLISM               2.0-3.0

PREVENTION OF SYSTEMIC EMBOLISM FROM:

TISSUE HEART VALVES              2.0-3.0

ACUTE MYOCARDIAL INFARCTION      2.0-3.0

VALVULAR HEART DISEASE           2.0-3.0

ATRIAL FIBRILLATION              2.0-3.0

MECHANICAL VALVES(HIGH RISK)     2.5-3.5

RECURRENT MYOCARDIAL INFARCTION  2.5-3.5



 

                          9                         Units are mL/min/1.73 m2



Chronic Kidney Disease Staging per [facility]:



Stage I & II   GFR >=60       Normal to Mildly Decreased

Stage III      GFR 30-59      Moderately Decreased

Stage IV       GFR 15-29      Severely Decreased

Stage V        GFR <15        Very Little GFR Left

ESRD           GFR <15 on RRT



 

                          10                        Testing performed at [facility] lab . Report copy to follow

on a separate form. 20 REF LAB#:[id number]



IAIN/Calcofluor preparatio     No fungus observed.





 

                          11                        Testing performed at [facility] lab . Report copy to follow

on a separate form. 20 REF LAB#:[id number]



FUNGUS CULTURE [facility]        No Yeast or Mold Isolated after 4 weeks.





 

                          12                        Testing performed at [facility] lab . Report copy to follow

on a separate form. 20 REF LAB#:[id number]



Due to limited sensitivity, smear results should

be used as an adjunct in evaluating patient tuberculosis

status. Cultural examination is highly recommended

for clinical diagnosis.





AFB sm REF LAB concentra      NEGATIVE





 

                          13                        Testing performed at [facility] lab . Report copy to follow

on a separate form. 20 REF LAB#:[id number]



FULL REPORT IN LAB NOTES ([facility] and [facility]).

No Acid-Fast Bacilli Isolated after 6 Weeks.





 

                          14                        ****************************

*********************

If anaerobic or aerobic growth is detected within

the next 7-21 days, an addendum will follow.

                                        .***************************************

********.



FULL REPORT IN LAB NOTES ([facility] and [facility]).

NO GROWTH ANAEROBICALLY





 

                          15                        FEW WBCS

NO ORGANISMS SEEN





 

                          16                        ****************************

*********************

If aerobic or anaerobic growth is detected within

the next 7-21 days, an addendum will follow.

                                        .***************************************

********.



FULL REPORT IN LAB NOTES ([facility] and [facility]).

NO GROWTH AEROBICALLY





 

                          17                        THERAPUTIC HUMAN INR VALUES

INDICATIONS                      NORMAL RANGES

PROPHYLAXIS/TREATMENT OF:

VENOUS THROMBOSIS                2.0-3.0

PULMONARY EMBOLISM               2.0-3.0

PREVENTION OF SYSTEMIC EMBOLISM FROM:

TISSUE HEART VALVES              2.0-3.0

ACUTE MYOCARDIAL INFARCTION      2.0-3.0

VALVULAR HEART DISEASE           2.0-3.0

ATRIAL FIBRILLATION              2.0-3.0

MECHANICAL VALVES(HIGH RISK)     2.5-3.5

RECURRENT MYOCARDIAL INFARCTION  2.5-3.5



 

                          18                        DIAGNOSIS CRITERIA

MMB ng/ml       Relative Index (RI)

NON-AMI               < or = 5               N/A

GRAY ZONE              > 5                < or = 4

AMI                    > 5                   > 4



 

                          19                        Troponin I Reference Interva

l for [employer] LOCI:



                                        99th Percentile= 0.00-0.045 ng/ml



Risk Stratification:

<= 0.10 ng/ml   Decreased Risk for Adverse Clinical

Events.

                                        0.10-1.50 ng/ml   Increased Risk for Adv

erse Clinical

Events. Evaluation of additional

criterion and/or repeat testing in 2-6

hours is suggested to rule out myocardial

damage.

>= 1.50 ng/ml   Indicative of Myocardial Injury.



 

                          20                        Units are mL/min/1.73 m2



Chronic Kidney Disease Staging per NKF:



Stage I & II   GFR >=60       Normal to Mildly Decreased

Stage III      GFR 30-59      Moderately Decreased

Stage IV       GFR 15-29      Severely Decreased

Stage V        GFR <15        Very Little GFR Left

ESRD           GFR <15 on RRT



 

                          21                        THERAPUTIC HUMAN INR VALUES

INDICATIONS                      NORMAL RANGES

PROPHYLAXIS/TREATMENT OF:

VENOUS THROMBOSIS                2.0-3.0

PULMONARY EMBOLISM               2.0-3.0

PREVENTION OF SYSTEMIC EMBOLISM FROM:

TISSUE HEART VALVES              2.0-3.0

ACUTE MYOCARDIAL INFARCTION      2.0-3.0

VALVULAR HEART DISEASE           2.0-3.0

ATRIAL FIBRILLATION              2.0-3.0

MECHANICAL VALVES(HIGH RISK)     2.5-3.5

RECURRENT MYOCARDIAL INFARCTION  2.5-3.5









Procedures





                Date            Code            Description     Status

 

                    2020          51106               Brief Emotional/Beha

v Assessment W/ Scoring Doc Per Standard 

Inst                                    Completed

 

                2018      [id number]        Mammogram       Completed

 

                2016      [id number]        Mammogram       Completed

 

                2015      [id number]        Mammogram       Completed

 

                2014      [id number]        Mammogram       Completed







Medical Devices





                                        Description

 

                                        No Information Available







Encounters





           Type       Date       Location   Provider   Dx         Diagnosis

 

           Office Visit 2020 11:45a Main Office Gayla Ya FNP K70.3

1     Alcoholic

cirrhosis of liver with ascites

 

                          K76.7                     Hepatorenal syndrome

 

                          J91.8                     Pleural effusion in other co

nditions classified elsewhere

 

                          Z13.89                    Encounter for screening for 

other disorder

 

           Office Visit 10/26/2020  3:15p Main Office Gayla Ya FNP K70.3

1     Alcoholic

cirrhosis of liver with ascites

 

                          K76.7                     Hepatorenal syndrome

 

                          J91.8                     Pleural effusion in other co

nditions classified elsewhere

 

                          E03.9                     Hypothyroidism, unspecified

 

                          I10                       Essential (primary) hyperten

nikolay

 

                          F32.1                     Major depressive disorder, s

zakia episode, moderate

 

           Office Visit 10/19/2020  2:45p Main Office PleGayla alonso, [facility] K70.3

1     Alcoholic

cirrhosis of liver with ascites

 

                          K76.7                     Hepatorenal syndrome

 

                          J91.8                     Pleural effusion in other co

nditions classified elsewhere

 

                          E03.9                     Hypothyroidism, unspecified

 

                          I10                       Essential (primary) hyperten

nikolay







Assessments





                Date            Code            Description     Provider

 

                2021      K70.31          Alcoholic cirrhosis of liver wit

h ascites Pleskach Gayla, [facility]

 

                2021      K76.7           Hepatorenal syndrome PleKALPESH alonso

neisha, [facility]

 

                2021      J91.8           Pleural effusion in other condit

ions classified elsewhere 

PleSherif alonsoy, [facility]

 

                2021      E03.9           Hypothyroidism, unspecified Ples

kaGayla smith, [facility]

 

                2021      I10             Essential (primary) hypertension

 Pleskach Gayla, [facility]

 

                2021      F32.1           Major depressive disorder, singl

e episode, moderate Pleskach 

Gayla, [facility]

 

                2020      K70.31          Alcoholic cirrhosis of liver wit

h ascites Pleskach, Gayla, [facility]

 

                2020      K76.7           Hepatorenal syndrome Pleskach, M

neisha, [facility]

 

                2020      J91.8           Pleural effusion in other condit

ions classified elsewhere 

Gayla Ya, [facility]

 

                2020      Z13.89          Encounter for screening for othe

r disorder Gayla Ya, 

[facility]

 

                10/26/2020      K70.31          Alcoholic cirrhosis of liver wit

h ascites Pleskach Gayla, [facility]

 

                10/26/2020      K76.7           Hepatorenal syndrome Pleskach M

neisha, [facility]

 

                10/26/2020      J91.8           Pleural effusion in other condit

ions classified elsewhere 

Plegavin Gayla, [facility]

 

                10/26/2020      E03.9           Hypothyroidism, unspecified Ples

kach Gayla, [facility]

 

                10/26/2020      I10             Essential (primary) hypertension

 Pleskach, Gayla, [facility]

 

                10/26/2020      F32.1           Major depressive disorder, singl

e episode, moderate Pleskach, 

Gayla, [facility]

 

                10/19/2020      K70.31          Alcoholic cirrhosis of liver wit

h ascites Pleskach, Gayla, [facility]

 

                10/19/2020      K76.7           Hepatorenal syndrome KALPESH Ya FNP

 

                10/19/2020      J91.8           Pleural effusion in other condit

ions classified elsewhere 

Gayla Ya FNP

 

                10/19/2020      E03.9           Hypothyroidism, unspecified Ples

Gayla arnold FNP

 

                10/19/2020      I10             Essential (primary) hypertension

 Gayal Ya FNP







Plan of Treatment

2021 - Gayla Ya FNP* K70.31 Alcoholic cirrhosis of liver with 
  ascites* Follow up:* 3 months





* K76.7 Hepatorenal syndrome

* J91.8 Pleural effusion in other conditions classified elsewhere

* E03.9 Hypothyroidism, unspecified

* I10 Essential (primary) hypertension

* F32.1 Major depressive disorder, single episode, moderate





Functional Status





                Functional Condition Comment         Date            Status

 

                Glasses         Reading                         Active

 

                Independent with all ADL's                                 Activ

e







Mental Status





                Mental Condition Comment         Date            Status

 

                None            poor reader                     Active







Referrals





                Refer to Dr     Reason for Referral Status          Appt Date

 

                [facility] ANXIETY         Closed          



 

                                        [address] [place] [address]

 

                                        [phone]

## 2021-01-16 NOTE — CCD
Continuity of Care Document (CCD)

                             Created on: 2020



Flora Taylor

External Reference #: [id number]

: 1960

Sex: Female



Demographics





                          Address                   [address] [place]  [address]

 

                          Home Phone                [phone]

 

                          Preferred Language        Unknown

 

                          Marital Status            Unknown

 

                          Religion Affiliation     Unknown

 

                          Race                      White

 

                          Ethnic Group              Not  or 





Author





                          Author                    Flora FRASER M.D.

 

                          Organization              Unknown

 

                          Address                   [address] [place]  [address]



 

                          Phone                     [phone]







Care Team Providers





                    Care Team Member Name Role                Phone

 

                    Meron Ramirez M.D. AUTM                [phone]







Problems





                    Active Problems     Provider            Date

 

                    Cirrhosis of liver  Arley Fraser M.D. Onset: 20

19

 

                    Screening for malignant neoplasm of colon Arley ventura M.D. Onset: 

2019







Social History





                Type            Date            Description     Comments

 

                Birth Sex                       Unknown          

 

                ETOH Use                        Occasionally    2016 decrea

sed. 

 

                Tobacco Use     Start: Unknown  Patient has never smoked  







Allergies, Adverse Reactions, Alerts





             Active Allergies Reaction     Severity     Comments     Date

 

             Penicillin                                          2019

 

                                        Inactive Allergies

 

             NKDA                                                2019







Medications





           Active Medications SIG        Qnty       Indications Ordering Provide

r Date

 

                          Spironolactone                     50mg Tablets       

            1 tab by mouth

twice a day     180tabs                         Arley Fraser M.D. 10/13/2

020

 

                          Torsemide                     20mg Tablets            

       1 tab by mouth 

twice a day     180tabs                         Arley Fraser M.D. 10/13/2

020

 

             Hydroxyzine HCL                     25mg Tablets                   

                                       

Pleskach, Gayla FNP                     

 

             Sertraline HCL                     50mg Tablets                    

                                      

Pleskach, Gayla FNP                     

 

                                        Fluticasone Propionate                  

   50mcg/Act Suspension                 

                                                    Unknown      

 

           Torsemide                     20mg Tablets                           

                         Unknown    



 

                                        Prednisolone Sodium Phosphate           

          15mg/5ML Solution             

             Take 2.7 Teaspoonful  13 ML  By Mouth Once Daily With Food         

                  Unknown      



 

           Midodrine HCL                     5mg Tablets                        

                            Unknown    



 

             Ciprofloxacin HCL                     250mg Tablets                

                                          

Unknown                                 

 

                          Loratadine                     10mg Tablets           

        Take One Tablet By

Mouth Every Day                                 Unknown         

 

                          Mucus Relief ER                     600mg Tablets ER 1

2HR                   Take

One Tablet By Mouth Every 12 Hours For 10 Days                                 U

nknown         

 

           Latanoprost                     0.005% Solution                      

                              Unknown    



 

                          Betaxolol HCL                     0.5% Solution       

            Instill One 

Drop In Each Eye Two Times A Day                                 Unknown        

 

 

           Multiple Vitamin                      Tablets                        

                            Unknown    



 

                                        Lactulose Encephalopathy                

     10GM/15ML Solution                 

             take 30ml by mouth three times a day 946ml                     Guillaume Fraser M.D. 



 

                          Brimonidine Tartrate                     0.2% Solution

                   Instill

1 Drop In [place] Two Times A Day                                 Unknown       

  

 

           Folic Acid                     1mg Tablets                           

                         Unknown    



 

             Levothyroxine Sodium                     112mcg Tablets            

                                              

Unknown                                 







Immunizations





                                        Description

 

                                        No Information Available







Vital Signs





                Date            Vital           Result          Comment

 

                10/29/2020 12:48pm Height          67 inches       5'7"

 

                    Weight              170.00 lb            

 

                    BP Systolic         116 mmHg             

 

                    BP Diastolic        70 mmHg              

 

                    Heart Rate          99 /min              

 

                    BMI (Body Mass Index) 26.6 kg/m2           

 

                    Weight              77.112 kg            

 

                    Body Temperature    97.7 F             

 

                10/13/2020  9:13am Height          67 inches       5'7"

 

                    Weight              166.00 lb            

 

                    BP Systolic         122 mmHg             

 

                    BP Diastolic        74 mmHg              

 

                    Heart Rate          95 /min              

 

                    BMI (Body Mass Index) 26.0 kg/m2           

 

                    Weight              75.298 kg            

 

                    Body Temperature    97.2 F             







Results





        Test    Acquired Date Facility Test    Result  H/L     Range   Note

 

                    CBC W/Auto Diff & PLT 10/27/2020          [address] [address]

             White Blood Count <pending>                               

 

             RBC Red Blood Count <pending>                               

 

             Hemoglobin   <pending>                               

 

             Hematocrit   <pending>                               

 

             MCV (Corpuscular Volume) <pending>                               

 

             MCH (Corpuscular Hemoglobin) <pending>                             

  

 

             MCHC (Corpuscular Hemog Conc) <pending>                            

   

 

             RDW          <pending>                               

 

             Platelet Count <pending>                               

 

             MPV          <pending>                               

 

             Neutrophils  <pending>                               

 

             Bands        <pending>                               

 

             Lymphocytes  <pending>                               

 

             Monocytes    <pending>                               

 

             Fluid Body Eosinophils <pending>                               

 

             Basophils    <pending>                               

 

             Absolute Basophils <pending>                               

 

             Absolute Eosinophils <pending>                               

 

             Absolute Lymphocytes <pending>                               

 

             Absolute Monocytes <pending>                               

 

             Absolute Neutrophils <pending>                               

 

                    CMP                 10/27/2020          [facility]

nter

                                        [address] [place] [address]

             Albumin      <pending>                               

 

             Alt - SGPT   <pending>                               

 

             Calcium      <pending>                               

 

             Carbon Dioxide <pending>                               

 

             Chloride     <pending>                               

 

             Creatinine   <pending>                               

 

             Glucose Serum <pending>                               

 

             Alkaline Phosphatase <pending>                               

 

             Potassium    <pending>                               

 

             Protein Total <pending>                               

 

             Sodium       <pending>                               

 

             Ast - Sgot   <pending>                               

 

             BUN - Urea Nitrogen <pending>                               

 

             Glucose, Fasting 104 mg/dL    High                 

 

             Blood Urea Nitrogen 51 mg/dL     High         7-18          

 

             Creatinine For GFR 1.98 mg/dL   High         0.55-1.30     

 

             Glomerular Filtration Rate 27.4         Low          >45          1

 

             Sodium Level 125 mEq/L    Low          136-145       

 

             Potassium Serum 4.4 mEq/L    Normal       3.5-5.1       

 

             Chloride Level 87 mEq/L     Low                  

 

             Carbon Dioxide Level 30 mEq/L     Normal       21-32         

 

             Anion Gap    8 mEq/L      Normal       8-16          

 

             Calcium Level 9.5 mg/dL    Normal       8.8-10.2      

 

             Ast/Sgot     78 U/L       High         7-37          

 

             Alt/SGPT     80 U/L       High         12-78         

 

             Alkaline Phosphatase 189 U/L      High                 

 

             Bilirubin,Total 7.6 mg/dL    High         0.2-1.0       

 

             Total Protein 7.6 GM/DL    Normal       6.4-8.2       

 

             Albumin      2.8 GM/DL    Low          3.2-5.2       

 

             Albumin/Globulin Ratio 0.6          Low          1.2-2.2       

 

                    Laboratory test finding 10/27/2020          [address] [address]

             Afp Serum Tumor Marker <pending>                               

 

             Immature Platelet Fraction <pending>                               

 

             Immature Platelet Fraction 3.4 %        Normal       0.0-9.59      

 

             Alpha Fetoprotein Tumor Quant 5.0 NG/ML    Normal       <8.1       

  2

 

             Immature Platelet Fraction % <pending>                             

  

 

             Immature Platelet Fraction % <pending>                             

  

 

             Immature Platelet Fraction % <pending>                             

  

 

             Immature Platelet Fraction % <pending>                             

  

 

             Immature Platelet Fraction % <pending>                             

  

 

             Immature Platelet Fraction % <pending>                             

  

 

                    CBC With Differential 2020          [place] [address]

             White Blood Count 4.1 10       Normal       4.0-10.0     3

 

             Red Blood Count 3.34 10      Low          4.00-5.40     

 

             Hemoglobin   11.5 g/dL    Low          12.0-15.5     

 

             Hematocrit   33.0 %       Low          36.0-47.0     

 

             Mean Corpuscular Volume 98.8 fl      High         80.0-96.0     

 

             Mean Corpuscular Hemoglobin 34.4 pg      High         27.0-33.0    

 

 

             Mean Corpuscular HGB Conc 34.8 g/dL    Normal       32.0-36.5     

 

             Red Cell Distribution Width 13.4 %       Normal       11.5-14.5    

 

 

             Platelet Count, Automated 85 10        Low          150-450      4

 

             Neutrophils % 60.5 %       Normal       36.0-66.0     

 

             Lymph %      22.3 %       Low          24.0-44.0     

 

             Mono %       11.6 %       High         0.0-5.0       

 

             Eos %        3.4 %        High         0.0-3.0       

 

             Baso %       1.5 %        High         0.0-1.0       

 

             Immature Granulocyte % 0.7 %        Normal       0-3.0         

 

             Nucleated Red Blood Cell % 0.0 %        Normal       0-0           

 

             Neutrophils # 2.5 10       Normal       1.5-8.5       

 

             Lymph #      0.9 10       Low          1.5-5.0       

 

             Mono #       0.5 10       Normal       0.0-0.8       

 

             Eos #        0.1 10       Normal       0.0-0.5       

 

             Baso #       0.1 10       Normal       0.0-0.2       

 

                    Comprehensive Metabolic Profil 2020          [address] [place] [address]

             Glucose, Fasting 84 mg/dL     Normal               

 

             Blood Urea Nitrogen 10 mg/dL     Normal       7-18          

 

             Creatinine For GFR 1.23 mg/dL   Normal       0.55-1.30     

 

             Glomerular Filtration Rate 47.4         Normal       >45          5

 

             Sodium Level 130 mEq/L    Low          136-145       

 

             Potassium Serum 3.8 mEq/L    Normal       3.5-5.1       

 

             Chloride Level 93 mEq/L     Low                  

 

             Carbon Dioxide Level 30 mEq/L     Normal       21-32         

 

             Anion Gap    7 mEq/L      Low          8-16          

 

             Calcium Level 8.2 mg/dL    Low          8.8-10.2      

 

             Ast/Sgot     87 U/L       High         7-37          

 

             Alt/SGPT     37 U/L       Normal       12-78         

 

             Alkaline Phosphatase 266 U/L      High                 

 

             Bilirubin,Total 6.4 mg/dL    High         0.2-1.0       

 

             Total Protein 7.6 GM/DL    Normal       6.4-8.2       

 

             Albumin      2.0 GM/DL    Low          3.2-5.2       

 

             Albumin/Globulin Ratio 0.4          Low          1.2-2.2       

 

                    Laboratory test finding 2020          [facility]

                                        [address] [place] [address]

             Alpha Fetoprotein Tumor Quant 6.3 NG/ML    Normal       <8.1       

  6

 

             Immature Platelet Fraction 1.1 %        Normal       0.0-9.59      







                          1                         Units are mL/min/1.73 m2



Chronic Kidney Disease Staging per NKF:



Stage I & II   GFR >=60       Normal to Mildly Decreased

Stage III      GFR 30-59      Moderately Decreased

Stage IV       GFR 15-29      Severely Decreased

Stage V        GFR <15        Very Little GFR Left

ESRD           GFR <15 on RRT



 

                          2                         THE AFP ASSAY IS PERFORMED O

N THE [facility] BY

CHEMILUMINESCENCE AND SHOULD NOT BE COMPARED INTERCHANGEABLY

WITH OTHER METHODS. IT SHOULD NOT BE USED ALONE AS A

SCREENING TEST OR DIAGNOSIS FOR THE PRESENCE OR ABSENCE OF

MALIGNANT DISEASE.  THESE RESULTS ARE NOT INTERPRETABLE IN

PREGNANT FEMALES.

PREDICTIONS OF DISEASE RECURRENCE SHOULD NOT BE BASED SOLELY

ON VALUES OBTAINED FROM SERIAL PATIENT SERUM VALUES.



 

                          3                         reviewed bili is still up

 

                          4                         Scan Verified machine result

s

PLATELET COUNT LESS THAN 100, AND NEW OCCURANCE OR





 

                          5                         Units are mL/min/1.73 m2



Chronic Kidney Disease Staging per NKF:



Stage I & II   GFR >=60       Normal to Mildly Decreased

Stage III      GFR 30-59      Moderately Decreased

Stage IV       GFR 15-29      Severely Decreased

Stage V        GFR <15        Very Little GFR Left

ESRD           GFR <15 on RRT



 

                          6                         THE AFP ASSAY IS PERFORMED O

N THE [facility] BY

CHEMILUMINESCENCE AND SHOULD NOT BE COMPARED INTERCHANGEABLY

WITH OTHER METHODS. IT SHOULD NOT BE USED ALONE AS A

SCREENING TEST OR DIAGNOSIS FOR THE PRESENCE OR ABSENCE OF

MALIGNANT DISEASE.  THESE RESULTS ARE NOT INTERPRETABLE IN

PREGNANT FEMALES.

PREDICTIONS OF DISEASE RECURRENCE SHOULD NOT BE BASED SOLELY

ON VALUES OBTAINED FROM SERIAL PATIENT SERUM VALUES.









Procedures





                                        Description

 

                                        No Information Available







[employer]





                                        Description

 

                                        No Information Available







Encounters





           Type       Date       Location   Provider   Dx         Diagnosis

 

           Office Visit 10/29/2020 12:45p Main Office ANALISA Saini

70.31     

Alcoholic cirrhosis of liver with ascites

 

           Office Visit 10/13/2020  9:30a Main Office ANALISA Saini

70.31     

Alcoholic cirrhosis of liver with ascites

 

           Office Visit 2020  2:15p Main Office ANALISA Saini

70.31     

Alcoholic cirrhosis of liver with ascites







Assessments





                Date            Code            Description     Provider

 

                10/29/2020      K70.31          Alcoholic cirrhosis of liver wit

h ascites Arley Fraser M.D.

 

                10/13/2020      K70.31          Alcoholic cirrhosis Arley denton M.D.

 

                2020      K70.31          Alcoholic cirrhosis of liver wit

h ascites Arley Fraser M.D.







Plan of Treatment

Future Appointment(s):* 2020  1:00 pm - Arley Fraser M.D. at Main 
  Office

10/29/2020 - Arley Fraser M.D.* K70.31 Alcoholic cirrhosis of liver with 
  ascites* New Labs:* Comprehensive Metabolic Profil, Ordered: 10/29/20



* Comments:* 61 yo wf who presented for a h/o alcoholic cirrhosis with ascites.n
  No  c/o abdominal pain, weight loss, change in bowel habits, or rectal 
  bleeding. No family h/o colon cancer. No h/o chest pain, or sob. Pt has been 
  drinking. She was admitted for sob, found to have a pleural effusion. She had 
  a tap on the pleural fluid. She had alcoholic hepatitis on prednisone at this 
  time.   Plan;1. Decrease Torsemide to one pill daily.2. Labs in 2 weeks 3. 
  Office in 3 weeks.









Functional Status





                                        Description

 

                                        No Information Available







Mental Status





                                        Description

 

                                        No Information Available







Referrals





                                        Description

 

                                        No Information Available

## 2021-01-16 NOTE — CCD
Continuity of Care Document (CCD)

                             Created on: 2020



Flora Taylor

External Reference #: [id number]

: 1960

Sex: Female



Demographics





                          Address                   [address] [place]  [address]

 

                          Home Phone                [phone]

 

                          Preferred Language        Unknown

 

                          Marital Status            Unknown

 

                          Anabaptist Affiliation     Unknown

 

                          Race                      White

 

                          Ethnic Group              Not  or 





Author





                          Author                    Flora FRASER M.D.

 

                          Organization              Unknown

 

                          Address                   [address] [place]  [address]



 

                          Phone                     [phone]







Care Team Providers





                    Care Team Member Name Role                Phone

 

                    Meron Ramirez M.D. AUTM                [phone]







Problems





                    Active Problems     Provider            Date

 

                    Cirrhosis of liver  Arley Fraser M.D. Onset: 20

19

 

                    Screening for malignant neoplasm of colon Arley ventura M.D. Onset: 

2019







Social History





                Type            Date            Description     Comments

 

                Birth Sex                       Unknown          

 

                ETOH Use                        Occasionally    2016 decrea

sed. 

 

                Tobacco Use     Start: Unknown  Patient has never smoked  







Allergies, Adverse Reactions, Alerts





             Active Allergies Reaction     Severity     Comments     Date

 

             Penicillin                                          2019

 

                                        Inactive Allergies

 

             NKDA                                                2019







Medications





           Active Medications SIG        Qnty       Indications Ordering Provide

r Date

 

                          Spironolactone                     50mg Tablets       

            1 tab by mouth

twice a day     180tabs                         Arley Fraser M.D. 10/13/2

020

 

                          Torsemide                     20mg Tablets            

       1 tab by mouth 

twice a day     180tabs                         Arley Fraser M.D. 10/13/2

020

 

             Hydroxyzine HCL                     25mg Tablets                   

                                       

Pleskach, Gayla FNP                     

 

             Sertraline HCL                     50mg Tablets                    

                                      

Pleskach, Gayla FNP                     

 

                                        Fluticasone Propionate                  

   50mcg/Act Suspension                 

                                                    Unknown      

 

           Torsemide                     20mg Tablets                           

                         Unknown    



 

                                        Prednisolone Sodium Phosphate           

          15mg/5ML Solution             

             Take 2.7 Teaspoonful  13 ML  By Mouth Once Daily With Food         

                  Unknown      



 

           Midodrine HCL                     5mg Tablets                        

                            Unknown    



 

             Ciprofloxacin HCL                     250mg Tablets                

                                          

Unknown                                 

 

                          Loratadine                     10mg Tablets           

        Take One Tablet By

Mouth Every Day                                 Unknown         

 

                          Mucus Relief ER                     600mg Tablets ER 1

2HR                   Take

One Tablet By Mouth Every 12 Hours For 10 Days                                 U

nknown         

 

           Latanoprost                     0.005% Solution                      

                              Unknown    



 

                          Betaxolol HCL                     0.5% Solution       

            Instill One 

Drop In Each Eye Two Times A Day                                 Unknown        

 

 

           Multiple Vitamin                      Tablets                        

                            Unknown    



 

                                        Lactulose Encephalopathy                

     10GM/15ML Solution                 

             take 30ml by mouth three times a day 946ml                     Guillaume Fraser M.D. 



 

                          Brimonidine Tartrate                     0.2% Solution

                   Instill

1 Drop In [place] Two Times A Day                                 Unknown       

  

 

           Folic Acid                     1mg Tablets                           

                         Unknown    



 

             Levothyroxine Sodium                     112mcg Tablets            

                                              

Unknown                                 







Immunizations





                                        Description

 

                                        No Information Available







Vital Signs





                Date            Vital           Result          Comment

 

                10/29/2020 12:48pm Height          67 inches       5'7"

 

                    Weight              170.00 lb            

 

                    BP Systolic         116 mmHg             

 

                    BP Diastolic        70 mmHg              

 

                    Heart Rate          99 /min              

 

                    BMI (Body Mass Index) 26.6 kg/m2           

 

                    Weight              77.112 kg            

 

                    Body Temperature    97.7 F             

 

                10/13/2020  9:13am Height          67 inches       5'7"

 

                    Weight              166.00 lb            

 

                    BP Systolic         122 mmHg             

 

                    BP Diastolic        74 mmHg              

 

                    Heart Rate          95 /min              

 

                    BMI (Body Mass Index) 26.0 kg/m2           

 

                    Weight              75.298 kg            

 

                    Body Temperature    97.2 F             







Results





        Test    Acquired Date Facility Test    Result  H/L     Range   Note

 

                    Comprehensive Metabolic Profil 2020          [facility]

                                        [address] [place] [address]

             Glucose, Fasting 118 mg/dL    High                1

 

             Blood Urea Nitrogen 69 mg/dL     High         7-18          

 

             Creatinine For GFR 2.49 mg/dL   High         0.55-1.30     

 

             Glomerular Filtration Rate 21.0         Low          >45          2

 

             Sodium Level 128 mEq/L    Low          136-145       

 

             Potassium Serum 4.2 mEq/L    Normal       3.5-5.1       

 

             Chloride Level 92 mEq/L     Low                  

 

             Carbon Dioxide Level 25 mEq/L     Normal       21-32         

 

             Anion Gap    11 mEq/L     Normal       8-16          

 

             Calcium Level 8.8 mg/dL    Normal       8.8-10.2      

 

             Ast/Sgot     45 U/L       High         7-37          

 

             Alt/SGPT     44 U/L       Normal       12-78         

 

             Alkaline Phosphatase 186 U/L      High                 

 

             Bilirubin,Total 4.2 mg/dL    High         0.2-1.0       

 

             Total Protein 6.9 GM/DL    Normal       6.4-8.2       

 

             Albumin      2.2 GM/DL    Low          3.2-5.2       

 

             Albumin/Globulin Ratio 0.5          Low          1.2-2.2       

 

                    CBC W/Auto Diff & PLT 10/27/2020          [facility]

                                        [address] [place] [address]

             White Blood Count <pending>                               

 

             RBC Red Blood Count <pending>                               

 

             Hemoglobin   <pending>                               

 

             Hematocrit   <pending>                               

 

             MCV (Corpuscular Volume) <pending>                               

 

             MCH (Corpuscular Hemoglobin) <pending>                             

  

 

             MCHC (Corpuscular Hemog Conc) <pending>                            

   

 

             RDW          <pending>                               

 

             Platelet Count <pending>                               

 

             MPV          <pending>                               

 

             Neutrophils  <pending>                               

 

             Bands        <pending>                               

 

             Lymphocytes  <pending>                               

 

             Monocytes    <pending>                               

 

             Fluid Body Eosinophils <pending>                               

 

             Basophils    <pending>                               

 

             Absolute Basophils <pending>                               

 

             Absolute Eosinophils <pending>                               

 

             Absolute Lymphocytes <pending>                               

 

             Absolute Monocytes <pending>                               

 

             Absolute Neutrophils <pending>                               

 

                    CMP                 10/27/2020          [facility]

nter

                                        [address] [place] [address]

             Albumin      <pending>                               

 

             Alt - SGPT   <pending>                               

 

             Calcium      <pending>                               

 

             Carbon Dioxide <pending>                               

 

             Chloride     <pending>                               

 

             Creatinine   <pending>                               

 

             Glucose Serum <pending>                               

 

             Alkaline Phosphatase <pending>                               

 

             Potassium    <pending>                               

 

             Protein Total <pending>                               

 

             Sodium       <pending>                               

 

             Ast - Sgot   <pending>                               

 

             BUN - Urea Nitrogen <pending>                               

 

             Glucose, Fasting 104 mg/dL    High                 

 

             Blood Urea Nitrogen 51 mg/dL     High         7-18          

 

             Creatinine For GFR 1.98 mg/dL   High         0.55-1.30     

 

             Glomerular Filtration Rate 27.4         Low          >45          3

 

             Sodium Level 125 mEq/L    Low          136-145       

 

             Potassium Serum 4.4 mEq/L    Normal       3.5-5.1       

 

             Chloride Level 87 mEq/L     Low                  

 

             Carbon Dioxide Level 30 mEq/L     Normal       21-32         

 

             Anion Gap    8 mEq/L      Normal       8-16          

 

             Calcium Level 9.5 mg/dL    Normal       8.8-10.2      

 

             Ast/Sgot     78 U/L       High         7-37          

 

             Alt/SGPT     80 U/L       High         12-78         

 

             Alkaline Phosphatase 189 U/L      High                 

 

             Bilirubin,Total 7.6 mg/dL    High         0.2-1.0       

 

             Total Protein 7.6 GM/DL    Normal       6.4-8.2       

 

             Albumin      2.8 GM/DL    Low          3.2-5.2       

 

             Albumin/Globulin Ratio 0.6          Low          1.2-2.2       

 

                    Laboratory test finding 10/27/2020          [address] [address]

             Afp Serum Tumor Marker <pending>                               

 

             Immature Platelet Fraction <pending>                               

 

             Immature Platelet Fraction 3.4 %        Normal       0.0-9.59      

 

             Alpha Fetoprotein Tumor Quant 5.0 NG/ML    Normal       <8.1       

  4

 

             Immature Platelet Fraction % <pending>                             

  

 

             Immature Platelet Fraction % <pending>                             

  

 

             Immature Platelet Fraction % <pending>                             

  

 

             Immature Platelet Fraction % <pending>                             

  

 

             Immature Platelet Fraction % <pending>                             

  

 

             Immature Platelet Fraction % <pending>                             

  

 

             Immature Platelet Fraction % <pending>                             

  

 

             Immature Platelet Fraction % <pending>                             

  

 

             Immature Platelet Fraction % <pending>                             

  

 

             Immature Platelet Fraction % <pending>                             

  

 

             Immature Platelet Fraction % <pending>                             

  

 

             Immature Platelet Fraction % <pending>                             

  

 

             Immature Platelet Fraction % <pending>                             

  

 

             Immature Platelet Fraction % <pending>                             

  

 

             Immature Platelet Fraction % <pending>                             

  

 

             Immature Platelet Fraction % <pending>                             

  

 

                    CBC With Differential 2020          [address] [address]

             White Blood Count 4.1 10       Normal       4.0-10.0     5

 

             Red Blood Count 3.34 10      Low          4.00-5.40     

 

             Hemoglobin   11.5 g/dL    Low          12.0-15.5     

 

             Hematocrit   33.0 %       Low          36.0-47.0     

 

             Mean Corpuscular Volume 98.8 fl      High         80.0-96.0     

 

             Mean Corpuscular Hemoglobin 34.4 pg      High         27.0-33.0    

 

 

             Mean Corpuscular HGB Conc 34.8 g/dL    Normal       32.0-36.5     

 

             Red Cell Distribution Width 13.4 %       Normal       11.5-14.5    

 

 

             Platelet Count, Automated 85 10        Low          150-450      6

 

             Neutrophils % 60.5 %       Normal       36.0-66.0     

 

             Lymph %      22.3 %       Low          24.0-44.0     

 

             Mono %       11.6 %       High         0.0-5.0       

 

             Eos %        3.4 %        High         0.0-3.0       

 

             Baso %       1.5 %        High         0.0-1.0       

 

             Immature Granulocyte % 0.7 %        Normal       0-3.0         

 

             Nucleated Red Blood Cell % 0.0 %        Normal       0-0           

 

             Neutrophils # 2.5 10       Normal       1.5-8.5       

 

             Lymph #      0.9 10       Low          1.5-5.0       

 

             Mono #       0.5 10       Normal       0.0-0.8       

 

             Eos #        0.1 10       Normal       0.0-0.5       

 

             Baso #       0.1 10       Normal       0.0-0.2       

 

                    Comprehensive Metabolic Profil 2020          [facility]

                                        [address] [place] [address]

             Glucose, Fasting 84 mg/dL     Normal               

 

             Blood Urea Nitrogen 10 mg/dL     Normal       7-18          

 

             Creatinine For GFR 1.23 mg/dL   Normal       0.55-1.30     

 

             Glomerular Filtration Rate 47.4         Normal       >45          7

 

             Sodium Level 130 mEq/L    Low          136-145       

 

             Potassium Serum 3.8 mEq/L    Normal       3.5-5.1       

 

             Chloride Level 93 mEq/L     Low                  

 

             Carbon Dioxide Level 30 mEq/L     Normal       21-32         

 

             Anion Gap    7 mEq/L      Low          8-16          

 

             Calcium Level 8.2 mg/dL    Low          8.8-10.2      

 

             Ast/Sgot     87 U/L       High         7-37          

 

             Alt/SGPT     37 U/L       Normal       12-78         

 

             Alkaline Phosphatase 266 U/L      High                 

 

             Bilirubin,Total 6.4 mg/dL    High         0.2-1.0       

 

             Total Protein 7.6 GM/DL    Normal       6.4-8.2       

 

             Albumin      2.0 GM/DL    Low          3.2-5.2       

 

             Albumin/Globulin Ratio 0.4          Low          1.2-2.2       

 

                    Laboratory test finding 2020          [facility]

                                        [address] [place] [address]

             Alpha Fetoprotein Tumor Quant 6.3 NG/ML    Normal       <8.1       

  8

 

             Immature Platelet Fraction 1.1 %        Normal       0.0-9.59      







                          1                         reviewed

 

                          2                         Units are mL/min/1.73 m2



Chronic Kidney Disease Staging per NKF:



Stage I & II   GFR >=60       Normal to Mildly Decreased

Stage III      GFR 30-59      Moderately Decreased

Stage IV       GFR 15-29      Severely Decreased

Stage V        GFR <15        Very Little GFR Left

ESRD           GFR <15 on RRT



 

                          3                         Units are mL/min/1.73 m2



Chronic Kidney Disease Staging per NKF:



Stage I & II   GFR >=60       Normal to Mildly Decreased

Stage III      GFR 30-59      Moderately Decreased

Stage IV       GFR 15-29      Severely Decreased

Stage V        GFR <15        Very Little GFR Left

ESRD           GFR <15 on RRT



 

                          4                         THE AFP ASSAY IS PERFORMED O

N THE [facility] BY

CHEMILUMINESCENCE AND SHOULD NOT BE COMPARED INTERCHANGEABLY

WITH OTHER METHODS. IT SHOULD NOT BE USED ALONE AS A

SCREENING TEST OR DIAGNOSIS FOR THE PRESENCE OR ABSENCE OF

MALIGNANT DISEASE.  THESE RESULTS ARE NOT INTERPRETABLE IN

PREGNANT FEMALES.

PREDICTIONS OF DISEASE RECURRENCE SHOULD NOT BE BASED SOLELY

ON VALUES OBTAINED FROM SERIAL PATIENT SERUM VALUES.



 

                          5                         reviewed bili is still up

 

                          6                         Scan Verified machine result

s

PLATELET COUNT LESS THAN 100, AND NEW OCCURANCE OR





 

                          7                         Units are mL/min/1.73 m2



Chronic Kidney Disease Staging per NKF:



Stage I & II   GFR >=60       Normal to Mildly Decreased

Stage III      GFR 30-59      Moderately Decreased

Stage IV       GFR 15-29      Severely Decreased

Stage V        GFR <15        Very Little GFR Left

ESRD           GFR <15 on RRT



 

                          8                         THE AFP ASSAY IS PERFORMED O

N THE [facility] BY

CHEMILUMINESCENCE AND SHOULD NOT BE COMPARED INTERCHANGEABLY

WITH OTHER METHODS. IT SHOULD NOT BE USED ALONE AS A

SCREENING TEST OR DIAGNOSIS FOR THE PRESENCE OR ABSENCE OF

MALIGNANT DISEASE.  THESE RESULTS ARE NOT INTERPRETABLE IN

PREGNANT FEMALES.

PREDICTIONS OF DISEASE RECURRENCE SHOULD NOT BE BASED SOLELY

ON VALUES OBTAINED FROM SERIAL PATIENT SERUM VALUES.









Procedures





                                        Description

 

                                        No Information Available







Medical Devices





                                        Description

 

                                        No Information Available







Encounters





           Type       Date       Location   Provider   Dx         Diagnosis

 

           Office Visit 10/29/2020 12:45p Main Office Arley Fraser M.D. K

70.31     

Alcoholic cirrhosis of liver with ascites

 

           Office Visit 10/13/2020  9:30a Main Office Arley Fraser M.D. K

70.31     

Alcoholic cirrhosis of liver with ascites

 

           Office Visit 2020  2:15p Main Office Arley Fraser M.D. K

70.31     

Alcoholic cirrhosis of liver with ascites







Assessments





                Date            Code            Description     Provider

 

                10/29/2020      K70.31          Alcoholic cirrhosis of liver wit

h ascites Arley Fraser M.D.

 

                10/13/2020      K70.31          Alcoholic cirrhosis Arley denton M.D.

 

                2020      K70.31          Alcoholic cirrhosis of liver wit

h ascites Arley Fraser M.D.







Plan of Treatment

Future Appointment(s):* 2020  1:00 pm - Arley Fraser M.D. at Main 
  Office

10/29/2020 - Arley Fraser M.D.* K70.31 Alcoholic cirrhosis of liver with 
  ascites* Comments:* 59 yo wf who presented for a h/o alcoholic cirrhosis with 
  ascites.n No  c/o abdominal pain, weight loss, change in bowel habits, or 
  rectal bleeding. No family h/o colon cancer. No h/o chest pain, or sob. Pt has
  been drinking. She was admitted for sob, found to have a pleural effusion. She
  had a tap on the pleural fluid. She had alcoholic hepatitis on prednisone at 
  this time.   Plan;1. Decrease Torsemide to one pill daily.2. Labs in 2 weeks 
  3. Office in 3 weeks.









Functional Status





                                        Description

 

                                        No Information Available







Mental Status





                                        Description

 

                                        No Information Available







Referrals





                                        Description

 

                                        No Information Available

## 2021-01-16 NOTE — CCD
Continuity of Care Document (CCD)

                             Created on: 12/15/2020



Flora Taylor

External Reference #: [id number]

: 1960

Sex: Female



Demographics





                          Address                   [address] [place] [address]  [address]

 

                          Home Phone                [phone]

 

                          Preferred Language        Unknown

 

                          Marital Status            Unknown

 

                          Restorationism Affiliation     Judaism (Non-Taoist, Non

-Specific)

 

                          Race                      White

 

                          Ethnic Group              Not  or 





Author





                          Author                    Flora YA [facility]

 

                          Organization              Unknown

 

                          Address                   [address]

[place]  [address]



 

                          Phone                     [phone]







Care Team Providers





                    Care Team Member Name Role                Phone

 

                    [facility]

TM                [phone]

 

                    [facility]         AUTM                [phone]

 

                    [facility] AUTM               

 [phone]

 

                    [facility] - [facility] Health AUTM            

    [phone]







Problems





                    Active Problems     Provider            Date

 

                    Essential hypertension Gabe Corral M.D. Onset: 

 

                    Hypothyroidism      Gabe Corral M.D. Onset: 2011

 

                    Vitamin D deficiency Gabe Corral M.D. Onset: 

 

                    Neck pain           Gabe Corral M.D. Onset: 2011

 

                    Generalized anxiety disorder Gabe Corral M.D. Onse

t: 2011

 

                    Allergic rhinitis   Gabe Corral M.D. Onset: 2012

 

                    Degeneration of lumbar intervertebral disc Benigno Corral M.D. Onset: 

2013

 

                    Alcoholic cirrhosis Gabe Corral M.D. Onset: 2018

 

                    Esophageal varices without bleeding Gabe Corral M. D. Onset: 2018







Social History





                Type            Date            Description     Comments

 

                Birth Sex                       Unknown          

 

                Tobacco Use     Start: Unknown  Never Smoked Cigarettes  

 

                Tobacco Use     Start: Unknown  Never Used Smokeless Tobacco  

 

                ETOH Use                        Consumed 4 Alcoholic Beverages P

er Day  

 

                Tobacco Use     Start: Unknown  Patient has never smoked  

 

                Recreational Drug Use                 Denies Drug Use  

 

                Smoking Status  Reviewed: 20 Patient has never smoked  

 

                Exercise Type/Frequency                 Exercises sporadically  

 

                Tattoo/Piercing                 Tattoo          3 

 

                Tattoo/Piercing                 Pierced ears    x2 

 

                Sun Exposure                    Minimum amount of sun exposure  

 

                Sun Exposure                    Uses sunscreen  Occasionally 

 

                Seat Belt/Car Seat                 Always uses seat belt  

 

                Bike Helmet                     Never           Does not bike ri

de. 

 

                Smoke Alarms                    Yes              

 

                Smoke Alarms                    Carbon Monoxide Detector: Yes  







Allergies, Adverse Reactions, Alerts





             Active Allergies Reaction     Severity     Comments     Date

 

             Penicillin                                          2004

 

             Cefdinir     Difficulty swallowing, Itching, throat felt itchy. Sev

ere                    2019







Medications





           Active Medications SIG        Qnty       Indications Ordering Provide

r Date

 

           Rifaximin Tablets 200 mg tab - take 2 tabs (400 mg) twice a day      

                 Unknown    

2020

 

                          Spironolactone                     50mg Tablets       

            take one half 

tablet (25 mg) by mouth twice a day                                 Unknown     

    2020

 

                          Allergy Relief Loratadine                     10mg Tab

lets                   1 

tab once a day as needed for allergy symptoms                                 Un

known         2020

 

                          Hydroxyzine HCL                     25mg Tablets      

             1 -2 tab by 

mouth as needed before bed 30tabs          F32.1           Gayla Ya FNP 

10/26/2020

 

                          Mucinex                     600mg Tablets ER 12HR     

              1 by mouth 

twice a day                                     Unknown         10/08/2020

 

                          Midodrine HCL                     5mg Tablets         

          1 tablet three 

times a day ( 8 am, 12 n,4 pm) 90tabs                          Gayla Ya FNP 10/08/2020

 

                          Torsemide                     20mg Tablets            

       take 1 tablet twice

a day (9 Am/5 PM)                                 Unknown         10/08/2020

 

                                        Lactulose Encephalopathy                

     10GM/15ML Solution                 

             take 30ml by mouth three times a day 2700units                 Gayla Araujo FNP 

2020

 

                          Thiamine HCL                     100mg Tablets        

           1 by mouth 

every day                                       Unknown         2020

 

                                        Fluticasone Propionate Nasal Spray      

               50mcg/Act Suspension     

             2 sprays each nostril once a day                           Unknown 

     2019

 

                          Latanoprost                     0.005% Solution       

            one drop in 

each eye daily at at bedtime                                 Unknown         

 

                          Betaxolol HCL                     0.5% Solution       

            1 drop each 

eye twice a day.                                 Unknown         2019

 

                          Brimonidine Tartrate                     0.2% Solution

                   one 

gtt. each eye twice a day                                 Unknown         2019

 

                          Vitamin D                     1000Unit Tablets        

           1 by mouth 

every day       100tabs                         Meron Ramirez M.D. 

019

 

                          Levothyroxine Sodium                     112mcg Tablet

s                   1 by 

mouth every day 90tabs          E03.9           Meron Ramirez M.D. 

018

 

                                        Wrist Splint/Cock-Up/Left/Canvas/Medium 

                     Misc               

                dx: carpal tunnel syndrome, duration 12 months, prognosis good 1

units                          

Gabe Corral M.D.              2018

 

                                        Wrist Splint/Cock-Up/Right/Canvas/Medium

                      Misc              

                                        wear splint every night for carpal tunne

l syndrome, prognosis good, duration

99 months       1units                          Gabe Corral M.D. 

 

                                        Levocetirizine Dihydrochloride          

           5mg Tablets                  

                1 tab by mouth every evening for allergies 90tabs          J30.9

           Meron Ramirez M.D.

                                        2015

 

                          Ketotifen Fumarate                     0.025% Solution

                   1 drop 

both eyes twice a day for allergy symptoms 5ml                             Unkno

wn         

 

                          Folic Acid                     1mg Tablets            

       1 by mouth every 

day             90tabs                          Meron Ramirez M.D. 

000

 

           Multivitamin Adult                      Tablets                      

                              Unknown    



 

                          Xifaxan                     200mg Tablets             

      1 by mouth twice a 

day             60tabs                          Gayla Ya FNP 

 

                                        History Medications

 

                          Sertraline HCL                     50mg Tablets       

            1 by mouth 

every day       30tabs          F32.1           Gayla Ya FNP 10/26/2020 

- 2020

 

                          Ciprofloxacin HCL                     250mg Tablets   

                1 tab by 

mouth once a day at 6 Am                                 Unknown         10/08/2

020 - 10/22/2020

 

                          Spironolactone                     50mg Tablets       

            take one 

tablet by mouth twice a day  (9 Am/5 PM)                                 Genie Tabares PA-C 10/08/2020 - 

2020







Immunizations





             CPT Code     Status       Date         Vaccine      Lot #

 

             [id number]        Given        2019   Pneumococcal Vaccine [id number]

 

                [id number]           Given           10/29/2018      Influenza Virus 

Vaccine, Quadrivalent,age 3 and 

up,multidose vial                       [id number]

 

             27989        Given        2017   Influenza Vaccination  

 

             96133        Given        2015   Influenza Vaccination  

 

             54943        Given        2013   Influenza Vaccination  

 

             50603        Given        04/15/2013   Zostavax      

 

             55624        Given        2012   Influenza Vaccination [id number]

C

 

             77425        Given        10/31/2011   Influenza Vaccination/preser

vative free [id number]

 

             75654        Given        2010   Adacel 11 Yrs or older 

BA

 

             02429        Given        10/04/2010   Influenza Vaccination [id number]

A

 

             28834        Given        2009   Influenza Vaccination [id number]

A

 

             14928        Given        2005   Tetnus Toxoid Im Or Jet Inje

ction Use  







Vital Signs





                Date            Vital           Result          Comment

 

                10/26/2020  3:20pm BP Systolic     105 mmHg         

 

                    BP Diastolic        63 mmHg              

 

                    Heart Rate          108 /min             

 

                    Body Temperature    97.4 F             

 

                    Respiratory Rate    20 /min              

 

                    Height              67.0 inches         5'7"

 

                    Weight              169.50 lb            

 

                    O2 % BldC Oximetry  98 %                 

 

                    Peak Expiratory Flow Rate 364                 Estimated Peak

 Flow Rate

 

                    Ideal Body Weight   135 lb               

 

                    BMI (Body Mass Index) 26.5 kg/m2           

 

                10/19/2020  3:07pm BP Systolic     114 mmHg         

 

                    BP Diastolic        71 mmHg              

 

                    Heart Rate          101 /min             

 

                    Body Temperature    97.3 F             

 

                    Respiratory Rate    18 /min              

 

                    Height              67.0 inches         5'7"

 

                    Weight              166.12 lb            

 

                    O2 % BldC Oximetry  100 %                

 

                    Peak Expiratory Flow Rate 364                 Estimated Peak

 Flow Rate

 

                    Ideal Body Weight   135 lb               

 

                    BMI (Body Mass Index) 26.0 kg/m2           







Results





        Test    Acquired Date Facility Test    Result  H/L     Range   Note

 

                    CBC With Differential 2020          [facility]

[place] [address] [phone] White Blood Count 5.5 10     Normal     4.0-10.0    

 

             Red Blood Count 3.39 10      Low          4.00-5.40     

 

             Hemoglobin   10.8 g/dL    Low          12.0-15.5     

 

             Hematocrit   32.6 %       Low          36.0-47.0     

 

             Mean Corpuscular Volume 96.2 fl      High         80.0-96.0     

 

             Mean Corpuscular Hemoglobin 31.9 pg      Normal       27.0-33.0    

 

 

             Mean Corpuscular HGB Conc 33.1 g/dL    Normal       32.0-36.5     

 

             Red Cell Distribution Width 13.8 %       Normal       11.5-14.5    

 

 

             Platelet Count, Automated 124 10       Low          150-450       

 

             Neutrophils % 73.3 %       High         36.0-66.0     

 

             Lymph %      13.9 %       Low          24.0-44.0     

 

             Mono %       9.9 %        High         0.0-5.0       

 

             Eos %        1.3 %        Normal       0.0-3.0       

 

             Baso %       0.9 %        Normal       0.0-1.0       

 

             Immature Granulocyte % 0.7 %        Normal       0-3.0         

 

             Nucleated Red Blood Cell % 0.0 %        Normal       0-0           

 

             Neutrophils # 4.1 10       Normal       1.5-8.5       

 

             Lymph #      0.8 10       Low          1.5-5.0       

 

             Mono #       0.6 10       Normal       0.0-0.8       

 

             Eos #        0.1 10       Normal       0.0-0.5       

 

             Baso #       0.1 10       Normal       0.0-0.2       

 

                    PT & Aptt           2020          [place] [address] [phone] Prothrombin Time 17.5 seconds High       12.5-14.3   

 

             Inr          1.40         Normal                    1

 

             Partial Thromboplastin Time 35.4 seconds Normal       24.2-38.5    

 

 

                    Liver Profile       2020          [place] [address] [phone] Ast/Sgot   43 U/L     High       7-37        

 

             Alt/SGPT     38 U/L       Normal       12-78         

 

             Alkaline Phosphatase 155 U/L      High                 

 

             Bilirubin,Total 3.1 mg/dL    High         0.2-1.0       

 

             Bilirubin,Direct 2.0 mg/dL    High         0.0-0.2       

 

             Total Protein 6.3 GM/DL    Low          6.4-8.2       

 

             Albumin      2.0 GM/DL    Low          3.2-5.2       

 

             Albumin/Globulin Ratio 0.5          Low          1.2-2.2       

 

                    Basic Metabolic Profile 2020          Patient Service 

[place] [address] [phone] Glucose, Fasting 169 mg/dL  High             

 

             Blood Urea Nitrogen 71 mg/dL     High         7-18          

 

             Creatinine For GFR 2.38 mg/dL   High         0.55-1.30     

 

             Glomerular Filtration Rate 22.1         Low          >45          2

 

             Sodium Level 131 mEq/L    Low          136-145       

 

             Potassium Serum 4.7 mEq/L    Normal       3.5-5.1       

 

             Chloride Level 99 mEq/L     Normal               

 

             Carbon Dioxide Level 21 mEq/L     Normal       21-32         

 

             Anion Gap    11 mEq/L     Normal       8-16          

 

             Calcium Level 8.7 mg/dL    Low          8.8-10.2      

 

                    Laboratory test finding 2020          Patient Service 

Center

[place] [address] [phone] Ethyl Alcohol (Ethanol) < 0.003 %  Normal     0.000-0.

010  

 

                    Comprehensive Metabolic Profil 2020          Patient [place]

[place] [address] [phone] Glucose, Fasting 118 mg/dL  High             

 

             Blood Urea Nitrogen 69 mg/dL     High         7-18          

 

             Creatinine For GFR 2.49 mg/dL   High         0.55-1.30     

 

             Glomerular Filtration Rate 21.0         Low          >45          3

 

             Sodium Level 128 mEq/L    Low          136-145       

 

             Potassium Serum 4.2 mEq/L    Normal       3.5-5.1       

 

             Chloride Level 92 mEq/L     Low                  

 

             Carbon Dioxide Level 25 mEq/L     Normal       21-32         

 

             Anion Gap    11 mEq/L     Normal       8-16          

 

             Calcium Level 8.8 mg/dL    Normal       8.8-10.2      

 

             Ast/Sgot     45 U/L       High         7-37          

 

             Alt/SGPT     44 U/L       Normal       12-78         

 

             Alkaline Phosphatase 186 U/L      High                 

 

             Bilirubin,Total 4.2 mg/dL    High         0.2-1.0       

 

             Total Protein 6.9 GM/DL    Normal       6.4-8.2       

 

             Albumin      2.2 GM/DL    Low          3.2-5.2       

 

             Albumin/Globulin Ratio 0.5          Low          1.2-2.2       

 

                    Laboratory test finding 2020          Patient Service 

Center

[place] [address] [phone] Anaerobic Culture **************** <SEE NOTE>         

               4

 

                    Body Fluid Culture And GS 2020          Patient [place]

[place] [address] [phone] Gram Stain (SEE NOTE)  Normal                5

 

             Body Fluid Culture **************** <SEE NOTE>                     

       6

 

                    PH, Body Fluid      2020          [place] [address] [phone] PH Body Fluid > 7.800 units Normal     Not Established

  

 

             Source, Body Fluid pH PLEURAL      Normal                     

 

                    Amylase Body Fluid  2020          [place] [address]

           [phone] Amylase, Body Fluid 31 U/L     Normal     Not Establis

hed  

 

             Source, Body Fluid Amylase PLEURAL      Normal                     

 

                    Glucose Body Fluid  2020          [place] [address]

           [phone] Glucose, Body Fluid 131 mg/dL  Normal     Not Establis

hed  

 

             Source, Body Fluid Glucose PLEURAL      Normal                     

 

                    LDH Body Fluid      2020          [place] [address]

           [phone] LDH, Body Fluid 51 U/L     Normal     Not Established 

 

 

             Source, Body Fluid LDH PLEURAL      Normal                     

 

                    TP Body Fluid       2020          [place] [address]

           [phone] Total Protein, Body Fluid 0.7 g/dL   Normal     Not Es

tablished  

 

             Source, Body Fluid Tot Protein PLEURAL      Normal                 

    

 

                    Cell Count Pleural Fluid 2020          Patient Service

 [place] [id number] [phone] Source, Body Fluid PLEURAL    Normal                 

 

             Pleural FL Color YELLOW       Normal       Colorless     

 

             Appearance, Body Fluid CLOUDY       Normal       Clear         

 

             WBC Body Fluid 114 /uL      High         0-10          

 

             RBC Body Fluid 4 10         Normal       <2            

 

             BF Mononuclear Cell % 91.2 %       High         0-0           

 

             BF Polymorphonuclear Cell % 8.8 %        High         0-0          

 

 

                    PT & Aptt           2020          [place] [address] [phone] Prothrombin Time 18.6 seconds High       12.5-14.3   

 

             Inr          1.52         Normal                    7

 

             Partial Thromboplastin Time 34.0 seconds Normal       24.2-38.5    

 

 

                    Laboratory test finding 2020          Patient Service 

[place] [address] [phone] Platelet Count, Automated 113 10     Low        150-45

0     

 

                    CBC With Differential 2020          Patient Service Ce

[place]ter

[place] [address] [phone] White Blood Count 7.9 10     Normal     4.0-10.0    

 

             Red Blood Count 3.31 10      Low          4.00-5.40     

 

             Hemoglobin   11.7 g/dL    Low          12.0-15.5     

 

             Hematocrit   34.8 %       Low          36.0-47.0     

 

             Mean Corpuscular Volume 105.1 fl     High         80.0-96.0     

 

             Mean Corpuscular Hemoglobin 35.3 pg      High         27.0-33.0    

 

 

             Mean Corpuscular HGB Conc 33.6 g/dL    Normal       32.0-36.5     

 

             Red Cell Distribution Width 14.6 %       High         11.5-14.5    

 

 

             Platelet Count, Automated 96 10        Low          150-450       

 

             Nucleated Red Blood Cell % 0.0 %        Normal       0-0           

 

                    Differential        2020          [place] [address] [phone] Neutrophils 70 %       High       28-66       

 

             Lymphocytes  14 %         Low          16-44         

 

             Monocytes    8 %          High         0-5           

 

             Eosinophils  5 %          High         0-3           

 

             Myelocytes   1 %          High         0-0           

 

             Atypical Lymph 2 %          Normal       0-5           

 

             Anisocytosis 1+           Normal                     

 

                    Laboratory test finding 2020          Patient Service 

[place] [address] [phone] Platelet Estimate DECREASED  Normal     Normal      

 

             Immature Platelet Fraction 1.3 %        Normal       0.0-9.59      

 

                    Cardiac Marker Panel 2020          [place] [address] [phone] CPK Creatine Phosphokinase 50 U/L     Normal     26-19

2      

 

             CK-MB Value Mass < 1.0 NG/ML  Normal       <3.6          

 

             MB/CK Relative Index 2.00         Normal       < Or =4      8

 

             Troponin I   < 0.02 NG/ML Normal       < 0.10       9

 

                    Liver Profile       2020          [place] [address] [phone] Ast/Sgot   80 U/L     High       7-37        

 

             Alt/SGPT     51 U/L       Normal       12-78         

 

             Alkaline Phosphatase 136 U/L      High                 

 

             Bilirubin,Total 6.8 mg/dL    High         0.2-1.0       

 

             Bilirubin,Direct 4.4 mg/dL    High         0.0-0.2       

 

             Total Protein 7.9 GM/DL    Normal       6.4-8.2       

 

             Albumin      2.0 GM/DL    Low          3.2-5.2       

 

             Albumin/Globulin Ratio 0.3          Low          1.2-2.2       

 

                    Basic Metabolic Profile 2020          Patient Service 

[place] [address] [phone] Glucose, Fasting 83 mg/dL   Normal           

 

             Blood Urea Nitrogen 24 mg/dL     High         7-18          

 

             Creatinine For GFR 1.38 mg/dL   High         0.55-1.30     

 

             Glomerular Filtration Rate 41.5         Low          >45          1

0

 

             Sodium Level 130 mEq/L    Low          136-145       

 

             Potassium Serum 3.4 mEq/L    Low          3.5-5.1       

 

             Chloride Level 96 mEq/L     Low                  

 

             Carbon Dioxide Level 23 mEq/L     Normal       21-32         

 

             Anion Gap    11 mEq/L     Normal       8-16          

 

             Calcium Level 8.6 mg/dL    Low          8.8-10.2      

 

                    Laboratory test finding 2020          Patient Service 

Center

[facility] [place] [address] [phone] NT-Pro  pg/mL  Normal     <125        

 

             Magnesium Level 2.2 mg/dL    Normal       1.8-2.4       

 

                    PT & Aptt           2020          [place] [address] [phone] Prothrombin Time 19.4 seconds High       12.5-14.3   

 

             Inr          1.60         Normal                    11

 

             Partial Thromboplastin Time 32.6 seconds Normal       24.2-38.5    

 







                          1                         THERAPUTIC HUMAN INR VALUES

INDICATIONS                      NORMAL RANGES

PROPHYLAXIS/TREATMENT OF:

VENOUS THROMBOSIS                2.0-3.0

PULMONARY EMBOLISM               2.0-3.0

PREVENTION OF SYSTEMIC EMBOLISM FROM:

TISSUE HEART VALVES              2.0-3.0

ACUTE MYOCARDIAL INFARCTION      2.0-3.0

VALVULAR HEART DISEASE           2.0-3.0

ATRIAL FIBRILLATION              2.0-3.0

MECHANICAL VALVES(HIGH RISK)     2.5-3.5

RECURRENT MYOCARDIAL INFARCTION  2.5-3.5



 

                          2                         Units are mL/min/1.73 m2



Chronic Kidney Disease Staging per NKF:



Stage I & II   GFR >=60       Normal to Mildly Decreased

Stage III      GFR 30-59      Moderately Decreased

Stage IV       GFR 15-29      Severely Decreased

Stage V        GFR <15        Very Little GFR Left

ESRD           GFR <15 on RRT



 

                          3                         Units are mL/min/1.73 m2



Chronic Kidney Disease Staging per NKF:



Stage I & II   GFR >=60       Normal to Mildly Decreased

Stage III      GFR 30-59      Moderately Decreased

Stage IV       GFR 15-29      Severely Decreased

Stage V        GFR <15        Very Little GFR Left

ESRD           GFR <15 on RRT



 

                          4                         ****************************

*********************

If anaerobic or aerobic growth is detected within

the next 7-21 days, an addendum will follow.

                                        .***************************************

********.



FULL REPORT IN LAB NOTES (eCW and [facility]).

NO GROWTH ANAEROBICALLY





 

                          5                         FEW WBCS

NO ORGANISMS SEEN





 

                          6                         ****************************

*********************

If aerobic or anaerobic growth is detected within

the next 7-21 days, an addendum will follow.

                                        .***************************************

********.



FULL REPORT IN LAB NOTES (eCW and [facility]).

NO GROWTH AEROBICALLY





 

                          7                         THERAPUTIC HUMAN INR VALUES

INDICATIONS                      NORMAL RANGES

PROPHYLAXIS/TREATMENT OF:

VENOUS THROMBOSIS                2.0-3.0

PULMONARY EMBOLISM               2.0-3.0

PREVENTION OF SYSTEMIC EMBOLISM FROM:

TISSUE HEART VALVES              2.0-3.0

ACUTE MYOCARDIAL INFARCTION      2.0-3.0

VALVULAR HEART DISEASE           2.0-3.0

ATRIAL FIBRILLATION              2.0-3.0

MECHANICAL VALVES(HIGH RISK)     2.5-3.5

RECURRENT MYOCARDIAL INFARCTION  2.5-3.5



 

                          8                         DIAGNOSIS CRITERIA

MMB ng/ml       Relative Index (RI)

NON-AMI               < or = 5               N/A

GRAY ZONE              > 5                < or = 4

AMI                    > 5                   > 4



 

                          9                         Troponin I Reference Interva

l for [employer] LOCI:



                                        99th Percentile= 0.00-0.045 ng/ml



Risk Stratification:

<= 0.10 ng/ml   Decreased Risk for Adverse Clinical

Events.

                                        0.10-1.50 ng/ml   Increased Risk for Adv

erse Clinical

Events. Evaluation of additional

criterion and/or repeat testing in 2-6

hours is suggested to rule out myocardial

damage.

>= 1.50 ng/ml   Indicative of Myocardial Injury.



 

                          10                        Units are mL/min/1.73 m2



Chronic Kidney Disease Staging per NKF:



Stage I & II   GFR >=60       Normal to Mildly Decreased

Stage III      GFR 30-59      Moderately Decreased

Stage IV       GFR 15-29      Severely Decreased

Stage V        GFR <15        Very Little GFR Left

ESRD           GFR <15 on RRT



 

                          11                        THERAPUTIC HUMAN INR VALUES

INDICATIONS                      NORMAL RANGES

PROPHYLAXIS/TREATMENT OF:

VENOUS THROMBOSIS                2.0-3.0

PULMONARY EMBOLISM               2.0-3.0

PREVENTION OF SYSTEMIC EMBOLISM FROM:

TISSUE HEART VALVES              2.0-3.0

ACUTE MYOCARDIAL INFARCTION      2.0-3.0

VALVULAR HEART DISEASE           2.0-3.0

ATRIAL FIBRILLATION              2.0-3.0

MECHANICAL VALVES(HIGH RISK)     2.5-3.5

RECURRENT MYOCARDIAL INFARCTION  2.5-3.5









Procedures





                Date            Code            Description     Status

 

                    2020          83602               Brief Emotional/Beha

v Assessment W/ Scoring Doc Per Standard 

Inst                                    Completed

 

                2018      [id number]        Mammogram       Completed

 

                2016      [id number]        Mammogram       Completed

 

                2015      [id number]        Mammogram       Completed

 

                2014      [id number]        Mammogram       Completed







Medical Devices





                                        Description

 

                                        No Information Available







Encounters





           Type       Date       Location   Provider   Dx         Diagnosis

 

           Office Visit 2020 11:45a Main Office Gayla Ya FNP K70.3

1     Alcoholic

cirrhosis of liver with ascites

 

                          K76.7                     Hepatorenal syndrome

 

                          J91.8                     Pleural effusion in other co

nditions classified elsewhere

 

                          Z13.89                    Encounter for screening for 

other disorder

 

           Office Visit 10/26/2020  3:15p Main Office Gayla Ya FNP K70.3

1     Alcoholic

cirrhosis of liver with ascites

 

                          K76.7                     Hepatorenal syndrome

 

                          J91.8                     Pleural effusion in other co

nditions classified elsewhere

 

                          E03.9                     Hypothyroidism, unspecified

 

                          I10                       Essential (primary) hyperten

nikolay

 

                          F32.1                     Major depressive disorder, s

zakia episode, moderate

 

           Office Visit 10/19/2020  2:45p Main Office Gayla Ya FNP K70.3

1     Alcoholic

cirrhosis of liver with ascites

 

                          K76.7                     Hepatorenal syndrome

 

                          J91.8                     Pleural effusion in other co

nditions classified elsewhere

 

                          E03.9                     Hypothyroidism, unspecified

 

                          I10                       Essential (primary) hyperten

nikolay







Assessments





                Date            Code            Description     Provider

 

                2020      K70.31          Alcoholic cirrhosis of liver wit

h ascites Gayla Ya FNP

 

                2020      K76.7           Hepatorenal syndrome [place] KALPESH

neisha, P

 

                2020      J91.8           Pleural effusion in other condit

ions classified elsewhere 

Gayla Ya, ANDREAP

 

                2020      Z13.89          Encounter for screening for othe

r disorder Gayla Ya, 

ANDREAP

 

                10/26/2020      K70.31          Alcoholic cirrhosis of liver wit

h ascites Gayla Ya, P

 

                10/26/2020      K76.7           Hepatorenal syndrome [place] KALPESH

neisha, [facility]

 

                10/26/2020      J91.8           Pleural effusion in other condit

ions classified elsewhere 

Gayla Ya, P

 

                10/26/2020      E03.9           Hypothyroidism, unspecified Ples

Gayla arnold, [facility]

 

                10/26/2020      I10             Essential (primary) hypertension

 Gayla Ya, [facility]

 

                10/26/2020      F32.1           Major depressive disorder, singl

e episode, moderate Gayla Ya [facility]

 

                10/19/2020      K70.31          Alcoholic cirrhosis of liver wit

h ascites Gayla Ya, [facility]

 

                10/19/2020      K76.7           Hepatorenal syndrome KALPESH Ya, [facility]

 

                10/19/2020      J91.8           Pleural effusion in other condit

ions classified elsewhere 

Gayla Ya, ANDREAP

 

                10/19/2020      E03.9           Hypothyroidism, unspecified Ples

Gayla arnold, P

 

                10/19/2020      I10             Essential (primary) hypertension

 Gayla Ya FNP







Plan of Treatment

Future Appointment(s):* 2021 11:30 am - Gayla Ya FNP at Main 
  Office

2020 - Gayla Ya FNP* K70.31 Alcoholic cirrhosis of liver with 
  ascites* Comments:* spoke with patient at length regarding importance of f/u 
  with pulmonology and nephrology.  She does not seem to understand the severity
  of her disease, she may possibly be in denial.  She is certainly not ready to 
  talk about hospice or DNR.  I encouraged her to call both pulmonary and 
  nephrology today to make follow up appointments



* Follow up:* one month





* K76.7 Hepatorenal syndrome

* J91.8 Pleural effusion in other conditions classified elsewhere

* Z13.89 Encounter for screening for other disorder





Functional Status





                Functional Condition Comment         Date            Status

 

                Glasses         Reading                         Active

 

                Independent with all ADL's                                 Activ

e







Mental Status





                Mental Condition Comment         Date            Status

 

                None            poor reader                     Active







Referrals





                Refer to      Reason for Referral Status          Appt Date

 

                [facility] ANXIETY         Closed          0



 

                                        [address] [place] [address]

 

                                        [phone]

## 2021-01-16 NOTE — CCD
Continuity of Care Document (CCD)

                             Created on: 2020



Flora Taylor

External Reference #: [id number]

: 1960

Sex: Female



Demographics





                          Address                   [address] [place] [address]  [id number]

 

                          Home Phone                [phone]

 

                          Preferred Language        Unknown

 

                          Marital Status            Unknown

 

                          Christianity Affiliation     Unknown

 

                          Race                      White

 

                          Ethnic Group              Not  or 





Author





                          Author                    Flora DEJESUS D.O.

 

                          Organization              Unknown

 

                          [place]  [address]



 

                          Phone                     [phone]







Care Team Providers





                    Care Team Member Name Role                Phone

 

                    Meron Ramirez M.D. AUTM                [phone]







Problems





                    Active Problems     Provider            Date

 

                    Essential hypertension Alec Hoffman MD     Onset: 2015

 

                    Difficulty breathing Bull Dejesus D.O.    Onset: 2020

 

                    Pleural effusion, not elsewhere classified Bull Dejesus D.O. 

   Onset: 2020







Social History





                Type            Date            Description     Comments

 

                Birth Sex                       Unknown          

 

                ETOH Use                        Occasionally consumes alcohol  

 

                Recreational Drug Use                 Denies Drug Use  

 

                Tobacco Use     Reviewed: 20 Patient has never smoked  

 

                Smoking Status  Reviewed: 20 Patient has never smoked  

 

                Exercise Type/Frequency                 Occasional Mild Exercise

  







Allergies, Adverse Reactions, Alerts





             Active Allergies Reaction     Severity     Comments     Date

 

             Penicillin   Unknown Reaction                           2015







Medications





           Active Medications SIG        Qnty       Indications Ordering Provide

r Date

 

                    Prevnar 13                      Suspension                  

 one injection       

1units              J90                 Bull Dejesus D.O.    10/27/2020

 

                                        Fluticasone Propionate                  

   50mcg/Act Suspension                 

             2 sprays to each nostril daily 1bottle      472.0        Alec cohen MD 2015

 

                          Levothyroxine Sodium                     112mcg Tablet

s                   1 tab 

by mouth every day 30tabs                          Unknown         

 

                          Betaxolol HCL                     0.5% Solution       

            1 gtt both 

eyes twice a day                                 Unknown         

 

                          Lactulose                     10GM/15ML Solution      

             by mouth 

twice a day     1892ml                          Unknown         

 

                          Azelastine HCL (Nasal)                     0.1% Soluti

on                   2 

sprays intranasal every day 30ml                            Unknown         00



 

                          Spironolactone                     50mg Tablets       

            1 tab by mouth

twice a day     30tabs                          Unknown         

 

                          Alphagan P                     0.1% Solution          

         2 drop both eyes 

every day                                       Unknown         

 

                          Furosemide                     20mg Tablets           

        Take One Tablet By

Mouth Every Morning                                 Unknown         







Immunizations





             CPT Code     Status       Date         Vaccine      Lot #

 

             [id number]        Given        10/27/2020   Prevnar 13   [id number]

 

             [id number]        Given        10/01/2020   Afluria, Quadrivalent, 0.5ml

, NDC# [id number]  







Vital Signs





                Date            Vital           Result          Comment

 

                2020  2:35pm BP Systolic     90 mmHg          

 

                    BP Diastolic        60 mmHg              

 

                    Heart Rate          94 /min              

 

                    O2 % BldC Oximetry  98 %                 

 

                    Body Temperature    99.1 F             

 

                    Height              66 inches           5'6"

 

                    Ideal Body Weight   130 lb               

 

                10/27/2020  9:30am BP Systolic     102 mmHg         

 

                    BP Diastolic        58 mmHg              

 

                    Heart Rate          106 /min             

 

                    O2 % BldC Oximetry  97 %                 

 

                    Body Temperature    96.2 F             

 

                    Height              66 inches           5'6"

 

                    Weight              171.00 lb            

 

                    BMI (Body Mass Index) 27.6 kg/m2           

 

                    Ideal Body Weight   130 lb               

 

                    Weight              77.566 kg            

 

                    BSA (Body Surface Area) 1.87 m2              







Results





        Test    Acquired Date Facility Test    Result  H/L     Range   Note

 

                    Laboratory test finding 2020          [facility]

                                        [address] [place] [address] [phone] Non Gyn/Cytology Req For Servi (SEE NOTE)             

           1

 

                    Coag Panel For Procedures 2020          [facility]

                                        [address] [place] [address] [phone] Platelet Count, Automated 113 10     Low        150-45

0     

 

                    Prothrombin Time/Inr 2020          [facility]

enter Main [facility]

                                        [address] [place], [place] [id number] [phone] Prothrombin Time 18.6 seconds High       12.5-14.3   

 

             Inr          1.52         Normal                    2

 

             Partial Thromboplastin Time 34.0 seconds Normal       24.2-38.5    

 

 

                    Cell Count Pleural Fluid 2020          [facility]

                                        [address] [place], [place] [address] [phone] Source, Body Fluid PLEURAL    Normal                 

 

             Pleural FL Color YELLOW       Normal       Colorless     

 

             Appearance, Body Fluid CLOUDY       Normal       Clear         

 

             WBC Body Fluid 114 /uL      High         0-10          

 

             RBC Body Fluid 4 10         Normal       <2            

 

             BF Mononuclear Cell % 91.2 %       High         0-0           

 

             BF Polymorphonuclear Cell % 8.8 %        High         0-0          

 

 

                    TP Body Fluid       2020          [facility]

                                        [address] [place] [address] [phone] Total Protein, Body Fluid 0.7 g/dL   Normal     Not Es

tablished  

 

             Source, Body Fluid Tot Protein PLEURAL      Normal                 

    

 

                    LDH Body Fluid      2020          [facility]

                                        [address] [place] [phone] LDH, Body Fluid 51 U/L     Normal     Not Established 

 

 

             Source, Body Fluid LDH PLEURAL      Normal                     

 

                    Glucose Body Fluid  2020          [facility]

                                        [address] [place] [address] [phone] Glucose, Body Fluid 131 mg/dL  Normal     Not Establis

hed  

 

             Source, Body Fluid Glucose PLEURAL      Normal                     

 

                    Amylase Body Fluid  2020          [address] [address] [phone] Amylase, Body Fluid 31 U/L     Normal     Not Establis

hed  

 

             Source, Body Fluid Amylase PLEURAL      Normal                     

 

                    PH, Body Fluid      2020          [facility]

                                        [address] [place] [address] [phone] PH Body Fluid > 7.800 units Normal     Not Established

  

 

             Source, Body Fluid pH PLEURAL      Normal                     

 

                    Body Fluid Culture And GS 2020          [facility]

                                        [address] [place] [address] [phone] Gram Stain (SEE NOTE)  Normal                3

 

             Body Fluid Culture **************** <SEE NOTE>                     

       4

 

                    Laboratory test finding 2020          [facility]

                                        [address] [place] [address] [phone] Anaerobic Culture **************** <SEE NOTE>         

               5







                          1                         SPECIMEN:            Pleural

 Fluid

                                        1400 ml yellow



SPECIMEN ADEQUACY:   Satisfactory for evaluation





CATEGORIZATION:      Negative for Malignancy



DESCRIPTIONS:        Specimen consists of reactive mesothelial cells

in a background of lymphocytes, macrophages,

and some neutrophils.







COMMENTS:













                                        2020 - 0908



Signed________ SERENA KAUR (ASCP) 2020 0908 (Prelim)

Signed________ PAUL OWUSU MD 2020 1111



 

                          2                         THERAPUTIC HUMAN INR VALUES

INDICATIONS                      NORMAL RANGES

PROPHYLAXIS/TREATMENT OF:

VENOUS THROMBOSIS                2.0-3.0

PULMONARY EMBOLISM               2.0-3.0

PREVENTION OF SYSTEMIC EMBOLISM FROM:

TISSUE HEART VALVES              2.0-3.0

ACUTE MYOCARDIAL INFARCTION      2.0-3.0

VALVULAR HEART DISEASE           2.0-3.0

ATRIAL FIBRILLATION              2.0-3.0

MECHANICAL VALVES(HIGH RISK)     2.5-3.5

RECURRENT MYOCARDIAL INFARCTION  2.5-3.5



 

                          3                         FEW WBCS

NO ORGANISMS SEEN





 

                          4                         ****************************

*********************

If aerobic or anaerobic growth is detected within

the next 7-21 days, an addendum will follow.

                                        .***************************************

********.



FULL REPORT IN LAB NOTES ([facility]CW and [facility]).

NO GROWTH AEROBICALLY





 

                          5                         ****************************

*********************

If anaerobic or aerobic growth is detected within

the next 7-21 days, an addendum will follow.

                                        .***************************************

********.



FULL REPORT IN LAB NOTES ([facility]CW and [facility]).

NO GROWTH ANAEROBICALLY











Procedures





                Date            Code            Description     Status

 

                2020      67026           Insertion Of Indwelling Tunneled

 Pleural Catheter W/Cuff 

Completed







Medical Devices





                                        Description

 

                                        No Information Available







Encounters





           Type       Date       Location   Provider   Dx         Diagnosis

 

             Office Visit 10/27/2020 10:00a [facility] Bull pina DDewayneO. J90

                                        Pleural effusion, not elsewhere classifi

ed

 

                          R06.00                    Dyspnea, unspecified

 

                          Z23                       Encounter for immunization

 

                Office Visit    10/08/2020  1:23a [facility] R

adilia Wiseman M.D.

                          J90                       Pleural effusion, not elsewh

ere classified

 

                          K72.90                    Hepatic failure, unspecified

 without coma

 

                          K76.7                     Hepatorenal syndrome

 

                          N28.9                     Disorder of kidney and urete

r, unspecified

 

                Office Visit    10/07/2020  1:23a [facility] R

adilia Wiseman M.D.

                          J90                       Pleural effusion, not elsewh

ere classified

 

                          K72.90                    Hepatic failure, unspecified

 without coma

 

                          K76.7                     Hepatorenal syndrome

 

                          N28.9                     Disorder of kidney and urete

r, unspecified

 

                Office Visit    10/06/2020  1:23a [facility] R

adilia Wiseman M.D.

                          J90                       Pleural effusion, not elsewh

ere classified

 

                          K72.90                    Hepatic failure, unspecified

 without coma

 

                          K76.7                     Hepatorenal syndrome

 

                          N28.9                     Disorder of kidney and urete

r, unspecified

 

                Office Visit    10/03/2020  1:23a [facility] R

adilia Wiseman M.D.

                          J90                       Pleural effusion, not elsewh

ere classified

 

                          K72.90                    Hepatic failure, unspecified

 without coma

 

                          K76.7                     Hepatorenal syndrome

 

                          N28.9                     Disorder of kidney and urete

r, unspecified

 

                Office Visit    10/01/2020  1:23a [facility] R

adilia Wiseman M.D.

                          J90                       Pleural effusion, not elsewh

ere classified

 

                          K72.90                    Hepatic failure, unspecified

 without coma

 

                          K76.7                     Hepatorenal syndrome

 

                          E03.9                     Hypothyroidism, unspecified

 

             Office Visit 2020  1:30p [facility] Bull pina D.O. J90

                                        Pleural effusion, not elsewhere classifi

ed

 

                          R06.00                    Dyspnea, unspecified







Assessments





                Date            Code            Description     Provider

 

                10/27/2020      J90             Pleural effusion, not elsewhere 

classified Bull Dejesus D.O.

 

                10/27/2020      R06.00          Dyspnea, unspecified Bull Dejesus,

 D.O.

 

                10/27/2020      Z23             Encounter for immunization Bull Dejesus D.O.

 

                10/08/2020      J90             Pleural effusion, not elsewhere 

classified Manas Wiseman M.D.

 

                10/08/2020      K72.90          Hepatic failure, unspecified wit

hout coma Manas Wiseman M.D.

 

                10/08/2020      K76.7           Hepatorenal syndrome Manas johnson M.D.

 

                10/08/2020      N28.9           Disorder of kidney and ureter, u

nspecified Manas Wiseman M.D.

 

                10/07/2020      J90             Pleural effusion, not elsewhere 

classified Manas Wiseman M.D.

 

                10/07/2020      K72.90          Hepatic failure, unspecified wit

hout coma Manas Wiseman M.D.

 

                10/07/2020      K76.7           Hepatorenal syndrome Manas johnson M.D.

 

                10/07/2020      N28.9           Disorder of kidney and ureter, u

nspecified Manas Wiseman M.D.

 

                10/06/2020      J90             Pleural effusion, not elsewhere 

classified Manas Wiseman M.D.

 

                10/06/2020      K72.90          Hepatic failure, unspecified wit

hout ximena Wiseman M.D.

 

                10/06/2020      K76.7           Hepatorenal syndrome Manas johnson M.D.

 

                10/06/2020      N28.9           Disorder of kidney and ureter, u

nspecified Manas Wiseman M.D.

 

                10/03/2020      J90             Pleural effusion, not elsewhere 

classified Manas Wiseman M.D.

 

                10/03/2020      K72.90          Hepatic failure, unspecified wit

hout coma Manas Wiseman M.D.

 

                10/03/2020      K76.7           Hepatorenal syndrome Manas johnson M.D.

 

                10/03/2020      N28.9           Disorder of kidney and ureter, u

nspecified Manas Wiseman M.D.

 

                10/01/2020      J90             Pleural effusion, not elsewhere 

classified Manas Wiseman M.D.

 

                10/01/2020      K72.90          Hepatic failure, unspecified wit

hout coma Manas Wiseman M.D.

 

                10/01/2020      K76.7           Hepatorenal syndrome Manas johnson M.D.

 

                10/01/2020      E03.9           Hypothyroidism, unspecified Declan Wiseman M.D.

 

                2020      J90             Pleural effusion, not elsewhere 

classified Manas Wiseman M.D.

 

                2020      J90             Pleural effusion, not elsewhere 

classified Bull Dejesus D.O.

 

                2020      R06.00          Dyspnea, unspecified Bull Dejesus D.O.







Plan of Treatment

Future Appointment(s):* 2021  3:00 pm - Bull Dejesus D.O. at [facility]

10/27/2020 - Bull Dejesus D.O.* J90 Pleural effusion, not elsewhere classified

* R06.00 Dyspnea, unspecified

* Z23 Encounter for immunization

* * New Medication:* Prevnar 13  



* Follow up:* Follow up in 2 months with cxr









Functional Status





                Functional Condition Comment         Date            Status

 

                Independent with all ADL's                                 Activ

e

 

                Independent with all IADL's                                 Acti

ve







Mental Status





                Mental Condition Comment         Date            Status

 

                None                                            Active

 

                Can understand information                                 Activ

e







Referrals





                                        Description

 

                                        No Information Available

## 2021-01-16 NOTE — CCD
Continuity of Care Document (CCD)

                             Created on: 2020



Flora Taylor

External Reference #: [id number]

: 1960

Sex: Female



Demographics





                          Address                   [address] [place] [address]

 

                          Home Phone                [phone]

 

                          Preferred Language        Unknown

 

                          Marital Status            Unknown

 

                          Temple Affiliation     Muslim (Non-Tenriism, Non

-Specific)

 

                          Race                      White

 

                          Ethnic Group              Not  or 





Author





                          Author                    [facility]

 

                          Organization              Unknown

 

                          Address                   [address]

[place]  [address]



 

                          Phone                     [phone]







Care Team Providers





                    Care Team Member Name Role                Phone

 

                    [facility]

TM                [phone]

 

                    [facility]         AUTM                [phone]

 

                    [facility] AUTM               

 [phone]

 

                    [facility] - [facility] AUTM            

    [phone]







Problems





                    Active Problems     Provider            Date

 

                    Essential hypertension Gabe Corral M.D. Onset: 

 

                    Hypothyroidism      Gabe Corral M.D. Onset: 2011

 

                    Vitamin D deficiency Gabe Corral M.D. Onset: 

 

                    Neck pain           Gabe Corral M.D. Onset: 2011

 

                    Generalized anxiety disorder Gabe Corral M.D. Onse

t: 2011

 

                    Allergic rhinitis   Gabe Corral M.D. Onset: 2012

 

                    Degeneration of lumbar intervertebral disc Benigno Corral M.D. Onset: 

2013

 

                    Alcoholic cirrhosis Gabe Corral M.D. Onset: 2018

 

                    Esophageal varices without bleeding Gabe Corral M. D. Onset: 2018







Social History





                Type            Date            Description     Comments

 

                Birth Sex                       Unknown          

 

                Tobacco Use     Start: Unknown  Never Smoked Cigarettes  

 

                Tobacco Use     Start: Unknown  Never Used Smokeless Tobacco  

 

                ETOH Use                        Consumed 4 Alcoholic Beverages P

er Day  

 

                Tobacco Use     Start: Unknown  Patient has never smoked  

 

                Recreational Drug Use                 Denies Drug Use  

 

                Smoking Status  Reviewed: 20 Patient has never smoked  

 

                Exercise Type/Frequency                 Exercises sporadically  

 

                Tattoo/Piercing                 Tattoo          3 

 

                Tattoo/Piercing                 Pierced ears    x2 

 

                Sun Exposure                    Minimum amount of sun exposure  

 

                Sun Exposure                    Uses sunscreen  Occasionally 

 

                Seat Belt/Car Seat                 Always uses seat belt  

 

                Bike Helmet                     Never           Does not bike ri

de. 

 

                Smoke Alarms                    Yes              

 

                Smoke Alarms                    Carbon Monoxide Detector: Yes  







Allergies, Adverse Reactions, Alerts





             Active Allergies Reaction     Severity     Comments     Date

 

             Penicillin                                          2004

 

             Cefdinir     Difficulty swallowing, Itching, throat felt itchy. Sev

ere                    2019







Medications





           Active Medications SIG        Qnty       Indications Ordering Provide

r Date

 

           Rifaximin Tablets 200 mg tab - take 2 tabs (400 mg) twice a day      

                 Unknown    

2020

 

                          Spironolactone                     50mg Tablets       

            take one half 

tablet (25 mg) by mouth twice a day                                 Unknown     

    2020

 

                          Allergy Relief Loratadine                     10mg Tab

lets                   1 

tab once a day as needed for allergy symptoms                                 Un

known         2020

 

                          Hydroxyzine HCL                     25mg Tablets      

             1 -2 tab by 

mouth as needed before bed 30tabs          F32.1           Gayla Ya FNP 

10/26/2020

 

                          Mucinex                     600mg Tablets ER 12HR     

              1 by mouth 

twice a day                                     Unknown         10/08/2020

 

                          Midodrine HCL                     5mg Tablets         

          1 tablet three 

times a day ( 8 am, 12 n,4 pm) 90tabs                          Gayla Ya FNP 10/08/2020

 

                          Torsemide                     20mg Tablets            

       take 1 tablet twice

a day (9 Am/5 PM)                                 Unknown         10/08/2020

 

                                        Lactulose Encephalopathy                

     10GM/15ML Solution                 

             take 30ml by mouth three times a day 2700units                 Gayla Araujo FNP 

2020

 

                          Thiamine HCL                     100mg Tablets        

           1 by mouth 

every day                                       Unknown         2020

 

                                        Fluticasone Propionate Nasal Spray      

               50mcg/Act Suspension     

             2 sprays each nostril once a day                           Unknown 

     2019

 

                          Latanoprost                     0.005% Solution       

            one drop in 

each eye daily at at bedtime                                 Unknown         

 

                          Betaxolol HCL                     0.5% Solution       

            1 drop each 

eye twice a day.                                 Unknown         2019

 

                          Brimonidine Tartrate                     0.2% Solution

                   one 

gtt. each eye twice a day                                 Unknown         2019

 

                          Vitamin D                     1000Unit Tablets        

           1 by mouth 

every day       100tabs                         Meron Ramirez M.D. 

019

 

                          Levothyroxine Sodium                     112mcg Tablet

s                   1 by 

mouth every day 90tabs          E03.9           Meron Ramirez M.D. 

018

 

                                        Wrist Splint/Cock-Up/Left/Canvas/Medium 

                     Misc               

                dx: carpal tunnel syndrome, duration 12 months, prognosis good 1

units                          

Gabe Corral M.D.              2018

 

                                        Wrist Splint/Cock-Up/Right/Canvas/Medium

                      Misc              

                                        wear splint every night for carpal tunne

l syndrome, prognosis good, duration

99 months       1units                          Gabe Corral M.D. 

 

                                        Levocetirizine Dihydrochloride          

           5mg Tablets                  

                1 tab by mouth every evening for allergies 90tabs          J30.9

           Meron Ramirez M.D.

                                        2015

 

                          Ketotifen Fumarate                     0.025% Solution

                   1 drop 

both eyes twice a day for allergy symptoms 5ml                             Unkno

wn         

 

                          Folic Acid                     1mg Tablets            

       1 by mouth every 

day             90tabs                          Meron Ramirez M.D. 

000

 

           Multivitamin Adult                      Tablets                      

                              Unknown    



 

                          Xifaxan                     200mg Tablets             

      1 by mouth twice a 

day             60tabs                          Gayla Ya FNP 

 

                                        History Medications

 

                          Sertraline HCL                     50mg Tablets       

            1 by mouth 

every day       30tabs          F32.1           Gayla Ya FNP 10/26/2020 

- 2020

 

                          Ciprofloxacin HCL                     250mg Tablets   

                1 tab by 

mouth once a day at 6 Am                                 Unknown         10/08/2

020 - 10/22/2020

 

                          Spironolactone                     50mg Tablets       

            take one 

tablet by mouth twice a day  (9 Am/5 PM)                                 Genie Tabares PA-C 10/08/2020 - 

2020







Immunizations





             CPT Code     Status       Date         Vaccine      Lot #

 

             [id number]        Given        2019   Pneumococcal Vaccine [id number]

 

                [id number]           Given           10/29/2018      Influenza Virus 

Vaccine, Quadrivalent,age 3 and 

up,multidose vial                       [id number]

 

             41276        Given        2017   Influenza Vaccination  

 

             60085        Given        2015   Influenza Vaccination  

 

             73431        Given        2013   Influenza Vaccination  

 

             55844        Given        04/15/2013   Zostavax      

 

             91525        Given        2012   Influenza Vaccination [id number]

C

 

             97013        Given        10/31/2011   Influenza Vaccination/preser

vative free [id number]

 

             55459        Given        2010   Adacel 11 Yrs or older 

BA

 

             81441        Given        10/04/2010   Influenza Vaccination [id number]

A

 

             54316        Given        2009   Influenza Vaccination [id number]

A

 

             61613        Given        2005   Tetnus Toxoid Im Or Jet Inje

ction Use  







Vital Signs





                Date            Vital           Result          Comment

 

                10/26/2020  3:20pm BP Systolic     105 mmHg         

 

                    BP Diastolic        63 mmHg              

 

                    Heart Rate          108 /min             

 

                    Body Temperature    97.4 F             

 

                    Respiratory Rate    20 /min              

 

                    Height              67.0 inches         5'7"

 

                    Weight              169.50 lb            

 

                    O2 % BldC Oximetry  98 %                 

 

                    Peak Expiratory Flow Rate 364                 Estimated Peak

 Flow Rate

 

                    Ideal Body Weight   135 lb               

 

                    BMI (Body Mass Index) 26.5 kg/m2           

 

                10/19/2020  3:07pm BP Systolic     114 mmHg         

 

                    BP Diastolic        71 mmHg              

 

                    Heart Rate          101 /min             

 

                    Body Temperature    97.3 F             

 

                    Respiratory Rate    18 /min              

 

                    Height              67.0 inches         5'7"

 

                    Weight              166.12 lb            

 

                    O2 % BldC Oximetry  100 %                

 

                    Peak Expiratory Flow Rate 364                 Estimated Peak

 Flow Rate

 

                    Ideal Body Weight   135 lb               

 

                    BMI (Body Mass Index) 26.0 kg/m2           







Results





        Test    Acquired Date Facility Test    Result  H/L     Range   Note

 

                    CBC With Differential 2020          [facility]

[place] [address] [phone] White Blood Count 5.5 10     Normal     4.0-10.0    

 

             Red Blood Count 3.39 10      Low          4.00-5.40     

 

             Hemoglobin   10.8 g/dL    Low          12.0-15.5     

 

             Hematocrit   32.6 %       Low          36.0-47.0     

 

             Mean Corpuscular Volume 96.2 fl      High         80.0-96.0     

 

             Mean Corpuscular Hemoglobin 31.9 pg      Normal       27.0-33.0    

 

 

             Mean Corpuscular HGB Conc 33.1 g/dL    Normal       32.0-36.5     

 

             Red Cell Distribution Width 13.8 %       Normal       11.5-14.5    

 

 

             Platelet Count, Automated 124 10       Low          150-450       

 

             Neutrophils % 73.3 %       High         36.0-66.0     

 

             Lymph %      13.9 %       Low          24.0-44.0     

 

             Mono %       9.9 %        High         0.0-5.0       

 

             Eos %        1.3 %        Normal       0.0-3.0       

 

             Baso %       0.9 %        Normal       0.0-1.0       

 

             Immature Granulocyte % 0.7 %        Normal       0-3.0         

 

             Nucleated Red Blood Cell % 0.0 %        Normal       0-0           

 

             Neutrophils # 4.1 10       Normal       1.5-8.5       

 

             Lymph #      0.8 10       Low          1.5-5.0       

 

             Mono #       0.6 10       Normal       0.0-0.8       

 

             Eos #        0.1 10       Normal       0.0-0.5       

 

             Baso #       0.1 10       Normal       0.0-0.2       

 

                    PT & Aptt           2020          [place] [address] [phone] Prothrombin Time 17.5 seconds High       12.5-14.3   

 

             Inr          1.40         Normal                    1

 

             Partial Thromboplastin Time 35.4 seconds Normal       24.2-38.5    

 

 

                    Liver Profile       2020          [place] [address] [phone] Ast/Sgot   43 U/L     High       7-37        

 

             Alt/SGPT     38 U/L       Normal       12-78         

 

             Alkaline Phosphatase 155 U/L      High                 

 

             Bilirubin,Total 3.1 mg/dL    High         0.2-1.0       

 

             Bilirubin,Direct 2.0 mg/dL    High         0.0-0.2       

 

             Total Protein 6.3 GM/DL    Low          6.4-8.2       

 

             Albumin      2.0 GM/DL    Low          3.2-5.2       

 

             Albumin/Globulin Ratio 0.5          Low          1.2-2.2       

 

                    Basic Metabolic Profile 2020          Patient [place] 

[place] [address] [phone] Glucose, Fasting 169 mg/dL  High             

 

             Blood Urea Nitrogen 71 mg/dL     High         7-18          

 

             Creatinine For GFR 2.38 mg/dL   High         0.55-1.30     

 

             Glomerular Filtration Rate 22.1         Low          >45          2

 

             Sodium Level 131 mEq/L    Low          136-145       

 

             Potassium Serum 4.7 mEq/L    Normal       3.5-5.1       

 

             Chloride Level 99 mEq/L     Normal               

 

             Carbon Dioxide Level 21 mEq/L     Normal       21-32         

 

             Anion Gap    11 mEq/L     Normal       8-16          

 

             Calcium Level 8.7 mg/dL    Low          8.8-10.2      

 

                    Laboratory test finding 2020          Patient Service 

Center

[place] [address] [phone] Ethyl Alcohol (Ethanol) < 0.003 %  Normal     0.000-0.

010  

 

                    Comprehensive Metabolic Profil 2020          Patient [place]

[place] [address] [phone] Glucose, Fasting 118 mg/dL  High             

 

             Blood Urea Nitrogen 69 mg/dL     High         7-18          

 

             Creatinine For GFR 2.49 mg/dL   High         0.55-1.30     

 

             Glomerular Filtration Rate 21.0         Low          >45          3

 

             Sodium Level 128 mEq/L    Low          136-145       

 

             Potassium Serum 4.2 mEq/L    Normal       3.5-5.1       

 

             Chloride Level 92 mEq/L     Low                  

 

             Carbon Dioxide Level 25 mEq/L     Normal       21-32         

 

             Anion Gap    11 mEq/L     Normal       8-16          

 

             Calcium Level 8.8 mg/dL    Normal       8.8-10.2      

 

             Ast/Sgot     45 U/L       High         7-37          

 

             Alt/SGPT     44 U/L       Normal       12-78         

 

             Alkaline Phosphatase 186 U/L      High                 

 

             Bilirubin,Total 4.2 mg/dL    High         0.2-1.0       

 

             Total Protein 6.9 GM/DL    Normal       6.4-8.2       

 

             Albumin      2.2 GM/DL    Low          3.2-5.2       

 

             Albumin/Globulin Ratio 0.5          Low          1.2-2.2       

 

                    Laboratory test finding 2020          Patient Service 

Center

[place] [address] [phone] Anaerobic Culture **************** <SEE NOTE>         

               4

 

                    Body Fluid Culture And GS 2020          [place] [address] [phone] Gram Stain (SEE NOTE)  Normal                5

 

             Body Fluid Culture **************** <SEE NOTE>                     

       6

 

                    PH, Body Fluid      2020          [place] [address] [phone] PH Body Fluid > 7.800 units Normal     Not Established

  

 

             Source, Body Fluid pH PLEURAL      Normal                     

 

                    Amylase Body Fluid  2020          [place] [address]

           [phone] Amylase, Body Fluid 31 U/L     Normal     Not Establis

hed  

 

             Source, Body Fluid Amylase PLEURAL      Normal                     

 

                    Glucose Body Fluid  2020          [place] [address]

           [phone] Glucose, Body Fluid 131 mg/dL  Normal     Not Establis

hed  

 

             Source, Body Fluid Glucose PLEURAL      Normal                     

 

                    LDH Body Fluid      2020          [place] [address]

           [phone] LDH, Body Fluid 51 U/L     Normal     Not Established 

 

 

             Source, Body Fluid LDH PLEURAL      Normal                     

 

                    TP Body Fluid       2020          [place] [address]

           [phone] Total Protein, Body Fluid 0.7 g/dL   Normal     Not Es

tablished  

 

             Source, Body Fluid Tot Protein PLEURAL      Normal                 

    

 

                    Cell Count Pleural Fluid 2020          Patient Service

 [place] [id number] [phone] Source, Body Fluid PLEURAL    Normal                 

 

             Pleural FL Color YELLOW       Normal       Colorless     

 

             Appearance, Body Fluid CLOUDY       Normal       Clear         

 

             WBC Body Fluid 114 /uL      High         0-10          

 

             RBC Body Fluid 4 10         Normal       <2            

 

             BF Mononuclear Cell % 91.2 %       High         0-0           

 

             BF Polymorphonuclear Cell % 8.8 %        High         0-0          

 

 

                    PT & Aptt           2020          [place] [address] [phone] Prothrombin Time 18.6 seconds High       12.5-14.3   

 

             Inr          1.52         Normal                    7

 

             Partial Thromboplastin Time 34.0 seconds Normal       24.2-38.5    

 

 

                    Laboratory test finding 2020          Patient [place] 

[place] [address] [phone] Platelet Count, Automated 113 10     Low        150-45

0     

 

                    CBC With Differential 2020          Patient Service Ce

nter

[place] [address] [phone] White Blood Count 7.9 10     Normal     4.0-10.0    

 

             Red Blood Count 3.31 10      Low          4.00-5.40     

 

             Hemoglobin   11.7 g/dL    Low          12.0-15.5     

 

             Hematocrit   34.8 %       Low          36.0-47.0     

 

             Mean Corpuscular Volume 105.1 fl     High         80.0-96.0     

 

             Mean Corpuscular Hemoglobin 35.3 pg      High         27.0-33.0    

 

 

             Mean Corpuscular HGB Conc 33.6 g/dL    Normal       32.0-36.5     

 

             Red Cell Distribution Width 14.6 %       High         11.5-14.5    

 

 

             Platelet Count, Automated 96 10        Low          150-450       

 

             Nucleated Red Blood Cell % 0.0 %        Normal       0-0           

 

                    Differential        2020          [place] [address] [phone] Neutrophils 70 %       High       28-66       

 

             Lymphocytes  14 %         Low          16-44         

 

             Monocytes    8 %          High         0-5           

 

             Eosinophils  5 %          High         0-3           

 

             Myelocytes   1 %          High         0-0           

 

             Atypical Lymph 2 %          Normal       0-5           

 

             Anisocytosis 1+           Normal                     

 

                    Laboratory test finding 2020          Patient [place] 

[place] [address] [phone] Platelet Estimate DECREASED  Normal     Normal      

 

             Immature Platelet Fraction 1.3 %        Normal       0.0-9.59      

 

                    Cardiac Marker Panel 2020          [place] [address] [phone] CPK Creatine Phosphokinase 50 U/L     Normal     26-19

2      

 

             CK-MB Value Mass < 1.0 NG/ML  Normal       <3.6          

 

             MB/CK Relative Index 2.00         Normal       < Or =4      8

 

             Troponin I   < 0.02 NG/ML Normal       < 0.10       9

 

                    Liver Profile       2020          [place] [address] [phone] Ast/Sgot   80 U/L     High       7-37        

 

             Alt/SGPT     51 U/L       Normal       12-78         

 

             Alkaline Phosphatase 136 U/L      High                 

 

             Bilirubin,Total 6.8 mg/dL    High         0.2-1.0       

 

             Bilirubin,Direct 4.4 mg/dL    High         0.0-0.2       

 

             Total Protein 7.9 GM/DL    Normal       6.4-8.2       

 

             Albumin      2.0 GM/DL    Low          3.2-5.2       

 

             Albumin/Globulin Ratio 0.3          Low          1.2-2.2       

 

                    Basic Metabolic Profile 2020          Patient [place] 

[place] [address] [phone] Glucose, Fasting 83 mg/dL   Normal           

 

             Blood Urea Nitrogen 24 mg/dL     High         7-18          

 

             Creatinine For GFR 1.38 mg/dL   High         0.55-1.30     

 

             Glomerular Filtration Rate 41.5         Low          >45          1

0

 

             Sodium Level 130 mEq/L    Low          136-145       

 

             Potassium Serum 3.4 mEq/L    Low          3.5-5.1       

 

             Chloride Level 96 mEq/L     Low                  

 

             Carbon Dioxide Level 23 mEq/L     Normal       21-32         

 

             Anion Gap    11 mEq/L     Normal       8-16          

 

             Calcium Level 8.6 mg/dL    Low          8.8-10.2      

 

                    Laboratory test finding 2020          Patient Service 

Center

[facility] [place] [address] [phone] NT-Pro  pg/mL  Normal     <125        

 

             Magnesium Level 2.2 mg/dL    Normal       1.8-2.4       

 

                    PT & Aptt           2020          [place] [address] [phone] Prothrombin Time 19.4 seconds High       12.5-14.3   

 

             Inr          1.60         Normal                    11

 

             Partial Thromboplastin Time 32.6 seconds Normal       24.2-38.5    

 







                          1                         THERAPUTIC HUMAN INR VALUES

INDICATIONS                      NORMAL RANGES

PROPHYLAXIS/TREATMENT OF:

VENOUS THROMBOSIS                2.0-3.0

PULMONARY EMBOLISM               2.0-3.0

PREVENTION OF SYSTEMIC EMBOLISM FROM:

TISSUE HEART VALVES              2.0-3.0

ACUTE MYOCARDIAL INFARCTION      2.0-3.0

VALVULAR HEART DISEASE           2.0-3.0

ATRIAL FIBRILLATION              2.0-3.0

MECHANICAL VALVES(HIGH RISK)     2.5-3.5

RECURRENT MYOCARDIAL INFARCTION  2.5-3.5



 

                          2                         Units are mL/min/1.73 m2



Chronic Kidney Disease Staging per NKF:



Stage I & II   GFR >=60       Normal to Mildly Decreased

Stage III      GFR 30-59      Moderately Decreased

Stage IV       GFR 15-29      Severely Decreased

Stage V        GFR <15        Very Little GFR Left

ESRD           GFR <15 on RRT



 

                          3                         Units are mL/min/1.73 m2



Chronic Kidney Disease Staging per NKF:



Stage I & II   GFR >=60       Normal to Mildly Decreased

Stage III      GFR 30-59      Moderately Decreased

Stage IV       GFR 15-29      Severely Decreased

Stage V        GFR <15        Very Little GFR Left

ESRD           GFR <15 on RRT



 

                          4                         ****************************

*********************

If anaerobic or aerobic growth is detected within

the next 7-21 days, an addendum will follow.

                                        .***************************************

********.



FULL REPORT IN LAB NOTES ([facility] and [facility]).

NO GROWTH ANAEROBICALLY





 

                          5                         FEW WBCS

NO ORGANISMS SEEN





 

                          6                         ****************************

*********************

If aerobic or anaerobic growth is detected within

the next 7-21 days, an addendum will follow.

                                        .***************************************

********.



FULL REPORT IN LAB NOTES ([facility] and [facility]).

NO GROWTH AEROBICALLY





 

                          7                         THERAPUTIC HUMAN INR VALUES

INDICATIONS                      NORMAL RANGES

PROPHYLAXIS/TREATMENT OF:

VENOUS THROMBOSIS                2.0-3.0

PULMONARY EMBOLISM               2.0-3.0

PREVENTION OF SYSTEMIC EMBOLISM FROM:

TISSUE HEART VALVES              2.0-3.0

ACUTE MYOCARDIAL INFARCTION      2.0-3.0

VALVULAR HEART DISEASE           2.0-3.0

ATRIAL FIBRILLATION              2.0-3.0

MECHANICAL VALVES(HIGH RISK)     2.5-3.5

RECURRENT MYOCARDIAL INFARCTION  2.5-3.5



 

                          8                         DIAGNOSIS CRITERIA

MMB ng/ml       Relative Index (RI)

NON-AMI               < or = 5               N/A

GRAY ZONE              > 5                < or = 4

AMI                    > 5                   > 4



 

                          9                         Troponin I Reference Interva

l for [employer] LOCI:



                                        99th Percentile= 0.00-0.045 ng/ml



Risk Stratification:

<= 0.10 ng/ml   Decreased Risk for Adverse Clinical

Events.

                                        0.10-1.50 ng/ml   Increased Risk for Adv

erse Clinical

Events. Evaluation of additional

criterion and/or repeat testing in 2-6

hours is suggested to rule out myocardial

damage.

>= 1.50 ng/ml   Indicative of Myocardial Injury.



 

                          10                        Units are mL/min/1.73 m2



Chronic Kidney Disease Staging per NKF:



Stage I & II   GFR >=60       Normal to Mildly Decreased

Stage III      GFR 30-59      Moderately Decreased

Stage IV       GFR 15-29      Severely Decreased

Stage V        GFR <15        Very Little GFR Left

ESRD           GFR <15 on RRT



 

                          11                        THERAPUTIC HUMAN INR VALUES

INDICATIONS                      NORMAL RANGES

PROPHYLAXIS/TREATMENT OF:

VENOUS THROMBOSIS                2.0-3.0

PULMONARY EMBOLISM               2.0-3.0

PREVENTION OF SYSTEMIC EMBOLISM FROM:

TISSUE HEART VALVES              2.0-3.0

ACUTE MYOCARDIAL INFARCTION      2.0-3.0

VALVULAR HEART DISEASE           2.0-3.0

ATRIAL FIBRILLATION              2.0-3.0

MECHANICAL VALVES(HIGH RISK)     2.5-3.5

RECURRENT MYOCARDIAL INFARCTION  2.5-3.5









Procedures





                Date            Code            Description     Status

 

                2018      [id number]        Mammogram       Completed

 

                2016      [id number]        Mammogram       Completed

 

                2015      [id number]        Mammogram       Completed

 

                2014      [id number]        Mammogram       Completed







Medical Devices





                                        Description

 

                                        No Information Available







Encounters





           Type       Date       Location   Provider   Dx         Diagnosis

 

           Office Visit 2020 11:45a Main Office Gayla Ya FNP K70.3

1     Alcoholic

cirrhosis of liver with ascites

 

                          K76.7                     Hepatorenal syndrome

 

                          J91.8                     Pleural effusion in other co

nditions classified elsewhere

 

           Office Visit 10/26/2020  3:15p Main Office Gayla Ya FNP K70.3

1     Alcoholic

cirrhosis of liver with ascites

 

                          K76.7                     Hepatorenal syndrome

 

                          J91.8                     Pleural effusion in other co

nditions classified elsewhere

 

                          E03.9                     Hypothyroidism, unspecified

 

                          I10                       Essential (primary) hyperten

nikolay

 

                          F32.1                     Major depressive disorder, s

zakia episode, moderate

 

           Office Visit 10/19/2020  2:45p Main Office Gayla Ya FNP K70.3

1     Alcoholic

cirrhosis of liver with ascites

 

                          K76.7                     Hepatorenal syndrome

 

                          J91.8                     Pleural effusion in other co

nditions classified elsewhere

 

                          E03.9                     Hypothyroidism, unspecified

 

                          I10                       Essential (primary) hyperten

nikolay







Assessments





                Date            Code            Description     Provider

 

                2020      K70.31          Alcoholic cirrhosis of liver wit

h ascites Gayla Ya FNP

 

                2020      K76.7           Hepatorenal syndrome KALPESH Ya FNP

 

                2020      J91.8           Pleural effusion in other condit

ions classified elsewhere 

PleGayla alonso, FNP

 

                10/26/2020      K70.31          Alcoholic cirrhosis of liver wit

h ascites PleGayla alonso, FNP

 

                10/26/2020      K76.7           Hepatorenal syndrome PleKALPESH alonso, FNP

 

                10/26/2020      J91.8           Pleural effusion in other condit

ions classified elsewhere 

PleGayla alonso, FNP

 

                10/26/2020      E03.9           Hypothyroidism, unspecified Ples

Gayla arnold, FNP

 

                10/26/2020      I10             Essential (primary) hypertension

 Gayla Ya, FNP

 

                10/26/2020      F32.1           Major depressive disorder, singl

e episode, moderate PleGayla alonso, FNP

 

                10/19/2020      K70.31          Alcoholic cirrhosis of liver wit

h ascites PleGayla alonso, FNP

 

                10/19/2020      K76.7           Hepatorenal syndrome PleKALPESH alonso, P

 

                10/19/2020      J91.8           Pleural effusion in other condit

ions classified elsewhere 

Gayla Ya, FNP

 

                10/19/2020      E03.9           Hypothyroidism, unspecified Ples

Gayla arnold, FNP

 

                10/19/2020      I10             Essential (primary) hypertension

 Gayla Ya, ANDREAP







Plan of Treatment

2020 - Gayla Ya FNP* K70.31 Alcoholic cirrhosis of liver with 
  ascites* Follow up:* one month





* K76.7 Hepatorenal syndrome

* J91.8 Pleural effusion in other conditions classified elsewhere





Functional Status





                Functional Condition Comment         Date            Status

 

                Glasses         Reading                         Active

 

                Independent with all ADL's                                 Activ

e







Mental Status





                Mental Condition Comment         Date            Status

 

                None            poor reader                     Active







Referrals





                Refer to      Reason for Referral Status          Appt Date

 

                [facility]         Sent            0



 

                                        [address] [place] [address]

 

                                        [phone]

## 2021-01-16 NOTE — CCD
Continuity of Care Document (CCD)

                             Created on: 10/26/2020



Flora Taylor

External Reference #: [id number]

: 1960

Sex: Female



Demographics





                          Address                   [address] [place] [address]  [address]

 

                          Home Phone                [phone]

 

                          Preferred Language        Unknown

 

                          Marital Status            Unknown

 

                          Yarsanism Affiliation     Restoration (Non-Jehovah's witness, Non

-Specific)

 

                          Race                      White

 

                          Ethnic Group              Not  or 





Author





                          Author                    Flora YA [facility]

 

                          Organization              Unknown

 

                          Address                   [address]

[place]  [address]



 

                          Phone                     [phone]







Care Team Providers





                    Care Team Member Name Role                Phone

 

                    [facility] - Gastro[facility]

TM                [phone]

 

                    [facility]         AUTM                [phone]

 

                    [facility] Pulmonary Disease AUTM               

 [phone]

 

                    [facility] - [facility] Health AUTM            

    [phone]







Problems





                    Active Problems     Provider            Date

 

                    Essential hypertension Gabe [place] M.D. Onset: 

 

                    Hypothyroidism      Gabe [place] M.D. Onset: 2011

 

                    Vitamin D deficiency Gabe [place] M.D. Onset: 

 

                    Neck pain           Gabe [place] M.D. Onset: 2011

 

                    Generalized anxiety disorder Gabe [place] M.D. Onse

t: 2011

 

                    Allergic rhinitis   Gabe [place] M.D. Onset: 2012

 

                    Degeneration of lumbar intervertebral disc Benigno [place] M.D. Onset: 

2013

 

                    Alcoholic cirrhosis Gabe [place] M.D. Onset: 2018

 

                    Esophageal varices without bleeding Gabe [place] M. D. Onset: 2018







Social History





                Type            Date            Description     Comments

 

                Birth Sex                       Unknown          

 

                Tobacco Use     Start: Unknown  Never Smoked Cigarettes  

 

                Tobacco Use     Start: Unknown  Never Used Smokeless Tobacco  

 

                ETOH Use                        Consumed 4 Alcoholic Beverages P

er Day  

 

                Tobacco Use     Start: Unknown  Patient has never smoked  

 

                Recreational Drug Use                 Denies Drug Use  

 

                Smoking Status  Reviewed: 10/26/20 Patient has never smoked  

 

                Exercise Type/Frequency                 Exercises sporadically  

 

                Tattoo/Piercing                 Tattoo          3 

 

                Tattoo/Piercing                 Pierced ears    x2 

 

                Sun Exposure                    Minimum amount of sun exposure  

 

                Sun Exposure                    Uses sunscreen  Occasionally 

 

                Seat Belt/Car Seat                 Always uses seat belt  

 

                Bike Helmet                     Never           Does not bike ri

de. 

 

                Smoke Alarms                    Yes              

 

                Smoke Alarms                    Carbon Monoxide Detector: Yes  







Allergies, Adverse Reactions, Alerts





             Active Allergies Reaction     Severity     Comments     Date

 

             Penicillin                                          2004

 

             Cefdinir     Difficulty swallowing, Itching, throat felt itchy. Sev

ere                    2019







Medications





           Active Medications SIG        Qnty       Indications Ordering Provide

r Date

 

                          Sertraline HCL                     50mg Tablets       

            1 by mouth 

every day       30tabs          F32.1           Gayla Ya FNP 10/26/2020

 

                          Hydroxyzine HCL                     25mg Tablets      

             1 -2 tab by 

mouth as needed before bed 30tabs          F32.1           Gayla Ya FNP 

10/26/2020

 

                          Mucinex                     600mg Tablets ER 12HR     

              1 by mouth 

twice a day                                     Unknown         10/08/2020

 

                          Midodrine HCL                     5mg Tablets         

          1 tablet three 

times a day ( 8 am, 12 n,4 pm) 90tabs                          Gayla Ya FNP 10/08/2020

 

                          Spironolactone                     50mg Tablets       

            take one 

tablet by mouth twice a day  (9 Am/5 PM)                                 Genie Tabares PA-C 10/08/2020

 

                          Torsemide                     20mg Tablets            

       take 1 tablet twice

a day (9 Am/5 PM)                                 Unknown         10/08/2020

 

                                        Lactulose Encephalopathy                

     10GM/15ML Solution                 

             take 30ml by mouth three times a day 2700units                 Gayla Araujo FNP 

2020

 

                          Thiamine HCL                     100mg Tablets        

           1 by mouth 

every day                                       Unknown         2020

 

                                        Fluticasone Propionate Nasal Spray      

               50mcg/Act Suspension     

             2 sprays each nostril once a day                           Unknown 

     2019

 

                          Latanoprost                     0.005% Solution       

            one drop in 

each eye daily at at bedtime                                 Unknown         

 

                          Betaxolol HCL                     0.5% Solution       

            1 drop each 

eye twice a day.                                 Unknown         2019

 

                          Brimonidine Tartrate                     0.2% Solution

                   one 

gtt. each eye twice a day                                 Unknown         2019

 

                          Vitamin D                     1000Unit Tablets        

           1 by mouth 

every day       100tabs                         Meron Ramirez M.D. 

019

 

                          Levothyroxine Sodium                     112mcg Tablet

s                   1 by 

mouth every day 90tabs          E03.9           Meron Ramirez M.D. 

018

 

                                        Wrist Splint/Cock-Up/Left/Canvas/Medium 

                     Misc               

                dx: carpal tunnel syndrome, duration 12 months, prognosis good 1

units                          

Gabe [place] M.D.              2018

 

                                        Wrist Splint/Cock-Up/Right/Canvas/Medium

                      Misc              

                                        wear splint every night for carpal tunne

l syndrome, prognosis good, duration

99 months       1units                          Gabe [place] M.D. 

 

                                        Levocetirizine Dihydrochloride          

           5mg Tablets                  

                1 tab by mouth every evening for allergies 90tabs          J30.9

           Meron Ramirez M.D.

                                        2015

 

                          Ketotifen Fumarate                     0.025% Solution

                   1 drop 

both eyes twice a day for allergy symptoms 5ml                             Unkno

wn         

 

                          Folic Acid                     1mg Tablets            

       1 by mouth every 

day             90tabs                          Meron Ramirez M.D. 0

000

 

           Multivitamin Adult                      Tablets                      

                              Unknown    



 

                                        History Medications

 

                          Ciprofloxacin HCL                     250mg Tablets   

                1 tab by 

mouth once a day at 6 Am                                 Unknown         10/08/2

020 - 10/22/2020







Immunizations





             CPT Code     Status       Date         Vaccine      Lot #

 

             [id number]        Given        2019   Pneumococcal Vaccine [id number]

 

                [id number]           Given           10/29/2018      Influenza Virus 

Vaccine, Quadrivalent,age 3 and 

up,multidose vial                       [id number]

 

             63681        Given        2017   Influenza Vaccination  

 

             91277        Given        2015   Influenza Vaccination  

 

             82603        Given        2013   Influenza Vaccination  

 

             96636        Given        04/15/2013   Zostavax      

 

             41690        Given        2012   Influenza Vaccination [id number]

C

 

             21842        Given        10/31/2011   Influenza Vaccination/preser

vative free [id number]

 

             36019        Given        2010   Adacel 11 Yrs or older 

BA

 

             73769        Given        10/04/2010   Influenza Vaccination [id number]

A

 

             77004        Given        2009   Influenza Vaccination [id number]

A

 

             01699        Given        2005   Tetnus Toxoid Im Or Jet Inje

ction Use  







Vital Signs





                Date            Vital           Result          Comment

 

                10/26/2020  3:20pm BP Systolic     105 mmHg         

 

                    BP Diastolic        63 mmHg              

 

                    Heart Rate          108 /min             

 

                    Body Temperature    97.4 F             

 

                    Respiratory Rate    20 /min              

 

                    Height              67.0 inches         5'7"

 

                    Weight              169.50 lb            

 

                    O2 % BldC Oximetry  98 %                 

 

                    Peak Expiratory Flow Rate 364                 Estimated Peak

 Flow Rate

 

                    Ideal Body Weight   135 lb               

 

                    BMI (Body Mass Index) 26.5 kg/m2           

 

                10/19/2020  3:07pm BP Systolic     114 mmHg         

 

                    BP Diastolic        71 mmHg              

 

                    Heart Rate          101 /min             

 

                    Body Temperature    97.3 F             

 

                    Respiratory Rate    18 /min              

 

                    Height              67.0 inches         5'7"

 

                    Weight              166.12 lb            

 

                    O2 % BldC Oximetry  100 %                

 

                    Peak Expiratory Flow Rate 364                 Estimated Peak

 Flow Rate

 

                    Ideal Body Weight   135 lb               

 

                    BMI (Body Mass Index) 26.0 kg/m2           







Results





        Test    Acquired Date Facility Test    Result  H/L     Range   Note

 

                    CBC With Differential 2020          [place] [address] [phone] White Blood Count 7.9 10     Normal     4.0-10.0    

 

             Red Blood Count 3.31 10      Low          4.00-5.40     

 

             Hemoglobin   11.7 g/dL    Low          12.0-15.5     

 

             Hematocrit   34.8 %       Low          36.0-47.0     

 

             Mean Corpuscular Volume 105.1 fl     High         80.0-96.0     

 

             Mean Corpuscular Hemoglobin 35.3 pg      High         27.0-33.0    

 

 

             Mean Corpuscular HGB Conc 33.6 g/dL    Normal       32.0-36.5     

 

             Red Cell Distribution Width 14.6 %       High         11.5-14.5    

 

 

             Platelet Count, Automated 96 10        Low          150-450       

 

             Nucleated Red Blood Cell % 0.0 %        Normal       0-0           

 

                    Differential        2020          [facility]

[place]

[place] [address] [phone] Neutrophils 70 %       High       28-66       

 

             Lymphocytes  14 %         Low          16-44         

 

             Monocytes    8 %          High         0-5           

 

             Eosinophils  5 %          High         0-3           

 

             Myelocytes   1 %          High         0-0           

 

             Atypical Lymph 2 %          Normal       0-5           

 

             Anisocytosis 1+           Normal                     

 

                    Laboratory test finding 2020          [facility] 

Center

[place] [address] [phone] Platelet Estimate DECREASED  Normal     Normal      

 

             Immature Platelet Fraction 1.3 %        Normal       0.0-9.59      

 

                    Cardiac Marker Panel 2020          [place] [address] [phone] CPK Creatine Phosphokinase 50 U/L     Normal     26-19

2      

 

             CK-MB Value Mass < 1.0 NG/ML  Normal       <3.6          

 

             MB/CK Relative Index 2.00         Normal       < Or =4      1

 

             Troponin I   < 0.02 NG/ML Normal       < 0.10       2

 

                    Liver Profile       2020          [place] [address] [phone] Ast/Sgot   80 U/L     High       7-37        

 

             Alt/SGPT     51 U/L       Normal       12-78         

 

             Alkaline Phosphatase 136 U/L      High                 

 

             Bilirubin,Total 6.8 mg/dL    High         0.2-1.0       

 

             Bilirubin,Direct 4.4 mg/dL    High         0.0-0.2       

 

             Total Protein 7.9 GM/DL    Normal       6.4-8.2       

 

             Albumin      2.0 GM/DL    Low          3.2-5.2       

 

             Albumin/Globulin Ratio 0.3          Low          1.2-2.2       

 

                    Basic Metabolic Profile 2020          Patient [place] 

[place] [address] [phone] Glucose, Fasting 83 mg/dL   Normal           

 

             Blood Urea Nitrogen 24 mg/dL     High         7-18          

 

             Creatinine For GFR 1.38 mg/dL   High         0.55-1.30     

 

             Glomerular Filtration Rate 41.5         Low          >45          3

 

             Sodium Level 130 mEq/L    Low          136-145       

 

             Potassium Serum 3.4 mEq/L    Low          3.5-5.1       

 

             Chloride Level 96 mEq/L     Low                  

 

             Carbon Dioxide Level 23 mEq/L     Normal       21-32         

 

             Anion Gap    11 mEq/L     Normal       8-16          

 

             Calcium Level 8.6 mg/dL    Low          8.8-10.2      

 

                    Laboratory test finding 2020          [facility] [place] 

[place] [address] [phone] NT-Pro  pg/mL  Normal     <125        

 

             Magnesium Level 2.2 mg/dL    Normal       1.8-2.4       

 

                    PT & Aptt           2020          [place] [address] [phone] Prothrombin Time 19.4 seconds High       12.5-14.3   

 

             Inr          1.60         Normal                    4

 

             Partial Thromboplastin Time 32.6 seconds Normal       24.2-38.5    

 

 

                    PT & Aptt           2020          [facility]

[place]

[place] [address] [phone] Prothrombin Time 18.2 seconds High       11.8-14.0   

 

             Inr          1.54         Normal                    5

 

             Partial Thromboplastin Time 35.6 seconds Normal       25.0-38.4    

 

 

                    CBC With Differential 2020          [facility] Ce

nter

[place] [address] [phone] White Blood Count 3.4 10     Low        4.0-10.0    

 

             Red Blood Count 3.95 10      Low          4.00-5.40     

 

             Hemoglobin   12.7 g/dL    Normal       12.0-15.5     

 

             Hematocrit   36.6 %       Normal       36.0-47.0     

 

             Mean Corpuscular Volume 92.7 fl      Normal       80.0-96.0     

 

             Mean Corpuscular Hemoglobin 32.2 pg      Normal       27.0-33.0    

 

 

             Mean Corpuscular HGB Conc 34.7 g/dL    Normal       32.0-36.5     

 

             Red Cell Distribution Width 13.6 %       Normal       11.5-14.5    

 

 

             Platelet Count, Automated 66 10        Low          150-450      6

 

             Neutrophils % 53.8 %       Normal       36.0-66.0     

 

             Lymph %      31.6 %       Normal       24.0-44.0     

 

             Mono %       10.4 %       High         0.0-5.0       

 

             Eos %        2.4 %        Normal       0.0-3.0       

 

             Baso %       1.5 %        High         0.0-1.0       

 

             Immature Granulocyte % 0.3 %        Normal       0-3.0         

 

             Nucleated Red Blood Cell % 0.0 %        Normal       0-0           

 

             Neutrophils # 1.8 10       Normal       1.5-8.5       

 

             Lymph #      1.1 10       Low          1.5-5.0       

 

             Mono #       0.4 10       Normal       0.0-0.8       

 

             Eos #        0.1 10       Normal       0.0-0.5       

 

             Baso #       0.1 10       Normal       0.0-0.2       

 

                    Laboratory test finding 2020          Patient [place] 

[place] [address] [phone] Immature Platelet Fraction 1.4 %      Normal     0.0-9

.59   7

 

                    Comprehensive Metabolic Profil 2020          Patient [facility]

ervice [place], [place] [id number] [phone] Glucose, Fasting 88 mg/dL   Normal           

 

             Blood Urea Nitrogen 11 mg/dL     Normal       7-18          

 

             Creatinine For GFR 1.07 mg/dL   Normal       0.55-1.30     

 

             Glomerular Filtration Rate 55.7         Normal       >45          8

 

             Sodium Level 139 mEq/L    Normal       136-145       

 

             Potassium Serum 3.4 mEq/L    Low          3.5-5.1       

 

             Chloride Level 101 mEq/L    Normal               

 

             Carbon Dioxide Level 30 mEq/L     Normal       21-32         

 

             Anion Gap    8 mEq/L      Normal       8-16          

 

             Calcium Level 8.3 mg/dL    Low          8.8-10.2      

 

             Ast/Sgot     60 U/L       High         7-37          

 

             Alt/SGPT     31 U/L       Normal       12-78         

 

             Alkaline Phosphatase 187 U/L      High                 

 

             Bilirubin,Total 3.4 mg/dL    High         0.2-1.0       

 

             Total Protein 8.5 GM/DL    High         6.4-8.2       

 

             Albumin      2.6 GM/DL    Low          3.2-5.2       

 

             Albumin/Globulin Ratio 0.4          Low          1.2-2.2       

 

                    Laboratory test finding 2020          [facility] 

Center

[place], [place] [id number] [phone] LDH Lactate Dehydrogenase 319 U/L    High       

     9







                          1                         DIAGNOSIS CRITERIA

MMB ng/ml       Relative Index (RI)

NON-AMI               < or = 5               N/A

GRAY ZONE              > 5                < or = 4

AMI                    > 5                   > 4



 

                          2                         Troponin I Reference Interva

l for Siemens Vista LOCI:



                                        99th Percentile= 0.00-0.045 ng/ml



Risk Stratification:

<= 0.10 ng/ml   Decreased Risk for Adverse Clinical

Events.

                                        0.10-1.50 ng/ml   Increased Risk for Adv

erse Clinical

Events. Evaluation of additional

criterion and/or repeat testing in 2-6

hours is suggested to rule out myocardial

damage.

>= 1.50 ng/ml   Indicative of Myocardial Injury.



 

                          3                         Units are mL/min/1.73 m2



Chronic Kidney Disease Staging per NKF:



Stage I & II   GFR >=60       Normal to Mildly Decreased

Stage III      GFR 30-59      Moderately Decreased

Stage IV       GFR 15-29      Severely Decreased

Stage V        GFR <15        Very Little GFR Left

ESRD           GFR <15 on RRT



 

                          4                         THERAPUTIC HUMAN INR VALUES

INDICATIONS                      NORMAL RANGES

PROPHYLAXIS/TREATMENT OF:

VENOUS THROMBOSIS                2.0-3.0

PULMONARY EMBOLISM               2.0-3.0

PREVENTION OF SYSTEMIC EMBOLISM FROM:

TISSUE HEART VALVES              2.0-3.0

ACUTE MYOCARDIAL INFARCTION      2.0-3.0

VALVULAR HEART DISEASE           2.0-3.0

ATRIAL FIBRILLATION              2.0-3.0

MECHANICAL VALVES(HIGH RISK)     2.5-3.5

RECURRENT MYOCARDIAL INFARCTION  2.5-3.5



 

                          5                         THERAPUTIC HUMAN INR VALUES

INDICATIONS                      NORMAL RANGES

PROPHYLAXIS/TREATMENT OF:

VENOUS THROMBOSIS                2.0-3.0

PULMONARY EMBOLISM               2.0-3.0

PREVENTION OF SYSTEMIC EMBOLISM FROM:

TISSUE HEART VALVES              2.0-3.0

ACUTE MYOCARDIAL INFARCTION      2.0-3.0

VALVULAR HEART DISEASE           2.0-3.0

ATRIAL FIBRILLATION              2.0-3.0

MECHANICAL VALVES(HIGH RISK)     2.5-3.5

RECURRENT MYOCARDIAL INFARCTION  2.5-3.5



 

                          6                         Scan Verified machine result

s

PLATELET COUNT LESS THAN 100, AND NEW OCCURANCE OR





 

                          7                         By: SEARO

Time: 0925



 

                          8                         Units are mL/min/1.73 m2



Chronic Kidney Disease Staging per NKF:



Stage I & II   GFR >=60       Normal to Mildly Decreased

Stage III      GFR 30-59      Moderately Decreased

Stage IV       GFR 15-29      Severely Decreased

Stage V        GFR <15        Very Little GFR Left

ESRD           GFR <15 on RRT



 

                          9                         By: SEARO

Time: 0926

By: SEARO Time: 0926









Procedures





                Date            Code            Description     Status

 

                2018      [id number]        Mammogram       Completed

 

                2016      [id number]        Mammogram       Completed

 

                2015      [id number]        Mammogram       Completed

 

                2014      [id number]        Mammogram       Completed







Medical Devices





                                        Description

 

                                        No Information Available







Encounters





           Type       Date       Location   Provider   Dx         Diagnosis

 

           Office Visit 10/19/2020  2:45p Main Office Gayla Ya FNP K70.3

1     Alcoholic

cirrhosis of liver with ascites

 

                          K76.7                     Hepatorenal syndrome

 

                          J91.8                     Pleural effusion in other co

nditions classified elsewhere

 

                          E03.9                     Hypothyroidism, unspecified

 

                          I10                       Essential (primary) hyperten

nikolay







Assessments





                Date            Code            Description     Provider

 

                10/26/2020      K70.31          Alcoholic cirrhosis of liver wit

h ascites Gayla Ya FNP

 

                10/26/2020      K76.7           Hepatorenal syndrome KALPESH Ya FNP

 

                10/26/2020      J91.8           Pleural effusion in other condit

ions classified elsewhere 

Gayla Ya FNP

 

                10/26/2020      E03.9           Hypothyroidism, unspecified Ples

Gayla arnold, ANDREA

 

                10/26/2020      I10             Essential (primary) hypertension

 Gayla Ya, [facility]

 

                10/26/2020      F32.1           Major depressive disorder, singl

e episode, moderate Gayla Ya, ANDREA

 

                10/19/2020      K70.31          Alcoholic cirrhosis of liver wit

h ascites Gayla Ya FN

 

                10/19/2020      K76.7           Hepatorenal syndrome KALPESH Ya, [facility]

 

                10/19/2020      J91.8           Pleural effusion in other condit

ions classified elsewhere 

Gayla Ya FNP

 

                10/19/2020      E03.9           Hypothyroidism, unspecified Ples

Gayla arnold, [facility]

 

                10/19/2020      I10             Essential (primary) hypertension

 Gayla Ya FNP







Plan of Treatment

10/26/2020 - Gayla Ya FNP* K70.31 Alcoholic cirrhosis of liver with 
  ascites

* K76.7 Hepatorenal syndrome

* J91.8 Pleural effusion in other conditions classified elsewhere

* E03.9 Hypothyroidism, unspecified* Comments:* continue levothyroxine





* I10 Essential (primary) hypertension* Comments:* controlled, continue current 
  medications





* F32.1 Major depressive disorder, single episode, moderate* New Medication:* 
  Sertraline HCL 50 mg - 1 by mouth every day

* Hydroxyzine HCL 25 mg - 1 -2 tab by mouth as needed before bed



* Follow up:* one month



* Recommendations:* take one half tablet of sertraline daily for one week then a
  whole tablet daily.









Functional Status





                Functional Condition Comment         Date            Status

 

                Glasses         Reading                         Active

 

                Independent with all ADL's                                 Activ

e







Mental Status





                Mental Condition Comment         Date            Status

 

                None            poor reader                     Active







Referrals





                Refer to      Reason for Referral Status          Appt Date

 

                [facility]         Sent            



 

                                        [address] [place] [address]

 

                                        [phone]

## 2021-01-16 NOTE — CCD
Continuity of Care Document (CCD)

                             Created on: 2021



Flora Taylor

External Reference #: [id number]

: 1960

Sex: Female



Demographics





                          Address                   [address] [place] [address]  [address]

 

                          Home Phone                [phone]

 

                          Preferred Language        Unknown

 

                          Marital Status            Unknown

 

                          Christian Affiliation     Evangelical (Non-Muslim, Non

-Specific)

 

                          Race                      White

 

                          Ethnic Group              Not  or 





Author





                          Organization              Unknown

 

                          Address                   Unknown

 

                          Phone                     Unavailable







Care Team Providers





                    Care Team Member Name Role                Phone

 

                    [facility] - Gastro[facility]

TM                [phone]

 

                    [facility]         AUTM                [phone]

 

                    [facility] Pulmonary Disease AUTM               

 [phone]

 

                    [facility] - Mental Health AUTM            

    [phone]







Problems





                    Active Problems     Provider            Date

 

                    Essential hypertension Gabe Corral M.D. Onset: 

 

                    Hypothyroidism      Gabe Corral M.D. Onset: 2011

 

                    Vitamin D deficiency Gabe Corral M.D. Onset: 

 

                    Neck pain           Gabe Corral M.D. Onset: 2011

 

                    Generalized anxiety disorder Gabe Corral M.D. Onse

t: 2011

 

                    Allergic rhinitis   Gabe Corral M.D. Onset: 2012

 

                    Degeneration of lumbar intervertebral disc Benigno Corral M.D. Onset: 

2013

 

                    Alcoholic cirrhosis Gabe Corral M.D. Onset: 2018

 

                    Esophageal varices without bleeding Gabe Corral M. D. Onset: 2018







Social History





                Type            Date            Description     Comments

 

                Birth Sex                       Unknown          

 

                Tobacco Use     Start: Unknown  Never Smoked Cigarettes  

 

                Tobacco Use     Start: Unknown  Never Used Smokeless Tobacco  

 

                ETOH Use                        Consumed 4 Alcoholic Beverages P

er Day  

 

                Tobacco Use     Start: Unknown  Patient has never smoked  

 

                Recreational Drug Use                 Denies Drug Use  

 

                Smoking Status  Reviewed: 20 Patient has never smoked  

 

                Exercise Type/Frequency                 Exercises sporadically  

 

                Tattoo/Piercing                 Tattoo          3 

 

                Tattoo/Piercing                 Pierced ears    x2 

 

                Sun Exposure                    Minimum amount of sun exposure  

 

                Sun Exposure                    Uses sunscreen  Occasionally 

 

                Seat Belt/Car Seat                 Always uses seat belt  

 

                Bike Helmet                     Never           Does not bike ri

de. 

 

                Smoke Alarms                    Yes              

 

                Smoke Alarms                    Carbon Monoxide Detector: Yes  







Allergies, Adverse Reactions, Alerts





             Active Allergies Reaction     Severity     Comments     Date

 

             Penicillin                                          2004

 

             Cefdinir     Difficulty swallowing, Itching, throat felt itchy. Sev

ere                    2019







Medications





           Active Medications SIG        Qnty       Indications Ordering Provide

r Date

 

           Rifaximin Tablets 200 mg tab - take 2 tabs (400 mg) twice a day      

                 Unknown    

2020

 

                          Spironolactone                     50mg Tablets       

            take one half 

tablet (25 mg) by mouth twice a day                                 Unknown     

    2020

 

                          Allergy Relief Loratadine                     10mg Tab

lets                   1 

tab once a day as needed for allergy symptoms                                 Un

known         2020

 

                          Hydroxyzine HCL                     25mg Tablets      

             1 -2 tab by 

mouth as needed before bed 30tabs          F32.1           Gayla Ya FNP 

10/26/2020

 

                          Mucinex                     600mg Tablets ER 12HR     

              1 by mouth 

twice a day                                     Unknown         10/08/2020

 

                          Midodrine HCL                     5mg Tablets         

          1 tablet three 

times a day ( 8 am, 12 n,4 pm) 90tabs                          Gayla Ya FNP 10/08/2020

 

                          Torsemide                     20mg Tablets            

       take 1 tablet twice

a day (9 Am/5 PM)                                 Unknown         10/08/2020

 

                                        Lactulose Encephalopathy                

     10GM/15ML Solution                 

             take 30ml by mouth three times a day 2700units                 Gayla Araujo FNP 

2020

 

                          Thiamine HCL                     100mg Tablets        

           1 by mouth 

every day                                       Unknown         2020

 

                                        Fluticasone Propionate Nasal Spray      

               50mcg/Act Suspension     

             2 sprays each nostril once a day                           Unknown 

     2019

 

                          Latanoprost                     0.005% Solution       

            one drop in 

each eye daily at at bedtime                                 Unknown         

 

                          Betaxolol HCL                     0.5% Solution       

            1 drop each 

eye twice a day.                                 Unknown         2019

 

                          Brimonidine Tartrate                     0.2% Solution

                   one 

gtt. each eye twice a day                                 Unknown         2019

 

                          Vitamin D                     1000Unit Tablets        

           1 by mouth 

every day       100tabs                         Meron Ramirez M.D. 

019

 

                          Levothyroxine Sodium                     112mcg Tablet

s                   1 by 

mouth every day 90tabs          E03.9           Meron Ramirez M.D. 

018

 

                                        Wrist Splint/Cock-Up/Left/Canvas/Medium 

                     Misc               

                dx: carpal tunnel syndrome, duration 12 months, prognosis good 1

units                          

Gabe Corral M.D.              2018

 

                                        Wrist Splint/Cock-Up/Right/Canvas/Medium

                      Misc              

                                        wear splint every night for carpal tunne

l syndrome, prognosis good, duration

99 months       1units                          Gabe Corral M.D. 

 

                                        Levocetirizine Dihydrochloride          

           5mg Tablets                  

                1 tab by mouth every evening for allergies 90tabs          J30.9

           Meron Ramirez M.D.

                                        2015

 

                          Ketotifen Fumarate                     0.025% Solution

                   1 drop 

both eyes twice a day for allergy symptoms 5ml                             Unkno

wn         

 

                          Folic Acid                     1mg Tablets            

       1 by mouth every 

day             90tabs                          Meron Ramirez M.D. 

000

 

           Multivitamin Adult                      Tablets                      

                              Unknown    



 

                          Xifaxan                     200mg Tablets             

      1 by mouth twice a 

day             60tabs                          Gayla Ya FNP 

 

                                        History Medications

 

                          Sertraline HCL                     50mg Tablets       

            1 by mouth 

every day       30tabs          F32.1           Gayla Ya FNP 10/26/2020 

- 2020

 

                          Ciprofloxacin HCL                     250mg Tablets   

                1 tab by 

mouth once a day at 6 Am                                 Unknown         10/08/2

020 - 10/22/2020

 

                          Spironolactone                     50mg Tablets       

            take one 

tablet by mouth twice a day  (9 Am/5 PM)                                 Genie Tabares PA-C 10/08/2020 - 

2020







Immunizations





             CPT Code     Status       Date         Vaccine      Lot #

 

             [id number]        Given        2019   Pneumococcal Vaccine [id number]

 

                [id number]           Given           10/29/2018      Influenza Virus 

Vaccine, Quadrivalent,age 3 and 

up,multidose vial                       [id number]

 

             46488        Given        2017   Influenza Vaccination  

 

             03893        Given        2015   Influenza Vaccination  

 

             70574        Given        2013   Influenza Vaccination  

 

             21226        Given        04/15/2013   Zostavax      

 

             51685        Given        2012   Influenza Vaccination [id number]

C

 

             00115        Given        10/31/2011   Influenza Vaccination/preser

vative free [id number]

 

             27686        Given        2010   Adacel 11 Yrs or older 

BA

 

             85254        Given        10/04/2010   Influenza Vaccination [id number]

A

 

             97103        Given        2009   Influenza Vaccination [id number]

A

 

             65809        Given        2005   Tetnus Toxoid Im Or Jet Inje

ction Use  







Vital Signs





                Date            Vital           Result          Comment

 

                10/26/2020  3:20pm BP Systolic     105 mmHg         

 

                    BP Diastolic        63 mmHg              

 

                    Heart Rate          108 /min             

 

                    Body Temperature    97.4 F             

 

                    Respiratory Rate    20 /min              

 

                    Height              67.0 inches         5'7"

 

                    Weight              169.50 lb            

 

                    O2 % BldC Oximetry  98 %                 

 

                    Peak Expiratory Flow Rate 364                 Estimated Peak

 Flow Rate

 

                    Ideal Body Weight   135 lb               

 

                    BMI (Body Mass Index) 26.5 kg/m2           

 

                10/19/2020  3:07pm BP Systolic     114 mmHg         

 

                    BP Diastolic        71 mmHg              

 

                    Heart Rate          101 /min             

 

                    Body Temperature    97.3 F             

 

                    Respiratory Rate    18 /min              

 

                    Height              67.0 inches         5'7"

 

                    Weight              166.12 lb            

 

                    O2 % BldC Oximetry  100 %                

 

                    Peak Expiratory Flow Rate 364                 Estimated Peak

 Flow Rate

 

                    Ideal Body Weight   135 lb               

 

                    BMI (Body Mass Index) 26.0 kg/m2           







Results





        Test    Acquired Date Facility Test    Result  H/L     Range   Note

 

                    Cell Count Pleural Fluid 2020          [facility]

 Center

[place] [address] [phone] Source, Body Fluid PLEURAL    Normal                 

 

             Pleural FL Color YELLOW       Normal       Colorless     

 

             Appearance, Body Fluid CLOUDY       Normal       Clear         

 

             WBC Body Fluid 101 /uL      High         0-10          

 

             RBC Body Fluid 2 10         Normal       <2            

 

             BF Mononuclear Cell % 94.0 %       High         0-0           

 

             BF Polymorphonuclear Cell % 6.0 %        High         0-0          

 

 

                    Body Fluid Culture And GS 2020          Patient Servic

e Center

[place] [address]

           [phone] Gram Stain (SEE NOTE)  Normal                1

 

             Body Fluid Culture **************** <SEE NOTE>                     

       2

 

                    Laboratory test finding 2020          Patient [facility] 

Center

[place] [address] [phone] Albumin 25% TRANSFUSED PRODU <SEE NOTE>               

         3

 

                    Cell Count Pleural Fluid 2020          Patient [facility]

 Center

[place] [address]

           [phone] Source, Body Fluid PLEURAL    Normal                 

 

             Pleural FL Color YELLOW       Normal       Colorless     

 

             Appearance, Body Fluid CLOUDY       Normal       Clear         

 

             WBC Body Fluid 124 /uL      High         0-10          

 

             RBC Body Fluid < 2 10       Normal       <2            

 

             BF Mononuclear Cell % 93.6 %       High         0-0           

 

             BF Polymorphonuclear Cell % 6.4 %        High         0-0          

 

 

                    Body Fluids Culture And Gram Stain 2020          [facility]

[place] [address] [phone] Gram Stain (SEE NOTE)  Normal                4

 

             Body Fluid Culture **************** <SEE NOTE>                     

       5

 

                    Laboratory test finding 2020          Patient [place] 

[place] [address]

           [phone] Albumin 25% TRANSFUSED PRODU <SEE NOTE>               

         6

 

                    Laboratory test finding 2020          Patient [place] 

[place] [address]

           [phone] Ethyl Alcohol (Ethanol) < 0.003 %  Normal     0.000-0.

010  

 

                    Basic Metabolic Profile 2020          Patient [place] 

[place] [address] [phone] Glucose, Fasting 169 mg/dL  High             

 

             Blood Urea Nitrogen 71 mg/dL     High         7-18          

 

             Creatinine For GFR 2.38 mg/dL   High         0.55-1.30     

 

             Glomerular Filtration Rate 22.1         Low          >45          7

 

             Sodium Level 131 mEq/L    Low          136-145       

 

             Potassium Serum 4.7 mEq/L    Normal       3.5-5.1       

 

             Chloride Level 99 mEq/L     Normal               

 

             Carbon Dioxide Level 21 mEq/L     Normal       21-32         

 

             Anion Gap    11 mEq/L     Normal       8-16          

 

             Calcium Level 8.7 mg/dL    Low          8.8-10.2      

 

                    Liver Profile       2020          [place] [address] [phone] Ast/Sgot   43 U/L     High       7-37        

 

             Alt/SGPT     38 U/L       Normal       12-78         

 

             Alkaline Phosphatase 155 U/L      High                 

 

             Bilirubin,Total 3.1 mg/dL    High         0.2-1.0       

 

             Bilirubin,Direct 2.0 mg/dL    High         0.0-0.2       

 

             Total Protein 6.3 GM/DL    Low          6.4-8.2       

 

             Albumin      2.0 GM/DL    Low          3.2-5.2       

 

             Albumin/Globulin Ratio 0.5          Low          1.2-2.2       

 

                    PT & Aptt           2020          [facility] Service Cent

[place] [address] [phone] Prothrombin Time 17.5 seconds High       12.5-14.3   

 

             Inr          1.40         Normal                    8

 

             Partial Thromboplastin Time 35.4 seconds Normal       24.2-38.5    

 

 

                    CBC With Differential 2020          Patient Service Ce

nter

[place] [address] [phone] White Blood Count 5.5 10     Normal     4.0-10.0    

 

             Red Blood Count 3.39 10      Low          4.00-5.40     

 

             Hemoglobin   10.8 g/dL    Low          12.0-15.5     

 

             Hematocrit   32.6 %       Low          36.0-47.0     

 

             Mean Corpuscular Volume 96.2 fl      High         80.0-96.0     

 

             Mean Corpuscular Hemoglobin 31.9 pg      Normal       27.0-33.0    

 

 

             Mean Corpuscular HGB Conc 33.1 g/dL    Normal       32.0-36.5     

 

             Red Cell Distribution Width 13.8 %       Normal       11.5-14.5    

 

 

             Platelet Count, Automated 124 10       Low          150-450       

 

             Neutrophils % 73.3 %       High         36.0-66.0     

 

             Lymph %      13.9 %       Low          24.0-44.0     

 

             Mono %       9.9 %        High         0.0-5.0       

 

             Eos %        1.3 %        Normal       0.0-3.0       

 

             Baso %       0.9 %        Normal       0.0-1.0       

 

             Immature Granulocyte % 0.7 %        Normal       0-3.0         

 

             Nucleated Red Blood Cell % 0.0 %        Normal       0-0           

 

             Neutrophils # 4.1 10       Normal       1.5-8.5       

 

             Lymph #      0.8 10       Low          1.5-5.0       

 

             Mono #       0.6 10       Normal       0.0-0.8       

 

             Eos #        0.1 10       Normal       0.0-0.5       

 

             Baso #       0.1 10       Normal       0.0-0.2       

 

                    Comprehensive Metabolic Profil 2020          [facility] [place] [address] [phone] Glucose, Fasting 118 mg/dL  High             

 

             Blood Urea Nitrogen 69 mg/dL     High         7-18          

 

             Creatinine For GFR 2.49 mg/dL   High         0.55-1.30     

 

             Glomerular Filtration Rate 21.0         Low          >45          9

 

             Sodium Level 128 mEq/L    Low          136-145       

 

             Potassium Serum 4.2 mEq/L    Normal       3.5-5.1       

 

             Chloride Level 92 mEq/L     Low                  

 

             Carbon Dioxide Level 25 mEq/L     Normal       21-32         

 

             Anion Gap    11 mEq/L     Normal       8-16          

 

             Calcium Level 8.8 mg/dL    Normal       8.8-10.2      

 

             Ast/Sgot     45 U/L       High         7-37          

 

             Alt/SGPT     44 U/L       Normal       12-78         

 

             Alkaline Phosphatase 186 U/L      High                 

 

             Bilirubin,Total 4.2 mg/dL    High         0.2-1.0       

 

             Total Protein 6.9 GM/DL    Normal       6.4-8.2       

 

             Albumin      2.2 GM/DL    Low          3.2-5.2       

 

             Albumin/Globulin Ratio 0.5          Low          1.2-2.2       

 

                    Culture Fungus Misc 2020          [place] [address] [phone] Fungal Smear Testing performe <SEE NOTE>              

          10

 

             Fungal Culture Other Source Testing performe <SEE NOTE>            

                11

 

                    Acid Fast Smear & Culture(Afb) Sendout 2020          P

[place] [address] [phone] Afb Smear  Testing performe <SEE NOTE>                

        12

 

             Afb Culture  Testing performe <SEE NOTE>                           

 13

 

                    Laboratory test finding 2020          Patient [place] 

[place] [address] [phone] Anaerobic Culture **************** <SEE NOTE>         

               14

 

                    Body Fluid Culture And GS 2020          Patient [place] [address] [phone] Gram Stain (SEE NOTE)  Normal                15

 

             Body Fluid Culture **************** <SEE NOTE>                     

       16

 

                    PH, Body Fluid      2020          [place] [address] [phone] PH Body Fluid > 7.800 units Normal     Not Established

  

 

             Source, Body Fluid pH PLEURAL      Normal                     

 

                    Amylase Body Fluid  2020          Patient Service [place] [id number] [phone] Amylase, Body Fluid 31 U/L     Normal     Not Establis

hed  

 

             Source, Body Fluid Amylase PLEURAL      Normal                     

 

                    Glucose Body Fluid  2020          Patient Service [place] [id number] [phone] Glucose, Body Fluid 131 mg/dL  Normal     Not Establis

hed  

 

             Source, Body Fluid Glucose PLEURAL      Normal                     

 

                    LDH Body Fluid      2020          [place] [address]

           [phone] LDH, Body Fluid 51 U/L     Normal     Not Established 

 

 

             Source, Body Fluid LDH PLEURAL      Normal                     

 

                    TP Body Fluid       2020          [place] [address]

           [phone] Total Protein, Body Fluid 0.7 g/dL   Normal     Not Es

tablished  

 

             Source, Body Fluid Tot Protein PLEURAL      Normal                 

    

 

                    Cell Count Pleural Fluid 2020          Patient [place]

 [place] [address] [phone] Source, Body Fluid PLEURAL    Normal                 

 

             Pleural FL Color YELLOW       Normal       Colorless     

 

             Appearance, Body Fluid CLOUDY       Normal       Clear         

 

             WBC Body Fluid 114 /uL      High         0-10          

 

             RBC Body Fluid 4 10         Normal       <2            

 

             BF Mononuclear Cell % 91.2 %       High         0-0           

 

             BF Polymorphonuclear Cell % 8.8 %        High         0-0          

 

 

                    PT & Aptt           2020          Patient Service [place] [id number] [phone] Prothrombin Time 18.6 seconds High       12.5-14.3   

 

             Inr          1.52         Normal                    17

 

             Partial Thromboplastin Time 34.0 seconds Normal       24.2-38.5    

 

 

                    Laboratory test finding 2020          Patient Service 

[place] [address] [phone] Platelet Count, Automated 113 10     Low        150-45

0     

 

                    CBC With Differential 2020          Patient Service Ce

nter

[place] [address] [phone] White Blood Count 7.9 10     Normal     4.0-10.0    

 

             Red Blood Count 3.31 10      Low          4.00-5.40     

 

             Hemoglobin   11.7 g/dL    Low          12.0-15.5     

 

             Hematocrit   34.8 %       Low          36.0-47.0     

 

             Mean Corpuscular Volume 105.1 fl     High         80.0-96.0     

 

             Mean Corpuscular Hemoglobin 35.3 pg      High         27.0-33.0    

 

 

             Mean Corpuscular HGB Conc 33.6 g/dL    Normal       32.0-36.5     

 

             Red Cell Distribution Width 14.6 %       High         11.5-14.5    

 

 

             Platelet Count, Automated 96 10        Low          150-450       

 

             Nucleated Red Blood Cell % 0.0 %        Normal       0-0           

 

                    Differential        2020          Patient Service [place] [id number] [phone] Neutrophils 70 %       High       28-66       

 

             Lymphocytes  14 %         Low          16-44         

 

             Monocytes    8 %          High         0-5           

 

             Eosinophils  5 %          High         0-3           

 

             Myelocytes   1 %          High         0-0           

 

             Atypical Lymph 2 %          Normal       0-5           

 

             Anisocytosis 1+           Normal                     

 

                    Laboratory test finding 2020          Patient Service 

[place] [id number] [phone] Platelet Estimate DECREASED  Normal     Normal      

 

             Immature Platelet Fraction 1.3 %        Normal       0.0-9.59      

 

                    Cardiac Marker Panel 2020          [place] [address] [phone] CPK Creatine Phosphokinase 50 U/L     Normal     26-19

2      

 

             CK-MB Value Mass < 1.0 NG/ML  Normal       <3.6          

 

             MB/CK Relative Index 2.00         Normal       < Or =4      18

 

             Troponin I   < 0.02 NG/ML Normal       < 0.10       19

 

                    Liver Profile       2020          Patient Service [place] [id number] [phone] Ast/Sgot   80 U/L     High       7-37        

 

             Alt/SGPT     51 U/L       Normal       12-78         

 

             Alkaline Phosphatase 136 U/L      High                 

 

             Bilirubin,Total 6.8 mg/dL    High         0.2-1.0       

 

             Bilirubin,Direct 4.4 mg/dL    High         0.0-0.2       

 

             Total Protein 7.9 GM/DL    Normal       6.4-8.2       

 

             Albumin      2.0 GM/DL    Low          3.2-5.2       

 

             Albumin/Globulin Ratio 0.3          Low          1.2-2.2       

 

                    Basic Metabolic Profile 2020          Patient [place] 

[place] [address] [phone] Glucose, Fasting 83 mg/dL   Normal           

 

             Blood Urea Nitrogen 24 mg/dL     High         7-18          

 

             Creatinine For GFR 1.38 mg/dL   High         0.55-1.30     

 

             Glomerular Filtration Rate 41.5         Low          >45          2

0

 

             Sodium Level 130 mEq/L    Low          136-145       

 

             Potassium Serum 3.4 mEq/L    Low          3.5-5.1       

 

             Chloride Level 96 mEq/L     Low                  

 

             Carbon Dioxide Level 23 mEq/L     Normal       21-32         

 

             Anion Gap    11 mEq/L     Normal       8-16          

 

             Calcium Level 8.6 mg/dL    Low          8.8-10.2      

 

                    Laboratory test finding 2020          Patient Service 

Center

[place], [place] [id number] [phone] NT-Pro  pg/mL  Normal     <125        

 

             Magnesium Level 2.2 mg/dL    Normal       1.8-2.4       

 

                    PT & Aptt           2020          Patient Service [place], [place] [id number] [phone] Prothrombin Time 19.4 seconds High       12.5-14.3   

 

             Inr          1.60         Normal                    21

 

             Partial Thromboplastin Time 32.6 seconds Normal       24.2-38.5    

 







                          1                         FEW WBCS

NO ORGANISMS SEEN





 

                          2                         ****************************

*********************

If aerobic or anaerobic growth is detected within

the next 7-21 days, an addendum will follow.

                                        .***************************************

********.



FULL REPORT IN LAB NOTES ([facility] and [facility]).

NO GROWTH AEROBICALLY





 

                          3                         TRANSFUSED PRODUCT: ALBUMIN 

25%                         COUNT: 1



 

                          4                         FEW RBCS

FEW WBCS

NO ORGANISMS SEEN





 

                          5                         ****************************

*********************

If aerobic or anaerobic growth is detected within

the next 7-21 days, an addendum will follow.

                                        .***************************************

********.



FULL REPORT IN LAB NOTES ([facility] and [facility]).

NO GROWTH AEROBICALLY





 

                          6                         TRANSFUSED PRODUCT: ALBUMIN 

25%                         COUNT: 1



 

                          7                         Units are mL/min/1.73 m2



Chronic Kidney Disease Staging per NK[facility]:



Stage I & II   GFR >=60       Normal to Mildly Decreased

Stage III      GFR 30-59      Moderately Decreased

Stage IV       GFR 15-29      Severely Decreased

Stage V        GFR <15        Very Little GFR Left

ESRD           GFR <15 on RRT



 

                          8                         THERAPUTIC HUMAN INR VALUES

INDICATIONS                      NORMAL RANGES

PROPHYLAXIS/TREATMENT OF:

VENOUS THROMBOSIS                2.0-3.0

PULMONARY EMBOLISM               2.0-3.0

PREVENTION OF SYSTEMIC EMBOLISM FROM:

TISSUE HEART VALVES              2.0-3.0

ACUTE MYOCARDIAL INFARCTION      2.0-3.0

VALVULAR HEART DISEASE           2.0-3.0

ATRIAL FIBRILLATION              2.0-3.0

MECHANICAL VALVES(HIGH RISK)     2.5-3.5

RECURRENT MYOCARDIAL INFARCTION  2.5-3.5



 

                          9                         Units are mL/min/1.73 m2



Chronic Kidney Disease Staging per [facility]:



Stage I & II   GFR >=60       Normal to Mildly Decreased

Stage III      GFR 30-59      Moderately Decreased

Stage IV       GFR 15-29      Severely Decreased

Stage V        GFR <15        Very Little GFR Left

ESRD           GFR <15 on RRT



 

                          10                        Testing performed at [facility]

[facility] . Report copy to follow

on a separate form. 20 REF LAB#:[id number]



IAIN/Calcofluor preparatio     No fungus observed.





 

                          11                        Testing performed at [facility]

[facility] . Report copy to follow

on a separate form. 20 REF LAB#:[id number]



FUNGUS CULTURE [facility]        No Yeast or Mold Isolated after 4 weeks.





 

                          12                        Testing performed at [facility]

[facility] . Report copy to follow

on a separate form. 20 REF LAB#:[id number]



Due to limited sensitivity, smear results should

be used as an adjunct in evaluating patient tuberculosis

status. Cultural examination is highly recommended

for clinical diagnosis.





AFB sm REF LAB concentra      NEGATIVE





 

                          13                        Testing performed at [facility]

[facility] . Report copy to follow

on a separate form. 20 REF LAB#:[id number]



FULL REPORT IN LAB NOTES ([facility]).

No Acid-Fast Bacilli Isolated after 6 Weeks.





 

                          14                        ****************************

*********************

If anaerobic or aerobic growth is detected within

the next 7-21 days, an addendum will follow.

                                        .***************************************

********.



FULL REPORT IN LAB NOTES ([facility]).

NO GROWTH ANAEROBICALLY





 

                          15                        FEW WBCS

NO ORGANISMS SEEN





 

                          16                        ****************************

*********************

If aerobic or anaerobic growth is detected within

the next 7-21 days, an addendum will follow.

                                        .***************************************

********.



FULL REPORT IN LAB NOTES ([facility]).

NO GROWTH AEROBICALLY





 

                          17                        THERAPUTIC HUMAN INR VALUES

INDICATIONS                      NORMAL RANGES

PROPHYLAXIS/TREATMENT OF:

VENOUS THROMBOSIS                2.0-3.0

PULMONARY EMBOLISM               2.0-3.0

PREVENTION OF SYSTEMIC EMBOLISM FROM:

TISSUE HEART VALVES              2.0-3.0

ACUTE MYOCARDIAL INFARCTION      2.0-3.0

VALVULAR HEART DISEASE           2.0-3.0

ATRIAL FIBRILLATION              2.0-3.0

MECHANICAL VALVES(HIGH RISK)     2.5-3.5

RECURRENT MYOCARDIAL INFARCTION  2.5-3.5



 

                          18                        DIAGNOSIS CRITERIA

MMB ng/ml       Relative Index (RI)

NON-AMI               < or = 5               N/A

GRAY ZONE              > 5                < or = 4

AMI                    > 5                   > 4



 

                          19                        Troponin I Reference Interva

l for [employer] LOCI:



                                        99th Percentile= 0.00-0.045 ng/ml



Risk Stratification:

<= 0.10 ng/ml   Decreased Risk for Adverse Clinical

Events.

                                        0.10-1.50 ng/ml   Increased Risk for Adv

erse Clinical

Events. Evaluation of additional

criterion and/or repeat testing in 2-6

hours is suggested to rule out myocardial

damage.

>= 1.50 ng/ml   Indicative of Myocardial Injury.



 

                          20                        Units are mL/min/1.73 m2



Chronic Kidney Disease Staging per NKF:



Stage I & II   GFR >=60       Normal to Mildly Decreased

Stage III      GFR 30-59      Moderately Decreased

Stage IV       GFR 15-29      Severely Decreased

Stage V        GFR <15        Very Little GFR Left

ESRD           GFR <15 on RRT



 

                          21                        THERAPUTIC HUMAN INR VALUES

INDICATIONS                      NORMAL RANGES

PROPHYLAXIS/TREATMENT OF:

VENOUS THROMBOSIS                2.0-3.0

PULMONARY EMBOLISM               2.0-3.0

PREVENTION OF SYSTEMIC EMBOLISM FROM:

TISSUE HEART VALVES              2.0-3.0

ACUTE MYOCARDIAL INFARCTION      2.0-3.0

VALVULAR HEART DISEASE           2.0-3.0

ATRIAL FIBRILLATION              2.0-3.0

MECHANICAL VALVES(HIGH RISK)     2.5-3.5

RECURRENT MYOCARDIAL INFARCTION  2.5-3.5









Procedures





                Date            Code            Description     Status

 

                    2020          84763               Brief Emotional/Beha

v Assessment W/ Scoring Doc Per Standard 

Inst                                    Completed

 

                2018      [id number]        Mammogram       Completed

 

                2016      [id number]        Mammogram       Completed

 

                2015      [id number]        Mammogram       Completed

 

                2014      [id number]        Mammogram       Completed







[employer] Devices





                                        Description

 

                                        No Information Available







Encounters





           Type       Date       Location   Provider   Dx         Diagnosis

 

           Office Visit 2020 11:45a Main Office Gayla Ya FNP K70.3

1     Alcoholic

cirrhosis of liver with ascites

 

                          K76.7                     Hepatorenal syndrome

 

                          J91.8                     Pleural effusion in other co

nditions classified elsewhere

 

                          Z13.89                    Encounter for screening for 

other disorder

 

           Office Visit 10/26/2020  3:15p Main Office Gayla Ya FNP K70.3

1     Alcoholic

cirrhosis of liver with ascites

 

                          K76.7                     Hepatorenal syndrome

 

                          J91.8                     Pleural effusion in other co

nditions classified elsewhere

 

                          E03.9                     Hypothyroidism, unspecified

 

                          I10                       Essential (primary) hyperten

nikolay

 

                          F32.1                     Major depressive disorder, s

zakia episode, moderate

 

           Office Visit 10/19/2020  2:45p Main Office Gayla Ya FNP K70.3

1     Alcoholic

cirrhosis of liver with ascites

 

                          K76.7                     Hepatorenal syndrome

 

                          J91.8                     Pleural effusion in other co

nditions classified elsewhere

 

                          E03.9                     Hypothyroidism, unspecified

 

                          I10                       Essential (primary) hyperten

nikolay







Assessments





                Date            Code            Description     Provider

 

                2020      K70.31          Alcoholic cirrhosis of liver wit

h ascites Gayla Ya, ANDREA

 

                2020      K76.7           Hepatorenal syndrome KALPESH Ya, [facility]

 

                2020      J91.8           Pleural effusion in other condit

ions classified elsewhere 

Gayla Ya FN

 

                2020      Z13.89          Encounter for screening for othe

r disorder Gayla Ya 

[facility]

 

                10/26/2020      K70.31          Alcoholic cirrhosis of liver wit

h ascites Gayla Ya, [facility]

 

                10/26/2020      K76.7           Hepatorenal syndrome KALPESH Ya, [facility]

 

                10/26/2020      J91.8           Pleural effusion in other condit

ions classified elsewhere 

Gayla Ya, [facility]

 

                10/26/2020      E03.9           Hypothyroidism, unspecified Ples

Gayla arnold, [facility]

 

                10/26/2020      I10             Essential (primary) hypertension

 Gayla Ya, [facility]

 

                10/26/2020      F32.1           Major depressive disorder, singl

e episode, moderate Gayla Ya FN

 

                10/19/2020      K70.31          Alcoholic cirrhosis of liver wit

h ascites Gayla Ya [facility]

 

                10/19/2020      K76.7           Hepatorenal syndrome KALPESH Ya, [facility]

 

                10/19/2020      J91.8           Pleural effusion in other condit

ions classified elsewhere 

Gayla Ya, ANDREA

 

                10/19/2020      E03.9           Hypothyroidism, unspecified Ples

Gayla arnold, [facility]

 

                10/19/2020      I10             Essential (primary) hypertension

 Gayla Ya FNP







Plan of Treatment

Future Appointment(s):* 2021 11:30 am - Gayla Ya FNP at Main 
  Office

2020 - Gayla Ya FNP* K70.31 Alcoholic cirrhosis of liver with 
  ascites* Comments:* spoke with patient at length regarding importance of f/u 
  with pulmonology and nephrology.  She does not seem to understand the severity
  of her disease, she may possibly be in denial.  She is certainly not ready to 
  talk about hospice or DNR.  I encouraged her to call both pulmonary and 
  nephrology today to make follow up appointments



* Follow up:* one month





* K76.7 Hepatorenal syndrome

* J91.8 Pleural effusion in other conditions classified elsewhere

* Z13.89 Encounter for screening for other disorder





Functional Status





                Functional Condition Comment         Date            Status

 

                Glasses         Reading                         Active

 

                Independent with all ADL's                                 Activ

e







Mental Status





                Mental Condition Comment         Date            Status

 

                None            poor reader                     Active







Referrals





                Refer to      Reason for Referral Status          Appt Date

 

                [facility] ANXIETY         Closed          



 

                                        [address] [place] [address]

 

                                        [phone]

## 2021-01-16 NOTE — CCD
Summarization Of Episode

                             Created on: 2021



JAMEL HARVEY

External Reference #: [id number]

: 1960

Sex: Female



Demographics





                          Address                   [address] [place]  [address]

 

                          Home Phone                [phone]

 

                          Preferred Language        English

 

                          Marital Status            

 

                          Advent Affiliation     NONE

 

                          Race                      White

 

                          Ethnic Group              Not  or 





Author





                          Author                    [facility]

 

                          Organization              [facility]

 

                          Address                   Unknown

 

                          Phone                     Unavailable







Support





                Name            Relationship    Address         Phone

 

                    IOANA HARVEY     [place]  [address]                    [phone]

 

                    ADRIA [place]  [address]                    [phone]

 

                    PARAM KAISER    Next Of Kin         [address] [place] [address]                    [phone]

 

                    RUBEN BURROWS    Next Of Kin         [address] [place]  [address]                    [phone]

 

                USHELDON              Next Of Kin     Unknown         Unavailable

 

                    JOSIE HARVEY     Next Of Kin         [address] [place]  [address]                        [phone]







Care Team Providers





                    Care Team Member Name Role                Phone

 

                    MADELINE Fraser MD Unavailable         Unavailable

 

                    MADELINE Fraser MD Unavailable         Unavailable

 

                    MADELINE Fraser MD Unavailable         Unavailable

 

                    MADELINE Fraser MD Unavailable         Unavailable

 

                    MADELINE Fraser MD Unavailable         Unavailable

 

                    MADELINE Fraser MD Unavailable         Unavailable

 

                    MADELINE Fraser MD Unavailable         Unavailable

 

                    MADELINE Fraser MD Unavailable         Unavailable

 

                    MADELINE Fraser MD Unavailable         Unavailable

 

                    MADELINE Fraser MD Unavailable         Unavailable

 

                    MADELINE Fraser MD Unavailable         Unavailable

 

                    MADELINE Fraser MD Unavailable         Unavailable

 

                    MADELINE Fraser MD Unavailable         Unavailable

 

                    MADELINE Fraser MD Unavailable         Unavailable

 

                    MADELINE Fraser MD Unavailable         Unavailable

 

                    MADELINE Fraser MD Unavailable         Unavailable

 

                    MADELINE Fraser MD Unavailable         Unavailable

 

                    MADELINE Fraser MD Unavailable         Unavailable

 

                    MADELINE Fraser MD Unavailable         Unavailable

 

                    MADELINE Fraser MD Unavailable         Unavailable

 

                    MADELINE Fraser MD Unavailable         Unavailable

 

                    MADELINE Fraser MD Unavailable         Unavailable

 

                    MADELINE Fraser MD Unavailable         Unavailable

 

                    MADELINE Fraser MD Unavailable         Unavailable

 

                    MADELINE Fraser MD Unavailable         Unavailable

 

                    MADELINE Fraser MD Unavailable         Unavailable

 

                    MADELINE Fraser MD Unavailable         Unavailable

 

                    MADELINE Fraser MD Unavailable         Unavailable

 

                    Bria, S Arley MD Unavailable         Unavailable

 

                    Bria, S Arley MD Unavailable         Unavailable

 

                    Bria, S Arley MD Unavailable         Unavailable

 

                    Bria, S Arley MD Unavailable         Unavailable

 

                    Bria, S Arley MD Unavailable         Unavailable

 

                    Bria, S Arley MD Unavailable         Unavailable

 

                    Bria, S Arley MD Unavailable         Unavailable

 

                    Bria, S Arley MD Unavailable         Unavailable

 

                    Bria, S Arley MD Unavailable         Unavailable

 

                    Bria, S Arley MD Unavailable         Unavailable

 

                    Bria, S Arley MD Unavailable         Unavailable

 

                    Bria, S Arley MD Unavailable         Unavailable

 

                    Bria, S Arley MD Unavailable         Unavailable

 

                    Bria, S Arley MD Unavailable         Unavailable

 

                    Bria, S Arley MD Unavailable         Unavailable

 

                    Bria, S Arley MD Unavailable         Unavailable

 

                    Bria, S Arley MD Unavailable         Unavailable

 

                    Bria, S Arley MD Unavailable         Unavailable

 

                    Bria, S Arley MD Unavailable         Unavailable

 

                    Bowen, Tesfaye Malagon MD Unavailable         Unavailable

 

                    Bowen, Tesfaye Malagon MD Unavailable         Unavailable

 

                    Bowen, Tesfaye Malagon MD Unavailable         Unavailable

 

                    Bowen, Tesfaye Malagon MD Unavailable         Unavailable

 

                    Bowen, Tesfaye Malagon MD Unavailable         Unavailable

 

                    Bowen, Tesfaye Malagon MD Unavailable         Unavailable

 

                    Bowen, Tesfaye Malagon MD Unavailable         Unavailable

 

                    Bowen, Tesfaye Malagon MD Unavailable         Unavailable

 

                    Bowen, Tesfaye Malagon MD Unavailable         Unavailable

 

                    Bowen, Tesfaye Malagon MD Unavailable         Unavailable

 

                    Bowen, Tesfaye Malagon MD Unavailable         Unavailable

 

                    Bowen, Tesfaye Malagon MD Unavailable         Unavailable

 

                    Bowen, Tesfaye Malagon MD Unavailable         Unavailable

 

                    Bowen, Tesfaye Malagon MD Unavailable         Unavailable

 

                    Bowen, Tesfaye Malagon MD Unavailable         Unavailable

 

                    Bowen, Tesfaye Malagon MD Unavailable         Unavailable

 

                    Bowen, Tesfaye Malagon MD Unavailable         Unavailable

 

                    Bowen, Tesfaye Malagon MD Unavailable         Unavailable

 

                    Bowen, Tesfaye Malagon MD Unavailable         Unavailable

 

                    Bowen, Tesfaye Malagon MD Unavailable         Unavailable

 

                    Bowen, Tesfaye Malagon MD Unavailable         Unavailable

 

                    Bowen, Tesfaye Malagon MD Unavailable         Unavailable

 

                    Bowen, Tesfaye Malagon MD Unavailable         Unavailable

 

                    Bowen, Tesfaye Malagon MD Unavailable         Unavailable

 

                    Bowen, Tesfaye Malagon MD Unavailable         Unavailable

 

                    Bowen, Tesfaye Malagon MD Unavailable         Unavailable

 

                    Bowen, Tesfaye Malagon MD Unavailable         Unavailable

 

                    Bowen, Tesfaye Malagon MD Unavailable         Unavailable

 

                    Bowen, Tesfaye Malagon MD Unavailable         Unavailable

 

                    Bowen, Tesfaye Malagon MD Unavailable         Unavailable

 

                    Bowen, Tesfaye Malagon MD Unavailable         Unavailable

 

                    Bowen, Tesfaye Malagon MD Unavailable         Unavailable

 

                    Bowen, Tesfaye Malagon MD Unavailable         Unavailable

 

                    Bowen, Tesfaye Malagon MD Unavailable         Unavailable

 

                    Bowen, Tesfaye Malagon MD Unavailable         Unavailable

 

                    Bowen, Tesfaye Malagon MD Unavailable         Unavailable

 

                    Bowen, Tesfaye Malagon MD Unavailable         Unavailable

 

                    Bowen, Tesfaye Malagon MD Unavailable         Unavailable

 

                    Scordo, M Genie PA Unavailable         Unavailable

 

                    Scordo, M Genie PA Unavailable         Unavailable

 

                    Scordo, M Genie PA Unavailable         Unavailable

 

                    Scordo, M Genie PA Unavailable         Unavailable

 

                    Scordo, M Genie PA Unavailable         Unavailable

 

                    Scordo, M Genie PA Unavailable         Unavailable

 

                    Scordo, M Genie PA Unavailable         Unavailable

 

                    Scordo, M Genie PA Unavailable         Unavailable

 

                    Scordo, M Genie PA Unavailable         Unavailable

 

                    Scordo, M Genie PA Unavailable         Unavailable

 

                    Scordo, M Genie PA Unavailable         Unavailable

 

                    Scordo, M Genie PA Unavailable         Unavailable

 

                    Scordo, M Genie PA Unavailable         Unavailable

 

                    Scordo, M Genie PA Unavailable         Unavailable

 

                    Scordo, M Genie PA Unavailable         Unavailable

 

                    Scordo, M Genie PA Unavailable         Unavailable

 

                    Scordo, M Genie PA Unavailable         Unavailable

 

                    Scordo, M Genie PA Unavailable         Unavailable

 

                    Scordo, M Genie PA Unavailable         Unavailable

 

                    Scordo, M Genie PA Unavailable         Unavailable

 

                    Scordo, M Genie PA Unavailable         Unavailable

 

                    Scordo, M Genie PA Unavailable         Unavailable

 

                    Scordo, M Genie PA Unavailable         Unavailable

 

                    Scordo, M Genie PA Unavailable         Unavailable

 

                    Scordo, M Genie PA Unavailable         Unavailable

 

                    Scordo, M Genie PA Unavailable         Unavailable

 

                    Scordo, M Genie PA Unavailable         Unavailable

 

                    Scordo, M Genie PA Unavailable         Unavailable

 

                    Scordo, M Genie PA Unavailable         Unavailable

 

                    Scordo, M Genie PA Unavailable         Unavailable

 

                    Scordo, M Genie PA Unavailable         Unavailable

 

                    Scordo, M Genie PA Unavailable         Unavailable

 

                    Scordo, M Genie PA Unavailable         Unavailable

 

                    Scordo, M Genie PA Unavailable         Unavailable

 

                    Scordo, M Genie PA Unavailable         Unavailable

 

                    Scordo, M Genie PA Unavailable         Unavailable

 

                    Scordo, M Genie PA Unavailable         Unavailable

 

                    Scordo, M Genie PA Unavailable         Unavailable

 

                    Scordo, M Genie PA Unavailable         Unavailable

 

                    Scordo, M Genie PA Unavailable         Unavailable

 

                    Pleskach,  Gayla FNP Unavailable         Unavailable

 

                    Pleskach,  Gayla FNP Unavailable         Unavailable

 

                    Pleskach,  Gayla FNP Unavailable         Unavailable

 

                    Pleskach,  Gayla FNP Unavailable         Unavailable

 

                    Pleskach,  Gayla FNP Unavailable         Unavailable

 

                    Pleskach,  Gayla FNP Unavailable         Unavailable

 

                    Pleskach,  Gayla FNP Unavailable         Unavailable

 

                    Pleskach,  Gayla FNP Unavailable         Unavailable

 

                    Pleskach,  Gayla FNP Unavailable         Unavailable

 

                    Pleskach,  Gayla FNP Unavailable         Unavailable

 

                    Pleskach,  Gayla FNP Unavailable         Unavailable

 

                    Pleskach,  Gayla FNP Unavailable         Unavailable

 

                    Pleskach,  Gayla FNP Unavailable         Unavailable

 

                    Pleskach,  Gayla FNP Unavailable         Unavailable

 

                    Pleskach,  Gayla FNP Unavailable         Unavailable

 

                    Pleskach,  Gayla FNP Unavailable         Unavailable

 

                    Pleskach,  Gayla FNP Unavailable         Unavailable

 

                    Pleskach,  Gayla FNP Unavailable         Unavailable

 

                    Pleskach,  Gayla FNP Unavailable         Unavailable

 

                    Pleskach,  Gayla FNP Unavailable         Unavailable

 

                    Pleskach,  Gayla FNP Unavailable         Unavailable

 

                    Pleskach,  Gayla FNP Unavailable         Unavailable

 

                    Pleskach,  Gayla FNP Unavailable         Unavailable

 

                    Pleskach,  Gayla FNP Unavailable         Unavailable

 

                    Pleskach,  Gayla FNP Unavailable         Unavailable

 

                    Pleskach,  Gayla FNP Unavailable         Unavailable

 

                    Pleskach,  Gayla FNP Unavailable         Unavailable

 

                    Pleskach,  Gayla FNP Unavailable         Unavailable

 

                    Kraeger, R Sara PA Unavailable         Unavailable

 

                    Kraeger, R Sara PA Unavailable         Unavailable

 

                    Kraeger, R Sara PA Unavailable         Unavailable

 

                    Kraeger, R Sara PA Unavailable         Unavailable

 

                    Kraeger, R Sara PA Unavailable         Unavailable

 

                    Kraeger, R Sara PA Unavailable         Unavailable

 

                    Kraeger, R Sara PA Unavailable         Unavailable

 

                    Kraeger, R Sara PA Unavailable         Unavailable

 

                    Kraeger, R Sara PA Unavailable         Unavailable

 

                    Kraeger, R Sara PA Unavailable         Unavailable

 

                    Kraeger, R Sara PA Unavailable         Unavailable

 

                    Kraeger, R Sara PA Unavailable         Unavailable

 

                    Kraeger, R Sara PA Unavailable         Unavailable

 

                    Kraeger, R Sara PA Unavailable         Unavailable

 

                    Kraeger, R Sara PA Unavailable         Unavailable

 

                    Kraeger, R Sara PA Unavailable         Unavailable

 

                    Kraeger, R Sara PA Unavailable         Unavailable

 

                    Kraeger, R Sara PA Unavailable         Unavailable

 

                    Kraeger, R Sara PA Unavailable         Unavailable

 

                    Kraeger, R Sara PA Unavailable         Unavailable

 

                    Kraeger, R Sara PA Unavailable         Unavailable

 

                    Kraeger, R Sara PA Unavailable         Unavailable

 

                    Kraeger, R Sara PA Unavailable         Unavailable

 

                    Kraeger, R Sara PA Unavailable         Unavailable

 

                    Kraeger, R Sara PA Unavailable         Unavailable

 

                    Kraeger, R Sara PA Unavailable         Unavailable

 

                    Kraeger, R Sara PA Unavailable         Unavailable

 

                    Kraeger, R Sara PA Unavailable         Unavailable

 

                    Kraeger, R Sara PA Unavailable         Unavailable

 

                    Kraeger, R Sara PA Unavailable         Unavailable

 

                    Kraeger, R Sara PA Unavailable         Unavailable

 

                    Kraeger, R Sara PA Unavailable         Unavailable

 

                    Kraeger, R Sara PA Unavailable         Unavailable

 

                    Kraeger, R Sara PA Unavailable         Unavailable

 

                    Kraeger, R Sara PA Unavailable         Unavailable

 

                    Kraeger, R Sara PA Unavailable         Unavailable

 

                    Kraeger, R Sara PA Unavailable         Unavailable

 

                    Kraeger, R Sara PA Unavailable         Unavailable

 

                    Kraeger, R Sara PA Unavailable         Unavailable

 

                    Kraeger, R Sara PA Unavailable         Unavailable

 

                    Kraeger, R Sara PA Unavailable         Unavailable

 

                    Kraeger, R Sara PA Unavailable         Unavailable

 

                    Kraeger, R Sara PA Unavailable         Unavailable

 

                    Kraeger, R Sara PA Unavailable         Unavailable

 

                    Kraeger, R Sara PA Unavailable         Unavailable

 

                    Kraeger, R Sara PA Unavailable         Unavailable

 

                    Kraeger, R Sara PA Unavailable         Unavailable

 

                    Kraeger, R Sara PA Unavailable         Unavailable

 

                    Kraeger, R Sara PA Unavailable         Unavailable

 

                    Kraeger, R Sara PA Unavailable         Unavailable

 

                    Kraeger, R Sara PA Unavailable         Unavailable

 

                    SEARS, A KANIKA DO    Unavailable         Unavailable

 

                    SEARS, A KANIKA DO    Unavailable         Unavailable

 

                    SEARS, A KANIKA DO    Unavailable         Unavailable

 

                    SEARS, A KANIKA DO    Unavailable         Unavailable

 

                    SEARS, A KANIKA DO    Unavailable         Unavailable

 

                    SEARS, A KANIKA DO    Unavailable         Unavailable

 

                    SEARS, A KANIKA DO    Unavailable         Unavailable

 

                    SEARS, A KANIKA DO    Unavailable         Unavailable

 

                    SEARS, A KANIKA DO    Unavailable         Unavailable

 

                    SEARS, A KANIKA DO    Unavailable         Unavailable

 

                    SEARS, A KANIKA DO    Unavailable         Unavailable

 

                    SEARS, A KANIKA DO    Unavailable         Unavailable

 

                    SEARS, A KANIKA DO    Unavailable         Unavailable

 

                    SEARS, A KANIKA DO    Unavailable         Unavailable

 

                    SEARS, A KANIKA DO    Unavailable         Unavailable

 

                    SEARS, A KANIKA DO    Unavailable         Unavailable

 

                    SEARS, A KANIKA DO    Unavailable         Unavailable

 

                    SEARS, A KANIKA DO    Unavailable         Unavailable

 

                    SEARS, A KANIKA DO    Unavailable         Unavailable

 

                    SEARS, A KANIKA DO    Unavailable         Unavailable

 

                    SEARS, A KANIKA DO    Unavailable         Unavailable

 

                    SEARS, A KANIKA DO    Unavailable         Unavailable

 

                    SEARS, A KANIKA DO    Unavailable         Unavailable

 

                    SEARS, A KANIKA DO    Unavailable         Unavailable

 

                    SEARS, A KANIKA DO    Unavailable         Unavailable

 

                    SEARS, A KANIKA DO    Unavailable         Unavailable

 

                    SEARS, A KANIKA DO    Unavailable         Unavailable

 

                    SEARS, A KANIKA DO    Unavailable         Unavailable

 

                    SEARS, A KANIKA DO    Unavailable         Unavailable

 

                    SEARS, A KANIKA DO    Unavailable         Unavailable

 

                    SEARS, A KANIKA DO    Unavailable         Unavailable

 

                    SEARS, A KANIKA DO    Unavailable         Unavailable

 

                    SEARS, A KANIKA DO    Unavailable         Unavailable

 

                    SEARS, A KANIKA DO    Unavailable         Unavailable

 

                    SEARS, A KANIKA DO    Unavailable         Unavailable

 

                    SEARS, A KANIKA DO    Unavailable         Unavailable

 

                    SEARS, A KANIKA DO    Unavailable         Unavailable

 

                    SEARS, A KANIKA DO    Unavailable         Unavailable

 

                    SEARS, A KANIKA DO    Unavailable         Unavailable

 

                    SEARS, A KANIKA DO    Unavailable         Unavailable

 

                    SEARS, A KANIKA DO    Unavailable         Unavailable

 

                    SEARS, A KANIKA DO    Unavailable         Unavailable

 

                    SEARS, A KANIKA DO    Unavailable         Unavailable

 

                    SEARS, A KANIKA DO    Unavailable         Unavailable

 

                    SEARS, A KANIKA DO    Unavailable         Unavailable



                                  



Re-disclosure Warning

          The records that you are about to access may contain information from 
federally-assisted alcohol or drug abuse programs. If such information is 
present, then the following federally mandated warning applies: This information
has been disclosed to you from records protected by federal confidentiality 
rules (42 CFR part 2). The federal rules prohibit you from making any further 
disclosure of this information unless further disclosure is expressly permitted 
by the written consent of the person to whom it pertains or as otherwise 
permitted by 42 CFR part 2. A general authorization for the release of medical 
or other information is NOT sufficient for this purpose. The Federal rules 
restrict any use of the information to criminally investigate or prosecute any 
alcohol or drug abuse patient.The records that you are about to access may 
contain highly sensitive health information, the redisclosure of which is 
protected by Article 27-F of the [facility] Public Health law. If you 
continue you may have access to information: Regarding HIV / AIDS; Provided by 
facilities licensed or operated by the [facility] Office of Mental Health; 
or Provided by the [facility] Office for People With Developmental 
Disabilities. If such information is present, then the following [place] 
mandated warning applies: This information has been disclosed to you from 
confidential records which are protected by state law. State law prohibits you 
from making any further disclosure of this information without the specific 
written consent of the person to whom it pertains, or as otherwise permitted by 
law. Any unauthorized further disclosure in violation of state law may result in
a fine or group home sentence or both. A general authorization for the release of 
medical or other information is NOT sufficient authorization for further disc
losure.                                                                         
    



Allergies and Adverse Reactions

          



           Type       Description Substance  Reaction   Status     Data Source(s

)

 

           Cefdnir, PCN Cefdnir, PCN Cefdnir, PCN            active     [facility]

 ([facility])



                                                                                
       



Family History

          



             Family Member Name Family Member Gender Family Member Status Date o

f Status 

Description                             Data Source(s)

 

           Unknown    Male       Problem                          [facility] ([facility])

 

           Unknown    Female     Problem                          [facility] ([facility]

 

           Unknown    Female     Problem                          [facility] ([facility]



                                                                                
                 



Encounters

          



           Encounter  Providers  Location   Date       Indications Data Source(s

)

 

             Outpatient   Attender: Arley Fraser MD Main Office  2020 

12:00:00 PM EST 

                                        [facility] ([facility])

 

             Outpatient   Attender: Gayla Ya [facility] Main Office  2020 1

0:45:00 AM EST  

                                        [facility] ([facility]

 

             Outpatient   Attender: Arley Fraser MD Main Office  10/29/2020 

12:45:00 PM EDT 

                                        [facility] ([facility])

 

                Outpatient      Attender: KANIKA Jonesang/Cecil/Nagi/Reindl

 10/27/2020 10:00:00 

AM EDT                                              [facility]

 

             Outpatient   Attender: Gayla Ya [facility] Main Office  10/26/2020 0

3:15:00 PM EDT  

                                        [facility] ([facility]

 

             Outpatient   Attender: Gayla Ya [facility] Main Office  10/19/2020 0

2:45:00 PM EDT  

                                        [facility] ([facility]

 

             Outpatient   Attender: Arley Fraser MD Main Office  10/13/2020 

09:30:00 AM EDT 

                                        [facility] ([facility])

 

                Outpatient      Attender: Ioana Hoffman/Cecil/Nagi/Re

indl 10/08/2020 

01:23:00 AM EDT                                     [facility]

 

                Outpatient      Attender: Ioana Hoffman/Cecil/Nagi/Re

indl 10/07/2020 

01:23:00 AM EDT                                     [facility]

 

                Outpatient      Attender: Ioana Hoffman/Cecil/Nagi/Re

indl 10/06/2020 

01:23:00 AM EDT                                     [facility]

 

                Outpatient      Attender: Ioana Hoffman/Cecil/Nagi/Re

indl 10/03/2020 

01:23:00 AM EDT                                     [facility]

 

                Outpatient      Attender: Ioana Hoffman/Cecil/Nagi/Re

indl 10/01/2020 

01:23:00 AM EDT                                     [facility]

 

                Outpatient      Attender: KANIKA Sumner/Cecil/Nagi/Reindl

 2020 01:30:00 

PM EDT                                              [facility]

 

             Outpatient   Attender: Arley Fraser MD Main Office  2020 

02:15:00 PM EDT 

                                        [facility] ([facility])

 

           Outpatient Referrer: Sara BARNHART            2020 06:05:00

 AM EDT            [facility]

 

           Outpatient Referrer: Sara BARNHART            2020 05:55:00

 AM EDT            [facility]

 

           Outpatient Referrer: Sara BARNHART            2020 10:26:00

 AM EDT            [facility]

 

           Outpatient Referrer: Sara BARNHART            2020 01:02:00

 PM EST            [facility]

 

           Outpatient Attender: Genie BARNHART Main Office 2020 01:45:00

 PM EST            

[facility] ([facility]

 

           Outpatient Referrer: Sara BARNHART            2020 09:18:00

 AM EST            [facility]

 

                                            2020 12:00:00 AM EST - 

020 04:02:49 PM EST            [facility] 

([facility]

 

           Outpatient Referrer: Sara BARNHART            2020 12:29:00

 PM EST            [facility]

 

           Outpatient Referrer: Sara BARNHART            2020 07:13:00

 PM EST            [facility]

 

           Outpatient Referrer: Sara BARNHART            2020 05:59:00

 AM EST            [facility]

 

           Outpatient Referrer: Sara BARNHART            2019 07:24:00

 PM EST            [facility]

 

           Outpatient Attender: Genie BARNHART Main Office 2019 11:15:00

 AM EST            

[facility] ([facility]

 

           Outpatient Referrer: Sara BARNHART            2019 09:28:00

 PM EST            [facility]

 

           Outpatient Referrer: Sara BARNHART            2019 10:17:00

 PM EST            [facility] 

[facility]

 

           Outpatient Attender: Genie BARNHART Main Office 2019 08:00:00

 AM EST            

[facility] ([facility]



                                                                                
                                                                                
                                                                                
                                                                                
                                            



Immunizations

          



             Vaccine      Date         Status       Description  Data Source(s)

 

             Pneumococcal conjugate PCV 13 10/27/2020 10:10:00 AM EDT completed 

                [facility] 

([facility] )

 

             New in .  IIV4 10/01/2020 09:41:00 AM EDT completed            

     [facility] ([facility] )



                                                                                
                 



Medications

          



          Medication Brand Name Start Date Product Form Dose      Route     Admi

nistrative 

Instructions Pharmacy Instructions Status     Indications Reaction   Description

 Data 

Source(s)

 

          20 mEq              2021 12:00:00 AM EST tablet extended release

 30                  TAKE ONE TABLET

BY MOUTH EVERY DAY TAKE ONE TABLET BY MOUTH EVERY DAY SOLD: 2021          

                        

[facility]

 

          10 mg               2020 12:00:00 AM EST tablet    60           

       TAKE ONE TABLET BY MOUTH THREE 

TIMES A DAY (8:00AM, 12:00PM, AND 4:00PM) TAKE ONE TABLET BY MOUTH THREE TIMES A

DAY (8:00AM, 12:00PM, AND 4:00PM) SOLD: 12/10/2020                              

          [facility]

 

                    Loratadine 10 MG Oral Tablet Allergy Relief Loratadine  12:00:00 AM 

EST                                       active                      [facility] ([facility]

 

           Spironolactone 50 MG Oral Tablet Spironolactone 2020 12:00:00 A

M EST                       

ORAL                          active                                  [facility] ([facility]

 

     Rifaximin Tablets      2020 12:00:00 AM EST                          

active                [facility] 

([facility]

 

          5 mg                2020 12:00:00 AM EST tablet    90           

       TAKE ONE TABLET BY MOUTH THREE 

TIMES A DAY TAKE ONE TABLET BY MOUTH THREE TIMES A DAY SOLD: 2020         

                         

[facility]

 

          20 mg               2020 12:00:00 AM EST tablet    180          

       TAKE ONE TABLET BY MOUTH TWICE A

DAY        TAKE ONE TABLET BY MOUTH TWICE A DAY SOLD: 2020                

                  [facility]

 

          50 mg               2020 12:00:00 AM EST tablet    180          

       TAKE ONE TABLET BY MOUTH TWICE A

DAY        TAKE ONE TABLET BY MOUTH TWICE A DAY SOLD: 2020                

                  [facility]

 

          25 mg               10/27/2020 12:00:00 AM EDT tablet    30           

       TAKE ONE TO TWO TABLET BY MOUTH 

AT BEDTIME TAKE ONE TO TWO TABLET BY MOUTH AT BEDTIME SOLD: 10/27/2020          

                        

[facility]

 

                                        0.5 ML Streptococcus pneumoniae serotype

 1 capsular antigen diphtheria [id number] 

protein conjugate vaccine 0.0044 MG/ML / Streptococcus pneumoniae serotype 14 
capsular antigen diphtheria [id number] protein conjugate vaccine 0.0044 MG/ML / 
Streptococcus pneumonia Prevnar 13 10/27/2020 12:00:00 AM EDT                   

                      active   

                                                            [facility] ([facility]

 

          Sertraline 50 MG Oral Tablet Sertraline HCL 10/26/2020 12:00:00 AM EDT

                     ORAL      

                      completed                                   [facility] ([facility]

 

             Sertraline 50 MG Oral Tablet SERTRALINE HCL 10/26/2020 12:00:00 AM 

EDT tablet       30

                                TAKE ONE TABLET BY MOUTH EVERY DAY TAKE ONE TABL

ET BY MOUTH EVERY DAY SOLD: 

10/27/2020                                                      [facility]

 

                    Hydroxyzine Hydrochloride 25 MG Oral Tablet Hydroxyzine HCL 

    10/26/2020 12:00:00 

AM EDT               ORAL                 active                      [facility] ([facility]

 

          10 gram/15 mL           10/19/2020 12:00:00 AM EDT solution  2700     

           TAKE 30ML BY MOUTH 

THREE TIMES A DAY TAKE 30ML BY MOUTH THREE TIMES A DAY SOLD: 10/22/2020         

                         

[facility]

 

        torsemide 20 MG Oral Tablet Torsemide 10/13/2020 12:00:00 AM EDT        

         ORAL                    

active                                                          [facility]

 

           Spironolactone 50 MG Oral Tablet Spironolactone 10/13/2020 12:00:00 A

M EDT                       

ORAL                          active                                  [facility]

 

          250 mg              10/08/2020 12:00:00 AM EDT tablet    14           

       TAKE ONE TABLET BY MOUTH EVERY 

DAY AT 6 IN THE MORNING   TAKE ONE TABLET BY MOUTH EVERY DAY AT 6 IN THE MORNING

 

SOLD: 10/10/2020                                                 [facility]

 

          5 mg                10/08/2020 12:00:00 AM EDT tablet    90           

       TAKE ONE TABLET BY MOUTH THREE 

TIMES A DAY 8AM NOON 4PM                TAKE ONE TABLET BY MOUTH THREE TIMES A D

AY 8AM NOON 4PM

             SOLD: 10/10/2020                                        [facility]

[facility]

 

                    midodrine hydrochloride 5 MG Oral Tablet Midodrine HCL      

 10/08/2020 12:00:00 AM 

EDT                                       active                      [facility] ([facility]

 

             Ciprofloxacin 250 MG Oral Tablet Ciprofloxacin HCL 10/08/2020 12:00

:00 AM EDT              

        ORAL                    completed                         [facility] ([facility]

 

          50 mg               10/08/2020 12:00:00 AM EDT tablet    60           

       TAKE ONE TABLET BY MOUTH TWICE A 

DAY 9AM AND 5PM           TAKE ONE TABLET BY MOUTH TWICE A DAY 9AM AND 5PM SOLD:

 

10/10/2020                                                      [facility]

 

                    12 HR Guaifenesin 600 MG Extended Release Oral Tablet [Mucin

ex] Mucinex             

10/08/2020 12:00:00 AM EDT               ORAL                 active            

          [facility] ([facility]

 

           Spironolactone 50 MG Oral Tablet Spironolactone 10/08/2020 12:00:00 A

M EDT                       

ORAL                          completed                               [facility]

 

        torsemide 20 MG Oral Tablet Torsemide 10/08/2020 12:00:00 AM EDT        

                                 

active                                                          [facility]

 

          20 mg               10/08/2020 12:00:00 AM EDT tablet    60           

       TAKE ONE TABLET BY MOUTH TWICE A 

DAY 9AM AND 5 IN THE EVENING            TAKE ONE TABLET BY MOUTH TWICE A DAY 9AM

 AND 5 IN 

THE EVENING  SOLD: 10/10/2020                                        [facility]

[facility]

 

          15 mg/5 mL (3 mg/mL)           10/08/2020 12:00:00 AM EDT solution  29

0                 TAKE 2.7 

TEASPOONFUL (13 ML) BY MOUTH ONCE DAILY WITH FOOD TAKE 2.7 TEASPOONFUL (13 ML) 

BY MOUTH ONCE DAILY WITH FOOD SOLD: 10/08/2020                                  

      [facility]

 

          10 mg               2020 12:00:00 AM EDT tablet    30           

       TAKE ONE TABLET BY MOUTH EVERY 

DAY        TAKE ONE TABLET BY MOUTH EVERY DAY SOLD: 2020                  

                [facility]

 

                    12 HR Guaifenesin 600 MG Extended Release Oral Tablet GUAIFE

NESIN         2020 

12:00:00 AM EDT tablet extended release 12hr 20                              CHAR

E ONE TABLET BY MOUTH EVERY

 12 HOURS FOR 10 DAYS     TAKE ONE TABLET BY MOUTH EVERY 12 HOURS FOR 10 DAYS SO

LD: 

2020                                                      [facility]

 

          300 mg              2020 12:00:00 AM EDT capsule   30           

       TAKE ONE CAPSULE BY MOUTH THREE

 TIMES A DAY FOR 10 DAYS                TAKE ONE CAPSULE BY MOUTH THREE TIMES A 

DAY FOR 10 DAYS

             SOLD: 2020                                        [facility]

[facility]

 

          20 mg               2020 12:00:00 AM EDT tablet    10           

       TAKE TWO TABLETS BY MOUTH EVERY 

DAY FOR 5 DAYS      TAKE TWO TABLETS BY MOUTH EVERY DAY FOR 5 DAYS SOLD: 

020     

                                                            [facility]

 

          10 gram/15 mL           2020 12:00:00 AM EDT solution  2700     

           TAKE 30ML BY MOUTH 

THREE TIMES A DAY TAKE 30ML BY MOUTH THREE TIMES A DAY SOLD: 2020         

                         

[facility]

 

          10 gram/15 mL           2020 12:00:00 AM EDT solution  2700     

           TAKE 30ML BY MOUTH 

THREE TIMES A DAY TAKE 30ML BY MOUTH THREE TIMES A DAY SOLD: 2020         

                         

[facility]

 

          0.5 %               2020 12:00:00 AM EDT drops     10           

       INSTILL 1 DROP IN EACH EYE TWO 

TIMES A DAY INSTILL 1 DROP IN EACH EYE TWO TIMES A DAY SOLD: 2020         

                         

[facility]

 

          0.005 %             2020 12:00:00 AM EDT drops     7            

       INSTILL 1 DROP IN EACH EYE EVERY 

EVENING    INSTILL 1 DROP IN EACH EYE EVERY EVENING SOLD: 2020            

                      [facility]

 

          0.2 %               2020 12:00:00 AM EDT drops     5            

       INSTILL 1 DROP IN EACH EYE TWO 

TIMES A DAY INSTILL 1 DROP IN EACH EYE TWO TIMES A DAY SOLD: 2020         

                         

[facility]

 

          0.5 %               2020 12:00:00 AM EDT drops     10           

       INSTILL 1 DROP IN EACH EYE TWO 

TIMES A DAY INSTILL 1 DROP IN EACH EYE TWO TIMES A DAY SOLD: 2020         

                         

[facility]

 

          25 mg               2020 12:00:00 AM EDT tablet    60           

       TAKE ONE TABLET BY MOUTH TWICE A 

DAY        TAKE ONE TABLET BY MOUTH TWICE A DAY SOLD: 2020                

                  [facility]

 

          0.5 %               2020 12:00:00 AM EDT drops     10           

       INSTILL ONE DROP IN EACH EYE TWO 

TIMES A DAY INSTILL ONE DROP IN EACH EYE TWO TIMES A DAY SOLD: 10/08/2020       

                           

[facility]

 

          0.5 %               2020 12:00:00 AM EDT drops     10           

       INSTILL ONE DROP IN EACH EYE TWO 

TIMES A DAY INSTILL ONE DROP IN EACH EYE TWO TIMES A DAY SOLD: 2020       

                           

[facility]

 

          0.5 %               2020 12:00:00 AM EDT drops     10           

       INSTILL ONE DROP IN EACH EYE TWO 

TIMES A DAY INSTILL ONE DROP IN EACH EYE TWO TIMES A DAY SOLD: 2020       

                           

[facility]

 

          0.5 %               2020 12:00:00 AM EDT drops     10           

       INSTILL ONE DROP IN EACH EYE TWO 

TIMES A DAY INSTILL ONE DROP IN EACH EYE TWO TIMES A DAY SOLD: 2020       

                           

[facility]

 

          0.5 %               2020 12:00:00 AM EDT drops     10           

       INSTILL ONE DROP IN EACH EYE TWO 

TIMES A DAY INSTILL ONE DROP IN EACH EYE TWO TIMES A DAY SOLD: 2020       

                           

[facility]

 

        Furosemide 40 MG Oral Tablet Furosemide 2020 12:00:00 AM EDT      

           ORAL                    

active                                                          [facility] ([facility])

 

          25 mg               2020 12:00:00 AM EDT tablet    60           

       TAKE ONE TABLET BY MOUTH TWICE A 

DAY        TAKE ONE TABLET BY MOUTH TWICE A DAY SOLD: 2020                

                  [facility]

 

          112 mcg             03/10/2020 12:00:00 AM EDT tablet    90           

       TAKE ONE TABLET BY MOUTH EVERY 

DAY        TAKE ONE TABLET BY MOUTH EVERY DAY SOLD: 2020                  

                [facility]

 

          112 mcg             03/10/2020 12:00:00 AM EDT tablet    90           

       TAKE ONE TABLET BY MOUTH EVERY 

DAY        TAKE ONE TABLET BY MOUTH EVERY DAY SOLD: 2020                  

                [facility]

 

          112 mcg             03/10/2020 12:00:00 AM EDT tablet    90           

       TAKE ONE TABLET BY MOUTH EVERY 

DAY        TAKE ONE TABLET BY MOUTH EVERY DAY SOLD: 2020                  

                [facility]

 

          112 mcg             03/10/2020 12:00:00 AM EDT tablet    90           

       TAKE ONE TABLET BY MOUTH EVERY 

DAY        TAKE ONE TABLET BY MOUTH EVERY DAY SOLD: 2020                  

                [facility]

 

          137 mcg (0.1 %)           2020 12:00:00 AM EST aerosol,spray 30 

                 SPRAY TWO SPRAYS

 IN ONE NOSTRIL EVERY DAY SPRAY TWO SPRAYS IN ONE NOSTRIL EVERY DAY SOLD: 

2020                                                      [facility]

 

             benzonatate 200 MG Oral Capsule BENZONATATE  2020 12:00:00 AM

 EST capsule      

30                                      TAKE ONE CAPSULE BY MOUTH THREE TIMES A 

DAY AS NEEDED FOR 10 DAYS TAKE ONE 

CAPSULE BY MOUTH THREE TIMES A DAY AS NEEDED FOR 10 DAYS SOLD: 2020       

                                 

[facility]

 

       Xalatan 0.005 % Xalatan 2020 12:00:00 AM EST        1.0 {gtt}      

                completed  

                                                            [facility] ([facility])

 

        Betaxolol HCl 0.5 % Betaxolol HCl 2020 12:00:00 AM EST         1.0

 {gtt}                         

completed                                                       [facility] ([facility])

 

           Brimonidine Tartrate 0.2 % Brimonidine Tartrate 2020 12:00:00 A

M EST                        

                              completed                               [facility] (

[facility])

 

                    Fluticasone Propionate 50 MCG/ACT Fluticasone Propionate  12:00:00 AM 

EST                                       completed                      [facility]

T ([facility])

 

     Folic Acid 1 MG Folic Acid 2020 12:00:00 AM EST                      

    completed                

[facility] ([facility])

 

                    Levocetirizine Dihydrochloride 5 MG Levocetirizine Dihydroch

loride 2020 

12:00:00 AM EST                                    completed                    

  [facility] ([facility])

 

      Lactulose 10 GM/15ML Lactulose 2020 12:00:00 AM EST                 

              completed       

                                                    [facility] ([facility])

 

        Ibuprofen 200 MG Ibuprofen 2020 12:00:00 AM EST         2.0 {table

t}                         

completed                                                       [facility] ([facility])

 

     MVI  MVI  2020 12:00:00 AM EST      1.0 {tablet}                compl

eted                [facility]

 ([facility])

 

       Thiamine HCl 100 MG Thiamine HCl 2020 12:00:00 AM EST              

                      completed 

                                                            [facility] ([facility])

 

        Spironolactone 25 MG Spironolactone 2020 12:00:00 AM EST          

                               

completed                                                       [facility] ([facility])

 

          400-80 mg           2020 12:00:00 AM EST tablet    14           

       TAKE ONE TABLET BY MOUTH 

TWICE A DAY TAKE ONE TABLET BY MOUTH TWICE A DAY SOLD: 2020               

                   [facility]

 

                          Sulfamethoxazole 800 MG / Trimethoprim 160 MG Oral Tab

let 

Sulfamethoxazole/Trimethoprim DS 2020 12:00:00 AM EST                 ORAL

                    active  

                                                            [facility]

 

        Thiamine 100 MG Oral Tablet Thiamine HCL 2020 12:00:00 AM EST     

            ORAL             

             active                                              [facility]

 

                    Lactulose 667 MG/ML Oral Solution Lactulose Encephalopathy 0

2020 12:00:00 

AM EST               ORAL                 active                      [facility] ([facility]

 

           Spironolactone 25 MG Oral Tablet Spironolactone 2020 12:00:00 A

M EST                       

ORAL                          active                                  [facility] ([facility]

 

          25 mg               2019 12:00:00 AM EST tablet    60           

       TAKE ONE TABLET BY MOUTH TWICE A 

DAY        TAKE ONE TABLET BY MOUTH TWICE A DAY SOLD: 2019                

                  [facility]

 

          1 mg                2019 12:00:00 AM EST tablet    90           

       TAKE ONE TABLET BY MOUTH EVERY DAY

           TAKE ONE TABLET BY MOUTH EVERY DAY SOLD: 2020                  

                [facility]

 

          1 mg                2019 12:00:00 AM EST tablet    90           

       TAKE ONE TABLET BY MOUTH EVERY DAY

           TAKE ONE TABLET BY MOUTH EVERY DAY SOLD: 2019                  

                [facility]

 

          1 mg                2019 12:00:00 AM EST tablet    30           

       TAKE ONE TABLET BY MOUTH EVERY DAY

           TAKE ONE TABLET BY MOUTH EVERY DAY SOLD: 2020                  

                [facility]

 

          1 mg                2019 12:00:00 AM EST tablet    60           

       TAKE ONE TABLET BY MOUTH EVERY DAY

           TAKE ONE TABLET BY MOUTH EVERY DAY SOLD: 2020                  

                [facility]

 

          1 mg                2019 12:00:00 AM EST tablet    90           

       TAKE ONE TABLET BY MOUTH EVERY DAY

           TAKE ONE TABLET BY MOUTH EVERY DAY SOLD: 2020                  

                [facility]

 

          50 mg               2019 12:00:00 AM EST tablet    90           

       TAKE ONE TABLET BY MOUTH EVERY 

DAY        TAKE ONE TABLET BY MOUTH EVERY DAY SOLD: 2019                  

                [facility]

 

           Spironolactone 50 MG Oral Tablet Spironolactone 2019 12:00:00 A

M EST                       

ORAL                          completed                               [facility]

 

          20 mg               2019 12:00:00 AM EST tablet    60           

       TAKE ONE TABLET BY MOUTH TWICE A 

DAY , HOLD FOR HEART RATE LESS THAN 60 OR SBP LESS THAN 100 TAKE ONE TABLET BY 

MOUTH TWICE A DAY , HOLD FOR HEART RATE LESS THAN 60 OR SBP LESS THAN 100 SOLD: 

2019                                                      [facility]

 

                    Propranolol Hydrochloride 20 MG Oral Tablet Propranolol HCL 

    2019 12:00:00 

AM EST                                    active                      [facility]

 

                    Fluticasone Propionate Nasal Spray Fluticasone Propionate Na

sal Spray 2019

 12:00:00 AM EST                                    active                      

[facility] ([facility]

 

           Spironolactone 25 MG Oral Tablet Spironolactone 2019 12:00:00 A

M EST                        

                              completed                               [facility] ([facility]

 

          25 mg               2019 12:00:00 AM EST tablet    60           

       TAKE ONE TABLET BY MOUTH TWICE A 

DAY        TAKE ONE TABLET BY MOUTH TWICE A DAY SOLD: 2019                

                  [facility]

 

          Ciprofloxacin 500 MG Oral Tablet [Cipro] Cipro     2019 12:00:00

 AM EST                               

                      completed                                   [facility]

 

          500 mg              2019 12:00:00 AM EST tablet    20           

       TAKE ONE TABLET BY MOUTH TWICE A

 DAY FOR 10 DAYS          TAKE ONE TABLET BY MOUTH TWICE A DAY FOR 10 DAYS SOLD:

 

2019                                                      [facility]

 

          500 mg              2019 12:00:00 AM EST tablet    15           

       TAKE ONE TABLET BY MOUTH THREE 

TIMES A DAY WITH FOOD UNTIL GONE DO NOT DRINK ALCOHOL TAKE ONE TABLET BY MOUTH 

THREE TIMES A DAY WITH FOOD UNTIL GONE DO NOT DRINK ALCOHOL SOLD: 2019    

                     

                                                    [facility]

 

                    Brimonidine tartrate 2 MG/ML Ophthalmic Solution Brimonidine

 Tartrate 2019

 12:00:00 AM EST                                    active               Brimoni

dine Tartrate [facility] ([facility]

 

        cefdinir 300 MG Oral Capsule Cefdinir 2019 12:00:00 AM EST        

         ORAL                    

completed                                                       [facility] ([facility]

 

                Betaxolol 5 MG/ML Ophthalmic Solution Betaxolol HCL   2019

 12:00:00 AM EST  

                                        active                          [facility] (

[facility]

 

          300 mg              2019 12:00:00 AM EST capsule   10           

       TAKE ONE CAPSULE BY MOUTH TWICE

 A DAY UNTIL GONE         TAKE ONE CAPSULE BY MOUTH TWICE A DAY UNTIL GONE SOLD:

 

2019                                                      [facility]

 

          40 mg               2019 12:00:00 AM EST tablet    14           

       TAKE ONE TABLET BY MOUTH EVERY 

DAY        TAKE ONE TABLET BY MOUTH EVERY DAY SOLD: 2019                  

                [facility]

 

          50 mg               2019 12:00:00 AM EST tablet    28           

       TAKE ONE TABLET BY MOUTH TWICE A 

DAY 9AM AND 5 IN THE EVENING            TAKE ONE TABLET BY MOUTH TWICE A DAY 9AM

 AND 5 IN 

THE EVENING  SOLD: 2019                                        [facility]

s

 

          Furosemide 40 MG Oral Tablet [Lasix] Lasix     2019 12:00:00 AM 

EST                     ORAL       

                      active                                      [facility] ([facility]

 

                    latanoprost 0.05 MG/ML Ophthalmic Solution Latanoprost      

   2019 12:00:00 AM 

EST                                       active                      [facility]

 

           Metronidazole 500 MG Oral Tablet [Flagyl] Flagyl     2019 12:00

:00 AM EST                       

ORAL                          completed                               [facility]

 

           Spironolactone 50 MG Oral Tablet Spironolactone 2019 12:00:00 A

M EST                       

ORAL                          completed                               [facility]

 

          Azelastine HCL (Nasal) Azelastine HCL (Nasal) 2019 12:00:00 AM E

ST                                

                      completed                                   [facility]

 

                    Propranolol Hydrochloride 20 MG Oral Tablet Propranolol HCL 

    2019 12:00:00 

AM EST                                    completed                      [facility] 

([facility]

 

          25 mg               2019 12:00:00 AM EST tablet    30           

       TAKE ONE TABLET BY MOUTH TWO 

TIMES A DAY TAKE ONE TABLET BY MOUTH TWO TIMES A DAY SOLD: 2019           

                       

[facility]

 

          25 mg               2019 12:00:00 AM EST tablet    30           

       TAKE ONE TABLET BY MOUTH TWO 

TIMES A DAY TAKE ONE TABLET BY MOUTH TWO TIMES A DAY SOLD: 2020           

                       

[facility]

 

          20 mg               2019 12:00:00 AM EST tablet    60           

       TAKE ONE TABLET BY MOUTH TWICE A 

DAY , HOLD FOR HR LESS THAN 60 OR SBP LESS THAN 100 TAKE ONE TABLET BY MOUTH 

TWICE A DAY , HOLD FOR HR LESS THAN 60 OR SBP LESS THAN 100 SOLD: 2019    

                     

                                                    [facility]

 

          0.5 %               2019 12:00:00 AM EDT drops     10           

       INSTILL ONE DROP IN EACH EYE TWO 

TIMES A DAY INSTILL ONE DROP IN EACH EYE TWO TIMES A DAY SOLD: 2019       

                           

[facility]

 

                    Betaxolol 5 MG/ML Ophthalmic Solution 0.5 % BETAXOLOL HCL   

    2019 12:00:00 AM

 EDT         drops        10                        INSTILL ONE DROP IN EACH EYE

 TWO TIMES A DAY INSTILL ONE DROP IN

 EACH EYE TWO TIMES A DAY SOLD: 2019                                      

  [facility]

 

          0.5 %               2019 12:00:00 AM EDT drops     10           

       INSTILL ONE DROP IN EACH EYE TWO 

TIMES A DAY INSTILL ONE DROP IN EACH EYE TWO TIMES A DAY SOLD: 2020       

                           

[facility]

 

          112 mcg             2019 12:00:00 AM EDT tablet    90           

       TAKE ONE TABLET BY MOUTH EVERY 

DAY        TAKE ONE TABLET BY MOUTH EVERY DAY SOLD: 2019                  

                [facility]



                                                                                
                                                                                
                                                                                
                                                                                
                                                                                
                                                                                
                                                                                
                                                                                
                                                                                
                                                                                
                                                                                
                                                                                
                                                                                
                            



Insurance Providers

          



             Payer name   Policy type / Coverage type Policy ID    Covered party

 ID Covered 

party's relationship to woods Policy [place]             Plan Information

 

          Pending sale to [facility] [employer] [facility]           [id number]           SP           

       [id number]

 

          Pending sale to [facility] [employer] [facility]           [id number]           SP           

       [id number]

 

          [facility] O         [id number]           S              

     [id number]

 

          Pending sale to [facility] [employer] [facility]           [id number]           SP           

       [id number]

 

          Pending sale to [facility] [employer]UNITY PLAN [employer]           [id number]           SP           

       [id number]

 

          Pending sale to [facility] [employer]           [id number]           SP           

       [id number]

 

          Pending sale to [facility] [employer]           [id number]           SP           

       [id number]

 

          Pending sale to [facility] [employer]UN[employer]AN [employer]           [id number]           SP           

       [id number]

 

          [facility] Community [employer] Commercial [id number]           Self                

[id number]

 

          [facility] [employer] Commercial [id number]           Self                

[id number]

 

          [facility] [employer] Commercial [id number]           Self                

[id number]

 

          [facility] [employer] Commercial [id number]           Self                

[id number]

 

          Pending sale to [facility] COMMUNITY PLAN [employer]           [id number]           SP           

       [id number]

 

          [facility] [employer] Commercial [id number]           Self                

[id number]

 

          [facility] [employer] Commercial [id number]           Self                

[id number]

 

          [facility] [employer] Commercial [id number]           Self                

[id number]

 

          [facility] [employer] Commercial [id number]           Self                

[id number]

 

          [facility] [employer] Commercial [id number]           Self                

[id number]

 

          [facility] [employer] Commercial [id number]           Self                

[id number]

 

          [facility] [employer] Commercial [id number]           Self                

[id number]

 

          [place] Plan Claims [facility] Medigap Part B [id number]           Self        

        [id number]

 

          Community Plan Commercial [id number]           Self                [id number]

[id number]

 

          [facility] Community Plan Commercial                     Self                

 

 

          [place] Plan Claims [facility] Medigap Part B [place]              Self        

        [place]

 

          Community Plan Commercial                     Self                 

 

          [employer] Commercial                     Self                 

 

          MEDICAID            [id number]            SP                  [id number]

 

          MEDICAID            [id number]            SP                  [id number]

 

          [employer] Commercial                     Self                 

 

          [employer] [place] Commercial                     Self          

       

 

          [employer]) P         [id number]           S              

     [id number]

 

          Pending sale to [facility] COMMUNITY PLAN [employer]           [id number]           SP           

       [id number]

 

          [employer] [place] P         [id number]           S                   [id number]

[id number]

 

          [employer] [facility] P         UNAVAILABLE           S             

      UNAVAILABLE

 

          [employer] P         [id number]           S                   [id number]



 

          BLUE CROSS PERSON PLAN           [id number]           SP            

      [id number]

 

          HMO BLUE            [id number]           SP                  [id number]

[id number]

 

          [employer] [place] BC        [id number]           S                   [id number]

[id number]

 

                              [id number]                                [id number]



                                                                                
                                                                                
                                                                                
                                                                                
                                                                                
                                                                    



Problems, Conditions, and Diagnoses

          



           Code       Display Name Description Problem Type Effective Dates Data

 Source(s)

 

                                        Pleural effusion, not elsewhere classifi

ed Pleural effusion, not elsewhere 

classified          Problem             2020 12:00:00 AM EDT [facility] ([facility]arcie bettencourt [facility] 

[facility], )

 

             [id number]    Difficulty breathing Difficulty breathing Problem     

 2020 12:00:00 

AM EDT                                  [facility] ([facility], )

 

                    B95.7               Other staphylococcus as the cause of dis

eases classified elsewhere Other 

staphylococcus as the cause of diseases classified elsewhere Problem            

       2020 

12:00:00 AM EST                         [facility] ([facility]

)

 

                          Z48.815                   Encounter for surgical after

care following surgery on the digestive 

system                                  Encounter for surgical aftercare followi

ng surgery on the digestive 

system              Problem             2020 12:00:00 AM EST [facility] ([facility])

 

                    K70.31              Alcoholic cirrhosis of liver with ascite

s Alcoholic cirrhosis of liver 

with ascites        Problem             2020 12:00:00 AM EST [facility] ([facility])

 

                    K70.40              Alcoholic hepatic failure without coma A

lcoholic hepatic failure without 

coma                Problem             2020 12:00:00 AM EST [facility] ([facility])

 

             K76.6        Portal hypertension Portal hypertension Problem      0

2020 12:00:00 AM EST

                                        [facility] ([facility]

)

 

                    I85.10              Secondary esophageal varices without ble

eding Secondary esophageal 

varices without bleeding Problem             2020 12:00:00 AM EST [facility]

 ([facility])

 

                    F10.288             Alcohol dependence with other alcohol-in

duced disorder Alcohol 

dependence with other alcohol-induced disorder Problem                   

020 12:00:00 AM 

EST                                     [facility] ([facility]

)

 

             K76.7        Hepatorenal syndrome Hepatorenal syndrome Problem     

 2020 12:00:00 AM 

EST                                     [facility] ([facility]

)

 

                          I12.9                     Hypertensive chronic kidney 

disease with stage 1 through stage 4 chronic 

kidney disease, or unspecified chronic kidney disease Hypertensive chronic 

kidney disease with stage 1 through stage 4 chronic kidney disease, or 
unspecified chronic kidney disease Problem             2020 12:00:00 AM ES

T [facility] 

([facility])

 

                    N18.3               Chronic kidney disease, stage 3 (moderat

e) Chronic kidney disease, stage 3

 (moderate)         Problem             2020 12:00:00 AM EST [facility] ([facility])

 

             D63.1        Anemia in chronic kidney disease Anemia in chronic kid

richard disease Problem      

2020 12:00:00 AM EST              [facility] ([facility]

)

 

                    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

d loss (chronic) Iron deficiency 

anemia secondary to blood loss (chronic) Problem             2020 12:00:00

 AM EST 

[facility] ([facility])

 

             G25.81       Restless legs syndrome Restless legs syndrome Problem 

     2020 12:00:00

 AM EST                                 [facility] ([facility]

)

 

             H40.9        Unspecified glaucoma Unspecified glaucoma Problem     

 2020 12:00:00 AM 

EST                                     [facility] ([facility]

)

 

                    Z48.01              Encounter for change or removal of surgi

mariela wound dressing Encounter for 

change or removal of surgical wound dressing Problem             2020 12:0

0:00 AM EST 

[facility] ([facility])

 

                    Z48.03              Encounter for change or removal of drain

s Encounter for change or removal

 of drains          Problem             2020 12:00:00 AM EST [facility] ([facility])

 

           Z91.81     History of falling History of falling Problem    

0 12:00:00 AM EST 

[facility] ([facility])

 

             Z60.2        Problems related to living alone Problems related to l

iving alone Problem      

2020 12:00:00 AM EST              [facility] ([facility]

)

 

                    Z79.51              Long term (current) use of inhaled stero

ids Long term (current) use of 

inhaled steroids    Problem             2020 12:00:00 AM EST [facility] ([facility])

 

                K65.2           Spontaneous bacterial peritonitis Spontaneous ba

cterial peritonitis 

Problem                   2020 12:00:00 AM EST [facility] ([facility])

 

             K76.9        Liver disease, unspecified Liver disease, unspecified 

Problem      2020 

12:00:00 AM EST                         [facility] ([facility]

)



                                                                                
                                                                                
                                                                                
                                                                              



Surgeries/Procedures

          



             Procedure    Description  Date         Indications  Data Source(s)

 

                    Brief Emotional/Behav Assessment W/ Scoring Doc Per Standard

 Inst                     2020 

12:00:00 AM EST                                     [facility]

 

             INSERTION INDWELLING TUNNELED PLEURAL CATHETER              

020 12:00:00 AM EDT              

[facility] ([facility], )



                                                                                
                  



Results

          



                    ID                  Date                Data Source

 

                    [id number]            2020 10:00:00 AM EST [facility]









          Name      Value     Range     Interpretation Code Description Data Debi

rce(s) Supporting 

Document(s)

 

          Gram Stain Laboratory test result                               [facility]

 ([facility]  

 

                                        FEW WBCS

NO ORGANISMS SEEN



 

 

           Body Fluid Culture Laboratory test result                            

      [facility]                                   

 

                                        ****************************************

*********

If aerobic or anaerobic growth is detected within

the next 7-21 days, an addendum will follow.

                                        .***************************************

********.



FULL REPORT IN LAB NOTES ([facility] and [facility]).

NO GROWTH AEROBICALLY



 









                    ID                  Date                Data Source

 

                    [id number]            2020 10:00:00 AM EST [facility]









          Name      Value     Range     Interpretation Code Description Data Debi

rce(s) Supporting 

Document(s)

 

           Source, Body Fluid Laboratory test result                            

      [facility]                                   

 

           Pleural FL Color Laboratory test result                              

    [facility]                                    

 

           Appearance, Body Fluid Laboratory test result                        

          [facility]                              

 

          WBC Body Fluid 101 /uL   0-10                          [facility]  

 

          BF Mononuclear Cell % 94.0 %    0-0                           [facility] (

[facility]  

 

          RBC Body Fluid 2 10                                    [facility]  

 

           BF Polymorphonuclear Cell % 6.0 %      0-0                           

   [facility]

                                         









                    ID                  Date                Data Source

 

                    [id number]            2020 08:12:00 AM EST [facility] ([facility]









          Name      Value     Range     Interpretation Code Description Data Debi

rce(s) Supporting 

Document(s)

 

          Albumin 25% Laboratory test result                               [facility]

T ([facility] 

 

 

                                        TRANSFUSED PRODUCT: ALBUMIN 25%         

                COUNT: 1

 









                    ID                  Date                Data Source

 

                    [id number]            2020 08:11:00 AM EST [facility]









          Name      Value     Range     Interpretation Code Description Data Debi

rce(s) Supporting 

Document(s)

 

           Pleural FL Color Laboratory test result                              

    [facility]                                    

 

           Source, Body Fluid Laboratory test result                            

      [facility]                                   

 

          WBC Body Fluid 124 /uL   0-10                          [facility]  

 

           Appearance, Body Fluid Laboratory test result                        

          [facility]                              

 

           RBC Body Fluid Laboratory test result                                

  [facility]                                    

 

          BF Mononuclear Cell % 93.6 %    0-0                           [facility] (

[facility]  

 

           BF Polymorphonuclear Cell % 6.4 %      0-0                           

   [facility]

                                         









                    ID                  Date                Data Source

 

                    [id number]            2020 08:07:00 AM EST [facility] ([facility]









          Name      Value     Range     Interpretation Code Description Data Debi

rce(s) Supporting 

Document(s)

 

           Body Fluid Culture Laboratory test result                            

      [facility]                                   

 

                                        ****************************************

*********

If aerobic or anaerobic growth is detected within

the next 7-21 days, an addendum will follow.

                                        .***************************************

********.



FULL REPORT IN LAB NOTES ([facility] and [facility]).

NO GROWTH AEROBICALLY



 

 

          Gram Stain Laboratory test result                               [facility]

 ([facility]  

 

                                        FEW RBCS

FEW WBCS

NO ORGANISMS SEEN



 









                    ID                  Date                Data Source

 

                    [id number]            2020 07:59:00 AM EST [facility]









          Name      Value     Range     Interpretation Code Description Data Debi

rce(s) Supporting 

Document(s)

 

          Albumin 25% Laboratory test result                               [facility]

T ([facility] 

 

 

                                        TRANSFUSED PRODUCT: ALBUMIN 25%         

                COUNT: 1

 









                    ID                  Date                Data Source

 

                    [id number]            2020 03:46:00 PM EST [facility] ([facility]









          Name      Value     Range     Interpretation Code Description Data [facility] Supporting 

Document(s)

 

          White Blood Count 5.5 10    4.0-10.0                      [facility]  

 

          Red Blood Count 3.39 10   4.00-5.40                     [facility]  

 

          Hematocrit 32.6 %    36.0-47.0                     [facility]  

 

          Hemoglobin 10.8 g/dL 12.0-15.5                     [facility]  

 

           Mean Corpuscular Volume 96.2 fl    80.0-96.0                        M

[facility] ([facility]                                    

 

           Mean Corpuscular Hemoglobin 31.9 pg    27.0-33.0                     

   [facility]                              

 

           Mean Corpuscular HGB Conc 33.1 g/dL  32.0-36.5                       

 [facility]                              

 

           Platelet Count, Automated 124 10     150-450                         

 [facility]                                    

 

           Red Cell Distribution Width 13.8 %     11.5-14.5                     

   [facility]                              

 

          Neutrophils % 73.3 %    36.0-66.0                     [facility]  

 

          Lymph %   13.9 %    24.0-44.0                     [facility]  

 

          Eos %     1.3 %     0.0-3.0                       [facility]  

 

          Mono %    9.9 %     0.0-5.0                       [facility]  

 

          Immature Granulocyte % 0.7 %     0-3.0                         [facility] 

([facility]  

 

          Baso %    0.9 %     0.0-1.0                       [facility]  

 

          Nucleated Red Blood Cell % 0.0 %     0-0                           [facility]

[facility] ([facility] 

 

 

          Neutrophils # 4.1 10    1.5-8.5                       [facility]  

 

          Lymph #   0.8 10    1.5-5.0                       [facility]  

 

          Eos #     0.1 10    0.0-0.5                       [facility]  

 

          Mono #    0.6 10    0.0-0.8                       [facility]  

 

          Baso #    0.1 10    0.0-0.2                       [facility]  









                    ID                  Date                Data Source

 

                    [id number]            2020 03:46:00 PM EST [facility] ([facility]









          Name      Value     Range     Interpretation Code Description Data Debi

rce(s) Supporting 

Document(s)

 

          Prothrombin Time 17.5 s    12.5-14.3                     [facility]  

 

           Partial Thromboplastin Time 35.4 s     24.2-38.5                     

   [facility]                              

 

          Inr       1.40                                    [facility]  

 

                                        THERAPUTIC HUMAN INR VALUES

INDICATIONS                      NORMAL RANGES

PROPHYLAXIS/TREATMENT OF:

VENOUS THROMBOSIS                2.0-3.0

PULMONARY EMBOLISM               2.0-3.0

PREVENTION OF SYSTEMIC EMBOLISM FROM:

TISSUE HEART VALVES              2.0-3.0

ACUTE MYOCARDIAL INFARCTION      2.0-3.0

VALVULAR HEART DISEASE           2.0-3.0

ATRIAL FIBRILLATION              2.0-3.0

MECHANICAL VALVES(HIGH RISK)     2.5-3.5

RECURRENT MYOCARDIAL INFARCTION  2.5-3.5

 









                    ID                  Date                Data Source

 

                    [id number]            2020 03:46:00 PM EST [facility] ([facility]









          Name      Value     Range     Interpretation Code Description Data Debi

rce(s) Supporting 

Document(s)

 

          Ast/Sgot  43 U/L    7-37                          [facility]  

 

          Bilirubin,Total 3.1 mg/dL 0.2-1.0                       [facility]  

 

          Alkaline Phosphatase 155 U/L                           [facility] ([facility]  

 

          Alt/SGPT  38 U/L    12-78                         [facility]  

 

          Bilirubin,Direct 2.0 mg/dL 0.0-0.2                       [facility]  

 

          Total Protein 6.3 GM/DL 6.4-8.2                       [facility]  

 

          Albumin   2.0 GM/DL 3.2-5.2                       [facility]  

 

          Albumin/Globulin Ratio 0.5       1.2-2.2                       [facility] 

([facility]  









                    ID                  Date                Data Source

 

                    [id number]            2020 03:46:00 PM EST [facility] ([facility]









          Name      Value     Range     Interpretation Code Description Data Debi

rce(s) Supporting 

Document(s)

 

          Glucose, Fasting 169 mg/dL                         [facility]  

 

           Creatinine For GFR 2.38 mg/dL 0.55-1.30                        [facility]

 ([facility]                                    

 

          Blood Urea Nitrogen 71 mg/dL  7-18                          [facility] ([facility]  

 

          Glomerular Filtration Rate 22.1                                    [facility]

[facility] ([facility]  

 

                                        <content>Units are mL/min/1.73 

m2</content><br/><content></content><br/><content>Chronic Kidney Disease Staging
 per NKF:</content><br/><content></content><br/><content>Stage I & II   GFR >=60
       Normal to Mildly Decreased</content><br/><content>Stage III      GFR 30-
59      Moderately Decreased</content><br/><content>Stage IV       GFR 15-29    
  Severely Decreased</content><br/><content>Stage V        GFR <15        Very 
Little GFR Left</content><br/><content>ESRD           GFR <15 on 
RRT</content><br/><content></content> 

 

          Sodium Level 131 meq/L 136-145                       [facility]  

 

          Potassium Serum 4.7 meq/L 3.5-5.1                       [facility]  

 

          Chloride Level 99 meq/L                          [facility]  

 

          Carbon Dioxide Level 21 meq/L  21-32                         [facility]  

 

          Anion Gap 11 meq/L  8-16                          [facility]  

 

          Calcium Level 8.7 mg/dL 8.8-10.2                      [facility]  









                    ID                  Date                Data Source

 

                    [id number]            2020 03:46:00 PM EST [facility] ([facility]









          Name      Value     Range     Interpretation Code Description Data Debi

rce(s) Supporting 

Document(s)

 

           Ethanol [Mass/volume] in Serum or Plasma Laboratory test result 0.000

-0.010                       

[facility] ([facility]   









                    ID                  Date                Data Source

 

                    [id number]            2020 12:19:00 PM EST [facility] ([facility]









          Name      Value     Range     Interpretation Code Description Data Debi

rce(s) Supporting 

Document(s)

 

          Glucose, Fasting 118 mg/dL                         [facility]  

 

          Blood Urea Nitrogen 69 mg/dL  7-18                          [facility]  

 

           Creatinine For GFR 2.49 mg/dL 0.55-1.30                        [facility]

 ([facility]                                    

 

          Glomerular Filtration Rate 21.0                                    [facility]

[facility] ([facility]  

 

                                        <content>Units are mL/min/1.73 

m2</content><br/><content></content><br/><content>Chronic Kidney Disease Staging
 per NK[facility]:</content><br/><content></content><br/><content>Stage I & II   GFR >=60
       Normal to Mildly Decreased</content><br/><content>Stage III      GFR 30-
59      Moderately Decreased</content><br/><content>Stage IV       GFR 15-29    
  Severely Decreased</content><br/><content>Stage V        GFR <15        Very 
Little GFR Left</content><br/><content>ESRD           GFR <15 on 
RRT</content><br/><content></content> 

 

          Sodium Level 128 meq/L 136-145                       [facility]  

 

          Potassium Serum 4.2 meq/L 3.5-5.1                       [facility]  

 

          Chloride Level 92 meq/L                          [facility]  

 

          Anion Gap 11 meq/L  8-16                          [facility]  

 

          Carbon Dioxide Level 25 meq/L  21-32                         [facility]  

 

          Ast/Sgot  45 U/L    7-37                          [facility]  

 

          Calcium Level 8.8 mg/dL 8.8-10.2                      [facility]  

 

          Alt/SGPT  44 U/L    12-78                         [facility]  

 

          Alkaline Phosphatase 186 U/L                           [facility]  

 

          Bilirubin,Total 4.2 mg/dL 0.2-1.0                       [facility]  

 

          Total Protein 6.9 GM/DL 6.4-8.2                       [facility]  

 

          Albumin   2.2 GM/DL 3.2-5.2                       [facility]  

 

          Albumin/Globulin Ratio 0.5       1.2-2.2                       [facility] 

([facility]  









                    ID                  Date                Data Source

 

                    [id number]              2020 12:19:00 PM EST [facility] ([facility])









          Name      Value     Range     Interpretation Code Description Data Debi

rce(s) Supporting 

Document(s)

 

          Glucose, Fasting 118 mg/dL                         [facility] ([facility]  

 

                                        reviewed 

 

          Blood Urea Nitrogen 69 mg/dL  7-18                          [facility] ([facility])  

 

                                        reviewed 

 

          Creatinine For GFR 2.49 mg/dL 0.55-1.30                     [facility] ([facility])  

 

                                        reviewed 

 

          Sodium Level 128 meq/L 136-145                       [facility] ([facility])  

 

                                        reviewed 

 

          Glomerular Filtration Rate 21.0                                    [facility]

[facility] ([facility])  

 

                                        reviewed 

 

          Carbon Dioxide Level 25 meq/L  21-32                         [facility] ([facility]

[facility])  

 

                                        reviewed 

 

          Chloride Level 92 meq/L                          [facility] ([facility]  

 

                                        reviewed 

 

          Potassium Serum 4.2 meq/L 3.5-5.1                       [facility] ([facility]  

 

                                        reviewed 

 

          Calcium Level 8.8 mg/dL 8.8-10.2                      [facility] ([facility]  

 

                                        reviewed 

 

          Anion Gap 11 meq/L  8-16                          [facility]  

 

                                        reviewed 

 

          Alt/SGPT  44 U/L    12-78                         [facility]  

 

                                        reviewed 

 

          Ast/Sgot  45 U/L    7-37                          [facility]  

 

                                        reviewed 

 

          Alkaline Phosphatase 186 U/L                           [facility]  

 

                                        reviewed 

 

          Bilirubin,Total 4.2 mg/dL 0.2-1.0                       [facility] ([facility])  

 

                                        reviewed 

 

          Total Protein 6.9 GM/DL 6.4-8.2                       [facility] ([facility]  

 

                                        reviewed 

 

          Albumin/Globulin Ratio 0.5       1.2-2.2                       [facility] 

([facility])  

 

                                        reviewed 

 

          Albumin   2.2 GM/DL 3.2-5.2                       [facility]  

 

                                        reviewed 









                    ID                  Date                Data Source

 

                    [id number]         2020 01:04:00 PM EST [facility]









          Name      Value     Range     Interpretation Code Description Data Debi

rce(s) Supporting 

Document(s)

 

                                        Microscopic observation [Identifier] in 

Unspecified specimen by Non-

gynecological cytology method Laboratory test result                            

            [facility] ([facility])                    

 

                                        SPECIMEN:            Pleural Fluid

                                        1400 ml yellow



SPECIMEN ADEQUACY:   Satisfactory for evaluation





CATEGORIZATION:      Negative for Malignancy



DESCRIPTIONS:        Specimen consists of reactive mesothelial cells

in a background of lymphocytes, macrophages,

and some neutrophils.







COMMENTS:













                                        2020 - 09



Signed________ SERENA KAUR (ASCP) 2020 [id number] (Prelim)

Signed________ PAUL OWUSU MD 2020 [id number]

 









                    ID                  Date                Data Source

 

                    [id number]            2020 08:25:00 AM EST [facility] ([facility]









          Name      Value     Range     Interpretation Code Description Data Debi

rce(s) Supporting 

Document(s)

 

           Platelets [#/volume] in Blood by Automated count 113 10     150-450  

                        [facility] 

([facility]          









                    ID                  Date                Data Source

 

                    [id number]            2020 08:25:00 AM EST [facility] ([facility]









          Name      Value     Range     Interpretation Code Description Data Debi

rce(s) Supporting 

Document(s)

 

          Prothrombin Time 18.6 s    12.5-14.3                     [facility]  

 

          Inr       1.52                                    [facility]  

 

                                        THERAPUTIC HUMAN INR VALUES

INDICATIONS                      NORMAL RANGES

PROPHYLAXIS/TREATMENT OF:

VENOUS THROMBOSIS                2.0-3.0

PULMONARY EMBOLISM               2.0-3.0

PREVENTION OF SYSTEMIC EMBOLISM FROM:

TISSUE HEART VALVES              2.0-3.0

ACUTE MYOCARDIAL INFARCTION      2.0-3.0

VALVULAR HEART DISEASE           2.0-3.0

ATRIAL FIBRILLATION              2.0-3.0

MECHANICAL VALVES(HIGH RISK)     2.5-3.5

RECURRENT MYOCARDIAL INFARCTION  2.5-3.5

 

 

           Partial Thromboplastin Time 34.0 s     24.2-38.5                     

   [facility]                              









                    ID                  Date                Data Source

 

                    [id number]         2020 08:25:00 AM EST [facility] ([facility], )









          Name      Value     Range     Interpretation Code Description Data Debi

rce(s) Supporting 

Document(s)

 

           Inr        1.52                  Normal (applies to non-numeric resul

ts)            [facility] ([facility], )                            

 

                                        THERAPUTIC HUMAN INR VALUES

INDICATIONS                      NORMAL RANGES

PROPHYLAXIS/TREATMENT OF:

VENOUS THROMBOSIS                2.0-3.0

PULMONARY EMBOLISM               2.0-3.0

PREVENTION OF SYSTEMIC EMBOLISM FROM:

TISSUE HEART VALVES              2.0-3.0

ACUTE MYOCARDIAL INFARCTION      2.0-3.0

VALVULAR HEART DISEASE           2.0-3.0

ATRIAL FIBRILLATION              2.0-3.0

MECHANICAL VALVES(HIGH RISK)     2.5-3.5

RECURRENT MYOCARDIAL INFARCTION  2.5-3.5

 

 

                Partial Thromboplastin Time 34.0 s          24.2-38.5       Norm

al (applies to non-numeric 

results)                                [facility] ([facility]) 

 

 

           Prothrombin Time 18.6 s     12.5-14.3  Above high normal            M

[facility] ([facility])                            









                    ID                  Date                Data Source

 

                    [id number]         2020 08:25:00 AM EST [facility] ([facility])









          Name      Value     Range     Interpretation Code Description Data Debi

rce(s) Supporting 

Document(s)

 

           aPTT in Platelet poor plasma by Coagulation assay Laboratory test res

ult                                  

[facility] ([facility])  

 

                Platelets [#/volume] in Blood by Automated count 113 10         

 150-450         Below low normal

                                        [facility] ([facility]) 

 









                    ID                  Date                Data Source

 

                    [id number]            2020 08:09:00 AM EST [facility] ([facility]









          Name      Value     Range     Interpretation Code Description Data Debi

rce(s) Supporting 

Document(s)

 

           Source, Body Fluid Laboratory test result                            

      [facility]                                   

 

           Pleural FL Color Laboratory test result                              

    [facility]                                    

 

          WBC Body Fluid 114 /uL   0-10                          [facility]  

 

           Appearance, Body Fluid Laboratory test result                        

          [facility]                              

 

          RBC Body Fluid 4 10                                    [facility]  

 

          BF Mononuclear Cell % 91.2 %    0-0                           [facility] (

[facility]  

 

           BF Polymorphonuclear Cell % 8.8 %      0-0                           

   [facility]

                                         









                    ID                  Date                Data Source

 

                    [id number]            2020 08:09:00 AM EST [facility] ([facility]









          Name      Value     Range     Interpretation Code Description Data Debi

rce(s) Supporting 

Document(s)

 

          Total Protein, Body Fluid 0.7 g/dL                                [facility]

[facility] ([facility] 

 

 

           Source, Body Fluid Tot Protein Laboratory test result                

                  [facility] ([facility]                    









                    ID                  Date                Data Source

 

                    [id number]            2020 08:09:00 AM EST [facility] ([facility]









          Name      Value     Range     Interpretation Code Description Data Debi

rce(s) Supporting 

Document(s)

 

          LDH, Body Fluid 51 U/L                                  [facility]  

 

           Source, Body Fluid LDH Laboratory test result                        

          [facility]                              









                    ID                  Date                Data Source

 

                    [id number]            2020 08:09:00 AM EST [facility] ([facility]









          Name      Value     Range     Interpretation Code Description Data Debi

rce(s) Supporting 

Document(s)

 

          Glucose, Body Fluid 131 mg/dL                               [facility] ([facility]  

 

           Source, Body Fluid Glucose Laboratory test result                    

              [facility]                    









                    ID                  Date                Data Source

 

                    [id number]            2020 08:09:00 AM EST [facility] ([facility]









          Name      Value     Range     Interpretation Code Description Data Debi

rce(s) Supporting 

Document(s)

 

          Amylase, Body Fluid 31 U/L                                  [facility]  

 

           Source, Body Fluid Amylase Laboratory test result                    

              [facility]                    









                    ID                  Date                Data Source

 

                    [id number]            2020 08:09:00 AM EST [facility] ([facility]









          Name      Value     Range     Interpretation Code Description Data Debi

rce(s) Supporting 

Document(s)

 

           Source, Body Fluid pH Laboratory test result                         

         [facility]                              

 

           PH Body Fluid Laboratory test result                                 

 [facility]                                    









                    ID                  Date                Data Source

 

                    [id number]            2020 08:09:00 AM EST [facility] ([facility]









          Name      Value     Range     Interpretation Code Description Data Debi

rce(s) Supporting 

Document(s)

 

          Gram Stain Laboratory test result                               [facility]

 ([facility]  

 

                                        FEW WBCS

NO ORGANISMS SEEN



 

 

           Body Fluid Culture Laboratory test result                            

      [facility]                                   

 

                                        ****************************************

*********

If aerobic or anaerobic growth is detected within

the next 7-21 days, an addendum will follow.

                                        .***************************************

********.



FULL REPORT IN LAB NOTES ([facility] and [facility]).

NO GROWTH AEROBICALLY



 









                    ID                  Date                Data Source

 

                    [id number]            2020 08:09:00 AM EST [facility] ([facility]









          Name      Value     Range     Interpretation Code Description Data Debi

rce(s) Supporting 

Document(s)

 

                          Bacteria identified in Unspecified specimen by Anaerob

e culture Laboratory test 

result                                              [facility] ([facility]  

 

                                        ****************************************

*********

If anaerobic or aerobic growth is detected within

the next 7-21 days, an addendum will follow.

                                        .***************************************

********.



FULL REPORT IN LAB NOTES ([facility] and [facility]).

NO GROWTH ANAEROBICALLY



 









                    ID                  Date                Data Source

 

                    [id number]            2020 08:09:00 AM EST [facility] ([facility]









          Name      Value     Range     Interpretation Code Description Data Debi

rce(s) Supporting 

Document(s)

 

          [facility] Smear Laboratory test result                               [facility] 

([facility]  

 

                                        Testing performed at reference lab . Rep

ort copy to follow

on a separate form. 20 REF LAB#:[id number]



Due to limited sensitivity, smear results should

be used as an adjunct in evaluating patient tuberculosis

status. Cultural examination is highly recommended

for clinical diagnosis.





AFB sm REF LAB concentra      NEGATIVE



 

 

          [facility] Culture Laboratory test result                               [facility]

T ([facility] 

 

 

                                        Testing performed at reference lab . Rep

ort copy to follow

on a separate form. 20 REF LAB#:[id number]



FULL REPORT IN LAB NOTES ([facility] and [facility]).

No Acid-Fast Bacilli Isolated after 6 Weeks.



 









                    ID                  Date                Data Source

 

                    [id number]            2020 08:09:00 AM EST [facility] ([facility]









          Name      Value     Range     Interpretation Code Description Data Debi

rce(s) Supporting 

Document(s)

 

           Fungal Smear Laboratory test result                                  

[facility] ([facility]

                                         

 

                                        Testing performed at reference lab . Rep

ort copy to follow

on a separate form. 20 REF LAB#:[id number]



IAIN/Calcofluor preparatio     No fungus observed.



 

 

           Fungal Culture Other Source Laboratory test result                   

               [facility] ([facility]                    

 

                                        Testing performed at reference lab . Rep

ort copy to follow

on a separate form. 20 REF LAB#:[id number]



FUNGUS CULTURE [facility]        No Yeast or Mold Isolated after 4 weeks.



 









                    ID                  Date                Data Source

 

                    [id number]         2020 08:09:00 AM EST [facility] ([facility], )









          Name      Value     Range     Interpretation Code Description Data Debi

rce(s) Supporting 

Document(s)

 

                          Bacteria identified in Unspecified specimen by Anaerob

e culture Laboratory test 

result                                              [facility] ([facility], )  

 

                                        ****************************************

*********

If anaerobic or aerobic growth is detected within

the next 7-21 days, an addendum will follow.

                                        .***************************************

********.



FULL REPORT IN LAB NOTES ([facility] and [facility]).

NO GROWTH ANAEROBICALLY



 









                    ID                  Date                Data Source

 

                    [id number]         2020 08:09:00 AM EST [facility] ([facility])









          Name      Value     Range     Interpretation Code Description Data Debi

rce(s) Supporting 

Document(s)

 

           Gram Stain Laboratory test result            Normal (applies to non-n

umeric results)            

[facility] ([facility])  

 

                                        FEW WBCS

NO ORGANISMS SEEN



 

 

           Body Fluid Culture Laboratory test result                            

      [facility] ([facility])                            

 

                                        ****************************************

*********

If aerobic or anaerobic growth is detected within

the next 7-21 days, an addendum will follow.

                                        .***************************************

********.



FULL REPORT IN LAB NOTES ([facility] and [facility]).

NO GROWTH AEROBICALLY



 









                    ID                  Date                Data Source

 

                    [id number]         2020 08:09:00 AM EST [facility] ([facility])









          Name      Value     Range     Interpretation Code Description Data Debi

rce(s) Supporting 

Document(s)

 

             PH Body Fluid Laboratory test result              Normal (applies t

o non-numeric results)  

                          [facility] ([facility])  

 

                Source, Body Fluid pH Laboratory test result                 Nor

mal (applies to non-numeric 

results)                                [facility] ([facility]) 

 









                    ID                  Date                Data Source

 

                    [id number]         2020 08:09:00 AM EST [facility] ([facility])









          Name      Value     Range     Interpretation Code Description Data Debi

rce(s) Supporting 

Document(s)

 

           Amylase, Body Fluid 31 U/L                Normal (applies to non-nume

[place] results)            [facility])         

 

                Source, Body Fluid Amylase Laboratory test result               

  Normal (applies to non-

numeric results)                        [facility]) 

 









                    ID                  Date                Data Source

 

                    [id number]         2020 08:09:00 AM EST [facility] ([facility])









          Name      Value     Range     Interpretation Code Description Data Debi

rce(s) Supporting 

Document(s)

 

                Source, Body Fluid Glucose Laboratory test result               

  Normal (applies to non-

numeric results)                        [facility]) 

 

 

           Glucose, Body Fluid 131 mg/dL             Normal (applies to non-nume

[place] results)            [facility])        









                    ID                  Date                Data Source

 

                    [id number]         2020 08:09:00 AM [facility] ([facility])









          Name      Value     Range     Interpretation Code Description Data Debi

rce(s) Supporting 

Document(s)

 

           LDH, Body Fluid 51 U/L                Normal (applies to non-numeric 

results)            [facility] 

([facility])         

 

                Source, Body Fluid LDH Laboratory test result                 No

rmal (applies to non-numeric 

results)                                [facility]) 

 









                    ID                  Date                Data Source

 

                    [id number]         2020 08:09:00 AM [facility])









          Name      Value     Range     Interpretation Code Description Data Debi

rce(s) Supporting 

Document(s)

 

           Total Protein, Body Fluid 0.7 g/dL              Normal (applies to no

n-numeric results)            

[facility] ([facility])  

 

                Source, Body Fluid Tot Protein Laboratory test result           

      Normal (applies to non-

numeric results)                        [facility]) 

 









                    ID                  Date                Data Source

 

                    [id number]         2020 08:09:00 AM EST [facility])









          Name      Value     Range     Interpretation Code Description Data Debi

rce(s) Supporting 

Document(s)

 

                Source, Body Fluid Laboratory test result                 Normal

 (applies to non-numeric 

results)                                [facility]) 

 

 

                Pleural FL Color Laboratory test result                 Normal (

applies to non-numeric 

results)                                [facility] ([facility]) 

 

 

           WBC Body Fluid 114 /uL    0-10       Above high normal            [facility]

[facility] ([facility])                            

 

           RBC Body Fluid 4 10                  Normal (applies to non-numeric r

esults)            [facility] 

([facility])         

 

                Appearance, Body Fluid Laboratory test result                 No

rmal (applies to non-numeric 

results)                                [facility] ([facility]) 

 

 

           BF Mononuclear Cell % 91.2 %     0-0        Above high normal        

    [facility] ([facility])                            

 

           BF Polymorphonuclear Cell % 8.8 %      0-0        Above high normal  

          [facility] ([facility])                    









                    ID                  Date                Data Source

 

                    [id number]         2020 08:09:00 AM EST [facility])









          Name      Value     Range     Interpretation Code Description Data Debi

rce(s) Supporting 

Document(s)

 

           Amylase [Enzymatic activity/volume] in Body fluid Laboratory test res

ult                                  

[facility])  









                    ID                  Date                Data Source

 

                    [id number]              10/27/2020 12:52:00 PM EDT [facility])









          Name      Value     Range     Interpretation Code Description Data Debi

rce(s) Supporting 

Document(s)

 

                          Platelets reticulated/100 platelets in Blood by Automa

aly count Laboratory test 

result                                              [facility]

)  

 

             Alpha-1-Fetoprotein [Mass/volume] in Serum or Plasma Laboratory leny

t result                           

                          [facility])  

 

           Laboratory test finding (navigational concept) 3.4 %      0.0-9.59   

                      [facility]                   

 

          Alpha Fetoprotein Tumor Quant 5.0 ng/mL                               

[facility])  

 

                                        THE AFP ASSAY IS PERFORMED ON THE [employer] BY

CHEMILUMINESCENCE AND SHOULD NOT BE COMPARED INTERCHANGEABLY

WITH OTHER METHODS. IT SHOULD NOT BE USED ALONE AS A

SCREENING TEST OR DIAGNOSIS FOR THE PRESENCE OR ABSENCE OF

MALIGNANT DISEASE.  THESE RESULTS ARE NOT INTERPRETABLE IN

PREGNANT FEMALES.

PREDICTIONS OF DISEASE RECURRENCE SHOULD NOT BE BASED SOLELY

ON VALUES OBTAINED FROM SERIAL PATIENT SERUM VALUES.

 

 

           Laboratory test finding (navigational concept) Laboratory test result

                                  

[facility] ([facility])            

 

           Laboratory test finding (navigational concept) Laboratory test result

                                  

[facility] ([facility])            

 

           Laboratory test finding (navigational concept) Laboratory test result

                                  

[facility]            

 

           Laboratory test finding (navigational concept) Laboratory test result

                                  

[facility] ([facility])            

 

           Laboratory test finding (navigational concept) Laboratory test result

                                  

[facility] ([facility])            

 

           Laboratory test finding (navigational concept) Laboratory test result

                                  

[facility] ([facility])            

 

           Laboratory test finding (navigational concept) Laboratory test result

                                  

[facility] ([facility])            

 

           Laboratory test finding (navigational concept) Laboratory test result

                                  

[facility] ([facility])            

 

           Laboratory test finding (navigational concept) Laboratory test result

                                  

[facility] ([facility])            

 

           Laboratory test finding (navigational concept) Laboratory test result

                                  

[facility] ([facility])            

 

           Laboratory test finding (navigational concept) Laboratory test result

                                  

[facility] ([facility])            

 

           Laboratory test finding (navigational concept) Laboratory test result

                                  

[facility] ([facility])            

 

           Laboratory test finding (navigational concept) Laboratory test result

                                  

[facility] ([facility])            

 

           Laboratory test finding (navigational concept) Laboratory test result

                                  

[facility] ([facility])            

 

           Laboratory test finding (navigational concept) Laboratory test result

                                  

[facility] ([facility])            

 

           Laboratory test finding (navigational concept) Laboratory test result

                                  

[facility] ([facility])            









                    ID                  Date                Data Source

 

                    [id number]              10/27/2020 12:52:00 PM EDT [facility] ([facility]









          Name      Value     Range     Interpretation Code Description Data Debi

rce(s) Supporting 

Document(s)

 

           Albumin [Mass/volume] in Serum or Plasma Laboratory test result      

                            [facility] 

([facility])                   

 

                          Alanine aminotransferase [Enzymatic activity/volume] i

n Serum or Plasma 

Laboratory test result                                        [facility] ([facility]  

 

           Calcium [Mass/volume] in Serum or Plasma Laboratory test result      

                            [facility] 

([facility])                   

 

                    Carbon dioxide, total [Moles/volume] in Serum or Plasma Labo

ratory test result  

                                                [facility] ([facility])  

 

           Chloride [Moles/volume] in Serum or Plasma Laboratory test result    

                              [facility] 

([facility])                   

 

           Glucose [Mass/volume] in Serum or Plasma Laboratory test result      

                            [facility] 

([facility])                   

 

                          Alkaline phosphatase [Enzymatic activity/volume] in Se

rum or Plasma Laboratory 

test result                                         [facility] ([facility]

)  

 

           Creatinine [Mass/volume] in Serum or Plasma Laboratory test result   

                               [facility] 

([facility])                   

 

           Potassium [Moles/volume] in Serum or Plasma Laboratory test result   

                               [facility] 

([facility])                   

 

           Triglyceride [Mass/volume] in Serum or Plasma Laboratory test result 

                                 

[facility] ([facility])            

 

           Protein [Mass/volume] in Serum or Plasma Laboratory test result      

                            [facility] 

([facility])                   

 

                          Aspartate aminotransferase [Enzymatic activity/volume]

 in Serum or Plasma 

Laboratory test result                                        [facility] ([facility]  

 

          Glucose, Fasting 104 mg/dL                         [facility]  

 

           Urea nitrogen [Mass/volume] in Serum or Plasma Laboratory test result

                                  

[facility] ([facility])            

 

          Creatinine For GFR 1.98 mg/dL 0.55-1.30                     [facility]  

 

          Blood Urea Nitrogen 51 mg/dL  7-18                          [facility] ([facility]  

 

          Glomerular Filtration Rate 27.4                                    [facility]

[facility] ([facility])  

 

                                        <content>Units are mL/min/1.73 

m2</content><br/><content></content><br/><content>Chronic Kidney Disease Staging
 per NKF:</content><br/><content></content><br/><content>Stage I & II   GFR >=60
       Normal to Mildly Decreased</content><br/><content>Stage III      GFR 30-
59      Moderately Decreased</content><br/><content>Stage IV       GFR 15-29    
  Severely Decreased</content><br/><content>Stage V        GFR <15        Very 
Little GFR Left</content><br/><content>ESRD           GFR <15 on 
RRT</content><br/><content></content> 

 

          Sodium Level 125 meq/L 136-145                       [facility] ([facility])  

 

          Potassium Serum 4.4 meq/L 3.5-5.1                       [facility]  

 

          Chloride Level 87 meq/L                          [facility]  

 

          Carbon Dioxide Level 30 meq/L  21-32                         [facility] ([facility]  

 

          Calcium Level 9.5 mg/dL 8.8-10.2                      [facility]  

 

          Anion Gap 8 meq/L   8-16                          [facility]  

 

          Ast/Sgot  78 U/L    7-37                          [facility]  

 

          Alt/SGPT  80 U/L    12-78                         [facility]  

 

          Bilirubin,Total 7.6 mg/dL 0.2-1.0                       [facility] ([facility]  

 

          Total Protein 7.6 GM/DL 6.4-8.2                       [facility]  

 

          Alkaline Phosphatase 189 U/L                           [facility] ([facility]  

 

          Albumin   2.8 GM/DL 3.2-5.2                       [facility]  

 

          Albumin/Globulin Ratio 0.6       1.2-2.2                       [facility] 

([facility])  









                    ID                  Date                Data Source

 

                    [id number]              10/27/2020 12:52:00 PM EDT [facility] ([facility])









          Name      Value     Range     Interpretation Code Description Data Debi

rce(s) Supporting 

Document(s)

 

           Hemoglobin [Mass/volume] in Blood Laboratory test result             

                     [facility] ([facility])                             

 

           Leukocytes [#/volume] in Blood by Automated count Laboratory test res

ult                                  

[facility] ([facility])            

 

             Erythrocytes [#/volume] in Blood by Automated count Laboratory test

 result                            

                          [facility] ([facility])  

 

                          Erythrocyte mean corpuscular volume [Entitic volume] b

y Automated count 

Laboratory test result                                        [facility] ([facility]  

 

                    Hematocrit [Volume Fraction] of Blood by Automated count Lab

oratory test result 

                                                [facility] ([facility])  

 

                                        Erythrocyte mean corpuscular hemoglobin 

concentration [Mass/volume] by Automated

 count     Laboratory test result                                  [facility] ([facility]  

 

                          Erythrocyte mean corpuscular hemoglobin [Entitic mass]

 by Automated count 

Laboratory test result                                        [facility] ([facility]  

 

                          Erythrocyte distribution width [Ratio] by Automated co

unt Laboratory test result

                                                    [facility] ([facility]

)  

 

           Platelets [#/volume] in Blood by Automated count Laboratory test resu

lt                                  

[facility] ([facility])            

 

                Neutrophils/100 leukocytes in Blood by Automated count Laborator

y test result                 

                                        [facility] ([facility])  

 

                          Platelet mean volume [Entitic volume] in Blood by Adalid Driscoll Laboratory test 

result                                              [facility] ([facility]

)  

 

                          Band form neutrophils/100 leukocytes in Body fluid by 

Manual count Laboratory 

test result                                         [facility] ([facility]

)  

 

                    Lymphocytes/100 leukocytes in Body fluid by Manual count Lab

oratory test result 

                                                [facility] ([facility])  

 

           Basophils/100 leukocytes in Blood Laboratory test result             

                     [facility]                              

 

             Monocytes/100 leukocytes in Blood by Automated count Laboratory leny

t result                           

                          [facility] ([facility])  

 

                    Eosinophils/100 leukocytes in Body fluid by Manual count Lab

oratory test result 

                                                [facility] ([facility])  

 

           Basophils [#/volume] in Blood by Automated count Laboratory test resu

lt                                  

[facility] ([facility])            

 

           Lymphocytes [#/volume] in Blood by Automated count Laboratory test re

sult                                  

[facility] ([facility])            

 

           Eosinophils [#/volume] in Blood by Automated count Laboratory test re

sult                                  

[facility] ([facility])            

 

           Neutrophils [#/volume] in Blood by Automated count Laboratory test re

sult                                  

[facility] ([facility])            

 

           Monocytes [#/volume] in Blood by Automated count Laboratory test resu

lt                                  

[facility] ([facility])            









                    ID                  Date                Data Source

 

                    [id number]            2020 06:25:00 AM EDT [facility] ([facility]









          Name      Value     Range     Interpretation Code Description Data Debi

rce(s) Supporting 

Document(s)

 

          Prothrombin Time 19.4 s    12.5-14.3                     [facility] ([facility]  

 

           Partial Thromboplastin Time 32.6 s     24.2-38.5                     

   [facility] ([facility]                              

 

          Inr       1.60                                    [facility]  

 

                                        THERAPUTIC HUMAN INR VALUES

INDICATIONS                      NORMAL RANGES

PROPHYLAXIS/TREATMENT OF:

VENOUS THROMBOSIS                2.0-3.0

PULMONARY EMBOLISM               2.0-3.0

PREVENTION OF SYSTEMIC EMBOLISM FROM:

TISSUE HEART VALVES              2.0-3.0

ACUTE MYOCARDIAL INFARCTION      2.0-3.0

VALVULAR HEART DISEASE           2.0-3.0

ATRIAL FIBRILLATION              2.0-3.0

MECHANICAL VALVES(HIGH RISK)     2.5-3.5

RECURRENT MYOCARDIAL INFARCTION  2.5-3.5

 









                    ID                  Date                Data Source

 

                    [id number]            2020 06:25:00 AM EDT [facility] ([facility]









          Name      Value     Range     Interpretation Code Description Data Debi

rce(s) Supporting 

Document(s)

 

                          Natriuretic peptide.B prohormone N-Terminal [Mass/volu

me] in Serum or Plasma 121

 pg/mL                                              [facility] ([facility]  

 

           Magnesium [Mass/volume] in Serum or Plasma 2.2 mg/dL  1.8-2.4        

                  [facility]                 









                    ID                  Date                Data Source

 

                    [id number]            2020 06:25:00 AM EDT [facility] ([facility]









          Name      Value     Range     Interpretation Code Description Data Debi

rce(s) Supporting 

Document(s)

 

          Blood Urea Nitrogen 24 mg/dL  7-18                          [facility] ([facility]

[facility]  

 

          Glucose, Fasting 83 mg/dL                          [facility]  

 

          Glomerular Filtration Rate 41.5                                    [facility]

[facility] ([facility]  

 

                                        <content>Units are mL/min/1.73 

m2</content><br/><content></content><br/><content>Chronic Kidney Disease Staging
 per NKF:</content><br/><content></content><br/><content>Stage I & II   GFR >=60
       Normal to Mildly Decreased</content><br/><content>Stage III      GFR 30-
59      Moderately Decreased</content><br/><content>Stage IV       GFR 15-29    
  Severely Decreased</content><br/><content>Stage V        GFR <15        Very 
Little GFR Left</content><br/><content>ESRD           GFR <15 on 
RRT</content><br/><content></content> 

 

           Creatinine For GFR 1.38 mg/dL 0.55-1.30                        [facility]

 ([facility]                                    

 

          Potassium Serum 3.4 meq/L 3.5-5.1                       [facility]  

 

          Sodium Level 130 meq/L 136-145                       [facility]  

 

          Chloride Level 96 meq/L                          [facility]  

 

          Carbon Dioxide Level 23 meq/L  21-32                         [facility]  

 

          Calcium Level 8.6 mg/dL 8.8-10.2                      [facility]  

 

          Anion Gap 11 meq/L  8-16                          [facility]  









                    ID                  Date                Data Source

 

                    [id number]            2020 06:25:00 AM EDT [facility] ([facility]









          Name      Value     Range     Interpretation Code Description Data Debi

rce(s) Supporting 

Document(s)

 

          Ast/Sgot  80 U/L    7-37                          [facility]  

 

          Alt/SGPT  51 U/L    12-78                         [facility]  

 

          Bilirubin,Total 6.8 mg/dL 0.2-1.0                       [facility]  

 

          Alkaline Phosphatase 136 U/L                           [facility]  

 

          Bilirubin,Direct 4.4 mg/dL 0.0-0.2                       [facility]  

 

          Total Protein 7.9 GM/DL 6.4-8.2                       [facility]  

 

          Albumin   2.0 GM/DL 3.2-5.2                       [facility]  

 

          Albumin/Globulin Ratio 0.3       1.2-2.2                       [facility] 

([facility]  









                    ID                  Date                Data Source

 

                    [id number]            2020 06:25:00 AM EDT [facility] ([facility]









          Name      Value     Range     Interpretation Code Description Data Debi

rce(s) Supporting 

Document(s)

 

           CPK Creatine Phosphokinase 50 U/L                              

  [facility]                                    

 

          MB/CK Relative Index 2.00                                    [facility]  

 

                                        <content>DIAGNOSIS CRITERIA</content><br

/><content>MMB ng/ml       Relative 

Index (RI)</content><br/><content>NON-AMI               < or = 5               
N/A</content><br/><content>GRAY ZONE              > 5                < or = 
4</content><br/><content>AMI                    > 5                   > 
4</content><br/><content></content> 

 

           CK-MB Value Mass Laboratory test result                              

    [facility]                                    

 

          Troponin I Laboratory test result                               [facility]

 ([facility]  

 

                                        <content>Troponin I Reference Interval f

or Siemens Valhalla 

LOCI:</content><br/><content></content><br/><content>99th Percentile= 0.00-0.045
 ng/ml</content><br/><content></content><br/><content>Risk 
Stratification:</content><br/><content><= 0.10 ng/ml   Decreased Risk for 
Adverse Clinical</content><br/><content>Events.</content><br/><content>0.10-1.50
 ng/ml   Increased Risk for Adverse Clinical</content><br/><content>Events. 
Evaluation of additional</content><br/><content>criterion and/or repeat testing 
in 2-6</content><br/><content>hours is suggested to rule out 
myocardial</content><br/><content>damage.</content><br/><content>>= 1.50 ng/ml  
 Indicative of Myocardial Injury.</content><br/><content></content> 









                    ID                  Date                Data Source

 

                    [id number]            2020 06:25:00 AM EDT [facility]









          Name      Value     Range     Interpretation Code Description Data Debi

rce(s) Supporting 

Document(s)

 

           Platelets [#/volume] in Blood by Estimate Laboratory test result     

                             [facility] 

([facility]          

 

                Platelets reticulated/100 platelets in Blood by Automated count 

1.3 %           0.0-9.59         

                                        [facility]  









                    ID                  Date                Data Source

 

                    [id number]            2020 06:25:00 AM EDT [facility]









          Name      Value     Range     Interpretation Code Description Data Debi

rce(s) Supporting 

Document(s)

 

          Neutrophils 70 %      28-66                         [facility]  

 

          Monocytes 8 %       0-5                           [facility]  

 

          Lymphocytes 14 %      16-44                         [facility]  

 

          Eosinophils 5 %       0-3                           [facility]  

 

          Myelocytes 1 %       0-0                           [facility]  

 

          Atypical Lymph 2 %       0-5                           [facility]  

 

           Anisocytosis Laboratory test result                                  

[facility]

                                         









                    ID                  Date                Data Source

 

                    [id number]            2020 06:25:00 AM EDT [facility] ([facility]









          Name      Value     Range     Interpretation Code Description Data Debi

rce(s) Supporting 

Document(s)

 

          White Blood Count 7.9 10    4.0-10.0                      [facility] ([facility]  

 

          Red Blood Count 3.31 10   4.00-5.40                     [facility]  

 

          Hemoglobin 11.7 g/dL 12.0-15.5                     [facility]  

 

           Mean Corpuscular Volume 105.1 fl   80.0-96.0                        [facility]

[facility]                                    

 

          Hematocrit 34.8 %    36.0-47.0                     [facility]  

 

           Mean Corpuscular Hemoglobin 35.3 pg    27.0-33.0                     

   [facility]                              

 

           Mean Corpuscular HGB Conc 33.6 g/dL  32.0-36.5                       

 [facility]                              

 

           Red Cell Distribution Width 14.6 %     11.5-14.5                     

   [facility]                              

 

           Platelet Count, Automated 96 10      150-450                         

 [facility]                                    

 

          Nucleated Red Blood Cell % 0.0 %     0-0                           [facility] ([facility] 

 









                    ID                  Date                Data Source

 

                    [id number]              2020 07:39:00 AM EDT [facility] ([facility])









          Name      Value     Range     Interpretation Code Description Data Debi

rce(s) Supporting 

Document(s)

 

           Alpha-1-Fetoprotein [Mass/volume] in Serum or Plasma 6.3 ng/mL       

                            [facility] 

([facility])                   

 

                                        reviewed bili is still up 

 

                Platelets reticulated/100 platelets in Blood by Automated count 

1.1 %           0.0-9.59         

                                        [facility] ([facility])  

 

                                        reviewed bili is still up 









                    ID                  Date                Data Source

 

                    [id number]              2020 07:39:00 AM EDT [facility] ([facility]









          Name      Value     Range     Interpretation Code Description Data Debi

rce(s) Supporting 

Document(s)

 

          Glucose, Fasting 84 mg/dL                          [facility] ([facility])  

 

                                        reviewed bili is still up 

 

          Creatinine For GFR 1.23 mg/dL 0.55-1.30                     [facility] ([facility])  

 

                                        reviewed bili is still up 

 

          Blood Urea Nitrogen 10 mg/dL  7-18                          [facility] ([facility])  

 

                                        reviewed bili is still up 

 

          Potassium Serum 3.8 meq/L 3.5-5.1                       [facility] ([facility])  

 

                                        reviewed bili is still up 

 

          Glomerular Filtration Rate 47.4                                    [facility]

[facility] ([facility])  

 

                                        reviewed bili is still up 

 

          Sodium Level 130 meq/L 136-145                       [facility])  

 

                                        reviewed bili is still up 

 

          Anion Gap 7 meq/L   8-16                          [facility]  

 

                                        reviewed bili is still up 

 

          Carbon Dioxide Level 30 meq/L  21-32                         [facility] ([facility]

[facility])  

 

                                        reviewed bili is still up 

 

          Chloride Level 93 meq/L                          [facility] ([facility])  

 

                                        reviewed bili is still up 

 

          Ast/Sgot  87 U/L    7-37                          [facility]  

 

                                        reviewed bili is still up 

 

          Calcium Level 8.2 mg/dL 8.8-10.2                      [facility]  

 

                                        reviewed bili is still up 

 

          Alkaline Phosphatase 266 U/L                           [facility] ([facility]

[facility])  

 

                                        reviewed bili is still up 

 

          Bilirubin,Total 6.4 mg/dL 0.2-1.0                       [facility] ([facility])  

 

                                        reviewed bili is still up 

 

          Alt/SGPT  37 U/L    12-78                         [facility]  

 

                                        reviewed bili is still up 

 

          Total Protein 7.6 GM/DL 6.4-8.2                       [facility]  

 

                                        reviewed bili is still up 

 

          Albumin/Globulin Ratio 0.4       1.2-2.2                       [facility] 

([facility])  

 

                                        reviewed bili is still up 

 

          Albumin   2.0 GM/DL 3.2-5.2                       [facility]  

 

                                        reviewed bili is still up 









                    ID                  Date                Data Source

 

                    [id number]              2020 07:39:00 AM EDT [facility] ([facility])









          Name      Value     Range     Interpretation Code Description Data Debi

rce(s) Supporting 

Document(s)

 

          White Blood Count 4.1 10    4.0-10.0                      [facility] ([facility])  

 

                                        reviewed bili is still up 

 

          Red Blood Count 3.34 10   4.00-5.40                     [facility] ([facility]  

 

                                        reviewed bili is still up 

 

          Hemoglobin 11.5 g/dL 12.0-15.5                     [facility]  

 

                                        reviewed bili is still up 

 

          Hematocrit 33.0 %    36.0-47.0                     [facility]  

 

                                        reviewed bili is still up 

 

          Mean Corpuscular Volume 98.8 fl   80.0-96.0                     [facility]

 ([facility])  

 

                                        reviewed bili is still up 

 

          Mean Corpuscular Hemoglobin 34.4 pg   27.0-33.0                     [facility]

[facility] ([facility]) 

 

 

                                        reviewed bili is still up 

 

          Mean Corpuscular HGB Conc 34.8 g/dL 32.0-36.5                     [facility]

[facility] ([facility]) 

 

 

                                        reviewed bili is still up 

 

          Red Cell Distribution Width 13.4 %    11.5-14.5                     [facility]

[facility] ([facility])  

 

                                        reviewed bili is still up 

 

          Platelet Count, Automated 85 10     150-450                       [facility]

[facility] ([facility])  

 

                                        reviewed bili is still up 

 

          Neutrophils % 60.5 %    36.0-66.0                     [facility] ([facility]  

 

                                        reviewed bili is still up 

 

          Lymph %   22.3 %    24.0-44.0                     [facility]  

 

                                        reviewed bili is still up 

 

          Mono %    11.6 %    0.0-5.0                       [facility]  

 

                                        reviewed bili is still up 

 

          Eos %     3.4 %     0.0-3.0                       [facility]  

 

                                        reviewed bili is still up 

 

          Baso %    1.5 %     0.0-1.0                       [facility]  

 

                                        reviewed bili is still up 

 

          Immature Granulocyte % 0.7 %     0-3.0                         [facility] 

([facility])  

 

                                        reviewed bili is still up 

 

          Nucleated Red Blood Cell % 0.0 %     0-0                           [facility]

[facility] ([facility])  

 

                                        reviewed bili is still up 

 

          Neutrophils # 2.5 10    1.5-8.5                       [facility] ([facility])  

 

                                        reviewed bili is still up 

 

          Lymph #   0.9 10    1.5-5.0                       [facility]  

 

                                        reviewed bili is still up 

 

          Mono #    0.5 10    0.0-0.8                       [facility]  

 

                                        reviewed bili is still up 

 

          Baso #    0.1 10    0.0-0.2                       [facility]  

 

                                        reviewed bili is still up 

 

          Eos #     0.1 10    0.0-0.5                       [facility]  

 

                                        reviewed bili is still up 









                    ID                  Date                Data Source

 

                    [id number]         2020 09:47:00 AM EDT [facility])









          Name      Value     Range     Interpretation Code Description Data Debi

rce(s) Supporting 

Document(s)

 

                Platelets reticulated/100 platelets in Blood by Automated count 

1.4 %           0.0-9.59        

Normal (applies to non-numeric results)                           [facility])                                      

 

                                        By: JT

Time: 925

 









                    ID                  Date                Data Source

 

                    [id number]         2020 09:47:00 AM EDT [facility])









          Name      Value     Range     Interpretation Code Description Data Debi

rce(s) Supporting 

Document(s)

 

           White Blood Count 3.4 10     4.0-10.0   Below low normal            M

[facility] ([facility])                            

 

           Red Blood Count 3.95 10    4.00-5.40  Below low normal            [facility]

[facility] ([facility])                            

 

           Hemoglobin 12.7 g/dL  12.0-15.5  Normal (applies to non-numeric resul

ts)            [facility] 

([facility], )         

 

           Hematocrit 36.6 %     36.0-47.0  Normal (applies to non-numeric resul

ts)            [facility]         

 

                Mean Corpuscular Volume 92.7 fl         80.0-96.0       Normal (

applies to non-numeric 

results)                                [facility] ([facility]) 

 

 

                Mean Corpuscular Hemoglobin 32.2 pg         27.0-33.0       Norm

al (applies to non-numeric 

results)                                [facility] ([facility]) 

 

 

                Mean Corpuscular HGB Conc 34.7 g/dL       32.0-36.5       Normal

 (applies to non-numeric 

results)                                [facility] ([facility]) 

 

 

           Platelet Count, Automated 66 10      150-450    Below low normal     

       [facility] ([facility])                    

 

                                        Scan Verified machine results

PLATELET COUNT LESS THAN 100, AND NEW OCCURANCE OR



 

 

                Red Cell Distribution Width 13.6 %          11.5-14.5       Norm

al (applies to non-numeric 

results)                                [facility] ([facility]) 

 

 

           Neutrophils % 53.8 %     36.0-66.0  Normal (applies to non-numeric re

sults)            [facility]         

 

           Lymph %    31.6 %     24.0-44.0  Normal (applies to non-numeric resul

ts)            [facility]         

 

           Mono %     10.4 %     0.0-5.0    Above high normal            [facility] 

([facility])                                      

 

           Eos %      2.4 %      0.0-3.0    Normal (applies to non-numeric resul

ts)            [facility]                    

 

           Baso %     1.5 %      0.0-1.0    Above high normal            [facility] 

([facility])

                                         

 

           Immature Granulocyte % 0.3 %      0-3.0      Normal (applies to non-n

umeric results)            

[facility])  

 

           Nucleated Red Blood Cell % 0.0 %      0-0        Normal (applies to n

on-numeric results)            

[facility]  

 

           Neutrophils # 1.8 10     1.5-8.5    Normal (applies to non-numeric re

sults)            [facility])         

 

           Lymph #    1.1 10     1.5-5.0    Below low normal            [facility] (

[facility])                                      

 

           Eos #      0.1 10     0.0-0.5    Normal (applies to non-numeric resul

ts)            [facility] ([facility])                   

 

           Mono #     0.4 10     0.0-0.8    Normal (applies to non-numeric resul

ts)            [facility] 

([facility])         

 

           Baso #     0.1 10     0.0-0.2    Normal (applies to non-numeric resul

ts)            [facility] 

([facility])         









                    ID                  Date                Data Source

 

                    [id number]         2020 09:47:00 AM EDT [facility] ([facility])









          Name      Value     Range     Interpretation Code Description Data Debi

rce(s) Supporting 

Document(s)

 

           Prothrombin Time 18.2 s     11.8-14.0  Above high normal            M

[facility] ([facility])                            

 

           Inr        1.54                  Normal (applies to non-numeric resul

ts)            [facility] ([facility])                            

 

                                        THERAPUTIC HUMAN INR VALUES

INDICATIONS                      NORMAL RANGES

PROPHYLAXIS/TREATMENT OF:

VENOUS THROMBOSIS                2.0-3.0

PULMONARY EMBOLISM               2.0-3.0

PREVENTION OF SYSTEMIC EMBOLISM FROM:

TISSUE HEART VALVES              2.0-3.0

ACUTE MYOCARDIAL INFARCTION      2.0-3.0

VALVULAR HEART DISEASE           2.0-3.0

ATRIAL FIBRILLATION              2.0-3.0

MECHANICAL VALVES(HIGH RISK)     2.5-3.5

RECURRENT MYOCARDIAL INFARCTION  2.5-3.5

 

 

                Partial Thromboplastin Time 35.6 s          25.0-38.4       Norm

al (applies to non-numeric 

results)                                [facility] ([facility]) 

 









                    ID                  Date                Data Source

 

                    [id number]         2020 09:47:00 AM EDT [facility] ([facility])









          Name      Value     Range     Interpretation Code Description Data Debi

rce(s) Supporting 

Document(s)

 

                    Lactate dehydrogenase [Enzymatic activity/volume] in Serum o

r Plasma 319 U/L             

          Above high normal                 [facility] ([facility])  

 

                                        By: SEARO

Time: 926

By: SEARO Time: 926

 









                    ID                  Date                Data Source

 

                    [id number]         2020 09:47:00 AM EDT [facility] ([facility])









          Name      Value     Range     Interpretation Code Description Data Debi

rce(s) Supporting 

Document(s)

 

           Glucose, Fasting 88 mg/dL        Normal (applies to non-numeric

 results)            

[facility] ([facility])  

 

           Blood Urea Nitrogen 11 mg/dL   7-18       Normal (applies to non-nume

verona results)            

[facility] ([facility])  

 

             Creatinine For GFR 1.07 mg/dL   0.55-1.30    Normal (applies to non

-numeric results) 

                          [facility] ([facility])  

 

           Glomerular Filtration Rate 55.7                  Normal (applies to n

on-numeric results)            

[facility] ([facility])  

 

                                        <content>Units are mL/min/1.73 

m2</content><br/><content></content><br/><content>Chronic Kidney Disease Staging
per NKF:</content><br/><content></content><br/><content>Stage I & II   GFR >=60 
     Normal to Mildly Decreased</content><br/><content>Stage III      GFR 30-59 
    Moderately Decreased</content><br/><content>Stage IV       GFR 15-29      
Severely Decreased</content><br/><content>Stage V        GFR <15        Very 
Little GFR Left</content><br/><content>ESRD           GFR <15 on 
RRT</content><br/><content></content> 

 

           Sodium Level 139 meq/L  136-145    Normal (applies to non-numeric res

ults)            [facility] 

([facility])         

 

           Potassium Serum 3.4 meq/L  3.5-5.1    Below low normal            MED

ENT ([facility])                            

 

           Chloride Level 101 meq/L       Normal (applies to non-numeric r

esults)            [facility]

([facility])         

 

           Carbon Dioxide Level 30 meq/L   21-32      Normal (applies to non-num

tiffani results)            

[facility] ([facility])  

 

           Anion Gap  8 meq/L    8-16       Normal (applies to non-numeric resul

ts)            [facility]         

 

           Calcium Level 8.3 mg/dL  8.8-10.2   Below low normal            [facility]

T ([facility])                            

 

           Ast/Sgot   60 U/L     7-37       Above high normal            [facility] 

([facility])

                                         

 

           Alt/SGPT   31 U/L     12-78      Normal (applies to non-numeric resul

ts)            [facility] 

([facility])         

 

           Alkaline Phosphatase 187 U/L         Above high normal         

   [facility] ([facility])                    

 

           Bilirubin,Total 3.4 mg/dL  0.2-1.0    Above high normal            ME

D[facility] ([facility])                            

 

           Albumin    2.6 GM/DL  3.2-5.2    Below low normal            [facility] (

[facility])                                      

 

           Total Protein 8.5 GM/DL  6.4-8.2    Above high normal            [facility]

NT ([facility])                            

 

           Albumin/Globulin Ratio 0.4        1.2-2.2    Below low normal        

    [facility] ([facility])                            









                    ID                  Date                Data Source

 

                    [id number]            2020 09:47:00 AM EDT [facility] ([facility]









          Name      Value     Range     Interpretation Code Description Data Debi

rce(s) Supporting 

Document(s)

 

                                        Lactate dehydrogenase [Enzymatic activit

y/volume] in Serum or Plasma by Lactate 

to pyruvate reaction 319 U/L                               [facility] ([facility]  

 

                                        By: SEARO

Time: 926

By: SEARO Time: 926

 









                    ID                  Date                Data Source

 

                    [id number]            2020 09:47:00 AM EDT [facility] ([facility]









          Name      Value     Range     Interpretation Code Description Data Debi

rce(s) Supporting 

Document(s)

 

          Glucose, Fasting 88 mg/dL                          [facility] ([facility]  

 

          Blood Urea Nitrogen 11 mg/dL  7-18                          [facility] ([facility]  

 

          Glomerular Filtration Rate 55.7                                    MED

ENT ([facility]  

 

                                        <content>Units are mL/min/1.73 

m2</content><br/><content></content><br/><content>Chronic Kidney Disease Staging
per NKF:</content><br/><content></content><br/><content>Stage I & II   GFR >=60 
     Normal to Mildly Decreased</content><br/><content>Stage III      GFR 30-59 
    Moderately Decreased</content><br/><content>Stage IV       GFR 15-29      
Severely Decreased</content><br/><content>Stage V        GFR <15        Very 
Little GFR Left</content><br/><content>ESRD           GFR <15 on 
RRT</content><br/><content></content> 

 

           Creatinine For GFR 1.07 mg/dL 0.55-1.30                        [facility]

 ([facility]                                    

 

          Sodium Level 139 meq/L 136-145                       [facility] ([facility]  

 

          Potassium Serum 3.4 meq/L 3.5-5.1                       [facility] ([facility]  

 

          Chloride Level 101 meq/L                         [facility] ([facility]  

 

          Calcium Level 8.3 mg/dL 8.8-10.2                      [facility] ([facility]  

 

          Carbon Dioxide Level 30 meq/L  21-32                         [facility] ([facility]  

 

          Anion Gap 8 meq/L   8-16                          [facility] ([facility]  

 

          Alt/SGPT  31 U/L    12-78                         [facility] ([facility]  

 

          Ast/Sgot  60 U/L    7-37                          [facility] ([facility]  

 

          Alkaline Phosphatase 187 U/L                           [facility] ([facility]  

 

          Bilirubin,Total 3.4 mg/dL 0.2-1.0                       [facility] ([facility]  

 

          Albumin/Globulin Ratio 0.4       1.2-2.2                       [facility] 

([facility]  

 

          Total Protein 8.5 GM/DL 6.4-8.2                       [facility] ([facility]  

 

          Albumin   2.6 GM/DL 3.2-5.2                       [facility] ([facility]  









                    ID                  Date                Data Source

 

                    [id number]            2020 09:47:00 AM EDT [facility] ([facility]









          Name      Value     Range     Interpretation Code Description Data Debi

rce(s) Supporting 

Document(s)

 

                Platelets reticulated/100 platelets in Blood by Automated count 

1.4 %           0.0-9.59         

                                        [facility] ([facility]  

 

                                        By: SEARO

Time: 925

 









                    ID                  Date                Data Source

 

                    [id number]            2020 09:47:00 AM EDT [facility] ([facility]









          Name      Value     Range     Interpretation Code Description Data Debi

rce(s) Supporting 

Document(s)

 

          Hemoglobin 12.7 g/dL 12.0-15.5                     [facility] ([facility]  

 

          Hematocrit 36.6 %    36.0-47.0                     [facility] ([facility]  

 

          White Blood Count 3.4 10    4.0-10.0                      [facility] ([facility]  

 

          Red Blood Count 3.95 10   4.00-5.40                     [facility] ([facility]  

 

           Mean Corpuscular Hemoglobin 32.2 pg    27.0-33.0                     

   [facility] ([facility]                              

 

           Mean Corpuscular HGB Conc 34.7 g/dL  32.0-36.5                       

 [facility] ([facility]                              

 

           Mean Corpuscular Volume 92.7 fl    80.0-96.0                        M

[facility] ([facility]                                    

 

           Red Cell Distribution Width 13.6 %     11.5-14.5                     

   [facility] ([facility]                              

 

          Neutrophils % 53.8 %    36.0-66.0                     [facility] ([facility]  

 

           Platelet Count, Automated 66 10      150-450                         

 [facility] ([facility]                                    

 

                                        Scan Verified machine results

PLATELET COUNT LESS THAN 100, AND NEW OCCURANCE OR



 

 

          Eos %     2.4 %     0.0-3.0                       [facility] ([facility]  

 

          Lymph %   31.6 %    24.0-44.0                     [facility]  

 

          Mono %    10.4 %    0.0-5.0                       [facility]  

 

          Neutrophils # 1.8 10    1.5-8.5                       [facility] ([facility]  

 

          Baso %    1.5 %     0.0-1.0                       [facility] ([facility]  

 

          Nucleated Red Blood Cell % 0.0 %     0-0                           MED

[facility] ([facility] 



 

          Immature Granulocyte % 0.3 %     0-3.0                         [facility] 

([facility]  

 

          Mono #    0.4 10    0.0-0.8                       [facility]  

 

          Eos #     0.1 10    0.0-0.5                       [facility]  

 

          Lymph #   1.1 10    1.5-5.0                       [facility]  

 

          Baso #    0.1 10    0.0-0.2                       [facility]  









                    ID                  Date                Data Source

 

                    [id number]            2020 09:47:00 AM EDT [facility] ([facility]









          Name      Value     Range     Interpretation Code Description Data Debi

rce(s) Supporting 

Document(s)

 

          Inr       1.54                                    [facility] ([facility]  

 

                                        THERAPUTIC HUMAN INR VALUES

INDICATIONS                      NORMAL RANGES

PROPHYLAXIS/TREATMENT OF:

VENOUS THROMBOSIS                2.0-3.0

PULMONARY EMBOLISM               2.0-3.0

PREVENTION OF SYSTEMIC EMBOLISM FROM:

TISSUE HEART VALVES              2.0-3.0

ACUTE MYOCARDIAL INFARCTION      2.0-3.0

VALVULAR HEART DISEASE           2.0-3.0

ATRIAL FIBRILLATION              2.0-3.0

MECHANICAL VALVES(HIGH RISK)     2.5-3.5

RECURRENT MYOCARDIAL INFARCTION  2.5-3.5

 

 

           Partial Thromboplastin Time 35.6 s     25.0-38.4                     

   [facility] ([facility]                              

 

          Prothrombin Time 18.2 s    11.8-14.0                     [facility] ([facility]  









                    ID                  Date                Data Source

 

                    [id number]            2020 01:37:00 PM EDT [facility] ([facility]









          Name      Value     Range     Interpretation Code Description Data Debi

e(s) Supporting 

Document(s)

 

          Albumin 25% Laboratory test result                               [facility]

T ([facility] 



 

                                        TRANSFUSED PRODUCT: ALBUMIN 25%         

                COUNT: 2

 









                    ID                  Date                Data Source

 

                    [id number]            2020 03:00:00 PM EST [facility] ([facility]









          Name      Value     Range     Interpretation Code Description Data Debi

[facility](s) Supporting 

Document(s)

 

          Glucose, Fasting 173 mg/dL                         [facility] ([facility]  

 

           Creatinine For GFR 1.14 mg/dL 0.55-1.30                        [facility]

 ([facility]                                    

 

          Glomerular Filtration Rate 51.9                                    MED

[facility] ([facility]  

 

                                        <content>Units are mL/min/1.73 

m2</content><br/><content></content><br/><content>Chronic Kidney Disease Staging
per NKF:</content><br/><content></content><br/><content>Stage I & II   GFR >=60 
     Normal to Mildly Decreased</content><br/><content>Stage III      GFR 30-59 
    Moderately Decreased</content><br/><content>Stage IV       GFR 15-29      
Severely Decreased</content><br/><content>Stage V        GFR <15        Very 
Little GFR Left</content><br/><content>ESRD           GFR <15 on 
RRT</content><br/><content></content> 

 

          Blood Urea Nitrogen 14 mg/dL  7-18                          [facility] ([facility]  

 

          Chloride Level 109 meq/L                         [facility] ([facility]  

 

          Sodium Level 139 meq/L 136-145                       [facility] ([facility]  

 

          Potassium Serum 3.7 meq/L 3.5-5.1                       [facility] ([facility]  

 

          Carbon Dioxide Level 24 meq/L  21-32                         [facility] ([facility]  

 

          Anion Gap 6 meq/L   8-16                          [facility]  

 

          Calcium Level 8.0 mg/dL 8.5-10.1                      [facility] ([facility]  

 

          Alt/SGPT  23 U/L    12-78                         [facility]  

 

          Alkaline Phosphatase 127 U/L                           [facility] ([facility]  

 

          Ast/Sgot  31 U/L    7-37                          [facility]  

 

          Bilirubin,Total 2.1 mg/dL 0.2-1.0                       [facility] ([facility]  

 

          Total Protein 7.2 GM/DL 6.4-8.2                       [facility] ([facility]  

 

          Albumin   2.6 GM/DL 3.2-5.2                       [facility]  

 

           Albumin/Globulin Ratio 0.57       1.00-1.93                        [facility]

[facility] ([facility]                                    









                    ID                  Date                Data Source

 

                    [id number]            2020 09:20:00 AM EST [facility] ([facility]









          Name      Value     Range     Interpretation Code Description Data Debi

rce(s) Supporting 

Document(s)

 

          Albumin 25% Laboratory test result                               [facility]

T ([facility] 



 

                                        TRANSFUSED PRODUCT: ALBUMIN 25%         

                COUNT: 1

 









                    ID                  Date                Data Source

 

                    [id number]            2020 08:05:00 AM EST [facility] ([facility]









          Name      Value     Range     Interpretation Code Description Data Debi

rce(s) Supporting 

Document(s)

 

          Albumin 25% Laboratory test result                               [facility]

T ([facility] 



 

                                        TRANSFUSED PRODUCT: ALBUMIN 25%         

                COUNT: 7

 









                    ID                  Date                Data Source

 

                    [id number]            2020 07:06:00 AM EST [facility] ([facility]









          Name      Value     Range     Interpretation Code Description Data Debi

rce(s) Supporting 

Document(s)

 

           Ethanol [Mass/volume] in Serum or Plasma Laboratory test result 0.000

-0.010                       

[facility] ([facility]   

 

           Ammonia [Mass/volume] in Blood 32 uMOL/L                             

      [facility] ([facility]                                    









                    ID                  Date                Data Source

 

                    [id number]            2020 11:27:00 PM EST [facility] ([facility]









          Name      Value     Range     Interpretation Code Description Data Debi

rce(s) Supporting 

Document(s)

 

          Glucose, Fasting 153 mg/dL                         [facility] ([facility]  

 

          Blood Urea Nitrogen 12 mg/dL  7-18                          [facility] ([facility]  

 

           Creatinine For GFR 1.14 mg/dL 0.55-1.30                        [facility]

 ([facility]                                    

 

          Glomerular Filtration Rate 51.9                                    MED

[facility] ([facility]  

 

                                        <content>Units are mL/min/1.73 

m2</content><br/><content></content><br/><content>Chronic Kidney Disease Staging
 per NKF:</content><br/><content></content><br/><content>Stage I & II   GFR >=60
       Normal to Mildly Decreased</content><br/><content>Stage III      GFR 30-
59      Moderately Decreased</content><br/><content>Stage IV       GFR 15-29    
  Severely Decreased</content><br/><content>Stage V        GFR <15        Very 
Little GFR Left</content><br/><content>ESRD           GFR <15 on 
RRT</content><br/><content></content> 

 

          Sodium Level 138 meq/L 136-145                       [facility] ([facility]  

 

          Chloride Level 107 meq/L                         [facility] ([facility]  

 

          Potassium Serum 3.6 meq/L 3.5-5.1                       [facility] ([facility]  

 

          Carbon Dioxide Level 25 meq/L  21-32                         [facility] ([facility]  

 

          Calcium Level 8.1 mg/dL 8.5-10.1                      [facility] ([facility]  

 

          Anion Gap 6 meq/L   8-16                          [facility]  

 

          Alkaline Phosphatase 122 U/L                           [facility] ([facility]  

 

          Alt/SGPT  23 U/L    12-78                         [facility]  

 

          Ast/Sgot  28 U/L    7-37                          [facility]  

 

          Total Protein 7.2 GM/DL 6.4-8.2                       [facility] ([facility]  

 

          Bilirubin,Total 2.7 mg/dL 0.2-1.0                       [facility] ([facility]  

 

          Albumin   2.6 GM/DL 3.2-5.2                       [facility]  

 

           Albumin/Globulin Ratio 0.57       1.00-1.93                        [facility]

[facility] ([facility]                                    









                    ID                  Date                Data Source

 

                    [id number]            2020 11:27:00 PM EST [facility] ([facility]









          Name      Value     Range     Interpretation Code Description Data Debi

rce(s) Supporting 

Document(s)

 

           Ammonia [Mass/volume] in Blood 71 uMOL/L                             

      [facility]                                    









                    ID                  Date                Data Source

 

                    [id number]            2020 11:27:00 PM EST [facility] ([facility]









          Name      Value     Range     Interpretation Code Description Data Debi

rce(s) Supporting 

Document(s)

 

          White Blood Count 4.0 10    4.0-10.0                      [facility] ([facility]  

 

          Hematocrit 33.6 %    36.0-47.0                     [facility]  

 

          Hemoglobin 10.7 g/dL 12.0-15.5                     [facility]  

 

          Red Blood Count 3.47 10   4.00-5.40                     [facility] ([facility]  

 

           Mean Corpuscular Hemoglobin 30.8 pg    27.0-33.0                     

   [facility] ([facility]                              

 

           Mean Corpuscular Volume 96.8 fl    80.0-96.0                        M

[facility] ([facility]                                    

 

          Nucleated Red Blood Cell % 0.0 %     0-0                           MED

[facility] ([facility] 

 

 

           Platelet Count, Automated 112 10     150-450                         

 [facility]                                    

 

           Mean Corpuscular HGB Conc 31.8 g/dL  32.0-36.5                       

 [facility] ([facility]                              

 

           Red Cell Distribution Width 14.6 %     11.5-14.5                     

   [facility] ([facility]                              









                    ID                  Date                Data Source

 

                    [id number]            2020 11:24:00 AM EST [facility] ([facility]









          Name      Value     Range     Interpretation Code Description Data Debi

rce(s) Supporting 

Document(s)

 

          Albumin 25% Laboratory test result                               [facility]

T ([facility] 

 

 

                                        TRANSFUSED PRODUCT: ALBUMIN 25%         

                COUNT: 6

 









                    ID                  Date                Data Source

 

                    [id number]            2019 01:02:00 PM EST [facility] ([facility]









          Name      Value     Range     Interpretation Code Description Data Debi

rce(s) Supporting 

Document(s)

 

           Alpha-1-Fetoprotein [Mass/volume] in Serum or Plasma 11.4 ng/mL      

                            [facility] 

([facility]          

 

                                        THE AFP ASSAY IS PERFORMED ON THE [employer] BY

CHEMILUMINESCENCE AND SHOULD NOT BE COMPARED INTERCHANGEABLY

WITH OTHER METHODS. IT SHOULD NOT BE USED ALONE AS A

SCREENING TEST OR DIAGNOSIS FOR THE PRESENCE OR ABSENCE OF

MALIGNANT DISEASE.  THESE RESULTS ARE NOT INTERPRETABLE IN

PREGNANT FEMALES.

PREDICTIONS OF DISEASE RECURRENCE SHOULD NOT BE BASED SOLELY

ON VALUES OBTAINED FROM SERIAL PATIENT SERUM VALUES.

 









                    ID                  Date                Data Source

 

                    [id number]            2019 01:02:00 PM EST [facility] ([facility]









          Name      Value     Range     Interpretation Code Description Data Debi

rce(s) Supporting 

Document(s)

 

          Glucose, Fasting 133 mg/dL                         [facility] ([facility]  

 

          Blood Urea Nitrogen 25 mg/dL  7-18                          [facility] ([facility]  

 

           Creatinine For GFR 1.75 mg/dL 0.55-1.30                        [facility]

 ([facility]                                    

 

          Sodium Level 133 meq/L 136-145                       [facility] ([facility]  

 

          Potassium Serum 4.5 meq/L 3.5-5.1                       [facility] ([facility]  

 

          Glomerular Filtration Rate 31.7                                    MED

[facility] ([facility] [facility]  

 

                                        <content>Units are mL/min/1.73 

m2</content><br/><content></content><br/><content>Chronic Kidney Disease Staging
 per NKF:</content><br/><content></content><br/><content>Stage I & II   GFR >=60
       Normal to Mildly Decreased</content><br/><content>Stage III      GFR 30-
59      Moderately Decreased</content><br/><content>Stage IV       GFR 15-29    
  Severely Decreased</content><br/><content>Stage V        GFR <15        Very 
Little GFR Left</content><br/><content>ESRD           GFR <15 on 
RRT</content><br/><content></content> 

 

          Anion Gap 8 meq/L   8-16                          [facility] ([facility]  

 

          Chloride Level 102 meq/L                         [facility] ([facility]  

 

          Carbon Dioxide Level 23 meq/L  21-32                         [facility] ([facility]TT [facility]  

 

          Calcium Level 8.7 mg/dL 8.5-10.1                      [facility] ([facility]  

 

          Alt/SGPT  33 U/L    12-78                         [facility] ([facility]  

 

          Ast/Sgot  73 U/L    7-37                          [facility] ([facility]  

 

          Bilirubin,Total 4.3 mg/dL 0.2-1.0                       [facility] ([facility]  

 

          Alkaline Phosphatase 133 U/L                           [facility] ([facility]  

 

          Albumin   2.2 GM/DL 3.2-5.2                       [facility] ([facility]  

 

           Albumin/Globulin Ratio 0.37       1.00-1.93                        [facility]

[facility] ([facility]                                    

 

          Total Protein 8.1 GM/DL 6.4-8.2                       [facility] ([facility]  









                    ID                  Date                Data Source

 

                    [id number]            2019 01:02:00 PM EST [facility] ([facility]









          Name      Value     Range     Interpretation Code Description Data Debi

rce(s) Supporting 

Document(s)

 

           Ammonia [Mass/volume] in Blood Laboratory test result                

                  [facility] ([facility]                    









                    ID                  Date                Data Source

 

                    [id number]            2019 01:02:00 PM EST [facility] ([facility]









          Name      Value     Range     Interpretation Code Description Data Debi

rce(s) Supporting 

Document(s)

 

          Prothrombin Time 18.3 s    11.8-14.0                     [facility] ([facility]  

 

          Inr       1.55                                    [facility] ([facility]  

 

                                        THERAPUTIC HUMAN INR VALUES

INDICATIONS                      NORMAL RANGES

PROPHYLAXIS/TREATMENT OF:

VENOUS THROMBOSIS                2.0-3.0

PULMONARY EMBOLISM               2.0-3.0

PREVENTION OF SYSTEMIC EMBOLISM FROM:

TISSUE HEART VALVES              2.0-3.0

ACUTE MYOCARDIAL INFARCTION      2.0-3.0

VALVULAR HEART DISEASE           2.0-3.0

ATRIAL FIBRILLATION              2.0-3.0

MECHANICAL VALVES(HIGH RISK)     2.5-3.5

RECURRENT MYOCARDIAL INFARCTION  2.5-3.5

 

 

           Partial Thromboplastin Time 34.4 s     25.0-38.4                     

   [facility] ([facility]                              









                    ID                  Date                Data Source

 

                    [id number]            2019 01:02:00 PM EST [facility] ([facility]









          Name      Value     Range     Interpretation Code Description Data Debi

e(s) Supporting 

Document(s)

 

          Red Blood Count 3.33 10   4.00-5.40                     [facility] ([facility]  

 

          White Blood Count 3.7 10    4.0-10.0                      [facility] (Mallorie [facility]  

 

          Hemoglobin 11.0 g/dL 12.0-15.5                     [facility] ([facility]  

 

           Mean Corpuscular Volume 100.3 fl   80.0-96.0                        [facility]

[facility] ([facility]                                    

 

          Hematocrit 33.4 %    36.0-47.0                     [facility] ([facility]  

 

           Mean Corpuscular Hemoglobin 33.0 pg    27.0-33.0                     

   [facility] ([facility]                              

 

           Mean Corpuscular HGB Conc 32.9 g/dL  32.0-36.5                       

 [facility] ([facility]                              

 

           Red Cell Distribution Width 14.7 %     11.5-14.5                     

   [facility] ([facility]                              

 

          Neutrophils % 68.6 %    36.0-66.0                     [facility] ([facility]  

 

           Platelet Count, Automated 124 10     150-450                         

 [facility] ([facility]                                    

 

          Lymph %   19.7 %    24.0-44.0                     [facility] ([facility]  

 

          Eos %     1.4 %     0.0-3.0                       [facility] ([facility]  

 

          Baso %    0.5 %     0.0-1.0                       [facility] ([facility]  

 

          Mono %    9.3 %     0.0-5.0                       [facility] ([facility] A. Abrahan

liams[facility]  

 

          Neutrophils # 2.5 10    1.5-8.5                       [facility] ([facility]  

 

          Immature Granulocyte % 0.5 %     0-3.0                         [facility] 

([facility]  

 

          Nucleated Red Blood Cell % 0.0 %     0-0                           [facility]

[facility] ([facility] 

 

 

          Lymph #   0.7 10    1.5-5.0                       [facility] ([facility]  

 

          Mono #    0.3 10    0.0-0.8                       [facility] ([facility]  

 

          Eos #     0.1 10    0.0-0.5                       [facility] ([facility]  

 

          Baso #    0.0 10    0.0-0.2                       [facility] ([facility]  









                    ID                  Date                Data Source

 

                    [id number]              2019 01:02:00 PM EST [facility] ([facility]

[employer])









          Name      Value     Range     Interpretation Code Description Data Debi

rce(s) Supporting 

Document(s)

 

          Inr       1.55                                    [facility] ([facility]  

 

                                        Labs are all abnormal Low wbc Low blood 

count Low platelets abnormal INR 

increased creatinine. Abdominal ct shows only cirrhosis. 

 

          Prothrombin Time 18.3 s    11.8-14.0                     [facility] ([facility]

[employer])  

 

                                        Labs are all abnormal Low wbc Low blood 

count Low platelets abnormal INR 

increased creatinine. Abdominal ct shows only cirrhosis. 

 

          Partial Thromboplastin Time 34.4 s    25.0-38.4                     [facility]

[facility] ([facility])  

 

                                        Labs are all abnormal Low wbc Low blood 

count Low platelets abnormal INR 

increased creatinine. Abdominal ct shows only cirrhosis. 









                    ID                  Date                Data Source

 

                    [id number]              2019 01:02:00 PM EST [facility] ([facility]

[employer])









          Name      Value     Range     Interpretation Code Description Data Debi

rce(s) Supporting 

Document(s)

 

          Glucose, Fasting 133 mg/dL                         [facility] ([facility]

[employer])  

 

                                        Labs are all abnormal Low wbc Low blood 

count Low platelets abnormal INR 

increased creatinine. Abdominal ct shows only cirrhosis. 

 

          Blood Urea Nitrogen 25 mg/dL  7-18                          [facility] ([facility])  

 

                                        Labs are all abnormal Low wbc Low blood 

count Low platelets abnormal INR 

increased creatinine. Abdominal ct shows only cirrhosis. 

 

          Sodium Level 133 meq/L 136-145                       [facility] ([facility])  

 

                                        Labs are all abnormal Low wbc Low blood 

count Low platelets abnormal INR 

increased creatinine. Abdominal ct shows only cirrhosis. 

 

          Creatinine For GFR 1.75 mg/dL 0.55-1.30                     [facility] ([facility])  

 

                                        Labs are all abnormal Low wbc Low blood 

count Low platelets abnormal INR 

increased creatinine. Abdominal ct shows only cirrhosis. 

 

          Glomerular Filtration Rate 31.7                                    [facility]

[facility] ([facility])  

 

                                        Labs are all abnormal Low wbc Low blood 

count Low platelets abnormal INR 

increased creatinine. Abdominal ct shows only cirrhosis. 

 

          Chloride Level 102 meq/L                         [facility] ([facility])  

 

                                        Labs are all abnormal Low wbc Low blood 

count Low platelets abnormal INR 

increased creatinine. Abdominal ct shows only cirrhosis. 

 

          Potassium Serum 4.5 meq/L 3.5-5.1                       [facility] ([facility]

[facility])  

 

                                        Labs are all abnormal Low wbc Low blood 

count Low platelets abnormal INR 

increased creatinine. Abdominal ct shows only cirrhosis. 

 

          Anion Gap 8 meq/L   8-16                          [facility]  

 

                                        Labs are all abnormal Low wbc Low blood 

count Low platelets abnormal INR 

increased creatinine. Abdominal ct shows only cirrhosis. 

 

          Carbon Dioxide Level 23 meq/L  21-32                         [facility] ([facility]

[facility]  

 

                                        Labs are all abnormal Low wbc Low blood 

count Low platelets abnormal INR 

increased creatinine. Abdominal ct shows only cirrhosis. 

 

          Calcium Level 8.7 mg/dL 8.5-10.1                      [facility] ([facility])  

 

                                        Labs are all abnormal Low wbc Low blood 

count Low platelets abnormal INR 

increased creatinine. Abdominal ct shows only cirrhosis. 

 

          Ast/Sgot  73 U/L    7-37                          [facility]  

 

                                        Labs are all abnormal Low wbc Low blood 

count Low platelets abnormal INR 

increased creatinine. Abdominal ct shows only cirrhosis. 

 

          Alkaline Phosphatase 133 U/L                           [facility] ([facility]  

 

                                        Labs are all abnormal Low wbc Low blood 

count Low platelets abnormal INR 

increased creatinine. Abdominal ct shows only cirrhosis. 

 

          Alt/SGPT  33 U/L    12-78                         [facility]  

 

                                        Labs are all abnormal Low wbc Low blood 

count Low platelets abnormal INR 

increased creatinine. Abdominal ct shows only cirrhosis. 

 

          Bilirubin,Total 4.3 mg/dL 0.2-1.0                       [facility] ([facility]

[facility])  

 

                                        Labs are all abnormal Low wbc Low blood 

count Low platelets abnormal INR 

increased creatinine. Abdominal ct shows only cirrhosis. 

 

          Albumin   2.2 GM/DL 3.2-5.2                       [facility]  

 

                                        Labs are all abnormal Low wbc Low blood 

count Low platelets abnormal INR 

increased creatinine. Abdominal ct shows only cirrhosis. 

 

          Total Protein 8.1 GM/DL 6.4-8.2                       [facility] ([facility])  

 

                                        Labs are all abnormal Low wbc Low blood 

count Low platelets abnormal INR 

increased creatinine. Abdominal ct shows only cirrhosis. 

 

          Albumin/Globulin Ratio 0.37      1.00-1.93                     [facility] 

([facility])  

 

                                        Labs are all abnormal Low wbc Low blood 

count Low platelets abnormal INR 

increased creatinine. Abdominal ct shows only cirrhosis. 









                    ID                  Date                Data Source

 

                    [id number]              2019 01:02:00 PM EST [facility] ([facility]

[facility])









          Name      Value     Range     Interpretation Code Description Data Edbi

rce(s) Supporting 

Document(s)

 

          Red Blood Count 3.33 10   4.00-5.40                     [facility] ([facility]

[facility])  

 

                                        Labs are all abnormal Low wbc Low blood 

count Low platelets abnormal INR 

increased creatinine. Abdominal ct shows only cirrhosis. 

 

          Hemoglobin 11.0 g/dL 12.0-15.5                     [facility]  

 

                                        Labs are all abnormal Low wbc Low blood 

count Low platelets abnormal INR 

increased creatinine. Abdominal ct shows only cirrhosis. 

 

          White Blood Count 3.7 10    4.0-10.0                      [facility] ([facility])  

 

                                        Labs are all abnormal Low wbc Low blood 

count Low platelets abnormal INR 

increased creatinine. Abdominal ct shows only cirrhosis. 

 

          Hematocrit 33.4 %    36.0-47.0                     [facility]  

 

                                        Labs are all abnormal Low wbc Low blood 

count Low platelets abnormal INR 

increased creatinine. Abdominal ct shows only cirrhosis. 

 

          Mean Corpuscular Volume 100.3 fl  80.0-96.0                     [facility]

 ([facility])  

 

                                        Labs are all abnormal Low wbc Low blood 

count Low platelets abnormal INR 

increased creatinine. Abdominal ct shows only cirrhosis. 

 

          Mean Corpuscular Hemoglobin 33.0 pg   27.0-33.0                     [facility]

[facility] ([facility]) 

 

 

                                        Labs are all abnormal Low wbc Low blood 

count Low platelets abnormal INR 

increased creatinine. Abdominal ct shows only cirrhosis. 

 

          Mean Corpuscular HGB Conc 32.9 g/dL 32.0-36.5                     [facility]

[facility] ([facility]) 

 

 

                                        Labs are all abnormal Low wbc Low blood 

count Low platelets abnormal INR 

increased creatinine. Abdominal ct shows only cirrhosis. 

 

          Red Cell Distribution Width 14.7 %    11.5-14.5                     [facility]

[facility] ([facility])  

 

                                        Labs are all abnormal Low wbc Low blood 

count Low platelets abnormal INR 

increased creatinine. Abdominal ct shows only cirrhosis. 

 

          Platelet Count, Automated 124 10    150-450                       [facility]

[facility] ([facility])  

 

                                        Labs are all abnormal Low wbc Low blood 

count Low platelets abnormal INR 

increased creatinine. Abdominal ct shows only cirrhosis. 

 

          Lymph %   19.7 %    24.0-44.0                     [facility]  

 

                                        Labs are all abnormal Low wbc Low blood 

count Low platelets abnormal INR 

increased creatinine. Abdominal ct shows only cirrhosis. 

 

          Neutrophils % 68.6 %    36.0-66.0                     [facility] ([facility])  

 

                                        Labs are all abnormal Low wbc Low blood 

count Low platelets abnormal INR 

increased creatinine. Abdominal ct shows only cirrhosis. 

 

          Mono %    9.3 %     0.0-5.0                       [facility]  

 

                                        Labs are all abnormal Low wbc Low blood 

count Low platelets abnormal INR 

increased creatinine. Abdominal ct shows only cirrhosis. 

 

          Eos %     1.4 %     0.0-3.0                       [facility]  

 

                                        Labs are all abnormal Low wbc Low blood 

count Low platelets abnormal INR 

increased creatinine. Abdominal ct shows only cirrhosis. 

 

          Baso %    0.5 %     0.0-1.0                       [facility] ([facility]  

 

                                        Labs are all abnormal Low wbc Low blood 

count Low platelets abnormal INR 

increased creatinine. Abdominal ct shows only cirrhosis. 

 

          Immature Granulocyte % 0.5 %     0-3.0                         [facility] 

([facility])  

 

                                        Labs are all abnormal Low wbc Low blood 

count Low platelets abnormal INR 

increased creatinine. Abdominal ct shows only cirrhosis. 

 

          Neutrophils # 2.5 10    1.5-8.5                       [facility] ([facility])  

 

                                        Labs are all abnormal Low wbc Low blood 

count Low platelets abnormal INR 

increased creatinine. Abdominal ct shows only cirrhosis. 

 

          Nucleated Red Blood Cell % 0.0 %     0-0                           [facility]

[facility] ([facility])  

 

                                        Labs are all abnormal Low wbc Low blood 

count Low platelets abnormal INR 

increased creatinine. Abdominal ct shows only cirrhosis. 

 

          Mono #    0.3 10    0.0-0.8                       [facility] ([facility]  

 

                                        Labs are all abnormal Low wbc Low blood 

count Low platelets abnormal INR 

increased creatinine. Abdominal ct shows only cirrhosis. 

 

          Lymph #   0.7 10    1.5-5.0                       [facility]  

 

                                        Labs are all abnormal Low wbc Low blood 

count Low platelets abnormal INR 

increased creatinine. Abdominal ct shows only cirrhosis. 

 

          Baso #    0.0 10    0.0-0.2                       [facility] ([facility]  

 

                                        Labs are all abnormal Low wbc Low blood 

count Low platelets abnormal INR 

increased creatinine. Abdominal ct shows only cirrhosis. 

 

          Eos #     0.1 10    0.0-0.5                       [facility] ([facility]  

 

                                        Labs are all abnormal Low wbc Low blood 

count Low platelets abnormal INR 

increased creatinine. Abdominal ct shows only cirrhosis. 









                    ID                  Date                Data Source

 

                    [id number]              2019 01:02:00 PM EST [facility] ([facility])









          Name      Value     Range     Interpretation Code Description Data Debi

rce(s) Supporting 

Document(s)

 

           Alpha-1-Fetoprotein [Mass/volume] in Serum or Plasma 11.4 ng/mL      

                            [facility] 

([facility])                   

 

                                        Labs are all abnormal Low wbc Low blood 

count Low platelets abnormal INR 

increased creatinine. Abdominal ct shows only cirrhosis. 

 

          Ammonia [Mass/volume] in Blood < 10 uMOL/L                            

   [facility] ([facility])  

 

                                        Labs are all abnormal Low wbc Low blood 

count Low platelets abnormal INR 

increased creatinine. Abdominal ct shows only cirrhosis. 









                    ID                  Date                Data Source

 

                    [id number]            2019 11:15:00 AM EST [facility] ([facility]









          Name      Value     Range     Interpretation Code Description Data Debi

rce(s) Supporting 

Document(s)

 

          PH,Urine  5.0 units 5.0-9.0                       [facility] ([facility]  

 

           Appearance, Urine Laboratory test result                             

     [facility] ([facility]                                    

 

           Color, Urine Laboratory test result                                  

[facility] ([facility]

                                         

 

           Protein, Urine Auto Laboratory test result                           

       [facility] ([facility]                              

 

           Specific Gravity Urine Auto 1.011      1.002-1.035                   

    [facility] ([facility]                              

 

           Urobilinogen, Urine Auto 0.2 mg/dL  0.0-2.0                          

[facility] ([facility]                                    

 

           Glucose, Urine (Ua) Auto Laboratory test result                      

            [facility] ([facility]                             

 

           Ketone, Urine Auto Laboratory test result                            

      [facility] ([facility]

 [facility]                                   

 

           Leukocyte Esterase, Urine Auto Laboratory test result                

                  [facility] ([facility]                    

 

           Nitrite, Urine Auto Laboratory test result                           

       [facility] ([facility]                              

 

           Bilirubin, Urine Auto Laboratory test result                         

         [facility] ([facility]                              

 

           Blood, Urine Blood Laboratory test result                            

      [facility] ([facility]

 [facility]                                   

 

          WBC, Urine Auto 96 /HPF   0-3                           [facility] ([facility]  

 

          RBC, Urine Auto 138 /HPF  0-3                           [facility] ([facility]  

 

           Squamous Epithelial Cell Ur AU 2 /HPF     0-6                        

      [facility] ([facility]                                    

 

           Bacteria, Urine Auto Laboratory test result                          

        [facility] ([facility]                              

 

           Mucus, Urine Laboratory test result                                  

[facility] ([facility]

                                         

 

          Hyaline Cast, Urine Auto 0 /LPF    0-1                           [facility]

T ([facility]  









                    ID                  Date                Data Source

 

                    [id number]            2019 11:15:00 AM EST [facility] ([facility]









          Name      Value     Range     Interpretation Code Description Data Debi

rce(s) Supporting 

Document(s)

 

           Amylase [Enzymatic activity/volume] in Serum or Plasma 79 U/L     25-

115                           [facility] 

([facility]          

 

                    Lipoprotein lipase [Enzymatic activity/volume] in Serum or P

lasma 324 U/L             

                                                [facility] ([facility]

 [facility]  









                    ID                  Date                Data Source

 

                    [id number]            2019 11:15:00 AM EST [facility] ([facility]









          Name      Value     Range     Interpretation Code Description Data Debi

rce(s) Supporting 

Document(s)

 

          Glucose, Fasting 102 mg/dL                         [facility] ([facility]  

 

          Blood Urea Nitrogen 38 mg/dL  7-18                          [facility] ([facility]  

 

           Creatinine For GFR 2.85 mg/dL 0.55-1.30                        [facility]

 ([facility]                                    

 

          Potassium Serum 3.9 meq/L 3.5-5.1                       [facility] ([facility]  

 

          Glomerular Filtration Rate 18.0                                    [facility]

[facility] ([facility]  

 

                                        <content>Units are mL/min/1.73 

m2</content><br/><content></content><br/><content>Chronic Kidney Disease Staging
per NKF:</content><br/><content></content><br/><content>Stage I & II   GFR >=60 
     Normal to Mildly Decreased</content><br/><content>Stage III      GFR 30-59 
    Moderately Decreased</content><br/><content>Stage IV       GFR 15-29      
Severely Decreased</content><br/><content>Stage V        GFR <15        Very 
Little GFR Left</content><br/><content>ESRD           GFR <15 on 
RRT</content><br/><content></content> 

 

          Sodium Level 133 meq/L 136-145                       [facility]  

 

          Chloride Level 98 meq/L                          [facility]  

 

          Carbon Dioxide Level 26 meq/L  21-32                         [facility]  

 

          Anion Gap 9 meq/L   8-16                          [facility]  

 

          Calcium Level 8.4 mg/dL 8.5-10.1                      [facility]  









                    ID                  Date                Data Source

 

                    [id number]            2019 11:15:00 AM EST [facility] ([facility]









          Name      Value     Range     Interpretation Code Description Data Debi

rce(s) Supporting 

Document(s)

 

          Ast/Sgot  75 U/L    7-37                          [facility]  

 

          Alt/SGPT  25 U/L    12-78                         [facility]  

 

          Bilirubin,Direct 2.9 mg/dL 0.0-0.2                       [facility]  

 

          Alkaline Phosphatase 99 U/L                            [facility]  

 

          Bilirubin,Total 4.7 mg/dL 0.2-1.0                       [facility]  

 

          Total Protein 7.2 GM/DL 6.4-8.2                       [facility]  

 

          Albumin   1.8 GM/DL 3.2-5.2                       [facility]  

 

           Albumin/Globulin Ratio 0.33       1.00-1.93                        [facility]

[facility]                                    









                    ID                  Date                Data Source

 

                    [id number]            2019 11:15:00 AM EST [facility]









          Name      Value     Range     Interpretation Code Description Data [facility] Supporting 

Document(s)

 

           Ammonia [Mass/volume] in Blood Laboratory test result                

                  [facility]                    









                    ID                  Date                Data Source

 

                    [id number]            2019 11:15:00 AM EST [facility]









          Name      Value     Range     Interpretation Code Description Data [facility] Supporting 

Document(s)

 

          Inr       1.90                                    [facility]  

 

                                        THERAPUTIC HUMAN INR VALUES

INDICATIONS                      NORMAL RANGES

PROPHYLAXIS/TREATMENT OF:

VENOUS THROMBOSIS                2.0-3.0

PULMONARY EMBOLISM               2.0-3.0

PREVENTION OF SYSTEMIC EMBOLISM FROM:

TISSUE HEART VALVES              2.0-3.0

ACUTE MYOCARDIAL INFARCTION      2.0-3.0

VALVULAR HEART DISEASE           2.0-3.0

ATRIAL FIBRILLATION              2.0-3.0

MECHANICAL VALVES(HIGH RISK)     2.5-3.5

RECURRENT MYOCARDIAL INFARCTION  2.5-3.5

 

 

          Prothrombin Time 21.5 s    11.8-14.0                     [facility]  









                    ID                  Date                Data Source

 

                    [id number]            2019 11:15:00 AM EST [facility]









          Name      Value     Range     Interpretation Code Description Data Debi

rce(s) Supporting 

Document(s)

 

          White Blood Count 4.5 10    4.0-10.0                      [facility]  

 

          Hemoglobin 11.1 g/dL 12.0-15.5                     [facility]  

 

          Red Blood Count 3.28 10   4.00-5.40                     [facility]  

 

          Hematocrit 32.7 %    36.0-47.0                     [facility]  

 

           Mean Corpuscular HGB Conc 33.9 g/dL  32.0-36.5                       

 [facility]                              

 

           Mean Corpuscular Hemoglobin 33.8 pg    27.0-33.0                     

   [facility]                              

 

           Mean Corpuscular Volume 99.7 fl    80.0-96.0                        M

[facility]                                    

 

           Platelet Count, Automated 103 10     150-450                         

 [facility]                                    

 

           Red Cell Distribution Width 14.5 %     11.5-14.5                     

   [facility]                              

 

          Neutrophils % 56.8 %    36.0-66.0                     [facility]  

 

          Lymph %   21.4 %    24.0-44.0                     [facility]  

 

          Mono %    16.5 %    0.0-5.0                       [facility]  

 

          Immature Granulocyte % 0.4 %     0-3.0                         [facility] 

[facility]  

 

          Baso %    1.6 %     0.0-1.0                       [facility]  

 

          Eos %     3.3 %     0.0-3.0                       [facility]  

 

          Lymph #   1.0 10    1.5-5.0                       [facility]  

 

          Neutrophils # 2.6 10    1.5-8.5                       [facility]  

 

          Nucleated Red Blood Cell % 0.0 %     0-0                           [facility]

[facility] 

 

 

          Eos #     0.2 10    0.0-0.5                       [facility]  

 

          Baso #    0.1 10    0.0-0.2                       [facility]  

 

          Mono #    0.7 10    0.0-0.8                       [facility]  









                    ID                  Date                Data Source

 

                    [id number]            2019 06:58:00 PM EST [facility]









          Name      Value     Range     Interpretation Code Description Data Debi

rce(s) Supporting 

Document(s)

 

           Ammonia [Mass/volume] in Blood 23 uMOL/L                             

      [facility]                                    









                    ID                  Date                Data Source

 

                    [id number]            2019 06:30:00 PM EST [facility]









          Name      Value     Range     Interpretation Code Description Data Debi

rce(s) Supporting 

Document(s)

 

           Laboratory test finding (navigational concept) 38.0 %     38.0-51.0  

                      [facility] 

[facility]          

 

           Laboratory test finding (navigational concept) 158 mg/dL       

                      [facility] 

[facility]          

 

           Laboratory test finding (navigational concept) 135 meq/L  136-145    

                      [facility] 

[facility]          

 

           Laboratory test finding (navigational concept) 4.5 meq/L  3.5-5.1    

                      [facility] 

[facility]          

 

           Laboratory test finding (navigational concept) 4.4 mg/dL  4.5-5.3    

                      [facility] 

[facility]          

 

           Laboratory test finding (navigational concept) 22.0 MM/L  23.0-27.0  

                      [facility] 

[facility]          

 

           Laboratory test finding (navigational concept) 99 meq/L        

                      [facility]                

 

           Laboratory test finding (navigational concept) 1.5 mg/dL  0.6-1.3    

                      [facility] 

[facility]          

 

           Laboratory test finding (navigational concept) 8 mg/dL    8-26       

                      [facility]                   









                    ID                  Date                Data Source

 

                    [id number]            2019 06:18:00 PM EST [facility]









          Name      Value     Range     Interpretation Code Description Data Debi

e(s) Supporting 

Document(s)

 

          Prothrombin Time 20.5 s    11.8-14.0                     [facility]  

 

          Inr       1.78                                    [facility]  

 

                                        THERAPUTIC HUMAN INR VALUES

INDICATIONS                      NORMAL RANGES

PROPHYLAXIS/TREATMENT OF:

VENOUS THROMBOSIS                2.0-3.0

PULMONARY EMBOLISM               2.0-3.0

PREVENTION OF SYSTEMIC EMBOLISM FROM:

TISSUE HEART VALVES              2.0-3.0

ACUTE MYOCARDIAL INFARCTION      2.0-3.0

VALVULAR HEART DISEASE           2.0-3.0

ATRIAL FIBRILLATION              2.0-3.0

MECHANICAL VALVES(HIGH RISK)     2.5-3.5

RECURRENT MYOCARDIAL INFARCTION  2.5-3.5

 

 

           Partial Thromboplastin Time 34.3 s     25.0-38.4                     

   [facility]                              









                    ID                  Date                Data Source

 

                    [id number]            2019 06:18:00 PM EST [facility]









          Name      Value     Range     Interpretation Code Description Data Debi

rce(s) Supporting 

Document(s)

 

                    Lipoprotein lipase [Enzymatic activity/volume] in Serum or P

lasma 465 U/L             

                                                [facility]  









                    ID                  Date                Data Source

 

                    [id number]            2019 06:18:00 PM EST [facility]









          Name      Value     Range     Interpretation Code Description Data Debi

e(s) Supporting 

Document(s)

 

          Alt/SGPT  26 U/L    12-78                         [facility]  

 

          Bilirubin,Total 6.7 mg/dL 0.2-1.0                       [facility]  

 

          Ast/Sgot  53 U/L    7-37                          [facility]  

 

          Alkaline Phosphatase 144 U/L                           [facility]  

 

          Total Protein 7.6 GM/DL 6.4-8.2                       [facility]  

 

          Bilirubin,Direct 3.6 mg/dL 0.0-0.2                       [facility]  

 

          Albumin   1.6 GM/DL 3.2-5.2                       [facility]  

 

           Albumin/Globulin Ratio 0.27       1.00-1.93                        [facility]

[facility]                                    









                    ID                  Date                Data Source

 

                    [id number]            2019 06:18:00 PM EST [facility]









          Name      Value     Range     Interpretation Code Description Data [facility]bi

rce(s) Supporting 

Document(s)

 

           Platelets [#/volume] in Blood by Estimate NORMAL                     

                 [facility]                    









                    ID                  Date                Data Source

 

                    [id number]            2019 06:18:00 PM EST [facility]









          Name      Value     Range     Interpretation Code Description Data [facility]

rce(s) Supporting 

Document(s)

 

          Neutrophils 85 %      28-66                         [facility]  

 

          Bands     2 %                                     [facility]  

 

          Lymphocytes 9 %       16-44                         [facility]  

 

          Macrocytosis 1+                                      [facility]  

 

          Monocytes 4 %       0-5                           [facility]  









                    ID                  Date                Data Source

 

                    [id number]            2019 06:18:00 PM EST [facility]









          Name      Value     Range     Interpretation Code Description Data Debi

rce(s) Supporting 

Document(s)

 

          White Blood Count 10.7 10   4.0-10.0                      [facility]  

 

          Hematocrit 37.7 %    36.0-47.0                     [facility]  

 

          Red Blood Count 3.44 10   4.00-5.40                     [facility]  

 

          Hemoglobin 12.3 g/dL 12.0-15.5                     [facility]  

 

           Mean Corpuscular Volume 109.6 fl   80.0-96.0                        M

[facility]                                    

 

           Mean Corpuscular Hemoglobin 35.8 pg    27.0-33.0                     

   [facility]                              

 

           Mean Corpuscular HGB Conc 32.6 g/dL  32.0-36.5                       

 [facility]                              

 

           Platelet Count, Automated 138 10     150-450                         

 [facility]                                    

 

           Red Cell Distribution Width 13.8 %     11.5-14.5                     

   [facility]                              

 

          Nucleated Red Blood Cell % 0.0 %     0-0                           [facility]

[facility] 

 







                                        Procedure

 

                                          



                                                                                
                                                                                
                                                                                
                                                                                
                                                                                
                                                                                
                                                                                
                                                                                
                                                                                
                                                                                
                                                                                
                                                                                
                                                                    



Social History

          



           Code       Duration   Value      Status     Description Data Source(s

)

 

             Smoking      2021 12:00:00 AM EST Patient has never smoked co

mpleted    Patient 

has never smoked                        [facility]

 

             Smoking      2020 12:00:00 AM EST Patient has never smoked co

mpleted    Patient 

has never smoked                        [facility] ([facility], )



                                                                                
                            



Vital Signs

          



                    ID                  Date                Data Source

 

                    UNK                                      









           Name       Value      Range      Interpretation Code Description Data

 Source(s)

 

           Body mass index (BMI) [Ratio] 27.6 kg/m2                       27.6 k

g/m2 [facility]

 

           Ideal body weight 135 [lb_av]                       135 [lb_av] [facility]

[facility]

 

           Oxygen saturation in Arterial blood by Pulse oximetry 99 %           

                  99 %       [facility] 

([facility]

 

           Body weight 176.12 [lb_av]                       176.12 [lb_av] [facility]

[facility] ([facility]

 

           Body height 67.0 [in_i]                       67.0 [in_i] [facility] ([facility]

 

                                        5'7" 

 

           Respiratory rate 20 /min                          20 /min    [facility] (

[facility]

 

           Body temperature 97.1 [degF]                       97.1 [degF] [facility]

 ([facility]

 

           Heart rate 94 /min                          94 /min    [facility]

 

           Diastolic blood pressure 66 mm[Hg]                        66 mm[Hg]  

[facility]

 

           Systolic blood pressure 105 mm[Hg]                       105 mm[Hg] 

[facility]

 

           Body temperature 97.0 [degF]                       97.0 [degF] [facility]

 ([facility])

 

           Body weight 79.380 kg                        79.380 kg  [facility] ([facility]

 

           Body mass index (BMI) [Ratio] 27.4 kg/m2                       27.4 k

g/m2 [facility] ([facility])

 

           Heart rate 85 /min                          85 /min    [facility] ([facility]

 

           Diastolic blood pressure 74 mm[Hg]                        74 mm[Hg]  

[facility] ([facility])

 

           Systolic blood pressure 121 mm[Hg]                       121 mm[Hg] M

[facility]

 

           Body weight 175.00 [lb_av]                       175.00 [lb_av] [facility]

T ([facility])

 

           Body height 67 [in_i]                        67 [in_i]  [facility]

 

                                        5'7" 

 

           Ideal body weight 130 [lb_av]                       130 [lb_av] [facility]

T ([facility], )

 

           Body height 66 [in_i]                        66 [in_i]  [facility] ([facility], )

 

                                        5'6" 

 

           Body temperature 99.1 [degF]                       99.1 [degF] [facility]

 ([facility],

 )

 

           Systolic blood pressure 90 mm[Hg]                        90 mm[Hg]  

[facility]

 

           Oxygen saturation in Arterial blood by Pulse oximetry 98 %           

                  98 %       [facility] 

([facility], )

 

           Heart rate 94 /min                          94 /min    [facility] ([facility], )

 

           Diastolic blood pressure 60 mm[Hg]                        60 mm[Hg]  

[facility] ([facility], )

 

           Body temperature 97.7 [degF]                       97.7 [degF] [facility]

 ([facility])

 

           Body weight 77.112 kg                        77.112 kg  [facility] ([facility]

 

           Body mass index (BMI) [Ratio] 26.6 kg/m2                       26.6 k

g/m2 [facility] ([facility])

 

           Heart rate 99 /min                          99 /min    [facility] ([facility]

 

           Diastolic blood pressure 70 mm[Hg]                        70 mm[Hg]  

[facility] ([facility])

 

           Systolic blood pressure 116 mm[Hg]                       116 mm[Hg] [facility] ([facility])

 

           Body weight 170.00 [lb_av]                       170.00 [lb_av] [facility]

T ([facility])

 

           Body height 67 [in_i]                        67 [in_i]  [facility]

 

                                        5'7" 

 

           Body surface area Derived from formula 1.87 m2                       

   1.87 m2    [facility] ([facility])

 

           Body weight 77.566 kg                        77.566 kg  [facility] ([facility])

 

           Ideal body weight 130 [lb_av]                       130 [lb_av] [facility]

T ([facility])

 

           Body mass index (BMI) [Ratio] 27.6 kg/m2                       27.6 k

g/m2 [facility] ([facility])

 

           Body weight 171.00 [lb_av]                       171.00 [lb_av] [facility]

T ([facility])

 

           Body height 66 [in_i]                        66 [in_i]  [facility] ([facility])

 

                                        5'6" 

 

           Body temperature 96.2 [degF]                       96.2 [degF] [facility]

 ([facility])

 

           Oxygen saturation in Arterial blood by Pulse oximetry 97 %           

                  97 %       [facility] 

([facility])

 

           Heart rate 106 /min                         106 /min   [facility] ([facility])

 

           Diastolic blood pressure 58 mm[Hg]                        58 mm[Hg]  

[facility] ([facility])

 

           Systolic blood pressure 102 mm[Hg]                       102 mm[Hg] [facility] ([facility])

 

           Body mass index (BMI) [Ratio] 26.5 kg/m2                       26.5 k

g/m2 [facility] ([facility]

 

           Ideal body weight 135 [lb_av]                       135 [lb_av] [facility]

[facility]

 

           Oxygen saturation in Arterial blood by Pulse oximetry 98 %           

                  98 %       [facility] 

([facility]

 

           Body weight 169.50 [lb_av]                       169.50 [lb_av] [facility]

[facility] ([facility]

 

           Body height 67.0 [in_i]                       67.0 [in_i] [facility] ([facility]

 

                                        5'7" 

 

           Respiratory rate 20 /min                          20 /min    [facility] (

[facility]

 

           Body temperature 97.4 [degF]                       97.4 [degF] [facility]

 ([facility]

 

           Heart rate 108 /min                         108 /min   [facility]

 

           Diastolic blood pressure 63 mm[Hg]                        63 mm[Hg]  

[facility] ([facility]

 

           Systolic blood pressure 105 mm[Hg]                       105 mm[Hg] M

[facility] ([facility]

 

           Body mass index (BMI) [Ratio] 26.0 kg/m2                       26.0 k

g/m2 [facility] ([facility]

 

           Ideal body weight 135 [lb_av]                       135 [lb_av] [facility]

T ([facility]

 

           Oxygen saturation in Arterial blood by Pulse oximetry 100 %          

                  100 %      [facility] 

([facility]

 

           Body weight 166.12 [lb_av]                       166.12 [lb_av] [facility]

T ([facility]

 

           Body height 67.0 [in_i]                       67.0 [in_i] [facility] ([facility]

 

                                        5'7" 

 

           Respiratory rate 18 /min                          18 /min    [facility] (

[facility]

 

           Body temperature 97.3 [degF]                       97.3 [degF] [facility]

 ([facility]

 

           Heart rate 101 /min                         101 /min   [facility] ([facility]

 

           Diastolic blood pressure 71 mm[Hg]                        71 mm[Hg]  

[facility] ([facility]

 

           Systolic blood pressure 114 mm[Hg]                       114 mm[Hg] 

[facility]

 

           Body temperature 97.2 [degF]                       97.2 [degF] [facility]

 ([facility])

 

           Body weight 75.298 kg                        75.298 kg  [facility] ([facility]

 

           Body mass index (BMI) [Ratio] 26.0 kg/m2                       26.0 k

g/m2 [facility] ([facility])

 

           Heart rate 95 /min                          95 /min    [facility] ([facility])

 

           Diastolic blood pressure 74 mm[Hg]                        74 mm[Hg]  

[facility] ([facility])

 

           Systolic blood pressure 122 mm[Hg]                       122 mm[Hg] 

[facility] ([facility])

 

           Body weight 166.00 [lb_av]                       166.00 [lb_av] [facility]

T ([facility])

 

           Body height 67 [in_i]                        67 [in_i]  [facility] ([facility]

 

                                        5'7" 

 

           Body weight 82.555 kg                        82.555 kg  [facility] ([facility]

 

           Body mass index (BMI) [Ratio] 28.5 kg/m2                       28.5 k

g/m2 [facility] ([facility])

 

           Heart rate 72 /min                          72 /min    [facility] ([facility])

 

           Diastolic blood pressure 86 mm[Hg]                        86 mm[Hg]  

[facility] ([facility])

 

           Systolic blood pressure 132 mm[Hg]                       132 mm[Hg] 

[facility] ([facility])

 

           Body weight 182.00 [lb_av]                       182.00 [lb_av] [facility]

T ([facility])

 

                                        Temp 97.7 

 

           Body height 67 [in_i]                        67 [in_i]  [facility] ([facility]

 

                                        5'7" 

 

           Body mass index (BMI) [Ratio] 267.6 kg/m2                       267.6

 kg/m2 [facility] ([facility]

 

           Ideal body weight 135 [lb_av]                       135 [lb_av] [facility]

T ([facility]

 

           Oxygen saturation in Arterial blood by Pulse oximetry 99 %           

                  99 %       [facility] 

([facility]

 

           Body weight 1708.50 [lb_av]                       1708.50 [lb_av] [facility]

[facility] ([facility]

 

           Body height 67.0 [in_i]                       67.0 [in_i] [facility] ([facility]

 

                                        5'7" 

 

           Respiratory rate 16 /min                          16 /min    [facility] (

[facility]

 

           Body temperature 98.2 [degF]                       98.2 [degF] [facility]

 ([facility]

 

           Heart rate 89 /min                          89 /min    [facility] ([facility]

 

           Diastolic blood pressure 52 mm[Hg]                        52 mm[Hg]  

[facility] ([facility]

 

           Systolic blood pressure 107 mm[Hg]                       107 mm[Hg] 

[facility] ([facility]

 

           Body weight 84.823 kg                        84.823 kg  [facility]

 

           Body mass index (BMI) [Ratio] 29.3 kg/m2                       29.3 k

g/m2 [facility] ([facility])

 

           Heart rate 99 /min                          99 /min    [facility] ([facility]

[facility])

 

           Diastolic blood pressure 77 mm[Hg]                        77 mm[Hg]  

[facility] ([facility])

 

           Systolic blood pressure 121 mm[Hg]                       121 mm[Hg] M

[facility] ([facility])

 

           Body weight 187.00 [lb_av]                       187.00 [lb_av] [facility]

T ([facility])

 

           Body height 67 [in_i]                        67 [in_i]  [facility]

 

                                        5'7" 

 

           Body mass index (BMI) [Ratio] 28.7 kg/m2                       28.7 k

g/m2 [facility] ([facility]

 

           Ideal body weight 135 [lb_av]                       135 [lb_av] [facility]

T ([facility]

 

           Oxygen saturation in Arterial blood by Pulse oximetry 98 %           

                  98 %       [facility] 

([facility]

 

           Body weight 183.38 [lb_av]                       183.38 [lb_av] [facility]

T ([facility]

 

           Body height 67.0 [in_i]                       67.0 [in_i] [facility] ([facility]

 

                                        5'7" 

 

           Respiratory rate 16 /min                          16 /min    [facility] (

[facility]

 

           Body temperature 97.5 [degF]                       97.5 [degF] [facility]

 ([facility]

 

           Heart rate 95 /min                          95 /min    [facility] ([facility]

 

           Diastolic blood pressure 66 mm[Hg]                        66 mm[Hg]  

[facility] ([facility]

 

           Systolic blood pressure 111 mm[Hg]                       111 mm[Hg] M

[facility] ([facility]

 

           Body mass index (BMI) [Ratio] 28.6 kg/m2                       28.6 k

g/m2 [facility] ([facility]

 

           Oxygen saturation in Arterial blood by Pulse oximetry 93 %           

                  93 %       [facility] 

([facility]

 

           Body weight 182.38 [lb_av]                       182.38 [lb_av] [facility]

T ([facility]

 

           Body height 67.0 [in_i]                       67.0 [in_i] [facility] ([facility]

 

                                        5'7" 

 

           Respiratory rate 18 /min                          18 /min    [facility] (

[facility]

 

           Body temperature 98.3 [degF]                       98.3 [degF] [facility]

 ([facility]

 

           Heart rate 95 /min                          95 /min    [facility] ([facility]

 

           Diastolic blood pressure 72 mm[Hg]                        72 mm[Hg]  

[facility] ([facility]

 

           Systolic blood pressure 106 mm[Hg]                       106 mm[Hg] M

[facility] ([facility]



                                                                                
                  



Patient Treatment Plan of Care

          



             Planned Activity Planned Date Details      Description  Data Source

(s)

 

             Xalatan 0.005 % 2020 12:00:00 AM EST                         

  [facility] ([facility])

 

             Folic Acid 1 MG 2020 12:00:00 AM EST                         

  [facility] ([facility])

 

             Fluticasone Propionate 50 MCG/ACT 2020 12:00:00 AM EST       

                    [facility] 

([facility])

 

             Brimonidine Tartrate 0.2 % 2020 12:00:00 AM EST              

             [facility] ([facility])

 

             Betaxolol HCl 0.5 % 2020 12:00:00 AM EST                     

      [facility] ([facility])

 

             Lactulose 10 GM/15ML 2020 12:00:00 AM EST                    

       [facility] ([facility])

 

             Levocetirizine Dihydrochloride 5 MG 2020 12:00:00 AM EST     

                      [facility] 

([facility])

 

             MVI          2020 12:00:00 AM EST                           N

[facility] ([facility])

 

             Spironolactone 25 MG 2020 12:00:00 AM EST                    

       [facility] ([facility])

 

             Thiamine HCl 100 MG 2020 12:00:00 AM EST                     

      [facility] ([facility])

 

             Ibuprofen 200 MG 2020 12:00:00 AM EST                        

   [facility] ([facility])

## 2021-01-16 NOTE — CCD
Summarization Of Episode

                             Created on: 2021



JAMEL HARVEY

External Reference #: [id number]

: 1960

Sex: Female



Demographics





                          Address                   [address] [place]  [address]

 

                          Home Phone                [phone]

 

                          Preferred Language        English

 

                          Marital Status            

 

                          Jew Affiliation     NONE

 

                          Race                      White

 

                          Ethnic Group              Not  or 





Author





                          Author                    [facility]

 

                          Organization              [facility]

 

                          Address                   Unknown

 

                          Phone                     Unavailable







Support





                Name            Relationship    Address         Phone

 

                    WES  ADRIA    [place]  [address]                    [phone]

 

                    PARAM KAISER    Next Of Kin         [address] [place]  [address]                    [phone]

 

                    CRISTARAJAN SANTOYOI    Next Of Kin         [address] [place]  [address]                    [phone]

 

                UE              Next Of Kin     Unknown         Unavailable

 

                    WESRANDYON     Next Of Kin         [address] [place]  [address]                        [phone]







Care Team Providers





                    Care Team Member Name Role                Phone

 

                    MADELINE Fraser MD Unavailable         Unavailable

 

                    MADELINE Fraser MD Unavailable         Unavailable

 

                    MADELINE Fraser MD Unavailable         Unavailable

 

                    MADELINE Fraser MD Unavailable         Unavailable

 

                    MADELINE Fraser MD Unavailable         Unavailable

 

                    MADELINE Fraser MD Unavailable         Unavailable

 

                    MADELINE Fraser MD Unavailable         Unavailable

 

                    MADELINE Fraser MD Unavailable         Unavailable

 

                    MADELINE Fraser MD Unavailable         Unavailable

 

                    MADELINE Fraser MD Unavailable         Unavailable

 

                    MADELINE Fraser MD Unavailable         Unavailable

 

                    MADELINE Fraser MD Unavailable         Unavailable

 

                    MADELINE Fraser MD Unavailable         Unavailable

 

                    MADELINE Fraser MD Unavailable         Unavailable

 

                    MADELINE Fraser MD Unavailable         Unavailable

 

                    MADELINE Fraser MD Unavailable         Unavailable

 

                    MADELINE Fraser MD Unavailable         Unavailable

 

                    MADELINE Fraser MD Unavailable         Unavailable

 

                    MADELINE Fraser MD Unavailable         Unavailable

 

                    MADELINE Fraser MD Unavailable         Unavailable

 

                    MADELINE Fraser MD Unavailable         Unavailable

 

                    MADELINE Fraser MD Unavailable         Unavailable

 

                    MADELINE Fraser MD Unavailable         Unavailable

 

                    MADELINE Fraser MD Unavailable         Unavailable

 

                    MADELINE Fraser MD Unavailable         Unavailable

 

                    MADELINE Fraser MD Unavailable         Unavailable

 

                    MADELINE Fraser MD Unavailable         Unavailable

 

                    MADELINE Fraser MD Unavailable         Unavailable

 

                    MADELINE Fraser MD Unavailable         Unavailable

 

                    MADELINE Fraser MD Unavailable         Unavailable

 

                    Bria, S Arley MD Unavailable         Unavailable

 

                    Bria, S Arley MD Unavailable         Unavailable

 

                    Bria, S Arley MD Unavailable         Unavailable

 

                    Bria, S Arley MD Unavailable         Unavailable

 

                    Bria, S Arley MD Unavailable         Unavailable

 

                    Bria, S Arley MD Unavailable         Unavailable

 

                    Bria, S Arley MD Unavailable         Unavailable

 

                    Bria, S Arley MD Unavailable         Unavailable

 

                    Bria, S Arley MD Unavailable         Unavailable

 

                    Bria, S Arley MD Unavailable         Unavailable

 

                    Bria, S Arley MD Unavailable         Unavailable

 

                    Bria, S Arley MD Unavailable         Unavailable

 

                    Bria, S Arley MD Unavailable         Unavailable

 

                    Bria, S Arley MD Unavailable         Unavailable

 

                    Bria, S Arley MD Unavailable         Unavailable

 

                    Bria, S Arley MD Unavailable         Unavailable

 

                    Bria, S Arley MD Unavailable         Unavailable

 

                    Bowen, Tesfaye Malagon MD Unavailable         Unavailable

 

                    Bowen, Tesfaye Malagon MD Unavailable         Unavailable

 

                    Bowen, Tesfaye Malagon MD Unavailable         Unavailable

 

                    Bowen, Tesfaye Malagon MD Unavailable         Unavailable

 

                    Bowen, Tesfaye Malagon MD Unavailable         Unavailable

 

                    Bowen, Tesfaye Malagon MD Unavailable         Unavailable

 

                    Bowen, Tesfaye Malagon MD Unavailable         Unavailable

 

                    Bowen, Tesfaye Malagon MD Unavailable         Unavailable

 

                    Bowen, Tesfaye Malagon MD Unavailable         Unavailable

 

                    Bowen, Tesfaye Malagon MD Unavailable         Unavailable

 

                    Bowen, Tesfaye Malagon MD Unavailable         Unavailable

 

                    Bowen, Tesfaye Malagon MD Unavailable         Unavailable

 

                    Bowen, Tesfaye Malagon MD Unavailable         Unavailable

 

                    Bowen, Tesfaye Malagon MD Unavailable         Unavailable

 

                    Bowen, Tesfaye Malagon MD Unavailable         Unavailable

 

                    Bowen, Tesfaye Malagon MD Unavailable         Unavailable

 

                    Bowen, Tesfaye Malagon MD Unavailable         Unavailable

 

                    Bowen, Tesfaye Malagon MD Unavailable         Unavailable

 

                    Bowen, Tesfaye Malagon MD Unavailable         Unavailable

 

                    Bowen, Tesfaye Malagon MD Unavailable         Unavailable

 

                    Bowen, Tesfaye Malagon MD Unavailable         Unavailable

 

                    Bowen, Tesfaye Malagon MD Unavailable         Unavailable

 

                    Bowen, Tesfaye Malagon MD Unavailable         Unavailable

 

                    Bowen, Tesfaye Malagon MD Unavailable         Unavailable

 

                    Bowen, Tesfaye Malagon MD Unavailable         Unavailable

 

                    Bowen, Tesfaye Malagon MD Unavailable         Unavailable

 

                    Bowen, Tesfaye Malagon MD Unavailable         Unavailable

 

                    Bowen, Tesfaye Malagon MD Unavailable         Unavailable

 

                    Bowen, Tesfaye Malagon MD Unavailable         Unavailable

 

                    Bowen, Tesfaye Malagon MD Unavailable         Unavailable

 

                    Bowen, Tesfaye Malagon MD Unavailable         Unavailable

 

                    Bowen, Tesfaye Malagon MD Unavailable         Unavailable

 

                    Bowen, Tesfaye Malagon MD Unavailable         Unavailable

 

                    Bowen, Tesfaye Malagon MD Unavailable         Unavailable

 

                    Bowen, Tesfaye Malagon MD Unavailable         Unavailable

 

                    Bowen, Tesfaye Malagon MD Unavailable         Unavailable

 

                    Bowen, Tesfaye Malagon MD Unavailable         Unavailable

 

                    Bowen, Tesfaye Malagon MD Unavailable         Unavailable

 

                    Scordo, M Genie PA Unavailable         Unavailable

 

                    Scordo, M Genie PA Unavailable         Unavailable

 

                    Scordo, M Genie PA Unavailable         Unavailable

 

                    Scordo, M Genie PA Unavailable         Unavailable

 

                    Scordo, M Genie PA Unavailable         Unavailable

 

                    Scordo, M Genie PA Unavailable         Unavailable

 

                    Scordo, M Genie PA Unavailable         Unavailable

 

                    Scordo, M Genie PA Unavailable         Unavailable

 

                    Scordo, M Genie PA Unavailable         Unavailable

 

                    Scordo, M Genie PA Unavailable         Unavailable

 

                    Scordo, M Genie PA Unavailable         Unavailable

 

                    Scordo, M Genie PA Unavailable         Unavailable

 

                    Scordo, M Genie PA Unavailable         Unavailable

 

                    Scordo, M Genie PA Unavailable         Unavailable

 

                    Scordo, M Genie PA Unavailable         Unavailable

 

                    Scordo, M Genie PA Unavailable         Unavailable

 

                    Scordo, M Genie PA Unavailable         Unavailable

 

                    Scordo, M Genie PA Unavailable         Unavailable

 

                    Scordo, M Genie PA Unavailable         Unavailable

 

                    Scordo, M Genie PA Unavailable         Unavailable

 

                    Scordo, M Genie PA Unavailable         Unavailable

 

                    Scordo, M Genie PA Unavailable         Unavailable

 

                    Scordo, M Genie PA Unavailable         Unavailable

 

                    Scordo, M Genie PA Unavailable         Unavailable

 

                    Scordo, M Genie PA Unavailable         Unavailable

 

                    Scordo, M Genie PA Unavailable         Unavailable

 

                    Scordo, M Genie PA Unavailable         Unavailable

 

                    Scordo, M Genie PA Unavailable         Unavailable

 

                    Scordo, M Genie PA Unavailable         Unavailable

 

                    Scordo, M Genie PA Unavailable         Unavailable

 

                    Scordo, M Genie PA Unavailable         Unavailable

 

                    Scordo, M Genie PA Unavailable         Unavailable

 

                    Scordo, M Genie PA Unavailable         Unavailable

 

                    Scordo, M Genie PA Unavailable         Unavailable

 

                    Scordo, M Genie PA Unavailable         Unavailable

 

                    Scordo, M Genie PA Unavailable         Unavailable

 

                    Scordo, M Genie PA Unavailable         Unavailable

 

                    Scordo, M Genie PA Unavailable         Unavailable

 

                    Scordo, M Genie PA Unavailable         Unavailable

 

                    Scordo, M Genie PA Unavailable         Unavailable

 

                    Pleskach,  Gayla FNP Unavailable         Unavailable

 

                    Pleskach,  Gayla FNP Unavailable         Unavailable

 

                    Pleskach,  Gayla FNP Unavailable         Unavailable

 

                    Pleskach,  Gayla FNP Unavailable         Unavailable

 

                    Pleskach,  Gayla FNP Unavailable         Unavailable

 

                    Pleskach,  Gayla FNP Unavailable         Unavailable

 

                    Pleskach,  Gayla FNP Unavailable         Unavailable

 

                    Pleskach,  Gayla FNP Unavailable         Unavailable

 

                    Pleskach,  Gayla FNP Unavailable         Unavailable

 

                    Pleskach,  Gayla FNP Unavailable         Unavailable

 

                    Pleskach,  Gayla FNP Unavailable         Unavailable

 

                    Pleskach,  Gayla FNP Unavailable         Unavailable

 

                    Pleskach,  Gayla FNP Unavailable         Unavailable

 

                    Pleskach,  Gayla FNP Unavailable         Unavailable

 

                    Pleskach,  Gayla FNP Unavailable         Unavailable

 

                    Pleskach,  Gayla FNP Unavailable         Unavailable

 

                    Pleskach,  Gayla FNP Unavailable         Unavailable

 

                    Pleskach,  Gayla FNP Unavailable         Unavailable

 

                    Pleskach,  Gayla FNP Unavailable         Unavailable

 

                    Pleskach,  Gayla FNP Unavailable         Unavailable

 

                    Pleskach,  Gayla FNP Unavailable         Unavailable

 

                    Pleskach,  Gayla FNP Unavailable         Unavailable

 

                    Pleskach,  Gayla FNP Unavailable         Unavailable

 

                    Pleskach,  Gayla FNP Unavailable         Unavailable

 

                    Pleskach,  Gayla FNP Unavailable         Unavailable

 

                    Pleskach,  Gayla FNP Unavailable         Unavailable

 

                    Pleskach,  Gayla FNP Unavailable         Unavailable

 

                    Pleskach,  Gayla FNP Unavailable         Unavailable

 

                    Kraeger, R Sara PA Unavailable         Unavailable

 

                    Kraeger, R Sara PA Unavailable         Unavailable

 

                    Kraeger, R Sara PA Unavailable         Unavailable

 

                    Kraeger, R Sara PA Unavailable         Unavailable

 

                    Kraeger, R Sara PA Unavailable         Unavailable

 

                    Kraeger, R Sara PA Unavailable         Unavailable

 

                    Kraeger, R Sara PA Unavailable         Unavailable

 

                    Kraeger, R Sara PA Unavailable         Unavailable

 

                    Kraeger, R Sara PA Unavailable         Unavailable

 

                    Kraeger, R Sara PA Unavailable         Unavailable

 

                    Kraeger, R Sara PA Unavailable         Unavailable

 

                    Kraeger, R Sara PA Unavailable         Unavailable

 

                    Kraeger, R Sara PA Unavailable         Unavailable

 

                    Kraeger, R Sara PA Unavailable         Unavailable

 

                    Kraeger, R Sara PA Unavailable         Unavailable

 

                    Kraeger, R Sara PA Unavailable         Unavailable

 

                    Kraeger, R Sara PA Unavailable         Unavailable

 

                    Kraeger, R Sara PA Unavailable         Unavailable

 

                    Kraeger, R Sara PA Unavailable         Unavailable

 

                    Kraeger, R Sara PA Unavailable         Unavailable

 

                    Kraeger, R Sara PA Unavailable         Unavailable

 

                    Kraeger, R Sara PA Unavailable         Unavailable

 

                    Kraeger, R Sara PA Unavailable         Unavailable

 

                    Kraeger, R Sara PA Unavailable         Unavailable

 

                    Kraeger, R Sara PA Unavailable         Unavailable

 

                    Kraeger, R Sara PA Unavailable         Unavailable

 

                    Kraeger, R Sara PA Unavailable         Unavailable

 

                    Kraeger, R Sara PA Unavailable         Unavailable

 

                    Kraeger, R Sara PA Unavailable         Unavailable

 

                    Kraeger, R Sara PA Unavailable         Unavailable

 

                    Kraeger, R Sara PA Unavailable         Unavailable

 

                    Kraeger, R Sara PA Unavailable         Unavailable

 

                    Kraeger, R Sara PA Unavailable         Unavailable

 

                    Kraeger, R Sara PA Unavailable         Unavailable

 

                    Kraeger, R Sara PA Unavailable         Unavailable

 

                    Kraeger, R Sara PA Unavailable         Unavailable

 

                    Kraeger, R Sara PA Unavailable         Unavailable

 

                    Kraeger, R Sara PA Unavailable         Unavailable

 

                    Kraeger, R Sara PA Unavailable         Unavailable

 

                    Kraeger, R Sara PA Unavailable         Unavailable

 

                    Kraeger, R Sara PA Unavailable         Unavailable

 

                    Kraeger, R Sara PA Unavailable         Unavailable

 

                    Kraeger, R Sara PA Unavailable         Unavailable

 

                    Kraeger, R Sara PA Unavailable         Unavailable

 

                    Kraeger, R Sara PA Unavailable         Unavailable

 

                    Kraeger, R Sara PA Unavailable         Unavailable

 

                    Kraeger, R Sara PA Unavailable         Unavailable

 

                    Kraeger, R Sara PA Unavailable         Unavailable

 

                    Kraeger, R Sara PA Unavailable         Unavailable

 

                    Kraeger, R Sara PA Unavailable         Unavailable

 

                    Kraeger, R Sara PA Unavailable         Unavailable

 

                    SEARS, A KANIKA DO    Unavailable         Unavailable

 

                    SEARS, A KANIKA DO    Unavailable         Unavailable

 

                    SEARS, A KANIKA DO    Unavailable         Unavailable

 

                    SEARS, A KANIKA DO    Unavailable         Unavailable

 

                    SEARS, A KANIKA DO    Unavailable         Unavailable

 

                    SEARS, A KANIKA DO    Unavailable         Unavailable

 

                    SEARS, A KANIKA DO    Unavailable         Unavailable

 

                    SEARS, A KANIKA DO    Unavailable         Unavailable

 

                    SEARS, A KANIKA DO    Unavailable         Unavailable

 

                    SEARS, A KANIKA DO    Unavailable         Unavailable

 

                    SEARS, A KANIKA DO    Unavailable         Unavailable

 

                    SEARS, A KANIKA DO    Unavailable         Unavailable

 

                    SEARS, A KANIKA DO    Unavailable         Unavailable

 

                    SEARS, A KANIKA DO    Unavailable         Unavailable

 

                    SEARS, A KANIKA DO    Unavailable         Unavailable

 

                    SEARS, A KANIKA DO    Unavailable         Unavailable

 

                    SEARS, A KANIKA DO    Unavailable         Unavailable

 

                    SEARS, A KANIKA DO    Unavailable         Unavailable

 

                    SEARS, A KANIKA DO    Unavailable         Unavailable

 

                    SEARS, A KANIKA DO    Unavailable         Unavailable

 

                    SEARS, A KANIKA DO    Unavailable         Unavailable

 

                    SEARS, A KANIKA DO    Unavailable         Unavailable

 

                    SEARS, A KANIKA DO    Unavailable         Unavailable

 

                    SEARS, A KANIKA DO    Unavailable         Unavailable

 

                    SEARS, A KANIKA DO    Unavailable         Unavailable

 

                    SEARS, A KANIKA DO    Unavailable         Unavailable

 

                    SEARS, A KANIKA DO    Unavailable         Unavailable

 

                    SEARS, A KANIKA DO    Unavailable         Unavailable

 

                    SEARS, A KANIKA DO    Unavailable         Unavailable

 

                    SEARS, A KANIKA DO    Unavailable         Unavailable

 

                    SEARS, A KANIKA DO    Unavailable         Unavailable

 

                    SEARS, A KANIKA DO    Unavailable         Unavailable

 

                    SEARS, A KANIKA DO    Unavailable         Unavailable

 

                    SEARS, A KANIKA DO    Unavailable         Unavailable

 

                    SEARS, A KANIKA DO    Unavailable         Unavailable

 

                    SEARS, A KANIKA DO    Unavailable         Unavailable

 

                    SEARS, A KANIKA DO    Unavailable         Unavailable

 

                    SEARS, A KANIKA DO    Unavailable         Unavailable

 

                    SEARS, A KANIKA DO    Unavailable         Unavailable

 

                    SEARS, A KANIKA DO    Unavailable         Unavailable

 

                    SEARS, A KANIKA DO    Unavailable         Unavailable

 

                    SEARS, A KANIKA DO    Unavailable         Unavailable

 

                    SEARS, A KANIKA DO    Unavailable         Unavailable

 

                    SEARS, A KANIKA DO    Unavailable         Unavailable

 

                    SEARS, A KANIKA DO    Unavailable         Unavailable



                                  



Re-disclosure Warning

          The records that you are about to access may contain information from 
federally-assisted alcohol or drug abuse programs. If such information is 
present, then the following federally mandated warning applies: This information
has been disclosed to you from records protected by federal confidentiality 
rules (42 CFR part 2). The federal rules prohibit you from making any further 
disclosure of this information unless further disclosure is expressly permitted 
by the written consent of the person to whom it pertains or as otherwise 
permitted by 42 CFR part 2. A general authorization for the release of medical 
or other information is NOT sufficient for this purpose. The Federal rules 
restrict any use of the information to criminally investigate or prosecute any 
alcohol or drug abuse patient.The records that you are about to access may 
contain highly sensitive health information, the redisclosure of which is 
protected by Article 27-F of the [facility] Public Health law. If you 
continue you may have access to information: Regarding HIV / AIDS; Provided by 
facilities licensed or operated by the [facility] Office of Mental Health; 
or Provided by the [facility] Office for People With Developmental 
Disabilities. If such information is present, then the following [place] 
mandated warning applies: This information has been disclosed to you from 
confidential records which are protected by state law. State law prohibits you 
from making any further disclosure of this information without the specific 
written consent of the person to whom it pertains, or as otherwise permitted by 
law. Any unauthorized further disclosure in violation of state law may result in
a fine or residential sentence or both. A general authorization for the release of 
medical or other information is NOT sufficient authorization for further disc
losure.                                                                         
    



Allergies and Adverse Reactions

          



           Type       Description Substance  Reaction   Status     Data Source(s

)

 

           Cefdnir, PCN Cefdnir, PCN Cefdnir, PCN            active     [facility]

 ([facility])



                                                                                
       



Family History

          



             Family Member Name Family Member Gender Family Member Status Date o

f Status 

Description                             Data Source(s)

 

           Unknown    Male       Problem                          [facility] ([facility])

 

           Unknown    Female     Problem                          [facility] ([facility]

 

           Unknown    Female     Problem                          [facility] ([facility]



                                                                                
                 



Encounters

          



           Encounter  Providers  Location   Date       Indications Data Source(s

)

 

             Outpatient   Attender: Arley Fraser MD Main Office  2020 

12:00:00 PM EST 

                                        [facility] ([facility])

 

             Outpatient   Attender: Gayla Ya [facility] Main Office  2020 1

0:45:00 AM EST  

                                        [facility] ([facility]

 

             Outpatient   Attender: Arley Fraser MD Main Office  10/29/2020 

12:45:00 PM EDT 

                                        [facility] ([facility])

 

                Outpatient      Attender: KANIKA DEJESUS DO [facility]

 10/27/2020 10:00:00 

AM EDT                                              [facility]

 

             Outpatient   Attender: Gayla Ya [facility] Main Office  10/26/2020 0

3:15:00 PM EDT  

                                        [facility] ([facility]

 

             Outpatient   Attender: Gayla Ya [facility] Main Office  10/19/2020 0

2:45:00 PM EDT  

                                        [facility] ([facility]

 

             Outpatient   Attender: Arley Fraser MD Main Office  10/13/2020 

09:30:00 AM EDT 

                                        [facility] ([facility])

 

                Outpatient      Attender: Manas [place]

[place] 10/08/2020 

01:23:00 AM EDT                                     [facility]

 

                Outpatient      Attender: Manas [place]

[place] 10/07/2020 

01:23:00 AM EDT                                     [facility]

 

                Outpatient      Attender: Manas [place]

[place] 10/06/2020 

01:23:00 AM EDT                                     [facility]

 

                Outpatient      Attender: Manas [place]

[place] 10/03/2020 

01:23:00 AM EDT                                     [facility]

 

                Outpatient      Attender: Manas [place]

[place] 10/01/2020 

01:23:00 AM EDT                                     [facility]

 

                Outpatient      Attender: KANIKA Sumner/[place]

 2020 01:30:00 

PM EDT                                              [facility]

 

             Outpatient   Attender: Arley Fraser MD Main Office  2020 

02:15:00 PM EDT 

                                        [facility] ([facility])

 

           Outpatient Referrer: Sara BARNHART            2020 06:05:00

 AM EDT            [facility]

 

           Outpatient Referrer: Sara BARNHART            2020 05:55:00

 AM EDT            [facility]

 

           Outpatient Referrer: Sara BARNHART            2020 10:26:00

 AM EDT            [facility]

 

           Outpatient Referrer: Sara BARNHART            2020 01:02:00

 PM EST            [facility]

 

           Outpatient Attender: Genie BARNHART Main Office 2020 01:45:00

 PM EST            

[facility] ([facility]

 

           Outpatient Referrer: Sara BARNHART            2020 09:18:00

 AM EST            [facility]

 

                                            2020 12:00:00 AM EST - 

020 04:02:49 PM EST            [facility] 

([facility])

 

           Outpatient Referrer: Sara BARNHART            2020 12:29:00

 PM EST            [facility]

 

           Outpatient Referrer: Sara BARNHART            2020 07:13:00

 PM EST            [facility]

 

           Outpatient Referrer: Sara BARNHART            2020 05:59:00

 AM EST            [facility]

 

           Outpatient Referrer: Sara BARNHART            2019 07:24:00

 PM EST            [facility]

 

           Outpatient Attender: Genie BARNHART Main Office 2019 11:15:00

 AM EST            

[facility] ([facility]

 

           Outpatient Referrer: Sara BARNHART            2019 09:28:00

 PM EST            [facility]

 

           Outpatient Referrer: Sara BARNHART            2019 10:17:00

 PM EST            [facility]

 

           Outpatient Attender: Genie BARNHART Main Office 2019 08:00:00

 AM EST            

[facility] ([facility]



                                                                                
                                                                                
                                                                                
                                                                                
                                            



Immunizations

          



             Vaccine      Date         Status       Description  Data Source(s)

 

             Pneumococcal conjugate PCV 13 10/27/2020 10:10:00 AM EDT completed 

                [facility] 

([facility] )

 

             New in .  IIV4 10/01/2020 09:41:00 AM EDT completed            

     [facility] ([facility] )



                                                                                
                 



Medications

          



          Medication Brand Name Start Date Product Form Dose      Route     Admi

nistrative 

Instructions Pharmacy Instructions Status     Indications Reaction   Description

 Data 

Source(s)

 

          20 mEq              2021 12:00:00 AM EST tablet extended release

 30                  TAKE ONE TABLET

BY MOUTH EVERY DAY TAKE ONE TABLET BY MOUTH EVERY DAY SOLD: 2021          

                        

[facility]

 

          10 mg               2020 12:00:00 AM EST tablet    60           

       TAKE ONE TABLET BY MOUTH THREE 

TIMES A DAY (8:00AM, 12:00PM, AND 4:00PM) TAKE ONE TABLET BY MOUTH THREE TIMES A

DAY (8:00AM, 12:00PM, AND 4:00PM) SOLD: 12/10/2020                              

          [facility]

 

                    Loratadine 10 MG Oral Tablet Allergy Relief Loratadine  12:00:00 AM 

EST                                       active                      [facility] ([facility]

 

           Spironolactone 50 MG Oral Tablet Spironolactone 2020 12:00:00 A

M EST                       

ORAL                          active                                  [facility] ([facility]

 

     Rifaximin Tablets      2020 12:00:00 AM EST                          

active                [facility] 

([facility]

 

          5 mg                2020 12:00:00 AM EST tablet    90           

       TAKE ONE TABLET BY MOUTH THREE 

TIMES A DAY TAKE ONE TABLET BY MOUTH THREE TIMES A DAY SOLD: 2020         

                         

[facility]

 

          20 mg               2020 12:00:00 AM EST tablet    180          

       TAKE ONE TABLET BY MOUTH TWICE A

DAY        TAKE ONE TABLET BY MOUTH TWICE A DAY SOLD: 2020                

                  [facility]

 

          50 mg               2020 12:00:00 AM EST tablet    180          

       TAKE ONE TABLET BY MOUTH TWICE A

DAY        TAKE ONE TABLET BY MOUTH TWICE A DAY SOLD: 2020                

                  [facility]

 

          25 mg               10/27/2020 12:00:00 AM EDT tablet    30           

       TAKE ONE TO TWO TABLET BY MOUTH 

AT BEDTIME TAKE ONE TO TWO TABLET BY MOUTH AT BEDTIME SOLD: 10/27/2020          

                        

[facility]

 

                                        0.5 ML Streptococcus pneumoniae serotype

 1 capsular antigen diphtheria [id number] 

protein conjugate vaccine 0.0044 MG/ML / Streptococcus pneumoniae serotype 14 
capsular antigen diphtheria [id number] protein conjugate vaccine 0.0044 MG/ML / 
Streptococcus pneumonia Prevnar 13 10/27/2020 12:00:00 AM EDT                   

                      active   

                                                            [facility] ([facility] )

 

          Sertraline 50 MG Oral Tablet Sertraline HCL 10/26/2020 12:00:00 AM EDT

                     ORAL      

                      completed                                   [facility] ([facility]

 

             Sertraline 50 MG Oral Tablet SERTRALINE HCL 10/26/2020 12:00:00 AM 

EDT tablet       30

                                TAKE ONE TABLET BY MOUTH EVERY DAY TAKE ONE TABL

ET BY MOUTH EVERY DAY SOLD: 

10/27/2020                                                      [facility]

 

                    Hydroxyzine Hydrochloride 25 MG Oral Tablet Hydroxyzine HCL 

    10/26/2020 12:00:00 

AM EDT               ORAL                 active                      [facility] ([facility]

 

          10 gram/15 mL           10/19/2020 12:00:00 AM EDT solution  2700     

           TAKE 30ML BY MOUTH 

THREE TIMES A DAY TAKE 30ML BY MOUTH THREE TIMES A DAY SOLD: 10/22/2020         

                         

[facility]

 

        torsemide 20 MG Oral Tablet Torsemide 10/13/2020 12:00:00 AM EDT        

         ORAL                    

active                                                          [facility]

 

           Spironolactone 50 MG Oral Tablet Spironolactone 10/13/2020 12:00:00 A

M EDT                       

ORAL                          active                                  [facility] ([facility]

 

          250 mg              10/08/2020 12:00:00 AM EDT tablet    14           

       TAKE ONE TABLET BY MOUTH EVERY 

DAY AT 6 IN THE MORNING   TAKE ONE TABLET BY MOUTH EVERY DAY AT 6 IN THE MORNING

 

SOLD: 10/10/2020                                                 [facility]

 

          5 mg                10/08/2020 12:00:00 AM EDT tablet    90           

       TAKE ONE TABLET BY MOUTH THREE 

TIMES A DAY 8AM NOON 4PM                TAKE ONE TABLET BY MOUTH THREE TIMES A D

AY 8AM NOON 4PM

             SOLD: 10/10/2020                                        [facility]

[facility]

 

                    midodrine hydrochloride 5 MG Oral Tablet Midodrine HCL      

 10/08/2020 12:00:00 AM 

EDT                                       active                      [facility] ([facility]

 

             Ciprofloxacin 250 MG Oral Tablet Ciprofloxacin HCL 10/08/2020 12:00

:00 AM EDT              

        ORAL                    completed                         [facility] ([facility]

 

          50 mg               10/08/2020 12:00:00 AM EDT tablet    60           

       TAKE ONE TABLET BY MOUTH TWICE A 

DAY 9AM AND 5PM           TAKE ONE TABLET BY MOUTH TWICE A DAY 9AM AND 5PM SOLD:

 

10/10/2020                                                      [facility]

 

                    12 HR Guaifenesin 600 MG Extended Release Oral Tablet [Mucin

ex] Mucinex             

10/08/2020 12:00:00 AM EDT               ORAL                 active            

          [facility] ([facility]

 

           Spironolactone 50 MG Oral Tablet Spironolactone 10/08/2020 12:00:00 A

M EDT                       

ORAL                          completed                               [facility] ([facility]

 

        torsemide 20 MG Oral Tablet Torsemide 10/08/2020 12:00:00 AM EDT        

                                 

active                                                          [facility] ([facility]

 

          20 mg               10/08/2020 12:00:00 AM EDT tablet    60           

       TAKE ONE TABLET BY MOUTH TWICE A 

DAY 9AM AND 5 IN THE EVENING            TAKE ONE TABLET BY MOUTH TWICE A DAY 9AM

 AND 5 IN 

THE EVENING  SOLD: 10/10/2020                                        [facility]

[facility]

 

          15 mg/5 mL (3 mg/mL)           10/08/2020 12:00:00 AM EDT solution  29

0                 TAKE 2.7 

TEASPOONFUL (13 ML) BY MOUTH ONCE DAILY WITH FOOD TAKE 2.7 TEASPOONFUL (13 ML) 

BY MOUTH ONCE DAILY WITH FOOD SOLD: 10/08/2020                                  

      [facility]

 

          10 mg               2020 12:00:00 AM EDT tablet    30           

       TAKE ONE TABLET BY MOUTH EVERY 

DAY        TAKE ONE TABLET BY MOUTH EVERY DAY SOLD: 2020                  

                [facility]

 

                    12 HR Guaifenesin 600 MG Extended Release Oral Tablet KAPIL REID         2020 

12:00:00 AM EDT tablet extended release 12hr 20                              CHAR

E ONE TABLET BY MOUTH EVERY

 12 HOURS FOR 10 DAYS     TAKE ONE TABLET BY MOUTH EVERY 12 HOURS FOR 10 DAYS SO

LD: 

2020                                                      [facility]

 

          300 mg              2020 12:00:00 AM EDT capsule   30           

       TAKE ONE CAPSULE BY MOUTH THREE

 TIMES A DAY FOR 10 DAYS                TAKE ONE CAPSULE BY MOUTH THREE TIMES A 

DAY FOR 10 DAYS

             SOLD: 2020                                        [facility]

[facility]

 

          20 mg               2020 12:00:00 AM EDT tablet    10           

       TAKE TWO TABLETS BY MOUTH EVERY 

DAY FOR 5 DAYS      TAKE TWO TABLETS BY MOUTH EVERY DAY FOR 5 DAYS SOLD: 

020     

                                                            [facility]

 

          10 gram/15 mL           2020 12:00:00 AM EDT solution  2700     

           TAKE 30ML BY MOUTH 

THREE TIMES A DAY TAKE 30ML BY MOUTH THREE TIMES A DAY SOLD: 2020         

                         

[facility]

 

          10 gram/15 mL           2020 12:00:00 AM EDT solution  2700     

           TAKE 30ML BY MOUTH 

THREE TIMES A DAY TAKE 30ML BY MOUTH THREE TIMES A DAY SOLD: 2020         

                         

[facility]

 

          0.5 %               2020 12:00:00 AM EDT drops     10           

       INSTILL 1 DROP IN EACH EYE TWO 

TIMES A DAY INSTILL 1 DROP IN EACH EYE TWO TIMES A DAY SOLD: 2020         

                         

[facility]

 

          0.005 %             2020 12:00:00 AM EDT drops     7            

       INSTILL 1 DROP IN EACH EYE EVERY 

EVENING    INSTILL 1 DROP IN EACH EYE EVERY EVENING SOLD: 2020            

                      [facility]

 

          0.2 %               2020 12:00:00 AM EDT drops     5            

       INSTILL 1 DROP IN EACH EYE TWO 

TIMES A DAY INSTILL 1 DROP IN EACH EYE TWO TIMES A DAY SOLD: 2020         

                         

[facility]

 

          0.5 %               2020 12:00:00 AM EDT drops     10           

       INSTILL 1 DROP IN EACH EYE TWO 

TIMES A DAY INSTILL 1 DROP IN EACH EYE TWO TIMES A DAY SOLD: 2020         

                         

[facility]

 

          25 mg               2020 12:00:00 AM EDT tablet    60           

       TAKE ONE TABLET BY MOUTH TWICE A 

DAY        TAKE ONE TABLET BY MOUTH TWICE A DAY SOLD: 2020                

                  [facility]

 

          0.5 %               2020 12:00:00 AM EDT drops     10           

       INSTILL ONE DROP IN EACH EYE TWO 

TIMES A DAY INSTILL ONE DROP IN EACH EYE TWO TIMES A DAY SOLD: 10/08/2020       

                           

[facility]

 

          0.5 %               2020 12:00:00 AM EDT drops     10           

       INSTILL ONE DROP IN EACH EYE TWO 

TIMES A DAY INSTILL ONE DROP IN EACH EYE TWO TIMES A DAY SOLD: 2020       

                           

[facility]

 

          0.5 %               2020 12:00:00 AM EDT drops     10           

       INSTILL ONE DROP IN EACH EYE TWO 

TIMES A DAY INSTILL ONE DROP IN EACH EYE TWO TIMES A DAY SOLD: 2020       

                           

[facility]

 

          0.5 %               2020 12:00:00 AM EDT drops     10           

       INSTILL ONE DROP IN EACH EYE TWO 

TIMES A DAY INSTILL ONE DROP IN EACH EYE TWO TIMES A DAY SOLD: 2020       

                           

[facility]

 

          0.5 %               2020 12:00:00 AM EDT drops     10           

       INSTILL ONE DROP IN EACH EYE TWO 

TIMES A DAY INSTILL ONE DROP IN EACH EYE TWO TIMES A DAY SOLD: 2020       

                           

[facility]

 

        Furosemide 40 MG Oral Tablet Furosemide 2020 12:00:00 AM EDT      

           ORAL                    

active                                                          [facility] ([facility])

 

          25 mg               2020 12:00:00 AM EDT tablet    60           

       TAKE ONE TABLET BY MOUTH TWICE A 

DAY        TAKE ONE TABLET BY MOUTH TWICE A DAY SOLD: 2020                

                  [facility]

 

          112 mcg             03/10/2020 12:00:00 AM EDT tablet    90           

       TAKE ONE TABLET BY MOUTH EVERY 

DAY        TAKE ONE TABLET BY MOUTH EVERY DAY SOLD: 2020                  

                [facility]

 

          112 mcg             03/10/2020 12:00:00 AM EDT tablet    90           

       TAKE ONE TABLET BY MOUTH EVERY 

DAY        TAKE ONE TABLET BY MOUTH EVERY DAY SOLD: 2020                  

                [facility]

 

          112 mcg             03/10/2020 12:00:00 AM EDT tablet    90           

       TAKE ONE TABLET BY MOUTH EVERY 

DAY        TAKE ONE TABLET BY MOUTH EVERY DAY SOLD: 2020                  

                [facility]

 

          112 mcg             03/10/2020 12:00:00 AM EDT tablet    90           

       TAKE ONE TABLET BY MOUTH EVERY 

DAY        TAKE ONE TABLET BY MOUTH EVERY DAY SOLD: 2020                  

                [facility]

 

          137 mcg (0.1 %)           2020 12:00:00 AM EST aerosol,spray 30 

                 SPRAY TWO SPRAYS

 IN ONE NOSTRIL EVERY DAY SPRAY TWO SPRAYS IN ONE NOSTRIL EVERY DAY SOLD: 

2020                                                      [facility]

 

             benzonatate 200 MG Oral Capsule BENZONATATE  2020 12:00:00 AM

 EST capsule      

30                                      TAKE ONE CAPSULE BY MOUTH THREE TIMES A 

DAY AS NEEDED FOR 10 DAYS TAKE ONE 

CAPSULE BY MOUTH THREE TIMES A DAY AS NEEDED FOR 10 DAYS SOLD: 2020       

                                 

[facility]

 

       Xalatan 0.005 % Xalatan 2020 12:00:00 AM EST        1.0 {gtt}      

                completed  

                                                            [facility] ([facility])

 

        Betaxolol HCl 0.5 % Betaxolol HCl 2020 12:00:00 AM EST         1.0

 {gtt}                         

completed                                                       [facility] ([facility])

 

           Brimonidine Tartrate 0.2 % Brimonidine Tartrate 2020 12:00:00 A

M EST                        

                              completed                               [facility] (

[facility])

 

                    Fluticasone Propionate 50 MCG/ACT Fluticasone Propionate  12:00:00 AM 

EST                                       completed                      [facility]

T ([facility])

 

     Folic Acid 1 MG Folic Acid 2020 12:00:00 AM EST                      

    completed                

[facility] ([facility])

 

                    Levocetirizine Dihydrochloride 5 MG Levocetirizine Dihydroch

loride 2020 

12:00:00 AM EST                                    completed                    

  [facility] ([facility])

 

      Lactulose 10 GM/15ML Lactulose 2020 12:00:00 AM EST                 

              completed       

                                                    [facility] ([facility])

 

        Ibuprofen 200 MG Ibuprofen 2020 12:00:00 AM EST         2.0 {table

t}                         

completed                                                       [facility] ([facility])

 

     MVI  MVI  2020 12:00:00 AM EST      1.0 {tablet}                compl

eted                [facility]

 ([facility])

 

       Thiamine HCl 100 MG Thiamine HCl 2020 12:00:00 AM EST              

                      completed 

                                                            [facility] ([facility])

 

        Spironolactone 25 MG Spironolactone 2020 12:00:00 AM EST          

                               

completed                                                       [facility] ([facility])

 

          400-80 mg           2020 12:00:00 AM EST tablet    14           

       TAKE ONE TABLET BY MOUTH 

TWICE A DAY TAKE ONE TABLET BY MOUTH TWICE A DAY SOLD: 2020               

                   [facility]

 

                          Sulfamethoxazole 800 MG / Trimethoprim 160 MG Oral Tab

let 

Sulfamethoxazole/Trimethoprim DS 2020 12:00:00 AM EST                 ORAL

                    active  

                                                            [facility]

 

        Thiamine 100 MG Oral Tablet Thiamine HCL 2020 12:00:00 AM EST     

            ORAL             

             active                                              [facility] ([facility]

 

                    Lactulose 667 MG/ML Oral Solution Lactulose Encephalopathy 0

2020 12:00:00 

AM EST               ORAL                 active                      [facility] ([facility]

 

           Spironolactone 25 MG Oral Tablet Spironolactone 2020 12:00:00 A

M EST                       

ORAL                          active                                  [facility] ([facility]

 

          25 mg               2019 12:00:00 AM EST tablet    60           

       TAKE ONE TABLET BY MOUTH TWICE A 

DAY        TAKE ONE TABLET BY MOUTH TWICE A DAY SOLD: 2019                

                  [facility]

 

          1 mg                2019 12:00:00 AM EST tablet    90           

       TAKE ONE TABLET BY MOUTH EVERY DAY

           TAKE ONE TABLET BY MOUTH EVERY DAY SOLD: 2020                  

                [facility]

 

          1 mg                2019 12:00:00 AM EST tablet    90           

       TAKE ONE TABLET BY MOUTH EVERY DAY

           TAKE ONE TABLET BY MOUTH EVERY DAY SOLD: 2019                  

                [facility]

 

          1 mg                2019 12:00:00 AM EST tablet    30           

       TAKE ONE TABLET BY MOUTH EVERY DAY

           TAKE ONE TABLET BY MOUTH EVERY DAY SOLD: 2020                  

                [facility]

 

          1 mg                2019 12:00:00 AM EST tablet    60           

       TAKE ONE TABLET BY MOUTH EVERY DAY

           TAKE ONE TABLET BY MOUTH EVERY DAY SOLD: 2020                  

                [facility]

 

          1 mg                2019 12:00:00 AM EST tablet    90           

       TAKE ONE TABLET BY MOUTH EVERY DAY

           TAKE ONE TABLET BY MOUTH EVERY DAY SOLD: 2020                  

                [facility]

 

          50 mg               2019 12:00:00 AM EST tablet    90           

       TAKE ONE TABLET BY MOUTH EVERY 

DAY        TAKE ONE TABLET BY MOUTH EVERY DAY SOLD: 2019                  

                [facility]

 

           Spironolactone 50 MG Oral Tablet Spironolactone 2019 12:00:00 A

M EST                       

ORAL                          completed                               [facility]

 

          20 mg               2019 12:00:00 AM EST tablet    60           

       TAKE ONE TABLET BY MOUTH TWICE A 

DAY , HOLD FOR HEART RATE LESS THAN 60 OR SBP LESS THAN 100 TAKE ONE TABLET BY 

MOUTH TWICE A DAY , HOLD FOR HEART RATE LESS THAN 60 OR SBP LESS THAN 100 SOLD: 

2019                                                      [facility]

 

                    Propranolol Hydrochloride 20 MG Oral Tablet Propranolol HCL 

    2019 12:00:00 

AM EST                                    active                      [facility] ([facility]

 

                    Fluticasone Propionate Nasal Spray Fluticasone Propionate Na

sal Spray 2019

 12:00:00 AM EST                                    active                      

[facility] ([facility]

 

           Spironolactone 25 MG Oral Tablet Spironolactone 2019 12:00:00 A

M EST                        

                              completed                               [facility] ([facility]

 

          25 mg               2019 12:00:00 AM EST tablet    60           

       TAKE ONE TABLET BY MOUTH TWICE A 

DAY        TAKE ONE TABLET BY MOUTH TWICE A DAY SOLD: 2019                

                  [facility]

 

          Ciprofloxacin 500 MG Oral Tablet [Cipro] Cipro     2019 12:00:00

 AM EST                               

                      completed                                   [facility]

 

          500 mg              2019 12:00:00 AM EST tablet    20           

       TAKE ONE TABLET BY MOUTH TWICE A

 DAY FOR 10 DAYS          TAKE ONE TABLET BY MOUTH TWICE A DAY FOR 10 DAYS SOLD:

 

2019                                                      [facility]

 

          500 mg              2019 12:00:00 AM EST tablet    15           

       TAKE ONE TABLET BY MOUTH THREE 

TIMES A DAY WITH FOOD UNTIL GONE DO NOT DRINK ALCOHOL TAKE ONE TABLET BY MOUTH 

THREE TIMES A DAY WITH FOOD UNTIL GONE DO NOT DRINK ALCOHOL SOLD: 2019    

                     

                                                    [facility]

 

                    Brimonidine tartrate 2 MG/ML Ophthalmic Solution Brimonidine

 Tartrate 2019

 12:00:00 AM EST                                    active               Brimoni

dine Tartrate [facility] ([facility]

 

        cefdinir 300 MG Oral Capsule Cefdinir 2019 12:00:00 AM EST        

         ORAL                    

completed                                                       [facility] ([facility]

 

                Betaxolol 5 MG/ML Ophthalmic Solution Betaxolol HCL   2019

 12:00:00 AM EST  

                                        active                          [facility] (

[facility]

 

          300 mg              2019 12:00:00 AM EST capsule   10           

       TAKE ONE CAPSULE BY MOUTH TWICE

 A DAY UNTIL GONE         TAKE ONE CAPSULE BY MOUTH TWICE A DAY UNTIL GONE SOLD:

 

2019                                                      [facility]

 

          40 mg               2019 12:00:00 AM EST tablet    14           

       TAKE ONE TABLET BY MOUTH EVERY 

DAY        TAKE ONE TABLET BY MOUTH EVERY DAY SOLD: 2019                  

                [facility]

 

          50 mg               2019 12:00:00 AM EST tablet    28           

       TAKE ONE TABLET BY MOUTH TWICE A 

DAY 9AM AND 5 IN THE EVENING            TAKE ONE TABLET BY MOUTH TWICE A DAY 9AM

 AND 5 IN 

THE EVENING  SOLD: 2019                                        [facility]

s

 

          Furosemide 40 MG Oral Tablet [Lasix] Lasix     2019 12:00:00 AM 

EST                     ORAL       

                      active                                      [facility] ([facility]

 

                    latanoprost 0.05 MG/ML Ophthalmic Solution Latanoprost      

   2019 12:00:00 AM 

EST                                       active                      [facility]

 

           Metronidazole 500 MG Oral Tablet [Flagyl] Flagyl     2019 12:00

:00 AM EST                       

ORAL                          completed                               [facility]

 

           Spironolactone 50 MG Oral Tablet Spironolactone 2019 12:00:00 A

M EST                       

ORAL                          completed                               [facility]

 

          Azelastine HCL (Nasal) Azelastine HCL (Nasal) 2019 12:00:00 AM E

ST                                

                      completed                                   [facility]

 

                    Propranolol Hydrochloride 20 MG Oral Tablet Propranolol HCL 

    2019 12:00:00 

AM EST                                    completed                      [facility] 

([facility]

 

          25 mg               2019 12:00:00 AM EST tablet    30           

       TAKE ONE TABLET BY MOUTH TWO 

TIMES A DAY TAKE ONE TABLET BY MOUTH TWO TIMES A DAY SOLD: 2019           

                       

[facility]

 

          25 mg               2019 12:00:00 AM EST tablet    30           

       TAKE ONE TABLET BY MOUTH TWO 

TIMES A DAY TAKE ONE TABLET BY MOUTH TWO TIMES A DAY SOLD: 2020           

                       

[facility]

 

          20 mg               2019 12:00:00 AM EST tablet    60           

       TAKE ONE TABLET BY MOUTH TWICE A 

DAY , HOLD FOR HR LESS THAN 60 OR SBP LESS THAN 100 TAKE ONE TABLET BY MOUTH 

TWICE A DAY , HOLD FOR HR LESS THAN 60 OR SBP LESS THAN 100 SOLD: 2019    

                     

                                                    [facility]

 

          0.5 %               2019 12:00:00 AM EDT drops     10           

       INSTILL ONE DROP IN EACH EYE TWO 

TIMES A DAY INSTILL ONE DROP IN EACH EYE TWO TIMES A DAY SOLD: 2019       

                           

[facility]

 

                    Betaxolol 5 MG/ML Ophthalmic Solution 0.5 % BETAXOLOL HCL   

    2019 12:00:00 AM

 EDT         drops        10                        INSTILL ONE DROP IN EACH EYE

 TWO TIMES A DAY INSTILL ONE DROP IN

 EACH EYE TWO TIMES A DAY SOLD: 2019                                      

  [facility]

 

          0.5 %               2019 12:00:00 AM EDT drops     10           

       INSTILL ONE DROP IN EACH EYE TWO 

TIMES A DAY INSTILL ONE DROP IN EACH EYE TWO TIMES A DAY SOLD: 2020       

                           

[facility]

 

          112 mcg             2019 12:00:00 AM EDT tablet    90           

       TAKE ONE TABLET BY MOUTH EVERY 

DAY        TAKE ONE TABLET BY MOUTH EVERY DAY SOLD: 2019                  

                [facility]



                                                                                
                                                                                
                                                                                
                                                                                
                                                                                
                                                                                
                                                                                
                                                                                
                                                                                
                                                                                
                                                                                
                                                                                
                                                                                
                            



Insurance Providers

          



             Payer name   Policy type / Coverage type Policy ID    Covered party

 ID Covered 

party's relationship to woods Policy [place]             Plan Information

 

          [facility] COMM[employer]AN [facility]           [id number]           SP           

       [id number]

 

          [facility] COMMUNITY PLAN [facility]           [id number]           SP           

       [id number]

 

          [facility]         [id number]           S              

     [id number]

 

          [facility] COMMUNITY PLAN [facility]           [id number]           SP           

       [id number]

 

          [facility] COMMUNITY PLAN [facility]           [id number]           SP           

       [id number]

 

          [facility] COMMUNITY PLAN [employer]           [id number]           SP           

       [id number]

 

          [facility] COMMUNITY PLAN [employer]           [id number]           SP           

       [id number]

 

          [facility] COMMUNITY PLAN [employer]           [id number]           SP           

       [id number]

 

          [facility] Community Plan Commercial [id number]           Self                

[id number]

 

          [facility] Community Plan Commercial [id number]           Self                

[id number]

 

          [facility] Community Plan Commercial [id number]           Self                

[id number]

 

          [facility] Community Plan Commercial [id number]           Self                

[id number]

 

          [facility] COMMUNITY PLAN [employer]           [id number]           SP           

       [id number]

 

          [facility] Community Plan Commercial [id number]           Self                

[id number]

 

          [facility] Community Plan Commercial [id number]           Self                

[id number]

 

          [facility] Community Plan Commercial [id number]           Self                

[id number]

 

          [facility] Community Plan Commercial [id number]           Self                

[id number]

 

          [facility] Community Plan Commercial [id number]           Self                

[id number]

 

          [facility] Community Plan Commercial [id number]           Self                

[id number]

 

          [facility] Community Plan Commercial [id number]           Self                

[id number]

 

          [place] Plan Claims [facility] Medigap Part B [id number]           Self        

        [id number]

 

          Community Plan Commercial [id number]           Self                [id number]

[id number]

 

          [facility] Community Plan Commercial                     Self                

 

 

          [place] Plan Claims [facility] Medigap Part B [place]              Self        

        [place]

 

          Community Plan Commercial                     Self                 

 

          [employer] Commercial                     Self                 

 

          MEDICAID            [id number]            SP                  [id number]

 

          MEDICAID            [id number]            SP                  [id number]

 

          [employer] Commercial                     Self                 

 

          [employer] [place] Commercial                     Self          

       

 

          [place] [facility] P         [id number]           S              

     [id number]

 

          [facility] COMMUNITY PLAN [employer]           [id number]           SP           

       [id number]

 

          [employer] [place] P         [id number]           S                   [id number]

[id number]

 

          [facility] P         UNAVAILABLE           S             

      UNAVAILABLE

 

          [employer] P         [id number]           S                   [id number]

[id number]

 

          BLUE CROSS PERSON PLAN           [id number]           SP            

      [id number]

 

          HMO BLUE            [id number]           SP                  [id number]

[id number]

 

          [employer] [place] BC        [id number]           S                   [id number]

[id number]

 

                              [id number]                                [id number]



                                                                                
                                                                                
                                                                                
                                                                                
                                                                                
                                                                    



Problems, Conditions, and Diagnoses

          



           Code       Display Name Description Problem Type Effective Dates Data

 Source(s)

 

                                        Pleural effusion, not elsewhere classifi

ed Pleural effusion, not elsewhere 

classified          Problem             2020 12:00:00 AM EDT [facility] ([facility] betten[facility] 

[facility], )

 

             [id number]    Difficulty breathing Difficulty breathing Problem     

 2020 12:00:00 

AM EDT                                  [facility] ([facility], )

 

                    B95.7               Other staphylococcus as the cause of dis

eases classified elsewhere Other 

staphylococcus as the cause of diseases classified elsewhere Problem            

       2020 

12:00:00 AM EST                         [facility] ([facility]

)

 

                          Z48.815                   Encounter for surgical after

care following surgery on the digestive 

system                                  Encounter for surgical aftercare followi

ng surgery on the digestive 

system              Problem             2020 12:00:00 AM EST [facility] ([facility])

 

                    K70.31              Alcoholic cirrhosis of liver with ascite

s Alcoholic cirrhosis of liver 

with ascites        Problem             2020 12:00:00 AM EST [facility] ([facility])

 

                    K70.40              Alcoholic hepatic failure without coma A

lcoholic hepatic failure without 

coma                Problem             2020 12:00:00 AM EST [facility] ([facility])

 

             K76.6        Portal hypertension Portal hypertension Problem      0

2020 12:00:00 AM EST

                                        [facility] ([facility]

)

 

                    I85.10              Secondary esophageal varices without ble

eding Secondary esophageal 

varices without bleeding Problem             2020 12:00:00 AM EST [facility]

 ([facility])

 

                    F10.288             Alcohol dependence with other alcohol-in

duced disorder Alcohol 

dependence with other alcohol-induced disorder Problem                   

020 12:00:00 AM 

EST                                     [facility] ([facility]

)

 

             K76.7        Hepatorenal syndrome Hepatorenal syndrome Problem     

 2020 12:00:00 AM 

EST                                     [facility] ([facility]

)

 

                          I12.9                     Hypertensive chronic kidney 

disease with stage 1 through stage 4 chronic 

kidney disease, or unspecified chronic kidney disease Hypertensive chronic 

kidney disease with stage 1 through stage 4 chronic kidney disease, or 
unspecified chronic kidney disease Problem             2020 12:00:00 AM ES

T [facility] 

([facility])

 

                    N18.3               Chronic kidney disease, stage 3 (moderat

e) Chronic kidney disease, stage 3

 (moderate)         Problem             2020 12:00:00 AM EST [facility] ([facility])

 

             D63.1        Anemia in chronic kidney disease Anemia in chronic kid

richard disease Problem      

2020 12:00:00 AM EST              [facility] ([facility]

)

 

                    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

d loss (chronic) Iron deficiency 

anemia secondary to blood loss (chronic) Problem             2020 12:00:00

 AM EST 

[facility] ([facility])

 

             G25.81       Restless legs syndrome Restless legs syndrome Problem 

     2020 12:00:00

 AM EST                                 [facility] ([facility]

)

 

             H40.9        Unspecified glaucoma Unspecified glaucoma Problem     

 2020 12:00:00 AM 

EST                                     [facility] ([facility]

)

 

                    Z48.01              Encounter for change or removal of surgi

mariela wound dressing Encounter for 

change or removal of surgical wound dressing Problem             2020 12:0

0:00 AM EST 

[facility] ([facility])

 

                    Z48.03              Encounter for change or removal of drain

s Encounter for change or removal

 of drains          Problem             2020 12:00:00 AM EST [facility] ([facility])

 

           Z91.81     History of falling History of falling Problem    

0 12:00:00 AM EST 

[facility] ([facility])

 

             Z60.2        Problems related to living alone Problems related to l

iving alone Problem      

2020 12:00:00 AM EST              [facility] ([facility]

)

 

                    Z79.51              Long term (current) use of inhaled stero

ids Long term (current) use of 

inhaled steroids    Problem             2020 12:00:00 AM EST [facility] ([facility])

 

                K65.2           Spontaneous bacterial peritonitis Spontaneous ba

cterial peritonitis 

Problem                   2020 12:00:00 AM EST [facility] ([facility])

 

             K76.9        Liver disease, unspecified Liver disease, unspecified 

Problem      2020 

12:00:00 AM EST                         [facility] ([facility]

)



                                                                                
                                                                                
                                                                                
                                                                              



Surgeries/Procedures

          



             Procedure    Description  Date         Indications  Data Source(s)

 

                    Brief Emotional/Behav Assessment W/ Scoring Doc Per Standard

 Inst                     2020 

12:00:00 AM EST                                     [facility]

 

             INSERTION INDWELLING TUNNELED PLEURAL CATHETER              

020 12:00:00 AM EDT              

[facility] ([facility], )



                                                                                
                  



Results

          



                    ID                  Date                Data Source

 

                    [id number]            2020 10:00:00 AM EST [facility]









          Name      Value     Range     Interpretation Code Description Data Debi

rce(s) Supporting 

Document(s)

 

          Gram Stain Laboratory test result                               [facility]

 ([facility]  

 

                                        FEW WBCS

NO ORGANISMS SEEN



 

 

           Body Fluid Culture Laboratory test result                            

      [facility]                                   

 

                                        ****************************************

*********

If aerobic or anaerobic growth is detected within

the next 7-21 days, an addendum will follow.

                                        .***************************************

********.



FULL REPORT IN LAB NOTES ([facility] and [facility]).

NO GROWTH AEROBICALLY



 









                    ID                  Date                Data Source

 

                    [id number]            2020 10:00:00 AM EST [facility] ([facility]









          Name      Value     Range     Interpretation Code Description Data Debi

rce(s) Supporting 

Document(s)

 

           Source, Body Fluid Laboratory test result                            

      [facility]                                   

 

           Pleural FL Color Laboratory test result                              

    [facility]                                    

 

           Appearance, Body Fluid Laboratory test result                        

          [facility] ([facility]                              

 

          WBC Body Fluid 101 /uL   0-10                          [facility]  

 

          BF Mononuclear Cell % 94.0 %    0-0                           [facility] (

[facility]  

 

          RBC Body Fluid 2 10                                    [facility]  

 

           BF Polymorphonuclear Cell % 6.0 %      0-0                           

   [facility]

                                         









                    ID                  Date                Data Source

 

                    [id number]            2020 08:12:00 AM EST [facility] ([facility]









          Name      Value     Range     Interpretation Code Description Data Debi

rce(s) Supporting 

Document(s)

 

          Albumin 25% Laboratory test result                               [facility]

T ([facility] 

 

 

                                        TRANSFUSED PRODUCT: ALBUMIN 25%         

                COUNT: 1

 









                    ID                  Date                Data Source

 

                    [id number]            2020 08:11:00 AM EST [facility]









          Name      Value     Range     Interpretation Code Description Data Debi

rce(s) Supporting 

Document(s)

 

           Pleural FL Color Laboratory test result                              

    [facility]                                    

 

           Source, Body Fluid Laboratory test result                            

      [facility]                                   

 

          WBC Body Fluid 124 /uL   0-10                          [facility]  

 

           Appearance, Body Fluid Laboratory test result                        

          [facility]                              

 

           RBC Body Fluid Laboratory test result                                

  [facility]                                    

 

          BF Mononuclear Cell % 93.6 %    0-0                           [facility] (

[facility]  

 

           BF Polymorphonuclear Cell % 6.4 %      0-0                           

   [facility]

                                         









                    ID                  Date                Data Source

 

                    [id number]            2020 08:07:00 AM EST [facility] ([facility]









          Name      Value     Range     Interpretation Code Description Data Debi

rce(s) Supporting 

Document(s)

 

           Body Fluid Culture Laboratory test result                            

      [facility]                                   

 

                                        ****************************************

*********

If aerobic or anaerobic growth is detected within

the next 7-21 days, an addendum will follow.

                                        .***************************************

********.



FULL REPORT IN LAB NOTES ([facility] and [facility]).

NO GROWTH AEROBICALLY



 

 

          Gram Stain Laboratory test result                               [facility]

 ([facility]  

 

                                        FEW RBCS

FEW WBCS

NO ORGANISMS SEEN



 









                    ID                  Date                Data Source

 

                    [id number]            2020 07:59:00 AM EST [facility]









          Name      Value     Range     Interpretation Code Description Data Debi

rce(s) Supporting 

Document(s)

 

          Albumin 25% Laboratory test result                               [facility]

T ([facility] 

 

 

                                        TRANSFUSED PRODUCT: ALBUMIN 25%         

                COUNT: 1

 









                    ID                  Date                Data Source

 

                    [id number]            2020 03:46:00 PM EST [facility] ([facility]









          Name      Value     Range     Interpretation Code Description Data Debi

e(s) Supporting 

Document(s)

 

          White Blood Count 5.5 10    4.0-10.0                      [facility] ([facility]  

 

          Red Blood Count 3.39 10   4.00-5.40                     [facility] ([facility]  

 

          Hematocrit 32.6 %    36.0-47.0                     [facility] ([facility]  

 

          Hemoglobin 10.8 g/dL 12.0-15.5                     [facility] ([facility]  

 

           Mean Corpuscular Volume 96.2 fl    80.0-96.0                        M

[facility] ([facility]                                    

 

           Mean Corpuscular Hemoglobin 31.9 pg    27.0-33.0                     

   [facility] ([facility]                              

 

           Mean Corpuscular HGB Conc 33.1 g/dL  32.0-36.5                       

 [facility] ([facility]                              

 

           Platelet Count, Automated 124 10     150-450                         

 [facility] ([facility]                                    

 

           Red Cell Distribution Width 13.8 %     11.5-14.5                     

   [facility] ([facility]                              

 

          Neutrophils % 73.3 %    36.0-66.0                     [facility] ([facility]  

 

          Lymph %   13.9 %    24.0-44.0                     [facility] ([facility]  

 

          Eos %     1.3 %     0.0-3.0                       [facility] ([facility]  

 

          Mono %    9.9 %     0.0-5.0                       [facility] ([facility]  

 

          Immature Granulocyte % 0.7 %     0-3.0                         [facility] 

([facility]  

 

          Baso %    0.9 %     0.0-1.0                       [facility] ([facility]  

 

          Nucleated Red Blood Cell % 0.0 %     0-0                           [facility]

[facility] ([facility] 

 

 

          Neutrophils # 4.1 10    1.5-8.5                       [facility] ([facility]  

 

          Lymph #   0.8 10    1.5-5.0                       [facility] ([facility]  

 

          Eos #     0.1 10    0.0-0.5                       [facility] ([facility]  

 

          Mono #    0.6 10    0.0-0.8                       [facility] ([facility]  

 

          Baso #    0.1 10    0.0-0.2                       [facility] ([facility]  









                    ID                  Date                Data Source

 

                    [id number]            2020 03:46:00 PM EST [facility] ([facility]









          Name      Value     Range     Interpretation Code Description Data Debi

rce(s) Supporting 

Document(s)

 

          Prothrombin Time 17.5 s    12.5-14.3                     [facility] ([facility]  

 

           Partial Thromboplastin Time 35.4 s     24.2-38.5                     

   [facility] ([facility]                              

 

          Inr       1.40                                    [facility] ([facility]  

 

                                        THERAPUTIC HUMAN INR VALUES

INDICATIONS                      NORMAL RANGES

PROPHYLAXIS/TREATMENT OF:

VENOUS THROMBOSIS                2.0-3.0

PULMONARY EMBOLISM               2.0-3.0

PREVENTION OF SYSTEMIC EMBOLISM FROM:

TISSUE HEART VALVES              2.0-3.0

ACUTE MYOCARDIAL INFARCTION      2.0-3.0

VALVULAR HEART DISEASE           2.0-3.0

ATRIAL FIBRILLATION              2.0-3.0

MECHANICAL VALVES(HIGH RISK)     2.5-3.5

RECURRENT MYOCARDIAL INFARCTION  2.5-3.5

 









                    ID                  Date                Data Source

 

                    [id number]            2020 03:46:00 PM EST [facility] ([facility]









          Name      Value     Range     Interpretation Code Description Data Debi

e(s) Supporting 

Document(s)

 

          Ast/Sgot  43 U/L    7-37                          [facility] ([facility]  

 

          Bilirubin,Total 3.1 mg/dL 0.2-1.0                       [facility] ([facility]  

 

          Alkaline Phosphatase 155 U/L                           [facility] ([facility]  

 

          Alt/SGPT  38 U/L    12-78                         [facility] ([facility]  

 

          Bilirubin,Direct 2.0 mg/dL 0.0-0.2                       [facility] ([facility]  

 

          Total Protein 6.3 GM/DL 6.4-8.2                       [facility] ([facility]  

 

          Albumin   2.0 GM/DL 3.2-5.2                       [facility] ([facility]  

 

          Albumin/Globulin Ratio 0.5       1.2-2.2                       [facility] 

([facility]  









                    ID                  Date                Data Source

 

                    [id number]            2020 03:46:00 PM EST [facility] ([facility]









          Name      Value     Range     Interpretation Code Description Data Debi

rce(s) Supporting 

Document(s)

 

          Glucose, Fasting 169 mg/dL                         [facility] ([facility]  

 

           Creatinine For GFR 2.38 mg/dL 0.55-1.30                        [facility]

 ([facility]                                    

 

          Blood Urea Nitrogen 71 mg/dL  7-18                          [facility] ([facility]  

 

          Glomerular Filtration Rate 22.1                                    [facility]

[facility] ([facility]  

 

                                        <content>Units are mL/min/1.73 

m2</content><br/><content></content><br/><content>Chronic Kidney Disease Staging
 per NKF:</content><br/><content></content><br/><content>Stage I & II   GFR >=60
       Normal to Mildly Decreased</content><br/><content>Stage III      GFR 30-
59      Moderately Decreased</content><br/><content>Stage IV       GFR 15-29    
  Severely Decreased</content><br/><content>Stage V        GFR <15        Very 
Little GFR Left</content><br/><content>ESRD           GFR <15 on 
RRT</content><br/><content></content> 

 

          Sodium Level 131 meq/L 136-145                       [facility] ([facility]  

 

          Potassium Serum 4.7 meq/L 3.5-5.1                       [facility] ([facility]  

 

          Chloride Level 99 meq/L                          [facility] ([facility]  

 

          Carbon Dioxide Level 21 meq/L  21-32                         [facility] ([facility]  

 

          Anion Gap 11 meq/L  8-16                          [facility] ([facility]  

 

          Calcium Level 8.7 mg/dL 8.8-10.2                      [facility] ([facility]  









                    ID                  Date                Data Source

 

                    [id number]            2020 03:46:00 PM EST [facility] ([facility]









          Name      Value     Range     Interpretation Code Description Data Debi

rce(s) Supporting 

Document(s)

 

           Ethanol [Mass/volume] in Serum or Plasma Laboratory test result 0.000

-0.010                       

[facility] ([facility]   









                    ID                  Date                Data Source

 

                    [id number]            2020 12:19:00 PM EST [facility] ([facility]









          Name      Value     Range     Interpretation Code Description Data Debi

rce(s) Supporting 

Document(s)

 

          Glucose, Fasting 118 mg/dL                         [facility] ([facility]  

 

          Blood Urea Nitrogen 69 mg/dL  7-18                          [facility] ([facility]  

 

           Creatinine For GFR 2.49 mg/dL 0.55-1.30                        [facility]

 ([facility]                                    

 

          Glomerular Filtration Rate 21.0                                    [facility]

[facility] ([facility]  

 

                                        <content>Units are mL/min/1.73 

m2</content><br/><content></content><br/><content>Chronic Kidney Disease Staging
 per NK[facility]:</content><br/><content></content><br/><content>Stage I & II   GFR >=60
       Normal to Mildly Decreased</content><br/><content>Stage III      GFR 30-
59      Moderately Decreased</content><br/><content>Stage IV       GFR 15-29    
  Severely Decreased</content><br/><content>Stage V        GFR <15        Very 
Little GFR Left</content><br/><content>ESRD           GFR <15 on 
RRT</content><br/><content></content> 

 

          Sodium Level 128 meq/L 136-145                       [facility] ([facility]  

 

          Potassium Serum 4.2 meq/L 3.5-5.1                       [facility] ([facility]  

 

          Chloride Level 92 meq/L                          [facility] ([facility]  

 

          Anion Gap 11 meq/L  8-16                          [facility] ([facility]  

 

          Carbon Dioxide Level 25 meq/L  21-32                         [facility] ([facility]  

 

          Ast/Sgot  45 U/L    7-37                          [facility] ([facility]  

 

          Calcium Level 8.8 mg/dL 8.8-10.2                      [facility] ([facility]  

 

          Alt/SGPT  44 U/L    12-78                         [facility] ([facility]  

 

          Alkaline Phosphatase 186 U/L                           [facility] ([facility]  

 

          Bilirubin,Total 4.2 mg/dL 0.2-1.0                       [facility] ([facility]  

 

          Total Protein 6.9 GM/DL 6.4-8.2                       [facility] ([facility]  

 

          Albumin   2.2 GM/DL 3.2-5.2                       [facility] ([facility]  

 

          Albumin/Globulin Ratio 0.5       1.2-2.2                       [facility] 

([facility]  









                    ID                  Date                Data Source

 

                    [id number]              2020 12:19:00 PM EST [facility] ([facility]

[facility] [facility])









          Name      Value     Range     Interpretation Code Description Data Debi

rce(s) Supporting 

Document(s)

 

          Glucose, Fasting 118 mg/dL                         [facility] ([facility]

[facility]  

 

                                        reviewed 

 

          Blood Urea Nitrogen 69 mg/dL  7-18                          [facility] ([facility]

[facility])  

 

                                        reviewed 

 

          Creatinine For GFR 2.49 mg/dL 0.55-1.30                     [facility] ([facility]

[facility])  

 

                                        reviewed 

 

          Sodium Level 128 meq/L 136-145                       [facility] ([facility])  

 

                                        reviewed 

 

          Glomerular Filtration Rate 21.0                                    MED

[facility] ([facility])  

 

                                        reviewed 

 

          Carbon Dioxide Level 25 meq/L  21-32                         [facility] ([facility]

[facility])  

 

                                        reviewed 

 

          Chloride Level 92 meq/L                          [facility] ([facility])  

 

                                        reviewed 

 

          Potassium Serum 4.2 meq/L 3.5-5.1                       [facility] ([facility]

[facility])  

 

                                        reviewed 

 

          Calcium Level 8.8 mg/dL 8.8-10.2                      [facility] ([facility])  

 

                                        reviewed 

 

          Anion Gap 11 meq/L  8-16                          [facility]  

 

                                        reviewed 

 

          Alt/SGPT  44 U/L    12-78                         [facility]  

 

                                        reviewed 

 

          Ast/Sgot  45 U/L    7-37                          [facility]  

 

                                        reviewed 

 

          Alkaline Phosphatase 186 U/L                           [facility] ([facility]

[facility])  

 

                                        reviewed 

 

          Bilirubin,Total 4.2 mg/dL 0.2-1.0                       [facility] ([facility]

[facility])  

 

                                        reviewed 

 

          Total Protein 6.9 GM/DL 6.4-8.2                       [facility] ([facility])  

 

                                        reviewed 

 

          Albumin/Globulin Ratio 0.5       1.2-2.2                       [facility] 

([facility])  

 

                                        reviewed 

 

          Albumin   2.2 GM/DL 3.2-5.2                       [facility] ([facility])  

 

                                        reviewed 









                    ID                  Date                Data Source

 

                    [id number]         2020 01:04:00 PM EST [facility] ([facility], )









          Name      Value     Range     Interpretation Code Description Data Debi

rce(s) Supporting 

Document(s)

 

                                        Microscopic observation [Identifier] in 

Unspecified specimen by Non-

gynecological cytology method Laboratory test result                            

            [facility] ([facility], )                    

 

                                        SPECIMEN:            Pleural Fluid

                                        1400 ml yellow



SPECIMEN ADEQUACY:   Satisfactory for evaluation





CATEGORIZATION:      Negative for Malignancy



DESCRIPTIONS:        Specimen consists of reactive mesothelial cells

in a background of lymphocytes, macrophages,

and some neutrophils.







COMMENTS:













                                        2020 - 908



Signed________ SERENA KAUR (ASCP) 2020 0908 (Prelim)

Signed________ PAUL OWUSU MD 2020 1111

 









                    ID                  Date                Data Source

 

                    [id number]            2020 08:25:00 AM EST [facility] ([facility]









          Name      Value     Range     Interpretation Code Description Data Debi

rce(s) Supporting 

Document(s)

 

           Platelets [#/volume] in Blood by Automated count 113 10     150-450  

                        [facility] 

([facility]          









                    ID                  Date                Data Source

 

                    [id number]            2020 08:25:00 AM EST [facility] ([facility]









          Name      Value     Range     Interpretation Code Description Data Dbei

rce(s) Supporting 

Document(s)

 

          Prothrombin Time 18.6 s    12.5-14.3                     [facility] ([facility]  

 

          Inr       1.52                                    [facility] ([facility]  

 

                                        THERAPUTIC HUMAN INR VALUES

INDICATIONS                      NORMAL RANGES

PROPHYLAXIS/TREATMENT OF:

VENOUS THROMBOSIS                2.0-3.0

PULMONARY EMBOLISM               2.0-3.0

PREVENTION OF SYSTEMIC EMBOLISM FROM:

TISSUE HEART VALVES              2.0-3.0

ACUTE MYOCARDIAL INFARCTION      2.0-3.0

VALVULAR HEART DISEASE           2.0-3.0

ATRIAL FIBRILLATION              2.0-3.0

MECHANICAL VALVES(HIGH RISK)     2.5-3.5

RECURRENT MYOCARDIAL INFARCTION  2.5-3.5

 

 

           Partial Thromboplastin Time 34.0 s     24.2-38.5                     

   [facility] ([facility]                              









                    ID                  Date                Data Source

 

                    [id number]         2020 08:25:00 AM EST [facility] ([facility], )









          Name      Value     Range     Interpretation Code Description Data Debi

rce(s) Supporting 

Document(s)

 

           Inr        1.52                  Normal (applies to non-numeric resul

ts)            [facility] ([facility], )                            

 

                                        THERAPUTIC HUMAN INR VALUES

INDICATIONS                      NORMAL RANGES

PROPHYLAXIS/TREATMENT OF:

VENOUS THROMBOSIS                2.0-3.0

PULMONARY EMBOLISM               2.0-3.0

PREVENTION OF SYSTEMIC EMBOLISM FROM:

TISSUE HEART VALVES              2.0-3.0

ACUTE MYOCARDIAL INFARCTION      2.0-3.0

VALVULAR HEART DISEASE           2.0-3.0

ATRIAL FIBRILLATION              2.0-3.0

MECHANICAL VALVES(HIGH RISK)     2.5-3.5

RECURRENT MYOCARDIAL INFARCTION  2.5-3.5

 

 

                Partial Thromboplastin Time 34.0 s          24.2-38.5       Norm

al (applies to non-numeric 

results)                                [facility] ([facility]) 

 

 

           Prothrombin Time 18.6 s     12.5-14.3  Above high normal            M

[facility] ([facility])                            









                    ID                  Date                Data Source

 

                    [id number]         2020 08:25:00 AM EST [facility] ([facility])









          Name      Value     Range     Interpretation Code Description Data Debi

rce(s) Supporting 

Document(s)

 

           aPTT in Platelet poor plasma by Coagulation assay Laboratory test res

ult                                  

[facility] ([facility])  

 

                Platelets [#/volume] in Blood by Automated count 113 10         

 150-450         Below low normal

                                        [facility] ([facility]) 

 









                    ID                  Date                Data Source

 

                    [id number]            2020 08:09:00 AM EST [facility] ([facility]









          Name      Value     Range     Interpretation Code Description Data Debi

rce(s) Supporting 

Document(s)

 

           Source, Body Fluid Laboratory test result                            

      [facility] ([facility]                                   

 

           Pleural FL Color Laboratory test result                              

    [facility] ([facility]                                    

 

          WBC Body Fluid 114 /uL   0-10                          [facility] ([facility]  

 

           Appearance, Body Fluid Laboratory test result                        

          [facility] ([facility]                              

 

          RBC Body Fluid 4 10                                    [facility] ([facility]  

 

          BF Mononuclear Cell % 91.2 %    0-0                           [facility] (

[facility]  

 

           BF Polymorphonuclear Cell % 8.8 %      0-0                           

   [facility] ([facility]

                                         









                    ID                  Date                Data Source

 

                    [id number]            2020 08:09:00 AM EST [facility] ([facility]









          Name      Value     Range     Interpretation Code Description Data Debi

rce(s) Supporting 

Document(s)

 

          Total Protein, Body Fluid 0.7 g/dL                                [facility]

[facility] ([facility] 

 

 

           Source, Body Fluid Tot Protein Laboratory test result                

                  [facility] ([facility]                    









                    ID                  Date                Data Source

 

                    [id number]            2020 08:09:00 AM EST [facility] ([facility]









          Name      Value     Range     Interpretation Code Description Data Debi

rce(s) Supporting 

Document(s)

 

          LDH, Body Fluid 51 U/L                                  [facility] ([facility]  

 

           Source, Body Fluid LDH Laboratory test result                        

          [facility] ([facility]                              









                    ID                  Date                Data Source

 

                    [id number]            2020 08:09:00 AM EST [facility] ([facility]









          Name      Value     Range     Interpretation Code Description Data Debi

rce(s) Supporting 

Document(s)

 

          Glucose, Body Fluid 131 mg/dL                               [facility] ([facility]  

 

           Source, Body Fluid Glucose Laboratory test result                    

              [facility] ([facility]                    









                    ID                  Date                Data Source

 

                    [id number]            2020 08:09:00 AM EST [facility] ([facility]









          Name      Value     Range     Interpretation Code Description Data Debi

rce(s) Supporting 

Document(s)

 

          Amylase, Body Fluid 31 U/L                                  [facility] ([facility]  

 

           Source, Body Fluid Amylase Laboratory test result                    

              [facility] ([facility]                    









                    ID                  Date                Data Source

 

                    [id number]            2020 08:09:00 AM EST [facility] ([facility]









          Name      Value     Range     Interpretation Code Description Data Debi

rce(s) Supporting 

Document(s)

 

           Source, Body Fluid pH Laboratory test result                         

         [facility] ([facility]                              

 

           PH Body Fluid Laboratory test result                                 

 [facility] ([facility]                                    









                    ID                  Date                Data Source

 

                    [id number]            2020 08:09:00 AM EST [facility] ([facility]









          Name      Value     Range     Interpretation Code Description Data Debi

rce(s) Supporting 

Document(s)

 

          Gram Stain Laboratory test result                               [facility]

 ([facility]  

 

                                        FEW WBCS

NO ORGANISMS SEEN



 

 

           Body Fluid Culture Laboratory test result                            

      [facility] ([facility]                                   

 

                                        ****************************************

*********

If aerobic or anaerobic growth is detected within

the next 7-21 days, an addendum will follow.

                                        .***************************************

********.



FULL REPORT IN LAB NOTES ([facility] and [facility]).

NO GROWTH AEROBICALLY



 









                    ID                  Date                Data Source

 

                    [id number]            2020 08:09:00 AM EST [facility] ([facility]









          Name      Value     Range     Interpretation Code Description Data Debi

rce(s) Supporting 

Document(s)

 

                          Bacteria identified in Unspecified specimen by Anaerob

e culture Laboratory test 

result                                              [facility] ([facility]  

 

                                        ****************************************

*********

If anaerobic or aerobic growth is detected within

the next 7-21 days, an addendum will follow.

                                        .***************************************

********.



FULL REPORT IN LAB NOTES ([facility] and [facility]).

NO GROWTH ANAEROBICALLY



 









                    ID                  Date                Data Source

 

                    [id number]            2020 08:09:00 AM EST [facility] ([facility]









          Name      Value     Range     Interpretation Code Description Data Debi

rce(s) Supporting 

Document(s)

 

          Afb Smear Laboratory test result                               [facility] 

([facility]  

 

                                        Testing performed at reference lab . Rep

ort copy to follow

on a separate form. 20 REF LAB#:[id number]



Due to limited sensitivity, smear results should

be used as an adjunct in evaluating patient tuberculosis

status. Cultural examination is highly recommended

for clinical diagnosis.





AFB sm REF LAB concentra      NEGATIVE



 

 

          [facility]fb Culture Laboratory test result                               [facility]

T ([facility] 

 

 

                                        Testing performed at reference lab . Rep

ort copy to follow

on a separate form. 20 REF LAB#:[id number]



FULL REPORT IN LAB NOTES ([facility] and [facility]).

No Acid-Fast Bacilli Isolated after 6 Weeks.



 









                    ID                  Date                Data Source

 

                    [id number]            2020 08:09:00 AM EST [facility] ([facility]









          Name      Value     Range     Interpretation Code Description Data Debi

e(s) Supporting 

Document(s)

 

           Fungal Smear Laboratory test result                                  

[facility] ([facility]

                                         

 

                                        Testing performed at reference lab . Rep

ort copy to follow

on a separate form. 20 REF LAB#:[id number]



IAIN/Calcofluor preparatio     No fungus observed.



 

 

           Fungal Culture Other Source Laboratory test result                   

               [facility] ([facility]                    

 

                                        Testing performed at reference lab . Rep

ort copy to follow

on a separate form. 20 REF LAB#:[id number]



FUNGUS CULTURE [facility]        No Yeast or Mold Isolated after 4 weeks.



 









                    ID                  Date                Data Source

 

                    [id number]         2020 08:09:00 AM EST [facility] ([facility], )









          Name      Value     Range     Interpretation Code Description Data Debi

rce(s) Supporting 

Document(s)

 

                          Bacteria identified in Unspecified specimen by Anaerob

e culture Laboratory test 

result                                              [facility] ([facility], )  

 

                                        ****************************************

*********

If anaerobic or aerobic growth is detected within

the next 7-21 days, an addendum will follow.

                                        .***************************************

********.



FULL REPORT IN LAB NOTES ([facility] and [facility]).

NO GROWTH ANAEROBICALLY



 









                    ID                  Date                Data Source

 

                    [id number]         2020 08:09:00 AM EST [facility] ([facility])









          Name      Value     Range     Interpretation Code Description Data Debi

rce(s) Supporting 

Document(s)

 

           Gram Stain Laboratory test result            Normal (applies to non-n

umeric results)            

[facility] ([facility])  

 

                                        FEW WBCS

NO ORGANISMS SEEN



 

 

           Body Fluid Culture Laboratory test result                            

      [facility] ([facility])                            

 

                                        ****************************************

*********

If aerobic or anaerobic growth is detected within

the next 7-21 days, an addendum will follow.

                                        .***************************************

********.



FULL REPORT IN LAB NOTES ([facility] and [facility]).

NO GROWTH AEROBICALLY



 









                    ID                  Date                Data Source

 

                    [id number]         2020 08:09:00 AM EST [facility] ([facility])









          Name      Value     Range     Interpretation Code Description Data Debi

rce(s) Supporting 

Document(s)

 

             PH Body Fluid Laboratory test result              Normal (applies t

o non-numeric results)  

                          [facility] ([facility])  

 

                Source, Body Fluid pH Laboratory test result                 Nor

mal (applies to non-numeric 

results)                                [facility] ([facility]) 

 









                    ID                  Date                Data Source

 

                    [id number]         2020 08:09:00 AM EST [facility])









          Name      Value     Range     Interpretation Code Description Data Debi

rce(s) Supporting 

Document(s)

 

           Amylase, Body Fluid 31 U/L                Normal (applies to non-nume

[place] results)            [facility])         

 

                Source, Body Fluid Amylase Laboratory test result               

  Normal (applies to non-

numeric results)                        [facility]) 

 









                    ID                  Date                Data Source

 

                    [id number]         2020 08:09:00 AM EST [facility])









          Name      Value     Range     Interpretation Code Description Data Debi

rce(s) Supporting 

Document(s)

 

                Source, Body Fluid Glucose Laboratory test result               

  Normal (applies to non-

numeric results)                        [facility]) 

 

 

           Glucose, Body Fluid 131 mg/dL             Normal (applies to non-nume

[place] results)            [facility])        









                    ID                  Date                Data Source

 

                    [id number]         2020 08:09:00 AM [facility])









          Name      Value     Range     Interpretation Code Description Data Debi

rce(s) Supporting 

Document(s)

 

           LDH, Body Fluid 51 U/L                Normal (applies to non-numeric 

results)            [facility] 

([facility])         

 

                Source, Body Fluid LDH Laboratory test result                 No

rmal (applies to non-numeric 

results)                                [facility]) 

 









                    ID                  Date                Data Source

 

                    [id number]         2020 08:09:00 AM EST [facility])









          Name      Value     Range     Interpretation Code Description Data Debi

rce(s) Supporting 

Document(s)

 

           Total Protein, Body Fluid 0.7 g/dL              Normal (applies to no

n-numeric results)            

[facility] ([facility])  

 

                Source, Body Fluid Tot Protein Laboratory test result           

      Normal (applies to non-

numeric results)                        [facility]) 

 









                    ID                  Date                Data Source

 

                    [id number]         2020 08:09:00 AM EST [facility])









          Name      Value     Range     Interpretation Code Description Data Debi

rce(s) Supporting 

Document(s)

 

                Source, Body Fluid Laboratory test result                 Normal

 (applies to non-numeric 

results)                                [facility]) 

 

 

                Pleural FL Color Laboratory test result                 Normal (

applies to non-numeric 

results)                                [facility]) 

 

 

           WBC Body Fluid 114 /uL    0-10       Above high normal            [facility] ([facility])                            

 

           RBC Body Fluid 4 10                  Normal (applies to non-numeric r

esults)            [facility] 

([facility])         

 

                Appearance, Body Fluid Laboratory test result                 No

rmal (applies to non-numeric 

results)                                [facility] ([facility]) 

 

 

           BF Mononuclear Cell % 91.2 %     0-0        Above high normal        

    [facility] ([facility])                            

 

           BF Polymorphonuclear Cell % 8.8 %      0-0        Above high normal  

          [facility] ([facility])                    









                    ID                  Date                Data Source

 

                    [id number]         2020 08:09:00 AM EST [facility])









          Name      Value     Range     Interpretation Code Description Data Debi

rce(s) Supporting 

Document(s)

 

           Amylase [Enzymatic activity/volume] in Body fluid Laboratory test res

ult                                  

[facility])  









                    ID                  Date                Data Source

 

                    [id number]              10/27/2020 12:52:00 PM EDT [facility])









          Name      Value     Range     Interpretation Code Description Data Debi

rce(s) Supporting 

Document(s)

 

                          Platelets reticulated/100 platelets in Blood by Automa

aly count Laboratory test 

result                                              [facility] ([facility]

)  

 

             Alpha-1-Fetoprotein [Mass/volume] in Serum or Plasma Laboratory leny

t result                           

                          [facility])  

 

           Laboratory test finding (navigational concept) 3.4 %      0.0-9.59   

                      [facility] 

([facility])                   

 

          Alpha Fetoprotein Tumor Quant 5.0 ng/mL                               

[facility])  

 

                                        THE AFP ASSAY IS PERFORMED ON THE makeena BY

CHEMILUMINESCENCE AND SHOULD NOT BE COMPARED INTERCHANGEABLY

WITH OTHER METHODS. IT SHOULD NOT BE USED ALONE AS A

SCREENING TEST OR DIAGNOSIS FOR THE PRESENCE OR ABSENCE OF

MALIGNANT DISEASE.  THESE RESULTS ARE NOT INTERPRETABLE IN

PREGNANT FEMALES.

PREDICTIONS OF DISEASE RECURRENCE SHOULD NOT BE BASED SOLELY

ON VALUES OBTAINED FROM SERIAL PATIENT SERUM VALUES.

 

 

           Laboratory test finding (navigational concept) Laboratory test result

                                  

[facility] ([facility])            

 

           Laboratory test finding (navigational concept) Laboratory test result

                                  

[facility] ([facility])            

 

           Laboratory test finding (navigational concept) Laboratory test result

                                  

[facility] ([facility])            

 

           Laboratory test finding (navigational concept) Laboratory test result

                                  

[facility] ([facility])            

 

           Laboratory test finding (navigational concept) Laboratory test result

                                  

[facility] ([facility])            

 

           Laboratory test finding (navigational concept) Laboratory test result

                                  

[facility] ([facility])            

 

           Laboratory test finding (navigational concept) Laboratory test result

                                  

[facility] ([facility])            

 

           Laboratory test finding (navigational concept) Laboratory test result

                                  

[facility] ([facility])            

 

           Laboratory test finding (navigational concept) Laboratory test result

                                  

[facility] ([facility])            

 

           Laboratory test finding (navigational concept) Laboratory test result

                                  

[facility] ([facility])            

 

           Laboratory test finding (navigational concept) Laboratory test result

                                  

[facility] ([facility])            

 

           Laboratory test finding (navigational concept) Laboratory test result

                                  

[facility] ([facility])            

 

           Laboratory test finding (navigational concept) Laboratory test result

                                  

[facility] ([facility])            

 

           Laboratory test finding (navigational concept) Laboratory test result

                                  

[facility] ([facility])            

 

           Laboratory test finding (navigational concept) Laboratory test result

                                  

[facility] ([facility])            

 

           Laboratory test finding (navigational concept) Laboratory test result

                                  

[facility] ([facility])            









                    ID                  Date                Data Source

 

                    [id number]              [facility]2020 12:52[facility]









          Name      Value     Range     Interpretation Code Description Data Debi

rce(s) Supporting 

Document(s)

 

           Albumin [Mass/volume] in Serum or Plasma Laboratory test result      

                            [facility] 

([facility])                   

 

                          Alanine aminotransferase [Enzymatic activity/volume] i

n Serum or Plasma 

Laboratory test result                                        [facility]  

 

           Calcium [Mass/volume] in Serum or Plasma Laboratory test result      

                            [facility] 

([facility])                   

 

                    Carbon dioxide, total [Moles/volume] in Serum or Plasma Labo

ratory test result  

                                                [facility] ([facility])  

 

           Chloride [Moles/volume] in Serum or Plasma Laboratory test result    

                              [facility] 

([facility])                   

 

           Glucose [Mass/volume] in Serum or Plasma Laboratory test result      

                            [facility] 

([facility])                   

 

                          Alkaline phosphatase [Enzymatic activity/volume] in Se

rum or Plasma Laboratory 

test result                                         [facility] ([facility]

)  

 

           Creatinine [Mass/volume] in Serum or Plasma Laboratory test result   

                               [facility] 

([facility])                   

 

           Potassium [Moles/volume] in Serum or Plasma Laboratory test result   

                               [facility] 

([facility])                   

 

           Triglyceride [Mass/volume] in Serum or Plasma Laboratory test result 

                                 

[facility] ([facility])            

 

           Protein [Mass/volume] in Serum or Plasma Laboratory test result      

                            [facility] 

([facility])                   

 

                          Aspartate aminotransferase [Enzymatic activity/volume]

 in Serum or Plasma 

Laboratory test result                                        [facility]  

 

          Glucose, Fasting 104 mg/dL                         [facility]  

 

           Urea nitrogen [Mass/volume] in Serum or Plasma Laboratory test result

                                  

[facility] ([facility])            

 

          Creatinine For GFR 1.98 mg/dL 0.55-1.30                     [facility]  

 

          Blood Urea Nitrogen 51 mg/dL  7-18                          [facility]  

 

          Glomerular Filtration Rate 27.4                                    [facility]

[facility] ([facility])  

 

                                        <content>Units are mL/min/1.73 

m2</content><br/><content></content><br/><content>Chronic Kidney Disease Staging
 per NKF:</content><br/><content></content><br/><content>Stage I & II   GFR >=60
       Normal to Mildly Decreased</content><br/><content>Stage III      GFR 30-
59      Moderately Decreased</content><br/><content>Stage IV       GFR 15-29    
  Severely Decreased</content><br/><content>Stage V        GFR <15        Very 
Little GFR Left</content><br/><content>ESRD           GFR <15 on 
RRT</content><br/><content></content> 

 

          Sodium Level 125 meq/L 136-145                       [facility])  

 

          Potassium Serum 4.4 meq/L 3.5-5.1                       [facility]  

 

          Chloride Level 87 meq/L                          [facility]  

 

          Carbon Dioxide Level 30 meq/L  21-32                         [facility]  

 

          Calcium Level 9.5 mg/dL 8.8-10.2                      [facility]  

 

          Anion Gap 8 meq/L   8-16                          [facility]  

 

          Ast/Sgot  78 U/L    7-37                          [facility]  

 

          Alt/SGPT  80 U/L    12-78                         [facility]  

 

          Bilirubin,Total 7.6 mg/dL 0.2-1.0                       [facility]  

 

          Total Protein 7.6 GM/DL 6.4-8.2                       [facility]  

 

          Alkaline Phosphatase 189 U/L                           [facility]  

 

          Albumin   2.8 GM/DL 3.2-5.2                       [facility]  

 

          Albumin/Globulin Ratio 0.6       1.2-2.2                       [facility] 

([facility])  









                    ID                  Date                Data Source

 

                    [id number]              10/27/2020 12:52:00 PM EDT [facility]









          Name      Value     Range     Interpretation Code Description Data Debi

rce(s) Supporting 

Document(s)

 

           Hemoglobin [Mass/volume] in Blood Laboratory test result             

                     [facility]                             

 

           Leukocytes [#/volume] in Blood by Automated count Laboratory test res

ult                                  

[facility] ([facility])            

 

             Erythrocytes [#/volume] in Blood by Automated count Laboratory test

 result                            

                          [facility] ([facility])  

 

                          Erythrocyte mean corpuscular volume [Entitic volume] b

y Automated count 

Laboratory test result                                        [facility]  

 

                    Hematocrit [Volume Fraction] of Blood by Automated count Lab

oratory test result 

                                                [facility] ([facility])  

 

                                        Erythrocyte mean corpuscular hemoglobin 

concentration [Mass/volume] by Automated

 count     Laboratory test result                                  [facility]  

 

                          Erythrocyte mean corpuscular hemoglobin [Entitic mass]

 by Automated count 

Laboratory test result                                        [facility]  

 

                          Erythrocyte distribution width [Ratio] by Automated co

unt Laboratory test result

                                                    [facility] ([facility]

)  

 

           Platelets [#/volume] in Blood by Automated count Laboratory test resu

lt                                  

[facility] ([facility])            

 

                Neutrophils/100 leukocytes in Blood by Automated count Laborator

y test result                 

                                        [facility] ([facility])  

 

                          Platelet mean volume [Entitic volume] in Blood by Adalid Driscoll Laboratory test 

result                                              [facility] ([facility]

)  

 

                          Band form neutrophils/100 leukocytes in Body fluid by 

Manual count Laboratory 

test result                                         [facility] ([facility]

)  

 

                    Lymphocytes/100 leukocytes in Body fluid by Manual count Lab

oratory test result 

                                                [facility] ([facility])  

 

           Basophils/100 leukocytes in Blood Laboratory test result             

                     [facility]                              

 

             Monocytes/100 leukocytes in Blood by Automated count Laboratory leny

t result                           

                          [facility] ([facility])  

 

                    Eosinophils/100 leukocytes in Body fluid by Manual count Lab

oratory test result 

                                                [facility] ([facility])  

 

           Basophils [#/volume] in Blood by Automated count Laboratory test resu

lt                                  

[facility] ([facility])            

 

           Lymphocytes [#/volume] in Blood by Automated count Laboratory test re

sult                                  

[facility] ([facility])            

 

           Eosinophils [#/volume] in Blood by Automated count Laboratory test re

sult                                  

[facility] ([facility])            

 

           Neutrophils [#/volume] in Blood by Automated count Laboratory test re

sult                                  

[facility] ([facility])            

 

           Monocytes [#/volume] in Blood by Automated count Laboratory test resu

lt                                  

[facility] ([facility])            









                    ID                  Date                Data Source

 

                    [id number]            2020 06:25:00 AM EDT [facility] ([facility]









          Name      Value     Range     Interpretation Code Description Data Debi

rce(s) Supporting 

Document(s)

 

          Prothrombin Time 19.4 s    12.5-14.3                     [facility] ([facility]  

 

           Partial Thromboplastin Time 32.6 s     24.2-38.5                     

   [facility] ([facility]                              

 

          Inr       1.60                                    [facility]  

 

                                        THERAPUTIC HUMAN INR VALUES

INDICATIONS                      NORMAL RANGES

PROPHYLAXIS/TREATMENT OF:

VENOUS THROMBOSIS                2.0-3.0

PULMONARY EMBOLISM               2.0-3.0

PREVENTION OF SYSTEMIC EMBOLISM FROM:

TISSUE HEART VALVES              2.0-3.0

ACUTE MYOCARDIAL INFARCTION      2.0-3.0

VALVULAR HEART DISEASE           2.0-3.0

ATRIAL FIBRILLATION              2.0-3.0

MECHANICAL VALVES(HIGH RISK)     2.5-3.5

RECURRENT MYOCARDIAL INFARCTION  2.5-3.5

 









                    ID                  Date                Data Source

 

                    [id number]            2020 06:25:00 AM EDT [facility] ([facility]









          Name      Value     Range     Interpretation Code Description Data Debi

rce(s) Supporting 

Document(s)

 

                          Natriuretic peptide.B prohormone N-Terminal [Mass/volu

me] in Serum or Plasma 121

 pg/mL                                              [facility] ([facility]  

 

           Magnesium [Mass/volume] in Serum or Plasma 2.2 mg/dL  1.8-2.4        

                  [facility]                 









                    ID                  Date                Data Source

 

                    [id number]            2020 06:25:00 AM EDT [facility] ([facility]









          Name      Value     Range     Interpretation Code Description Data Debi

rce(s) Supporting 

Document(s)

 

          Blood Urea Nitrogen 24 mg/dL  7-18                          [facility]  

 

          Glucose, Fasting 83 mg/dL                          [facility]  

 

          Glomerular Filtration Rate 41.5                                    [facility]

[facility] ([facility]  

 

                                        <content>Units are mL/min/1.73 

m2</content><br/><content></content><br/><content>Chronic Kidney Disease Staging
 per NKF:</content><br/><content></content><br/><content>Stage I & II   GFR >=60
       Normal to Mildly Decreased</content><br/><content>Stage III      GFR 30-
59      Moderately Decreased</content><br/><content>Stage IV       GFR 15-29    
  Severely Decreased</content><br/><content>Stage V        GFR <15        Very 
Little GFR Left</content><br/><content>ESRD           GFR <15 on 
RRT</content><br/><content></content> 

 

           Creatinine For GFR 1.38 mg/dL 0.55-1.30                        [facility]

 ([facility]                                    

 

          Potassium Serum 3.4 meq/L 3.5-5.1                       [facility]  

 

          Sodium Level 130 meq/L 136-145                       [facility]  

 

          Chloride Level 96 meq/L                          [facility]  

 

          Carbon Dioxide Level 23 meq/L  21-32                         [facility]  

 

          Calcium Level 8.6 mg/dL 8.8-10.2                      [facility]  

 

          Anion Gap 11 meq/L  8-16                          [facility]  









                    ID                  Date                Data Source

 

                    [id number]            2020 06:25:00 AM EDT [facility]









          Name      Value     Range     Interpretation Code Description Data Debi

rce(s) Supporting 

Document(s)

 

          Ast/Sgot  80 U/L    7-37                          [facility]  

 

          Alt/SGPT  51 U/L    12-78                         [facility]  

 

          Bilirubin,Total 6.8 mg/dL 0.2-1.0                       [facility]  

 

          Alkaline Phosphatase 136 U/L                           [facility]  

 

          Bilirubin,Direct 4.4 mg/dL 0.0-0.2                       [facility]  

 

          Total Protein 7.9 GM/DL 6.4-8.2                       [facility]  

 

          Albumin   2.0 GM/DL 3.2-5.2                       [facility]  

 

          Albumin/Globulin Ratio 0.3       1.2-2.2                       [facility] 

([facility]  









                    ID                  Date                Data Source

 

                    [id number]            2020 06:25:00 AM EDT [facility]









          Name      Value     Range     Interpretation Code Description Data Dbei

rce(s) Supporting 

Document(s)

 

           CPK Creatine Phosphokinase 50 U/L                              

  [facility]                                    

 

          MB/CK Relative Index 2.00                                    [facility]  

 

                                        <content>DIAGNOSIS CRITERIA</content><br

/><content>MMB ng/ml       Relative 

Index (RI)</content><br/><content>NON-AMI               < or = 5               
N/A</content><br/><content>GRAY ZONE              > 5                < or = 
4</content><br/><content>AMI                    > 5                   > 
4</content><br/><content></content> 

 

           CK-MB Value Mass Laboratory test result                              

    [facility]                                    

 

          Troponin I Laboratory test result                               [facility]

 ([facility]  

 

                                        <content>Troponin I Reference Interval f

or Siemens [place] 

LOCI:</content><br/><content></content><br/><content>99th Percentile= 0.00-0.045
 ng/ml</content><br/><content></content><br/><content>Risk 
Stratification:</content><br/><content><= 0.10 ng/ml   Decreased Risk for 
Adverse Clinical</content><br/><content>Events.</content><br/><content>0.10-1.50
 ng/ml   Increased Risk for Adverse Clinical</content><br/><content>Events. 
Evaluation of additional</content><br/><content>criterion and/or repeat testing 
in 2-6</content><br/><content>hours is suggested to rule out 
myocardial</content><br/><content>damage.</content><br/><content>>= 1.50 ng/ml  
 Indicative of Myocardial Injury.</content><br/><content></content> 









                    ID                  Date                Data Source

 

                    [id number]            2020 06:25:00 AM EDT [facility]









          Name      Value     Range     Interpretation Code Description Data Debi

rce(s) Supporting 

Document(s)

 

           Platelets [#/volume] in Blood by Estimate Laboratory test result     

                             [facility] 

([facility]          

 

                Platelets reticulated/100 platelets in Blood by Automated count 

1.3 %           0.0-9.59         

                                        [facility]  









                    ID                  Date                Data Source

 

                    [id number]            2020 06:25:00 AM EDT [facility]









          Name      Value     Range     Interpretation Code Description Data Debi

rce(s) Supporting 

Document(s)

 

          Neutrophils 70 %      28-66                         [facility]  

 

          Monocytes 8 %       0-5                           [facility]  

 

          Lymphocytes 14 %      16-44                         [facility]  

 

          Eosinophils 5 %       0-3                           [facility]  

 

          Myelocytes 1 %       0-0                           [facility]  

 

          Atypical Lymph 2 %       0-5                           [facility]  

 

           Anisocytosis Laboratory test result                                  

[facility]

                                         









                    ID                  Date                Data Source

 

                    [id number]            2020 06:25:00 AM EDT [facility]









          Name      Value     Range     Interpretation Code Description Data Debi

rce(s) Supporting 

Document(s)

 

          White Blood Count 7.9 10    4.0-10.0                      [facility]  

 

          Red Blood Count 3.31 10   4.00-5.40                     [facility]  

 

          Hemoglobin 11.7 g/dL 12.0-15.5                     [facility]  

 

           Mean Corpuscular Volume 105.1 fl   80.0-96.0                        [facility]

[facility]                                    

 

          Hematocrit 34.8 %    36.0-47.0                     [facility]  

 

           Mean Corpuscular Hemoglobin 35.3 pg    27.0-33.0                     

   [facility]                              

 

           Mean Corpuscular HGB Conc 33.6 g/dL  32.0-36.5                       

 [facility]                              

 

           Red Cell Distribution Width 14.6 %     11.5-14.5                     

   [facility]                              

 

           Platelet Count, Automated 96 10      150-450                         

 [facility]                                    

 

          Nucleated Red Blood Cell % 0.0 %     0-0                           [facility] ([facility] 

 









                    ID                  Date                Data Source

 

                    [id number]              2020 07:39:00 AM EDT [facility] ([facility]

tive [facility])









          Name      Value     Range     Interpretation Code Description Data Debi

rce(s) Supporting 

Document(s)

 

           Alpha-1-Fetoprotein [Mass/volume] in Serum or Plasma 6.3 ng/mL       

                            [facility] 

([facility])                   

 

                                        reviewed bili is still up 

 

                Platelets reticulated/100 platelets in Blood by Automated count 

1.1 %           0.0-9.59         

                                        [facility] ([facility])  

 

                                        reviewed bili is still up 









                    ID                  Date                Data Source

 

                    [id number]              2020 07:39:00 AM EDT [facility] ([facility]

tive [facility])









          Name      Value     Range     Interpretation Code Description Data Debi

rce(s) Supporting 

Document(s)

 

          Glucose, Fasting 84 mg/dL                          [facility]  

 

                                        reviewed bili is still up 

 

          Creatinine For GFR 1.23 mg/dL 0.55-1.30                     [facility] ([facility])  

 

                                        reviewed bili is still up 

 

          Blood Urea Nitrogen 10 mg/dL  7-18                          [facility] ([facility])  

 

                                        reviewed bili is still up 

 

          Potassium Serum 3.8 meq/L 3.5-5.1                       [facility] ([facility]  

 

                                        reviewed bili is still up 

 

          Glomerular Filtration Rate 47.4                                    [facility]

[facility] ([facility])  

 

                                        reviewed bili is still up 

 

          Sodium Level 130 meq/L 136-145                       [facility])  

 

                                        reviewed bili is still up 

 

          Anion Gap 7 meq/L   8-16                          [facility]  

 

                                        reviewed bili is still up 

 

          Carbon Dioxide Level 30 meq/L  21-32                         [facility] ([facility]

[facility])  

 

                                        reviewed bili is still up 

 

          Chloride Level 93 meq/L                          [facility]  

 

                                        reviewed bili is still up 

 

          Ast/Sgot  87 U/L    7-37                          [facility]  

 

                                        reviewed bili is still up 

 

          Calcium Level 8.2 mg/dL 8.8-10.2                      [facility]  

 

                                        reviewed bili is still up 

 

          Alkaline Phosphatase 266 U/L                           [facility] ([facility]

[facility])  

 

                                        reviewed bili is still up 

 

          Bilirubin,Total 6.4 mg/dL 0.2-1.0                       [facility] ([facility])  

 

                                        reviewed bili is still up 

 

          Alt/SGPT  37 U/L    12-78                         [facility]  

 

                                        reviewed bili is still up 

 

          Total Protein 7.6 GM/DL 6.4-8.2                       [facility]  

 

                                        reviewed bili is still up 

 

          Albumin/Globulin Ratio 0.4       1.2-2.2                       [facility] 

([facility])  

 

                                        reviewed bili is still up 

 

          Albumin   2.0 GM/DL 3.2-5.2                       [facility]  

 

                                        reviewed bili is still up 









                    ID                  Date                Data Source

 

                    [id number]              2020 07:39:00 AM EDT [facility] ([facility]









          Name      Value     Range     Interpretation Code Description Data Debi

rce(s) Supporting 

Document(s)

 

          White Blood Count 4.1 10    4.0-10.0                      [facility] ([facility])  

 

                                        reviewed bili is still up 

 

          Red Blood Count 3.34 10   4.00-5.40                     [facility] ([facility]  

 

                                        reviewed bili is still up 

 

          Hemoglobin 11.5 g/dL 12.0-15.5                     [facility]  

 

                                        reviewed bili is still up 

 

          Hematocrit 33.0 %    36.0-47.0                     [facility]  

 

                                        reviewed bili is still up 

 

          Mean Corpuscular Volume 98.8 fl   80.0-96.0                     [facility]

 ([facility])  

 

                                        reviewed bili is still up 

 

          Mean Corpuscular Hemoglobin 34.4 pg   27.0-33.0                     [facility]

[facility] ([facility]) 

 

 

                                        reviewed bili is still up 

 

          Mean Corpuscular HGB Conc 34.8 g/dL 32.0-36.5                     [facility]

[facility] ([facility]) 

 

 

                                        reviewed bili is still up 

 

          Red Cell Distribution Width 13.4 %    11.5-14.5                     [facility]

[facility] ([facility])  

 

                                        reviewed bili is still up 

 

          Platelet Count, Automated 85 10     150-450                       [facility]

[facility] ([facility])  

 

                                        reviewed bili is still up 

 

          Neutrophils % 60.5 %    36.0-66.0                     [facility]  

 

                                        reviewed bili is still up 

 

          Lymph %   22.3 %    24.0-44.0                     [facility]  

 

                                        reviewed bili is still up 

 

          Mono %    11.6 %    0.0-5.0                       [facility]  

 

                                        reviewed bili is still up 

 

          Eos %     3.4 %     0.0-3.0                       [facility]  

 

                                        reviewed bili is still up 

 

          Baso %    1.5 %     0.0-1.0                       [facility]  

 

                                        reviewed bili is still up 

 

          Immature Granulocyte % 0.7 %     0-3.0                         [facility] 

([facility])  

 

                                        reviewed bili is still up 

 

          Nucleated Red Blood Cell % 0.0 %     0-0                           [facility]

[facility] ([facility])  

 

                                        reviewed bili is still up 

 

          Neutrophils # 2.5 10    1.5-8.5                       [facility]  

 

                                        reviewed bili is still up 

 

          Lymph #   0.9 10    1.5-5.0                       [facility]  

 

                                        reviewed bili is still up 

 

          Mono #    0.5 10    0.0-0.8                       [facility]  

 

                                        reviewed bili is still up 

 

          Baso #    0.1 10    0.0-0.2                       [facility]  

 

                                        reviewed bili is still up 

 

          Eos #     0.1 10    0.0-0.5                       [facility]  

 

                                        reviewed bili is still up 









                    ID                  Date                Data Source

 

                    [id number]         2020 09:47:00 AM EDT [facility])









          Name      Value     Range     Interpretation Code Description Data Debi

rce(s) Supporting 

Document(s)

 

                Platelets reticulated/100 platelets in Blood by Automated count 

1.4 %           0.0-9.59        

Normal (applies to non-numeric results)                           [facility])                                      

 

                                        By: JT

Time: 925

 









                    ID                  Date                Data Source

 

                    [id number]         2020 09:47:00 AM EDT [facility] ([facility])









          Name      Value     Range     Interpretation Code Description Data Debi

rce(s) Supporting 

Document(s)

 

           White Blood Count 3.4 10     4.0-10.0   Below low normal            M

[facility] ([facility])                            

 

           Red Blood Count 3.95 10    4.00-5.40  Below low normal            [facility]

[facility] ([facility])                            

 

           Hemoglobin 12.7 g/dL  12.0-15.5  Normal (applies to non-numeric resul

ts)            [facility])         

 

           Hematocrit 36.6 %     36.0-47.0  Normal (applies to non-numeric resul

ts)            [facility] 

([facility], )         

 

                Mean Corpuscular Volume 92.7 fl         80.0-96.0       Normal (

applies to non-numeric 

results)                                [facility] ([facility]) 

 

 

                Mean Corpuscular Hemoglobin 32.2 pg         27.0-33.0       Norm

al (applies to non-numeric 

results)                                [facility] ([facility]) 

 

 

                Mean Corpuscular HGB Conc 34.7 g/dL       32.0-36.5       Normal

 (applies to non-numeric 

results)                                [facility] ([facility]) 

 

 

           Platelet Count, Automated 66 10      150-450    Below low normal     

       [facility] ([facility])                    

 

                                        Scan Verified machine results

PLATELET COUNT LESS THAN 100, AND NEW OCCURANCE OR



 

 

                Red Cell Distribution Width 13.6 %          11.5-14.5       Norm

al (applies to non-numeric 

results)                                [facility] ([facility]) 

 

 

           Neutrophils % 53.8 %     36.0-66.0  Normal (applies to non-numeric re

sults)            [facility])         

 

           Lymph %    31.6 %     24.0-44.0  Normal (applies to non-numeric resul

ts)            [facility] 

([facility])         

 

           Mono %     10.4 %     0.0-5.0    Above high normal            [facility] 

([facility])                                      

 

           Eos %      2.4 %      0.0-3.0    Normal (applies to non-numeric resul

ts)            [facility] ([facility])                    

 

           Baso %     1.5 %      0.0-1.0    Above high normal            [facility] 

([facility])

                                         

 

           Immature Granulocyte % 0.3 %      0-3.0      Normal (applies to non-n

umeric results)            

[facility] ([facility])  

 

           Nucleated Red Blood Cell % 0.0 %      0-0        Normal (applies to n

on-numeric results)            

[facility])  

 

           Neutrophils # 1.8 10     1.5-8.5    Normal (applies to non-numeric re

sults)            [facility]         

 

           Lymph #    1.1 10     1.5-5.0    Below low normal            [facility] (

[facility])                                      

 

           Eos #      0.1 10     0.0-0.5    Normal (applies to non-numeric resul

ts)            [facility] ([facility])                   

 

           Mono #     0.4 10     0.0-0.8    Normal (applies to non-numeric resul

ts)            [facility] 

([facility])         

 

           Baso #     0.1 10     0.0-0.2    Normal (applies to non-numeric resul

ts)            [facility] 

([facility])         









                    ID                  Date                Data Source

 

                    [id number]         2020 09:47:00 AM EDT [facility]









          Name      Value     Range     Interpretation Code Description Data Debi

rce(s) Supporting 

Document(s)

 

           Prothrombin Time 18.2 s     11.8-14.0  Above high normal            [facility]

[facility]                            

 

           Inr        1.54                  Normal (applies to non-numeric resul

ts)            [facility] ([facility])                            

 

                                        THERAPUTIC HUMAN INR VALUES

INDICATIONS                      NORMAL RANGES

PROPHYLAXIS/TREATMENT OF:

VENOUS THROMBOSIS                2.0-3.0

PULMONARY EMBOLISM               2.0-3.0

PREVENTION OF SYSTEMIC EMBOLISM FROM:

TISSUE HEART VALVES              2.0-3.0

ACUTE MYOCARDIAL INFARCTION      2.0-3.0

VALVULAR HEART DISEASE           2.0-3.0

ATRIAL FIBRILLATION              2.0-3.0

MECHANICAL VALVES(HIGH RISK)     2.5-3.5

RECURRENT MYOCARDIAL INFARCTION  2.5-3.5

 

 

                Partial Thromboplastin Time 35.6 s          25.0-38.4       Norm

al (applies to non-numeric 

results)                                [facility] ([facility]) 

 









                    ID                  Date                Data Source

 

                    [id number]         2020 09:47:00 AM EDT [facility]









          Name      Value     Range     Interpretation Code Description Data Debi

rce(s) Supporting 

Document(s)

 

                    Lactate dehydrogenase [Enzymatic activity/volume] in Serum o

r Plasma 319 U/L             

          Above high normal                 [facility] ([facility])  

 

                                        By: SEARO

Time: 926

By: SEARO Time: 926

 









                    ID                  Date                Data Source

 

                    [id number]         2020 09:47:00 AM EDT [facility] ([facility])









          Name      Value     Range     Interpretation Code Description Data Debi

rce(s) Supporting 

Document(s)

 

           Glucose, Fasting 88 mg/dL        Normal (applies to non-numeric

 results)            

[facility]  

 

           Blood Urea Nitrogen 11 mg/dL   7-18       Normal (applies to non-nume

verona results)            

[facility] ([facility])  

 

             Creatinine For GFR 1.07 mg/dL   0.55-1.30    Normal (applies to non

-numeric results) 

                          [facility] ([facility])  

 

           Glomerular Filtration Rate 55.7                  Normal (applies to n

on-numeric results)            

[facility] ([facility], )  

 

                                        <content>Units are mL/min/1.73 

m2</content><br/><content></content><br/><content>Chronic Kidney Disease Staging
per NKF:</content><br/><content></content><br/><content>Stage I & II   GFR >=60 
     Normal to Mildly Decreased</content><br/><content>Stage III      GFR 30-59 
    Moderately Decreased</content><br/><content>Stage IV       GFR 15-29      
Severely Decreased</content><br/><content>Stage V        GFR <15        Very 
Little GFR Left</content><br/><content>ESRD           GFR <15 on 
RRT</content><br/><content></content> 

 

           Sodium Level 139 meq/L  136-145    Normal (applies to non-numeric res

ults)            [facility] 

([facility], )         

 

           Potassium Serum 3.4 meq/L  3.5-5.1    Below low normal            [facility]

[facility] ([facility], )                            

 

           Chloride Level 101 meq/L       Normal (applies to non-numeric r

esults)            [facility]

([facility])         

 

           Carbon Dioxide Level 30 meq/L   21-32      Normal (applies to non-num

tiffani results)            

[facility] ([facility], )  

 

           Anion Gap  8 meq/L    8-16       Normal (applies to non-numeric resul

ts)            [facility])         

 

           Calcium Level 8.3 mg/dL  8.8-10.2   Below low normal            [facility]

[facility] ([facility])                            

 

           Ast/Sgot   60 U/L     7-37       Above high normal            [facility]

                                         

 

           Alt/SGPT   31 U/L     12-78      Normal (applies to non-numeric resul

ts)            [facility] 

([facility], )         

 

           Alkaline Phosphatase 187 U/L         Above high normal         

   [facility] ([facility])                    

 

           Bilirubin,Total 3.4 mg/dL  0.2-1.0    Above high normal            [facility]

[facility] ([facility])                            

 

           Albumin    2.6 GM/DL  3.2-5.2    Below low normal            [facility] (

[facility])                                      

 

           Total Protein 8.5 GM/DL  6.4-8.2    Above high normal            [facility]

[facility] ([facility])                            

 

           Albumin/Globulin Ratio 0.4        1.2-2.2    Below low normal        

    [facility] ([facility])                            









                    ID                  Date                Data Source

 

                    [id number]            2020 09:47:00 AM EDT [facility] ([facility]









          Name      Value     Range     Interpretation Code Description Data Debi

rce(s) Supporting 

Document(s)

 

                                        Lactate dehydrogenase [Enzymatic activit

y/volume] in Serum or Plasma by Lactate 

to pyruvate reaction 319 U/L                               [facility] ([facility]  

 

                                        By: SEARO

Time: 926

By: SEARO Time: 926

 









                    ID                  Date                Data Source

 

                    [id number]            2020 09:47:00 AM EDT [facility] ([facility]









          Name      Value     Range     Interpretation Code Description Data Debi

rce(s) Supporting 

Document(s)

 

          Glucose, Fasting 88 mg/dL                          [facility] ([facility]  

 

          Blood Urea Nitrogen 11 mg/dL  7-18                          [facility] ([facility]  

 

          Glomerular Filtration Rate 55.7                                    [facility]

[facility] ([facility]  

 

                                        <content>Units are mL/min/1.73 

m2</content><br/><content></content><br/><content>Chronic Kidney Disease Staging
per NKF:</content><br/><content></content><br/><content>Stage I & II   GFR >=60 
     Normal to Mildly Decreased</content><br/><content>Stage III      GFR 30-59 
    Moderately Decreased</content><br/><content>Stage IV       GFR 15-29      
Severely Decreased</content><br/><content>Stage V        GFR <15        Very 
Little GFR Left</content><br/><content>ESRD           GFR <15 on 
RRT</content><br/><content></content> 

 

           Creatinine For GFR 1.07 mg/dL 0.55-1.30                        [facility]

 [facility]                                    

 

          Sodium Level 139 meq/L 136-145                       [facility] ([facility]  

 

          Potassium Serum 3.4 meq/L 3.5-5.1                       [facility]  

 

          Chloride Level 101 meq/L                         [facility]  

 

          Calcium Level 8.3 mg/dL 8.8-10.2                      [facility]  

 

          Carbon Dioxide Level 30 meq/L  21-32                         [facility]  

 

          Anion Gap 8 meq/L   8-16                          [facility]  

 

          Alt/SGPT  31 U/L    12-78                         [facility]  

 

          Ast/Sgot  60 U/L    7-37                          [facility]  

 

          Alkaline Phosphatase 187 U/L                           [facility]  

 

          Bilirubin,Total 3.4 mg/dL 0.2-1.0                       [facility]  

 

          Albumin/Globulin Ratio 0.4       1.2-2.2                       [facility] 

([facility]  

 

          Total Protein 8.5 GM/DL 6.4-8.2                       [facility]  

 

          Albumin   2.6 GM/DL 3.2-5.2                       [facility]  









                    ID                  Date                Data Source

 

                    [id number]            2020 09:47:00 AM EDT [facility] ([facility]









          Name      Value     Range     Interpretation Code Description Data Debi

rce(s) Supporting 

Document(s)

 

                Platelets reticulated/100 platelets in Blood by Automated count 

1.4 %           0.0-9.59         

                                        [facility] ([facility]  

 

                                        By: SEALUPE

Time: 925

 









                    ID                  Date                Data Source

 

                    [id number]            2020 09:47:00 AM EDT [facility] ([facility]









          Name      Value     Range     Interpretation Code Description Data Debi

rce(s) Supporting 

Document(s)

 

          Hemoglobin 12.7 g/dL 12.0-15.5                     [facility]  

 

          Hematocrit 36.6 %    36.0-47.0                     [facility]  

 

          White Blood Count 3.4 10    4.0-10.0                      [facility]  

 

          Red Blood Count 3.95 10   4.00-5.40                     [facility]  

 

           Mean Corpuscular Hemoglobin 32.2 pg    27.0-33.0                     

   [facility]                              

 

           Mean Corpuscular HGB Conc 34.7 g/dL  32.0-36.5                       

 [facility]                              

 

           Mean Corpuscular Volume 92.7 fl    80.0-96.0                        [facility]

[facility]                                    

 

           Red Cell Distribution Width 13.6 %     11.5-14.5                     

   [facility]                              

 

          Neutrophils % 53.8 %    36.0-66.0                     [facility]  

 

           Platelet Count, Automated 66 10      150-450                         

 [facility]                                    

 

                                        Scan Verified machine results

PLATELET COUNT LESS THAN 100, AND NEW OCCURANCE OR



 

 

          Eos %     2.4 %     0.0-3.0                       [facility]  

 

          Lymph %   31.6 %    24.0-44.0                     [facility]  

 

          Mono %    10.4 %    0.0-5.0                       [facility]  

 

          Neutrophils # 1.8 10    1.5-8.5                       [facility]  

 

          Baso %    1.5 %     0.0-1.0                       [facility]  

 

          Nucleated Red Blood Cell % 0.0 %     0-0                           [facility] ([facility] 



 

          Immature Granulocyte % 0.3 %     0-3.0                         [facility] 

([facility]  

 

          Mono #    0.4 10    0.0-0.8                       [facility]  

 

          Eos #     0.1 10    0.0-0.5                       [facility]  

 

          Lymph #   1.1 10    1.5-5.0                       [facility]  

 

          Baso #    0.1 10    0.0-0.2                       [facility]  









                    ID                  Date                Data Source

 

                    [id number]            2020 09:47:00 AM EDT [facility] ([facility]









          Name      Value     Range     Interpretation Code Description Data Debi

rce(s) Supporting 

Document(s)

 

          Inr       1.54                                    [facility]  

 

                                        THERAPUTIC HUMAN INR VALUES

INDICATIONS                      NORMAL RANGES

PROPHYLAXIS/TREATMENT OF:

VENOUS THROMBOSIS                2.0-3.0

PULMONARY EMBOLISM               2.0-3.0

PREVENTION OF SYSTEMIC EMBOLISM FROM:

TISSUE HEART VALVES              2.0-3.0

ACUTE MYOCARDIAL INFARCTION      2.0-3.0

VALVULAR HEART DISEASE           2.0-3.0

ATRIAL FIBRILLATION              2.0-3.0

MECHANICAL VALVES(HIGH RISK)     2.5-3.5

RECURRENT MYOCARDIAL INFARCTION  2.5-3.5

 

 

           Partial Thromboplastin Time 35.6 s     25.0-38.4                     

   [facility]                              

 

          Prothrombin Time 18.2 s    11.8-14.0                     [facility]  









                    ID                  Date                Data Source

 

                    [id number]            2020 01:37:00 PM EDT [facility] ([facility]









          Name      Value     Range     Interpretation Code Description Data Debi

rce(s) Supporting 

Document(s)

 

          Albumin 25% Laboratory test result                               [facility]

[facility] ([facility] 



 

                                        TRANSFUSED PRODUCT: ALBUMIN 25%         

                COUNT: 2

 









                    ID                  Date                Data Source

 

                    [id number]            2020 03:00:00 PM EST [facility] ([facility]









          Name      Value     Range     Interpretation Code Description Data Debi

e(s) Supporting 

Document(s)

 

          Glucose, Fasting 173 mg/dL                         [facility]  

 

           Creatinine For GFR 1.14 mg/dL 0.55-1.30                        [facility]

 ([facility]                                    

 

          Glomerular Filtration Rate 51.9                                    [facility]

[facility] ([facility]  

 

                                        <content>Units are mL/min/1.73 

m2</content><br/><content></content><br/><content>Chronic Kidney Disease Staging
per NKF:</content><br/><content></content><br/><content>Stage I & II   GFR >=60 
     Normal to Mildly Decreased</content><br/><content>Stage III      GFR 30-59 
    Moderately Decreased</content><br/><content>Stage IV       GFR 15-29      
Severely Decreased</content><br/><content>Stage V        GFR <15        Very 
Little GFR Left</content><br/><content>ESRD           GFR <15 on 
RRT</content><br/><content></content> 

 

          Blood Urea Nitrogen 14 mg/dL  7-18                          [facility]  

 

          Chloride Level 109 meq/L                         [facility]  

 

          Sodium Level 139 meq/L 136-145                       [facility]  

 

          Potassium Serum 3.7 meq/L 3.5-5.1                       [facility]  

 

          Carbon Dioxide Level 24 meq/L  21-32                         [facility]  

 

          Anion Gap 6 meq/L   8-16                          [facility]  

 

          Calcium Level 8.0 mg/dL 8.5-10.1                      [facility]  

 

          Alt/SGPT  23 U/L    12-78                         [facility]  

 

          Alkaline Phosphatase 127 U/L                           [facility]  

 

          Ast/Sgot  31 U/L    7-37                          [facility]  

 

          Bilirubin,Total 2.1 mg/dL 0.2-1.0                       [facility]  

 

          Total Protein 7.2 GM/DL 6.4-8.2                       [facility]  

 

          Albumin   2.6 GM/DL 3.2-5.2                       [facility]  

 

           Albumin/Globulin Ratio 0.57       1.00-1.93                        [facility]

[facility] ([facility]                                    









                    ID                  Date                Data Source

 

                    [id number]            2020 09:20:00 AM EST [facility] ([facility]









          Name      Value     Range     Interpretation Code Description Data Debi

rce(s) Supporting 

Document(s)

 

          Albumin 25% Laboratory test result                               [facility]

[facility] ([facility] 



 

                                        TRANSFUSED PRODUCT: ALBUMIN 25%         

                COUNT: 1

 









                    ID                  Date                Data Source

 

                    [id number]            2020 08:05:00 AM EST [facility] ([facility]









          Name      Value     Range     Interpretation Code Description Data Debi

rce(s) Supporting 

Document(s)

 

          Albumin 25% Laboratory test result                               [facility]

[facility] ([facility] 



 

                                        TRANSFUSED PRODUCT: ALBUMIN 25%         

                COUNT: 7

 









                    ID                  Date                Data Source

 

                    [id number]            2020 07:06:00 AM EST [facility] ([facility]









          Name      Value     Range     Interpretation Code Description Data Debi

rce(s) Supporting 

Document(s)

 

           Ethanol [Mass/volume] in Serum or Plasma Laboratory test result 0.000

-0.010                       

[facility] ([facility]   

 

           Ammonia [Mass/volume] in Blood 32 uMOL/L                             

      [facility]                                    









                    ID                  Date                Data Source

 

                    [id number]            2020 11:27:00 PM EST [facility] ([facility]









          Name      Value     Range     Interpretation Code Description Data Debi

rce(s) Supporting 

Document(s)

 

          Glucose, Fasting 153 mg/dL                         [facility]  

 

          Blood Urea Nitrogen 12 mg/dL  7-18                          [facility]  

 

           Creatinine For GFR 1.14 mg/dL 0.55-1.30                        [facility]

 ([facility]                                    

 

          Glomerular Filtration Rate 51.9                                    [facility]

[facility] ([facility]  

 

                                        <content>Units are mL/min/1.73 

m2</content><br/><content></content><br/><content>Chronic Kidney Disease Staging
 per NKF:</content><br/><content></content><br/><content>Stage I & II   GFR >=60
       Normal to Mildly Decreased</content><br/><content>Stage III      GFR 30-
59      Moderately Decreased</content><br/><content>Stage IV       GFR 15-29    
  Severely Decreased</content><br/><content>Stage V        GFR <15        Very 
Little GFR Left</content><br/><content>ESRD           GFR <15 on 
RRT</content><br/><content></content> 

 

          Sodium Level 138 meq/L 136-145                       [facility]  

 

          Chloride Level 107 meq/L                         [facility]  

 

          Potassium Serum 3.6 meq/L 3.5-5.1                       [facility]  

 

          Carbon Dioxide Level 25 meq/L  21-32                         [facility]  

 

          Calcium Level 8.1 mg/dL 8.5-10.1                      [facility]  

 

          Anion Gap 6 meq/L   8-16                          [facility]  

 

          Alkaline Phosphatase 122 U/L                           [facility]  

 

          Alt/SGPT  23 U/L    12-78                         [facility]  

 

          Ast/Sgot  28 U/L    7-37                          [facility]  

 

          Total Protein 7.2 GM/DL 6.4-8.2                       [facility]  

 

          Bilirubin,Total 2.7 mg/dL 0.2-1.0                       [facility]  

 

          Albumin   2.6 GM/DL 3.2-5.2                       [facility]  

 

           Albumin/Globulin Ratio 0.57       1.00-1.93                        [facility]

[facility]                                    









                    ID                  Date                Data Source

 

                    [id number]            2020 11:27:00 PM EST [facility] ([facility]









          Name      Value     Range     Interpretation Code Description Data Debi

rce(s) Supporting 

Document(s)

 

           Ammonia [Mass/volume] in Blood 71 uMOL/L                             

      [facility]                                    









                    ID                  Date                Data Source

 

                    [id number]            2020 11:27:00 PM EST [facility] ([facility]









          Name      Value     Range     Interpretation Code Description Data Debi

rce(s) Supporting 

Document(s)

 

          White Blood Count 4.0 10    4.0-10.0                      [facility]  

 

          Hematocrit 33.6 %    36.0-47.0                     [facility]  

 

          Hemoglobin 10.7 g/dL 12.0-15.5                     [facility]  

 

          Red Blood Count 3.47 10   4.00-5.40                     [facility]  

 

           Mean Corpuscular Hemoglobin 30.8 pg    27.0-33.0                     

   [facility]                              

 

           Mean Corpuscular Volume 96.8 fl    80.0-96.0                        [facility]

[facility]                                    

 

          Nucleated Red Blood Cell % 0.0 %     0-0                           [facility]

[facility] ([facility] 

 

 

           Platelet Count, Automated 112 10     150-450                         

 [facility]                                    

 

           Mean Corpuscular HGB Conc 31.8 g/dL  32.0-36.5                       

 [facility]                              

 

           Red Cell Distribution Width 14.6 %     11.5-14.5                     

   [facility]                              









                    ID                  Date                Data Source

 

                    [id number]            2020 11:24:00 AM EST [facility]









          Name      Value     Range     Interpretation Code Description Data Debi

rce(s) Supporting 

Document(s)

 

          Albumin 25% Laboratory test result                               [facility]

[facility] ([facility] 

 

 

                                        TRANSFUSED PRODUCT: ALBUMIN 25%         

                COUNT: 6

 









                    ID                  Date                Data Source

 

                    [id number]            2019 01:02:00 PM EST [facility] ([facility]









          Name      Value     Range     Interpretation Code Description Data Debi

rce(s) Supporting 

Document(s)

 

           Alpha-1-Fetoprotein [Mass/volume] in Serum or Plasma 11.4 ng/mL      

                            [facility] 

([facility]          

 

                                        THE AFP ASSAY IS PERFORMED ON THE makeena BY

CHEMILUMINESCENCE AND SHOULD NOT BE COMPARED INTERCHANGEABLY

WITH OTHER METHODS. IT SHOULD NOT BE USED ALONE AS A

SCREENING TEST OR DIAGNOSIS FOR THE PRESENCE OR ABSENCE OF

MALIGNANT DISEASE.  THESE RESULTS ARE NOT INTERPRETABLE IN

PREGNANT FEMALES.

PREDICTIONS OF DISEASE RECURRENCE SHOULD NOT BE BASED SOLELY

ON VALUES OBTAINED FROM SERIAL PATIENT SERUM VALUES.

 









                    ID                  Date                Data Source

 

                    [id number]            2019 01:02:00 PM EST [facility] ([facility]









          Name      Value     Range     Interpretation Code Description Data Debi

rce(s) Supporting 

Document(s)

 

          Glucose, Fasting 133 mg/dL                         [facility]  

 

          Blood Urea Nitrogen 25 mg/dL  7-18                          [facility]  

 

           Creatinine For GFR 1.75 mg/dL 0.55-1.30                        [facility]

 [facility]                                    

 

          Sodium Level 133 meq/L 136-145                       [facility]  

 

          Potassium Serum 4.5 meq/L 3.5-5.1                       [facility]  

 

          Glomerular Filtration Rate 31.7                                    [facility]

[facility] ([facility]  

 

                                        <content>Units are mL/min/1.73 

m2</content><br/><content></content><br/><content>Chronic Kidney Disease Staging
 per NKF:</content><br/><content></content><br/><content>Stage I & II   GFR >=60
       Normal to Mildly Decreased</content><br/><content>Stage III      GFR 30-
59      Moderately Decreased</content><br/><content>Stage IV       GFR 15-29    
  Severely Decreased</content><br/><content>Stage V        GFR <15        Very 
Little GFR Left</content><br/><content>ESRD           GFR <15 on 
RRT</content><br/><content></content> 

 

          Anion Gap 8 meq/L   8-16                          [facility] ([facility]  

 

          Chloride Level 102 meq/L                         [facility] ([facility]  

 

          Carbon Dioxide Level 23 meq/L  21-32                         [facility] ([facility]  

 

          Calcium Level 8.7 mg/dL 8.5-10.1                      [facility] ([facility]  

 

          Alt/SGPT  33 U/L    12-78                         [facility]  

 

          Ast/Sgot  73 U/L    7-37                          [facility]  

 

          Bilirubin,Total 4.3 mg/dL 0.2-1.0                       [facility] ([facility]  

 

          Alkaline Phosphatase 133 U/L                           [facility] ([facility]

[facility]  

 

          Albumin   2.2 GM/DL 3.2-5.2                       [facility] ([facility]  

 

           Albumin/Globulin Ratio 0.37       1.00-1.93                        [facility]

[facility] ([facility]                                    

 

          Total Protein 8.1 GM/DL 6.4-8.2                       [facility] ([facility]  









                    ID                  Date                Data Source

 

                    [id number]            2019 01:02:00 PM EST [facility] ([facility]









          Name      Value     Range     Interpretation Code Description Data Debi

rce(s) Supporting 

Document(s)

 

           Ammonia [Mass/volume] in Blood Laboratory test result                

                  [facility] ([facility]                    









                    ID                  Date                Data Source

 

                    [id number]            2019 01:02:00 PM EST [facility] ([facility]









          Name      Value     Range     Interpretation Code Description Data Debi

rce(s) Supporting 

Document(s)

 

          Prothrombin Time 18.3 s    11.8-14.0                     [facility] ([facility]  

 

          Inr       1.55                                    [facility]  

 

                                        THERAPUTIC HUMAN INR VALUES

INDICATIONS                      NORMAL RANGES

PROPHYLAXIS/TREATMENT OF:

VENOUS THROMBOSIS                2.0-3.0

PULMONARY EMBOLISM               2.0-3.0

PREVENTION OF SYSTEMIC EMBOLISM FROM:

TISSUE HEART VALVES              2.0-3.0

ACUTE MYOCARDIAL INFARCTION      2.0-3.0

VALVULAR HEART DISEASE           2.0-3.0

ATRIAL FIBRILLATION              2.0-3.0

MECHANICAL VALVES(HIGH RISK)     2.5-3.5

RECURRENT MYOCARDIAL INFARCTION  2.5-3.5

 

 

           Partial Thromboplastin Time 34.4 s     25.0-38.4                     

   [facility]                              









                    ID                  Date                Data Source

 

                    [id number]            2019 01:02:00 PM EST [facility] ([facility]









          Name      Value     Range     Interpretation Code Description Data Debi

rce(s) Supporting 

Document(s)

 

          Red Blood Count 3.33 10   4.00-5.40                     [facility] ([facility]  

 

          White Blood Count 3.7 10    4.0-10.0                      [facility] ([facility]  

 

          Hemoglobin 11.0 g/dL 12.0-15.5                     [facility] ([facility]  

 

           Mean Corpuscular Volume 100.3 fl   80.0-96.0                        [facility]

[facility] ([facility]                                    

 

          Hematocrit 33.4 %    36.0-47.0                     [facility] ([facility]  

 

           Mean Corpuscular Hemoglobin 33.0 pg    27.0-33.0                     

   [facility] ([facility]                              

 

           Mean Corpuscular HGB Conc 32.9 g/dL  32.0-36.5                       

 [facility] ([facility]                              

 

           Red Cell Distribution Width 14.7 %     11.5-14.5                     

   [facility] ([facility]                              

 

          Neutrophils % 68.6 %    36.0-66.0                     [facility]  

 

           Platelet Count, Automated 124 10     150-450                         

 [facility]                                    

 

          Lymph %   19.7 %    24.0-44.0                     [facility]  

 

          Eos %     1.4 %     0.0-3.0                       [facility]  

 

          Baso %    0.5 %     0.0-1.0                       [facility]  

 

          Mono %    9.3 %     0.0-5.0                       [facility]  

 

          Neutrophils # 2.5 10    1.5-8.5                       [facility] ([facility]  

 

          Immature Granulocyte % 0.5 %     0-3.0                         [facility] 

([facility]  

 

          Nucleated Red Blood Cell % 0.0 %     0-0                           [facility]

[facility] ([facility] 

 

 

          Lymph #   0.7 10    1.5-5.0                       [facility]  

 

          Mono #    0.3 10    0.0-0.8                       [facility]  

 

          Eos #     0.1 10    0.0-0.5                       [facility]  

 

          Baso #    0.0 10    0.0-0.2                       [facility] ([facility]  









                    ID                  Date                Data Source

 

                    [id number]              2019 01:02:00 PM EST [facility] ([facility]

[employer] [facility])









          Name      Value     Range     Interpretation Code Description Data Debi

e(s) Supporting 

Document(s)

 

          Inr       1.55                                    [facility] ([facility])  

 

                                        Labs are all abnormal Low wbc Low blood 

count Low platelets abnormal INR 

increased creatinine. Abdominal ct shows only cirrhosis. 

 

          Prothrombin Time 18.3 s    11.8-14.0                     [facility] ([facility]

[facility] [facility])  

 

                                        Labs are all abnormal Low wbc Low blood 

count Low platelets abnormal INR 

increased creatinine. Abdominal ct shows only cirrhosis. 

 

          Partial Thromboplastin Time 34.4 s    25.0-38.4                     [facility]

[facility] ([facility])  

 

                                        Labs are all abnormal Low wbc Low blood 

count Low platelets abnormal INR 

increased creatinine. Abdominal ct shows only cirrhosis. 









                    ID                  Date                Data Source

 

                    [id number]              2019 01:02:00 PM EST [facility] ([facility]

[employer] [facility])









          Name      Value     Range     Interpretation Code Description Data [facility](s) Supporting 

Document(s)

 

          Glucose, Fasting 133 mg/dL                         [facility] ([facility]

[facility] [facility])  

 

                                        Labs are all abnormal Low wbc Low blood 

count Low platelets abnormal INR 

increased creatinine. Abdominal ct shows only cirrhosis. 

 

          Blood Urea Nitrogen 25 mg/dL  7-18                          [facility] ([facility])  

 

                                        Labs are all abnormal Low wbc Low blood 

count Low platelets abnormal INR 

increased creatinine. Abdominal ct shows only cirrhosis. 

 

          Sodium Level 133 meq/L 136-145                       [facility] ([employer]

 [facility])  

 

                                        Labs are all abnormal Low wbc Low blood 

count Low platelets abnormal INR 

increased creatinine. Abdominal ct shows only cirrhosis. 

 

          Creatinine For GFR 1.75 mg/dL 0.55-1.30                     [facility] ([facility])  

 

                                        Labs are all abnormal Low wbc Low blood 

count Low platelets abnormal INR 

increased creatinine. Abdominal ct shows only cirrhosis. 

 

          Glomerular Filtration Rate 31.7                                    [facility]

[facility] ([facility])  

 

                                        Labs are all abnormal Low wbc Low blood 

count Low platelets abnormal INR 

increased creatinine. Abdominal ct shows only cirrhosis. 

 

          Chloride Level 102 meq/L                         [facility] ([employer])  

 

                                        Labs are all abnormal Low wbc Low blood 

count Low platelets abnormal INR 

increased creatinine. Abdominal ct shows only cirrhosis. 

 

          Potassium Serum 4.5 meq/L 3.5-5.1                       [facility] ([facility]

[employer])  

 

                                        Labs are all abnormal Low wbc Low blood 

count Low platelets abnormal INR 

increased creatinine. Abdominal ct shows only cirrhosis. 

 

          Anion Gap 8 meq/L   8-16                          [facility]  

 

                                        Labs are all abnormal Low wbc Low blood 

count Low platelets abnormal INR 

increased creatinine. Abdominal ct shows only cirrhosis. 

 

          Carbon Dioxide Level 23 meq/L  21-32                         [facility] ([facility]

[facility] [employer])  

 

                                        Labs are all abnormal Low wbc Low blood 

count Low platelets abnormal INR 

increased creatinine. Abdominal ct shows only cirrhosis. 

 

          Calcium Level 8.7 mg/dL 8.5-10.1                      [facility] ([employer]

[facility])  

 

                                        Labs are all abnormal Low wbc Low blood 

count Low platelets abnormal INR 

increased creatinine. Abdominal ct shows only cirrhosis. 

 

          Ast/Sgot  73 U/L    7-37                          [facility]  

 

                                        Labs are all abnormal Low wbc Low blood 

count Low platelets abnormal INR 

increased creatinine. Abdominal ct shows only cirrhosis. 

 

          Alkaline Phosphatase 133 U/L                           [facility] ([facility]

[facility])  

 

                                        Labs are all abnormal Low wbc Low blood 

count Low platelets abnormal INR 

increased creatinine. Abdominal ct shows only cirrhosis. 

 

          Alt/SGPT  33 U/L    12-78                         [facility] ([facility]  

 

                                        Labs are all abnormal Low wbc Low blood 

count Low platelets abnormal INR 

increased creatinine. Abdominal ct shows only cirrhosis. 

 

          Bilirubin,Total 4.3 mg/dL 0.2-1.0                       [facility] ([facility]

[employer])  

 

                                        Labs are all abnormal Low wbc Low blood 

count Low platelets abnormal INR 

increased creatinine. Abdominal ct shows only cirrhosis. 

 

          Albumin   2.2 GM/DL 3.2-5.2                       [facility] ([facility]

[facility])  

 

                                        Labs are all abnormal Low wbc Low blood 

count Low platelets abnormal INR 

increased creatinine. Abdominal ct shows only cirrhosis. 

 

          Total Protein 8.1 GM/DL 6.4-8.2                       [facility] ([facility])  

 

                                        Labs are all abnormal Low wbc Low blood 

count Low platelets abnormal INR 

increased creatinine. Abdominal ct shows only cirrhosis. 

 

          Albumin/Globulin Ratio 0.37      1.00-1.93                     [facility] 

([facility])  

 

                                        Labs are all abnormal Low wbc Low blood 

count Low platelets abnormal INR 

increased creatinine. Abdominal ct shows only cirrhosis. 









                    ID                  Date                Data Source

 

                    [id number]              2019 01:02:00 PM EST [facility] ([facility])









          Name      Value     Range     Interpretation Code Description Data Debi

rce(s) Supporting 

Document(s)

 

          Red Blood Count 3.33 10   4.00-5.40                     [facility] ([facility]

[facility])  

 

                                        Labs are all abnormal Low wbc Low blood 

count Low platelets abnormal INR 

increased creatinine. Abdominal ct shows only cirrhosis. 

 

          Hemoglobin 11.0 g/dL 12.0-15.5                     [facility]  

 

                                        Labs are all abnormal Low wbc Low blood 

count Low platelets abnormal INR 

increased creatinine. Abdominal ct shows only cirrhosis. 

 

          White Blood Count 3.7 10    4.0-10.0                      [facility] ([facility]

[facility])  

 

                                        Labs are all abnormal Low wbc Low blood 

count Low platelets abnormal INR 

increased creatinine. Abdominal ct shows only cirrhosis. 

 

          Hematocrit 33.4 %    36.0-47.0                     [facility] ([facility]  

 

                                        Labs are all abnormal Low wbc Low blood 

count Low platelets abnormal INR 

increased creatinine. Abdominal ct shows only cirrhosis. 

 

          Mean Corpuscular Volume 100.3 fl  80.0-96.0                     [facility]

 ([facility])  

 

                                        Labs are all abnormal Low wbc Low blood 

count Low platelets abnormal INR 

increased creatinine. Abdominal ct shows only cirrhosis. 

 

          Mean Corpuscular Hemoglobin 33.0 pg   27.0-33.0                     [facility]

[facility] ([facility]) 

 

 

                                        Labs are all abnormal Low wbc Low blood 

count Low platelets abnormal INR 

increased creatinine. Abdominal ct shows only cirrhosis. 

 

          Mean Corpuscular HGB Conc 32.9 g/dL 32.0-36.5                     [facility]

[facility] ([facility]) 

 

 

                                        Labs are all abnormal Low wbc Low blood 

count Low platelets abnormal INR 

increased creatinine. Abdominal ct shows only cirrhosis. 

 

          Red Cell Distribution Width 14.7 %    11.5-14.5                     [facility]

[facility] ([facility])  

 

                                        Labs are all abnormal Low wbc Low blood 

count Low platelets abnormal INR 

increased creatinine. Abdominal ct shows only cirrhosis. 

 

          Platelet Count, Automated 124 10    150-450                       [facility]

[facility] ([facility])  

 

                                        Labs are all abnormal Low wbc Low blood 

count Low platelets abnormal INR 

increased creatinine. Abdominal ct shows only cirrhosis. 

 

          Lymph %   19.7 %    24.0-44.0                     [facility] ([facility])  

 

                                        Labs are all abnormal Low wbc Low blood 

count Low platelets abnormal INR 

increased creatinine. Abdominal ct shows only cirrhosis. 

 

          Neutrophils % 68.6 %    36.0-66.0                     [facility] ([facility])  

 

                                        Labs are all abnormal Low wbc Low blood 

count Low platelets abnormal INR 

increased creatinine. Abdominal ct shows only cirrhosis. 

 

          Mono %    9.3 %     0.0-5.0                       [facility] ([facility]

[facility])  

 

                                        Labs are all abnormal Low wbc Low blood 

count Low platelets abnormal INR 

increased creatinine. Abdominal ct shows only cirrhosis. 

 

          Eos %     1.4 %     0.0-3.0                       [facility]  

 

                                        Labs are all abnormal Low wbc Low blood 

count Low platelets abnormal INR 

increased creatinine. Abdominal ct shows only cirrhosis. 

 

          Baso %    0.5 %     0.0-1.0                       [facility] ([facility]

[facility])  

 

                                        Labs are all abnormal Low wbc Low blood 

count Low platelets abnormal INR 

increased creatinine. Abdominal ct shows only cirrhosis. 

 

          Immature Granulocyte % 0.5 %     0-3.0                         [facility] 

([facility])  

 

                                        Labs are all abnormal Low wbc Low blood 

count Low platelets abnormal INR 

increased creatinine. Abdominal ct shows only cirrhosis. 

 

          Neutrophils # 2.5 10    1.5-8.5                       [facility] ([facility])  

 

                                        Labs are all abnormal Low wbc Low blood 

count Low platelets abnormal INR 

increased creatinine. Abdominal ct shows only cirrhosis. 

 

          Nucleated Red Blood Cell % 0.0 %     0-0                           [facility]

[facility] ([facility])  

 

                                        Labs are all abnormal Low wbc Low blood 

count Low platelets abnormal INR 

increased creatinine. Abdominal ct shows only cirrhosis. 

 

          Mono #    0.3 10    0.0-0.8                       [facility] ([facility]

[facility])  

 

                                        Labs are all abnormal Low wbc Low blood 

count Low platelets abnormal INR 

increased creatinine. Abdominal ct shows only cirrhosis. 

 

          Lymph #   0.7 10    1.5-5.0                       [facility] ([facility]

[facility])  

 

                                        Labs are all abnormal Low wbc Low blood 

count Low platelets abnormal INR 

increased creatinine. Abdominal ct shows only cirrhosis. 

 

          Baso #    0.0 10    0.0-0.2                       [facility] ([facility]

[facility])  

 

                                        Labs are all abnormal Low wbc Low blood 

count Low platelets abnormal INR 

increased creatinine. Abdominal ct shows only cirrhosis. 

 

          Eos #     0.1 10    0.0-0.5                       [facility] ([facility]

[facility])  

 

                                        Labs are all abnormal Low wbc Low blood 

count Low platelets abnormal INR 

increased creatinine. Abdominal ct shows only cirrhosis. 









                    ID                  Date                Data Source

 

                    [id number]              2019 01:02:00 PM EST [facility] ([facility])









          Name      Value     Range     Interpretation Code Description Data Debi

rce(s) Supporting 

Document(s)

 

           Alpha-1-Fetoprotein [Mass/volume] in Serum or Plasma 11.4 ng/mL      

                            [facility] 

([facility])                   

 

                                        Labs are all abnormal Low wbc Low blood 

count Low platelets abnormal INR 

increased creatinine. Abdominal ct shows only cirrhosis. 

 

          Ammonia [Mass/volume] in Blood < 10 uMOL/L                            

   [facility] ([facility])  

 

                                        Labs are all abnormal Low wbc Low blood 

count Low platelets abnormal INR 

increased creatinine. Abdominal ct shows only cirrhosis. 









                    ID                  Date                Data Source

 

                    [id number]            2019 11:15:00 AM EST [facility] ([facility]









          Name      Value     Range     Interpretation Code Description Data Debi

rce(s) Supporting 

Document(s)

 

          PH,Urine  5.0 units 5.0-9.0                       [facility] ([facility]  

 

           Appearance, Urine Laboratory test result                             

     [facility]                                    

 

           Color, Urine Laboratory test result                                  

[facility] ([facility]

                                         

 

           Protein, Urine Auto Laboratory test result                           

       [facility] ([facility]                              

 

           Specific Gravity Urine Auto 1.011      1.002-1.035                   

    [facility] ([facility]                              

 

           Urobilinogen, Urine Auto 0.2 mg/dL  0.0-2.0                          

[facility] ([facility]                                    

 

           Glucose, Urine (Ua) Auto Laboratory test result                      

            [facility] ([facility]                             

 

           Ketone, Urine Auto Laboratory test result                            

      [facility] ([facility]

 [facility]                                   

 

           Leukocyte Esterase, Urine Auto Laboratory test result                

                  [facility] ([facility]                    

 

           Nitrite, Urine Auto Laboratory test result                           

       [facility] ([facility]                              

 

           Bilirubin, Urine Auto Laboratory test result                         

         [facility] ([facility]                              

 

           Blood, Urine Blood Laboratory test result                            

      [facility] ([facility]

 [facility]                                   

 

          WBC, Urine Auto 96 /HPF   0-3                           [facility] ([facility]  

 

          RBC, Urine Auto 138 /HPF  0-3                           [facility] ([facility]  

 

           Squamous Epithelial Cell Ur AU 2 /HPF     0-6                        

      [facility]                                    

 

           Bacteria, Urine Auto Laboratory test result                          

        [facility] ([facility]                              

 

           Mucus, Urine Laboratory test result                                  

[facility] ([facility]

                                         

 

          Hyaline Cast, Urine Auto 0 /LPF    0-1                           [facility]

[facility] ([facility]  









                    ID                  Date                Data Source

 

                    [id number]            2019 11:15:00 AM EST [facility] ([facility]









          Name      Value     Range     Interpretation Code Description Data Debi

rce(s) Supporting 

Document(s)

 

           Amylase [Enzymatic activity/volume] in Serum or Plasma 79 U/L     25-

115                           [facility] 

([facility]          

 

                    Lipoprotein lipase [Enzymatic activity/volume] in Serum or P

lasma 324 U/L             

                                                [facility] ([facility]  









                    ID                  Date                Data Source

 

                    [id number]            2019 11:15:00 AM EST [facility] ([facility]









          Name      Value     Range     Interpretation Code Description Data Debi

rce(s) Supporting 

Document(s)

 

          Glucose, Fasting 102 mg/dL                         [facility] ([facility]  

 

          Blood Urea Nitrogen 38 mg/dL  7-18                          [facility] ([facility]  

 

           Creatinine For GFR 2.85 mg/dL 0.55-1.30                        [facility]

 ([facility]                                    

 

          Potassium Serum 3.9 meq/L 3.5-5.1                       [facility] ([facility]  

 

          Glomerular Filtration Rate 18.0                                    MED

[facility] ([facility]  

 

                                        <content>Units are mL/min/1.73 

m2</content><br/><content></content><br/><content>Chronic Kidney Disease Staging
per NKF:</content><br/><content></content><br/><content>Stage I & II   GFR >=60 
     Normal to Mildly Decreased</content><br/><content>Stage III      GFR 30-59 
    Moderately Decreased</content><br/><content>Stage IV       GFR 15-29      
Severely Decreased</content><br/><content>Stage V        GFR <15        Very 
Little GFR Left</content><br/><content>ESRD           GFR <15 on 
RRT</content><br/><content></content> 

 

          Sodium Level 133 meq/L 136-145                       [facility]  

 

          Chloride Level 98 meq/L                          [facility]  

 

          Carbon Dioxide Level 26 meq/L  21-32                         [facility]  

 

          Anion Gap 9 meq/L   8-16                          [facility]  

 

          Calcium Level 8.4 mg/dL 8.5-10.1                      [facility]  









                    ID                  Date                Data Source

 

                    [id number]            2019 11:15:00 AM EST [facility] ([facility]









          Name      Value     Range     Interpretation Code Description Data Debi

rce(s) Supporting 

Document(s)

 

          Ast/Sgot  75 U/L    7-37                          [facility]  

 

          Alt/SGPT  25 U/L    12-78                         [facility]  

 

          Bilirubin,Direct 2.9 mg/dL 0.0-0.2                       [facility]  

 

          Alkaline Phosphatase 99 U/L                            [facility]  

 

          Bilirubin,Total 4.7 mg/dL 0.2-1.0                       [facility]  

 

          Total Protein 7.2 GM/DL 6.4-8.2                       [facility]  

 

          Albumin   1.8 GM/DL 3.2-5.2                       [facility]  

 

           Albumin/Globulin Ratio 0.33       1.00-1.93                        [facility]

[facility]                                    









                    ID                  Date                Data Source

 

                    [id number]            2019 11:15:00 AM EST [facility] ([facility]









          Name      Value     Range     Interpretation Code Description Data Debi

e(s) Supporting 

Document(s)

 

           Ammonia [Mass/volume] in Blood Laboratory test result                

                  [facility]                    









                    ID                  Date                Data Source

 

                    [id number]            2019 11:15:00 AM EST [facility] ([facility]









          Name      Value     Range     Interpretation Code Description Data Deib

rce(s) Supporting 

Document(s)

 

          Inr       1.90                                    [facility]  

 

                                        THERAPUTIC HUMAN INR VALUES

INDICATIONS                      NORMAL RANGES

PROPHYLAXIS/TREATMENT OF:

VENOUS THROMBOSIS                2.0-3.0

PULMONARY EMBOLISM               2.0-3.0

PREVENTION OF SYSTEMIC EMBOLISM FROM:

TISSUE HEART VALVES              2.0-3.0

ACUTE MYOCARDIAL INFARCTION      2.0-3.0

VALVULAR HEART DISEASE           2.0-3.0

ATRIAL FIBRILLATION              2.0-3.0

MECHANICAL VALVES(HIGH RISK)     2.5-3.5

RECURRENT MYOCARDIAL INFARCTION  2.5-3.5

 

 

          Prothrombin Time 21.5 s    11.8-14.0                     [facility]  









                    ID                  Date                Data Source

 

                    [id number]            2019 11:15:00 AM EST [facility]









          Name      Value     Range     Interpretation Code Description Data Debi

rce(s) Supporting 

Document(s)

 

          White Blood Count 4.5 10    4.0-10.0                      [facility]  

 

          Hemoglobin 11.1 g/dL 12.0-15.5                     [facility]  

 

          Red Blood Count 3.28 10   4.00-5.40                     [facility]  

 

          Hematocrit 32.7 %    36.0-47.0                     [facility]  

 

           Mean Corpuscular HGB Conc 33.9 g/dL  32.0-36.5                       

 [facility]                              

 

           Mean Corpuscular Hemoglobin 33.8 pg    27.0-33.0                     

   [facility]                              

 

           Mean Corpuscular Volume 99.7 fl    80.0-96.0                        M

[facility]                                    

 

           Platelet Count, Automated 103 10     150-450                         

 [facility]                                    

 

           Red Cell Distribution Width 14.5 %     11.5-14.5                     

   [facility]                              

 

          Neutrophils % 56.8 %    36.0-66.0                     [facility]  

 

          Lymph %   21.4 %    24.0-44.0                     [facility]  

 

          Mono %    16.5 %    0.0-5.0                       [facility]  

 

          Immature Granulocyte % 0.4 %     0-3.0                         [facility] 

[facility]  

 

          Baso %    1.6 %     0.0-1.0                       [facility]  

 

          Eos %     3.3 %     0.0-3.0                       [facility]  

 

          Lymph #   1.0 10    1.5-5.0                       [facility]  

 

          Neutrophils # 2.6 10    1.5-8.5                       [facility]  

 

          Nucleated Red Blood Cell % 0.0 %     0-0                           [facility]

[facility] 

 

 

          Eos #     0.2 10    0.0-0.5                       [facility]  

 

          Baso #    0.1 10    0.0-0.2                       [facility]  

 

          Mono #    0.7 10    0.0-0.8                       [facility]  









                    ID                  Date                Data Source

 

                    [id number]            2019 06:58:00 PM EST [facility]









          Name      Value     Range     Interpretation Code Description Data Debi

rce(s) Supporting 

Document(s)

 

           Ammonia [Mass/volume] in Blood 23 uMOL/L                             

      [facility]                                    









                    ID                  Date                Data Source

 

                    [id number]            2019 06:30:00 PM EST [facility]









          Name      Value     Range     Interpretation Code Description Data Debi

rce(s) Supporting 

Document(s)

 

           Laboratory test finding (navigational concept) 38.0 %     38.0-51.0  

                      [facility] 

[facility]          

 

           Laboratory test finding (navigational concept) 158 mg/dL       

                      [facility] 

[facility]          

 

           Laboratory test finding (navigational concept) 135 meq/L  136-145    

                      [facility] 

[facility]          

 

           Laboratory test finding (navigational concept) 4.5 meq/L  3.5-5.1    

                      [facility] 

[facility]          

 

           Laboratory test finding (navigational concept) 4.4 mg/dL  4.5-5.3    

                      [facility] 

[facility]          

 

           Laboratory test finding (navigational concept) 22.0 MM/L  23.0-27.0  

                      [facility] 

[facility]          

 

           Laboratory test finding (navigational concept) 99 meq/L        

                      [facility]                

 

           Laboratory test finding (navigational concept) 1.5 mg/dL  0.6-1.3    

                      [facility] 

[facility]          

 

           Laboratory test finding (navigational concept) 8 mg/dL    8-26       

                      [facility]                   









                    ID                  Date                Data Source

 

                    [id number]            2019 06:18:00 PM EST [facility]









          Name      Value     Range     Interpretation Code Description Data Debi

rce(s) Supporting 

Document(s)

 

          Prothrombin Time 20.5 s    11.8-14.0                     [facility]  

 

          Inr       1.78                                    [facility]  

 

                                        THERAPUTIC HUMAN INR VALUES

INDICATIONS                      NORMAL RANGES

PROPHYLAXIS/TREATMENT OF:

VENOUS THROMBOSIS                2.0-3.0

PULMONARY EMBOLISM               2.0-3.0

PREVENTION OF SYSTEMIC EMBOLISM FROM:

TISSUE HEART VALVES              2.0-3.0

ACUTE MYOCARDIAL INFARCTION      2.0-3.0

VALVULAR HEART DISEASE           2.0-3.0

ATRIAL FIBRILLATION              2.0-3.0

MECHANICAL VALVES(HIGH RISK)     2.5-3.5

RECURRENT MYOCARDIAL INFARCTION  2.5-3.5

 

 

           Partial Thromboplastin Time 34.3 s     25.0-38.4                     

   [facility]                              









                    ID                  Date                Data Source

 

                    [id number]            2019 06:18:00 PM EST [facility]









          Name      Value     Range     Interpretation Code Description Data Debi

rce(s) Supporting 

Document(s)

 

                    Lipoprotein lipase [Enzymatic activity/volume] in Serum or P

lasma 465 U/L             

                                                [facility]  









                    ID                  Date                Data Source

 

                    [id number]            2019 06:18:00 PM EST [facility]









          Name      Value     Range     Interpretation Code Description Data Debi

rce(s) Supporting 

Document(s)

 

          Alt/SGPT  26 U/L    12-78                         [facility]  

 

          Bilirubin,Total 6.7 mg/dL 0.2-1.0                       [facility]  

 

          Ast/Sgot  53 U/L    7-37                          [facility]  

 

          Alkaline Phosphatase 144 U/L                           [facility]  

 

          Total Protein 7.6 GM/DL 6.4-8.2                       [facility]  

 

          Bilirubin,Direct 3.6 mg/dL 0.0-0.2                       [facility]  

 

          Albumin   1.6 GM/DL 3.2-5.2                       [facility]  

 

           Albumin/Globulin Ratio 0.27       1.00-1.93                        [facility]

[facility]                                    









                    ID                  Date                Data Source

 

                    [id number]            2019 06:18:00 PM EST [facility]









          Name      Value     Range     Interpretation Code Description Data Debi

rce(s) Supporting 

Document(s)

 

           Platelets [#/volume] in Blood by Estimate NORMAL                     

                 [facility]                    









                    ID                  Date                Data Source

 

                    [id number]            2019 06:18:00 PM EST [facility]









          Name      Value     Range     Interpretation Code Description Data Debi

rce(s) Supporting 

Document(s)

 

          Neutrophils 85 %      28-66                         [facility]  

 

          Bands     2 %                                     [facility]  

 

          Lymphocytes 9 %       16-44                         [facility]  

 

          Macrocytosis 1+                                      [facility]  

 

          Monocytes 4 %       0-5                           [facility]  









                    ID                  Date                Data Source

 

                    [id number]            2019 06:18:00 PM EST [facility]









          Name      Value     Range     Interpretation Code Description Data [facility]

rce(s) Supporting 

Document(s)

 

          White Blood Count 10.7 10   4.0-10.0                      [facility]  

 

          Hematocrit 37.7 %    36.0-47.0                     [facility]  

 

          Red Blood Count 3.44 10   4.00-5.40                     [facility]  

 

          Hemoglobin 12.3 g/dL 12.0-15.5                     [facility]  

 

           Mean Corpuscular Volume 109.6 fl   80.0-96.0                        M

[facility]                                    

 

           Mean Corpuscular Hemoglobin 35.8 pg    27.0-33.0                     

   [facility]                              

 

           Mean Corpuscular HGB Conc 32.6 g/dL  32.0-36.5                       

 [facility]                              

 

           Platelet Count, Automated 138 10     150-450                         

 [facility]                                    

 

           Red Cell Distribution Width 13.8 %     11.5-14.5                     

   [facility]                              

 

          Nucleated Red Blood Cell % 0.0 %     0-0                           [facility]

[facility] ([facility] 

 







                                        Procedure

 

                                          



                                                                                
                                                                                
                                                                                
                                                                                
                                                                                
                                                                                
                                                                                
                                                                                
                                                                                
                                                                                
                                                                                
                                                                                
                                                                    



Social History

          



           Code       Duration   Value      Status     Description Data Source(s

)

 

             Smoking      2021 12:00:00 AM EST Patient has never smoked co

mpleted    Patient 

has never smoked                        [facility]

 

             Smoking      2020 12:00:00 AM EST Patient has never smoked co

mpleted    Patient 

has never smoked                        [facility] ([facility], )



                                                                                
                            



Vital Signs

          



                    ID                  Date                Data Source

 

                    UNK                                      









           Name       Value      Range      Interpretation Code Description Data

 Source(s)

 

           Body mass index (BMI) [Ratio] 27.6 kg/m2                       27.6 k

g/m2 [facility]

 

           Ideal body weight 135 [lb_av]                       135 [lb_av] [facility]

[facility] ([facility]

 

           Oxygen saturation in Arterial blood by Pulse oximetry 99 %           

                  99 %       [facility] 

([facility]

 

           Body weight 176.12 [lb_av]                       176.12 [lb_av] [facility]

[facility] ([facility]

 

           Body height 67.0 [in_i]                       67.0 [in_i] [facility] ([facility]

 

                                        5'7" 

 

           Respiratory rate 20 /min                          20 /min    [facility] (

[facility]

 

           Body temperature 97.1 [degF]                       97.1 [degF] [facility]

 ([facility]

 

           Heart rate 94 /min                          94 /min    [facility]

 

           Diastolic blood pressure 66 mm[Hg]                        66 mm[Hg]  

[facility]

 

           Systolic blood pressure 105 mm[Hg]                       105 mm[Hg] [facility] ([facility]

 

           Body temperature 97.0 [degF]                       97.0 [degF] [facility]

 ([facility])

 

           Body weight 79.380 kg                        79.380 kg  [facility] ([facility]

 

           Body mass index (BMI) [Ratio] 27.4 kg/m2                       27.4 k

g/m2 [facility] ([facility])

 

           Heart rate 85 /min                          85 /min    [facility] ([facility]

 

           Diastolic blood pressure 74 mm[Hg]                        74 mm[Hg]  

[facility] ([facility])

 

           Systolic blood pressure 121 mm[Hg]                       121 mm[Hg] 

[facility]

 

           Body weight 175.00 [lb_av]                       175.00 [lb_av] [facility]

T ([facility])

 

           Body height 67 [in_i]                        67 [in_i]  [facility]

 

                                        5'7" 

 

           Ideal body weight 130 [lb_av]                       130 [lb_av] [facility]

T ([facility], )

 

           Body height 66 [in_i]                        66 [facility]_i[facility]

 

                                        5'6" 

 

           Body temperature 99.1 [degF]                       99.1 [degF] [facility]

 ([facility],

 )

 

           Systolic blood pressure 90 mm[Hg]                        90 mm[Hg]  [facility] ([facility], )

 

           Oxygen saturation in Arterial blood by Pulse oximetry 98 %           

                  98 %       [facility], [facility]

 

           Heart rate 94 /min                          94 /min    [facility] ([facility], )

 

           Diastolic blood pressure 60 mm[Hg]                        60 mm[Hg]  

[facility] ([facility], )

 

           Body temperature 97.7 [degF]                       97.7 [degF] [facility]

 ([facility])

 

           Body weight 77.112 kg                        77.112 kg  [facility] ([facility])

 

           Body mass index (BMI) [Ratio] 26.6 kg/m2                       26.6 k

g/m2 [facility] ([facility]

 

           Heart rate 99 /min                          99 /min    [facility]

 

           Diastolic blood pressure 70 mm[Hg]                        70 mm[Hg]  

[facility] ([facility])

 

           Systolic blood pressure 116 mm[Hg]                       116 mm[Hg] [facility]

[facility]

 

           Body weight 170.00 [lb_av]                       170.00 [lb_av] [facility]

T ([facility])

 

           Body height 67 [in_i]                        67 [in_i]  [facility] ([facility])

 

                                        5'7" 

 

           Body surface area Derived from formula 1.87 m2                       

   1.87 m2    [facility] ([facility])

 

           Body weight 77.566 kg                        77.566 kg  [facility] ([facility])

 

           Ideal body weight 130 [lb_av]                       130 [lb_av] [facility]

T ([facility])

 

           Body mass index (BMI) [Ratio] 27.6 kg/m2                       27.6 k

g/m2 [facility] ([facility])

 

           Body weight 171.00 [lb_av]                       171.00 [lb_av] [facility]

T ([facility])

 

           Body height 66 [in_i]                        66 [in_i]  [facility] ([facility])

 

                                        5'6" 

 

           Body temperature 96.2 [degF]                       96.2 [degF] [facility]

 ([facility])

 

           Oxygen saturation in Arterial blood by Pulse oximetry 97 %           

                  97 %       [facility] 

([facility])

 

           Heart rate 106 /min                         106 /min   [facility] ([facility])

 

           Diastolic blood pressure 58 mm[Hg]                        58 mm[Hg]  

[facility] ([facility])

 

           Systolic blood pressure 102 mm[Hg]                       102 mm[Hg] [facility] ([facility])

 

           Body mass index (BMI) [Ratio] 26.5 kg/m2                       26.5 k

g/m2 [facility] ([facility]

 

           Ideal body weight 135 [lb_av]                       135 [lb_av] [facility]

[facility]

 

           Oxygen saturation in Arterial blood by Pulse oximetry 98 %           

                  98 %       [facility] 

[facility]

 

           Body weight 169.50 [lb_av]                       169.50 [lb_av] [facility]

[facility]

 

           Body height 67.0 [in_i]                       67.0 [in_i] [facility]

 

                                        5'7" 

 

           Respiratory rate 20 /min                          20 /min    [facility] (

[facility]

 

           Body temperature 97.4 [degF]                       97.4 [degF] [facility]

 [facility]

 

           Heart rate 108 /min                         108 /min   [facility]

 

           Diastolic blood pressure 63 mm[Hg]                        63 mm[Hg]  

[facility] ([facility]

 

           Systolic blood pressure 105 mm[Hg]                       105 mm[Hg] M

[facility] ([facility]

 

           Body mass index (BMI) [Ratio] 26.0 kg/m2                       26.0 k

g/m2 [facility]

 

           Ideal body weight 135 [lb_av]                       135 [lb_av] [facility]

[facility]

 

           Oxygen saturation in Arterial blood by Pulse oximetry 100 %          

                  100 %      [facility] 

([facility]

 

           Body weight 166.12 [lb_av]                       166.12 [lb_av] [facility]

[facility]

 

           Body height 67.0 [in_i]                       67.0 [in_i] [facility]

 

                                        5'7" 

 

           Respiratory rate 18 /min                          18 /min    [facility] (

[facility]

 

           Body temperature 97.3 [degF]                       97.3 [degF] [facility]

 ([facility]

 

           Heart rate 101 /min                         101 /min   [facility] ([facility]

 

           Diastolic blood pressure 71 mm[Hg]                        71 mm[Hg]  

[facility] ([facility]

 

           Systolic blood pressure 114 mm[Hg]                       114 mm[Hg] 

[facility]

 

           Body temperature 97.2 [degF]                       97.2 [degF] [facility]

 ([facility])

 

           Body weight 75.298 kg                        75.298 kg  [facility] ([facility]

 

           Body mass index (BMI) [Ratio] 26.0 kg/m2                       26.0 k

g/m2 [facility] ([facility])

 

           Heart rate 95 /min                          95 /min    [facility] ([facility]

 

           Diastolic blood pressure 74 mm[Hg]                        74 mm[Hg]  

[facility] ([facility])

 

           Systolic blood pressure 122 mm[Hg]                       122 mm[Hg] [facility] ([facility])

 

           Body weight 166.00 [lb_av]                       166.00 [lb_av] [facility]

T ([facility])

 

           Body height 67 [in_i]                        67 [in_i]  [facility] ([facility]

 

                                        5'7" 

 

           Body weight 82.555 kg                        82.555 kg  [facility] ([facility]

 

           Body mass index (BMI) [Ratio] 28.5 kg/m2                       28.5 k

g/m2 [facility] ([facility]

 

           Heart rate 72 /min                          72 /min    [facility] ([facility])

 

           Diastolic blood pressure 86 mm[Hg]                        86 mm[Hg]  

[facility] ([facility])

 

           Systolic blood pressure 132 mm[Hg]                       132 mm[Hg] [facility] ([facility])

 

           Body weight 182.00 [lb_av]                       182.00 [lb_av] [facility]

T ([facility])

 

                                        Temp 97.7 

 

           Body height 67 [in_i]                        67 [in_i]  [facility] ([facility]

 

                                        5'7" 

 

           Body mass index (BMI) [Ratio] 267.6 kg/m2                       267.6

 kg/m2 [facility] ([facility]

 

           Ideal body weight 135 [lb_av]                       135 [lb_av] [facility]

T ([facility]

 

           Oxygen saturation in Arterial blood by Pulse oximetry 99 %           

                  99 %       [facility] 

([facility]

 

           Body weight 1708.50 [lb_av]                       1708.50 [lb_av] [facility]

[facility] ([facility]

 

           Body height 67.0 [in_i]                       67.0 [in_i] [facility] ([facility]

 

                                        5'7" 

 

           Respiratory rate 16 /min                          16 /min    [facility] (

[facility]

 

           Body temperature 98.2 [degF]                       98.2 [degF] [facility]

 ([facility]

 

           Heart rate 89 /min                          89 /min    [facility] ([facility]

 

           Diastolic blood pressure 52 mm[Hg]                        52 mm[Hg]  

[facility] ([facility]

 

           Systolic blood pressure 107 mm[Hg]                       107 mm[Hg] M

[facility] ([facility]

 

           Body weight 84.823 kg                        84.823 kg  [facility] ([facility])

 

           Body mass index (BMI) [Ratio] 29.3 kg/m2                       29.3 k

g/m2 [facility] ([facility])

 

           Heart rate 99 /min                          99 /min    [facility] ([facility]

[facility])

 

           Diastolic blood pressure 77 mm[Hg]                        77 mm[Hg]  

[facility] ([facility])

 

           Systolic blood pressure 121 mm[Hg]                       121 mm[Hg] M

[facility] ([facility])

 

           Body weight 187.00 [lb_av]                       187.00 [lb_av] [facility]

T ([facility])

 

           Body height 67 [in_i]                        67 [in_i]  [facility] ([facility])

 

                                        5'7" 

 

           Body mass index (BMI) [Ratio] 28.7 kg/m2                       28.7 k

g/m2 [facility] ([facility]

 

           Ideal body weight 135 [lb_av]                       135 [lb_av] [facility]

T ([facility]

 

           Oxygen saturation in Arterial blood by Pulse oximetry 98 %           

                  98 %       [facility] 

([facility]

 

           Body weight 183.38 [lb_av]                       183.38 [lb_av] [facility]

T ([facility]

 

           Body height 67.0 [in_i]                       67.0 [in_i] [facility] ([facility]

 

                                        5'7" 

 

           Respiratory rate 16 /min                          16 /min    [facility] (

[facility]

 

           Body temperature 97.5 [degF]                       97.5 [degF] [facility]

 ([facility]

 

           Heart rate 95 /min                          95 /min    [facility] ([facility]

 

           Diastolic blood pressure 66 mm[Hg]                        66 mm[Hg]  

[facility] ([facility]

 

           Systolic blood pressure 111 mm[Hg]                       111 mm[Hg] M

[facility] ([facility]

 

           Body mass index (BMI) [Ratio] 28.6 kg/m2                       28.6 k

g/m2 [facility] ([facility]

 

           Oxygen saturation in Arterial blood by Pulse oximetry 93 %           

                  93 %       [facility] 

[facility]

 

           Body weight 182.38 [lb_av]                       182.38 [lb_av] [facility]

T ([facility]

 

           Body height 67.0 [in_i]                       67.0 [in_i] [facility] ([facility]

 

                                        5'7" 

 

           Respiratory rate 18 /min                          18 /min    [facility] (

[facility]

 

           Body temperature 98.3 [degF]                       98.3 [degF] [facility]

 ([facility]

 

           Heart rate 95 /min                          95 /min    [facility] ([facility]

 

           Diastolic blood pressure 72 mm[Hg]                        72 mm[Hg]  

[facility] ([facility]

 

           Systolic blood pressure 106 mm[Hg]                       106 mm[Hg] M

[facility] ([facility]



                                                                                
                  



Patient Treatment Plan of Care

          



             Planned Activity Planned Date Details      Description  Data Source

(s)

 

             Xalatan 0.005 % 2020 12:00:00 AM EST                         

  [facility] ([facility])

 

             Folic Acid 1 MG 2020 12:00:00 AM EST                         

  [facility] ([facility])

 

             Fluticasone Propionate 50 MCG/ACT 2020 12:00:00 AM EST       

                    [facility] 

([facility])

 

             Brimonidine Tartrate 0.2 % 2020 12:00:00 AM EST              

             [facility] ([facility])

 

             Betaxolol HCl 0.5 % 2020 12:00:00 AM EST                     

      [facility] ([facility])

 

             Lactulose 10 GM/15ML 2020 12:00:00 AM EST                    

       [facility] ([facility])

 

             Levocetirizine Dihydrochloride 5 MG 2020 12:00:00 AM EST     

                      [facility] 

([facility])

 

             MVI          2020 12:00:00 AM EST                           N

[facility] ([facility])

 

             Spironolactone 25 MG 2020 12:00:00 AM EST                    

       [facility] ([facility])

 

             Thiamine HCl 100 MG 2020 12:00:00 AM EST                     

      [facility] ([facility])

 

             Ibuprofen 200 MG 2020 12:00:00 AM EST                        

   [facility] ([facility])

## 2021-01-16 NOTE — CCD
Continuity of Care Document (CCD)

                             Created on: 2020



[employer]

External Reference #: [id number]

: 1960

Sex: Female



Demographics





                          Address                   [address] [place]  [address]

 

                          Home Phone                [phone]

 

                          Preferred Language        Unknown

 

                          Marital Status            Unknown

 

                          Anabaptist Affiliation     Shinto (Non-Rastafari, Non

-Specific)

 

                          Race                      White

 

                          Ethnic Group              Not  or 





Author





                          Organization              Unknown

 

                          Address                   Unknown

 

                          Phone                     Unavailable







Care Team Providers





                    Care Team Member Name Role                Phone

 

                    [facility] - Gastroenter[facility]

TM                [phone]

 

                    [facility]         AUTM                [phone]

 

                    [facility] - Pulmonary Disease AUTM               

 [phone]

 

                    [facility] - Mental Health AUTM            

    [phone]







Problems





                    Active Problems     Provider            Date

 

                    Essential hypertension Gabe Corral M.D. Onset: 

 

                    Hypothyroidism      Gabe Corral M.D. Onset: 2011

 

                    Vitamin D deficiency Gabe Corral M.D. Onset: 

 

                    Neck pain           Gabe Corral M.D. Onset: 2011

 

                    Generalized anxiety disorder Gabe Corral M.D. Onse

t: 2011

 

                    Allergic rhinitis   Gabe Corral M.D. Onset: 2012

 

                    Degeneration of lumbar intervertebral disc Benigno Corral M.D. Onset: 

2013

 

                    Alcoholic cirrhosis Gabe Corral M.D. Onset: 2018

 

                    Esophageal varices without bleeding Gabe Corral M. D. Onset: 2018







Social History





                Type            Date            Description     Comments

 

                Birth Sex                       Unknown          

 

                Tobacco Use     Start: Unknown  Never Smoked Cigarettes  

 

                Tobacco Use     Start: Unknown  Never Used Smokeless Tobacco  

 

                ETOH Use                        Consumed 4 Alcoholic Beverages P

er Day  

 

                Tobacco Use     Start: Unknown  Patient has never smoked  

 

                Recreational Drug Use                 Denies Drug Use  

 

                Smoking Status  Reviewed: 10/26/20 Patient has never smoked  

 

                Exercise Type/Frequency                 Exercises sporadically  

 

                Tattoo/Piercing                 Tattoo          3 

 

                Tattoo/Piercing                 Pierced ears    x2 

 

                Sun Exposure                    Minimum amount of sun exposure  

 

                Sun Exposure                    Uses sunscreen  Occasionally 

 

                Seat Belt/Car Seat                 Always uses seat belt  

 

                Bike Helmet                     Never           Does not bike ri

de. 

 

                Smoke Alarms                    Yes              

 

                Smoke Alarms                    Carbon Monoxide Detector: Yes  







Allergies, Adverse Reactions, Alerts





             Active Allergies Reaction     Severity     Comments     Date

 

             Penicillin                                          2004

 

             Cefdinir     Difficulty swallowing, Itching, throat felt itchy. Sev

ere                    2019







Medications





           Active Medications SIG        Qnty       Indications Ordering Provide

r Date

 

                          Sertraline HCL                     50mg Tablets       

            1 by mouth 

every day       30tabs          F32.1           aGyla Ya FNP 10/26/2020

 

                          Hydroxyzine HCL                     25mg Tablets      

             1 -2 tab by 

mouth as needed before bed 30tabs          F32.1           Gayla Ya FNP 

10/26/2020

 

                          Mucinex                     600mg Tablets ER 12HR     

              1 by mouth 

twice a day                                     Unknown         10/08/2020

 

                          Midodrine HCL                     5mg Tablets         

          1 tablet three 

times a day ( 8 am, 12 n,4 pm) 90tabs                          Gayla Ya FNP 10/08/2020

 

                          Spironolactone                     50mg Tablets       

            take one 

tablet by mouth twice a day  (9 Am/5 PM)                                 Genie Tabares PA-C 10/08/2020

 

                          Torsemide                     20mg Tablets            

       take 1 tablet twice

a day (9 Am/5 PM)                                 Unknown         10/08/2020

 

                                        Lactulose Encephalopathy                

     10GM/15ML Solution                 

             take 30ml by mouth three times a day 2700units                 Gayla Araujo FNP 

2020

 

                          Thiamine HCL                     100mg Tablets        

           1 by mouth 

every day                                       Unknown         2020

 

                                        Fluticasone Propionate Nasal Spray      

               50mcg/Act Suspension     

             2 sprays each nostril once a day                           Unknown 

     2019

 

                          Latanoprost                     0.005% Solution       

            one drop in 

each eye daily at at bedtime                                 Unknown         

 

                          Betaxolol HCL                     0.5% Solution       

            1 drop each 

eye twice a day.                                 Unknown         2019

 

                          Brimonidine Tartrate                     0.2% Solution

                   one 

gtt. each eye twice a day                                 Unknown         2019

 

                          Vitamin D                     1000Unit Tablets        

           1 by mouth 

every day       100tabs                         Meron Ramirez M.D. 

019

 

                          Levothyroxine Sodium                     112mcg Tablet

s                   1 by 

mouth every day 90tabs          E03.9           Meron Ramirez M.D. 

018

 

                                        Wrist Splint/Cock-Up/Left/Canvas/Medium 

                     Misc               

                dx: carpal tunnel syndrome, duration 12 months, prognosis good 1

units                          

Gabe Corral M.D.              2018

 

                                        Wrist Splint/Cock-Up/Right/Canvas/Medium

                      Misc              

                                        wear splint every night for carpal tunne

l syndrome, prognosis good, duration

99 months       1units                          Gabe Corral M.D. 

 

                                        Levocetirizine Dihydrochloride          

           5mg Tablets                  

                1 tab by mouth every evening for allergies 90tabs          J30.9

           Meron Ramirez M.D.

                                        2015

 

                          Ketotifen Fumarate                     0.025% Solution

                   1 drop 

both eyes twice a day for allergy symptoms 5ml                             Unkno

wn         

 

                          Folic Acid                     1mg Tablets            

       1 by mouth every 

day             90tabs                          Meron Ramirez M.D. 

000

 

           Multivitamin Adult                      Tablets                      

                              Unknown    



 

                                        History Medications

 

                          Ciprofloxacin HCL                     250mg Tablets   

                1 tab by 

mouth once a day at 6 Am                                 Unknown         10/08/2

020 - 10/22/2020







Immunizations





             CPT Code     Status       Date         Vaccine      Lot #

 

             [id number]        Given        2019   Pneumococcal Vaccine [id number]

 

                [id number]           Given           10/29/2018      Influenza Virus 

Vaccine, Quadrivalent,age 3 and 

up,multidose vial                       [id number]

 

             54322        Given        2017   Influenza Vaccination  

 

             99284        Given        2015   Influenza Vaccination  

 

             91607        Given        2013   Influenza Vaccination  

 

             [id number]        Given        04/15/2013   Zostavax      

 

             54860        Given        2012   Influenza Vaccination [id number]

C

 

             65977        Given        10/31/2011   Influenza Vaccination/preser

vative free [id number]

 

             66003        Given        2010   Adacel 11 Yrs or older 

BA

 

             85677        Given        10/04/2010   Influenza Vaccination [id number]

A

 

             96062        Given        2009   Influenza Vaccination [id number]

A

 

             20763        Given        2005   Tetnus Toxoid Im Or Jet Inje

ction Use  







Vital Signs





                Date            Vital           Result          Comment

 

                10/26/2020  3:20pm BP Systolic     105 mmHg         

 

                    BP Diastolic        63 mmHg              

 

                    Heart Rate          108 /min             

 

                    Body Temperature    97.4 F             

 

                    Respiratory Rate    20 /min              

 

                    Height              67.0 inches         5'7"

 

                    Weight              169.50 lb            

 

                    O2 % BldC Oximetry  98 %                 

 

                    Peak Expiratory Flow Rate 364                 Estimated Peak

 Flow Rate

 

                    Ideal Body Weight   135 lb               

 

                    BMI (Body Mass Index) 26.5 kg/m2           

 

                10/19/2020  3:07pm BP Systolic     114 mmHg         

 

                    BP Diastolic        71 mmHg              

 

                    Heart Rate          101 /min             

 

                    Body Temperature    97.3 F             

 

                    Respiratory Rate    18 /min              

 

                    Height              67.0 inches         5'7"

 

                    Weight              166.12 lb            

 

                    O2 % BldC Oximetry  100 %                

 

                    Peak Expiratory Flow Rate 364                 Estimated Peak

 Flow Rate

 

                    Ideal Body Weight   135 lb               

 

                    BMI (Body Mass Index) 26.0 kg/m2           







Results





        Test    Acquired Date Facility Test    Result  H/L     Range   Note

 

                    Laboratory test finding 2020          [facility] 

[place] [address] [phone] Platelet Count, Automated 113 10     Low        150-45

0     

 

                    PT & Aptt           2020          [place] [address] [phone] Prothrombin Time 18.6 seconds High       12.5-14.3   

 

             Inr          1.52         Normal                    1

 

             Partial Thromboplastin Time 34.0 seconds Normal       24.2-38.5    

 

 

                    CBC With Differential 2020          [facility] Service Ce

nter

[place] [address] [phone] White Blood Count 7.9 10     Normal     4.0-10.0    

 

             Red Blood Count 3.31 10      Low          4.00-5.40     

 

             Hemoglobin   11.7 g/dL    Low          12.0-15.5     

 

             Hematocrit   34.8 %       Low          36.0-47.0     

 

             Mean Corpuscular Volume 105.1 fl     High         80.0-96.0     

 

             Mean Corpuscular Hemoglobin 35.3 pg      High         27.0-33.0    

 

 

             Mean Corpuscular HGB Conc 33.6 g/dL    Normal       32.0-36.5     

 

             Red Cell Distribution Width 14.6 %       High         11.5-14.5    

 

 

             Platelet Count, Automated 96 10        Low          150-450       

 

             Nucleated Red Blood Cell % 0.0 %        Normal       0-0           

 

                    Differential        2020          [place] [address] [phone] Neutrophils 70 %       High       28-66       

 

             Lymphocytes  14 %         Low          16-44         

 

             Monocytes    8 %          High         0-5           

 

             Eosinophils  5 %          High         0-3           

 

             Myelocytes   1 %          High         0-0           

 

             Atypical Lymph 2 %          Normal       0-5           

 

             Anisocytosis 1+           Normal                     

 

                    Laboratory test finding 2020          Patient Service 

[place] [address] [phone] Platelet Estimate DECREASED  Normal     Normal      

 

             Immature Platelet Fraction 1.3 %        Normal       0.0-9.59      

 

                    Cardiac Marker Panel 2020          Patient Service [facility]

ter

[place] [address] [phone] CPK Creatine Phosphokinase 50 U/L     Normal     26-19

2      

 

             CK-MB Value Mass < 1.0 NG/ML  Normal       <3.6          

 

             MB/CK Relative Index 2.00         Normal       < Or =4      2

 

             Troponin I   < 0.02 NG/ML Normal       < 0.10       3

 

                    Liver Profile       2020          Patient Service [facility]

[place]

[place] [address] [phone] Ast/Sgot   80 U/L     High       7-37        

 

             Alt/SGPT     51 U/L       Normal       12-78         

 

             Alkaline Phosphatase 136 U/L      High                 

 

             Bilirubin,Total 6.8 mg/dL    High         0.2-1.0       

 

             Bilirubin,Direct 4.4 mg/dL    High         0.0-0.2       

 

             Total Protein 7.9 GM/DL    Normal       6.4-8.2       

 

             Albumin      2.0 GM/DL    Low          3.2-5.2       

 

             Albumin/Globulin Ratio 0.3          Low          1.2-2.2       

 

                    Basic Metabolic Profile 2020          Patient Service 

[place] [address] [phone] Glucose, Fasting 83 mg/dL   Normal           

 

             Blood Urea Nitrogen 24 mg/dL     High         7-18          

 

             Creatinine For GFR 1.38 mg/dL   High         0.55-1.30     

 

             Glomerular Filtration Rate 41.5         Low          >45          4

 

             Sodium Level 130 mEq/L    Low          136-145       

 

             Potassium Serum 3.4 mEq/L    Low          3.5-5.1       

 

             Chloride Level 96 mEq/L     Low                  

 

             Carbon Dioxide Level 23 mEq/L     Normal       21-32         

 

             Anion Gap    11 mEq/L     Normal       8-16          

 

             Calcium Level 8.6 mg/dL    Low          8.8-10.2      

 

                    Laboratory test finding 2020          Patient Service 

[place] [address] [phone] NT-Pro  pg/mL  Normal     <125        

 

             Magnesium Level 2.2 mg/dL    Normal       1.8-2.4       

 

                    PT & Aptt           2020          [facility] [place]

[place] [address] [phone] Prothrombin Time 19.4 seconds High       12.5-14.3   

 

             Inr          1.60         Normal                    5

 

             Partial Thromboplastin Time 32.6 seconds Normal       24.2-38.5    

 

 

                    PT & Aptt           2020          Patient Service [facility]

[place]

[place] [address] [phone] Prothrombin Time 18.2 seconds High       11.8-14.0   

 

             Inr          1.54         Normal                    6

 

             Partial Thromboplastin Time 35.6 seconds Normal       25.0-38.4    

 

 

                    CBC With Differential 2020          Patient Service Ce

nter

[place] [address] [phone] White Blood Count 3.4 10     Low        4.0-10.0    

 

             Red Blood Count 3.95 10      Low          4.00-5.40     

 

             Hemoglobin   12.7 g/dL    Normal       12.0-15.5     

 

             Hematocrit   36.6 %       Normal       36.0-47.0     

 

             Mean Corpuscular Volume 92.7 fl      Normal       80.0-96.0     

 

             Mean Corpuscular Hemoglobin 32.2 pg      Normal       27.0-33.0    

 

 

             Mean Corpuscular HGB Conc 34.7 g/dL    Normal       32.0-36.5     

 

             Red Cell Distribution Width 13.6 %       Normal       11.5-14.5    

 

 

             Platelet Count, Automated 66 10        Low          150-450      7

 

             Neutrophils % 53.8 %       Normal       36.0-66.0     

 

             Lymph %      31.6 %       Normal       24.0-44.0     

 

             Mono %       10.4 %       High         0.0-5.0       

 

             Eos %        2.4 %        Normal       0.0-3.0       

 

             Baso %       1.5 %        High         0.0-1.0       

 

             Immature Granulocyte % 0.3 %        Normal       0-3.0         

 

             Nucleated Red Blood Cell % 0.0 %        Normal       0-0           

 

             Neutrophils # 1.8 10       Normal       1.5-8.5       

 

             Lymph #      1.1 10       Low          1.5-5.0       

 

             Mono #       0.4 10       Normal       0.0-0.8       

 

             Eos #        0.1 10       Normal       0.0-0.5       

 

             Baso #       0.1 10       Normal       0.0-0.2       

 

                    Laboratory test finding 2020          Patient Service 

Center

[place] [address] [phone] Immature Platelet Fraction 1.4 %      Normal     0.0-9

.59   8

 

                    Comprehensive Metabolic Profil 2020          [facility] [place] [address] [phone] Glucose, Fasting 88 mg/dL   Normal           

 

             Blood Urea Nitrogen 11 mg/dL     Normal       7-18          

 

             Creatinine For GFR 1.07 mg/dL   Normal       0.55-1.30     

 

             Glomerular Filtration Rate 55.7         Normal       >45          9

 

             Sodium Level 139 mEq/L    Normal       136-145       

 

             Potassium Serum 3.4 mEq/L    Low          3.5-5.1       

 

             Chloride Level 101 mEq/L    Normal               

 

             Carbon Dioxide Level 30 mEq/L     Normal       21-32         

 

             Anion Gap    8 mEq/L      Normal       8-16          

 

             Calcium Level 8.3 mg/dL    Low          8.8-10.2      

 

             Ast/Sgot     60 U/L       High         7-37          

 

             Alt/SGPT     31 U/L       Normal       12-78         

 

             Alkaline Phosphatase 187 U/L      High                 

 

             Bilirubin,Total 3.4 mg/dL    High         0.2-1.0       

 

             Total Protein 8.5 GM/DL    High         6.4-8.2       

 

             Albumin      2.6 GM/DL    Low          3.2-5.2       

 

             Albumin/Globulin Ratio 0.4          Low          1.2-2.2       

 

                    Laboratory test finding 2020          Patient Service 

Center

[place], [place] [id number] [phone] LDH Lactate Dehydrogenase 319 U/L    High       

     10







                          1                         THERAPUTIC HUMAN INR VALUES

INDICATIONS                      NORMAL RANGES

PROPHYLAXIS/TREATMENT OF:

VENOUS THROMBOSIS                2.0-3.0

PULMONARY EMBOLISM               2.0-3.0

PREVENTION OF SYSTEMIC EMBOLISM FROM:

TISSUE HEART VALVES              2.0-3.0

ACUTE MYOCARDIAL INFARCTION      2.0-3.0

VALVULAR HEART DISEASE           2.0-3.0

ATRIAL FIBRILLATION              2.0-3.0

MECHANICAL VALVES(HIGH RISK)     2.5-3.5

RECURRENT MYOCARDIAL INFARCTION  2.5-3.5



 

                          2                         DIAGNOSIS CRITERIA

MMB ng/ml       Relative Index (RI)

NON-AMI               < or = 5               N/A

GRAY ZONE              > 5                < or = 4

AMI                    > 5                   > 4



 

                          3                         Troponin I Reference Interva

l for Siemens Vista LOCI:



                                        99th Percentile= 0.00-0.045 ng/ml



Risk Stratification:

<= 0.10 ng/ml   Decreased Risk for Adverse Clinical

Events.

                                        0.10-1.50 ng/ml   Increased Risk for Adv

erse Clinical

Events. Evaluation of additional

criterion and/or repeat testing in 2-6

hours is suggested to rule out myocardial

damage.

>= 1.50 ng/ml   Indicative of Myocardial Injury.



 

                          4                         Units are mL/min/1.73 m2



Chronic Kidney Disease Staging per NKF:



Stage I & II   GFR >=60       Normal to Mildly Decreased

Stage III      GFR 30-59      Moderately Decreased

Stage IV       GFR 15-29      Severely Decreased

Stage V        GFR <15        Very Little GFR Left

ESRD           GFR <15 on RRT



 

                          5                         THERAPUTIC HUMAN INR VALUES

INDICATIONS                      NORMAL RANGES

PROPHYLAXIS/TREATMENT OF:

VENOUS THROMBOSIS                2.0-3.0

PULMONARY EMBOLISM               2.0-3.0

PREVENTION OF SYSTEMIC EMBOLISM FROM:

TISSUE HEART VALVES              2.0-3.0

ACUTE MYOCARDIAL INFARCTION      2.0-3.0

VALVULAR HEART DISEASE           2.0-3.0

ATRIAL FIBRILLATION              2.0-3.0

MECHANICAL VALVES(HIGH RISK)     2.5-3.5

RECURRENT MYOCARDIAL INFARCTION  2.5-3.5



 

                          6                         THERAPUTIC HUMAN INR VALUES

INDICATIONS                      NORMAL RANGES

PROPHYLAXIS/TREATMENT OF:

VENOUS THROMBOSIS                2.0-3.0

PULMONARY EMBOLISM               2.0-3.0

PREVENTION OF SYSTEMIC EMBOLISM FROM:

TISSUE HEART VALVES              2.0-3.0

ACUTE MYOCARDIAL INFARCTION      2.0-3.0

VALVULAR HEART DISEASE           2.0-3.0

ATRIAL FIBRILLATION              2.0-3.0

MECHANICAL VALVES(HIGH RISK)     2.5-3.5

RECURRENT MYOCARDIAL INFARCTION  2.5-3.5



 

                          7                         Scan Verified machine result

s

PLATELET COUNT LESS THAN 100, AND NEW OCCURANCE OR





 

                          8                         By: SEARO

Time: 925



 

                          9                         Units are mL/min/1.73 m2



Chronic Kidney Disease Staging per NKF:



Stage I & II   GFR >=60       Normal to Mildly Decreased

Stage III      GFR 30-59      Moderately Decreased

Stage IV       GFR 15-29      Severely Decreased

Stage V        GFR <15        Very Little GFR Left

ESRD           GFR <15 on RRT



 

                          10                        By: SEARO

Time: 926

By: SEARO Time: 926









Procedures





                Date            Code            Description     Status

 

                2018      [id number]        Mammogram       Completed

 

                2016      [id number]        Mammogram       Completed

 

                2015      [id number]        Mammogram       Completed

 

                2014      [id number]        Mammogram       Completed







Medical Devices





                                        Description

 

                                        No Information Available







Encounters





           Type       Date       Location   Provider   Dx         Diagnosis

 

           Office Visit 10/26/2020  3:15p Main Office PleskachGayla, FNP K70.3

1     Alcoholic

cirrhosis of liver with ascites

 

                          K76.7                     Hepatorenal syndrome

 

                          J91.8                     Pleural effusion in other co

nditions classified elsewhere

 

                          E03.9                     Hypothyroidism, unspecified

 

                          I10                       Essential (primary) hyperten

nikolay

 

                          F32.1                     Major depressive disorder, s

zakia episode, moderate

 

           Office Visit 10/19/2020  2:45p Main Office Pleskach Gayla, FNP K70.3

1     Alcoholic

cirrhosis of liver with ascites

 

                          K76.7                     Hepatorenal syndrome

 

                          J91.8                     Pleural effusion in other co

nditions classified elsewhere

 

                          E03.9                     Hypothyroidism, unspecified

 

                          I10                       Essential (primary) hyperten

nikolay







Assessments





                Date            Code            Description     Provider

 

                10/26/2020      K70.31          Alcoholic cirrhosis of liver wit

h ascites Pleskach, Gayla, FNP

 

                10/26/2020      K76.7           Hepatorenal syndrome Pleskach, M

neisha, FNP

 

                10/26/2020      J91.8           Pleural effusion in other condit

ions classified elsewhere 

Pleskach, Gayla, FNP

 

                10/26/2020      E03.9           Hypothyroidism, unspecified Ples

kach, Gayla, FNP

 

                10/26/2020      I10             Essential (primary) hypertension

 Pleskach, Gayla, FNP

 

                10/26/2020      F32.1           Major depressive disorder, singl

e episode, moderate Pleskach, 

Gayla, FNP

 

                10/19/2020      K70.31          Alcoholic cirrhosis of liver wit

h ascites Pleskach, Gayla, FNP

 

                10/19/2020      K76.7           Hepatorenal syndrome Pleskach, M

neisha, FNP

 

                10/19/2020      J91.8           Pleural effusion in other condit

ions classified elsewhere 

Pleskach, Gayla, FNP

 

                10/19/2020      E03.9           Hypothyroidism, unspecified Ples

kach, Gayla, FNP

 

                10/19/2020      I10             Essential (primary) hypertension

 Pleskach, Gayla, FNP







Plan of Treatment

Future Appointment(s):* 2020  3:45 pm - Gayla Ya FNP at Main 
  Office

10/26/2020 - Gayla Ya FNP* K70.31 Alcoholic cirrhosis of liver with 
  ascites

* K76.7 Hepatorenal syndrome

* J91.8 Pleural effusion in other conditions classified elsewhere

* E03.9 Hypothyroidism, unspecified* Comments:* continue levothyroxine





* I10 Essential (primary) hypertension* Comments:* controlled, continue current 
  medications





* F32.1 Major depressive disorder, single episode, moderate* New Medication:* 
  Sertraline HCL 50 mg - 1 by mouth every day

* Hydroxyzine HCL 25 mg - 1 -2 tab by mouth as needed before bed



* Follow up:* one month



* Recommendations:* take one half tablet of sertraline daily for one week then a
  whole tablet daily.









Functional Status





                Functional Condition Comment         Date            Status

 

                Glasses         Reading                         Active

 

                Independent with all ADL's                                 Activ

e







Mental Status





                Mental Condition Comment         Date            Status

 

                None            poor reader                     Active







Referrals





                Refer to      Reason for Referral Status          Appt Date

 

                [facility]         Sent            



 

                                        [address] [place] [address]

 

                                        [phone]

## 2021-01-16 NOTE — CCD
Continuity of Care Document (CCD)

                             Created on: 2021



Flora Taylor

External Reference #: [id number]

: 1960

Sex: Female



Demographics





                          Address                   [address] [place] [address]  [id number]

 

                          Home Phone                [phone]

 

                          Preferred Language        Unknown

 

                          Marital Status            Unknown

 

                          Buddhist Affiliation     Mandaeism (Non-Episcopalian, Non

-Specific)

 

                          Race                      White

 

                          Ethnic Group              Not  or 





Author





                          Organization              Unknown

 

                          Address                   Unknown

 

                          Phone                     Unavailable







Care Team Providers





                    Care Team Member Name Role                Phone

 

                    [facility] - Gastro[facility]

TM                [phone]

 

                    [facility]         AUTM                [phone]

 

                    [facility] - Pul[facility] Disease AUTM               

 [phone]

 

                    [facility] - Mental Health AUTM            

    [phone]







Problems





                    Active Problems     Provider            Date

 

                    Essential hypertension Gabe Corral M.D. Onset: 

 

                    Hypothyroidism      Gabe Corral M.D. Onset: 2011

 

                    Vitamin D deficiency Gabe Corral M.D. Onset: 

 

                    Neck pain           Gabe Corral M.D. Onset: 2011

 

                    Generalized anxiety disorder Gabe Corral M.D. Onse

t: 2011

 

                    Allergic rhinitis   Gabe Corral M.D. Onset: 2012

 

                    Degeneration of lumbar intervertebral disc Benigno Corral M.D. Onset: 

2013

 

                    Alcoholic cirrhosis Gabe Corral M.D. Onset: 2018

 

                    Esophageal varices without bleeding Gabe Corral M. D. Onset: 2018







Social History





                Type            Date            Description     Comments

 

                Birth Sex                       Unknown          

 

                Tobacco Use     Start: Unknown  Never Smoked Cigarettes  

 

                Tobacco Use     Start: Unknown  Never Used Smokeless Tobacco  

 

                ETOH Use                        Consumed 4 Alcoholic Beverages P

er Day  

 

                Tobacco Use     Start: Unknown  Patient has never smoked  

 

                Recreational Drug Use                 Denies Drug Use  

 

                Smoking Status  Reviewed: 20 Patient has never smoked  

 

                Exercise Type/Frequency                 Exercises sporadically  

 

                Tattoo/Piercing                 Tattoo          3 

 

                Tattoo/Piercing                 Pierced ears    x2 

 

                Sun Exposure                    Minimum amount of sun exposure  

 

                Sun Exposure                    Uses sunscreen  Occasionally 

 

                Seat Belt/Car Seat                 Always uses seat belt  

 

                Bike Helmet                     Never           Does not bike ri

de. 

 

                Smoke Alarms                    Yes              

 

                Smoke Alarms                    Carbon Monoxide Detector: Yes  







Allergies, Adverse Reactions, Alerts





             Active Allergies Reaction     Severity     Comments     Date

 

             Penicillin                                          2004

 

             Cefdinir     Difficulty swallowing, Itching, throat felt itchy. Sev

ere                    2019







Medications





           Active Medications SIG        Qnty       Indications Ordering Provide

r Date

 

           Rifaximin Tablets 200 mg tab - take 2 tabs (400 mg) twice a day      

                 Unknown    

2020

 

                          Spironolactone                     50mg Tablets       

            take one half 

tablet (25 mg) by mouth twice a day                                 Unknown     

    2020

 

                          Allergy Relief Loratadine                     10mg Tab

lets                   1 

tab once a day as needed for allergy symptoms                                 Un

known         2020

 

                          Hydroxyzine HCL                     25mg Tablets      

             1 -2 tab by 

mouth as needed before bed 30tabs          F32.1           Gayla Ya FNP 

10/26/2020

 

                          Mucinex                     600mg Tablets ER 12HR     

              1 by mouth 

twice a day                                     Unknown         10/08/2020

 

                          Midodrine HCL                     5mg Tablets         

          1 tablet three 

times a day ( 8 am, 12 n,4 pm) 90tabs                          Gayla Ya FNP 10/08/2020

 

                          Torsemide                     20mg Tablets            

       take 1 tablet twice

a day (9 Am/5 PM)                                 Unknown         10/08/2020

 

                                        Lactulose Encephalopathy                

     10GM/15ML Solution                 

             take 30ml by mouth three times a day 2700units                 Gayla Araujo FNP 

2020

 

                          Thiamine HCL                     100mg Tablets        

           1 by mouth 

every day                                       Unknown         2020

 

                                        Fluticasone Propionate Nasal Spray      

               50mcg/Act Suspension     

             2 sprays each nostril once a day                           Unknown 

     2019

 

                          Latanoprost                     0.005% Solution       

            one drop in 

each eye daily at at bedtime                                 Unknown         

 

                          Betaxolol HCL                     0.5% Solution       

            1 drop each 

eye twice a day.                                 Unknown         2019

 

                          Brimonidine Tartrate                     0.2% Solution

                   one 

gtt. each eye twice a day                                 Unknown         2019

 

                          Vitamin D                     1000Unit Tablets        

           1 by mouth 

every day       100tabs                         Meron Ramirez M.D. 

019

 

                          Levothyroxine Sodium                     112mcg Tablet

s                   1 by 

mouth every day 90tabs          E03.9           Meron Ramirez M.D. 

018

 

                                        Wrist Splint/Cock-Up/Left/Canvas/Medium 

                     Misc               

                dx: carpal tunnel syndrome, duration 12 months, prognosis good 1

units                          

Gabe Corral M.D.              2018

 

                                        Wrist Splint/Cock-Up/Right/Canvas/Medium

                      Misc              

                                        wear splint every night for carpal tunne

l syndrome, prognosis good, duration

99 months       1units                          Gabe Corral M.D. 

 

                                        Levocetirizine Dihydrochloride          

           5mg Tablets                  

                1 tab by mouth every evening for allergies 90tabs          J30.9

           Meron Ramirez M.D.

                                        2015

 

                          Ketotifen Fumarate                     0.025% Solution

                   1 drop 

both eyes twice a day for allergy symptoms 5ml                             Unkno

wn         

 

                          Folic Acid                     1mg Tablets            

       1 by mouth every 

day             90tabs                          Meron Ramirez M.D. 

000

 

           Multivitamin Adult                      Tablets                      

                              Unknown    



 

                          Xifaxan                     200mg Tablets             

      1 by mouth twice a 

day             60tabs                          Gayla Ya FNP 

 

                                        History Medications

 

                          Sertraline HCL                     50mg Tablets       

            1 by mouth 

every day       30tabs          F32.1           Gayla Ya FNP 10/26/2020 

- 2020

 

                          Ciprofloxacin HCL                     250mg Tablets   

                1 tab by 

mouth once a day at 6 Am                                 Unknown         10/08/2

020 - 10/22/2020

 

                          Spironolactone                     50mg Tablets       

            take one 

tablet by mouth twice a day  (9 Am/5 PM)                                 Genie Tabares PA-C 10/08/2020 - 

2020







Immunizations





             CPT Code     Status       Date         Vaccine      Lot #

 

             [id number]        Given        2019   Pneumococcal Vaccine [id number]

 

                [id number]           Given           10/29/2018      Influenza Virus 

Vaccine, Quadrivalent,age 3 and 

up,multidose vial                       [id number]

 

             60769        Given        2017   Influenza Vaccination  

 

             58637        Given        2015   Influenza Vaccination  

 

             12117        Given        2013   Influenza Vaccination  

 

             05500        Given        04/15/2013   Zostavax      

 

             73078        Given        2012   Influenza Vaccination [id number]

C

 

             72398        Given        10/31/2011   Influenza Vaccination/preser

vative free [id number]

 

             77604        Given        2010   Adacel 11 Yrs or older 

BA

 

             92974        Given        10/04/2010   Influenza Vaccination [id number]

A

 

             04137        Given        2009   Influenza Vaccination [id number]

A

 

             15801        Given        2005   Tetnus Toxoid Im Or Jet Inje

ction Use  







Vital Signs





                Date            Vital           Result          Comment

 

                10/26/2020  3:20pm BP Systolic     105 mmHg         

 

                    BP Diastolic        63 mmHg              

 

                    Heart Rate          108 /min             

 

                    Body Temperature    97.4 F             

 

                    Respiratory Rate    20 /min              

 

                    Height              67.0 inches         5'7"

 

                    Weight              169.50 lb            

 

                    O2 % BldC Oximetry  98 %                 

 

                    Peak Expiratory Flow Rate 364                 Estimated Peak

 Flow Rate

 

                    Ideal Body Weight   135 lb               

 

                    BMI (Body Mass Index) 26.5 kg/m2           

 

                10/19/2020  3:07pm BP Systolic     114 mmHg         

 

                    BP Diastolic        71 mmHg              

 

                    Heart Rate          101 /min             

 

                    Body Temperature    97.3 F             

 

                    Respiratory Rate    18 /min              

 

                    Height              67.0 inches         5'7"

 

                    Weight              166.12 lb            

 

                    O2 % BldC Oximetry  100 %                

 

                    Peak Expiratory Flow Rate 364                 Estimated Peak

 Flow Rate

 

                    Ideal Body Weight   135 lb               

 

                    BMI (Body Mass Index) 26.0 kg/m2           







Results





        Test    Acquired Date Facility Test    Result  H/L     Range   Note

 

                    Cell Count Pleural Fluid 2020          [facility]

 Center

[place] [address] [phone] Source, Body Fluid PLEURAL    Normal                 

 

             Pleural FL Color YELLOW       Normal       Colorless     

 

             Appearance, Body Fluid CLOUDY       Normal       Clear         

 

             WBC Body Fluid 101 /uL      High         0-10          

 

             RBC Body Fluid 2 10         Normal       <2            

 

             BF Mononuclear Cell % 94.0 %       High         0-0           

 

             BF Polymorphonuclear Cell % 6.0 %        High         0-0          

 

 

                    Body Fluid Culture And GS 2020          Patient Servic

e Center

[place] [address]

           [phone] Gram Stain (SEE NOTE)  Normal                1

 

             Body Fluid Culture **************** <SEE NOTE>                     

       2

 

                    Laboratory test finding 2020          Patient Service 

[place]

[place] [address] [phone] Albumin 25% TRANSFUSED PRODU <SEE NOTE>               

         3

 

                    Cell Count Pleural Fluid 2020          Patient [facility]

 [place]

[place] [address]

           [phone] Source, Body Fluid PLEURAL    Normal                 

 

             Pleural FL Color YELLOW       Normal       Colorless     

 

             Appearance, Body Fluid CLOUDY       Normal       Clear         

 

             WBC Body Fluid 124 /uL      High         0-10          

 

             RBC Body Fluid < 2 10       Normal       <2            

 

             BF Mononuclear Cell % 93.6 %       High         0-0           

 

             BF Polymorphonuclear Cell % 6.4 %        High         0-0          

 

 

                    Body Fluids Culture And Gram Stain 2020          [facility]

[place] [address] [phone] Gram Stain (SEE NOTE)  Normal                4

 

             Body Fluid Culture **************** <SEE NOTE>                     

       5

 

                    Laboratory test finding 2020          [place] 

[place] [address]

           [phone] Albumin 25% TRANSFUSED PRODU <SEE NOTE>               

         6

 

                    Laboratory test finding 2020          Patient Service 

[place] [address]

           [phone] Ethyl Alcohol (Ethanol) < 0.003 %  Normal     0.000-0.

010  

 

                    Basic Metabolic Profile 2020          Patient Service 

[place] [address] [phone] Glucose, Fasting 169 mg/dL  High             

 

             Blood Urea Nitrogen 71 mg/dL     High         7-18          

 

             Creatinine For GFR 2.38 mg/dL   High         0.55-1.30     

 

             Glomerular Filtration Rate 22.1         Low          >45          7

 

             Sodium Level 131 mEq/L    Low          136-145       

 

             Potassium Serum 4.7 mEq/L    Normal       3.5-5.1       

 

             Chloride Level 99 mEq/L     Normal               

 

             Carbon Dioxide Level 21 mEq/L     Normal       21-32         

 

             Anion Gap    11 mEq/L     Normal       8-16          

 

             Calcium Level 8.7 mg/dL    Low          8.8-10.2      

 

                    Liver Profile       2020          [place] [address] [phone] Ast/Sgot   43 U/L     High       7-37        

 

             Alt/SGPT     38 U/L       Normal       12-78         

 

             Alkaline Phosphatase 155 U/L      High                 

 

             Bilirubin,Total 3.1 mg/dL    High         0.2-1.0       

 

             Bilirubin,Direct 2.0 mg/dL    High         0.0-0.2       

 

             Total Protein 6.3 GM/DL    Low          6.4-8.2       

 

             Albumin      2.0 GM/DL    Low          3.2-5.2       

 

             Albumin/Globulin Ratio 0.5          Low          1.2-2.2       

 

                    PT & Aptt           2020          Patient Service [place]

[place] [address] [phone] Prothrombin Time 17.5 seconds High       12.5-14.3   

 

             Inr          1.40         Normal                    8

 

             Partial Thromboplastin Time 35.4 seconds Normal       24.2-38.5    

 

 

                    CBC With Differential 2020          Patient Service Ce

[place]ter

[place] [address] [phone] White Blood Count 5.5 10     Normal     4.0-10.0    

 

             Red Blood Count 3.39 10      Low          4.00-5.40     

 

             Hemoglobin   10.8 g/dL    Low          12.0-15.5     

 

             Hematocrit   32.6 %       Low          36.0-47.0     

 

             Mean Corpuscular Volume 96.2 fl      High         80.0-96.0     

 

             Mean Corpuscular Hemoglobin 31.9 pg      Normal       27.0-33.0    

 

 

             Mean Corpuscular HGB Conc 33.1 g/dL    Normal       32.0-36.5     

 

             Red Cell Distribution Width 13.8 %       Normal       11.5-14.5    

 

 

             Platelet Count, Automated 124 10       Low          150-450       

 

             Neutrophils % 73.3 %       High         36.0-66.0     

 

             Lymph %      13.9 %       Low          24.0-44.0     

 

             Mono %       9.9 %        High         0.0-5.0       

 

             Eos %        1.3 %        Normal       0.0-3.0       

 

             Baso %       0.9 %        Normal       0.0-1.0       

 

             Immature Granulocyte % 0.7 %        Normal       0-3.0         

 

             Nucleated Red Blood Cell % 0.0 %        Normal       0-0           

 

             Neutrophils # 4.1 10       Normal       1.5-8.5       

 

             Lymph #      0.8 10       Low          1.5-5.0       

 

             Mono #       0.6 10       Normal       0.0-0.8       

 

             Eos #        0.1 10       Normal       0.0-0.5       

 

             Baso #       0.1 10       Normal       0.0-0.2       

 

                    Comprehensive Metabolic Profil 2020          [facility] [place] [address] [phone] Glucose, Fasting 118 mg/dL  High             

 

             Blood Urea Nitrogen 69 mg/dL     High         7-18          

 

             Creatinine For GFR 2.49 mg/dL   High         0.55-1.30     

 

             Glomerular Filtration Rate 21.0         Low          >45          9

 

             Sodium Level 128 mEq/L    Low          136-145       

 

             Potassium Serum 4.2 mEq/L    Normal       3.5-5.1       

 

             Chloride Level 92 mEq/L     Low                  

 

             Carbon Dioxide Level 25 mEq/L     Normal       21-32         

 

             Anion Gap    11 mEq/L     Normal       8-16          

 

             Calcium Level 8.8 mg/dL    Normal       8.8-10.2      

 

             Ast/Sgot     45 U/L       High         7-37          

 

             Alt/SGPT     44 U/L       Normal       12-78         

 

             Alkaline Phosphatase 186 U/L      High                 

 

             Bilirubin,Total 4.2 mg/dL    High         0.2-1.0       

 

             Total Protein 6.9 GM/DL    Normal       6.4-8.2       

 

             Albumin      2.2 GM/DL    Low          3.2-5.2       

 

             Albumin/Globulin Ratio 0.5          Low          1.2-2.2       

 

                    Culture Fungus Misc 2020          [place] [address] [phone] Fungal Smear Testing performe <SEE NOTE>              

          10

 

             Fungal Culture Other Source Testing performe <SEE NOTE>            

                11

 

                    Acid Fast Smear & Culture(Afb) Sendout 2020          P

[place] [address] [phone] Afb Smear  Testing performe <SEE NOTE>                

        12

 

             Afb Culture  Testing performe <SEE NOTE>                           

 13

 

                    Laboratory test finding 2020          Patient Service 

[place] [address] [phone] Anaerobic Culture **************** <SEE NOTE>         

               14

 

                    Body Fluid Culture And GS 2020          Patient [place] [address] [phone] Gram Stain (SEE NOTE)  Normal                15

 

             Body Fluid Culture **************** <SEE NOTE>                     

       16

 

                    PH, Body Fluid      2020          [place] [address] [phone] PH Body Fluid > 7.800 units Normal     Not Established

  

 

             Source, Body Fluid pH PLEURAL      Normal                     

 

                    Amylase Body Fluid  2020          Patient Service [place] [id number] [phone] Amylase, Body Fluid 31 U/L     Normal     Not Establis

hed  

 

             Source, Body Fluid Amylase PLEURAL      Normal                     

 

                    Glucose Body Fluid  2020          Patient Service [place] [address] [phone] Glucose, Body Fluid 131 mg/dL  Normal     Not Establis

hed  

 

             Source, Body Fluid Glucose PLEURAL      Normal                     

 

                    LDH Body Fluid      2020          [place] [address]

           [phone] LDH, Body Fluid 51 U/L     Normal     Not Established 

 

 

             Source, Body Fluid LDH PLEURAL      Normal                     

 

                    TP Body Fluid       2020          [place] [address]

           [phone] Total Protein, Body Fluid 0.7 g/dL   Normal     Not Es

tablished  

 

             Source, Body Fluid Tot Protein PLEURAL      Normal                 

    

 

                    Cell Count Pleural Fluid 2020          Patient [place]

 [place] [address] [phone] Source, Body Fluid PLEURAL    Normal                 

 

             Pleural FL Color YELLOW       Normal       Colorless     

 

             Appearance, Body Fluid CLOUDY       Normal       Clear         

 

             WBC Body Fluid 114 /uL      High         0-10          

 

             RBC Body Fluid 4 10         Normal       <2            

 

             BF Mononuclear Cell % 91.2 %       High         0-0           

 

             BF Polymorphonuclear Cell % 8.8 %        High         0-0          

 

 

                    PT & Aptt           2020          Patient Service [place] [address]13 [phone] Prothrombin Time 18.6 seconds High       12.5-14.3   

 

             Inr          1.52         Normal                    17

 

             Partial Thromboplastin Time 34.0 seconds Normal       24.2-38.5    

 

 

                    Laboratory test finding 2020          Patient Service 

[place] [address] [phone] Platelet Count, Automated 113 10     Low        150-45

0     

 

                    CBC With Differential 2020          Patient Service Ce

[place]ter

[place] [address] [phone] White Blood Count 7.9 10     Normal     4.0-10.0    

 

             Red Blood Count 3.31 10      Low          4.00-5.40     

 

             Hemoglobin   11.7 g/dL    Low          12.0-15.5     

 

             Hematocrit   34.8 %       Low          36.0-47.0     

 

             Mean Corpuscular Volume 105.1 fl     High         80.0-96.0     

 

             Mean Corpuscular Hemoglobin 35.3 pg      High         27.0-33.0    

 

 

             Mean Corpuscular HGB Conc 33.6 g/dL    Normal       32.0-36.5     

 

             Red Cell Distribution Width 14.6 %       High         11.5-14.5    

 

 

             Platelet Count, Automated 96 10        Low          150-450       

 

             Nucleated Red Blood Cell % 0.0 %        Normal       0-0           

 

                    Differential        2020          Patient Service [place] [address]99 [phone] Neutrophils 70 %       High       28-66       

 

             Lymphocytes  14 %         Low          16-44         

 

             Monocytes    8 %          High         0-5           

 

             Eosinophils  5 %          High         0-3           

 

             Myelocytes   1 %          High         0-0           

 

             Atypical Lymph 2 %          Normal       0-5           

 

             Anisocytosis 1+           Normal                     

 

                    Laboratory test finding 2020          Patient Service 

[place] [id number] [phone] Platelet Estimate DECREASED  Normal     Normal      

 

             Immature Platelet Fraction 1.3 %        Normal       0.0-9.59      

 

                    Cardiac Marker Panel 2020          [place] [address] [phone] CPK Creatine Phosphokinase 50 U/L     Normal     26-19

2      

 

             CK-MB Value Mass < 1.0 NG/ML  Normal       <3.6          

 

             MB/CK Relative Index 2.00         Normal       < Or =4      18

 

             Troponin I   < 0.02 NG/ML Normal       < 0.10       19

 

                    Liver Profile       2020          Patient Service [place] [id number] [phone] Ast/Sgot   80 U/L     High       7-37        

 

             Alt/SGPT     51 U/L       Normal       12-78         

 

             Alkaline Phosphatase 136 U/L      High                 

 

             Bilirubin,Total 6.8 mg/dL    High         0.2-1.0       

 

             Bilirubin,Direct 4.4 mg/dL    High         0.0-0.2       

 

             Total Protein 7.9 GM/DL    Normal       6.4-8.2       

 

             Albumin      2.0 GM/DL    Low          3.2-5.2       

 

             Albumin/Globulin Ratio 0.3          Low          1.2-2.2       

 

                    Basic Metabolic Profile 2020          Patient Service 

[place] [address] [phone] Glucose, Fasting 83 mg/dL   Normal           

 

             Blood Urea Nitrogen 24 mg/dL     High         7-18          

 

             Creatinine For GFR 1.38 mg/dL   High         0.55-1.30     

 

             Glomerular Filtration Rate 41.5         Low          >45          2

0

 

             Sodium Level 130 mEq/L    Low          136-145       

 

             Potassium Serum 3.4 mEq/L    Low          3.5-5.1       

 

             Chloride Level 96 mEq/L     Low                  

 

             Carbon Dioxide Level 23 mEq/L     Normal       21-32         

 

             Anion Gap    11 mEq/L     Normal       8-16          

 

             Calcium Level 8.6 mg/dL    Low          8.8-10.2      

 

                    Laboratory test finding 2020          Patient Service 

Center

[place], [place] [id number] [phone] NT-Pro  pg/mL  Normal     <125        

 

             Magnesium Level 2.2 mg/dL    Normal       1.8-2.4       

 

                    PT & Aptt           2020          Patient Service [place], [place] [id number] [phone] Prothrombin Time 19.4 seconds High       12.5-14.3   

 

             Inr          1.60         Normal                    21

 

             Partial Thromboplastin Time 32.6 seconds Normal       24.2-38.5    

 







                          1                         FEW WBCS

NO ORGANISMS SEEN





 

                          2                         ****************************

*********************

If aerobic or anaerobic growth is detected within

the next 7-21 days, an addendum will follow.

                                        .***************************************

********.



FULL REPORT IN LAB NOTES ([facility] and [facility]).

NO GROWTH AEROBICALLY





 

                          3                         TRANSFUSED PRODUCT: ALBUMIN 

25%                         COUNT: 1



 

                          4                         FEW RBCS

FEW WBCS

NO ORGANISMS SEEN





 

                          5                         ****************************

*********************

If aerobic or anaerobic growth is detected within

the next 7-21 days, an addendum will follow.

                                        .***************************************

********.



FULL REPORT IN LAB NOTES ([facility] and [facility]).

NO GROWTH AEROBICALLY





 

                          6                         TRANSFUSED PRODUCT: ALBUMIN 

25%                         COUNT: 1



 

                          7                         Units are mL/min/1.73 m2



Chronic Kidney Disease Staging per NKF:



Stage I & II   GFR >=60       Normal to Mildly Decreased

Stage III      GFR 30-59      Moderately Decreased

Stage IV       GFR 15-29      Severely Decreased

Stage V        GFR <15        Very Little GFR Left

ESRD           GFR <15 on RRT



 

                          8                         THERAPUTIC HUMAN INR VALUES

INDICATIONS                      NORMAL RANGES

PROPHYLAXIS/TREATMENT OF:

VENOUS THROMBOSIS                2.0-3.0

PULMONARY EMBOLISM               2.0-3.0

PREVENTION OF SYSTEMIC EMBOLISM FROM:

TISSUE HEART VALVES              2.0-3.0

ACUTE MYOCARDIAL INFARCTION      2.0-3.0

VALVULAR HEART DISEASE           2.0-3.0

ATRIAL FIBRILLATION              2.0-3.0

MECHANICAL VALVES(HIGH RISK)     2.5-3.5

RECURRENT MYOCARDIAL INFARCTION  2.5-3.5



 

                          9                         Units are mL/min/1.73 m2



Chronic Kidney Disease Staging per NK[facility]:



Stage I & II   GFR >=60       Normal to Mildly Decreased

Stage III      GFR 30-59      Moderately Decreased

Stage IV       GFR 15-29      Severely Decreased

Stage V        GFR <15        Very Little GFR Left

ESRD           GFR <15 on RRT



 

                          10                        Testing performed at [facility]

[facility] . Report copy to follow

on a separate form. 20 REF LAB#:[id number]



IAIN/Calcofluor preparatio     No fungus observed.





 

                          11                        Testing performed at [facility]

[facility] . Report copy to follow

on a separate form. 20 REF LAB#:[id number]



FUNGUS CULTURE [facility]        No Yeast or Mold Isolated after 4 weeks.





 

                          12                        Testing performed at [facility]

[facility] lab . Report copy to follow

on a separate form. 20 REF LAB#:[id number]



Due to limited sensitivity, smear results should

be used as an adjunct in evaluating patient tuberculosis

status. Cultural examination is highly recommended

for clinical diagnosis.





AFB sm REF LAB concentra      NEGATIVE





 

                          13                        Testing performed at [facility]

[facility] lab . Report copy to follow

on a separate form. 20 REF LAB#:[id number]



FULL REPORT IN LAB NOTES ([facility] and [facility]).

No Acid-Fast Bacilli Isolated after 6 Weeks.





 

                          14                        ****************************

*********************

If anaerobic or aerobic growth is detected within

the next 7-21 days, an addendum will follow.

                                        .***************************************

********.



FULL REPORT IN LAB NOTES ([facility] and [facility]).

NO GROWTH ANAEROBICALLY





 

                          15                        FEW WBCS

NO ORGANISMS SEEN





 

                          16                        ****************************

*********************

If aerobic or anaerobic growth is detected within

the next 7-21 days, an addendum will follow.

                                        .***************************************

********.



FULL REPORT IN LAB NOTES ([facility] and [facility]).

NO GROWTH AEROBICALLY





 

                          17                        THERAPUTIC HUMAN INR VALUES

INDICATIONS                      NORMAL RANGES

PROPHYLAXIS/TREATMENT OF:

VENOUS THROMBOSIS                2.0-3.0

PULMONARY EMBOLISM               2.0-3.0

PREVENTION OF SYSTEMIC EMBOLISM FROM:

TISSUE HEART VALVES              2.0-3.0

ACUTE MYOCARDIAL INFARCTION      2.0-3.0

VALVULAR HEART DISEASE           2.0-3.0

ATRIAL FIBRILLATION              2.0-3.0

MECHANICAL VALVES(HIGH RISK)     2.5-3.5

RECURRENT MYOCARDIAL INFARCTION  2.5-3.5



 

                          18                        DIAGNOSIS CRITERIA

MMB ng/ml       Relative Index (RI)

NON-AMI               < or = 5               N/A

GRAY ZONE              > 5                < or = 4

AMI                    > 5                   > 4



 

                          19                        Troponin I Reference Interva

l for [employer] LOCI:



                                        99th Percentile= 0.00-0.045 ng/ml



Risk Stratification:

<= 0.10 ng/ml   Decreased Risk for Adverse Clinical

Events.

                                        0.10-1.50 ng/ml   Increased Risk for Adv

erse Clinical

Events. Evaluation of additional

criterion and/or repeat testing in 2-6

hours is suggested to rule out myocardial

damage.

>= 1.50 ng/ml   Indicative of Myocardial Injury.



 

                          20                        Units are mL/min/1.73 m2



Chronic Kidney Disease Staging per NKF:



Stage I & II   GFR >=60       Normal to Mildly Decreased

Stage III      GFR 30-59      Moderately Decreased

Stage IV       GFR 15-29      Severely Decreased

Stage V        GFR <15        Very Little GFR Left

ESRD           GFR <15 on RRT



 

                          21                        THERAPUTIC HUMAN INR VALUES

INDICATIONS                      NORMAL RANGES

PROPHYLAXIS/TREATMENT OF:

VENOUS THROMBOSIS                2.0-3.0

PULMONARY EMBOLISM               2.0-3.0

PREVENTION OF SYSTEMIC EMBOLISM FROM:

TISSUE HEART VALVES              2.0-3.0

ACUTE MYOCARDIAL INFARCTION      2.0-3.0

VALVULAR HEART DISEASE           2.0-3.0

ATRIAL FIBRILLATION              2.0-3.0

MECHANICAL VALVES(HIGH RISK)     2.5-3.5

RECURRENT MYOCARDIAL INFARCTION  2.5-3.5









Procedures





                Date            Code            Description     Status

 

                    2020          97483               Brief Emotional/Beha

v Assessment W/ Scoring Doc Per Standard 

Inst                                    Completed

 

                2018      [id number]        Mammogram       Completed

 

                2016      [id number]        Mammogram       Completed

 

                2015      [id number]        Mammogram       Completed

 

                2014      [id number]        Mammogram       Completed







Medical Devices





                                        Description

 

                                        No Information Available







Encounters





           Type       Date       Location   Provider   Dx         Diagnosis

 

           Office Visit 2020 11:45a Main Office Gayla Ya FNP K70.3

1     Alcoholic

cirrhosis of liver with ascites

 

                          K76.7                     Hepatorenal syndrome

 

                          J91.8                     Pleural effusion in other co

nditions classified elsewhere

 

                          Z13.89                    Encounter for screening for 

other disorder

 

           Office Visit 10/26/2020  3:15p Main Office Gayla Ya FNP K70.3

1     Alcoholic

cirrhosis of liver with ascites

 

                          K76.7                     Hepatorenal syndrome

 

                          J91.8                     Pleural effusion in other co

nditions classified elsewhere

 

                          E03.9                     Hypothyroidism, unspecified

 

                          I10                       Essential (primary) hyperten

nikolay

 

                          F32.1                     Major depressive disorder, s

zakia episode, moderate

 

           Office Visit 10/19/2020  2:45p Main Office Gayla Ya FNP K70.3

1     Alcoholic

cirrhosis of liver with ascites

 

                          K76.7                     Hepatorenal syndrome

 

                          J91.8                     Pleural effusion in other co

nditions classified elsewhere

 

                          E03.9                     Hypothyroidism, unspecified

 

                          I10                       Essential (primary) hyperten

nikolay







Assessments





                Date            Code            Description     Provider

 

                2020      K70.31          Alcoholic cirrhosis of liver wit

h ascites Gayla Ya, ANDREA

 

                2020      K76.7           Hepatorenal syndrome KALPESH Ya, [facility]

 

                2020      J91.8           Pleural effusion in other condit

ions classified elsewhere 

Gayla Ya FN

 

                2020      Z13.89          Encounter for screening for othe

r disorder Gayla Ya 

[facility]

 

                10/26/2020      K70.31          Alcoholic cirrhosis of liver wit

h ascites Gayla Ya, [facility]

 

                10/26/2020      K76.7           Hepatorenal syndrome KALPESH Ya, [facility]

 

                10/26/2020      J91.8           Pleural effusion in other condit

ions classified elsewhere 

Gayla Ya, [facility]

 

                10/26/2020      E03.9           Hypothyroidism, unspecified Ples

Gayla arnold, [facility]

 

                10/26/2020      I10             Essential (primary) hypertension

 Gayla Ya, [facility]

 

                10/26/2020      F32.1           Major depressive disorder, singl

e episode, moderate Gayla Ya FN

 

                10/19/2020      K70.31          Alcoholic cirrhosis of liver wit

h ascites Gayla Ya [facility]

 

                10/19/2020      K76.7           Hepatorenal syndrome KALPESH Ya, [facility]

 

                10/19/2020      J91.8           Pleural effusion in other condit

ions classified elsewhere 

Gayla Ya, ANDREA

 

                10/19/2020      E03.9           Hypothyroidism, unspecified Ples

Gayla arnold, [facility]

 

                10/19/2020      I10             Essential (primary) hypertension

 Gayla Ya FNP







Plan of Treatment

Future Appointment(s):* 2021 11:30 am - Gayla Ya FNP at Main 
  Office

2020 - Gayla Ya FNP* K70.31 Alcoholic cirrhosis of liver with 
  ascites* Comments:* spoke with patient at length regarding importance of f/u 
  with pulmonology and nephrology.  She does not seem to understand the severity
  of her disease, she may possibly be in denial.  She is certainly not ready to 
  talk about hospice or DNR.  I encouraged her to call both pulmonary and 
  nephrology today to make follow up appointments



* Follow up:* one month





* K76.7 Hepatorenal syndrome

* J91.8 Pleural effusion in other conditions classified elsewhere

* Z13.89 Encounter for screening for other disorder





Functional Status





                Functional Condition Comment         Date            Status

 

                Glasses         Reading                         Active

 

                Independent with all ADL's                                 Activ

e







Mental Status





                Mental Condition Comment         Date            Status

 

                None            poor reader                     Active







Referrals





                Refer to      Reason for Referral Status          Appt Date

 

                [facility] ANXIET[facility]         Closed          



 

                                        [address] [place] [address]

 

                                        [phone]

## 2021-01-16 NOTE — CCD
Continuity of Care Document (CCD)

                             Created on: 2020



Flora Taylor

External Reference #: [id number]

: 1960

Sex: Female



Demographics





                          Address                   [address] [place] [address]  [id number]

 

                          Home Phone                [phone]

 

                          Preferred Language        Unknown

 

                          Marital Status            Unknown

 

                          Yazdanism Affiliation     Unknown

 

                          Race                      White

 

                          Ethnic Group              Not  or 





Author





                          Author                    Flora DEJESUS D.O.

 

                          Organization              Unknown

 

                          [place]  [address]



 

                          Phone                     [phone]







Care Team Providers





                    Care Team Member Name Role                Phone

 

                    Meron Ramirez M.D. AUTM                [phone]







Problems





                    Active Problems     Provider            Date

 

                    Essential hypertension Alec Hoffman MD     Onset: 2015

 

                    Difficulty breathing Bull Dejesus D.O.    Onset: 2020

 

                    Pleural effusion, not elsewhere classified Bull Dejesus D.O. 

   Onset: 2020







Social History





                Type            Date            Description     Comments

 

                Birth Sex                       Unknown          

 

                ETOH Use                        Occasionally consumes alcohol  

 

                Recreational Drug Use                 Denies Drug Use  

 

                Tobacco Use     Reviewed: 20 Patient has never smoked  

 

                Smoking Status  Reviewed: 20 Patient has never smoked  

 

                Exercise Type/Frequency                 Occasional Mild Exercise

  







Allergies, Adverse Reactions, Alerts





             Active Allergies Reaction     Severity     Comments     Date

 

             Penicillin   Unknown Reaction                           2015







Medications





           Active Medications SIG        Qnty       Indications Ordering Provide

r Date

 

                    Prevnar 13                      Suspension                  

 one injection       

1units              J90                 Bull Dejesus D.O.    10/27/2020

 

                                        Fluticasone Propionate                  

   50mcg/Act Suspension                 

             2 sprays to each nostril daily 1bottle      472.0        Alec cohen MD 2015

 

                          Levothyroxine Sodium                     112mcg Tablet

s                   1 tab 

by mouth every day 30tabs                          Unknown         

 

                          Betaxolol HCL                     0.5% Solution       

            1 gtt both 

eyes twice a day                                 Unknown         

 

                          Lactulose                     10GM/15ML Solution      

             by mouth 

twice a day     1892ml                          Unknown         

 

                          Azelastine HCL (Nasal)                     0.1% Soluti

on                   2 

sprays intranasal every day 30ml                            Unknown         00



 

                          Spironolactone                     50mg Tablets       

            1 tab by mouth

twice a day     30tabs                          Unknown         

 

                          Alphagan P                     0.1% Solution          

         2 drop both eyes 

every day                                       Unknown         

 

                          Furosemide                     20mg Tablets           

        Take One Tablet By

Mouth Every Morning                                 Unknown         







Immunizations





             CPT Code     Status       Date         Vaccine      Lot #

 

             [id number]        Given        10/27/2020   Prevnar 13   [id number]

 

             [id number]        Given        10/01/2020   Afluria, Quadrivalent, 0.5ml

, NDC# 45637-935-94  







Vital Signs





                Date            Vital           Result          Comment

 

                2020  2:35pm BP Systolic     90 mmHg          

 

                    BP Diastolic        60 mmHg              

 

                    Heart Rate          94 /min              

 

                    O2 % BldC Oximetry  98 %                 

 

                    Body Temperature    99.1 F             

 

                    Height              66 inches           5'6"

 

                    Ideal Body Weight   130 lb               

 

                10/27/2020  9:30am BP Systolic     102 mmHg         

 

                    BP Diastolic        58 mmHg              

 

                    Heart Rate          106 /min             

 

                    O2 % BldC Oximetry  97 %                 

 

                    Body Temperature    96.2 F             

 

                    Height              66 inches           5'6"

 

                    Weight              171.00 lb            

 

                    BMI (Body Mass Index) 27.6 kg/m2           

 

                    Ideal Body Weight   130 lb               

 

                    Weight              77.566 kg            

 

                    BSA (Body Surface Area) 1.87 m2              







Results





        Test    Acquired Date Facility Test    Result  H/L     Range   Note

 

                    Laboratory test finding 2020          [facility]

                                        [address] [place] [address] [phone] Anaerobic Culture **************** <SEE NOTE>         

               1

 

                    Body Fluid Culture And GS 2020          [facility]

                                        [address] [place] [address] [phone] Gram Stain (SEE NOTE)  Normal                2

 

             Body Fluid Culture **************** <SEE NOTE>                     

       3

 

                    Laboratory test finding 2020          [facility]

                                        [address] [place] [address] [phone] Amylase Body Fluid <pending>                         

 

                    PH, Body Fluid      2020          [facility]

                                        [address], [place] [id number] [phone] PH Body Fluid > 7.800 units Normal     Not Established

  

 

             Source, Body Fluid pH PLEURAL      Normal                     

 

                    Amylase Body Fluid  2020          [facility]

nter Main Lab

                                        [address] [place] [address]

           [phone] Amylase, Body Fluid 31 U/L     Normal     Not Establis

hed  

 

             Source, Body Fluid Amylase PLEURAL      Normal                     

 

                    Glucose Body Fluid  2020          [facility]

                                        [address] [place] [address] [phone] Glucose, Body Fluid 131 mg/dL  Normal     Not Establis

hed  

 

             Source, Body Fluid Glucose PLEURAL      Normal                     

 

                    LDH Body Fluid      2020          [facility]

                                        [address] [place] [address] [phone] LDH, Body Fluid 51 U/L     Normal     Not Established 

 

 

             Source, Body Fluid LDH PLEURAL      Normal                     

 

                    TP Body Fluid       2020          [facility]

                                        [address] [place] [address] [phone] Total Protein, Body Fluid 0.7 g/dL   Normal     Not Es

tablished  

 

             Source, Body Fluid Tot Protein PLEURAL      Normal                 

    

 

                    Cell Count Pleural Fluid 2020          [facility]

                                        [address] [place] [address] [phone] Source, Body Fluid PLEURAL    Normal                 

 

             Pleural FL Color YELLOW       Normal       Colorless     

 

             Appearance, Body Fluid CLOUDY       Normal       Clear         

 

             WBC Body Fluid 114 /uL      High         0-10          

 

             RBC Body Fluid 4 10         Normal       <2            

 

             BF Mononuclear Cell % 91.2 %       High         0-0           

 

             BF Polymorphonuclear Cell % 8.8 %        High         0-0          

 

 

                    Prothrombin Time/Inr 2020          [facility]

enter Main Lab

                                        [address] [place] [address] [phone] Prothrombin Time 18.6 seconds High       12.5-14.3   

 

             Inr          1.52         Normal                    4

 

             Partial Thromboplastin Time 34.0 seconds Normal       24.2-38.5    

 

 

                    Coag Panel For Procedures 2020          [facility]

                                        [address] [place] [address] [phone] Platelet Count, Automated 113 10     Low        150-45

0     

 

             Partial Thromboplastin Time <pending>                              

 

 

                    Laboratory test finding 2020          [facility]

                                        [address] [place] [address] [phone] Non Gyn/Cytology Req For Servi (SEE NOTE)             

           5

 

                    Comprehensive Metabolic Profil 2020          [facility]

                                        [address], [place] [id number] [phone] Glucose, Fasting 88 mg/dL   Normal           

 

             Blood Urea Nitrogen 11 mg/dL     Normal       7-18          

 

             Creatinine For GFR 1.07 mg/dL   Normal       0.55-1.30     

 

             Glomerular Filtration Rate 55.7         Normal       >45          6

 

             Sodium Level 139 mEq/L    Normal       136-145       

 

             Potassium Serum 3.4 mEq/L    Low          3.5-5.1       

 

             Chloride Level 101 mEq/L    Normal               

 

             Carbon Dioxide Level 30 mEq/L     Normal       21-32         

 

             Anion Gap    8 mEq/L      Normal       8-16          

 

             Calcium Level 8.3 mg/dL    Low          8.8-10.2      

 

             Ast/Sgot     60 U/L       High         7-37          

 

             Alt/SGPT     31 U/L       Normal       12-78         

 

             Alkaline Phosphatase 187 U/L      High                 

 

             Bilirubin,Total 3.4 mg/dL    High         0.2-1.0       

 

             Total Protein 8.5 GM/DL    High         6.4-8.2       

 

             Albumin      2.6 GM/DL    Low          3.2-5.2       

 

             Albumin/Globulin Ratio 0.4          Low          1.2-2.2       

 

                    Laboratory test finding 2020          [facility]

                                        [address], [place] [id number] [phone] LDH Lactate Dehydrogenase 319 U/L    High       

     7

 

                    PT & Aptt           2020          [facility]

nter Main Lab

                                        [address] [place] [address] [phone] Prothrombin Time 18.2 seconds High       11.8-14.0   

 

             Inr          1.54         Normal                    8

 

             Partial Thromboplastin Time 35.6 seconds Normal       25.0-38.4    

 

 

                    CBC With Differential 2020          [facility]

                                        [address] [place] [address] [phone] White Blood Count 3.4 10     Low        4.0-10.0    

 

             Red Blood Count 3.95 10      Low          4.00-5.40     

 

             Hemoglobin   12.7 g/dL    Normal       12.0-15.5     

 

             Hematocrit   36.6 %       Normal       36.0-47.0     

 

             Mean Corpuscular Volume 92.7 fl      Normal       80.0-96.0     

 

             Mean Corpuscular Hemoglobin 32.2 pg      Normal       27.0-33.0    

 

 

             Mean Corpuscular HGB Conc 34.7 g/dL    Normal       32.0-36.5     

 

             Red Cell Distribution Width 13.6 %       Normal       11.5-14.5    

 

 

             Platelet Count, Automated 66 10        Low          150-450      9

 

             Neutrophils % 53.8 %       Normal       36.0-66.0     

 

             Lymph %      31.6 %       Normal       24.0-44.0     

 

             Mono %       10.4 %       High         0.0-5.0       

 

             Eos %        2.4 %        Normal       0.0-3.0       

 

             Baso %       1.5 %        High         0.0-1.0       

 

             Immature Granulocyte % 0.3 %        Normal       0-3.0         

 

             Nucleated Red Blood Cell % 0.0 %        Normal       0-0           

 

             Neutrophils # 1.8 10       Normal       1.5-8.5       

 

             Lymph #      1.1 10       Low          1.5-5.0       

 

             Mono #       0.4 10       Normal       0.0-0.8       

 

             Eos #        0.1 10       Normal       0.0-0.5       

 

             Baso #       0.1 10       Normal       0.0-0.2       

 

                    Laboratory test finding 2020          [facility]

                                        [address] [place], [place] [address] [phone] Immature Platelet Fraction 1.4 %      Normal     0.0-9

.59   10







                          1                         ****************************

*********************

If anaerobic or aerobic growth is detected within

the next 7-21 days, an addendum will follow.

                                        .***************************************

********.



FULL REPORT IN LAB NOTES (eCW and [facility]).

NO GROWTH ANAEROBICALLY





 

                          2                         FEW WBCS

NO ORGANISMS SEEN





 

                          3                         ****************************

*********************

If aerobic or anaerobic growth is detected within

the next 7-21 days, an addendum will follow.

                                        .***************************************

********.



FULL REPORT IN LAB NOTES (eCW and [facility]).

NO GROWTH AEROBICALLY





 

                          4                         THERAPUTIC HUMAN INR VALUES

INDICATIONS                      NORMAL RANGES

PROPHYLAXIS/TREATMENT OF:

VENOUS THROMBOSIS                2.0-3.0

PULMONARY EMBOLISM               2.0-3.0

PREVENTION OF SYSTEMIC EMBOLISM FROM:

TISSUE HEART VALVES              2.0-3.0

ACUTE MYOCARDIAL INFARCTION      2.0-3.0

VALVULAR HEART DISEASE           2.0-3.0

ATRIAL FIBRILLATION              2.0-3.0

MECHANICAL VALVES(HIGH RISK)     2.5-3.5

RECURRENT MYOCARDIAL INFARCTION  2.5-3.5



 

                          5                         SPECIMEN:            Pleural

 Fluid

                                        1400 ml yellow



SPECIMEN ADEQUACY:   Satisfactory for evaluation





CATEGORIZATION:      Negative for Malignancy



DESCRIPTIONS:        Specimen consists of reactive mesothelial cells

in a background of lymphocytes, macrophages,

and some neutrophils.







COMMENTS:













                                        2020 - 908



Signed________ SERENA KAUR (ASCP) 2020 0908 (Prelim)

Signed________ PAUL OWUSU MD 2020 1111



 

                          6                         Units are mL/min/1.73 m2



Chronic Kidney Disease Staging per NKF:



Stage I & II   GFR >=60       Normal to Mildly Decreased

Stage III      GFR 30-59      Moderately Decreased

Stage IV       GFR 15-29      Severely Decreased

Stage V        GFR <15        Very Little GFR Left

ESRD           GFR <15 on RRT



 

                          7                         By: SEARO

Time: 926

By: SEARO Time: 926



 

                          8                         THERAPUTIC HUMAN INR VALUES

INDICATIONS                      NORMAL RANGES

PROPHYLAXIS/TREATMENT OF:

VENOUS THROMBOSIS                2.0-3.0

PULMONARY EMBOLISM               2.0-3.0

PREVENTION OF SYSTEMIC EMBOLISM FROM:

TISSUE HEART VALVES              2.0-3.0

ACUTE MYOCARDIAL INFARCTION      2.0-3.0

VALVULAR HEART DISEASE           2.0-3.0

ATRIAL FIBRILLATION              2.0-3.0

MECHANICAL VALVES(HIGH RISK)     2.5-3.5

RECURRENT MYOCARDIAL INFARCTION  2.5-3.5



 

                          9                         Scan Verified machine result

s

PLATELET COUNT LESS THAN 100, AND NEW OCCURANCE OR





 

                          10                        By: SEARO

Time: 925









Procedures





                Date            Code            Description     Status

 

                2020      99890           Insertion Of Indwelling Tunneled

 Pleural Catheter W/Cuff 

Completed







Medical Devices





                                        Description

 

                                        No Information Available







Encounters





           Type       Date       Location   Provider   Dx         Diagnosis

 

             Office Visit 10/27/2020 10:00a [facility] Bull Se

ars, D.O. J90

                                        Pleural effusion, not elsewhere classifi

ed

 

                          R06.00                    Dyspnea, unspecified

 

                          Z23                       Encounter for immunization

 

                Office Visit    10/08/2020  1:23a [facility] R

adilia Wiseman M.D.

                          J90                       Pleural effusion, not elsewh

ere classified

 

                          K72.90                    Hepatic failure, unspecified

 without coma

 

                          K76.7                     Hepatorenal syndrome

 

                          N28.9                     Disorder of kidney and urete

r, unspecified

 

                Office Visit    10/07/2020  1:23a [facility] R

adilia Wiseman M.D.

                          J90                       Pleural effusion, not elsewh

ere classified

 

                          K72.90                    Hepatic failure, unspecified

 without coma

 

                          K76.7                     Hepatorenal syndrome

 

                          N28.9                     Disorder of kidney and urete

r, unspecified

 

                Office Visit    10/06/2020  1:23a [facility] R

adilia Wiseman M.D.

                          J90                       Pleural effusion, not elsewh

ere classified

 

                          K72.90                    Hepatic failure, unspecified

 without coma

 

                          K76.7                     Hepatorenal syndrome

 

                          N28.9                     Disorder of kidney and urete

r, unspecified

 

                Office Visit    10/03/2020  1:23a [facility] R

adilia Wiseman M.D.

                          J90                       Pleural effusion, not elsewh

ere classified

 

                          K72.90                    Hepatic failure, unspecified

 without coma

 

                          K76.7                     Hepatorenal syndrome

 

                          N28.9                     Disorder of kidney and urete

r, unspecified

 

                Office Visit    10/01/2020  1:23a [facility] R

adilia Wiseman M.D.

                          J90                       Pleural effusion, not elsewh

ere classified

 

                          K72.90                    Hepatic failure, unspecified

 without coma

 

                          K76.7                     Hepatorenal syndrome

 

                          E03.9                     Hypothyroidism, unspecified

 

             Office Visit 2020  1:30p [facility] Bull Se

ars, D.O. J90

                                        Pleural effusion, not elsewhere classifi

ed

 

                          R06.00                    Dyspnea, unspecified







Assessments





                Date            Code            Description     Provider

 

                10/27/2020      J90             Pleural effusion, not elsewhere 

classified Bull Sears, D.O.

 

                10/27/2020      R06.00          Dyspnea, unspecified Bull Sears,

 D.O.

 

                10/27/2020      Z23             Encounter for immunization Bull Dejesus D.O.

 

                10/08/2020      J90             Pleural effusion, not elsewhere 

classified Manas Wiseman M.D.

 

                10/08/2020      K72.90          Hepatic failure, unspecified wit

hout coma Manas Wiseman M.D.

 

                10/08/2020      K76.7           Hepatorenal syndrome Manas johnson M.D.

 

                10/08/2020      N28.9           Disorder of kidney and ureter, u

nspecified Manas Wiseman M.D.

 

                10/07/2020      J90             Pleural effusion, not elsewhere 

classified Manas Wiseman M.D.

 

                10/07/2020      K72.90          Hepatic failure, unspecified wit

hout coma Manas Wiseman M.D.

 

                10/07/2020      K76.7           Hepatorenal syndrome Manas johnson M.D.

 

                10/07/2020      N28.9           Disorder of kidney and ureter, u

nspecified Manas Wiseman M.D.

 

                10/06/2020      J90             Pleural effusion, not elsewhere 

classified Manas Wiseman M.D.

 

                10/06/2020      K72.90          Hepatic failure, unspecified wit

hout coma Manas Wiseman M.D.

 

                10/06/2020      K76.7           Hepatorenal syndrome Manas johnson M.D.

 

                10/06/2020      N28.9           Disorder of kidney and ureter, u

nspecified Manas Wiseman M.D.

 

                10/03/2020      J90             Pleural effusion, not elsewhere 

classified Manas Wiseman M.D.

 

                10/03/2020      K72.90          Hepatic failure, unspecified wit

hout ximena Wiseman M.D.

 

                10/03/2020      K76.7           Hepatorenal syndrome Manas johnson M.D.

 

                10/03/2020      N28.9           Disorder of kidney and ureter, u

nspecified Manas Wiseman M.D.

 

                10/01/2020      J90             Pleural effusion, not elsewhere 

classified Manas Wiseman M.D.

 

                10/01/2020      K72.90          Hepatic failure, unspecified wit

hout coma Manas Wiseman M.D.

 

                10/01/2020      K76.7           Hepatorenal syndrome Manas johnson M.D.

 

                10/01/2020      E03.9           Hypothyroidism, unspecified Declan Wiseman M.D.

 

                2020      J90             Pleural effusion, not elsewhere 

classified Manas Wiseman M.D.

 

                2020      J90             Pleural effusion, not elsewhere 

classified Bull Dejesus D.O.

 

                2020      R06.00          Dyspnea, unspecified Bull Dejesus D.O.







Plan of Treatment

Future Appointment(s):* 2021  3:00 pm - Bull Dejesus D.O. at [facility]

10/27/2020 - Bull Dejesus D.O.* J90 Pleural effusion, not elsewhere classified

* R06.00 Dyspnea, unspecified

* Z23 Encounter for immunization

* * New Medication:* Prevnar 13  



* Follow up:* Follow up in 2 months with cxr









Functional Status





                Functional Condition Comment         Date            Status

 

                Independent with all ADL's                                 Activ

e

 

                Independent with all IADL's                                 Acti

ve







Mental Status





                Mental Condition Comment         Date            Status

 

                None                                            Active

 

                Can understand information                                 Activ

e







Referrals





                                        Description

 

                                        No Information Available

## 2021-01-16 NOTE — CCD
Continuity of Care Document (CCD)

                             Created on: 2020



Flora Taylor

External Reference #: [id number]

: 1960

Sex: Female



Demographics





                          Address                   [address] [place] [address]  [address]

 

                          Home Phone                [phone]

 

                          Preferred Language        Unknown

 

                          Marital Status            Unknown

 

                          Spiritism Affiliation     Unknown

 

                          Race                      White

 

                          Ethnic Group              Not  or 





Author





                          Author                    Flora DEJESUS D.O.

 

                          Organization              Unknown

 

                          [place]  [address]



 

                          Phone                     [phone]







Care Team Providers





                    Care Team Member Name Role                Phone

 

                    Meron Ramirez M.D. AUTM                [phone]







Problems





                    Active Problems     Provider            Date

 

                    Essential hypertension Alec Hoffman MD     Onset: 2015

 

                    Difficulty breathing Bull Dejesus D.O.    Onset: 2020

 

                    Pleural effusion, not elsewhere classified Bull Dejesus D.O. 

   Onset: 2020







Social History





                Type            Date            Description     Comments

 

                Birth Sex                       Unknown          

 

                ETOH Use                        Occasionally consumes alcohol  

 

                Recreational Drug Use                 Denies Drug Use  

 

                Tobacco Use     Reviewed: 20 Patient has never smoked  

 

                Smoking Status  Reviewed: 20 Patient has never smoked  

 

                Exercise Type/Frequency                 Occasional Mild Exercise

  







Allergies, Adverse Reactions, Alerts





             Active Allergies Reaction     Severity     Comments     Date

 

             Penicillin   Unknown Reaction                           2015







Medications





           Active Medications SIG        Qnty       Indications Ordering Provide

r Date

 

                    Prevnar 13                      Suspension                  

 one injection       

1units              J90                 Bull Dejesus D.O.    10/27/2020

 

                                        Fluticasone Propionate                  

   50mcg/Act Suspension                 

             2 sprays to each nostril daily 1bottle      472.0        Alec cohen MD 2015

 

                          Levothyroxine Sodium                     112mcg Tablet

s                   1 tab 

by mouth every day 30tabs                          Unknown         

 

                          Betaxolol HCL                     0.5% Solution       

            1 gtt both 

eyes twice a day                                 Unknown         

 

                          Lactulose                     10GM/15ML Solution      

             by mouth 

twice a day     1892ml                          Unknown         

 

                          Azelastine HCL (Nasal)                     0.1% Soluti

on                   2 

sprays intranasal every day 30ml                            Unknown         00



 

                          Spironolactone                     50mg Tablets       

            1 tab by mouth

twice a day     30tabs                          Unknown         

 

                          Alphagan P                     0.1% Solution          

         2 drop both eyes 

every day                                       Unknown         

 

                          Furosemide                     20mg Tablets           

        Take One Tablet By

Mouth Every Morning                                 Unknown         







Immunizations





             CPT Code     Status       Date         Vaccine      Lot #

 

             [id number]        Given        10/27/2020   Prevnar 13   [id number]

 

             [id number]        Given        10/01/2020   Afluria, Quadrivalent, 0.5ml

, NDC# 72928-607-62  







Vital Signs





                Date            Vital           Result          Comment

 

                2020  2:35pm BP Systolic     90 mmHg          

 

                    BP Diastolic        60 mmHg              

 

                    Heart Rate          94 /min              

 

                    O2 % BldC Oximetry  98 %                 

 

                    Body Temperature    99.1 F             

 

                    Height              66 inches           5'6"

 

                    Ideal Body Weight   130 lb               

 

                10/27/2020  9:30am BP Systolic     102 mmHg         

 

                    BP Diastolic        58 mmHg              

 

                    Heart Rate          106 /min             

 

                    O2 % BldC Oximetry  97 %                 

 

                    Body Temperature    96.2 F             

 

                    Height              66 inches           5'6"

 

                    Weight              171.00 lb            

 

                    BMI (Body Mass Index) 27.6 kg/m2           

 

                    Ideal Body Weight   130 lb               

 

                    Weight              77.566 kg            

 

                    BSA (Body Surface Area) 1.87 m2              







Results





        Test    Acquired Date Facility Test    Result  H/L     Range   Note

 

                    Laboratory test finding 2020          [facility]

                                        [address] [place] [address] [phone] Non Gyn/Cytology Req For Servi (SEE NOTE)             

           1

 

                    Coag Panel For Procedures 2020          [facility]

                                        [address] [place] [address] [phone] Platelet Count, Automated 113 10     Low        150-45

0     

 

                    Prothrombin Time/Inr 2020          [facility]

enter [facility]

                                        [address], [place] [id number] [phone] Prothrombin Time 18.6 seconds High       12.5-14.3   

 

             Inr          1.52         Normal                    2

 

             Partial Thromboplastin Time 34.0 seconds Normal       24.2-38.5    

 

 

                    Cell Count Pleural Fluid 2020          [facility]

                                        [address], [place] [id number] [phone] Source, Body Fluid PLEURAL    Normal                 

 

             Pleural FL Color YELLOW       Normal       Colorless     

 

             Appearance, Body Fluid CLOUDY       Normal       Clear         

 

             WBC Body Fluid 114 /uL      High         0-10          

 

             RBC Body Fluid 4 10         Normal       <2            

 

             BF Mononuclear Cell % 91.2 %       High         0-0           

 

             BF Polymorphonuclear Cell % 8.8 %        High         0-0          

 

 

                    TP Body Fluid       2020          [facility]

                                        [address] [place] [address] [phone] Total Protein, Body Fluid 0.7 g/dL   Normal     Not Es

tablished  

 

             Source, Body Fluid Tot Protein PLEURAL      Normal                 

    

 

                    LDH Body Fluid      2020          [facility]

                                        [address] [place] [phone] LDH, Body Fluid 51 U/L     Normal     Not Established 

 

 

             Source, Body Fluid LDH PLEURAL      Normal                     

 

                    Glucose Body Fluid  2020          [facility]

                                        [address] [place] [address] [phone] Glucose, Body Fluid 131 mg/dL  Normal     Not Establis

hed  

 

             Source, Body Fluid Glucose PLEURAL      Normal                     

 

                    Amylase Body Fluid  2020          [address] [address] [phone] Amylase, Body Fluid 31 U/L     Normal     Not Establis

hed  

 

             Source, Body Fluid Amylase PLEURAL      Normal                     

 

                    PH, Body Fluid      2020          [facility]

                                        [address] [place] [address] [phone] PH Body Fluid > 7.800 units Normal     Not Established

  

 

             Source, Body Fluid pH PLEURAL      Normal                     

 

                    Body Fluid Culture And GS 2020          [facility]

                                        [address] [place] [address] [phone] Gram Stain (SEE NOTE)  Normal                3

 

             Body Fluid Culture **************** <SEE NOTE>                     

       4

 

                    Laboratory test finding 2020          [facility]

                                        [address] [place] [address] [phone] Anaerobic Culture **************** <SEE NOTE>         

               5







                          1                         SPECIMEN:            Pleural

 Fluid

                                        1400 ml yellow



SPECIMEN ADEQUACY:   Satisfactory for evaluation





CATEGORIZATION:      Negative for Malignancy



DESCRIPTIONS:        Specimen consists of reactive mesothelial cells

in a background of lymphocytes, macrophages,

and some neutrophils.







COMMENTS:













                                        2020 - 0908



Signed________ SERENA KAUR (ASCP) 2020 0908 (Prelim)

Signed________ PAUL OWUSU MD 2020 1111



 

                          2                         THERAPUTIC HUMAN INR VALUES

INDICATIONS                      NORMAL RANGES

PROPHYLAXIS/TREATMENT OF:

VENOUS THROMBOSIS                2.0-3.0

PULMONARY EMBOLISM               2.0-3.0

PREVENTION OF SYSTEMIC EMBOLISM FROM:

TISSUE HEART VALVES              2.0-3.0

ACUTE MYOCARDIAL INFARCTION      2.0-3.0

VALVULAR HEART DISEASE           2.0-3.0

ATRIAL FIBRILLATION              2.0-3.0

MECHANICAL VALVES(HIGH RISK)     2.5-3.5

RECURRENT MYOCARDIAL INFARCTION  2.5-3.5



 

                          3                         FEW WBCS

NO ORGANISMS SEEN





 

                          4                         ****************************

*********************

If aerobic or anaerobic growth is detected within

the next 7-21 days, an addendum will follow.

                                        .***************************************

********.



FULL REPORT IN LAB NOTES ([facility] and [facility]).

NO GROWTH AEROBICALLY





 

                          5                         ****************************

*********************

If anaerobic or aerobic growth is detected within

the next 7-21 days, an addendum will follow.

                                        .***************************************

********.



FULL REPORT IN LAB NOTES ([facility] and [facility]).

NO GROWTH ANAEROBICALLY











Procedures





                Date            Code            Description     Status

 

                2020      78056           Insertion Of Indwelling Tunneled

 Pleural Catheter W/Cuff 

Completed







Medical Devices





                                        Description

 

                                        No Information Available







Encounters





           Type       Date       Location   Provider   Dx         Diagnosis

 

             Office Visit 10/27/2020 10:00a [facility] Bull pina DDewayneO. J90

                                        Pleural effusion, not elsewhere classifi

ed

 

                          R06.00                    Dyspnea, unspecified

 

                          Z23                       Encounter for immunization

 

                Office Visit    10/08/2020  1:23a [facility] R

adilia Wiseman M.D.

                          J90                       Pleural effusion, not elsewh

ere classified

 

                          K72.90                    Hepatic failure, unspecified

 without coma

 

                          K76.7                     Hepatorenal syndrome

 

                          N28.9                     Disorder of kidney and urete

r, unspecified

 

                Office Visit    10/07/2020  1:23a [facility] R

adilia Wiseman M.D.

                          J90                       Pleural effusion, not elsewh

ere classified

 

                          K72.90                    Hepatic failure, unspecified

 without coma

 

                          K76.7                     Hepatorenal syndrome

 

                          N28.9                     Disorder of kidney and urete

r, unspecified

 

                Office Visit    10/06/2020  1:23a [facility] R

adilia Wiseman M.D.

                          J90                       Pleural effusion, not elsewh

ere classified

 

                          K72.90                    Hepatic failure, unspecified

 without coma

 

                          K76.7                     Hepatorenal syndrome

 

                          N28.9                     Disorder of kidney and urete

r, unspecified

 

                Office Visit    10/03/2020  1:23a [facility] R

adilia Wiseman M.D.

                          J90                       Pleural effusion, not elsewh

ere classified

 

                          K72.90                    Hepatic failure, unspecified

 without coma

 

                          K76.7                     Hepatorenal syndrome

 

                          N28.9                     Disorder of kidney and urete

r, unspecified

 

                Office Visit    10/01/2020  1:23a [facility] R

adilia Wiseman M.D.

                          J90                       Pleural effusion, not elsewh

ere classified

 

                          K72.90                    Hepatic failure, unspecified

 without coma

 

                          K76.7                     Hepatorenal syndrome

 

                          E03.9                     Hypothyroidism, unspecified

 

             Office Visit 2020  1:30p [facility] Bull pina D.O. J90

                                        Pleural effusion, not elsewhere classifi

ed

 

                          R06.00                    Dyspnea, unspecified







Assessments





                Date            Code            Description     Provider

 

                10/27/2020      J90             Pleural effusion, not elsewhere 

classified Bull Dejesus D.O.

 

                10/27/2020      R06.00          Dyspnea, unspecified Bull Dejesus,

 D.O.

 

                10/27/2020      Z23             Encounter for immunization Bull Dejesus D.O.

 

                10/08/2020      J90             Pleural effusion, not elsewhere 

classified Manas Wiseman M.D.

 

                10/08/2020      K72.90          Hepatic failure, unspecified wit

hout coma Manas Wiseman M.D.

 

                10/08/2020      K76.7           Hepatorenal syndrome Manas johnson M.D.

 

                10/08/2020      N28.9           Disorder of kidney and ureter, u

nspecified Manas Wiseman M.D.

 

                10/07/2020      J90             Pleural effusion, not elsewhere 

classified Manas Wiseman M.D.

 

                10/07/2020      K72.90          Hepatic failure, unspecified wit

hout coma Manas Wiseman M.D.

 

                10/07/2020      K76.7           Hepatorenal syndrome Manas johnson M.D.

 

                10/07/2020      N28.9           Disorder of kidney and ureter, u

nspecified Manas Wiseman M.D.

 

                10/06/2020      J90             Pleural effusion, not elsewhere 

classified Manas Wiseman M.D.

 

                10/06/2020      K72.90          Hepatic failure, unspecified wit

hout ximena Wiseman M.D.

 

                10/06/2020      K76.7           Hepatorenal syndrome Manas johnson M.D.

 

                10/06/2020      N28.9           Disorder of kidney and ureter, u

nspecified Manas Wiseman M.D.

 

                10/03/2020      J90             Pleural effusion, not elsewhere 

classified Manas Wiseman M.D.

 

                10/03/2020      K72.90          Hepatic failure, unspecified wit

hout coma Manas Wiseman M.D.

 

                10/03/2020      K76.7           Hepatorenal syndrome Manas johnson M.D.

 

                10/03/2020      N28.9           Disorder of kidney and ureter, u

nspecified Manas Wiseman M.D.

 

                10/01/2020      J90             Pleural effusion, not elsewhere 

classified Manas Wiseman M.D.

 

                10/01/2020      K72.90          Hepatic failure, unspecified wit

hout coma Manas Wiseman M.D.

 

                10/01/2020      K76.7           Hepatorenal syndrome Manas johnson M.D.

 

                10/01/2020      E03.9           Hypothyroidism, unspecified Declan Wiseman M.D.

 

                2020      J90             Pleural effusion, not elsewhere 

classified Manas Wiseman M.D.

 

                2020      J90             Pleural effusion, not elsewhere 

classified Bull Dejesus D.O.

 

                2020      R06.00          Dyspnea, unspecified Bull Dejesus D.O.







Plan of Treatment

Future Appointment(s):* 2021  3:00 pm - Bull Dejesus D.O. at [facility]

10/27/2020 - Bull Dejesus D.O.* J90 Pleural effusion, not elsewhere classified

* R06.00 Dyspnea, unspecified

* Z23 Encounter for immunization

* * New Medication:* Prevnar 13  



* Follow up:* Follow up in 2 months with cxr









Functional Status





                Functional Condition Comment         Date            Status

 

                Independent with all ADL's                                 Activ

e

 

                Independent with all IADL's                                 Acti

ve







Mental Status





                Mental Condition Comment         Date            Status

 

                None                                            Active

 

                Can understand information                                 Activ

e







Referrals





                                        Description

 

                                        No Information Available

## 2021-01-16 NOTE — CCD
Continuity of Care Document (CCD)

                             Created on: 2020



Flora Taylor

External Reference #: [id number]

: 1960

Sex: Female



Demographics





                          Address                   [address] [place] [address]  [address]

 

                          Home Phone                [phone]

 

                          Preferred Language        Unknown

 

                          Marital Status            Unknown

 

                          Mandaen Affiliation     Catholic (Non-Roman Catholic, Non

-Specific)

 

                          Race                      White

 

                          Ethnic Group              Not  or 





Author





                          Author                    Flora DAN M.D.

 

                          Organization              Unknown

 

                          Address                   [address]

[place]  [address]



 

                          Phone                     [phone]







Care Team Providers





                    Care Team Member Name Role                Phone

 

                    [facility] - Gastro[facility]

TM                [phone]

 

                    [facility]         AUTM                [phone]

 

                    [facility] AUTM               

 [phone]

 

                    [facility] - [facility] Health AUTM            

    [phone]







Problems





                    Active Problems     Provider            Date

 

                    Essential hypertension Gabe Corral M.D. Onset: 

 

                    Hypothyroidism      Gabe Corral M.D. Onset: 2011

 

                    Vitamin D deficiency Gabe Corral M.D. Onset: 

 

                    Neck pain           Gabe Corral M.D. Onset: 2011

 

                    Generalized anxiety disorder Gabe Corral M.D. Onse

t: 2011

 

                    Allergic rhinitis   Gabe Corral M.D. Onset: 2012

 

                    Degeneration of lumbar intervertebral disc Benigno Corral M.D. Onset: 

2013

 

                    Alcoholic cirrhosis Gabe Corral M.D. Onset: 2018

 

                    Esophageal varices without bleeding Gabe Corral M. D. Onset: 2018







Social History





                Type            Date            Description     Comments

 

                Birth Sex                       Unknown          

 

                Tobacco Use     Start: Unknown  Never Smoked Cigarettes  

 

                Tobacco Use     Start: Unknown  Never Used Smokeless Tobacco  

 

                ETOH Use                        Consumed 4 Alcoholic Beverages P

er Day  

 

                Tobacco Use     Start: Unknown  Patient has never smoked  

 

                Recreational Drug Use                 Denies Drug Use  

 

                Smoking Status  Reviewed: 10/26/20 Patient has never smoked  

 

                Exercise Type/Frequency                 Exercises sporadically  

 

                Tattoo/Piercing                 Tattoo          3 

 

                Tattoo/Piercing                 Pierced ears    x2 

 

                Sun Exposure                    Minimum amount of sun exposure  

 

                Sun Exposure                    Uses sunscreen  Occasionally 

 

                Seat Belt/Car Seat                 Always uses seat belt  

 

                Bike Helmet                     Never           Does not bike ri

de. 

 

                Smoke Alarms                    Yes              

 

                Smoke Alarms                    Carbon Monoxide Detector: Yes  







Allergies, Adverse Reactions, Alerts





             Active Allergies Reaction     Severity     Comments     Date

 

             Penicillin                                          2004

 

             Cefdinir     Difficulty swallowing, Itching, throat felt itchy. Sev

ere                    2019







Medications





           Active Medications SIG        Qnty       Indications Ordering Provide

r Date

 

                          Sertraline HCL                     50mg Tablets       

            1 by mouth 

every day       30tabs          F32.1           Gayla Ya FNP 10/26/2020

 

                          Hydroxyzine HCL                     25mg Tablets      

             1 -2 tab by 

mouth as needed before bed 30tabs          F32.1           Gayla Ya FNP 

10/26/2020

 

                          Mucinex                     600mg Tablets ER 12HR     

              1 by mouth 

twice a day                                     Unknown         10/08/2020

 

                          Midodrine HCL                     5mg Tablets         

          1 tablet three 

times a day ( 8 am, 12 n,4 pm) 90tabs                          Gayal Ya FNP 10/08/2020

 

                          Torsemide                     20mg Tablets            

       take 1 tablet twice

a day (9 Am/5 PM)                                 Unknown         10/08/2020

 

                                        Lactulose Encephalopathy                

     10GM/15ML Solution                 

             take 30ml by mouth three times a day 2700units                 Gayla Araujo FNP 

2020

 

                          Thiamine HCL                     100mg Tablets        

           1 by mouth 

every day                                       Unknown         2020

 

                                        Fluticasone Propionate Nasal Spray      

               50mcg/Act Suspension     

             2 sprays each nostril once a day                           Unknown 

     2019

 

                          Latanoprost                     0.005% Solution       

            one drop in 

each eye daily at at bedtime                                 Unknown         

 

                          Betaxolol HCL                     0.5% Solution       

            1 drop each 

eye twice a day.                                 Unknown         2019

 

                          Brimonidine Tartrate                     0.2% Solution

                   one 

gtt. each eye twice a day                                 Unknown         2019

 

                          Vitamin D                     1000Unit Tablets        

           1 by mouth 

every day       100tabs                         Meron Dan M.D. 

019

 

                          Levothyroxine Sodium                     112mcg Tablet

s                   1 by 

mouth every day 90tabs          E03.9           Meron Dan M.D. 

018

 

                                        Wrist Splint/Cock-Up/Left/Canvas/Medium 

                     Misc               

                dx: carpal tunnel syndrome, duration 12 months, prognosis good 1

units                          

Gabe Corral M.D.              2018

 

                                        Wrist Splint/Cock-Up/Right/Canvas/Medium

                      Misc              

                                        wear splint every night for carpal tunne

l syndrome, prognosis good, duration

99 months       1units                          Gabe Corral M.D. 

 

                                        Levocetirizine Dihydrochloride          

           5mg Tablets                  

                1 tab by mouth every evening for allergies 90tabs          J30.9

           Meron Dan M.D.

                                        2015

 

                          Ketotifen Fumarate                     0.025% Solution

                   1 drop 

both eyes twice a day for allergy symptoms 5ml                             Unkno

wn         

 

                          Folic Acid                     1mg Tablets            

       1 by mouth every 

day             90tabs                          Meron Dan M.D. 

000

 

           Multivitamin Adult                      Tablets                      

                              Unknown    



 

                                        History Medications

 

                          Ciprofloxacin HCL                     250mg Tablets   

                1 tab by 

mouth once a day at 6 Am                                 Unknown         10/08/2

020 - 10/22/2020

 

                          Spironolactone                     50mg Tablets       

            take one 

tablet by mouth twice a day  (9 Am/5 PM)                                 Genie Tabares PA-C 10/08/2020 - 

2020







Immunizations





             CPT Code     Status       Date         Vaccine      Lot #

 

             [id number]        Given        2019   Pneumococcal Vaccine [id number]

 

                [id number]           Given           10/29/2018      Influenza Virus 

Vaccine, Quadrivalent,age 3 and 

up,multidose vial                       [id number]

 

             82660        Given        2017   Influenza Vaccination  

 

             17287        Given        2015   Influenza Vaccination  

 

             88555        Given        2013   Influenza Vaccination  

 

             39548        Given        04/15/2013   Zostavax      

 

             54743        Given        2012   Influenza Vaccination [id number]

C

 

             58694        Given        10/31/2011   Influenza Vaccination/preser

vative free [id number]

 

             16439        Given        2010   Adacel 11 Yrs or older 

BA

 

             87120        Given        10/04/2010   Influenza Vaccination [id number]

A

 

             01629        Given        2009   Influenza Vaccination [id number]

A

 

             32112        Given        2005   Tetnus Toxoid Im Or Jet Inje

ction Use  







Vital Signs





                Date            Vital           Result          Comment

 

                10/26/2020  3:20pm BP Systolic     105 mmHg         

 

                    BP Diastolic        63 mmHg              

 

                    Heart Rate          108 /min             

 

                    Body Temperature    97.4 F             

 

                    Respiratory Rate    20 /min              

 

                    Height              67.0 inches         5'7"

 

                    Weight              169.50 lb            

 

                    O2 % BldC Oximetry  98 %                 

 

                    Peak Expiratory Flow Rate 364                 Estimated Peak

 Flow Rate

 

                    Ideal Body Weight   135 lb               

 

                    BMI (Body Mass Index) 26.5 kg/m2           

 

                10/19/2020  3:07pm BP Systolic     114 mmHg         

 

                    BP Diastolic        71 mmHg              

 

                    Heart Rate          101 /min             

 

                    Body Temperature    97.3 F             

 

                    Respiratory Rate    18 /min              

 

                    Height              67.0 inches         5'7"

 

                    Weight              166.12 lb            

 

                    O2 % BldC Oximetry  100 %                

 

                    Peak Expiratory Flow Rate 364                 Estimated Peak

 Flow Rate

 

                    Ideal Body Weight   135 lb               

 

                    BMI (Body Mass Index) 26.0 kg/m2           







Results





        Test    Acquired Date Facility Test    Result  H/L     Range   Note

 

                    CBC With Differential 2020          [place] [address] [phone] White Blood Count 5.5 10     Normal     4.0-10.0    

 

             Red Blood Count 3.39 10      Low          4.00-5.40     

 

             Hemoglobin   10.8 g/dL    Low          12.0-15.5     

 

             Hematocrit   32.6 %       Low          36.0-47.0     

 

             Mean Corpuscular Volume 96.2 fl      High         80.0-96.0     

 

             Mean Corpuscular Hemoglobin 31.9 pg      Normal       27.0-33.0    

 

 

             Mean Corpuscular HGB Conc 33.1 g/dL    Normal       32.0-36.5     

 

             Red Cell Distribution Width 13.8 %       Normal       11.5-14.5    

 

 

             Platelet Count, Automated 124 10       Low          150-450       

 

             Neutrophils % 73.3 %       High         36.0-66.0     

 

             Lymph %      13.9 %       Low          24.0-44.0     

 

             Mono %       9.9 %        High         0.0-5.0       

 

             Eos %        1.3 %        Normal       0.0-3.0       

 

             Baso %       0.9 %        Normal       0.0-1.0       

 

             Immature Granulocyte % 0.7 %        Normal       0-3.0         

 

             Nucleated Red Blood Cell % 0.0 %        Normal       0-0           

 

             Neutrophils # 4.1 10       Normal       1.5-8.5       

 

             Lymph #      0.8 10       Low          1.5-5.0       

 

             Mono #       0.6 10       Normal       0.0-0.8       

 

             Eos #        0.1 10       Normal       0.0-0.5       

 

             Baso #       0.1 10       Normal       0.0-0.2       

 

                    PT & Aptt           2020          [place] [address] [phone] Prothrombin Time 17.5 seconds High       12.5-14.3   

 

             Inr          1.40         Normal                    1

 

             Partial Thromboplastin Time 35.4 seconds Normal       24.2-38.5    

 

 

                    Liver Profile       2020          [place] [address] [phone] Ast/Sgot   43 U/L     High       7-37        

 

             Alt/SGPT     38 U/L       Normal       12-78         

 

             Alkaline Phosphatase 155 U/L      High                 

 

             Bilirubin,Total 3.1 mg/dL    High         0.2-1.0       

 

             Bilirubin,Direct 2.0 mg/dL    High         0.0-0.2       

 

             Total Protein 6.3 GM/DL    Low          6.4-8.2       

 

             Albumin      2.0 GM/DL    Low          3.2-5.2       

 

             Albumin/Globulin Ratio 0.5          Low          1.2-2.2       

 

                    Basic Metabolic Profile 2020          Patient [place] 

[place] [address] [phone] Glucose, Fasting 169 mg/dL  High             

 

             Blood Urea Nitrogen 71 mg/dL     High         7-18          

 

             Creatinine For GFR 2.38 mg/dL   High         0.55-1.30     

 

             Glomerular Filtration Rate 22.1         Low          >45          2

 

             Sodium Level 131 mEq/L    Low          136-145       

 

             Potassium Serum 4.7 mEq/L    Normal       3.5-5.1       

 

             Chloride Level 99 mEq/L     Normal               

 

             Carbon Dioxide Level 21 mEq/L     Normal       21-32         

 

             Anion Gap    11 mEq/L     Normal       8-16          

 

             Calcium Level 8.7 mg/dL    Low          8.8-10.2      

 

                    Laboratory test finding 2020          Patient [place] 

[place] [address] [phone] Ethyl Alcohol (Ethanol) < 0.003 %  Normal     0.000-0.

010  

 

                    Comprehensive Metabolic Profil 2020          [facility] [address] [phone] Glucose, Fasting 118 mg/dL  High             

 

             Blood Urea Nitrogen 69 mg/dL     High         7-18          

 

             Creatinine For GFR 2.49 mg/dL   High         0.55-1.30     

 

             Glomerular Filtration Rate 21.0         Low          >45          3

 

             Sodium Level 128 mEq/L    Low          136-145       

 

             Potassium Serum 4.2 mEq/L    Normal       3.5-5.1       

 

             Chloride Level 92 mEq/L     Low                  

 

             Carbon Dioxide Level 25 mEq/L     Normal       21-32         

 

             Anion Gap    11 mEq/L     Normal       8-16          

 

             Calcium Level 8.8 mg/dL    Normal       8.8-10.2      

 

             Ast/Sgot     45 U/L       High         7-37          

 

             Alt/SGPT     44 U/L       Normal       12-78         

 

             Alkaline Phosphatase 186 U/L      High                 

 

             Bilirubin,Total 4.2 mg/dL    High         0.2-1.0       

 

             Total Protein 6.9 GM/DL    Normal       6.4-8.2       

 

             Albumin      2.2 GM/DL    Low          3.2-5.2       

 

             Albumin/Globulin Ratio 0.5          Low          1.2-2.2       

 

                    Laboratory test finding 2020          Patient Service 

[place] [address]

           [phone] Anaerobic Culture **************** <SEE NOTE>         

               4

 

                    Body Fluid Culture And GS 2020          Patient [place]

[place] [address]

           [phone] Gram Stain (SEE NOTE)  Normal                5

 

             Body Fluid Culture **************** <SEE NOTE>                     

       6

 

                    PH, Body Fluid      2020          [place] [address]

           [phone] PH Body Fluid > 7.800 units Normal     Not Established

  

 

             Source, Body Fluid pH PLEURAL      Normal                     

 

                    Amylase Body Fluid  2020          [place] [address]

           [phone] Amylase, Body Fluid 31 U/L     Normal     Not Establis

hed  

 

             Source, Body Fluid Amylase PLEURAL      Normal                     

 

                    Glucose Body Fluid  2020          Patient Service [place], [place] [id number] [phone] Glucose, Body Fluid 131 mg/dL  Normal     Not Establis

hed  

 

             Source, Body Fluid Glucose PLEURAL      Normal                     

 

                    LDH Body Fluid      2020          [place] [address]

           [phone] LDH, Body Fluid 51 U/L     Normal     Not Established 

 

 

             Source, Body Fluid LDH PLEURAL      Normal                     

 

                    TP Body Fluid       2020          [place] [address] [phone] Total Protein, Body Fluid 0.7 g/dL   Normal     Not Es

tablished  

 

             Source, Body Fluid Tot Protein PLEURAL      Normal                 

    

 

                    Cell Count Pleural Fluid 2020          Patient [place]

 [place] [address] [phone] Source, Body Fluid PLEURAL    Normal                 

 

             Pleural FL Color YELLOW       Normal       Colorless     

 

             Appearance, Body Fluid CLOUDY       Normal       Clear         

 

             WBC Body Fluid 114 /uL      High         0-10          

 

             RBC Body Fluid 4 10         Normal       <2            

 

             BF Mononuclear Cell % 91.2 %       High         0-0           

 

             BF Polymorphonuclear Cell % 8.8 %        High         0-0          

 

 

                    PT & Aptt           2020          [place] [address] [phone] Prothrombin Time 18.6 seconds High       12.5-14.3   

 

             Inr          1.52         Normal                    7

 

             Partial Thromboplastin Time 34.0 seconds Normal       24.2-38.5    

 

 

                    Laboratory test finding 2020          Patient [place] 

[place] [address] [phone] Platelet Count, Automated 113 10     Low        150-45

0     

 

                    CBC With Differential 2020          [place] [address] [phone] White Blood Count 7.9 10     Normal     4.0-10.0    

 

             Red Blood Count 3.31 10      Low          4.00-5.40     

 

             Hemoglobin   11.7 g/dL    Low          12.0-15.5     

 

             Hematocrit   34.8 %       Low          36.0-47.0     

 

             Mean Corpuscular Volume 105.1 fl     High         80.0-96.0     

 

             Mean Corpuscular Hemoglobin 35.3 pg      High         27.0-33.0    

 

 

             Mean Corpuscular HGB Conc 33.6 g/dL    Normal       32.0-36.5     

 

             Red Cell Distribution Width 14.6 %       High         11.5-14.5    

 

 

             Platelet Count, Automated 96 10        Low          150-450       

 

             Nucleated Red Blood Cell % 0.0 %        Normal       0-0           

 

                    Differential        2020          Patient Service [facility] [address] [phone] Neutrophils 70 %       High       28-66       

 

             Lymphocytes  14 %         Low          16-44         

 

             Monocytes    8 %          High         0-5           

 

             Eosinophils  5 %          High         0-3           

 

             Myelocytes   1 %          High         0-0           

 

             Atypical Lymph 2 %          Normal       0-5           

 

             Anisocytosis 1+           Normal                     

 

                    Laboratory test finding 2020          [place] 

[place] [address] [phone] Platelet Estimate DECREASED  Normal     Normal      

 

             Immature Platelet Fraction 1.3 %        Normal       0.0-9.59      

 

                    Cardiac Marker Panel 2020          Patient Service [facility]

[place]

[place] [address] [phone] CPK Creatine Phosphokinase 50 U/L     Normal     26-19

2      

 

             CK-MB Value Mass < 1.0 NG/ML  Normal       <3.6          

 

             MB/CK Relative Index 2.00         Normal       < Or =4      8

 

             Troponin I   < 0.02 NG/ML Normal       < 0.10       9

 

                    Liver Profile       2020          [place] [address] [phone] Ast/Sgot   80 U/L     High       7-37        

 

             Alt/SGPT     51 U/L       Normal       12-78         

 

             Alkaline Phosphatase 136 U/L      High                 

 

             Bilirubin,Total 6.8 mg/dL    High         0.2-1.0       

 

             Bilirubin,Direct 4.4 mg/dL    High         0.0-0.2       

 

             Total Protein 7.9 GM/DL    Normal       6.4-8.2       

 

             Albumin      2.0 GM/DL    Low          3.2-5.2       

 

             Albumin/Globulin Ratio 0.3          Low          1.2-2.2       

 

                    Basic Metabolic Profile 2020          Patient [place] 

[place] [address] [phone] Glucose, Fasting 83 mg/dL   Normal           

 

             Blood Urea Nitrogen 24 mg/dL     High         7-18          

 

             Creatinine For GFR 1.38 mg/dL   High         0.55-1.30     

 

             Glomerular Filtration Rate 41.5         Low          >45          1

0

 

             Sodium Level 130 mEq/L    Low          136-145       

 

             Potassium Serum 3.4 mEq/L    Low          3.5-5.1       

 

             Chloride Level 96 mEq/L     Low                  

 

             Carbon Dioxide Level 23 mEq/L     Normal       21-32         

 

             Anion Gap    11 mEq/L     Normal       8-16          

 

             Calcium Level 8.6 mg/dL    Low          8.8-10.2      

 

                    Laboratory test finding 2020          Patient Service 

Center

[place], [place] [id number] [phone] NT-Pro  pg/mL  Normal     <125        

 

             Magnesium Level 2.2 mg/dL    Normal       1.8-2.4       

 

                    PT & Aptt           2020          [place] [address] [phone] Prothrombin Time 19.4 seconds High       12.5-14.3   

 

             Inr          1.60         Normal                    11

 

             Partial Thromboplastin Time 32.6 seconds Normal       24.2-38.5    

 







                          1                         THERAPUTIC HUMAN INR VALUES

INDICATIONS                      NORMAL RANGES

PROPHYLAXIS/TREATMENT OF:

VENOUS THROMBOSIS                2.0-3.0

PULMONARY EMBOLISM               2.0-3.0

PREVENTION OF SYSTEMIC EMBOLISM FROM:

TISSUE HEART VALVES              2.0-3.0

ACUTE MYOCARDIAL INFARCTION      2.0-3.0

VALVULAR HEART DISEASE           2.0-3.0

ATRIAL FIBRILLATION              2.0-3.0

MECHANICAL VALVES(HIGH RISK)     2.5-3.5

RECURRENT MYOCARDIAL INFARCTION  2.5-3.5



 

                          2                         Units are mL/min/1.73 m2



Chronic Kidney Disease Staging per NK[facility]:



Stage I & II   GFR >=60       Normal to Mildly Decreased

Stage III      GFR 30-59      Moderately Decreased

Stage IV       GFR 15-29      Severely Decreased

Stage V        GFR <15        Very Little GFR Left

ESRD           GFR <15 on RRT



 

                          3                         Units are mL/min/1.73 m2



Chronic Kidney Disease Staging per NKF:



Stage I & II   GFR >=60       Normal to Mildly Decreased

Stage III      GFR 30-59      Moderately Decreased

Stage IV       GFR 15-29      Severely Decreased

Stage V        GFR <15        Very Little GFR Left

ESRD           GFR <15 on RRT



 

                          4                         ****************************

*********************

If anaerobic or aerobic growth is detected within

the next 7-21 days, an addendum will follow.

                                        .***************************************

********.



FULL REPORT IN LAB NOTES ([facility] and [facility]).

NO GROWTH ANAEROBICALLY





 

                          5                         FEW WBCS

NO ORGANISMS SEEN





 

                          6                         ****************************

*********************

If aerobic or anaerobic growth is detected within

the next 7-21 days, an addendum will follow.

                                        .***************************************

********.



FULL REPORT IN LAB NOTES ([facility] and [facility]).

NO GROWTH AEROBICALLY





 

                          7                         THERAPUTIC HUMAN INR VALUES

INDICATIONS                      NORMAL RANGES

PROPHYLAXIS/TREATMENT OF:

VENOUS THROMBOSIS                2.0-3.0

PULMONARY EMBOLISM               2.0-3.0

PREVENTION OF SYSTEMIC EMBOLISM FROM:

TISSUE HEART VALVES              2.0-3.0

ACUTE MYOCARDIAL INFARCTION      2.0-3.0

VALVULAR HEART DISEASE           2.0-3.0

ATRIAL FIBRILLATION              2.0-3.0

MECHANICAL VALVES(HIGH RISK)     2.5-3.5

RECURRENT MYOCARDIAL INFARCTION  2.5-3.5



 

                          8                         DIAGNOSIS CRITERIA

MMB ng/ml       Relative Index (RI)

NON-AMI               < or = 5               N/A

GRAY ZONE              > 5                < or = 4

AMI                    > 5                   > 4



 

                          9                         Troponin I Reference Interva

l for [employer] LOCI:



                                        99th Percentile= 0.00-0.045 ng/ml



Risk Stratification:

<= 0.10 ng/ml   Decreased Risk for Adverse Clinical

Events.

                                        0.10-1.50 ng/ml   Increased Risk for Adv

erse Clinical

Events. Evaluation of additional

criterion and/or repeat testing in 2-6

hours is suggested to rule out myocardial

damage.

>= 1.50 ng/ml   Indicative of Myocardial Injury.



 

                          10                        Units are mL/min/1.73 m2



Chronic Kidney Disease Staging per NK[facility]:



Stage I & II   GFR >=60       Normal to Mildly Decreased

Stage III      GFR 30-59      Moderately Decreased

Stage IV       GFR 15-29      Severely Decreased

Stage V        GFR <15        Very Little GFR Left

ESRD           GFR <15 on RRT



 

                          11                        THERAPUTIC HUMAN INR VALUES

INDICATIONS                      NORMAL RANGES

PROPHYLAXIS/TREATMENT OF:

VENOUS THROMBOSIS                2.0-3.0

PULMONARY EMBOLISM               2.0-3.0

PREVENTION OF SYSTEMIC EMBOLISM FROM:

TISSUE HEART VALVES              2.0-3.0

ACUTE MYOCARDIAL INFARCTION      2.0-3.0

VALVULAR HEART DISEASE           2.0-3.0

ATRIAL FIBRILLATION              2.0-3.0

MECHANICAL VALVES(HIGH RISK)     2.5-3.5

RECURRENT MYOCARDIAL INFARCTION  2.5-3.5









Procedures





                Date            Code            Description     Status

 

                2018      [id number]        Mammogram       Completed

 

                2016      [id number]        Mammogram       Completed

 

                2015      [id number]        Mammogram       Completed

 

                2014      [id number]        Mammogram       Completed







Medical Devices





                                        Description

 

                                        No Information Available







Encounters





           Type       Date       Location   Provider   Dx         Diagnosis

 

           Office Visit 10/26/2020  3:15p Main Office PleGayla alonso, FNP K70.3

1     Alcoholic

cirrhosis of liver with ascites

 

                          K76.7                     Hepatorenal syndrome

 

                          J91.8                     Pleural effusion in other co

nditions classified elsewhere

 

                          E03.9                     Hypothyroidism, unspecified

 

                          I10                       Essential (primary) hyperten

nikolay

 

                          F32.1                     Major depressive disorder, s

zakia episode, moderate

 

           Office Visit 10/19/2020  2:45p Main Office PleskachGayla, FNP K70.3

1     Alcoholic

cirrhosis of liver with ascites

 

                          K76.7                     Hepatorenal syndrome

 

                          J91.8                     Pleural effusion in other co

nditions classified elsewhere

 

                          E03.9                     Hypothyroidism, unspecified

 

                          I10                       Essential (primary) hyperten

nikolay







Assessments





                Date            Code            Description     Provider

 

                10/26/2020      K70.31          Alcoholic cirrhosis of liver wit

h ascites PleGayla alonso, FNP

 

                10/26/2020      K76.7           Hepatorenal syndrome Plegavin M

neisha, P

 

                10/26/2020      J91.8           Pleural effusion in other condit

ions classified elsewhere 

PleGayla alonso, FNP

 

                10/26/2020      E03.9           Hypothyroidism, unspecified Ples

kaGayla smith, FNP

 

                10/26/2020      I10             Essential (primary) hypertension

 Pleskach Gayla, FNP

 

                10/26/2020      F32.1           Major depressive disorder, singl

e episode, moderate Pleskfabrizio 

Gayla, FNP

 

                10/19/2020      K70.31          Alcoholic cirrhosis of liver wit

h ascites Pleskach Gayla, FNP

 

                10/19/2020      K76.7           Hepatorenal syndrome Pleskach, M

neisha, FNP

 

                10/19/2020      J91.8           Pleural effusion in other condit

ions classified elsewhere 

Plegavin Gayla, FNP

 

                10/19/2020      E03.9           Hypothyroidism, unspecified Ples

Gayla arnold FNP

 

                10/19/2020      I10             Essential (primary) hypertension

 Gayla Ya FNP







Plan of Treatment

10/26/2020 - Gayla Ya FNP* K70.31 Alcoholic cirrhosis of liver with 
  ascites

* K76.7 Hepatorenal syndrome

* J91.8 Pleural effusion in other conditions classified elsewhere

* E03.9 Hypothyroidism, unspecified* Comments:* continue levothyroxine





* I10 Essential (primary) hypertension* Comments:* controlled, continue current 
  medications





* F32.1 Major depressive disorder, single episode, moderate* New Medication:* 
  Sertraline HCL 50 mg - 1 by mouth every day

* Hydroxyzine HCL 25 mg - 1 -2 tab by mouth as needed before bed



* Follow up:* one month



* Recommendations:* take one half tablet of sertraline daily for one week then a
  whole tablet daily.









Functional Status





                Functional Condition Comment         Date            Status

 

                Glasses         Reading                         Active

 

                Independent with all ADL's                                 Activ

e







Mental Status





                Mental Condition Comment         Date            Status

 

                None            poor reader                     Active







Referrals





                Refer to      Reason for Referral Status          Appt Date

 

                [facility]         Sent            



 

                                        [address] [place] [address]

 

                                        [phone]

## 2021-01-16 NOTE — CCD
Continuity of Care Document (CCD)

                             Created on: 2020



[facility]

External Reference #: [id number]

: 1960

Sex: Female



Demographics





                          Address                   [address] [place]  [address]

 

                          Home Phone                [phone]

 

                          Preferred Language        Unknown

 

                          Marital Status            Unknown

 

                          Islam Affiliation     Islam (Non-Sikh, Non

-Specific)

 

                          Race                      White

 

                          Ethnic Group              Not  or 





Author





                          Organization              Unknown

 

                          Address                   Unknown

 

                          Phone                     Unavailable







Care Team Providers





                    Care Team Member Name Role                Phone

 

                    [facility] - Gastro[facility]

TM                [phone]

 

                    [facility]         AUTM                [phone]

 

                    [facility]               

 [phone]

 

                    [facility] - [facility] Health AUTM            

    [phone]







Problems





                    Active Problems     Provider            Date

 

                    Essential hypertension Gabe Corral M.D. Onset: 

 

                    Hypothyroidism      Gabe Corral M.D. Onset: 2011

 

                    Vitamin D deficiency Gabe Corral M.D. Onset: 

 

                    Neck pain           Gabe Corral M.D. Onset: 2011

 

                    Generalized anxiety disorder Gabe Corral M.D. Onse

t: 2011

 

                    Allergic rhinitis   Gabe Corral M.D. Onset: 2012

 

                    Degeneration of lumbar intervertebral disc Benigno Corral M.D. Onset: 

2013

 

                    Alcoholic cirrhosis Gabe Corral M.D. Onset: 2018

 

                    Esophageal varices without bleeding Gabe Corral M. D. Onset: 2018







Social History





                Type            Date            Description     Comments

 

                Birth Sex                       Unknown          

 

                Tobacco Use     Start: Unknown  Never Smoked Cigarettes  

 

                Tobacco Use     Start: Unknown  Never Used Smokeless Tobacco  

 

                ETOH Use                        Consumed 4 Alcoholic Beverages P

er Day  

 

                Tobacco Use     Start: Unknown  Patient has never smoked  

 

                Recreational Drug Use                 Denies Drug Use  

 

                Smoking Status  Reviewed: 20 Patient has never smoked  

 

                Exercise Type/Frequency                 Exercises sporadically  

 

                Tattoo/Piercing                 Tattoo          3 

 

                Tattoo/Piercing                 Pierced ears    x2 

 

                Sun Exposure                    Minimum amount of sun exposure  

 

                Sun Exposure                    Uses sunscreen  Occasionally 

 

                Seat Belt/Car Seat                 Always uses seat belt  

 

                Bike Helmet                     Never           Does not bike ri

de. 

 

                Smoke Alarms                    Yes              

 

                Smoke Alarms                    Carbon Monoxide Detector: Yes  







Allergies, Adverse Reactions, Alerts





             Active Allergies Reaction     Severity     Comments     Date

 

             Penicillin                                          2004

 

             Cefdinir     Difficulty swallowing, Itching, throat felt itchy. Sev

ere                    2019







Medications





           Active Medications SIG        Qnty       Indications Ordering Provide

r Date

 

           Rifaximin Tablets 200 mg tab - take 2 tabs (400 mg) twice a day      

                 Unknown    

2020

 

                          Spironolactone                     50mg Tablets       

            take one half 

tablet (25 mg) by mouth twice a day                                 Unknown     

    2020

 

                          Allergy Relief Loratadine                     10mg Tab

lets                   1 

tab once a day as needed for allergy symptoms                                 Un

known         2020

 

                          Hydroxyzine HCL                     25mg Tablets      

             1 -2 tab by 

mouth as needed before bed 30tabs          F32.1           Gayla Ya FNP 

10/26/2020

 

                          Mucinex                     600mg Tablets ER 12HR     

              1 by mouth 

twice a day                                     Unknown         10/08/2020

 

                          Midodrine HCL                     5mg Tablets         

          1 tablet three 

times a day ( 8 am, 12 n,4 pm) 90tabs                          Gayla Ya FNP 10/08/2020

 

                          Torsemide                     20mg Tablets            

       take 1 tablet twice

a day (9 Am/5 PM)                                 Unknown         10/08/2020

 

                                        Lactulose Encephalopathy                

     10GM/15ML Solution                 

             take 30ml by mouth three times a day 2700units                 Gayla Araujo FNP 

2020

 

                          Thiamine HCL                     100mg Tablets        

           1 by mouth 

every day                                       Unknown         2020

 

                                        Fluticasone Propionate Nasal Spray      

               50mcg/Act Suspension     

             2 sprays each nostril once a day                           Unknown 

     2019

 

                          Latanoprost                     0.005% Solution       

            one drop in 

each eye daily at at bedtime                                 Unknown         

 

                          Betaxolol HCL                     0.5% Solution       

            1 drop each 

eye twice a day.                                 Unknown         2019

 

                          Brimonidine Tartrate                     0.2% Solution

                   one 

gtt. each eye twice a day                                 Unknown         2019

 

                          Vitamin D                     1000Unit Tablets        

           1 by mouth 

every day       100tabs                         Meron Ramirez M.D. 

019

 

                          Levothyroxine Sodium                     112mcg Tablet

s                   1 by 

mouth every day 90tabs          E03.9           Meron Ramirez M.D. 

018

 

                                        Wrist Splint/Cock-Up/Left/Canvas/Medium 

                     Misc               

                dx: carpal tunnel syndrome, duration 12 months, prognosis good 1

units                          

Gabe Corral M.D.              2018

 

                                        Wrist Splint/Cock-Up/Right/Canvas/Medium

                      Misc              

                                        wear splint every night for carpal tunne

l syndrome, prognosis good, duration

99 months       1units                          Gabe Corral M.D. 

 

                                        Levocetirizine Dihydrochloride          

           5mg Tablets                  

                1 tab by mouth every evening for allergies 90tabs          J30.9

           Meron Ramirez M.D.

                                        2015

 

                          Ketotifen Fumarate                     0.025% Solution

                   1 drop 

both eyes twice a day for allergy symptoms 5ml                             Unkno

wn         

 

                          Folic Acid                     1mg Tablets            

       1 by mouth every 

day             90tabs                          Meron Ramirez M.D. 

000

 

           Multivitamin Adult                      Tablets                      

                              Unknown    



 

                          Xifaxan                     200mg Tablets             

      1 by mouth twice a 

day             60tabs                          Gayla Ya FNP 

 

                                        History Medications

 

                          Sertraline HCL                     50mg Tablets       

            1 by mouth 

every day       30tabs          F32.1           Gayla Ya FNP 10/26/2020 

- 2020

 

                          Ciprofloxacin HCL                     250mg Tablets   

                1 tab by 

mouth once a day at 6 Am                                 Unknown         10/08/2

020 - 10/22/2020

 

                          Spironolactone                     50mg Tablets       

            take one 

tablet by mouth twice a day  (9 Am/5 PM)                                 Genie Tabares PA-C 10/08/2020 - 

2020







Immunizations





             CPT Code     Status       Date         Vaccine      Lot #

 

             [id number]        Given        2019   Pneumococcal Vaccine [id number]

 

                [id number]           Given           10/29/2018      Influenza Virus 

Vaccine, Quadrivalent,age 3 and 

up,multidose vial                       [id number]

 

             86673        Given        2017   Influenza Vaccination  

 

             16954        Given        2015   Influenza Vaccination  

 

             32074        Given        2013   Influenza Vaccination  

 

             46340        Given        04/15/2013   Zostavax      

 

             99312        Given        2012   Influenza Vaccination [id number]

C

 

             57753        Given        10/31/2011   Influenza Vaccination/preser

vative free [id number]

 

             79686        Given        2010   Adacel 11 Yrs or older 

BA

 

             10340        Given        10/04/2010   Influenza Vaccination [id number]

A

 

             58215        Given        2009   Influenza Vaccination [id number]

A

 

             12832        Given        2005   Tetnus Toxoid Im Or Jet Inje

ction Use  







Vital Signs





                Date            Vital           Result          Comment

 

                10/26/2020  3:20pm BP Systolic     105 mmHg         

 

                    BP Diastolic        63 mmHg              

 

                    Heart Rate          108 /min             

 

                    Body Temperature    97.4 F             

 

                    Respiratory Rate    20 /min              

 

                    Height              67.0 inches         5'7"

 

                    Weight              169.50 lb            

 

                    O2 % BldC Oximetry  98 %                 

 

                    Peak Expiratory Flow Rate 364                 Estimated Peak

 Flow Rate

 

                    Ideal Body Weight   135 lb               

 

                    BMI (Body Mass Index) 26.5 kg/m2           

 

                10/19/2020  3:07pm BP Systolic     114 mmHg         

 

                    BP Diastolic        71 mmHg              

 

                    Heart Rate          101 /min             

 

                    Body Temperature    97.3 F             

 

                    Respiratory Rate    18 /min              

 

                    Height              67.0 inches         5'7"

 

                    Weight              166.12 lb            

 

                    O2 % BldC Oximetry  100 %                

 

                    Peak Expiratory Flow Rate 364                 Estimated Peak

 Flow Rate

 

                    Ideal Body Weight   135 lb               

 

                    BMI (Body Mass Index) 26.0 kg/m2           







Results





        Test    Acquired Date Facility Test    Result  H/L     Range   Note

 

                    Cell Count Pleural Fluid 2020          Patient [place]

 [place] [address] [phone] Source, Body Fluid PLEURAL    Normal                 

 

             Pleural FL Color YELLOW       Normal       Colorless     

 

             Appearance, Body Fluid CLOUDY       Normal       Clear         

 

             WBC Body Fluid 124 /uL      High         0-10          

 

             RBC Body Fluid < 2 10       Normal       <2            

 

             BF Mononuclear Cell % 93.6 %       High         0-0           

 

             BF Polymorphonuclear Cell % 6.4 %        High         0-0          

 

 

                    Body Fluids Culture And Gram Stain 2020          [facility]

[place] [address] [phone] Gram Stain (SEE NOTE)  Normal                1

 

             Body Fluid Culture **************** <SEE NOTE>                     

       2

 

                    Laboratory test finding 2020          Patient [place] 

[place] [address] [phone] Albumin 25% TRANSFUSED PRODU <SEE NOTE>               

         3

 

                    CBC With Differential 2020          Patient [place] 

[place] [address] [phone] White Blood Count 5.5 10     Normal     4.0-10.0    

 

             Red Blood Count 3.39 10      Low          4.00-5.40     

 

             Hemoglobin   10.8 g/dL    Low          12.0-15.5     

 

             Hematocrit   32.6 %       Low          36.0-47.0     

 

             Mean Corpuscular Volume 96.2 fl      High         80.0-96.0     

 

             Mean Corpuscular Hemoglobin 31.9 pg      Normal       27.0-33.0    

 

 

             Mean Corpuscular HGB Conc 33.1 g/dL    Normal       32.0-36.5     

 

             Red Cell Distribution Width 13.8 %       Normal       11.5-14.5    

 

 

             Platelet Count, Automated 124 10       Low          150-450       

 

             Neutrophils % 73.3 %       High         36.0-66.0     

 

             Lymph %      13.9 %       Low          24.0-44.0     

 

             Mono %       9.9 %        High         0.0-5.0       

 

             Eos %        1.3 %        Normal       0.0-3.0       

 

             Baso %       0.9 %        Normal       0.0-1.0       

 

             Immature Granulocyte % 0.7 %        Normal       0-3.0         

 

             Nucleated Red Blood Cell % 0.0 %        Normal       0-0           

 

             Neutrophils # 4.1 10       Normal       1.5-8.5       

 

             Lymph #      0.8 10       Low          1.5-5.0       

 

             Mono #       0.6 10       Normal       0.0-0.8       

 

             Eos #        0.1 10       Normal       0.0-0.5       

 

             Baso #       0.1 10       Normal       0.0-0.2       

 

                    PT & Aptt           2020          [place] [address] [phone] Prothrombin Time 17.5 seconds High       12.5-14.3   

 

             Inr          1.40         Normal                    4

 

             Partial Thromboplastin Time 35.4 seconds Normal       24.2-38.5    

 

 

                    Liver Profile       2020          [place] [address] [phone] Ast/Sgot   43 U/L     High       7-37        

 

             Alt/SGPT     38 U/L       Normal       12-78         

 

             Alkaline Phosphatase 155 U/L      High                 

 

             Bilirubin,Total 3.1 mg/dL    High         0.2-1.0       

 

             Bilirubin,Direct 2.0 mg/dL    High         0.0-0.2       

 

             Total Protein 6.3 GM/DL    Low          6.4-8.2       

 

             Albumin      2.0 GM/DL    Low          3.2-5.2       

 

             Albumin/Globulin Ratio 0.5          Low          1.2-2.2       

 

                    Basic Metabolic Profile 2020          Patient Service 

Center

[place] [address] [phone] Glucose, Fasting 169 mg/dL  High             

 

             Blood Urea Nitrogen 71 mg/dL     High         7-18          

 

             Creatinine For GFR 2.38 mg/dL   High         0.55-1.30     

 

             Glomerular Filtration Rate 22.1         Low          >45          5

 

             Sodium Level 131 mEq/L    Low          136-145       

 

             Potassium Serum 4.7 mEq/L    Normal       3.5-5.1       

 

             Chloride Level 99 mEq/L     Normal               

 

             Carbon Dioxide Level 21 mEq/L     Normal       21-32         

 

             Anion Gap    11 mEq/L     Normal       8-16          

 

             Calcium Level 8.7 mg/dL    Low          8.8-10.2      

 

                    Laboratory test finding 2020          Patient Service 

[place], [place] [id number] [phone] Ethyl Alcohol (Ethanol) < 0.003 %  Normal     0.000-0.

010  

 

                    Comprehensive Metabolic Profil 2020          [place] [address] [phone] Glucose, Fasting 118 mg/dL  High             

 

             Blood Urea Nitrogen 69 mg/dL     High         7-18          

 

             Creatinine For GFR 2.49 mg/dL   High         0.55-1.30     

 

             Glomerular Filtration Rate 21.0         Low          >45          6

 

             Sodium Level 128 mEq/L    Low          136-145       

 

             Potassium Serum 4.2 mEq/L    Normal       3.5-5.1       

 

             Chloride Level 92 mEq/L     Low                  

 

             Carbon Dioxide Level 25 mEq/L     Normal       21-32         

 

             Anion Gap    11 mEq/L     Normal       8-16          

 

             Calcium Level 8.8 mg/dL    Normal       8.8-10.2      

 

             Ast/Sgot     45 U/L       High         7-37          

 

             Alt/SGPT     44 U/L       Normal       12-78         

 

             Alkaline Phosphatase 186 U/L      High                 

 

             Bilirubin,Total 4.2 mg/dL    High         0.2-1.0       

 

             Total Protein 6.9 GM/DL    Normal       6.4-8.2       

 

             Albumin      2.2 GM/DL    Low          3.2-5.2       

 

             Albumin/Globulin Ratio 0.5          Low          1.2-2.2       

 

                    Culture Fungus Misc 2020          [place] [address]

           [phone] Fungal Smear Testing performe <SEE NOTE>              

          7

 

             Fungal Culture Other Source Testing performe <SEE NOTE>            

                8

 

                    Acid Fast Smear & Culture(Afb) Sendout 2020          P

atient Service [place] [id number] [phone] Afb Smear  Testing performe <SEE NOTE>                

        9

 

             Afb Culture  Testing performe <SEE NOTE>                           

 10

 

                    Laboratory test finding 2020          Patient Service 

[place] [id number] [phone] Anaerobic Culture **************** <SEE NOTE>         

               11

 

                    Body Fluid Culture And GS 2020          Patient Servic

e [place] [id number] [phone] Gram Stain (SEE NOTE)  Normal                12

 

             Body Fluid Culture **************** <SEE NOTE>                     

       13

 

                    PH, Body Fluid      2020          Patient Service [place] [id number] [phone] PH Body Fluid > 7.800 units Normal     Not Established

  

 

             Source, Body Fluid pH PLEURAL      Normal                     

 

                    Amylase Body Fluid  2020          Patient Service [place] [id number] [phone] Amylase, Body Fluid 31 U/L     Normal     Not Establis

hed  

 

             Source, Body Fluid Amylase PLEURAL      Normal                     

 

                    Glucose Body Fluid  2020          Patient Service [place] [id number] [phone] Glucose, Body Fluid 131 mg/dL  Normal     Not Establis

hed  

 

             Source, Body Fluid Glucose PLEURAL      Normal                     

 

                    LDH Body Fluid      2020          Patient Service [place] [id number] [phone] LDH, Body Fluid 51 U/L     Normal     Not Established 

 

 

             Source, Body Fluid LDH PLEURAL      Normal                     

 

                    TP Body Fluid       2020          Patient Service [place] [id number] [phone] Total Protein, Body Fluid 0.7 g/dL   Normal     Not Es

tablished  

 

             Source, Body Fluid Tot Protein PLEURAL      Normal                 

    

 

                    Cell Count Pleural Fluid 2020          Patient Service

 [place] [address] [phone] Source, Body Fluid PLEURAL    Normal                 

 

             Pleural FL Color YELLOW       Normal       Colorless     

 

             Appearance, Body Fluid CLOUDY       Normal       Clear         

 

             WBC Body Fluid 114 /uL      High         0-10          

 

             RBC Body Fluid 4 10         Normal       <2            

 

             BF Mononuclear Cell % 91.2 %       High         0-0           

 

             BF Polymorphonuclear Cell % 8.8 %        High         0-0          

 

 

                    PT & Aptt           2020          Patient Service [facility]

er

[place] [id number] [phone] Prothrombin Time 18.6 seconds High       12.5-14.3   

 

             Inr          1.52         Normal                    14

 

             Partial Thromboplastin Time 34.0 seconds Normal       24.2-38.5    

 

 

                    Laboratory test finding 2020          Patient Service 

[place] [address] [phone] Platelet Count, Automated 113 10     Low        150-45

0     

 

                    CBC With Differential 2020          Patient Service Ce

nter

[place] [address] [phone] White Blood Count 7.9 10     Normal     4.0-10.0    

 

             Red Blood Count 3.31 10      Low          4.00-5.40     

 

             Hemoglobin   11.7 g/dL    Low          12.0-15.5     

 

             Hematocrit   34.8 %       Low          36.0-47.0     

 

             Mean Corpuscular Volume 105.1 fl     High         80.0-96.0     

 

             Mean Corpuscular Hemoglobin 35.3 pg      High         27.0-33.0    

 

 

             Mean Corpuscular HGB Conc 33.6 g/dL    Normal       32.0-36.5     

 

             Red Cell Distribution Width 14.6 %       High         11.5-14.5    

 

 

             Platelet Count, Automated 96 10        Low          150-450       

 

             Nucleated Red Blood Cell % 0.0 %        Normal       0-0           

 

                    Differential        2020          [place] [address] [phone] Neutrophils 70 %       High       28-66       

 

             Lymphocytes  14 %         Low          16-44         

 

             Monocytes    8 %          High         0-5           

 

             Eosinophils  5 %          High         0-3           

 

             Myelocytes   1 %          High         0-0           

 

             Atypical Lymph 2 %          Normal       0-5           

 

             Anisocytosis 1+           Normal                     

 

                    Laboratory test finding 2020          Patient Service 

[place] [address] [phone] Platelet Estimate DECREASED  Normal     Normal      

 

             Immature Platelet Fraction 1.3 %        Normal       0.0-9.59      

 

                    Cardiac Marker Panel 2020          [place] [address] [phone] CPK Creatine Phosphokinase 50 U/L     Normal     26-19

2      

 

             CK-MB Value Mass < 1.0 NG/ML  Normal       <3.6          

 

             MB/CK Relative Index 2.00         Normal       < Or =4      15

 

             Troponin I   < 0.02 NG/ML Normal       < 0.10       16

 

                    Liver Profile       2020          [place] [address] [phone] Ast/Sgot   80 U/L     High       7-37        

 

             Alt/SGPT     51 U/L       Normal       12-78         

 

             Alkaline Phosphatase 136 U/L      High                 

 

             Bilirubin,Total 6.8 mg/dL    High         0.2-1.0       

 

             Bilirubin,Direct 4.4 mg/dL    High         0.0-0.2       

 

             Total Protein 7.9 GM/DL    Normal       6.4-8.2       

 

             Albumin      2.0 GM/DL    Low          3.2-5.2       

 

             Albumin/Globulin Ratio 0.3          Low          1.2-2.2       

 

                    Basic Metabolic Profile 2020          Patient [place] 

[place] [address] [phone] Glucose, Fasting 83 mg/dL   Normal           

 

             Blood Urea Nitrogen 24 mg/dL     High         7-18          

 

             Creatinine For GFR 1.38 mg/dL   High         0.55-1.30     

 

             Glomerular Filtration Rate 41.5         Low          >45          1

7

 

             Sodium Level 130 mEq/L    Low          136-145       

 

             Potassium Serum 3.4 mEq/L    Low          3.5-5.1       

 

             Chloride Level 96 mEq/L     Low                  

 

             Carbon Dioxide Level 23 mEq/L     Normal       21-32         

 

             Anion Gap    11 mEq/L     Normal       8-16          

 

             Calcium Level 8.6 mg/dL    Low          8.8-10.2      

 

                    Laboratory test finding 2020          Patient Service 

[place] [address] [phone] NT-Pro  pg/mL  Normal     <125        

 

             Magnesium Level 2.2 mg/dL    Normal       1.8-2.4       

 

                    PT & Aptt           2020          [place] [address] [phone] Prothrombin Time 19.4 seconds High       12.5-14.3   

 

             Inr          1.60         Normal                    18

 

             Partial Thromboplastin Time 32.6 seconds Normal       24.2-38.5    

 







                          1                         FEW RBCS

FEW WBCS

NO ORGANISMS SEEN





 

                          2                         ****************************

*********************

If aerobic or anaerobic growth is detected within

the next 7-21 days, an addendum will follow.

                                        .***************************************

********.



FULL REPORT IN LAB NOTES ([facility] and [facility]).

NO GROWTH AEROBICALLY





 

                          3                         TRANSFUSED PRODUCT: ALBUMIN 

25%                         COUNT: 1



 

                          4                         THERAPUTIC HUMAN INR VALUES

INDICATIONS                      NORMAL RANGES

PROPHYLAXIS/TREATMENT OF:

VENOUS THROMBOSIS                2.0-3.0

PULMONARY EMBOLISM               2.0-3.0

PREVENTION OF SYSTEMIC EMBOLISM FROM:

TISSUE HEART VALVES              2.0-3.0

ACUTE MYOCARDIAL INFARCTION      2.0-3.0

VALVULAR HEART DISEASE           2.0-3.0

ATRIAL FIBRILLATION              2.0-3.0

MECHANICAL VALVES(HIGH RISK)     2.5-3.5

RECURRENT MYOCARDIAL INFARCTION  2.5-3.5



 

                          5                         Units are mL/min/1.73 m2



Chronic Kidney Disease Staging per [facility]:



Stage I & II   GFR >=60       Normal to Mildly Decreased

Stage III      GFR 30-59      Moderately Decreased

Stage IV       GFR 15-29      Severely Decreased

Stage V        GFR <15        Very Little GFR Left

ESRD           GFR <15 on RRT



 

                          6                         Units are mL/min/1.73 m2



Chronic Kidney Disease Staging per [facility]:



Stage I & II   GFR >=60       Normal to Mildly Decreased

Stage III      GFR 30-59      Moderately Decreased

Stage IV       GFR 15-29      Severely Decreased

Stage V        GFR <15        Very Little GFR Left

ESRD           GFR <15 on RRT



 

                          7                         Testing performed at [facility] lab . Report copy to follow

on a separate form. 20 REF LAB#:[id number]



IAIN/Calcofluor preparatio     No fungus observed.





 

                          8                         Testing performed at [facility] lab . Report copy to follow

on a separate form. 20 REF LAB#:[id number]



FUNGUS CULTURE [facility]        No Yeast or Mold Isolated after 4 weeks.





 

                          9                         Testing performed at [facility] lab . Report copy to follow

on a separate form. 20 REF LAB#:[id number]



Due to limited sensitivity, smear results should

be used as an adjunct in evaluating patient tuberculosis

status. Cultural examination is highly recommended

for clinical diagnosis.





AFB sm REF LAB concentra      NEGATIVE





 

                          10                        Testing performed at [facility] lab . Report copy to follow

on a separate form. 20 REF LAB#:[id number]



FULL REPORT IN LAB NOTES ([facility] and [facility]).

No Acid-Fast Bacilli Isolated after 6 Weeks.





 

                          11                        ****************************

*********************

If anaerobic or aerobic growth is detected within

the next 7-21 days, an addendum will follow.

                                        .***************************************

********.



FULL REPORT IN LAB NOTES ([facility] and [facility]).

NO GROWTH ANAEROBICALLY





 

                          12                        FEW WBCS

NO ORGANISMS SEEN





 

                          13                        ****************************

*********************

If aerobic or anaerobic growth is detected within

the next 7-21 days, an addendum will follow.

                                        .***************************************

********.



FULL REPORT IN LAB NOTES ([facility]).

NO GROWTH AEROBICALLY





 

                          14                        THERAPUTIC HUMAN INR VALUES

INDICATIONS                      NORMAL RANGES

PROPHYLAXIS/TREATMENT OF:

VENOUS THROMBOSIS                2.0-3.0

PULMONARY EMBOLISM               2.0-3.0

PREVENTION OF SYSTEMIC EMBOLISM FROM:

TISSUE HEART VALVES              2.0-3.0

ACUTE MYOCARDIAL INFARCTION      2.0-3.0

VALVULAR HEART DISEASE           2.0-3.0

ATRIAL FIBRILLATION              2.0-3.0

MECHANICAL VALVES(HIGH RISK)     2.5-3.5

RECURRENT MYOCARDIAL INFARCTION  2.5-3.5



 

                          15                        DIAGNOSIS CRITERIA

MMB ng/ml       Relative Index (RI)

NON-AMI               < or = 5               N/A

GRAY ZONE              > 5                < or = 4

AMI                    > 5                   > 4



 

                          16                        Troponin I Reference Interva

l for [employer] LOCI:



                                        99th Percentile= 0.00-0.045 ng/ml



Risk Stratification:

<= 0.10 ng/ml   Decreased Risk for Adverse Clinical

Events.

                                        0.10-1.50 ng/ml   Increased Risk for Adv

erse Clinical

Events. Evaluation of additional

criterion and/or repeat testing in 2-6

hours is suggested to rule out myocardial

damage.

>= 1.50 ng/ml   Indicative of Myocardial Injury.



 

                          17                        Units are mL/min/1.73 m2



Chronic Kidney Disease Staging per [facility]:



Stage I & II   GFR >=60       Normal to Mildly Decreased

Stage III      GFR 30-59      Moderately Decreased

Stage IV       GFR 15-29      Severely Decreased

Stage V        GFR <15        Very Little GFR Left

ESRD           GFR <15 on RRT



 

                          18                        THERAPUTIC HUMAN INR VALUES

INDICATIONS                      NORMAL RANGES

PROPHYLAXIS/TREATMENT OF:

VENOUS THROMBOSIS                2.0-3.0

PULMONARY EMBOLISM               2.0-3.0

PREVENTION OF SYSTEMIC EMBOLISM FROM:

TISSUE HEART VALVES              2.0-3.0

ACUTE MYOCARDIAL INFARCTION      2.0-3.0

VALVULAR HEART DISEASE           2.0-3.0

ATRIAL FIBRILLATION              2.0-3.0

MECHANICAL VALVES(HIGH RISK)     2.5-3.5

RECURRENT MYOCARDIAL INFARCTION  2.5-3.5









Procedures





                Date            Code            Description     Status

 

                    2020          82004               Brief Emotional/Beha

v Assessment W/ Scoring Doc Per Standard 

Inst                                    Completed

 

                2018      [id number]        Mammogram       Completed

 

                2016      [id number]        Mammogram       Completed

 

                2015      [id number]        Mammogram       Completed

 

                2014      [id number]        Mammogram       Completed







Medical Devices





                                        Description

 

                                        No Information Available







Encounters





           Type       Date       Location   Provider   Dx         Diagnosis

 

           Office Visit 2020 11:45a Main Office Gayla Ya FNP K70.3

1     Alcoholic

cirrhosis of liver with ascites

 

                          K76.7                     Hepatorenal syndrome

 

                          J91.8                     Pleural effusion in other co

nditions classified elsewhere

 

                          Z13.89                    Encounter for screening for 

other disorder

 

           Office Visit 10/26/2020  3:15p Main Office Gayla Ya FNP K70.3

1     Alcoholic

cirrhosis of liver with ascites

 

                          K76.7                     Hepatorenal syndrome

 

                          J91.8                     Pleural effusion in other co

nditions classified elsewhere

 

                          E03.9                     Hypothyroidism, unspecified

 

                          I10                       Essential (primary) hyperten

nikolay

 

                          F32.1                     Major depressive disorder, s

zakia episode, moderate

 

           Office Visit 10/19/2020  2:45p Main Office PleGayla alonso, [facility] K70.3

1     Alcoholic

cirrhosis of liver with ascites

 

                          K76.7                     Hepatorenal syndrome

 

                          J91.8                     Pleural effusion in other co

nditions classified elsewhere

 

                          E03.9                     Hypothyroidism, unspecified

 

                          I10                       Essential (primary) hyperten

nikolay







Assessments





                Date            Code            Description     Provider

 

                2020      K70.31          Alcoholic cirrhosis of liver wit

h ascites Gayla Ya, [facility]

 

                2020      K76.7           Hepatorenal syndrome KALPESH Ya, [facility]

 

                2020      J91.8           Pleural effusion in other condit

ions classified elsewhere 

Gayla Ya, [facility]

 

                2020      Z13.89          Encounter for screening for othe

r disorder Gayla Ya, 

[facility]

 

                10/26/2020      K70.31          Alcoholic cirrhosis of liver wit

h ascites Gayla Ya, [facility]

 

                10/26/2020      K76.7           Hepatorenal syndrome KALPESH Ya

neisha, [facility]

 

                10/26/2020      J91.8           Pleural effusion in other condit

ions classified elsewhere 

Gayla Ya, [facility]

 

                10/26/2020      E03.9           Hypothyroidism, unspecified Ples

Gayla arnold, [facility]

 

                10/26/2020      I10             Essential (primary) hypertension

 aGyla Ya, [facility]

 

                10/26/2020      F32.1           Major depressive disorder, singl

e episode, moderate PleskGayla dinh, [facility]

 

                10/19/2020      K70.31          Alcoholic cirrhosis of liver wit

h ascites Gayla Ya, [facility]

 

                10/19/2020      K76.7           Hepatorenal syndrome Plegavin M

neisha, [facility]

 

                10/19/2020      J91.8           Pleural effusion in other condit

ions classified elsewhere 

Gayla Ya, [facility]

 

                10/19/2020      E03.9           Hypothyroidism, unspecified Ples

kach Gayla, [facility]

 

                10/19/2020      I10             Essential (primary) hypertension

 Gayla Ya FNP







Plan of Treatment

Future Appointment(s):* 2021 11:30 am - Gayla Ya FNP at Main 
  Office

2020 - Gayla Ya FNP* K70.31 Alcoholic cirrhosis of liver with 
  ascites* Comments:* spoke with patient at length regarding importance of f/u 
  with pulmonology and nephrology.  She does not seem to understand the severity
  of her disease, she may possibly be in denial.  She is certainly not ready to 
  talk about hospice or DNR.  I encouraged her to call both pulmonary and 
  nephrology today to make follow up appointments



* Follow up:* one month





* K76.7 Hepatorenal syndrome

* J91.8 Pleural effusion in other conditions classified elsewhere

* Z13.89 Encounter for screening for other disorder





Functional Status





                Functional Condition Comment         Date            Status

 

                Glasses         Reading                         Active

 

                Independent with all ADL's                                 Activ

e







Mental Status





                Mental Condition Comment         Date            Status

 

                None            poor reader                     Active







Referrals





                Refer to      Reason for Referral Status          Appt Date

 

                [facility] ANXIETY         Closed          



 

                                        [address] [place] [address]

 

                                        [phone]

## 2021-01-16 NOTE — CCD
Continuity of Care Document (CCD)

                             Created on: 2020



[facility]

External Reference #: [id number]

: 1960

Sex: Female



Demographics





                          Address                   [address] [place]  [address]

 

                          Home Phone                [phone]

 

                          Preferred Language        Unknown

 

                          Marital Status            Unknown

 

                          Gnosticist Affiliation     Adventism (Non-Roman Catholic, Non

-Specific)

 

                          Race                      White

 

                          Ethnic Group              Not  or 





Author





                          Organization              Unknown

 

                          Address                   Unknown

 

                          Phone                     Unavailable







Care Team Providers





                    Care Team Member Name Role                Phone

 

                    [facility] - Gastro[facility]

TM                [phone]

 

                    [facility]         AUTM                [phone]

 

                    [facility] Pul[facility] Disease [facility]M               

 [phone]

 

                    [facility] - Mental Health AUTM            

    [phone]







Problems





                    Active Problems     Provider            Date

 

                    Essential hypertension Gabe Corral M.D. Onset: 

 

                    Hypothyroidism      Gabe Corral M.D. Onset: 2011

 

                    Vitamin D deficiency Gabe Corral M.D. Onset: 

 

                    Neck pain           Gabe Corral M.D. Onset: 2011

 

                    Generalized anxiety disorder Gabe Corral M.D. Onse

t: 2011

 

                    Allergic rhinitis   Gabe Corral M.D. Onset: 2012

 

                    Degeneration of lumbar intervertebral disc Benigno Corral M.D. Onset: 

2013

 

                    Alcoholic cirrhosis Gabe Corral M.D. Onset: 2018

 

                    Esophageal varices without bleeding Gabe Corral M. D. Onset: 2018







Social History





                Type            Date            Description     Comments

 

                Birth Sex                       Unknown          

 

                Tobacco Use     Start: Unknown  Never Smoked Cigarettes  

 

                Tobacco Use     Start: Unknown  Never Used Smokeless Tobacco  

 

                ETOH Use                        Consumed 4 Alcoholic Beverages P

er Day  

 

                Tobacco Use     Start: Unknown  Patient has never smoked  

 

                Recreational Drug Use                 Denies Drug Use  

 

                Smoking Status  Reviewed: 20 Patient has never smoked  

 

                Exercise Type/Frequency                 Exercises sporadically  

 

                Tattoo/Piercing                 Tattoo          3 

 

                Tattoo/Piercing                 Pierced ears    x2 

 

                Sun Exposure                    Minimum amount of sun exposure  

 

                Sun Exposure                    Uses sunscreen  Occasionally 

 

                Seat Belt/Car Seat                 Always uses seat belt  

 

                Bike Helmet                     Never           Does not bike ri

de. 

 

                Smoke Alarms                    Yes              

 

                Smoke Alarms                    Carbon Monoxide Detector: Yes  







Allergies, Adverse Reactions, Alerts





             Active Allergies Reaction     Severity     Comments     Date

 

             Penicillin                                          2004

 

             Cefdinir     Difficulty swallowing, Itching, throat felt itchy. Sev

ere                    2019







Medications





           Active Medications SIG        Qnty       Indications Ordering Provide

r Date

 

           Rifaximin Tablets 200 mg tab - take 2 tabs (400 mg) twice a day      

                 Unknown    

2020

 

                          Spironolactone                     50mg Tablets       

            take one half 

tablet (25 mg) by mouth twice a day                                 Unknown     

    2020

 

                          Allergy Relief Loratadine                     10mg Tab

lets                   1 

tab once a day as needed for allergy symptoms                                 Un

known         2020

 

                          Hydroxyzine HCL                     25mg Tablets      

             1 -2 tab by 

mouth as needed before bed 30tabs          F32.1           Gayla Ya FNP 

10/26/2020

 

                          Mucinex                     600mg Tablets ER 12HR     

              1 by mouth 

twice a day                                     Unknown         10/08/2020

 

                          Midodrine HCL                     5mg Tablets         

          1 tablet three 

times a day ( 8 am, 12 n,4 pm) 90tabs                          Gayla Ya FNP 10/08/2020

 

                          Torsemide                     20mg Tablets            

       take 1 tablet twice

a day (9 Am/5 PM)                                 Unknown         10/08/2020

 

                                        Lactulose Encephalopathy                

     10GM/15ML Solution                 

             take 30ml by mouth three times a day 2700units                 Gayla Araujo FNP 

2020

 

                          Thiamine HCL                     100mg Tablets        

           1 by mouth 

every day                                       Unknown         2020

 

                                        Fluticasone Propionate Nasal Spray      

               50mcg/Act Suspension     

             2 sprays each nostril once a day                           Unknown 

     2019

 

                          Latanoprost                     0.005% Solution       

            one drop in 

each eye daily at at bedtime                                 Unknown         

 

                          Betaxolol HCL                     0.5% Solution       

            1 drop each 

eye twice a day.                                 Unknown         2019

 

                          Brimonidine Tartrate                     0.2% Solution

                   one 

gtt. each eye twice a day                                 Unknown         2019

 

                          Vitamin D                     1000Unit Tablets        

           1 by mouth 

every day       100tabs                         Meron Ramirez M.D. 

019

 

                          Levothyroxine Sodium                     112mcg Tablet

s                   1 by 

mouth every day 90tabs          E03.9           Meron Ramirez M.D. 

018

 

                                        Wrist Splint/Cock-Up/Left/Canvas/Medium 

                     Misc               

                dx: carpal tunnel syndrome, duration 12 months, prognosis good 1

units                          

Gabe Corral M.D.              2018

 

                                        Wrist Splint/Cock-Up/Right/Canvas/Medium

                      Misc              

                                        wear splint every night for carpal tunne

l syndrome, prognosis good, duration

99 months       1units                          Gabe Corral M.D. 

 

                                        Levocetirizine Dihydrochloride          

           5mg Tablets                  

                1 tab by mouth every evening for allergies 90tabs          J30.9

           Meron Ramirez M.D.

                                        2015

 

                          Ketotifen Fumarate                     0.025% Solution

                   1 drop 

both eyes twice a day for allergy symptoms 5ml                             Unkno

wn         

 

                          Folic Acid                     1mg Tablets            

       1 by mouth every 

day             90tabs                          Meron Ramirez M.D. 

000

 

           Multivitamin Adult                      Tablets                      

                              Unknown    



 

                          Xifaxan                     200mg Tablets             

      1 by mouth twice a 

day             60tabs                          Gayla Ya FNP 

 

                                        History Medications

 

                          Sertraline HCL                     50mg Tablets       

            1 by mouth 

every day       30tabs          F32.1           Gayla Ya FNP 10/26/2020 

- 2020

 

                          Ciprofloxacin HCL                     250mg Tablets   

                1 tab by 

mouth once a day at 6 Am                                 Unknown         10/08/2

020 - 10/22/2020

 

                          Spironolactone                     50mg Tablets       

            take one 

tablet by mouth twice a day  (9 Am/5 PM)                                 Genie Tabares PA-C 10/08/2020 - 

2020







Immunizations





             CPT Code     Status       Date         Vaccine      Lot #

 

             [id number]        Given        2019   Pneumococcal Vaccine [id number]

 

                [id number]           Given           10/29/2018      Influenza Virus 

Vaccine, Quadrivalent,age 3 and 

up,multidose vial                       [id number]

 

             58040        Given        2017   Influenza Vaccination  

 

             26089        Given        2015   Influenza Vaccination  

 

             67344        Given        2013   Influenza Vaccination  

 

             89004        Given        04/15/2013   Zostavax      

 

             84119        Given        2012   Influenza Vaccination [id number]

C

 

             45579        Given        10/31/2011   Influenza Vaccination/preser

vative free [id number]

 

             99078        Given        2010   Adacel 11 Yrs or older 

BA

 

             08699        Given        10/04/2010   Influenza Vaccination [id number]

A

 

             25288        Given        2009   Influenza Vaccination [id number]

A

 

             35925        Given        2005   Tetnus Toxoid Im Or Jet Inje

ction Use  







Vital Signs





                Date            Vital           Result          Comment

 

                10/26/2020  3:20pm BP Systolic     105 mmHg         

 

                    BP Diastolic        63 mmHg              

 

                    Heart Rate          108 /min             

 

                    Body Temperature    97.4 F             

 

                    Respiratory Rate    20 /min              

 

                    Height              67.0 inches         5'7"

 

                    Weight              169.50 lb            

 

                    O2 % BldC Oximetry  98 %                 

 

                    Peak Expiratory Flow Rate 364                 Estimated Peak

 Flow Rate

 

                    Ideal Body Weight   135 lb               

 

                    BMI (Body Mass Index) 26.5 kg/m2           

 

                10/19/2020  3:07pm BP Systolic     114 mmHg         

 

                    BP Diastolic        71 mmHg              

 

                    Heart Rate          101 /min             

 

                    Body Temperature    97.3 F             

 

                    Respiratory Rate    18 /min              

 

                    Height              67.0 inches         5'7"

 

                    Weight              166.12 lb            

 

                    O2 % BldC Oximetry  100 %                

 

                    Peak Expiratory Flow Rate 364                 Estimated Peak

 Flow Rate

 

                    Ideal Body Weight   135 lb               

 

                    BMI (Body Mass Index) 26.0 kg/m2           







Results





        Test    Acquired Date Facility Test    Result  H/L     Range   Note

 

                    Cell Count Pleural Fluid 2020          Patient [place]

 [place] [address] [phone] Source, Body Fluid PLEURAL    Normal                 

 

             Pleural FL Color YELLOW       Normal       Colorless     

 

             Appearance, Body Fluid CLOUDY       Normal       Clear         

 

             WBC Body Fluid 124 /uL      High         0-10          

 

             RBC Body Fluid < 2 10       Normal       <2            

 

             BF Mononuclear Cell % 93.6 %       High         0-0           

 

             BF Polymorphonuclear Cell % 6.4 %        High         0-0          

 

 

                    Body Fluids Culture And Gram Stain 2020          [place]

[place] [address] [phone] Gram Stain (SEE NOTE)  Normal                1

 

             Body Fluid Culture **************** <SEE NOTE>                     

       2

 

                    Laboratory test finding 2020          Patient [place] 

[place] [address] [phone] Albumin 25% TRANSFUSED PRODU <SEE NOTE>               

         3

 

                    CBC With Differential 2020          Patient [place] 

[place] [address] [phone] White Blood Count 5.5 10     Normal     4.0-10.0    

 

             Red Blood Count 3.39 10      Low          4.00-5.40     

 

             Hemoglobin   10.8 g/dL    Low          12.0-15.5     

 

             Hematocrit   32.6 %       Low          36.0-47.0     

 

             Mean Corpuscular Volume 96.2 fl      High         80.0-96.0     

 

             Mean Corpuscular Hemoglobin 31.9 pg      Normal       27.0-33.0    

 

 

             Mean Corpuscular HGB Conc 33.1 g/dL    Normal       32.0-36.5     

 

             Red Cell Distribution Width 13.8 %       Normal       11.5-14.5    

 

 

             Platelet Count, Automated 124 10       Low          150-450       

 

             Neutrophils % 73.3 %       High         36.0-66.0     

 

             Lymph %      13.9 %       Low          24.0-44.0     

 

             Mono %       9.9 %        High         0.0-5.0       

 

             Eos %        1.3 %        Normal       0.0-3.0       

 

             Baso %       0.9 %        Normal       0.0-1.0       

 

             Immature Granulocyte % 0.7 %        Normal       0-3.0         

 

             Nucleated Red Blood Cell % 0.0 %        Normal       0-0           

 

             Neutrophils # 4.1 10       Normal       1.5-8.5       

 

             Lymph #      0.8 10       Low          1.5-5.0       

 

             Mono #       0.6 10       Normal       0.0-0.8       

 

             Eos #        0.1 10       Normal       0.0-0.5       

 

             Baso #       0.1 10       Normal       0.0-0.2       

 

                    PT & Aptt           2020          [place] [address] [phone] Prothrombin Time 17.5 seconds High       12.5-14.3   

 

             Inr          1.40         Normal                    4

 

             Partial Thromboplastin Time 35.4 seconds Normal       24.2-38.5    

 

 

                    Liver Profile       2020          [place] [address] [phone] Ast/Sgot   43 U/L     High       7-37        

 

             Alt/SGPT     38 U/L       Normal       12-78         

 

             Alkaline Phosphatase 155 U/L      High                 

 

             Bilirubin,Total 3.1 mg/dL    High         0.2-1.0       

 

             Bilirubin,Direct 2.0 mg/dL    High         0.0-0.2       

 

             Total Protein 6.3 GM/DL    Low          6.4-8.2       

 

             Albumin      2.0 GM/DL    Low          3.2-5.2       

 

             Albumin/Globulin Ratio 0.5          Low          1.2-2.2       

 

                    Basic Metabolic Profile 2020          Patient Service 

Center

[place] [address] [phone] Glucose, Fasting 169 mg/dL  High             

 

             Blood Urea Nitrogen 71 mg/dL     High         7-18          

 

             Creatinine For GFR 2.38 mg/dL   High         0.55-1.30     

 

             Glomerular Filtration Rate 22.1         Low          >45          5

 

             Sodium Level 131 mEq/L    Low          136-145       

 

             Potassium Serum 4.7 mEq/L    Normal       3.5-5.1       

 

             Chloride Level 99 mEq/L     Normal               

 

             Carbon Dioxide Level 21 mEq/L     Normal       21-32         

 

             Anion Gap    11 mEq/L     Normal       8-16          

 

             Calcium Level 8.7 mg/dL    Low          8.8-10.2      

 

                    Laboratory test finding 2020          Patient Service 

[place], [place] [id number] [phone] Ethyl Alcohol (Ethanol) < 0.003 %  Normal     0.000-0.

010  

 

                    Comprehensive Metabolic Profil 2020          [place] [address] [phone] Glucose, Fasting 118 mg/dL  High             

 

             Blood Urea Nitrogen 69 mg/dL     High         7-18          

 

             Creatinine For GFR 2.49 mg/dL   High         0.55-1.30     

 

             Glomerular Filtration Rate 21.0         Low          >45          6

 

             Sodium Level 128 mEq/L    Low          136-145       

 

             Potassium Serum 4.2 mEq/L    Normal       3.5-5.1       

 

             Chloride Level 92 mEq/L     Low                  

 

             Carbon Dioxide Level 25 mEq/L     Normal       21-32         

 

             Anion Gap    11 mEq/L     Normal       8-16          

 

             Calcium Level 8.8 mg/dL    Normal       8.8-10.2      

 

             Ast/Sgot     45 U/L       High         7-37          

 

             Alt/SGPT     44 U/L       Normal       12-78         

 

             Alkaline Phosphatase 186 U/L      High                 

 

             Bilirubin,Total 4.2 mg/dL    High         0.2-1.0       

 

             Total Protein 6.9 GM/DL    Normal       6.4-8.2       

 

             Albumin      2.2 GM/DL    Low          3.2-5.2       

 

             Albumin/Globulin Ratio 0.5          Low          1.2-2.2       

 

                    Culture Fungus Misc 2020          [place] [address]

           [phone] Fungal Smear Testing performe <SEE NOTE>              

          7

 

             Fungal Culture Other Source Testing performe <SEE NOTE>            

                8

 

                    Acid Fast Smear & Culture(Afb) Sendout 2020          P

atient Service [place] [id number] [phone] Afb Smear  Testing performe <SEE NOTE>                

        9

 

             Afb Culture  Testing performe <SEE NOTE>                           

 10

 

                    Laboratory test finding 2020          Patient Service 

[place] [id number] [phone] Anaerobic Culture **************** <SEE NOTE>         

               11

 

                    Body Fluid Culture And GS 2020          Patient Servic

e [place] [id number] [phone] Gram Stain (SEE NOTE)  Normal                12

 

             Body Fluid Culture **************** <SEE NOTE>                     

       13

 

                    PH, Body Fluid      2020          Patient Service [place] [id number] [phone] PH Body Fluid > 7.800 units Normal     Not Established

  

 

             Source, Body Fluid pH PLEURAL      Normal                     

 

                    Amylase Body Fluid  2020          Patient Service [place] [id number] [phone] Amylase, Body Fluid 31 U/L     Normal     Not Establis

hed  

 

             Source, Body Fluid Amylase PLEURAL      Normal                     

 

                    Glucose Body Fluid  2020          Patient Service [place] [address] [phone] Glucose, Body Fluid 131 mg/dL  Normal     Not Establis

hed  

 

             Source, Body Fluid Glucose PLEURAL      Normal                     

 

                    LDH Body Fluid      2020          Patient Service [place] [address]27 [phone] LDH, Body Fluid 51 U/L     Normal     Not Established 

 

 

             Source, Body Fluid LDH PLEURAL      Normal                     

 

                    TP Body Fluid       2020          Patient Service [place] [address] [phone] Total Protein, Body Fluid 0.7 g/dL   Normal     Not Es

tablished  

 

             Source, Body Fluid Tot Protein PLEURAL      Normal                 

    

 

                    Cell Count Pleural Fluid 2020          Patient Service

 [place] [address] [phone] Source, Body Fluid PLEURAL    Normal                 

 

             Pleural FL Color YELLOW       Normal       Colorless     

 

             Appearance, Body Fluid CLOUDY       Normal       Clear         

 

             WBC Body Fluid 114 /uL      High         0-10          

 

             RBC Body Fluid 4 10         Normal       <2            

 

             BF Mononuclear Cell % 91.2 %       High         0-0           

 

             BF Polymorphonuclear Cell % 8.8 %        High         0-0          

 

 

                    PT & Aptt           2020          Patient Service [facility]

er

[place] [id number] [phone] Prothrombin Time 18.6 seconds High       12.5-14.3   

 

             Inr          1.52         Normal                    14

 

             Partial Thromboplastin Time 34.0 seconds Normal       24.2-38.5    

 

 

                    Laboratory test finding 2020          Patient Service 

[place] [address] [phone] Platelet Count, Automated 113 10     Low        150-45

0     

 

                    CBC With Differential 2020          Patient Service Ce

nter

[place] [address] [phone] White Blood Count 7.9 10     Normal     4.0-10.0    

 

             Red Blood Count 3.31 10      Low          4.00-5.40     

 

             Hemoglobin   11.7 g/dL    Low          12.0-15.5     

 

             Hematocrit   34.8 %       Low          36.0-47.0     

 

             Mean Corpuscular Volume 105.1 fl     High         80.0-96.0     

 

             Mean Corpuscular Hemoglobin 35.3 pg      High         27.0-33.0    

 

 

             Mean Corpuscular HGB Conc 33.6 g/dL    Normal       32.0-36.5     

 

             Red Cell Distribution Width 14.6 %       High         11.5-14.5    

 

 

             Platelet Count, Automated 96 10        Low          150-450       

 

             Nucleated Red Blood Cell % 0.0 %        Normal       0-0           

 

                    Differential        2020          [place] [address] [phone] Neutrophils 70 %       High       28-66       

 

             Lymphocytes  14 %         Low          16-44         

 

             Monocytes    8 %          High         0-5           

 

             Eosinophils  5 %          High         0-3           

 

             Myelocytes   1 %          High         0-0           

 

             Atypical Lymph 2 %          Normal       0-5           

 

             Anisocytosis 1+           Normal                     

 

                    Laboratory test finding 2020          Patient [place] 

[place] [address] [phone] Platelet Estimate DECREASED  Normal     Normal      

 

             Immature Platelet Fraction 1.3 %        Normal       0.0-9.59      

 

                    Cardiac Marker Panel 2020          [place] [address] [phone] CPK Creatine Phosphokinase 50 U/L     Normal     26-19

2      

 

             CK-MB Value Mass < 1.0 NG/ML  Normal       <3.6          

 

             MB/CK Relative Index 2.00         Normal       < Or =4      15

 

             Troponin I   < 0.02 NG/ML Normal       < 0.10       16

 

                    Liver Profile       2020          [place] [address] [phone] Ast/Sgot   80 U/L     High       7-37        

 

             Alt/SGPT     51 U/L       Normal       12-78         

 

             Alkaline Phosphatase 136 U/L      High                 

 

             Bilirubin,Total 6.8 mg/dL    High         0.2-1.0       

 

             Bilirubin,Direct 4.4 mg/dL    High         0.0-0.2       

 

             Total Protein 7.9 GM/DL    Normal       6.4-8.2       

 

             Albumin      2.0 GM/DL    Low          3.2-5.2       

 

             Albumin/Globulin Ratio 0.3          Low          1.2-2.2       

 

                    Basic Metabolic Profile 2020          Patient [place] 

[place] [address] [phone] Glucose, Fasting 83 mg/dL   Normal           

 

             Blood Urea Nitrogen 24 mg/dL     High         7-18          

 

             Creatinine For GFR 1.38 mg/dL   High         0.55-1.30     

 

             Glomerular Filtration Rate 41.5         Low          >45          1

7

 

             Sodium Level 130 mEq/L    Low          136-145       

 

             Potassium Serum 3.4 mEq/L    Low          3.5-5.1       

 

             Chloride Level 96 mEq/L     Low                  

 

             Carbon Dioxide Level 23 mEq/L     Normal       21-32         

 

             Anion Gap    11 mEq/L     Normal       8-16          

 

             Calcium Level 8.6 mg/dL    Low          8.8-10.2      

 

                    Laboratory test finding 2020          Patient Service 

[place] [address] [phone] NT-Pro  pg/mL  Normal     <125        

 

             Magnesium Level 2.2 mg/dL    Normal       1.8-2.4       

 

                    PT & Aptt           2020          [place] [address] [phone] Prothrombin Time 19.4 seconds High       12.5-14.3   

 

             Inr          1.60         Normal                    18

 

             Partial Thromboplastin Time 32.6 seconds Normal       24.2-38.5    

 







                          1                         FEW RBCS

FEW WBCS

NO ORGANISMS SEEN





 

                          2                         ****************************

*********************

If aerobic or anaerobic growth is detected within

the next 7-21 days, an addendum will follow.

                                        .***************************************

********.



FULL REPORT IN LAB NOTES ([facility] and [facility]).

NO GROWTH AEROBICALLY





 

                          3                         TRANSFUSED PRODUCT: ALBUMIN 

25%                         COUNT: 1



 

                          4                         THERAPUTIC HUMAN INR VALUES

INDICATIONS                      NORMAL RANGES

PROPHYLAXIS/TREATMENT OF:

VENOUS THROMBOSIS                2.0-3.0

PULMONARY EMBOLISM               2.0-3.0

PREVENTION OF SYSTEMIC EMBOLISM FROM:

TISSUE HEART VALVES              2.0-3.0

ACUTE MYOCARDIAL INFARCTION      2.0-3.0

VALVULAR HEART DISEASE           2.0-3.0

ATRIAL FIBRILLATION              2.0-3.0

MECHANICAL VALVES(HIGH RISK)     2.5-3.5

RECURRENT MYOCARDIAL INFARCTION  2.5-3.5



 

                          5                         Units are mL/min/1.73 m2



Chronic Kidney Disease Staging per [facility]:



Stage I & II   GFR >=60       Normal to Mildly Decreased

Stage III      GFR 30-59      Moderately Decreased

Stage IV       GFR 15-29      Severely Decreased

Stage V        GFR <15        Very Little GFR Left

ESRD           GFR <15 on RRT



 

                          6                         Units are mL/min/1.73 m2



Chronic Kidney Disease Staging per [facility]:



Stage I & II   GFR >=60       Normal to Mildly Decreased

Stage III      GFR 30-59      Moderately Decreased

Stage IV       GFR 15-29      Severely Decreased

Stage V        GFR <15        Very Little GFR Left

ESRD           GFR <15 on RRT



 

                          7                         Testing performed at [facility] . Report copy to follow

on a separate form. 20 REF LAB#:[id number]



IAIN/Calcofluor preparatio     No fungus observed.





 

                          8                         Testing performed at [facility] . Report copy to follow

on a separate form. 20 REF LAB#:[id number]



FUNGUS CULTURE [facility]        No Yeast or Mold Isolated after 4 weeks.





 

                          9                         Testing performed at [facility] lab . Report copy to follow

on a separate form. 20 REF LAB#:[id number]



Due to limited sensitivity, smear results should

be used as an adjunct in evaluating patient tuberculosis

status. Cultural examination is highly recommended

for clinical diagnosis.





AFB sm REF LAB concentra      NEGATIVE





 

                          10                        Testing performed at [facility] lab . Report copy to follow

on a separate form. 20 REF LAB#:[id number]



FULL REPORT IN LAB NOTES ([facility] and [facility]).

No Acid-Fast Bacilli Isolated after 6 Weeks.





 

                          11                        ****************************

*********************

If anaerobic or aerobic growth is detected within

the next 7-21 days, an addendum will follow.

                                        .***************************************

********.



FULL REPORT IN LAB NOTES ([facility] and [facility]).

NO GROWTH ANAEROBICALLY





 

                          12                        FEW WBCS

NO ORGANISMS SEEN





 

                          13                        ****************************

*********************

If aerobic or anaerobic growth is detected within

the next 7-21 days, an addendum will follow.

                                        .***************************************

********.



FULL REPORT IN LAB NOTES ([facility] and [facility]).

NO GROWTH AEROBICALLY





 

                          14                        THERAPUTIC HUMAN INR VALUES

INDICATIONS                      NORMAL RANGES

PROPHYLAXIS/TREATMENT OF:

VENOUS THROMBOSIS                2.0-3.0

PULMONARY EMBOLISM               2.0-3.0

PREVENTION OF SYSTEMIC EMBOLISM FROM:

TISSUE HEART VALVES              2.0-3.0

ACUTE MYOCARDIAL INFARCTION      2.0-3.0

VALVULAR HEART DISEASE           2.0-3.0

ATRIAL FIBRILLATION              2.0-3.0

MECHANICAL VALVES(HIGH RISK)     2.5-3.5

RECURRENT MYOCARDIAL INFARCTION  2.5-3.5



 

                          15                        DIAGNOSIS CRITERIA

MMB ng/ml       Relative Index (RI)

NON-AMI               < or = 5               N/A

GRAY ZONE              > 5                < or = 4

AMI                    > 5                   > 4



 

                          16                        Troponin I Reference Interva

l for [employer] LOCI:



                                        99th Percentile= 0.00-0.045 ng/ml



Risk Stratification:

<= 0.10 ng/ml   Decreased Risk for Adverse Clinical

Events.

                                        0.10-1.50 ng/ml   Increased Risk for Adv

erse Clinical

Events. Evaluation of additional

criterion and/or repeat testing in 2-6

hours is suggested to rule out myocardial

damage.

>= 1.50 ng/ml   Indicative of Myocardial Injury.



 

                          17                        Units are mL/min/1.73 m2



Chronic Kidney Disease Staging per NK[facility]:



Stage I & II   GFR >=60       Normal to Mildly Decreased

Stage III      GFR 30-59      Moderately Decreased

Stage IV       GFR 15-29      Severely Decreased

Stage V        GFR <15        Very Little GFR Left

ESRD           GFR <15 on RRT



 

                          18                        THERAPUTIC HUMAN INR VALUES

INDICATIONS                      NORMAL RANGES

PROPHYLAXIS/TREATMENT OF:

VENOUS THROMBOSIS                2.0-3.0

PULMONARY EMBOLISM               2.0-3.0

PREVENTION OF SYSTEMIC EMBOLISM FROM:

TISSUE HEART VALVES              2.0-3.0

ACUTE MYOCARDIAL INFARCTION      2.0-3.0

VALVULAR HEART DISEASE           2.0-3.0

ATRIAL FIBRILLATION              2.0-3.0

MECHANICAL VALVES(HIGH RISK)     2.5-3.5

RECURRENT MYOCARDIAL INFARCTION  2.5-3.5









Procedures





                Date            Code            Description     Status

 

                    2020          44714               Brief Emotional/Beha

v Assessment W/ Scoring Doc Per Standard 

Inst                                    Completed

 

                2018      [id number]        Mammogram       Completed

 

                2016      [id number]        Mammogram       Completed

 

                2015      [id number]        Mammogram       Completed

 

                2014      [id number]        Mammogram       Completed







[employer] Devices





                                        Description

 

                                        No Information Available







Encounters





           Type       Date       Location   Provider   Dx         Diagnosis

 

           Office Visit 2020 11:45a Main Office Gayla Ya FNP K70.3

1     Alcoholic

cirrhosis of liver with ascites

 

                          K76.7                     Hepatorenal syndrome

 

                          J91.8                     Pleural effusion in other co

nditions classified elsewhere

 

                          Z13.89                    Encounter for screening for 

other disorder

 

           Office Visit 10/26/2020  3:15p Main Office Gayla Ya FNP K70.3

1     Alcoholic

cirrhosis of liver with ascites

 

                          K76.7                     Hepatorenal syndrome

 

                          J91.8                     Pleural effusion in other co

nditions classified elsewhere

 

                          E03.9                     Hypothyroidism, unspecified

 

                          I10                       Essential (primary) hyperten

nikolay

 

                          F32.1                     Major depressive disorder, s

zakia episode, moderate

 

           Office Visit 10/19/2020  2:45p Main Office PleGayla alonso, [facility] K70.3

1     Alcoholic

cirrhosis of liver with ascites

 

                          K76.7                     Hepatorenal syndrome

 

                          J91.8                     Pleural effusion in other co

nditions classified elsewhere

 

                          E03.9                     Hypothyroidism, unspecified

 

                          I10                       Essential (primary) hyperten

nikolay







Assessments





                Date            Code            Description     Provider

 

                2020      K70.31          Alcoholic cirrhosis of liver wit

h ascites Gayla Ya, [facility]

 

                2020      K76.7           Hepatorenal syndrome KALPESH Ya, [facility]

 

                2020      J91.8           Pleural effusion in other condit

ions classified elsewhere 

Gayla Ya, [facility]

 

                2020      Z13.89          Encounter for screening for othe

r disorder Gayla Ya, 

[facility]

 

                10/26/2020      K70.31          Alcoholic cirrhosis of liver wit

h ascites Gayla Ya, [facility]

 

                10/26/2020      K76.7           Hepatorenal syndrome KALPESH Ya

neisha, [facility]

 

                10/26/2020      J91.8           Pleural effusion in other condit

ions classified elsewhere 

Gayla Ya, [facility]

 

                10/26/2020      E03.9           Hypothyroidism, unspecified Ples

Gayla arnold, [facility]

 

                10/26/2020      I10             Essential (primary) hypertension

 Gayla Ya, [facility]

 

                10/26/2020      F32.1           Major depressive disorder, singl

e episode, moderate PleskGayla dinh, [facility]

 

                10/19/2020      K70.31          Alcoholic cirrhosis of liver wit

h ascites Gayla Ya, [facility]

 

                10/19/2020      K76.7           Hepatorenal syndrome Plegavin M

neisha, [facility]

 

                10/19/2020      J91.8           Pleural effusion in other condit

ions classified elsewhere 

Gayla Ya, [facility]

 

                10/19/2020      E03.9           Hypothyroidism, unspecified Ples

kach Gayla, [facility]

 

                10/19/2020      I10             Essential (primary) hypertension

 Gayla Ya FNP







Plan of Treatment

Future Appointment(s):* 2021 11:30 am - Gayla Ya FNP at Main 
  Office

2020 - Gayla Ya FNP* K70.31 Alcoholic cirrhosis of liver with 
  ascites* Comments:* spoke with patient at length regarding importance of f/u 
  with pulmonology and nephrology.  She does not seem to understand the severity
  of her disease, she may possibly be in denial.  She is certainly not ready to 
  talk about hospice or DNR.  I encouraged her to call both pulmonary and 
  nephrology today to make follow up appointments



* Follow up:* one month





* K76.7 Hepatorenal syndrome

* J91.8 Pleural effusion in other conditions classified elsewhere

* Z13.89 Encounter for screening for other disorder





Functional Status





                Functional Condition Comment         Date            Status

 

                Glasses         Reading                         Active

 

                Independent with all ADL's                                 Activ

e







Mental Status





                Mental Condition Comment         Date            Status

 

                None            poor reader                     Active







Referrals





                Refer to      Reason for Referral Status          Appt Date

 

                [facility] ANXIETY         Closed          



 

                                        [address] [place] [address]

 

                                        [phone]

## 2021-01-16 NOTE — CCD
Continuity of Care Document (CCD)

                             Created on: 2020



Flora Taylor

External Reference #: [id number]

: 1960

Sex: Female



Demographics





                          Address                   [address] [place]  [address]

 

                          Home Phone                [phone]

 

                          Preferred Language        Unknown

 

                          Marital Status            Unknown

 

                          Religion Affiliation     Unknown

 

                          Race                      White

 

                          Ethnic Group              Not  or 





Author





                          Author                    Flora FRASER M.D.

 

                          Organization              Unknown

 

                          Address                   [address] [place]  [address]



 

                          Phone                     [phone]







Care Team Providers





                    Care Team Member Name Role                Phone

 

                    Meron Ramirez M.D. AUTM                [phone]







Problems





                    Active Problems     Provider            Date

 

                    Cirrhosis of liver  Arley Fraser M.D. Onset: 20

19

 

                    Screening for malignant neoplasm of colon Arley ventura M.D. Onset: 

2019







Social History





                Type            Date            Description     Comments

 

                Birth Sex                       Unknown          

 

                ETOH Use                        Occasionally    2016 decrea

sed. 

 

                Tobacco Use     Start: Unknown  Patient has never smoked  







Allergies, Adverse Reactions, Alerts





             Active Allergies Reaction     Severity     Comments     Date

 

             Penicillin                                          2019

 

                                        Inactive Allergies

 

             NKDA                                                2019







Medications





           Active Medications SIG        Qnty       Indications Ordering Provide

r Date

 

                          Spironolactone                     50mg Tablets       

            1 tab by mouth

twice a day     180tabs                         Arley Fraser M.D. 10/13/2

020

 

                          Torsemide                     20mg Tablets            

       1 tab by mouth 

twice a day     180tabs                         Arley Fraser M.D. 10/13/2

020

 

                          Xifaxan                     200mg Tablets             

      Take Two Tablets 

400MG  By Mouth Twice A Day For 15 Days                                 Unknown 

        

 

             Hydroxyzine HCL                     25mg Tablets                   

                                       

Pleskach, Gayla FNP                     

 

             Sertraline HCL                     50mg Tablets                    

                                      

Pleskach, Gayla FNP                     

 

                                        Fluticasone Propionate                  

   50mcg/Act Suspension                 

                                                    Unknown      

 

           Torsemide                     20mg Tablets                           

                         Unknown    



 

                                        Prednisolone Sodium Phosphate           

          15mg/5ML Solution             

             Take 2.7 Teaspoonful  13 ML  By Mouth Once Daily With Food         

                  Unknown      



 

           Midodrine HCL                     5mg Tablets                        

                            Unknown    



 

             Ciprofloxacin HCL                     250mg Tablets                

                                          

Unknown                                 

 

                          Loratadine                     10mg Tablets           

        Take One Tablet By

Mouth Every Day                                 Unknown         

 

                          Mucus Relief ER                     600mg Tablets ER 1

2HR                   Take

One Tablet By Mouth Every 12 Hours For 10 Days                                 U

nknown         

 

           Latanoprost                     0.005% Solution                      

                              Unknown    



 

                          Betaxolol HCL                     0.5% Solution       

            Instill One 

Drop In Each Eye Two Times A Day                                 Unknown        

 

 

           Multiple Vitamin                      Tablets                        

                            Unknown    



 

                                        Lactulose Encephalopathy                

     10GM/15ML Solution                 

             take 30ml by mouth three times a day 946ml                     Guillaume Fraser M.D. 



 

                          Brimonidine Tartrate                     0.2% Solution

                   Instill

1 Drop In [place] Two Times A Day                                 Unknown       

  

 

           Folic Acid                     1mg Tablets                           

                         Unknown    



 

             Levothyroxine Sodium                     112mcg Tablets            

                                              

Unknown                                 







Immunizations





                                        Description

 

                                        No Information Available







Vital Signs





                Date            Vital           Result          Comment

 

                2020 12:58pm Height          67 inches       5'7"

 

                    Weight              175.00 lb            

 

                    BP Systolic         121 mmHg             

 

                    BP Diastolic        74 mmHg              

 

                    Heart Rate          85 /min              

 

                    BMI (Body Mass Index) 27.4 kg/m2           

 

                    Weight              79.380 kg            

 

                    Body Temperature    97.0 F             

 

                10/29/2020 12:48pm Height          67 inches       5'7"

 

                    Weight              170.00 lb            

 

                    BP Systolic         116 mmHg             

 

                    BP Diastolic        70 mmHg              

 

                    Heart Rate          99 /min              

 

                    BMI (Body Mass Index) 26.6 kg/m2           

 

                    Weight              77.112 kg            

 

                    Body Temperature    97.7 F             







Results





        Test    Acquired Date Facility Test    Result  H/L     Range   Note

 

                    Comprehensive Metabolic Profil 2020          [facility]

                                        [address] [place] [address]

             Glucose, Fasting 118 mg/dL    High                1

 

             Blood Urea Nitrogen 69 mg/dL     High         7-18          

 

             Creatinine For GFR 2.49 mg/dL   High         0.55-1.30     

 

             Glomerular Filtration Rate 21.0         Low          >45          2

 

             Sodium Level 128 mEq/L    Low          136-145       

 

             Potassium Serum 4.2 mEq/L    Normal       3.5-5.1       

 

             Chloride Level 92 mEq/L     Low                  

 

             Carbon Dioxide Level 25 mEq/L     Normal       21-32         

 

             Anion Gap    11 mEq/L     Normal       8-16          

 

             Calcium Level 8.8 mg/dL    Normal       8.8-10.2      

 

             Ast/Sgot     45 U/L       High         7-37          

 

             Alt/SGPT     44 U/L       Normal       12-78         

 

             Alkaline Phosphatase 186 U/L      High                 

 

             Bilirubin,Total 4.2 mg/dL    High         0.2-1.0       

 

             Total Protein 6.9 GM/DL    Normal       6.4-8.2       

 

             Albumin      2.2 GM/DL    Low          3.2-5.2       

 

             Albumin/Globulin Ratio 0.5          Low          1.2-2.2       

 

                    CBC W/Auto Diff & PLT 10/27/2020          [facility]

                                        [address] [place] [address]

             White Blood Count <pending>                               

 

             RBC Red Blood Count <pending>                               

 

             Hemoglobin   <pending>                               

 

             Hematocrit   <pending>                               

 

             MCV (Corpuscular Volume) <pending>                               

 

             MCH (Corpuscular Hemoglobin) <pending>                             

  

 

             MCHC (Corpuscular Hemog Conc) <pending>                            

   

 

             RDW          <pending>                               

 

             Platelet Count <pending>                               

 

             MPV          <pending>                               

 

             Neutrophils  <pending>                               

 

             Bands        <pending>                               

 

             Lymphocytes  <pending>                               

 

             Monocytes    <pending>                               

 

             Fluid Body Eosinophils <pending>                               

 

             Basophils    <pending>                               

 

             Absolute Basophils <pending>                               

 

             Absolute Eosinophils <pending>                               

 

             Absolute Lymphocytes <pending>                               

 

             Absolute Monocytes <pending>                               

 

             Absolute Neutrophils <pending>                               

 

                    CMP                 10/27/2020          [facility]

nter

                                        [address] [place] [address]

             Albumin      <pending>                               

 

             Alt - SGPT   <pending>                               

 

             Calcium      <pending>                               

 

             Carbon Dioxide <pending>                               

 

             Chloride     <pending>                               

 

             Creatinine   <pending>                               

 

             Glucose Serum <pending>                               

 

             Alkaline Phosphatase <pending>                               

 

             Potassium    <pending>                               

 

             Protein Total <pending>                               

 

             Sodium       <pending>                               

 

             Ast - Sgot   <pending>                               

 

             BUN - Urea Nitrogen <pending>                               

 

             Glucose, Fasting 104 mg/dL    High                 

 

             Blood Urea Nitrogen 51 mg/dL     High         7-18          

 

             Creatinine For GFR 1.98 mg/dL   High         0.55-1.30     

 

             Glomerular Filtration Rate 27.4         Low          >45          3

 

             Sodium Level 125 mEq/L    Low          136-145       

 

             Potassium Serum 4.4 mEq/L    Normal       3.5-5.1       

 

             Chloride Level 87 mEq/L     Low                  

 

             Carbon Dioxide Level 30 mEq/L     Normal       21-32         

 

             Anion Gap    8 mEq/L      Normal       8-16          

 

             Calcium Level 9.5 mg/dL    Normal       8.8-10.2      

 

             Ast/Sgot     78 U/L       High         7-37          

 

             Alt/SGPT     80 U/L       High         12-78         

 

             Alkaline Phosphatase 189 U/L      High                 

 

             Bilirubin,Total 7.6 mg/dL    High         0.2-1.0       

 

             Total Protein 7.6 GM/DL    Normal       6.4-8.2       

 

             Albumin      2.8 GM/DL    Low          3.2-5.2       

 

             Albumin/Globulin Ratio 0.6          Low          1.2-2.2       

 

                    Laboratory test finding 10/27/2020          [place] [address]

             Afp Serum Tumor Marker <pending>                               

 

             Immature Platelet Fraction <pending>                               

 

             Immature Platelet Fraction 3.4 %        Normal       0.0-9.59      

 

             Alpha Fetoprotein Tumor Quant 5.0 NG/ML    Normal       <8.1       

  4

 

             Immature Platelet Fraction % <pending>                             

  

 

             Immature Platelet Fraction % <pending>                             

  

 

             Immature Platelet Fraction % <pending>                             

  

 

             Immature Platelet Fraction % <pending>                             

  

 

             Immature Platelet Fraction % <pending>                             

  

 

             Immature Platelet Fraction % <pending>                             

  

 

             Immature Platelet Fraction % <pending>                             

  

 

             Immature Platelet Fraction % <pending>                             

  

 

             Immature Platelet Fraction % <pending>                             

  

 

             Immature Platelet Fraction % <pending>                             

  

 

             Immature Platelet Fraction % <pending>                             

  

 

             Immature Platelet Fraction % <pending>                             

  

 

             Immature Platelet Fraction % <pending>                             

  

 

             Immature Platelet Fraction % <pending>                             

  

 

             Immature Platelet Fraction % <pending>                             

  

 

             Immature Platelet Fraction % <pending>                             

  

 

                    CBC With Differential 2020          [place] [address]

             White Blood Count 4.1 10       Normal       4.0-10.0     5

 

             Red Blood Count 3.34 10      Low          4.00-5.40     

 

             Hemoglobin   11.5 g/dL    Low          12.0-15.5     

 

             Hematocrit   33.0 %       Low          36.0-47.0     

 

             Mean Corpuscular Volume 98.8 fl      High         80.0-96.0     

 

             Mean Corpuscular Hemoglobin 34.4 pg      High         27.0-33.0    

 

 

             Mean Corpuscular HGB Conc 34.8 g/dL    Normal       32.0-36.5     

 

             Red Cell Distribution Width 13.4 %       Normal       11.5-14.5    

 

 

             Platelet Count, Automated 85 10        Low          150-450      6

 

             Neutrophils % 60.5 %       Normal       36.0-66.0     

 

             Lymph %      22.3 %       Low          24.0-44.0     

 

             Mono %       11.6 %       High         0.0-5.0       

 

             Eos %        3.4 %        High         0.0-3.0       

 

             Baso %       1.5 %        High         0.0-1.0       

 

             Immature Granulocyte % 0.7 %        Normal       0-3.0         

 

             Nucleated Red Blood Cell % 0.0 %        Normal       0-0           

 

             Neutrophils # 2.5 10       Normal       1.5-8.5       

 

             Lymph #      0.9 10       Low          1.5-5.0       

 

             Mono #       0.5 10       Normal       0.0-0.8       

 

             Eos #        0.1 10       Normal       0.0-0.5       

 

             Baso #       0.1 10       Normal       0.0-0.2       

 

                    Comprehensive Metabolic Profil 2020          [facility]

                                        [address] [place] [address]

             Glucose, Fasting 84 mg/dL     Normal               

 

             Blood Urea Nitrogen 10 mg/dL     Normal       7-18          

 

             Creatinine For GFR 1.23 mg/dL   Normal       0.55-1.30     

 

             Glomerular Filtration Rate 47.4         Normal       >45          7

 

             Sodium Level 130 mEq/L    Low          136-145       

 

             Potassium Serum 3.8 mEq/L    Normal       3.5-5.1       

 

             Chloride Level 93 mEq/L     Low                  

 

             Carbon Dioxide Level 30 mEq/L     Normal       21-32         

 

             Anion Gap    7 mEq/L      Low          8-16          

 

             Calcium Level 8.2 mg/dL    Low          8.8-10.2      

 

             Ast/Sgot     87 U/L       High         7-37          

 

             Alt/SGPT     37 U/L       Normal       12-78         

 

             Alkaline Phosphatase 266 U/L      High                 

 

             Bilirubin,Total 6.4 mg/dL    High         0.2-1.0       

 

             Total Protein 7.6 GM/DL    Normal       6.4-8.2       

 

             Albumin      2.0 GM/DL    Low          3.2-5.2       

 

             Albumin/Globulin Ratio 0.4          Low          1.2-2.2       

 

                    Laboratory test finding 2020          [facility]

                                        [address] [place] [address]

             Alpha Fetoprotein Tumor Quant 6.3 NG/ML    Normal       <8.1       

  8

 

             Immature Platelet Fraction 1.1 %        Normal       0.0-9.59      







                          1                         reviewed

 

                          2                         Units are mL/min/1.73 m2



Chronic Kidney Disease Staging per NKF:



Stage I & II   GFR >=60       Normal to Mildly Decreased

Stage III      GFR 30-59      Moderately Decreased

Stage IV       GFR 15-29      Severely Decreased

Stage V        GFR <15        Very Little GFR Left

ESRD           GFR <15 on RRT



 

                          3                         Units are mL/min/1.73 m2



Chronic Kidney Disease Staging per NKF:



Stage I & II   GFR >=60       Normal to Mildly Decreased

Stage III      GFR 30-59      Moderately Decreased

Stage IV       GFR 15-29      Severely Decreased

Stage V        GFR <15        Very Little GFR Left

ESRD           GFR <15 on RRT



 

                          4                         THE AFP ASSAY IS PERFORMED O

N THE [facility] BY

CHEMILUMINESCENCE AND SHOULD NOT BE COMPARED INTERCHANGEABLY

WITH OTHER METHODS. IT SHOULD NOT BE USED ALONE AS A

SCREENING TEST OR DIAGNOSIS FOR THE PRESENCE OR ABSENCE OF

MALIGNANT DISEASE.  THESE RESULTS ARE NOT INTERPRETABLE IN

PREGNANT FEMALES.

PREDICTIONS OF DISEASE RECURRENCE SHOULD NOT BE BASED SOLELY

ON VALUES OBTAINED FROM SERIAL PATIENT SERUM VALUES.



 

                          5                         reviewed bili is still up

 

                          6                         Scan Verified machine result

s

PLATELET COUNT LESS THAN 100, AND NEW OCCURANCE OR





 

                          7                         Units are mL/min/1.73 m2



Chronic Kidney Disease Staging per NKF:



Stage I & II   GFR >=60       Normal to Mildly Decreased

Stage III      GFR 30-59      Moderately Decreased

Stage IV       GFR 15-29      Severely Decreased

Stage V        GFR <15        Very Little GFR Left

ESRD           GFR <15 on RRT



 

                          8                         THE AFP ASSAY IS PERFORMED O

N THE [facility] BY

CHEMILUMINESCENCE AND SHOULD NOT BE COMPARED INTERCHANGEABLY

WITH OTHER METHODS. IT SHOULD NOT BE USED ALONE AS A

SCREENING TEST OR DIAGNOSIS FOR THE PRESENCE OR ABSENCE OF

MALIGNANT DISEASE.  THESE RESULTS ARE NOT INTERPRETABLE IN

PREGNANT FEMALES.

PREDICTIONS OF DISEASE RECURRENCE SHOULD NOT BE BASED SOLELY

ON VALUES OBTAINED FROM SERIAL PATIENT SERUM VALUES.









Procedures





                                        Description

 

                                        No Information Available







Medical Devices





                                        Description

 

                                        No Information Available







Encounters





           Type       Date       Location   Provider   Dx         Diagnosis

 

           Office Visit 2020  1:00p Main Office Arley Fraser M.D. K

70.31     

Alcoholic cirrhosis of liver with ascites

 

           Office Visit 10/29/2020 12:45p Main Office Arley Fraser M.D. K

70.31     

Alcoholic cirrhosis of liver with ascites

 

           Office Visit 10/13/2020  9:30a Main Office Arley Fraser M.D. K

70.31     

Alcoholic cirrhosis of liver with ascites

 

           Office Visit 2020  2:15p Main Office Arley Fraser M.D. K

70.31     

Alcoholic cirrhosis of liver with ascites







Assessments





                Date            Code            Description     Provider

 

                2020      K70.31          Alcoholic cirrhosis of liver wit

h ascites Arley Fraser M.D.

 

                10/29/2020      K70.31          Alcoholic cirrhosis of liver wit

h ascites Arley Fraser M.D.

 

                10/13/2020      K70.31          Alcoholic cirrhosis Arley denton M.D.

 

                2020      K70.31          Alcoholic cirrhosis of liver wit

h ascites Arley Fraser M.D.







Plan of Treatment

Future Appointment(s):* 2021  2:00 pm - Arley Fraser M.D. at Main 
  Office

2020 - Arley Fraser M.D.* K70.31 Alcoholic cirrhosis of liver with 
  ascites* Comments:* 59 yo wf who presented for a h/o alcoholic cirrhosis with 
  ascites.n No  c/o abdominal pain, weight loss, change in bowel habits, or 
  rectal bleeding. No family h/o colon cancer. No h/o chest pain, or sob. Pt has
  been drinking. She was admitted for sob, found to have a pleural effusion. She
  had a tap on the pleural fluid. She had alcoholic hepatitis off prednisone at 
  this time.   Plan:1. Maintain meds.2. Pt was admitted to the hospital for 
  hydrothorax.3. She is feeling well.4. Office in 6 weeks.









Functional Status





                                        Description

 

                                        No Information Available







Mental Status





                                        Description

 

                                        No Information Available







Referrals





                                        Description

 

                                        No Information Available

## 2021-01-16 NOTE — CCD
Continuity of Care Document (CCD)

                             Created on: 10/28/2020



Flora Taylor

External Reference #: [id number]

: 1960

Sex: Female



Demographics





                          Address                   [address] [place] [address]  [address]

 

                          Home Phone                [phone]

 

                          Preferred Language        Unknown

 

                          Marital Status            Unknown

 

                          Latter-day Affiliation     Gnosticism (Non-Yazidi, Non

-Specific)

 

                          Race                      White

 

                          Ethnic Group              Not  or 





Author





                          Author                    Flora YA [facility]

 

                          Organization              Unknown

 

                          Address                   [address]

[place]  [address]



 

                          Phone                     [phone]







Care Team Providers





                    Care Team Member Name Role                Phone

 

                    [facility] - [facility]astro[facility]

TM                [phone]

 

                    [facility]         AUTM                [phone]

 

                    [facility] Pul[facility] Disease AUTM               

 [phone]

 

                    [facility] - [facility] Health AUTM            

    [phone]







Problems





                    Active Problems     Provider            Date

 

                    Essential hypertension Gabe Corral M.D. Onset: 

 

                    Hypothyroidism      Gabe Corral M.D. Onset: 2011

 

                    Vitamin D deficiency Gabe Corral M.D. Onset: 

 

                    Neck pain           Gabe Corral M.D. Onset: 2011

 

                    Generalized anxiety disorder Gabe Corral M.D. Onse

t: 2011

 

                    Allergic rhinitis   Gabe Corral M.D. Onset: 2012

 

                    Degeneration of lumbar intervertebral disc Benigno Corral M.D. Onset: 

2013

 

                    Alcoholic cirrhosis Gabe Corral M.D. Onset: 2018

 

                    Esophageal varices without bleeding Gabe Corral M. D. Onset: 2018







Social History





                Type            Date            Description     Comments

 

                Birth Sex                       Unknown          

 

                Tobacco Use     Start: Unknown  Never Smoked Cigarettes  

 

                Tobacco Use     Start: Unknown  Never Used Smokeless Tobacco  

 

                ETOH Use                        Consumed 4 Alcoholic Beverages P

er Day  

 

                Tobacco Use     Start: Unknown  Patient has never smoked  

 

                Recreational Drug Use                 Denies Drug Use  

 

                Smoking Status  Reviewed: 10/26/20 Patient has never smoked  

 

                Exercise Type/Frequency                 Exercises sporadically  

 

                Tattoo/Piercing                 Tattoo          3 

 

                Tattoo/Piercing                 Pierced ears    x2 

 

                Sun Exposure                    Minimum amount of sun exposure  

 

                Sun Exposure                    Uses sunscreen  Occasionally 

 

                Seat Belt/Car Seat                 Always uses seat belt  

 

                Bike Helmet                     Never           Does not bike ri

de. 

 

                Smoke Alarms                    Yes              

 

                Smoke Alarms                    Carbon Monoxide Detector: Yes  







Allergies, Adverse Reactions, Alerts





             Active Allergies Reaction     Severity     Comments     Date

 

             Penicillin                                          2004

 

             Cefdinir     Difficulty swallowing, Itching, throat felt itchy. Sev

ere                    2019







Medications





           Active Medications SIG        Qnty       Indications Ordering Provide

r Date

 

                          Sertraline HCL                     50mg Tablets       

            1 by mouth 

every day       30tabs          F32.1           Gayla Ya FNP 10/26/2020

 

                          Hydroxyzine HCL                     25mg Tablets      

             1 -2 tab by 

mouth as needed before bed 30tabs          F32.1           Gayla Ya FNP 

10/26/2020

 

                          Mucinex                     600mg Tablets ER 12HR     

              1 by mouth 

twice a day                                     Unknown         10/08/2020

 

                          Midodrine HCL                     5mg Tablets         

          1 tablet three 

times a day ( 8 am, 12 n,4 pm) 90tabs                          Gayla Ya FNP 10/08/2020

 

                          Spironolactone                     50mg Tablets       

            take one 

tablet by mouth twice a day  (9 Am/5 PM)                                 Genie Tabares PA-C 10/08/2020

 

                          Torsemide                     20mg Tablets            

       take 1 tablet twice

a day (9 Am/5 PM)                                 Unknown         10/08/2020

 

                                        Lactulose Encephalopathy                

     10GM/15ML Solution                 

             take 30ml by mouth three times a day 2700units                 Gayla Araujo FNP 

2020

 

                          Thiamine HCL                     100mg Tablets        

           1 by mouth 

every day                                       Unknown         2020

 

                                        Fluticasone Propionate Nasal Spray      

               50mcg/Act Suspension     

             2 sprays each nostril once a day                           Unknown 

     2019

 

                          Latanoprost                     0.005% Solution       

            one drop in 

each eye daily at at bedtime                                 Unknown         

 

                          Betaxolol HCL                     0.5% Solution       

            1 drop each 

eye twice a day.                                 Unknown         2019

 

                          Brimonidine Tartrate                     0.2% Solution

                   one 

gtt. each eye twice a day                                 Unknown         2019

 

                          Vitamin D                     1000Unit Tablets        

           1 by mouth 

every day       100tabs                         Meron Ramirez M.D. 

019

 

                          Levothyroxine Sodium                     112mcg Tablet

s                   1 by 

mouth every day 90tabs          E03.9           Meron Ramirez M.D. 

018

 

                                        Wrist Splint/Cock-Up/Left/Canvas/Medium 

                     Misc               

                dx: carpal tunnel syndrome, duration 12 months, prognosis good 1

units                          

Gabe Corral M.D.              2018

 

                                        Wrist Splint/Cock-Up/Right/Canvas/Medium

                      Misc              

                                        wear splint every night for carpal tunne

l syndrome, prognosis good, duration

99 months       1units                          Gabe Corral M.D. 

 

                                        Levocetirizine Dihydrochloride          

           5mg Tablets                  

                1 tab by mouth every evening for allergies 90tabs          J30.9

           Meron Ramirez M.D.

                                        2015

 

                          Ketotifen Fumarate                     0.025% Solution

                   1 drop 

both eyes twice a day for allergy symptoms 5ml                             Unkno

wn         

 

                          Folic Acid                     1mg Tablets            

       1 by mouth every 

day             90tabs                          Meron Ramirez M.D. 0

000

 

           Multivitamin Adult                      Tablets                      

                              Unknown    



 

                                        History Medications

 

                          Ciprofloxacin HCL                     250mg Tablets   

                1 tab by 

mouth once a day at 6 Am                                 Unknown         10/08/2

020 - 10/22/2020







Immunizations





             CPT Code     Status       Date         Vaccine      Lot #

 

             [id number]        Given        2019   Pneumococcal Vaccine [id number]

 

                94900           Given           10/29/2018      Influenza Virus 

Vaccine, Quadrivalent,age 3 and 

up,multidose vial                       [id number]

 

             36306        Given        2017   Influenza Vaccination  

 

             88813        Given        2015   Influenza Vaccination  

 

             29854        Given        2013   Influenza Vaccination  

 

             03677        Given        04/15/2013   Zostavax      

 

             87571        Given        2012   Influenza Vaccination [id number]

C

 

             45051        Given        10/31/2011   Influenza Vaccination/preser

vative free [id number]

 

             56536        Given        2010   Adacel 11 Yrs or older 

BA

 

             12139        Given        10/04/2010   Influenza Vaccination [id number]

A

 

             80136        Given        2009   Influenza Vaccination [id number]

A

 

             95956        Given        2005   Tetnus Toxoid Im Or Jet Inje

ction Use  







Vital Signs





                Date            Vital           Result          Comment

 

                10/26/2020  3:20pm BP Systolic     105 mmHg         

 

                    BP Diastolic        63 mmHg              

 

                    Heart Rate          108 /min             

 

                    Body Temperature    97.4 F             

 

                    Respiratory Rate    20 /min              

 

                    Height              67.0 inches         5'7"

 

                    Weight              169.50 lb            

 

                    O2 % BldC Oximetry  98 %                 

 

                    Peak Expiratory Flow Rate 364                 Estimated Peak

 Flow Rate

 

                    Ideal Body Weight   135 lb               

 

                    BMI (Body Mass Index) 26.5 kg/m2           

 

                10/19/2020  3:07pm BP Systolic     114 mmHg         

 

                    BP Diastolic        71 mmHg              

 

                    Heart Rate          101 /min             

 

                    Body Temperature    97.3 F             

 

                    Respiratory Rate    18 /min              

 

                    Height              67.0 inches         5'7"

 

                    Weight              166.12 lb            

 

                    O2 % BldC Oximetry  100 %                

 

                    Peak Expiratory Flow Rate 364                 Estimated Peak

 Flow Rate

 

                    Ideal Body Weight   135 lb               

 

                    BMI (Body Mass Index) 26.0 kg/m2           







Results





        Test    Acquired Date Facility Test    Result  H/L     Range   Note

 

                    CBC With Differential 2020          [place] [address] [phone] White Blood Count 7.9 10     Normal     4.0-10.0    

 

             Red Blood Count 3.31 10      Low          4.00-5.40     

 

             Hemoglobin   11.7 g/dL    Low          12.0-15.5     

 

             Hematocrit   34.8 %       Low          36.0-47.0     

 

             Mean Corpuscular Volume 105.1 fl     High         80.0-96.0     

 

             Mean Corpuscular Hemoglobin 35.3 pg      High         27.0-33.0    

 

 

             Mean Corpuscular HGB Conc 33.6 g/dL    Normal       32.0-36.5     

 

             Red Cell Distribution Width 14.6 %       High         11.5-14.5    

 

 

             Platelet Count, Automated 96 10        Low          150-450       

 

             Nucleated Red Blood Cell % 0.0 %        Normal       0-0           

 

                    Differential        2020          [facility]

[place]

[place] [address] [phone] Neutrophils 70 %       High       28-66       

 

             Lymphocytes  14 %         Low          16-44         

 

             Monocytes    8 %          High         0-5           

 

             Eosinophils  5 %          High         0-3           

 

             Myelocytes   1 %          High         0-0           

 

             Atypical Lymph 2 %          Normal       0-5           

 

             Anisocytosis 1+           Normal                     

 

                    Laboratory test finding 2020          [facility] 

[place]

[place] [address] [phone] Platelet Estimate DECREASED  Normal     Normal      

 

             Immature Platelet Fraction 1.3 %        Normal       0.0-9.59      

 

                    Cardiac Marker Panel 2020          [place] [address] [phone] CPK Creatine Phosphokinase 50 U/L     Normal     26-19

2      

 

             CK-MB Value Mass < 1.0 NG/ML  Normal       <3.6          

 

             MB/CK Relative Index 2.00         Normal       < Or =4      1

 

             Troponin I   < 0.02 NG/ML Normal       < 0.10       2

 

                    Liver Profile       2020          [place] [address] [phone] Ast/Sgot   80 U/L     High       7-37        

 

             Alt/SGPT     51 U/L       Normal       12-78         

 

             Alkaline Phosphatase 136 U/L      High                 

 

             Bilirubin,Total 6.8 mg/dL    High         0.2-1.0       

 

             Bilirubin,Direct 4.4 mg/dL    High         0.0-0.2       

 

             Total Protein 7.9 GM/DL    Normal       6.4-8.2       

 

             Albumin      2.0 GM/DL    Low          3.2-5.2       

 

             Albumin/Globulin Ratio 0.3          Low          1.2-2.2       

 

                    Basic Metabolic Profile 2020          [facility] 

[place] [address] [phone] Glucose, Fasting 83 mg/dL   Normal           

 

             Blood Urea Nitrogen 24 mg/dL     High         7-18          

 

             Creatinine For GFR 1.38 mg/dL   High         0.55-1.30     

 

             Glomerular Filtration Rate 41.5         Low          >45          3

 

             Sodium Level 130 mEq/L    Low          136-145       

 

             Potassium Serum 3.4 mEq/L    Low          3.5-5.1       

 

             Chloride Level 96 mEq/L     Low                  

 

             Carbon Dioxide Level 23 mEq/L     Normal       21-32         

 

             Anion Gap    11 mEq/L     Normal       8-16          

 

             Calcium Level 8.6 mg/dL    Low          8.8-10.2      

 

                    Laboratory test finding 2020          [facility] 

[place] [address] [phone] NT-Pro  pg/mL  Normal     <125        

 

             Magnesium Level 2.2 mg/dL    Normal       1.8-2.4       

 

                    PT & Aptt           2020          [place] [address] [phone] Prothrombin Time 19.4 seconds High       12.5-14.3   

 

             Inr          1.60         Normal                    4

 

             Partial Thromboplastin Time 32.6 seconds Normal       24.2-38.5    

 

 

                    PT & Aptt           2020          [facility]

[place]

[place] [address] [phone] Prothrombin Time 18.2 seconds High       11.8-14.0   

 

             Inr          1.54         Normal                    5

 

             Partial Thromboplastin Time 35.6 seconds Normal       25.0-38.4    

 

 

                    CBC With Differential 2020          [facility]

[place]ter

[place] [address] [phone] White Blood Count 3.4 10     Low        4.0-10.0    

 

             Red Blood Count 3.95 10      Low          4.00-5.40     

 

             Hemoglobin   12.7 g/dL    Normal       12.0-15.5     

 

             Hematocrit   36.6 %       Normal       36.0-47.0     

 

             Mean Corpuscular Volume 92.7 fl      Normal       80.0-96.0     

 

             Mean Corpuscular Hemoglobin 32.2 pg      Normal       27.0-33.0    

 

 

             Mean Corpuscular HGB Conc 34.7 g/dL    Normal       32.0-36.5     

 

             Red Cell Distribution Width 13.6 %       Normal       11.5-14.5    

 

 

             Platelet Count, Automated 66 10        Low          150-450      6

 

             Neutrophils % 53.8 %       Normal       36.0-66.0     

 

             Lymph %      31.6 %       Normal       24.0-44.0     

 

             Mono %       10.4 %       High         0.0-5.0       

 

             Eos %        2.4 %        Normal       0.0-3.0       

 

             Baso %       1.5 %        High         0.0-1.0       

 

             Immature Granulocyte % 0.3 %        Normal       0-3.0         

 

             Nucleated Red Blood Cell % 0.0 %        Normal       0-0           

 

             Neutrophils # 1.8 10       Normal       1.5-8.5       

 

             Lymph #      1.1 10       Low          1.5-5.0       

 

             Mono #       0.4 10       Normal       0.0-0.8       

 

             Eos #        0.1 10       Normal       0.0-0.5       

 

             Baso #       0.1 10       Normal       0.0-0.2       

 

                    Laboratory test finding 2020          [facility] 

[place] [address] [phone] Immature Platelet Fraction 1.4 %      Normal     0.0-9

.59   7

 

                    Comprehensive Metabolic Profil 2020          [facility]

ervice [place], [place] [id number] [phone] Glucose, Fasting 88 mg/dL   Normal           

 

             Blood Urea Nitrogen 11 mg/dL     Normal       7-18          

 

             Creatinine For GFR 1.07 mg/dL   Normal       0.55-1.30     

 

             Glomerular Filtration Rate 55.7         Normal       >45          8

 

             Sodium Level 139 mEq/L    Normal       136-145       

 

             Potassium Serum 3.4 mEq/L    Low          3.5-5.1       

 

             Chloride Level 101 mEq/L    Normal               

 

             Carbon Dioxide Level 30 mEq/L     Normal       21-32         

 

             Anion Gap    8 mEq/L      Normal       8-16          

 

             Calcium Level 8.3 mg/dL    Low          8.8-10.2      

 

             Ast/Sgot     60 U/L       High         7-37          

 

             Alt/SGPT     31 U/L       Normal       12-78         

 

             Alkaline Phosphatase 187 U/L      High                 

 

             Bilirubin,Total 3.4 mg/dL    High         0.2-1.0       

 

             Total Protein 8.5 GM/DL    High         6.4-8.2       

 

             Albumin      2.6 GM/DL    Low          3.2-5.2       

 

             Albumin/Globulin Ratio 0.4          Low          1.2-2.2       

 

                    Laboratory test finding 2020          [facility]

[place], [place] [id number] [phone] LDH Lactate Dehydrogenase 319 U/L    High       

     9







                          1                         DIAGNOSIS CRITERIA

MMB ng/ml       Relative Index (RI)

NON-AMI               < or = 5               N/A

GRAY ZONE              > 5                < or = 4

AMI                    > 5                   > 4



 

                          2                         Troponin I Reference Interva

l for Siemens Vista LOCI:



                                        99th Percentile= 0.00-0.045 ng/ml



Risk Stratification:

<= 0.10 ng/ml   Decreased Risk for Adverse Clinical

Events.

                                        0.10-1.50 ng/ml   Increased Risk for Adv

erse Clinical

Events. Evaluation of additional

criterion and/or repeat testing in 2-6

hours is suggested to rule out myocardial

damage.

>= 1.50 ng/ml   Indicative of Myocardial Injury.



 

                          3                         Units are mL/min/1.73 m2



Chronic Kidney Disease Staging per NKF:



Stage I & II   GFR >=60       Normal to Mildly Decreased

Stage III      GFR 30-59      Moderately Decreased

Stage IV       GFR 15-29      Severely Decreased

Stage V        GFR <15        Very Little GFR Left

ESRD           GFR <15 on RRT



 

                          4                         THERAPUTIC HUMAN INR VALUES

INDICATIONS                      NORMAL RANGES

PROPHYLAXIS/TREATMENT OF:

VENOUS THROMBOSIS                2.0-3.0

PULMONARY EMBOLISM               2.0-3.0

PREVENTION OF SYSTEMIC EMBOLISM FROM:

TISSUE HEART VALVES              2.0-3.0

ACUTE MYOCARDIAL INFARCTION      2.0-3.0

VALVULAR HEART DISEASE           2.0-3.0

ATRIAL FIBRILLATION              2.0-3.0

MECHANICAL VALVES(HIGH RISK)     2.5-3.5

RECURRENT MYOCARDIAL INFARCTION  2.5-3.5



 

                          5                         THERAPUTIC HUMAN INR VALUES

INDICATIONS                      NORMAL RANGES

PROPHYLAXIS/TREATMENT OF:

VENOUS THROMBOSIS                2.0-3.0

PULMONARY EMBOLISM               2.0-3.0

PREVENTION OF SYSTEMIC EMBOLISM FROM:

TISSUE HEART VALVES              2.0-3.0

ACUTE MYOCARDIAL INFARCTION      2.0-3.0

VALVULAR HEART DISEASE           2.0-3.0

ATRIAL FIBRILLATION              2.0-3.0

MECHANICAL VALVES(HIGH RISK)     2.5-3.5

RECURRENT MYOCARDIAL INFARCTION  2.5-3.5



 

                          6                         Scan Verified machine result

s

PLATELET COUNT LESS THAN 100, AND NEW OCCURANCE OR





 

                          7                         By: SEARO

Time: 0925



 

                          8                         Units are mL/min/1.73 m2



Chronic Kidney Disease Staging per NKF:



Stage I & II   GFR >=60       Normal to Mildly Decreased

Stage III      GFR 30-59      Moderately Decreased

Stage IV       GFR 15-29      Severely Decreased

Stage V        GFR <15        Very Little GFR Left

ESRD           GFR <15 on RRT



 

                          9                         By: SEARO

Time: 09

By: SEARO Time: 09









Procedures





                Date            Code            Description     Status

 

                2018      [id number]        Mammogram       Completed

 

                2016      [id number]        Mammogram       Completed

 

                2015      [id number]        Mammogram       Completed

 

                2014      [id number]        Mammogram       Completed







Medical Devices





                                        Description

 

                                        No Information Available







Encounters





           Type       Date       Location   Provider   Dx         Diagnosis

 

           Office Visit 10/26/2020  3:15p Main Office Gayla Ya FNP K70.3

1     Alcoholic

cirrhosis of liver with ascites

 

                          K76.7                     Hepatorenal syndrome

 

                          J91.8                     Pleural effusion in other co

nditions classified elsewhere

 

                          E03.9                     Hypothyroidism, unspecified

 

                          I10                       Essential (primary) hyperten

nikolay

 

                          F32.1                     Major depressive disorder, s

zakia episode, moderate

 

           Office Visit 10/19/2020  2:45p Main Office Gayla Ya FNP K70.3

1     Alcoholic

cirrhosis of liver with ascites

 

                          K76.7                     Hepatorenal syndrome

 

                          J91.8                     Pleural effusion in other co

nditions classified elsewhere

 

                          E03.9                     Hypothyroidism, unspecified

 

                          I10                       Essential (primary) hyperten

nikolay







Assessments





                Date            Code            Description     Provider

 

                10/26/2020      K70.31          Alcoholic cirrhosis of liver wit

h ascites Gayla Ya, ANDREA

 

                10/26/2020      K76.7           Hepatorenal syndrome KALPESH Ya, [facility]

 

                10/26/2020      J91.8           Pleural effusion in other condit

ions classified elsewhere 

Gayla Ya FN

 

                10/26/2020      E03.9           Hypothyroidism, unspecified Kathys

Gayla arnold, ANDREA

 

                10/26/2020      I10             Essential (primary) hypertension

 Gayla Ya, OLENA

 

                10/26/2020      F32.1           Major depressive disorder, singl

e episode, moderate Gayla Ya, ANDREA

 

                10/19/2020      K70.31          Alcoholic cirrhosis of liver wit

h ascites Gayla Ya, [facility]

 

                10/19/2020      K76.7           Hepatorenal syndrome KALPESH Ya, [facility]

 

                10/19/2020      J91.8           Pleural effusion in other condit

ions classified elsewhere 

Gayla Ya FNP

 

                10/19/2020      E03.9           Hypothyroidism, unspecified Gayla Araujo, OLENA

 

                10/19/2020      I10             Essential (primary) hypertension

 Gayla Ya FNP







Plan of Treatment

Future Appointment(s):* 2020  3:45 pm - Gayla Ya FNP at Main 
  Office

10/26/2020 - Gayla Ya FNP* K70.31 Alcoholic cirrhosis of liver with 
  ascites

* K76.7 Hepatorenal syndrome

* J91.8 Pleural effusion in other conditions classified elsewhere

* E03.9 Hypothyroidism, unspecified* Comments:* continue levothyroxine





* I10 Essential (primary) hypertension* Comments:* controlled, continue current 
  medications





* F32.1 Major depressive disorder, single episode, moderate* New Medication:* 
  Sertraline HCL 50 mg - 1 by mouth every day

* Hydroxyzine HCL 25 mg - 1 -2 tab by mouth as needed before bed



* Follow up:* one month



* Recommendations:* take one half tablet of sertraline daily for one week then a
  whole tablet daily.









Functional Status





                Functional Condition Comment         Date            Status

 

                Glasses         Reading                         Active

 

                Independent with all ADL's                                 Activ

e







Mental Status





                Mental Condition Comment         Date            Status

 

                None            poor reader                     Active







Referrals





                Refer to      Reason for Referral Status          Appt Date

 

                [facility]         Sent            



 

                                        [address] [place] [address]

 

                                        [phone]

## 2021-01-16 NOTE — CCD
Continuity of Care Document (CCD)

                             Created on: 2020



[facility]

External Reference #: [id number]

: 1960

Sex: Female



Demographics





                          Address                   [address] [place]  [address]

 

                          Home Phone                [phone]

 

                          Preferred Language        Unknown

 

                          Marital Status            Unknown

 

                          Pentecostalism Affiliation     Jew (Non-Nondenominational, Non

-Specific)

 

                          Race                      White

 

                          Ethnic Group              Not  or 





Author





                          Organization              Unknown

 

                          Address                   Unknown

 

                          Phone                     Unavailable







Care Team Providers





                    Care Team Member Name Role                Phone

 

                    [facility] - Gastroenter[facility]

TM                [phone]

 

                    [facility]         AUTM                [phone]

 

                    [facility] - Pul[facility] Disease AUTM               

 [phone]

 

                    [facility] - Mental Health AUTM            

    [phone]







Problems





                    Active Problems     Provider            Date

 

                    Essential hypertension Gabe Corral M.D. Onset: 

 

                    Hypothyroidism      Gabe Corral M.D. Onset: 2011

 

                    Vitamin D deficiency Gabe Corral M.D. Onset: 

 

                    Neck pain           Gbae Corral M.D. Onset: 2011

 

                    Generalized anxiety disorder Gabe Corral M.D. Onse

t: 2011

 

                    Allergic rhinitis   Gabe Corral M.D. Onset: 2012

 

                    Degeneration of lumbar intervertebral disc Benigno Corral M.D. Onset: 

2013

 

                    Alcoholic cirrhosis Gabe Corral M.D. Onset: 2018

 

                    Esophageal varices without bleeding Gabe Corral M. D. Onset: 2018







Social History





                Type            Date            Description     Comments

 

                Birth Sex                       Unknown          

 

                Tobacco Use     Start: Unknown  Never Smoked Cigarettes  

 

                Tobacco Use     Start: Unknown  Never Used Smokeless Tobacco  

 

                ETOH Use                        Consumed 4 Alcoholic Beverages P

er Day  

 

                Tobacco Use     Start: Unknown  Patient has never smoked  

 

                Recreational Drug Use                 Denies Drug Use  

 

                Smoking Status  Reviewed: 20 Patient has never smoked  

 

                Exercise Type/Frequency                 Exercises sporadically  

 

                Tattoo/Piercing                 Tattoo          3 

 

                Tattoo/Piercing                 Pierced ears    x2 

 

                Sun Exposure                    Minimum amount of sun exposure  

 

                Sun Exposure                    Uses sunscreen  Occasionally 

 

                Seat Belt/Car Seat                 Always uses seat belt  

 

                Bike Helmet                     Never           Does not bike ri

de. 

 

                Smoke Alarms                    Yes              

 

                Smoke Alarms                    Carbon Monoxide Detector: Yes  







Allergies, Adverse Reactions, Alerts





             Active Allergies Reaction     Severity     Comments     Date

 

             Penicillin                                          2004

 

             Cefdinir     Difficulty swallowing, Itching, throat felt itchy. Sev

ere                    2019







Medications





           Active Medications SIG        Qnty       Indications Ordering Provide

r Date

 

           Rifaximin Tablets 200 mg tab - take 2 tabs (400 mg) twice a day      

                 Unknown    

2020

 

                          Spironolactone                     50mg Tablets       

            take one half 

tablet (25 mg) by mouth twice a day                                 Unknown     

    2020

 

                          Allergy Relief Loratadine                     10mg Tab

lets                   1 

tab once a day as needed for allergy symptoms                                 Un

known         2020

 

                          Hydroxyzine HCL                     25mg Tablets      

             1 -2 tab by 

mouth as needed before bed 30tabs          F32.1           Gayla Ya FNP 

10/26/2020

 

                          Mucinex                     600mg Tablets ER 12HR     

              1 by mouth 

twice a day                                     Unknown         10/08/2020

 

                          Midodrine HCL                     5mg Tablets         

          1 tablet three 

times a day ( 8 am, 12 n,4 pm) 90tabs                          Gayla Ya FNP 10/08/2020

 

                          Torsemide                     20mg Tablets            

       take 1 tablet twice

a day (9 Am/5 PM)                                 Unknown         10/08/2020

 

                                        Lactulose Encephalopathy                

     10GM/15ML Solution                 

             take 30ml by mouth three times a day 2700units                 Gayla Araujo FNP 

2020

 

                          Thiamine HCL                     100mg Tablets        

           1 by mouth 

every day                                       Unknown         2020

 

                                        Fluticasone Propionate Nasal Spray      

               50mcg/Act Suspension     

             2 sprays each nostril once a day                           Unknown 

     2019

 

                          Latanoprost                     0.005% Solution       

            one drop in 

each eye daily at at bedtime                                 Unknown         

 

                          Betaxolol HCL                     0.5% Solution       

            1 drop each 

eye twice a day.                                 Unknown         2019

 

                          Brimonidine Tartrate                     0.2% Solution

                   one 

gtt. each eye twice a day                                 Unknown         2019

 

                          Vitamin D                     1000Unit Tablets        

           1 by mouth 

every day       100tabs                         Meron Ramirez M.D. 

019

 

                          Levothyroxine Sodium                     112mcg Tablet

s                   1 by 

mouth every day 90tabs          E03.9           Meron Ramirez M.D. 

018

 

                                        Wrist Splint/Cock-Up/Left/Canvas/Medium 

                     Misc               

                dx: carpal tunnel syndrome, duration 12 months, prognosis good 1

units                          

Gabe Corral M.D.              2018

 

                                        Wrist Splint/Cock-Up/Right/Canvas/Medium

                      Misc              

                                        wear splint every night for carpal tunne

l syndrome, prognosis good, duration

99 months       1units                          Gabe Corral M.D. 

 

                                        Levocetirizine Dihydrochloride          

           5mg Tablets                  

                1 tab by mouth every evening for allergies 90tabs          J30.9

           Meron Ramirez M.D.

                                        2015

 

                          Ketotifen Fumarate                     0.025% Solution

                   1 drop 

both eyes twice a day for allergy symptoms 5ml                             Unkno

wn         

 

                          Folic Acid                     1mg Tablets            

       1 by mouth every 

day             90tabs                          Meron Ramirez M.D. 

000

 

           Multivitamin Adult                      Tablets                      

                              Unknown    



 

                          Xifaxan                     200mg Tablets             

      1 by mouth twice a 

day             60tabs                          Gayla Ya FNP 

 

                                        History Medications

 

                          Sertraline HCL                     50mg Tablets       

            1 by mouth 

every day       30tabs          F32.1           Gayla Ya FNP 10/26/2020 

- 2020

 

                          Ciprofloxacin HCL                     250mg Tablets   

                1 tab by 

mouth once a day at 6 Am                                 Unknown         10/08/2

020 - 10/22/2020

 

                          Spironolactone                     50mg Tablets       

            take one 

tablet by mouth twice a day  (9 Am/5 PM)                                 Genie Tabares PA-C 10/08/2020 - 

2020







Immunizations





             CPT Code     Status       Date         Vaccine      Lot #

 

             [id number]        Given        2019   Pneumococcal Vaccine [id number]

 

                [id number]           Given           10/29/2018      Influenza Virus 

Vaccine, Quadrivalent,age 3 and 

up,multidose vial                       [id number]

 

             36458        Given        2017   Influenza Vaccination  

 

             18796        Given        2015   Influenza Vaccination  

 

             93607        Given        2013   Influenza Vaccination  

 

             [id number]        Given        04/15/2013   Zostavax      

 

             16208        Given        2012   Influenza Vaccination [id number]

C

 

             49653        Given        10/31/2011   Influenza Vaccination/preser

vative free [id number]

 

             19987        Given        2010   Adacel 11 Yrs or older 

BA

 

             81800        Given        10/04/2010   Influenza Vaccination [id number]

A

 

             53073        Given        2009   Influenza Vaccination [id number]

A

 

             80295        Given        2005   Tetnus Toxoid Im Or Jet Inje

ction Use  







Vital Signs





                Date            Vital           Result          Comment

 

                10/26/2020  3:20pm BP Systolic     105 mmHg         

 

                    BP Diastolic        63 mmHg              

 

                    Heart Rate          108 /min             

 

                    Body Temperature    97.4 F             

 

                    Respiratory Rate    20 /min              

 

                    Height              67.0 inches         5'7"

 

                    Weight              169.50 lb            

 

                    O2 % BldC Oximetry  98 %                 

 

                    Peak Expiratory Flow Rate 364                 Estimated Peak

 Flow Rate

 

                    Ideal Body Weight   135 lb               

 

                    BMI (Body Mass Index) 26.5 kg/m2           

 

                10/19/2020  3:07pm BP Systolic     114 mmHg         

 

                    BP Diastolic        71 mmHg              

 

                    Heart Rate          101 /min             

 

                    Body Temperature    97.3 F             

 

                    Respiratory Rate    18 /min              

 

                    Height              67.0 inches         5'7"

 

                    Weight              166.12 lb            

 

                    O2 % BldC Oximetry  100 %                

 

                    Peak Expiratory Flow Rate 364                 Estimated Peak

 Flow Rate

 

                    Ideal Body Weight   135 lb               

 

                    BMI (Body Mass Index) 26.0 kg/m2           







Results





        Test    Acquired Date Facility Test    Result  H/L     Range   Note

 

                    Cell Count Pleural Fluid 2020          Patient Service

 [place] [address] [phone] Source, Body Fluid PLEURAL    Normal                 

 

             Pleural FL Color YELLOW       Normal       Colorless     

 

             Appearance, Body Fluid CLOUDY       Normal       Clear         

 

             WBC Body Fluid 124 /uL      High         0-10          

 

             RBC Body Fluid < 2 10       Normal       <2            

 

             BF Mononuclear Cell % 93.6 %       High         0-0           

 

             BF Polymorphonuclear Cell % 6.4 %        High         0-0          

 

 

                    Body Fluids Culture And Gram Stain 2020          [facility]

[place] [address] [phone] Gram Stain (SEE NOTE)  Normal                1

 

             Body Fluid Culture **************** <SEE NOTE>                     

       2

 

                    Laboratory test finding 2020          Patient Service 

[place] [address] [phone] Albumin 25% TRANSFUSED PRODU <SEE NOTE>               

         3

 

                    CBC With Differential 2020          Patient [place] 

[place] [address] [phone] White Blood Count 5.5 10     Normal     4.0-10.0    

 

             Red Blood Count 3.39 10      Low          4.00-5.40     

 

             Hemoglobin   10.8 g/dL    Low          12.0-15.5     

 

             Hematocrit   32.6 %       Low          36.0-47.0     

 

             Mean Corpuscular Volume 96.2 fl      High         80.0-96.0     

 

             Mean Corpuscular Hemoglobin 31.9 pg      Normal       27.0-33.0    

 

 

             Mean Corpuscular HGB Conc 33.1 g/dL    Normal       32.0-36.5     

 

             Red Cell Distribution Width 13.8 %       Normal       11.5-14.5    

 

 

             Platelet Count, Automated 124 10       Low          150-450       

 

             Neutrophils % 73.3 %       High         36.0-66.0     

 

             Lymph %      13.9 %       Low          24.0-44.0     

 

             Mono %       9.9 %        High         0.0-5.0       

 

             Eos %        1.3 %        Normal       0.0-3.0       

 

             Baso %       0.9 %        Normal       0.0-1.0       

 

             Immature Granulocyte % 0.7 %        Normal       0-3.0         

 

             Nucleated Red Blood Cell % 0.0 %        Normal       0-0           

 

             Neutrophils # 4.1 10       Normal       1.5-8.5       

 

             Lymph #      0.8 10       Low          1.5-5.0       

 

             Mono #       0.6 10       Normal       0.0-0.8       

 

             Eos #        0.1 10       Normal       0.0-0.5       

 

             Baso #       0.1 10       Normal       0.0-0.2       

 

                    PT & Aptt           2020          [place] [address] [phone] Prothrombin Time 17.5 seconds High       12.5-14.3   

 

             Inr          1.40         Normal                    4

 

             Partial Thromboplastin Time 35.4 seconds Normal       24.2-38.5    

 

 

                    Liver Profile       2020          [place] [address] [phone] Ast/Sgot   43 U/L     High       7-37        

 

             Alt/SGPT     38 U/L       Normal       12-78         

 

             Alkaline Phosphatase 155 U/L      High                 

 

             Bilirubin,Total 3.1 mg/dL    High         0.2-1.0       

 

             Bilirubin,Direct 2.0 mg/dL    High         0.0-0.2       

 

             Total Protein 6.3 GM/DL    Low          6.4-8.2       

 

             Albumin      2.0 GM/DL    Low          3.2-5.2       

 

             Albumin/Globulin Ratio 0.5          Low          1.2-2.2       

 

                    Basic Metabolic Profile 2020          Patient Service 

[place]

[place] [address] [phone] Glucose, Fasting 169 mg/dL  High             

 

             Blood Urea Nitrogen 71 mg/dL     High         7-18          

 

             Creatinine For GFR 2.38 mg/dL   High         0.55-1.30     

 

             Glomerular Filtration Rate 22.1         Low          >45          5

 

             Sodium Level 131 mEq/L    Low          136-145       

 

             Potassium Serum 4.7 mEq/L    Normal       3.5-5.1       

 

             Chloride Level 99 mEq/L     Normal               

 

             Carbon Dioxide Level 21 mEq/L     Normal       21-32         

 

             Anion Gap    11 mEq/L     Normal       8-16          

 

             Calcium Level 8.7 mg/dL    Low          8.8-10.2      

 

                    Laboratory test finding 2020          Patient Service 

[place], [place] [id number] [phone] Ethyl Alcohol (Ethanol) < 0.003 %  Normal     0.000-0.

010  

 

                    Comprehensive Metabolic Profil 2020          [place] [address] [phone] Glucose, Fasting 118 mg/dL  High             

 

             Blood Urea Nitrogen 69 mg/dL     High         7-18          

 

             Creatinine For GFR 2.49 mg/dL   High         0.55-1.30     

 

             Glomerular Filtration Rate 21.0         Low          >45          6

 

             Sodium Level 128 mEq/L    Low          136-145       

 

             Potassium Serum 4.2 mEq/L    Normal       3.5-5.1       

 

             Chloride Level 92 mEq/L     Low                  

 

             Carbon Dioxide Level 25 mEq/L     Normal       21-32         

 

             Anion Gap    11 mEq/L     Normal       8-16          

 

             Calcium Level 8.8 mg/dL    Normal       8.8-10.2      

 

             Ast/Sgot     45 U/L       High         7-37          

 

             Alt/SGPT     44 U/L       Normal       12-78         

 

             Alkaline Phosphatase 186 U/L      High                 

 

             Bilirubin,Total 4.2 mg/dL    High         0.2-1.0       

 

             Total Protein 6.9 GM/DL    Normal       6.4-8.2       

 

             Albumin      2.2 GM/DL    Low          3.2-5.2       

 

             Albumin/Globulin Ratio 0.5          Low          1.2-2.2       

 

                    Culture Fungus Misc 2020          [place] [address]

           [phone] Fungal Smear Testing performe <SEE NOTE>              

          7

 

             Fungal Culture Other Source Testing performe <SEE NOTE>            

                8

 

                    Acid Fast Smear & Culture(Afb) Sendout 2020          P

atient Service [place] [id number] [phone] Afb Smear  Testing performe <SEE NOTE>                

        9

 

             Afb Culture  Testing performe <SEE NOTE>                           

 10

 

                    Laboratory test finding 2020          Patient Service 

[place] [id number] [phone] Anaerobic Culture **************** <SEE NOTE>         

               11

 

                    Body Fluid Culture And GS 2020          Patient Servic

e [place] [id number] [phone] Gram Stain (SEE NOTE)  Normal                12

 

             Body Fluid Culture **************** <SEE NOTE>                     

       13

 

                    PH, Body Fluid      2020          Patient Service [place] [id number] [phone] PH Body Fluid > 7.800 units Normal     Not Established

  

 

             Source, Body Fluid pH PLEURAL      Normal                     

 

                    Amylase Body Fluid  2020          Patient Service [place] [id number] [phone] Amylase, Body Fluid 31 U/L     Normal     Not Establis

hed  

 

             Source, Body Fluid Amylase PLEURAL      Normal                     

 

                    Glucose Body Fluid  2020          Patient Service [place] [address] [phone] Glucose, Body Fluid 131 mg/dL  Normal     Not Establis

hed  

 

             Source, Body Fluid Glucose PLEURAL      Normal                     

 

                    LDH Body Fluid      2020          Patient Service [place] [address] [phone] LDH, Body Fluid 51 U/L     Normal     Not Established 

 

 

             Source, Body Fluid LDH PLEURAL      Normal                     

 

                    TP Body Fluid       2020          Patient Service [place] [address] [phone] Total Protein, Body Fluid 0.7 g/dL   Normal     Not Es

tablished  

 

             Source, Body Fluid Tot Protein PLEURAL      Normal                 

    

 

                    Cell Count Pleural Fluid 2020          Patient Service

 [place] [address] [phone] Source, Body Fluid PLEURAL    Normal                 

 

             Pleural FL Color YELLOW       Normal       Colorless     

 

             Appearance, Body Fluid CLOUDY       Normal       Clear         

 

             WBC Body Fluid 114 /uL      High         0-10          

 

             RBC Body Fluid 4 10         Normal       <2            

 

             BF Mononuclear Cell % 91.2 %       High         0-0           

 

             BF Polymorphonuclear Cell % 8.8 %        High         0-0          

 

 

                    PT & Aptt           2020          Patient Service [facility]

er

[place] [id number] [phone] Prothrombin Time 18.6 seconds High       12.5-14.3   

 

             Inr          1.52         Normal                    14

 

             Partial Thromboplastin Time 34.0 seconds Normal       24.2-38.5    

 

 

                    Laboratory test finding 2020          Patient Service 

[place] [address] [phone] Platelet Count, Automated 113 10     Low        150-45

0     

 

                    CBC With Differential 2020          Patient Service Ce

nter

[place] [address] [phone] White Blood Count 7.9 10     Normal     4.0-10.0    

 

             Red Blood Count 3.31 10      Low          4.00-5.40     

 

             Hemoglobin   11.7 g/dL    Low          12.0-15.5     

 

             Hematocrit   34.8 %       Low          36.0-47.0     

 

             Mean Corpuscular Volume 105.1 fl     High         80.0-96.0     

 

             Mean Corpuscular Hemoglobin 35.3 pg      High         27.0-33.0    

 

 

             Mean Corpuscular HGB Conc 33.6 g/dL    Normal       32.0-36.5     

 

             Red Cell Distribution Width 14.6 %       High         11.5-14.5    

 

 

             Platelet Count, Automated 96 10        Low          150-450       

 

             Nucleated Red Blood Cell % 0.0 %        Normal       0-0           

 

                    Differential        2020          [place] [address] [phone] Neutrophils 70 %       High       28-66       

 

             Lymphocytes  14 %         Low          16-44         

 

             Monocytes    8 %          High         0-5           

 

             Eosinophils  5 %          High         0-3           

 

             Myelocytes   1 %          High         0-0           

 

             Atypical Lymph 2 %          Normal       0-5           

 

             Anisocytosis 1+           Normal                     

 

                    Laboratory test finding 2020          Patient Service 

[place] [address] [phone] Platelet Estimate DECREASED  Normal     Normal      

 

             Immature Platelet Fraction 1.3 %        Normal       0.0-9.59      

 

                    Cardiac Marker Panel 2020          [place] [address] [phone] CPK Creatine Phosphokinase 50 U/L     Normal     26-19

2      

 

             CK-MB Value Mass < 1.0 NG/ML  Normal       <3.6          

 

             MB/CK Relative Index 2.00         Normal       < Or =4      15

 

             Troponin I   < 0.02 NG/ML Normal       < 0.10       16

 

                    Liver Profile       2020          [place] [address] [phone] Ast/Sgot   80 U/L     High       7-37        

 

             Alt/SGPT     51 U/L       Normal       12-78         

 

             Alkaline Phosphatase 136 U/L      High                 

 

             Bilirubin,Total 6.8 mg/dL    High         0.2-1.0       

 

             Bilirubin,Direct 4.4 mg/dL    High         0.0-0.2       

 

             Total Protein 7.9 GM/DL    Normal       6.4-8.2       

 

             Albumin      2.0 GM/DL    Low          3.2-5.2       

 

             Albumin/Globulin Ratio 0.3          Low          1.2-2.2       

 

                    Basic Metabolic Profile 2020          Patient Service 

[place] [address] [phone] Glucose, Fasting 83 mg/dL   Normal           

 

             Blood Urea Nitrogen 24 mg/dL     High         7-18          

 

             Creatinine For GFR 1.38 mg/dL   High         0.55-1.30     

 

             Glomerular Filtration Rate 41.5         Low          >45          1

7

 

             Sodium Level 130 mEq/L    Low          136-145       

 

             Potassium Serum 3.4 mEq/L    Low          3.5-5.1       

 

             Chloride Level 96 mEq/L     Low                  

 

             Carbon Dioxide Level 23 mEq/L     Normal       21-32         

 

             Anion Gap    11 mEq/L     Normal       8-16          

 

             Calcium Level 8.6 mg/dL    Low          8.8-10.2      

 

                    Laboratory test finding 2020          Patient Service 

[place] [address] [phone] NT-Pro  pg/mL  Normal     <125        

 

             Magnesium Level 2.2 mg/dL    Normal       1.8-2.4       

 

                    PT & Aptt           2020          [place] [address] [phone] Prothrombin Time 19.4 seconds High       12.5-14.3   

 

             Inr          1.60         Normal                    18

 

             Partial Thromboplastin Time 32.6 seconds Normal       24.2-38.5    

 







                          1                         FEW RBCS

FEW WBCS

NO ORGANISMS SEEN





 

                          2                         ****************************

*********************

If aerobic or anaerobic growth is detected within

the next 7-21 days, an addendum will follow.

                                        .***************************************

********.



FULL REPORT IN LAB NOTES ([facility] and [facility]).

NO GROWTH AEROBICALLY





 

                          3                         TRANSFUSED PRODUCT: ALBUMIN 

25%                         COUNT: 1



 

                          4                         THERAPUTIC HUMAN INR VALUES

INDICATIONS                      NORMAL RANGES

PROPHYLAXIS/TREATMENT OF:

VENOUS THROMBOSIS                2.0-3.0

PULMONARY EMBOLISM               2.0-3.0

PREVENTION OF SYSTEMIC EMBOLISM FROM:

TISSUE HEART VALVES              2.0-3.0

ACUTE MYOCARDIAL INFARCTION      2.0-3.0

VALVULAR HEART DISEASE           2.0-3.0

ATRIAL FIBRILLATION              2.0-3.0

MECHANICAL VALVES(HIGH RISK)     2.5-3.5

RECURRENT MYOCARDIAL INFARCTION  2.5-3.5



 

                          5                         Units are mL/min/1.73 m2



Chronic Kidney Disease Staging per [facility]:



Stage I & II   GFR >=60       Normal to Mildly Decreased

Stage III      GFR 30-59      Moderately Decreased

Stage IV       GFR 15-29      Severely Decreased

Stage V        GFR <15        Very Little GFR Left

ESRD           GFR <15 on RRT



 

                          6                         Units are mL/min/1.73 m2



Chronic Kidney Disease Staging per [facility]:



Stage I & II   GFR >=60       Normal to Mildly Decreased

Stage III      GFR 30-59      Moderately Decreased

Stage IV       GFR 15-29      Severely Decreased

Stage V        GFR <15        Very Little GFR Left

ESRD           GFR <15 on RRT



 

                          7                         Testing performed at [facility] lab . Report copy to follow

on a separate form. 20 REF LAB#:[id number]



IAIN/Calcofluor preparatio     No fungus observed.





 

                          8                         Testing performed at [facility] lab . Report copy to follow

on a separate form. 20 REF LAB#:[id number]



FUNGUS CULTURE [facility]        No Yeast or Mold Isolated after 4 weeks.





 

                          9                         Testing performed at [facility] lab . Report copy to follow

on a separate form. 20 REF LAB#:[id number]



Due to limited sensitivity, smear results should

be used as an adjunct in evaluating patient tuberculosis

status. Cultural examination is highly recommended

for clinical diagnosis.





AFB sm REF LAB concentra      NEGATIVE





 

                          10                        Testing performed at [facility] lab . Report copy to follow

on a separate form. 20 REF LAB#:[id number]



FULL REPORT IN LAB NOTES ([facility] and [facility]).

No Acid-Fast Bacilli Isolated after 6 Weeks.





 

                          11                        ****************************

*********************

If anaerobic or aerobic growth is detected within

the next 7-21 days, an addendum will follow.

                                        .***************************************

********.



FULL REPORT IN LAB NOTES ([facility] and [facility]).

NO GROWTH ANAEROBICALLY





 

                          12                        FEW WBCS

NO ORGANISMS SEEN





 

                          13                        ****************************

*********************

If aerobic or anaerobic growth is detected within

the next 7-21 days, an addendum will follow.

                                        .***************************************

********.



FULL REPORT IN LAB NOTES ([facility] and [facility]).

NO GROWTH AEROBICALLY





 

                          14                        THERAPUTIC HUMAN INR VALUES

INDICATIONS                      NORMAL RANGES

PROPHYLAXIS/TREATMENT OF:

VENOUS THROMBOSIS                2.0-3.0

PULMONARY EMBOLISM               2.0-3.0

PREVENTION OF SYSTEMIC EMBOLISM FROM:

TISSUE HEART VALVES              2.0-3.0

ACUTE MYOCARDIAL INFARCTION      2.0-3.0

VALVULAR HEART DISEASE           2.0-3.0

ATRIAL FIBRILLATION              2.0-3.0

MECHANICAL VALVES(HIGH RISK)     2.5-3.5

RECURRENT MYOCARDIAL INFARCTION  2.5-3.5



 

                          15                        DIAGNOSIS CRITERIA

MMB ng/ml       Relative Index (RI)

NON-AMI               < or = 5               N/A

GRAY ZONE              > 5                < or = 4

AMI                    > 5                   > 4



 

                          16                        Troponin I Reference Interva

l for [employer] LOCI:



                                        99th Percentile= 0.00-0.045 ng/ml



Risk Stratification:

<= 0.10 ng/ml   Decreased Risk for Adverse Clinical

Events.

                                        0.10-1.50 ng/ml   Increased Risk for Adv

erse Clinical

Events. Evaluation of additional

criterion and/or repeat testing in 2-6

hours is suggested to rule out myocardial

damage.

>= 1.50 ng/ml   Indicative of Myocardial Injury.



 

                          17                        Units are mL/min/1.73 m2



Chronic Kidney Disease Staging per NKF:



Stage I & II   GFR >=60       Normal to Mildly Decreased

Stage III      GFR 30-59      Moderately Decreased

Stage IV       GFR 15-29      Severely Decreased

Stage V        GFR <15        Very Little GFR Left

ESRD           GFR <15 on RRT



 

                          18                        THERAPUTIC HUMAN INR VALUES

INDICATIONS                      NORMAL RANGES

PROPHYLAXIS/TREATMENT OF:

VENOUS THROMBOSIS                2.0-3.0

PULMONARY EMBOLISM               2.0-3.0

PREVENTION OF SYSTEMIC EMBOLISM FROM:

TISSUE HEART VALVES              2.0-3.0

ACUTE MYOCARDIAL INFARCTION      2.0-3.0

VALVULAR HEART DISEASE           2.0-3.0

ATRIAL FIBRILLATION              2.0-3.0

MECHANICAL VALVES(HIGH RISK)     2.5-3.5

RECURRENT MYOCARDIAL INFARCTION  2.5-3.5









Procedures





                Date            Code            Description     Status

 

                    2020          30388               Brief Emotional/Beha

v Assessment W/ Scoring Doc Per Standard 

Inst                                    Completed

 

                2018      [id number]        Mammogram       Completed

 

                2016      [id number]        Mammogram       Completed

 

                2015      [id number]        Mammogram       Completed

 

                2014      [id number]        Mammogram       Completed







Medical Devices





                                        Description

 

                                        No Information Available







Encounters





           Type       Date       Location   Provider   Dx         Diagnosis

 

           Office Visit 2020 11:45a Main Office Gayla Ya FNP K70.3

1     Alcoholic

cirrhosis of liver with ascites

 

                          K76.7                     Hepatorenal syndrome

 

                          J91.8                     Pleural effusion in other co

nditions classified elsewhere

 

                          Z13.89                    Encounter for screening for 

other disorder

 

           Office Visit 10/26/2020  3:15p Main Office Gayla Ya FNP K70.3

1     Alcoholic

cirrhosis of liver with ascites

 

                          K76.7                     Hepatorenal syndrome

 

                          J91.8                     Pleural effusion in other co

nditions classified elsewhere

 

                          E03.9                     Hypothyroidism, unspecified

 

                          I10                       Essential (primary) hyperten

nikolay

 

                          F32.1                     Major depressive disorder, s

zakia episode, moderate

 

           Office Visit 10/19/2020  2:45p Main Office PleGayla alonso, [facility] K70.3

1     Alcoholic

cirrhosis of liver with ascites

 

                          K76.7                     Hepatorenal syndrome

 

                          J91.8                     Pleural effusion in other co

nditions classified elsewhere

 

                          E03.9                     Hypothyroidism, unspecified

 

                          I10                       Essential (primary) hyperten

nikolay







Assessments





                Date            Code            Description     Provider

 

                2020      K70.31          Alcoholic cirrhosis of liver wit

h ascites Gayla Ya, [facility]

 

                2020      K76.7           Hepatorenal syndrome KALPESH Ya, [facility]

 

                2020      J91.8           Pleural effusion in other condit

ions classified elsewhere 

Gayla Ya, [facility]

 

                2020      Z13.89          Encounter for screening for othe

r disorder Gayla Ya, 

[facility]

 

                10/26/2020      K70.31          Alcoholic cirrhosis of liver wit

h ascites Gayla Ya, [facility]

 

                10/26/2020      K76.7           Hepatorenal syndrome KALPESH Ya

neisha, [facility]

 

                10/26/2020      J91.8           Pleural effusion in other condit

ions classified elsewhere 

Gayla Ya, [facility]

 

                10/26/2020      E03.9           Hypothyroidism, unspecified Ples

Gayla arnold, [facility]

 

                10/26/2020      I10             Essential (primary) hypertension

 Gayla Ya, [facility]

 

                10/26/2020      F32.1           Major depressive disorder, singl

e episode, moderate PleskGayla dinh, [facility]

 

                10/19/2020      K70.31          Alcoholic cirrhosis of liver wit

h ascites Gayla Ya, [facility]

 

                10/19/2020      K76.7           Hepatorenal syndrome Plegavin M

neisha, [facility]

 

                10/19/2020      J91.8           Pleural effusion in other condit

ions classified elsewhere 

Gayla Ya, [facility]

 

                10/19/2020      E03.9           Hypothyroidism, unspecified Ples

kach Gayla, [facility]

 

                10/19/2020      I10             Essential (primary) hypertension

 Gayla Ya FNP







Plan of Treatment

Future Appointment(s):* 2021 11:30 am - Gayla Ya FNP at Main 
  Office

2020 - Gayla Ya FNP* K70.31 Alcoholic cirrhosis of liver with 
  ascites* Comments:* spoke with patient at length regarding importance of f/u 
  with pulmonology and nephrology.  She does not seem to understand the severity
  of her disease, she may possibly be in denial.  She is certainly not ready to 
  talk about hospice or DNR.  I encouraged her to call both pulmonary and 
  nephrology today to make follow up appointments



* Follow up:* one month





* K76.7 Hepatorenal syndrome

* J91.8 Pleural effusion in other conditions classified elsewhere

* Z13.89 Encounter for screening for other disorder





Functional Status





                Functional Condition Comment         Date            Status

 

                Glasses         Reading                         Active

 

                Independent with all ADL's                                 Activ

e







Mental Status





                Mental Condition Comment         Date            Status

 

                None            poor reader                     Active







Referrals





                Refer to      Reason for Referral Status          Appt Date

 

                [facility] ANXIETY         Closed          



 

                                        [address] [place] [address]

 

                                        [phone]

## 2021-01-16 NOTE — CCD
Continuity of Care Document (CCD)

                             Created on: 2020



Flora Taylor

External Reference #: [id number]

: 1960

Sex: Female



Demographics





                          Address                   [address] [place] [id number]

 

                          Home Phone                [phone]

 

                          Preferred Language        Unknown

 

                          Marital Status            Unknown

 

                          Cheondoism Affiliation     Unknown

 

                          Race                      White

 

                          Ethnic Group              Not  or 





Author





                          Author                    Flora DEJESUS D.O.

 

                          Organization              Unknown

 

                          [place]  [address]



 

                          Phone                     [phone]







Care Team Providers





                    Care Team Member Name Role                Phone

 

                    Meron Ramirez M.D. AUTM                [phone]







Problems





                    Active Problems     Provider            Date

 

                    Essential hypertension Alec Hoffman MD     Onset: 2015

 

                    Difficulty breathing Bull Dejesus D.O.    Onset: 2020

 

                    Pleural effusion, not elsewhere classified Bull Dejesus D.O. 

   Onset: 2020







Social History





                Type            Date            Description     Comments

 

                Birth Sex                       Unknown          

 

                ETOH Use                        Occasionally consumes alcohol  

 

                Recreational Drug Use                 Denies Drug Use  

 

                Tobacco Use     Reviewed: 20 Patient has never smoked  

 

                Smoking Status  Reviewed: 20 Patient has never smoked  

 

                Exercise Type/Frequency                 Occasional Mild Exercise

  







Allergies, Adverse Reactions, Alerts





             Active Allergies Reaction     Severity     Comments     Date

 

             Penicillin   Unknown Reaction                           2015







Medications





           Active Medications SIG        Qnty       Indications Ordering Provide

r Date

 

                    Prevnar 13                      Suspension                  

 one injection       

1units              J90                 Bull Dejesus D.O.    10/27/2020

 

                                        Fluticasone Propionate                  

   50mcg/Act Suspension                 

             2 sprays to each nostril daily 1bottle      472.0        Alec cohen MD 2015

 

                          Levothyroxine Sodium                     112mcg Tablet

s                   1 tab 

by mouth every day 30tabs                          Unknown         

 

                          Betaxolol HCL                     0.5% Solution       

            1 gtt both 

eyes twice a day                                 Unknown         

 

                          Lactulose                     10GM/15ML Solution      

             by mouth 

twice a day     1892ml                          Unknown         

 

                          Azelastine HCL (Nasal)                     0.1% Soluti

on                   2 

sprays intranasal every day 30ml                            Unknown         00



 

                          Spironolactone                     50mg Tablets       

            1 tab by mouth

twice a day     30tabs                          Unknown         

 

                          Alphagan P                     0.1% Solution          

         2 drop both eyes 

every day                                       Unknown         

 

                          Furosemide                     20mg Tablets           

        Take One Tablet By

Mouth Every Morning                                 Unknown         







Immunizations





             CPT Code     Status       Date         Vaccine      Lot #

 

             [id number]        Given        10/27/2020   Prevnar 13   [id number]

 

             [id number]        Given        10/01/2020   Afluria, Quadrivalent, 0.5ml

, NDC# 54867-424-03  







Vital Signs





                Date            Vital           Result          Comment

 

                2020  2:35pm BP Systolic     90 mmHg          

 

                    BP Diastolic        60 mmHg              

 

                    Heart Rate          94 /min              

 

                    O2 % BldC Oximetry  98 %                 

 

                    Body Temperature    99.1 F             

 

                    Height              66 inches           5'6"

 

                    Ideal Body Weight   130 lb               

 

                10/27/2020  9:30am BP Systolic     102 mmHg         

 

                    BP Diastolic        58 mmHg              

 

                    Heart Rate          106 /min             

 

                    O2 % BldC Oximetry  97 %                 

 

                    Body Temperature    96.2 F             

 

                    Height              66 inches           5'6"

 

                    Weight              171.00 lb            

 

                    BMI (Body Mass Index) 27.6 kg/m2           

 

                    Ideal Body Weight   130 lb               

 

                    Weight              77.566 kg            

 

                    BSA (Body Surface Area) 1.87 m2              







Results





        Test    Acquired Date Facility Test    Result  H/L     Range   Note

 

                    Laboratory test finding 2020          [facility]

                                        [address] [place] [address] [phone] Non Gyn/Cytology Req For Servi (SEE NOTE)             

           1

 

                    Coag Panel For Procedures 2020          [facility]

                                        [address] [place] [address] [phone] Platelet Count, Automated 113 10     Low        150-45

0     

 

                    Prothrombin Time/Inr 2020          [facility]

[facility]

                                        [address] [place], [place] [id number] [phone] Prothrombin Time 18.6 seconds High       12.5-14.3   

 

             Inr          1.52         Normal                    2

 

             Partial Thromboplastin Time 34.0 seconds Normal       24.2-38.5    

 

 

                    Cell Count Pleural Fluid 2020          [facility]

                                        [address] [place], [place] [id number] [phone] Source, Body Fluid PLEURAL    Normal                 

 

             Pleural FL Color YELLOW       Normal       Colorless     

 

             Appearance, Body Fluid CLOUDY       Normal       Clear         

 

             WBC Body Fluid 114 /uL      High         0-10          

 

             RBC Body Fluid 4 10         Normal       <2            

 

             BF Mononuclear Cell % 91.2 %       High         0-0           

 

             BF Polymorphonuclear Cell % 8.8 %        High         0-0          

 

 

                    TP Body Fluid       2020          [address] [address] [phone] Total Protein, Body Fluid 0.7 g/dL   Normal     Not Es

tablished  

 

             Source, Body Fluid Tot Protein PLEURAL      Normal                 

    

 

                    LDH Body Fluid      2020          [facility]

                                        [address] [place] [phone] LDH, Body Fluid 51 U/L     Normal     Not Established 

 

 

             Source, Body Fluid LDH PLEURAL      Normal                     

 

                    Glucose Body Fluid  2020          [address] [address] [phone] Glucose, Body Fluid 131 mg/dL  Normal     Not Establis

hed  

 

             Source, Body Fluid Glucose PLEURAL      Normal                     

 

                    Amylase Body Fluid  2020          [address] [address]

           [phone] Amylase, Body Fluid 31 U/L     Normal     Not Establis

hed  

 

             Source, Body Fluid Amylase PLEURAL      Normal                     

 

                    PH, Body Fluid      2020          [facility]

                                        [address] [place] [address] [phone] PH Body Fluid > 7.800 units Normal     Not Established

  

 

             Source, Body Fluid pH PLEURAL      Normal                     

 

                    Laboratory test finding 2020          [facility]

                                        [address] [place] [phone] Amylase Body Fluid <pending>                         

 

                    Body Fluid Culture And GS 2020          [facility]

                                        [address] [place] [phone] Gram Stain (SEE NOTE)  Normal                3

 

             Body Fluid Culture **************** <SEE NOTE>                     

       4

 

                    Laboratory test finding 2020          [facility]

                                        [address] [place] [address] [phone] Anaerobic Culture **************** <SEE NOTE>         

               5







                          1                         SPECIMEN:            Pleural

 Fluid

                                        1400 ml yellow



SPECIMEN ADEQUACY:   Satisfactory for evaluation





CATEGORIZATION:      Negative for Malignancy



DESCRIPTIONS:        Specimen consists of reactive mesothelial cells

in a background of lymphocytes, macrophages,

and some neutrophils.







COMMENTS:













                                        2020 - 908



Signed________ SERENA KAUR (ASCP) 2020 09 (Prelim)

Signed________ PAUL OWUSU MD 2020 1111



 

                          2                         THERAPUTIC HUMAN INR VALUES

INDICATIONS                      NORMAL RANGES

PROPHYLAXIS/TREATMENT OF:

VENOUS THROMBOSIS                2.0-3.0

PULMONARY EMBOLISM               2.0-3.0

PREVENTION OF SYSTEMIC EMBOLISM FROM:

TISSUE HEART VALVES              2.0-3.0

ACUTE MYOCARDIAL INFARCTION      2.0-3.0

VALVULAR HEART DISEASE           2.0-3.0

ATRIAL FIBRILLATION              2.0-3.0

MECHANICAL VALVES(HIGH RISK)     2.5-3.5

RECURRENT MYOCARDIAL INFARCTION  2.5-3.5



 

                          3                         FEW WBCS

NO ORGANISMS SEEN





 

                          4                         ****************************

*********************

If aerobic or anaerobic growth is detected within

the next 7-21 days, an addendum will follow.

                                        .***************************************

********.



FULL REPORT IN LAB NOTES ([facility] and [facility]).

NO GROWTH AEROBICALLY





 

                          5                         ****************************

*********************

If anaerobic or aerobic growth is detected within

the next 7-21 days, an addendum will follow.

                                        .***************************************

********.



FULL REPORT IN LAB NOTES ([facility] and [facility]).

NO GROWTH ANAEROBICALLY











Procedures





                Date            Code            Description     Status

 

                2020      23265           Insertion Of Indwelling Tunneled

 Pleural Catheter W/Cuff 

Completed







Medical Devices





                                        Description

 

                                        No Information Available







Encounters





           Type       Date       Location   Provider   Dx         Diagnosis

 

             Office Visit 10/27/2020 10:00a [facility] Bull Se

ars, D.O. J90

                                        Pleural effusion, not elsewhere classifi

ed

 

                          R06.00                    Dyspnea, unspecified

 

                          Z23                       Encounter for immunization

 

                Office Visit    10/08/2020  1:23a [facility] R

adilia Wiseman M.D.

                          J90                       Pleural effusion, not elsewh

ere classified

 

                          K72.90                    Hepatic failure, unspecified

 without coma

 

                          K76.7                     Hepatorenal syndrome

 

                          N28.9                     Disorder of kidney and urete

r, unspecified

 

                Office Visit    10/07/2020  1:23a [facility] R

adilia Wiseman M.D.

                          J90                       Pleural effusion, not elsewh

ere classified

 

                          K72.90                    Hepatic failure, unspecified

 without coma

 

                          K76.7                     Hepatorenal syndrome

 

                          N28.9                     Disorder of kidney and urete

r, unspecified

 

                Office Visit    10/06/2020  1:23a [facility] R

adilia Wiseman M.D.

                          J90                       Pleural effusion, not elsewh

ere classified

 

                          K72.90                    Hepatic failure, unspecified

 without coma

 

                          K76.7                     Hepatorenal syndrome

 

                          N28.9                     Disorder of kidney and urete

r, unspecified

 

                Office Visit    10/03/2020  1:23a [facility] R

adilia Wiseman M.D.

                          J90                       Pleural effusion, not elsewh

ere classified

 

                          K72.90                    Hepatic failure, unspecified

 without coma

 

                          K76.7                     Hepatorenal syndrome

 

                          N28.9                     Disorder of kidney and urete

r, unspecified

 

                Office Visit    10/01/2020  1:23a [facility] R

adilia Wiseman M.D.

                          J90                       Pleural effusion, not elsewh

ere classified

 

                          K72.90                    Hepatic failure, unspecified

 without coma

 

                          K76.7                     Hepatorenal syndrome

 

                          E03.9                     Hypothyroidism, unspecified

 

             Office Visit 2020  1:30p [facility] Bull Se

ars, D.O. J90

                                        Pleural effusion, not elsewhere classifi

ed

 

                          R06.00                    Dyspnea, unspecified







Assessments





                Date            Code            Description     Provider

 

                10/27/2020      J90             Pleural effusion, not elsewhere 

classified Bull Dejesus, D.O.

 

                10/27/2020      R06.00          Dyspnea, unspecified Bull Sears,

 D.O.

 

                10/27/2020      Z23             Encounter for immunization Bull 

Sears, D.O.

 

                10/08/2020      J90             Pleural effusion, not elsewhere 

classified Manas Wiseman M.D.

 

                10/08/2020      K72.90          Hepatic failure, unspecified wit

hout coma Manas Wiseman M.D.

 

                10/08/2020      K76.7           Hepatorenal syndrome Manas johnson M.D.

 

                10/08/2020      N28.9           Disorder of kidney and ureter, u

nspecified Manas Wiseman M.D.

 

                10/07/2020      J90             Pleural effusion, not elsewhere 

classified Manas Wiseman M.D.

 

                10/07/2020      K72.90          Hepatic failure, unspecified wit

hout coma Manas Wiseman M.D.

 

                10/07/2020      K76.7           Hepatorenal syndrome Manas johnson M.D.

 

                10/07/2020      N28.9           Disorder of kidney and ureter, u

nspecified Manas Wiseman M.D.

 

                10/06/2020      J90             Pleural effusion, not elsewhere 

classified Manas Wiseman M.D.

 

                10/06/2020      K72.90          Hepatic failure, unspecified wit

hout coma Manas Wiseman M.D.

 

                10/06/2020      K76.7           Hepatorenal syndrome Manas johnson M.D.

 

                10/06/2020      N28.9           Disorder of kidney and ureter, u

nspecified Manas Wiseman M.D.

 

                10/03/2020      J90             Pleural effusion, not elsewhere 

classified Manas Wiseman M.D.

 

                10/03/2020      K72.90          Hepatic failure, unspecified wit

hout coma Manas Wiseman M.D.

 

                10/03/2020      K76.7           Hepatorenal syndrome Manas johnson M.D.

 

                10/03/2020      N28.9           Disorder of kidney and ureter, u

nspecified Manas Wiseman M.D.

 

                10/01/2020      J90             Pleural effusion, not elsewhere 

classified Manas Wiseman M.D.

 

                10/01/2020      K72.90          Hepatic failure, unspecified wit

hout coma Manas Wiseman M.D.

 

                10/01/2020      K76.7           Hepatorenal syndrome Manas johnson M.D.

 

                10/01/2020      E03.9           Hypothyroidism, unspecified Declan Wiseman M.D.

 

                2020      J90             Pleural effusion, not elsewhere 

classified Manas Wiseamn M.D.

 

                2020      J90             Pleural effusion, not elsewhere 

classified Bull Dejesus D.O.

 

                2020      R06.00          Dyspnea, unspecified Bull Dejesus D.O.







Plan of Treatment

Future Appointment(s):* 2021  3:00 pm - Bull Dejesus D.O. at [facility]

10/27/2020 - Bull Dejesus D.O.* J90 Pleural effusion, not elsewhere classified

* R06.00 Dyspnea, unspecified

* Z23 Encounter for immunization

* * New Medication:* Prevnar 13  



* Follow up:* Follow up in 2 months with cxr









Functional Status





                Functional Condition Comment         Date            Status

 

                Independent with all ADL's                                 Activ

e

 

                Independent with all IADL's                                 Acti

ve







Mental Status





                Mental Condition Comment         Date            Status

 

                None                                            Active

 

                Can understand information                                 Activ

e







Referrals





                                        Description

 

                                        No Information Available

## 2021-01-19 NOTE — ED PDOC
Post-Departure Follow-Up


certified letter sent re formal read of ct abd/p. please find out pcp name and f

ax and have pt lola gutierrez MD.mlg Lundborg-Gray,Maja MD          Jan 19, 2021 11:44

## 2021-01-27 ENCOUNTER — HOSPITAL ENCOUNTER (OUTPATIENT)
Dept: HOSPITAL 53 - M IRPRO | Age: 61
End: 2021-01-27
Attending: INTERNAL MEDICINE
Payer: COMMERCIAL

## 2021-01-27 VITALS — SYSTOLIC BLOOD PRESSURE: 100 MMHG | DIASTOLIC BLOOD PRESSURE: 54 MMHG

## 2021-01-27 DIAGNOSIS — J90: Primary | ICD-10-CM

## 2021-01-27 LAB
APPEARANCE FLD: (no result)
COLOR PLR: (no result)
SPECIMEN SOURCE FLD: (no result)

## 2021-01-27 PROCEDURE — 89051 BODY FLUID CELL COUNT: CPT

## 2021-01-27 PROCEDURE — 87205 SMEAR GRAM STAIN: CPT

## 2021-01-27 PROCEDURE — 32555 ASPIRATE PLEURA W/ IMAGING: CPT

## 2021-01-27 PROCEDURE — 87070 CULTURE OTHR SPECIMN AEROBIC: CPT

## 2021-01-27 NOTE — REP
INDICATION:

PLEURAL EFFUSION



The patient has a history of left pleural effusion



COMPARISON:

None.



TECHNIQUE:

The procedure was performed by YOON Christy,  under the direct

supervision of Dr. Deng



The risks and benefits of the procedure were explained to the patient and an informed

consent was obtained both verbally and written.  Directly prior to the start of the

procedure a formal time-out was completed in the procedure room.



Pleural fluid in left lung zone was localized using ultrasound guidance.  The skin was

prepped and draped in a sterile fashion.  Ten ML of buffered lidocaine was used as a

local anesthetic. Using ultrasound guidance an 8-Persian multi side-hole catheter was

inserted using trocar technique.



FINDINGS:

One thousand six hundred mL of light sotero colored fluid was withdrawn and sent to the

laboratory for further analysis.



The patient tolerated the procedure well and there were no immediate complications.

After the appropriate amount of monitored convalescence, the patient was discharged

from the department.



IMPRESSION:

Ultrasound-guided thoracentesis with removal of 1600 mL of fluid.





<Electronically signed by Miladis Angulo > 01/27/21 1633

<Electronically signed by Noam Deng > 01/27/21 1636

## 2021-01-27 NOTE — REP
INDICATION:

POST LEFT THORA, 2  VIEW.



COMPARISON:

Comparison chest x-ray 13 January 2021..



TECHNIQUE:

PA and lateral views.



FINDINGS:

A left pleural effusion is noted, decreased in size from the 13 January 2021 study.  A

dextroconvex curve is again noted in the thoracic spine.  A pectus excavatum deformity

is seen magnifying the cardiac silhouette on the frontal view.  Right lung remains

clear.  There is no evidence of pneumothorax.



IMPRESSION:

Decreased left pleural effusion post left thoracentesis.  No complication seen.





<Electronically signed by Noam Deng > 01/27/21 0624

## 2021-02-10 ENCOUNTER — HOSPITAL ENCOUNTER (OUTPATIENT)
Dept: HOSPITAL 53 - M IRPRO | Age: 61
End: 2021-02-10
Attending: INTERNAL MEDICINE
Payer: COMMERCIAL

## 2021-02-10 VITALS — DIASTOLIC BLOOD PRESSURE: 57 MMHG | SYSTOLIC BLOOD PRESSURE: 108 MMHG

## 2021-02-10 DIAGNOSIS — J90: Primary | ICD-10-CM

## 2021-02-10 LAB
APPEARANCE FLD: (no result)
COLOR PLR: (no result)
SPECIMEN SOURCE FLD: (no result)

## 2021-02-10 PROCEDURE — 32555 ASPIRATE PLEURA W/ IMAGING: CPT

## 2021-02-10 PROCEDURE — 96374 THER/PROPH/DIAG INJ IV PUSH: CPT

## 2021-02-10 PROCEDURE — 87205 SMEAR GRAM STAIN: CPT

## 2021-02-10 PROCEDURE — 89051 BODY FLUID CELL COUNT: CPT

## 2021-02-10 PROCEDURE — 87070 CULTURE OTHR SPECIMN AEROBIC: CPT

## 2021-02-10 NOTE — REP
INDICATION:

PLEURAL EFFUSION



The patient has a history of left pleural effusion



COMPARISON:

None.



TECHNIQUE:

The procedure was performed by YOON Christy,  under the direct

supervision of Dr. Deng



The risks and benefits of the procedure were explained to the patient and an informed

consent was obtained both verbally and written.  Directly prior to the start of the

procedure a formal time-out was completed in the procedure room.



Pleural fluid in left lung zone was localized using ultrasound guidance.  The skin was

prepped and draped in a sterile fashion.  Ten ML of buffered lidocaine was used as a

local anesthetic. Using ultrasound guidance an 8-Latvian multi side-hole catheter was

inserted using trocar technique.



FINDINGS:

One thousand one hundred fifteen mL of red tinged colored fluid was withdrawn and sent

to the laboratory for further analysis.



The patient tolerated the procedure well and there were no immediate complications.

After the appropriate amount of monitored convalescence, the patient was discharged

from the department.



IMPRESSION:

Ultrasound-guided thoracentesis with removal 1115 mL red pleural fluid.





<Electronically signed by Miladis Angulo > 02/10/21 1512

<Electronically signed by Noam Deng > 02/10/21 1626

## 2021-02-10 NOTE — REP
INDICATION:

POST LEFT THORA, 2 VIEW.



COMPARISON:

Comparison chest x-ray 27 January 2021..



TECHNIQUE:

PA and lateral views.



FINDINGS:

The patient is a radiograph immediately status post left thoracentesis stent with

removal of 1.1 L of pleural fluid.  Today's chest x-ray demonstrates a moderate amount

of left pleural fluid, actually more than was present on the 27 January 2021 study.

This opacifies the lower half of the left hemithorax.  There is no evidence of

pneumothorax.  The right lung is clear with a sharp right pleural angles.  There is a

dextroconvex curve in the thoracic spine.



IMPRESSION:

No complication is identified.  A moderate degree of left pleural effusion persists

post left thoracentesis.





<Electronically signed by Noam Deng > 02/10/21 0022

## 2021-02-24 ENCOUNTER — HOSPITAL ENCOUNTER (OUTPATIENT)
Dept: HOSPITAL 53 - M IRPRO | Age: 61
End: 2021-02-24
Attending: INTERNAL MEDICINE
Payer: COMMERCIAL

## 2021-02-24 VITALS — DIASTOLIC BLOOD PRESSURE: 56 MMHG | SYSTOLIC BLOOD PRESSURE: 106 MMHG

## 2021-02-24 DIAGNOSIS — J90: Primary | ICD-10-CM

## 2021-02-24 LAB
APPEARANCE FLD: (no result)
COLOR PLR: (no result)
SPECIMEN SOURCE FLD: (no result)

## 2021-02-24 PROCEDURE — 87205 SMEAR GRAM STAIN: CPT

## 2021-02-24 PROCEDURE — 71046 X-RAY EXAM CHEST 2 VIEWS: CPT

## 2021-02-24 PROCEDURE — 32555 ASPIRATE PLEURA W/ IMAGING: CPT

## 2021-02-24 PROCEDURE — 87070 CULTURE OTHR SPECIMN AEROBIC: CPT

## 2021-02-24 PROCEDURE — 96365 THER/PROPH/DIAG IV INF INIT: CPT

## 2021-02-24 PROCEDURE — 89051 BODY FLUID CELL COUNT: CPT

## 2021-02-24 NOTE — REP
INDICATION:

POST THORACENTESIS.



COMPARISON:

02/10/2021



TECHNIQUE:

Two-view chest



FINDINGS:

Lungs hypoinflated.  Much smaller left pleural effusion noted.  I do not see any

definite pneumothorax.  The right lung is without definite acute finding.

Cardiomediastinal silhouette and airway grossly intact.



IMPRESSION:

Status post left thoracentesis with marked decrease in pleural fluid and no evidence

of pneumothorax.





<Electronically signed by Armando Gordon > 02/24/21 6881

## 2021-02-24 NOTE — REP
INDICATION:

PLEURAL EFFUSION



The patient has a history of left pleural effusion



COMPARISON:

None.



TECHNIQUE:

The procedure was performed by YOON Christy,  under the direct

supervision of Dr. Deng



The risks and benefits of the procedure were explained to the patient and an informed

consent was obtained both verbally and written.  Directly prior to the start of the

procedure a formal time-out was completed in the procedure room.



Pleural fluid in left lung zone was localized using ultrasound guidance.  The skin was

prepped and draped in a sterile fashion.  Ten ML of buffered lidocaine was used as a

local anesthetic. Using ultrasound guidance an 8-Arabic multi side-hole catheter was

inserted using trocar technique.



FINDINGS:

1350 mL mL of yellow colored fluid was withdrawn and sent to the laboratory for

further analysis.



The patient tolerated the procedure well and there were no immediate complications.

After the appropriate amount of monitored convalescence, the patient was discharged

from the department.



IMPRESSION:

Ultrasound-guided thoracentesis with removal of 1350 mL of yellow pleural fluid.





<Electronically signed by Miladis Angulo > 02/24/21 1548

<Electronically signed by Noam Deng > 02/24/21 [id number]

## 2021-03-10 ENCOUNTER — HOSPITAL ENCOUNTER (OUTPATIENT)
Dept: HOSPITAL 53 - M IRPRO | Age: 61
End: 2021-03-10
Attending: INTERNAL MEDICINE
Payer: COMMERCIAL

## 2021-03-10 VITALS — DIASTOLIC BLOOD PRESSURE: 58 MMHG | SYSTOLIC BLOOD PRESSURE: 101 MMHG

## 2021-03-10 DIAGNOSIS — J90: Primary | ICD-10-CM

## 2021-03-10 LAB
APPEARANCE FLD: (no result)
COLOR PLR: (no result)
SPECIMEN SOURCE FLD: (no result)

## 2021-03-10 PROCEDURE — 96365 THER/PROPH/DIAG IV INF INIT: CPT

## 2021-03-10 PROCEDURE — 87205 SMEAR GRAM STAIN: CPT

## 2021-03-10 PROCEDURE — 32555 ASPIRATE PLEURA W/ IMAGING: CPT

## 2021-03-10 PROCEDURE — 87070 CULTURE OTHR SPECIMN AEROBIC: CPT

## 2021-03-10 PROCEDURE — 89051 BODY FLUID CELL COUNT: CPT

## 2021-03-10 NOTE — REP
INDICATION:

POST LEFT THORA, 2 VIEW.



COMPARISON:

02/24/2021.



TECHNIQUE:

Two views of chest performed.



FINDINGS:

There is no pneumothorax status post left thoracentesis.  Mild residual pleural and

parenchymal opacities again seen in the left lung base.  The heart and mediastinum are

unremarkable and unchanged.



IMPRESSION:

No pneumothorax status post left thoracentesis.





<Electronically signed by Sushant Khan > 03/10/21 4717

## 2021-03-20 ENCOUNTER — HOSPITAL ENCOUNTER (INPATIENT)
Dept: HOSPITAL 53 - M ED | Age: 61
LOS: 15 days | Discharge: HOME HEALTH | DRG: 720 | End: 2021-04-04
Attending: GENERAL PRACTICE | Admitting: INTERNAL MEDICINE
Payer: COMMERCIAL

## 2021-03-20 VITALS — DIASTOLIC BLOOD PRESSURE: 52 MMHG | SYSTOLIC BLOOD PRESSURE: 90 MMHG

## 2021-03-20 VITALS — BODY MASS INDEX: 28.86 KG/M2 | WEIGHT: 183.87 LBS | HEIGHT: 67 IN

## 2021-03-20 VITALS — SYSTOLIC BLOOD PRESSURE: 97 MMHG | DIASTOLIC BLOOD PRESSURE: 53 MMHG

## 2021-03-20 VITALS — SYSTOLIC BLOOD PRESSURE: 135 MMHG | DIASTOLIC BLOOD PRESSURE: 58 MMHG

## 2021-03-20 VITALS — DIASTOLIC BLOOD PRESSURE: 53 MMHG | SYSTOLIC BLOOD PRESSURE: 94 MMHG

## 2021-03-20 VITALS — DIASTOLIC BLOOD PRESSURE: 61 MMHG | SYSTOLIC BLOOD PRESSURE: 110 MMHG

## 2021-03-20 VITALS — DIASTOLIC BLOOD PRESSURE: 60 MMHG | SYSTOLIC BLOOD PRESSURE: 103 MMHG

## 2021-03-20 VITALS — DIASTOLIC BLOOD PRESSURE: 57 MMHG | SYSTOLIC BLOOD PRESSURE: 103 MMHG

## 2021-03-20 DIAGNOSIS — E78.5: ICD-10-CM

## 2021-03-20 DIAGNOSIS — I85.10: ICD-10-CM

## 2021-03-20 DIAGNOSIS — E03.9: ICD-10-CM

## 2021-03-20 DIAGNOSIS — K76.6: ICD-10-CM

## 2021-03-20 DIAGNOSIS — K65.2: ICD-10-CM

## 2021-03-20 DIAGNOSIS — G89.29: ICD-10-CM

## 2021-03-20 DIAGNOSIS — K76.7: ICD-10-CM

## 2021-03-20 DIAGNOSIS — D61.818: ICD-10-CM

## 2021-03-20 DIAGNOSIS — F41.9: ICD-10-CM

## 2021-03-20 DIAGNOSIS — Z79.899: ICD-10-CM

## 2021-03-20 DIAGNOSIS — N18.4: ICD-10-CM

## 2021-03-20 DIAGNOSIS — K70.31: ICD-10-CM

## 2021-03-20 DIAGNOSIS — F32.9: ICD-10-CM

## 2021-03-20 DIAGNOSIS — Z88.0: ICD-10-CM

## 2021-03-20 DIAGNOSIS — G40.909: ICD-10-CM

## 2021-03-20 DIAGNOSIS — N17.9: ICD-10-CM

## 2021-03-20 DIAGNOSIS — J94.8: ICD-10-CM

## 2021-03-20 DIAGNOSIS — Z88.8: ICD-10-CM

## 2021-03-20 DIAGNOSIS — A41.9: Primary | ICD-10-CM

## 2021-03-20 DIAGNOSIS — J96.00: ICD-10-CM

## 2021-03-20 DIAGNOSIS — D69.6: ICD-10-CM

## 2021-03-20 DIAGNOSIS — L29.9: ICD-10-CM

## 2021-03-20 DIAGNOSIS — E87.1: ICD-10-CM

## 2021-03-20 LAB
ALBUMIN SERPL BCG-MCNC: 2.6 GM/DL (ref 3.2–5.2)
ALT SERPL W P-5'-P-CCNC: 32 U/L (ref 12–78)
AMYLASE FLD-CCNC: 37 U/L
APPEARANCE FLD: (no result)
APTT BLD: 34.6 SECONDS (ref 24.2–38.5)
BASE EXCESS BLDV CALC-SCNC: -1.4 MMOL/L (ref -2–2)
BASOPHILS # BLD AUTO: 0.1 10^3/UL (ref 0–0.2)
BASOPHILS NFR BLD AUTO: 0.7 % (ref 0–1)
BILIRUB CONJ SERPL-MCNC: 1.7 MG/DL (ref 0–0.2)
BILIRUB SERPL-MCNC: 4 MG/DL (ref 0.2–1)
BUN SERPL-MCNC: 42 MG/DL (ref 7–18)
CALCIUM SERPL-MCNC: 8.5 MG/DL (ref 8.8–10.2)
CHLORIDE SERPL-SCNC: 97 MEQ/L (ref 98–107)
CHOLEST FLD-MCNC: < 50 MG/DL
CO2 BLDV CALC-SCNC: 24.4 MEQ/L (ref 24–28)
CO2 SERPL-SCNC: 27 MEQ/L (ref 21–32)
COLOR PLR: (no result)
CREAT SERPL-MCNC: 2.22 MG/DL (ref 0.55–1.3)
CREAT UR-MCNC: 106 MG/DL
EOSINOPHIL # BLD AUTO: 0.2 10^3/UL (ref 0–0.5)
EOSINOPHIL NFR BLD AUTO: 2.5 % (ref 0–3)
FERRITIN SERPL-MCNC: 170 NG/ML (ref 8–252)
GFR SERPL CREATININE-BSD FRML MDRD: 23.9 ML/MIN/{1.73_M2} (ref 45–?)
GLUCOSE SERPL-MCNC: 86 MG/DL (ref 70–100)
HCO3 BLDV-SCNC: 23.2 MEQ/L (ref 23–27)
HCO3 STD BLDV-SCNC: 23.3 MEQ/L
HCT VFR BLD AUTO: 35.3 % (ref 36–47)
HGB BLD-MCNC: 11.8 G/DL (ref 12–15.5)
INR PPP: 1.36
IRON SATN MFR SERPL: 96.6 % (ref 13.2–45)
IRON SERPL-MCNC: 252 UG/DL (ref 50–170)
LDH FLD L TO P-CCNC: 72 U/L
LDH SERPL L TO P-CCNC: 274 U/L (ref 84–246)
LIPASE SERPL-CCNC: 547 U/L (ref 73–393)
LYMPHOCYTES # BLD AUTO: 0.8 10^3/UL (ref 1.5–5)
LYMPHOCYTES NFR BLD AUTO: 12.5 % (ref 24–44)
MCH RBC QN AUTO: 30 PG (ref 27–33)
MCHC RBC AUTO-ENTMCNC: 33.4 G/DL (ref 32–36.5)
MCV RBC AUTO: 89.8 FL (ref 80–96)
MONOCYTES # BLD AUTO: 0.8 10^3/UL (ref 0–0.8)
MONOCYTES NFR BLD AUTO: 11.9 % (ref 2–8)
NEUTROPHILS # BLD AUTO: 4.9 10^3/UL (ref 1.5–8.5)
NEUTROPHILS NFR BLD AUTO: 72.1 % (ref 36–66)
OSMOLALITY UR: 325 MOSM/KG (ref 50–1400)
PCO2 BLDV: 38.4 MMHG (ref 38–50)
PH BLDV: 7.4 UNITS (ref 7.33–7.43)
PH FLD: 7.64 UNITS
PLATELET # BLD AUTO: 116 10^3/UL (ref 150–450)
PO2 BLDV: 75.3 MMHG (ref 30–50)
POTASSIUM 24H UR-SCNC: 39.9 MEQ/L
POTASSIUM SERPL-SCNC: 4.8 MEQ/L (ref 3.5–5.1)
PROT SERPL-MCNC: 6.9 GM/DL (ref 6.4–8.2)
PROT UR-MCNC: 5.5 MG/DL (ref 0–12)
PROTHROMBIN TIME: 17.1 SECONDS (ref 12.5–14.3)
RBC # BLD AUTO: 3.93 10^6/UL (ref 4–5.4)
RSV RNA NPH QL NAA+PROBE: NEGATIVE
SAO2 % BLDV: 95 % (ref 60–80)
SODIUM SERPL-SCNC: 130 MEQ/L (ref 136–145)
SODIUM UR-SCNC: 14 MEQ/L
SPECIMEN SOURCE FLD: (no result)
TIBC SERPL-MCNC: 261 UG/DL (ref 250–450)
TRIGL FLD-MCNC: 130 MG/DL
WBC # BLD AUTO: 6.7 10^3/UL (ref 4–10)

## 2021-03-20 PROCEDURE — 0W9B3ZZ DRAINAGE OF LEFT PLEURAL CAVITY, PERCUTANEOUS APPROACH: ICD-10-PCS | Performed by: SPECIALIST

## 2021-03-20 RX ADMIN — BRIMONIDINE TARTRATE SCH DROP: 1.5 SOLUTION OPHTHALMIC at 20:00

## 2021-03-20 RX ADMIN — MIDODRINE HYDROCHLORIDE SCH MG: 5 TABLET ORAL at 12:15

## 2021-03-20 RX ADMIN — SODIUM CHLORIDE, PRESERVATIVE FREE SCH ML: 5 INJECTION INTRAVENOUS at 22:02

## 2021-03-20 RX ADMIN — SODIUM CHLORIDE, PRESERVATIVE FREE SCH ML: 5 INJECTION INTRAVENOUS at 14:00

## 2021-03-20 RX ADMIN — MIDODRINE HYDROCHLORIDE SCH MG: 5 TABLET ORAL at 16:28

## 2021-03-20 RX ADMIN — SODIUM CHLORIDE SCH UNITS: 4.5 INJECTION, SOLUTION INTRAVENOUS at 20:00

## 2021-03-20 RX ADMIN — DEXTROSE MONOHYDRATE SCH MLS/HR: 50 INJECTION, SOLUTION INTRAVENOUS at 17:42

## 2021-03-20 RX ADMIN — LATANOPROST SCH DROP: 50 SOLUTION OPHTHALMIC at 20:00

## 2021-03-20 RX ADMIN — Medication SCH UNITS: at 11:10

## 2021-03-20 RX ADMIN — CIPROFLOXACIN SCH MLS/HR: 2 INJECTION, SOLUTION INTRAVENOUS at 14:41

## 2021-03-20 RX ADMIN — MORPHINE SULFATE PRN MG: 4 INJECTION INTRAVENOUS at 11:10

## 2021-03-20 RX ADMIN — LEVOTHYROXINE SODIUM SCH MCG: 112 TABLET ORAL at 11:11

## 2021-03-20 RX ADMIN — MORPHINE SULFATE PRN MG: 4 INJECTION INTRAVENOUS at 18:40

## 2021-03-20 RX ADMIN — BRIMONIDINE TARTRATE SCH DROP: 1.5 SOLUTION OPHTHALMIC at 16:28

## 2021-03-20 RX ADMIN — BETAXOLOL HYDROCHLORIDE SCH DROP: 2.8 SUSPENSION/ DROPS OPHTHALMIC at 20:00

## 2021-03-20 RX ADMIN — MULTIPLE VITAMINS W/ MINERALS TAB SCH TAB: TAB at 11:11

## 2021-03-20 RX ADMIN — FOLIC ACID SCH MG: 1 TABLET ORAL at 11:11

## 2021-03-20 RX ADMIN — BRIMONIDINE TARTRATE SCH DROP: 1.5 SOLUTION OPHTHALMIC at 11:08

## 2021-03-20 RX ADMIN — BETAXOLOL HYDROCHLORIDE SCH DROP: 2.8 SUSPENSION/ DROPS OPHTHALMIC at 11:08

## 2021-03-20 NOTE — REPVR
PROCEDURE INFORMATION: 

Exam: XR Chest 

Exam date and time: 3/20/2021 4:32 AM 

Age: 61 years old 

Clinical indication: Other: Shortness of breath 



TECHNIQUE: 

Imaging protocol: XR of the chest 

Views: 2 views. 



COMPARISON: 

CT Chest without contrast 10/4/2020 10:41 AM 



FINDINGS: 

Lungs: Secondary left lung atelectasis. The right lung is clear. 

Pleural spaces: Large left pleural effusion. 

Heart/Mediastinum: The heart is not well-defined but appears slightly shifted 

to the right. 

Bones/joints: Unremarkable. 

Soft tissues: There are moderately generous overlying soft tissues. 



IMPRESSION: 

Large left pleural effusion with secondary left lung atelectasis since 

10/04/2020. 



Electronically signed by: Sridhar Seymour On 03/20/2021  05:12:01 AM

## 2021-03-20 NOTE — IPN
PROGRESS NOTE



DATE:  03/20/2021



SUBJECTIVE: Flora is seen in an interim bed in the Emergency Room. She was

admitted with symptomatic pleural effusion. She gets intermittent therapeutic

thoracentesis coordinated by Dr. Bull Silverio through pulmonology. It looks as

though her diuretic doses were recently decreased by Dr. Fraser, her

gastroenterologist, we suspect related to pending hepatorenal syndrome. She has

a history of chronic kidney disease and is followed by nephrology, alcoholic

liver disease with portal hypertension, varices and cytopenia as well as

hepatic encephalopathy and ascites. She has recurrent left-sided hydrothorax.

Today, she does not feel any different than she did when she came in. She is

expecting a thoracentesis today. She has a bit of discomfort from abdominal

distention as well. 



PHYSICAL EXAMINATION: 

VITAL SIGNS: Blood pressure 119/62, pulse 104, respiratory rate 18, 95% oxygen

saturation on room air, afebrile. 

GENERAL: Alert, conversant, in no distress. 

HEENT: No JVD present.

RESPIRATORY: Decreased breath sounds left side.

HEART: Regular rhythm.

ABDOMEN: Soft, distended with ascites, nontender.

EXTREMITIES: Trace peripheral edema. No clubbing or cyanosis.



LABORATORY REVIEW: No labs have been done since her admission.



IMPRESSION:

  1.  Left-sided symptomatic pleural effusion: I discussed the case with Dr. Eveline Dunlap who sees the patient in consultation to perform therapeutic

      thoracentesis. Patient has one of these scheduled as an outpatient on

      3/24/2021 anyway.

  2.  Ascites: Admission note discusses paracentesis, but she has not had any

      imaging studies to quantitate the fluid. I have ordered an ultrasound of

      the abdomen. She would need albumin prior to any high volume paracentesis.

  3.  Acute kidney injury superimposed on stage 4 chronic kidney disease:

      Nephrology has been consulted. Volume status was problematic. I suspect

      she is developing hepatorenal syndrome. 

  4.  Cirrhosis: Daily labs have been ordered. Prognosis is poor. Baseline INR

      is 1.36.

## 2021-03-20 NOTE — HPEPDOC
[facility] Medical History & Physical


Date of Admission


Mar 20, 2021


Date of Service:  Mar 20, 2021


Primary Care Physician:  Meron Ramirez MD


Attending Physician:  RG VALDOVINOS MD





History and Physical


TIME OF SERVICE: 625am


   


CHIEF COMPLAINT: dyspnea





HISTORY OF PRESENT ILLNESS: 


This 61 yr old F reports having the dose of her Lasix decreased from two times a

day to daily about 3 weeks ago. Thereafter she noticed that she has been having 

intermittent episodes of dyspnea especially when she exerts her self. During her

last thoracentesis she noticed that more fluid was removed than usual. She 

thinks her last paracentesis was 5 or 6 months ago. In addition to feeling short

of breath she has been feeling thirstier, admits to drinking more fluids and 

feels like her abdomen is more distended. She denied having chest pain, cough, 

runny nose, fever chills, feeling lethargic, or having swelling of her ankles. 

 discussed the patients case with  and ordered Lasix which 

the patient feels helped her dyspnea a bit. 





REVIEW OF SYSTEMS: 12-point review of systems negative except as listed in HPI





PAST MEDICAL/ SURGICAL HISTORY:


History of decompensated alcoholic liver cirrhosis complicated by portal HTN, 

esophageal varices, bicytopenia episodes of hepatic encephalopathy, ascites & 

recurrent left sided hepatic hydrothorax (s/p Pigtail catheter placement & left 

PleurX tunneled catheter)


Chronic HTN


CKD 4


Hypothyroidism


DLP


Asthma


Anxiety / Depression


D&C





SOCIAL HISTORY:


She doesnt smoke, use recreational drugs, quit drinking alcohol & used to work 

at [employer].





FAMILY HISTORY:


   


ALLERGIES: Please see below.





HOME MEDICATIONS: Please see below. 





PHYSICAL EXAMINATION:


Vital Signs








  Date Time  Temp Pulse Resp B/P (MAP) Pulse Ox O2 Delivery O2 Flow Rate FiO2


 


3/20/21 01:54 99.1 102 20 111/73 (86) 98 Room Air  





GENERAL APPEARANCE: anxious / lying on her side 


HEENT: no scleral icterus 


CARDIOVASCULAR: slightly tachycardic / NMRG / she doesnt have LE edema


LUNGS: she is not coughing or using accessory muscles/ breath sounds are 

diminished especially on the left / there is dullness to percussion on the left


ABDOMEN: distended  & soft


MUSCULOSKELETAL: NCAT / BRUCE x 4


NEUROLOGICAL: CN 2-12 intact / she has asterisks


PSYCHIATRIC: A&OX 3 /able to understand and follow all commands





LABORATORY DATA:


3/20/21 03:01





Immature Granulocyte % (Auto) 0.3, Neutrophils (%) (Auto) 72.1H, Lymphocytes (%)

(Auto) 12.5L, Monocytes (%) (Auto) 11.9H, Eosinophils (%) (Auto) 2.5, Basophils 

(%) (Auto) 0.7, Neutrophils # (Auto) 4.9, Lymphocytes # (Auto) 0.8L, Monocytes #

(Auto) 0.8, Eosinophils # (Auto) 0.2, Basophils # (Auto) 0.1, Nucleated Red 

Blood Cells % (auto) 0.0, Prothrombin Time 17.1H, Prothromb Time International 

Ratio 1.36, Activated Partial Thromboplast Time 34.6, Anion Gap 6L, Glomerular 

Filtration Rate 23.9L, Calcium Level 8.5L, Total Bilirubin 4.0H, Direct 

Bilirubin 1.7H, Aspartate Amino Transf (AST/SGOT) 38H, Alanine Aminotransferase 

(ALT/SGPT) 32, Alkaline Phosphatase 153H, Ammonia < 10, Total Protein 6.9, 

Albumin 2.6L, Albumin/Globulin Ratio 0.6L, Lipase 547H





IMAGING: Chest x-ray IMPRESSION: Large left pleural effusion with secondary 

left lung atelectasis since 10/04/2020.





MICROBIOLOGY: respiratory panel pending





ASSESSMENT: 


Ms. Taylor is a 61 yr old w a hx of alcoholic liver cirrhosis, CKD4, HTN, 

hypothyroidism, anxiety depression and asthma who presented w c/o dyspnea; she 

will be admitted for management of recurrent pleural effusion, ascites, and BAM 

on CKD4.





PLAN:


1 Dyspnea 2/2 recurrent left sided hepatic hydrothorax


She is currently sating well on RA


Plan: admit to PCU/ f/u VBG / pulse ox / will ask the day time to consult Dr. Wiseman or IR for thoracentesis





2 Ascites


The abdominal distention is also causing a mass effect on he chest and making 

difficult for 


Plan: will ask the day time team to consult IR for therapeutic paracentesis 





3 BAM on CKD 4


I suspect that the dose of her Lasix was reduced bc of worsening renal function


She was given one dose of IV Lasix in the ER


Plan: f/u urine studies for FEUrea / hold diuretics / will ask the day time team

to consult Nephro to determine if it is appropriate to hold furosemide and sp

ironolactone and give an albumin challenge if they feel the BAM is pre-renal or 

give additional Lasix if the feel the BAM is due to congestion





4 NN Anemia


Likely multifactorial


Plan: f/u iron studies with soluble transferrin receptor, stool occult





5 Liver cirrhosis w portal HTN 


She has an appointment at [facility] to determine if she is a candidate for

a liver transplant next month. 


Plan: will ask the day time team to consider calling  / in the mean-

while I will place an order to request records from his offfice 





6 Hx of encephalopathy


Plan: will hold lactulose to avoid in case her BAM is pre-renal and start 

rifaxamin instead for HE





7 Thrombocytopenia 


2/2 reduced thrombopoetin production


Plan: trend LFTs





8 Elevated lipase


She doesnt have overt abdominal pain and the lipase is not elevated enough to 

meet the cut-off to diagnose pancreatitis





9 Hypothyroidism


Plan: Levothyroxine





10 Hx of hypotension ?


Plan: midodrine 





DVT px w SCDs





Dispo: home after at least 2 midnights stay





Laboratory Data


CBC/BMP


Laboratory Tests


3/20/21 03:01











Home Medications


Scheduled


Betaxolol Hcl (Betaxolol HCl) 0.5 % Sol, 1 DROP OU BID


Brimonidine Tartrate (Brimonidine Tartrate) 0.2% 5ML Drops, 1 DROP OU BID


Cholecalciferol (Vitamin D3) (Vitamin D3) 1,000 Unit Tablet, 1,000 UNITS PO 

DAILY


Folic Acid (Folic Acid) 1 Mg Tablet, 1 MG PO DAILY


Lactulose (Lactulose) 10 Gm/15 Ml Solution, 30 ML PO BID


Latanoprost (Xalatan) 0.005% 2.5ML Drops, 1 DROP OU QHS


Levothyroxine Sodium (Synthroid) 112 Mcg Tablet, 112 MCG PO DAILY


Midodrine HCl (Midodrine HCl) 5 Mg Tablet, 10 MG PO TID


   0800/1200/1600 


Multivitamins (Thera M Plus Tablet) 1 Each Tablet, 1 TAB PO DAILY


Potassium Chloride (Potassium Chloride) 20 Meq Tablet.er, 20 MEQ PO DAILY


Spironolactone (Spironolactone) 50 Mg Tablet, 50 MG PO DAILY


Torsemide (Torsemide) 20 Mg Tablet, 20 MG PO DAILY





Scheduled PRN


Hydroxyzine HCl (Hydroxyzine HCl) 25 Mg Tablet, 25 MG PO QHS PRN for SLEEP


Loratadine (Loratadine) 10 Mg Tablet, 10 MG PO DAILY PRN for ALLERGIES





Allergies


Coded Allergies:  


     cefdinir (Verified  Allergy, Severe, THROAT CLOSES, 5/27/20)


     Penicillins (Verified  Allergy, Unknown, UNKNOWN CHILDHOOD REACTION, 5/27/ 20)





A-FIB/CHADSVASC


A-FIB History


Current/History of A-Fib/PAF?:  No


Current PO Anticoag Therapy:  No











RG VALDOVINOS MD                Mar 20, 2021 06:49

## 2021-03-20 NOTE — CR
PULMONARY CONSULTATION

DATE: 03/20/2021



CHIEF COMPLAINT:   Shortness of breath.



HISTORY OF PRESENT ILLNESS:  Ms. Taylor is a 61-year-old female with a past

medical history of alcoholic cirrhosis with history of esophageal varices and

recurrent episodes of left sided hepatic hydrothorax, who is here today with

complaints of worsening shortness of breath and dyspnea on exertion. The

patient has a prior history of recurrent ascites with left sided hepatic

hydrothorax that has been drained in the past with a PleurX catheter and most

currently has been receiving recurrent therapeutic thoracentesis every 2 weeks

as ordered by her pulmonologist. The patient generally will have the

thoracentesis performed by [facility] and receive albumin prior to the procedure. She

was due for a repeat thoracentesis in a few days time next week on the 24th.

She was noticing however significant worsening shortness of breath and so had

presented to the Emergency Department overnight. She did note that her last

thoracentesis which was on the 10th had required more drainage in the past with

approximately 1.65 liters of fluid removed at that time. Her fluid was also

noted to be more orange in color at the time as well. The patient also has had

the dose of her Lasix decreased from twice a day to daily, about 3 weeks ago by

her gastroenterologist. She does note in the past few weeks that she has had

significant decrease in her urine output. She denies having any worsening

abdominal distention or lower extremity edema. She does have some abdominal

discomfort that she is noticing now. She does have somewhat chronic chills at

home but does not feel that she has had significant fevers or chills. She is

also having some chest discomfort as well on that left side.



The patient was seen by Nephrology earlier today due to worsening renal

function. There was concern for hepatorenal syndrome. She was ordered for

albumin infusion which she did not receive yet.



PAST MEDICAL HISTORY:  The patient's past medical history is significant for:

1.  History of decompensated alcoholic cirrhosis with portal hypertension and

    esophageal varices as well as a prior history of hepatic encephalopathy and

    a recurrent ascites with left sided hepatic hydrothorax.

2.  Prior history of hypertension, now with hypotension on Midodrine.

3.  Chronic kidney disease.

4.  Hypothyroidism.

5.  Hyperlipidemia. 

6.  Anxiety/depression.

7.  Chronic pain syndrome.



PAST SURGICAL HISTORY:  The patient's past surgical history is significant for:

1.  D&C.

2.  Nasal surgery.

3.  Carpal tunnel surgery.

4.  Right arm fracture. 



FAMILY HISTORY:  Mother with history of chronic obstructive pulmonary disease

and history of brain cancer. A son with history of hypertension and asthma. 



SOCIAL HISTORY:  The patient denies any tobacco use. She as stated, has a

former history of alcohol abuse but has quit drinking for about 9 months now. 



HOME MEDICATIONS:

1.  Brimonidine.

2.  Vitamin D-3.

3.  Folic Acid.

4.  Lactulose.

5.  Latanoprost.

6.  Synthroid. 

7.  Midodrine.

8.  Multivitamin.

9.  Potassium Chloride.

10.  Spironolactone.

11.  Torsemide.

12.  Hydroxyzine p.r.n. 

13.  Loratadine p.r.n. 



ALLERGIES:  

1.  Cefdinir.

2.  Penicillin.



PHYSICAL EXAMINATION:

VITAL SIGNS: Temperature 98.1, pulse 93, respirations 20, blood pressure

101/56, O2 sat 98% on room air. 

INTAKE AND OUTPUT: In 375, out 150 mL. 

GENERAL APPEARANCE: The patient is sitting in the bed, appears anxious but is

not in any acute respiratory distress. She is speaking in full sentences and is

not using any accessory muscles for respiration.

HEENT: Normocephalic and atraumatic. Pupils reactive to light. There is sclera

icterus noted. Moist membranes noted. 

NECK: Supple. Trachea is midline. No palpable cervical adenopathy.

CARDIOVASCULAR: Regular rate and rhythm, normal S1, S2, with questionable extra

heart sound. No rubs or gallops.

LUNGS: Diminished breath sounds on the left with dullness to percussion on the

left. There are no rales, rhonchi or wheezes noted. 

ABDOMEN: Soft, mildly distended, nontender. 

EXTREMITIES:  No significant lower extremity edema bilaterally. 



LABORATORY DATA:  WBC 6.7, hemoglobin 11.8, platelet count 116.



Chemistries - sodium is 130, potassium is 4.8, chloride is 97, bicarbonate is

27, BUN 42, creatinine 2.22, glucose is 86. 



T-bili is 4.0, D-bili is 1.7, LDH is 274, AST ALT 38 and 32, alkaline

phosphatase 153. INR is 1.36. Albumin is 2.6.



VBG: PH 7.399, pco2 of 38.4, pO2 of 75.3.



IMAGING DATA:  Chest x-ray shows a large left pleural effusion with atelectasis

with some mediastinal shift to the right. This effusion appears larger when

compared to previous chest x-ray imaging from February. 



Abdominal ultrasound  there is trace ascites in the lower quadrants

bilaterally. 



ASSESSMENT  AND PLAN:  

Ms. Taylor is a 61-year-old female with a past medical history of alcoholic

    cirrhosis with history of portal hypertension, esophageal varices, hepatic

    encephalopathy, and recurrent ascites and left sided hepatic hydrothorax on

    therapeutic thoracentesis drainage every 2 weeks who presents with worsening

    dyspnea. The patient was also found on admission to have worsening acute

    kidney injury on chronic kidney disease. The patient's chest x-ray did show

    a large left sided pleural effusion which appears to be larger than previous

    studies. She also was noted to have more fluid drained on her last

    thoracentesis on 3/10 and she also had decreasing urine output and a

    decreased home of her home diuretic.



1. Recurrent left sided hepatic hydrothorax on therapeutic thoracentesis every

    2 weeks.

- The patient is on palliative thoracentesis every 2 weeks as an outpatient

    with I.[facility]. She does receive albumin prior to her thoracentesis to help with

    protein losses.

- The patient did have a thoracentesis performed today with removal of 1,650

    to 1,700 mL of cloudy orange, red tinged fluid. With the appearance of the

    fluid, there is suspicion for bacterial infection with possible SBP given

    that the pleural effusion is thought to be due to her recurrent ascites and

    is in actuality a hepatic hydrothorax.

- The patient does have risk factors for SBP given her portal hypotension and

    esophageal varices as well as with her history of recurrent ascites and

    decompensated cirrhosis.

- Her pleural fluid was sent for studies including cultures. We will start

    empiric antibiotics with Vancomycin for MRSA coverage given her healthcare

    visits and with Ciprofloxacin as she has reported severe Penicillin

    allergy.  

- The patient otherwise is sating well on room air. She does not appear to be

    in any acute respiratory distress. We did discuss with the patient that

    given the large pleural effusion, that we are limited in the amount that we

    can drain at one time with a thoracentesis. She may require repeat drainage

    sooner than her usual, as she will have some fluid still left over after the

    thoracentesis. 



2.  Acute kidney injury on chronic kidney disease with questionable hepatorenal

    syndrome. 

- The patient did have her diuretic dose decreased as an outpatient, likely

    due to worsening renal function. If she did have an infected ascites/pleural

    effusion, then this can precipitate worsening renal function and in

    particular, hepatorenal syndrome.

- Nephrology has been consulted. The patient is on Midodrine chronically for

     her hypotension and they have ordered additional albumin.

- Appreciate renal recommendations and follow up.



3,   History of cirrhosis with portal hypertension and a prior history of

      hepatic encephalopathy. 

- The patient is on Lactulose chronically which is currently on hold. She is

     on Rifaximin still.

- The patient's MELD score is 27 and she is due for evaluation at the

     [facility] for a liver transplant as she has been sober and

     alcohol free for almost 9 months now. 

- We will continue to monitor the patient's mental status. She has opioids

     p.r.n. for her chronic pain. 

- The patient may potentially require SBP prophylaxis with antibiotics as an

     outpatient, perhaps prior to her thoracentesis procedures.

- There is also discussion for a possible repeat attempt at a TIPS

     procedure, although with her history of hepatic encephalopathy, there is

     some concern.



4.  Hypothyroidism. Continue home medication as per Primary Team.



DVT PROPHYLAXIS  Heparin.



CODE STATUS: Full code.

MTDD

## 2021-03-20 NOTE — RO
OPERATIVE NOTE



DATE OF OPERATION: 03/20/2021



INDICATION FOR PROCEDURE: Therapeutic drainage of pleural effusion. 



PREPROCEDURE DIAGNOSIS: Left pleural effusion. 



POSTPROCEDURE DIAGNOSIS: Left pleural effusion. 



ATTENDING PHYSICIAN: Eveline Dunlap MD. 



CONSENT: Obtained from the patient prior to the procedure. Indication, risks,

and benefits were explained at length. 



PROCEDURE: Thoracetesis



ASSISTANT: None. 



ANESTHESIA: Local



DESCRIPTION OF PROCEDURE: Time-out was performed and the chest x-ray was

reviewed with the appropriate side confirmed and marked. Ultrasound was used as

well to identify the correct site, which was marked. Full sterile technique was

maintained throughout the procedure including surgical cap, mask, protective

eyewear, sterile gown, and sterile gloves. The patient was prepped and draped

in sterile fashion using chlorhexidine scrub after the appropriate level was

marked and confirmed by ultrasound. Then 1% Lidocaine was used to anesthetize

the skin, subcutaneous tissue, superior aspect of the rib, periosteum and

parietal. A finder needle was then introduced over the superior aspect of the

rib to locate the pleural fluid. Orange, red-tinged, cloudy fluid was

aspirated. The thoracentesis needle was then introduced through the skin

incision into the pleural space using negative aspiration pressure with the

band indicator to confirm appropriate positioning of the needle. The

thoracentesis catheter was then threaded without difficulty over the needle.

Approximately 1, 650-1,700 mL of orange, red-tinged, cloudy fluid was removed

without difficulty. The patient had only slight coughing towards the end of the

procedure, but no chest pain. The catheter was then removed. A bandage dressing

was placed over the insertion site. No immediate complications were noted

during the procedure. A postprocedure chest x-ray is pending. The fluid was

sent for studies. Estimated blood loss less than 2 mL.

MTDD

## 2021-03-20 NOTE — REP
INDICATION:

s/p thoracentesis.



COMPARISON:

PA and lateral on 03/10/2021 and PA and lateral chest earlier today at 2:39 a.m..



TECHNIQUE:

Portable AP chest with the patient sitting.



FINDINGS:

There is a large left pleural effusion similar to the comparison studies.  There is no

pneumothorax.

The right lung is clear.  Cardiac size is normal.  The martha, mediastinum, and skeletal

structures are unremarkable.





IMPRESSION:

Persisting large left pleural effusion.





<Electronically signed by Sushant Haley > 03/20/21 0749

## 2021-03-20 NOTE — REP
INDICATION:

ascites, spoke w/ dr dale oked to change order 9:51am.



COMPARISON:

Abdomen and pelvis CT dated 01/16/2021.



TECHNIQUE:

Limited study of multiple ultrasonographic images of the abdominal quadrants for

ascites.



FINDINGS:

There is a trace to minimal ascites in the lower quadrants bilaterally.  No other

ascites is identified.



IMPRESSION:

Trace to minimal ascites in the lower quadrants bilaterally.





<Electronically signed by Sushant Haley > 03/20/21 1023

## 2021-03-20 NOTE — CR
NEPHROLOGY CONSULTATION

DATE: 03/20/2021



REQUESTING PHYSICIAN:  Dr. Tesfaye Marino.



REASON FOR CONSULTATION:  Management of acute renal failure and volume status.



CHIEF COMPLAINT:  Patient presented to the hospital with progressive shortness

of breath.



HISTORY OF PRESENT ILLNESS:  Flora Taylor is a 61-year-old female with past

medical history of alcoholic liver cirrhosis with portal hypertension, hepatic

hydrothorax, baseline chronic kidney disease stage III with a best baseline

creatinine around 1.5 as of October 2020.  She is known to nephrology service

from previous hospitalization and from outpatient clinic as well.  She

presented to the hospital with progressive shortness of breath.   She reports

that she gets a period of left-sided thoracentesis by interventional radiology

(IR).  The last time she got it done was on March 10, 2021 and despite getting

the thoracentesis done, patient kept on having progressive shortness of breath.

  She was feeling weak and tired.  She was feeling thirsty.  She also reports

that the gastroenterologist (GI) recently decreased her diuretics and ever

since the diuretics were decreased, she is having more and more fluid

accumulated in the left-sided pleural cavity.  Patient was admitted under the

hospitalist service today with acute renal failure superimposed on chronic

kidney disease stage III.  Her creatinine was 2.2 on arrival.  Imaging showed

that patient had large left-sided pleural effusion, so nephrology service was

called for further help in the management of this patient with acute renal

failure and possible risk of hepatorenal syndrome.  Of note, because of large

left-sided effusion, pulmonary service has also been called and they are

planning to do left-sided thoracentesis today.  



PAST MEDICAL HISTORY:   

1.  History of decompensated liver cirrhosis secondary to alcohol use.  Last

use of alcohol was nine months ago.  

2.  She has portal hypertension.

3.  Esophageal varices.

4.  Recurrent left-sided hepatic hydrothorax.

5.  History of pigtail catheter in the past, currently getting palliative

left-sided thoracentesis every two weeks.

6.  Chronic kidney disease stage III to early stage IV, best baseline

creatinine about 1.5, as per previous hospitalizations.

7.  Hypothyroidism.

8.  Hyperlipidemia.

9.  Anxiety/depression.



PAST SURGICAL HISTORY:   

1.  History of dilation and curettage (D and C) in the past.

2.  Nasal surgery.

3.  Carpal tunnel surgery.



ALLERGIES:  She is allergic to PENICILLINS and CEFDINIR.



FAMILY HISTORY:  No significant family history of end-stage renal disease

requiring hemodialysis.



SOCIAL HISTORY:  Patient lives at home.  She denies any smoking.  She reports

that her last alcohol drink was nine months ago.  She denies any illicit drug

abuse.  



REVIEW OF SYSTEMS:   

CONSTITUTIONAL:  Patient reports feeling weak and tired.

EYES:  Denies any blurry vision or double vision.

ENT:  Denies any dysphagia or odynophagia.

CARDIOVASCULAR:  She reports progressive shortness of breath. 

RESPIRATORY:  She denies any cough, but she reports shortness of breath after

mild exertion.

GASTROINTESTINAL (GI):  She reports cirrhosis secondary to alcohol use and

history of ascites in the past.

GENITOURINARY:  She reports decreased urine output.

MUSCULOSKELETAL:  She reports muscle weakness.

SKIN:  She denies any rashes or ulcers.

HEMATOLOGICAL/ONCOLOGICAL:  She denies any easy bleeding or bruising.

PSYCHIATRIC:  She denies any depression or anxiety at this time.

CENTRAL NERVOUS SYSTEM:  She denies any strokes or seizures.

All other review of systems are negative.



PHYSICAL EXAMINATION:

GENERAL:  Patient is awake, alert, oriented times three, mild respiratory and

painful distress, laying in bed.

VITAL SIGNS:  Temperature 98.7 degrees Fahrenheit, blood pressure 94/53, pulse

86, respiratory rate 19, saturating 95% on room air.

INTAKE AND OUTPUT:  Urine output recorded is 450 mL.

HEAD AND NECK EXAM:  Extraocular muscles intact.  Pupils equally round and

reactive to light.  Mucous membranes are moist.  Neck is supple.  No

significant jugular venous distention (JVD).

CARDIOVASCULAR:  S1, S2.  I can feel the right-sided mediastinal shift. 

RESPIRATORY:  Decreased breath sounds at the left base all the way up to the

left upper lung zone.  Decreased vocal resonance on the left side as well.  

ABDOMEN:  Soft.  Slightly distended.  Left lobe of the liver is palpable. 

There is mild splenomegaly noted as well.  

GENITOURINARY:  Bladder is not palpable.

MUSCULOSKELETAL:  No clubbing or cyanosis.  Pulses are 2+.  

CENTRAL NERVOUS SYSTEM (CNS):  Patient has mild tremor of the upper bilateral

extremities.  Otherwise, she follows commands and moves extremities.



LABORATORY REVIEW:   

CBC showed a WBC 6.7, hemoglobin 11.8, platelets 116.



INR is 1.36.



Urine osmolality is 325.  Creatinine is 106, protein is 5.5, sodium is 14,

potassium is 39.9.



Pleural fluid:  WBC was 2799, 79% polymorphonuclear.  BBG pH is 7.39.



BMP showed sodium 130, potassium 4.8, chloride 97, bicarbonate 27, BUN 42,

creatinine 2.2.



Total bilirubin is 4, lipase 547.



COVID-19 is negative.



MICROBIOLOGY:

Cultures are still pending.



IMAGING DATA:

A chest x-ray was done today.  It showed large left-sided pleural effusion.



An abdominal x-ray was also done today.  It showed no significant ascites.



CURRENT INPATIENT MEDICATIONS:  Patient's medications include:

-  ciprofloxacin 400 mg IV every 12 hours

-  vancomycin 1 gram IV every 12 hours

-  folic acid 1 gram by mouth daily

-  Lasix 40 mg IV times 1 dose was given early in the morning

-  levothyroxine 112 mcg by mouth daily

-  midodrine 10 mg by mouth three times a day

-  morphine as needed

-  multivitamin daily

-  Zofran as needed

-  rifaximin 400 mg by mouth twice a day

-  vitamin D 1000 units by mouth daily



ASSESSMENT AND PLAN:   

1.  Acute renal failure superimposed on chronic kidney disease.  Patient's

urine sodium is low despite being given a dose of Lasix in the morning.  It

points towards either intravascular depletion or hepatorenal.  However, at this

point, since patient has evidence of infection on the left pleural tap, most

likely patient has developed spontaneous bacterial peritonitis (SBP).  At this

point, patient needs to be treated with albumin, midodrine, IV antibiotics.  I

would avoid using diuretics at this time.  Once the infection is treated,

patient will be started on diuretics.  



2.  Recurrent left-sided hepatic hydrothorax.  Patient got thoracentesis done

by pulmonary service.  About 1.7 liters of fluid was removed.  Fluid shows

signs of infection.  She is already started on ciprofloxacin and vancomycin.  



3.  Decompensated liver cirrhosis with hepatic hydrothorax.  Patient has a

history of alcoholic cirrhosis.  She is pending evaluation at [place] for

liver transplant.  Continue midodrine, albumin and antibiotics at this time. 

Continue rifaximin to prevent hepatic encephalopathy.



4.  Chronic hypotension.  Continue current dose of midodrine 10 mg by mouth

three times a day.



5.  Hypothyroidism.  Continue current dose of levothyroxine.  



Thank you for involving me in the care of this patient.  I shall be happy to

follow the patient along with you tomorrow morning.

## 2021-03-21 VITALS — SYSTOLIC BLOOD PRESSURE: 104 MMHG | DIASTOLIC BLOOD PRESSURE: 54 MMHG

## 2021-03-21 VITALS — DIASTOLIC BLOOD PRESSURE: 56 MMHG | SYSTOLIC BLOOD PRESSURE: 100 MMHG

## 2021-03-21 VITALS — DIASTOLIC BLOOD PRESSURE: 52 MMHG | SYSTOLIC BLOOD PRESSURE: 108 MMHG

## 2021-03-21 VITALS — DIASTOLIC BLOOD PRESSURE: 60 MMHG | SYSTOLIC BLOOD PRESSURE: 117 MMHG

## 2021-03-21 VITALS — DIASTOLIC BLOOD PRESSURE: 50 MMHG | SYSTOLIC BLOOD PRESSURE: 86 MMHG

## 2021-03-21 VITALS — DIASTOLIC BLOOD PRESSURE: 54 MMHG | SYSTOLIC BLOOD PRESSURE: 104 MMHG

## 2021-03-21 VITALS — SYSTOLIC BLOOD PRESSURE: 92 MMHG | DIASTOLIC BLOOD PRESSURE: 54 MMHG

## 2021-03-21 VITALS — DIASTOLIC BLOOD PRESSURE: 57 MMHG | SYSTOLIC BLOOD PRESSURE: 112 MMHG

## 2021-03-21 VITALS — SYSTOLIC BLOOD PRESSURE: 93 MMHG | DIASTOLIC BLOOD PRESSURE: 57 MMHG

## 2021-03-21 VITALS — DIASTOLIC BLOOD PRESSURE: 55 MMHG | SYSTOLIC BLOOD PRESSURE: 109 MMHG

## 2021-03-21 VITALS — SYSTOLIC BLOOD PRESSURE: 101 MMHG | DIASTOLIC BLOOD PRESSURE: 54 MMHG

## 2021-03-21 VITALS — DIASTOLIC BLOOD PRESSURE: 54 MMHG | SYSTOLIC BLOOD PRESSURE: 91 MMHG

## 2021-03-21 VITALS — DIASTOLIC BLOOD PRESSURE: 54 MMHG | SYSTOLIC BLOOD PRESSURE: 99 MMHG

## 2021-03-21 VITALS — DIASTOLIC BLOOD PRESSURE: 53 MMHG | SYSTOLIC BLOOD PRESSURE: 98 MMHG

## 2021-03-21 VITALS — DIASTOLIC BLOOD PRESSURE: 52 MMHG | SYSTOLIC BLOOD PRESSURE: 90 MMHG

## 2021-03-21 VITALS — SYSTOLIC BLOOD PRESSURE: 93 MMHG | DIASTOLIC BLOOD PRESSURE: 52 MMHG

## 2021-03-21 VITALS — DIASTOLIC BLOOD PRESSURE: 57 MMHG | SYSTOLIC BLOOD PRESSURE: 99 MMHG

## 2021-03-21 LAB
ALBUMIN SERPL BCG-MCNC: 2.6 GM/DL (ref 3.2–5.2)
ALT SERPL W P-5'-P-CCNC: 22 U/L (ref 12–78)
APPEARANCE UR: (no result)
APTT BLD: 45.9 SECONDS (ref 24.2–38.5)
BACTERIA UR QL AUTO: NEGATIVE
BILIRUB SERPL-MCNC: 3.8 MG/DL (ref 0.2–1)
BILIRUB UR QL STRIP.AUTO: NEGATIVE
BUN SERPL-MCNC: 50 MG/DL (ref 7–18)
CALCIUM SERPL-MCNC: 8.1 MG/DL (ref 8.8–10.2)
CHLORIDE SERPL-SCNC: 101 MEQ/L (ref 98–107)
CHLORIDE UR-SCNC: < 10 MEQ/L
CO2 SERPL-SCNC: 24 MEQ/L (ref 21–32)
CREAT SERPL-MCNC: 2.23 MG/DL (ref 0.55–1.3)
CREAT UR-MCNC: 148 MG/DL
GFR SERPL CREATININE-BSD FRML MDRD: 23.8 ML/MIN/{1.73_M2} (ref 45–?)
GLUCOSE SERPL-MCNC: 111 MG/DL (ref 70–100)
GLUCOSE UR QL STRIP.AUTO: NEGATIVE MG/DL
HCT VFR BLD AUTO: 26.9 % (ref 36–47)
HCT VFR BLD AUTO: 27.6 % (ref 36–47)
HGB BLD-MCNC: 8.8 G/DL (ref 12–15.5)
HGB BLD-MCNC: 9.2 G/DL (ref 12–15.5)
HGB UR QL STRIP.AUTO: NEGATIVE
INR PPP: 1.74
KETONES UR QL STRIP.AUTO: NEGATIVE MG/DL
LEUKOCYTE ESTERASE UR QL STRIP.AUTO: NEGATIVE
MAGNESIUM SERPL-MCNC: 2.2 MG/DL (ref 1.8–2.4)
MCH RBC QN AUTO: 29.9 PG (ref 27–33)
MCH RBC QN AUTO: 30.1 PG (ref 27–33)
MCHC RBC AUTO-ENTMCNC: 32.7 G/DL (ref 32–36.5)
MCHC RBC AUTO-ENTMCNC: 33.3 G/DL (ref 32–36.5)
MCV RBC AUTO: 90.2 FL (ref 80–96)
MCV RBC AUTO: 91.5 FL (ref 80–96)
NITRITE UR QL STRIP.AUTO: NEGATIVE
PH UR STRIP.AUTO: 5 UNITS (ref 5–9)
PLATELET # BLD AUTO: 71 10^3/UL (ref 150–450)
PLATELET # BLD AUTO: 81 10^3/UL (ref 150–450)
POTASSIUM 24H UR-SCNC: 41.9 MEQ/L
POTASSIUM SERPL-SCNC: 4.6 MEQ/L (ref 3.5–5.1)
PROT SERPL-MCNC: 5.8 GM/DL (ref 6.4–8.2)
PROT UR QL STRIP.AUTO: NEGATIVE MG/DL
PROTHROMBIN TIME: 20.7 SECONDS (ref 12.5–14.3)
RBC # BLD AUTO: 2.94 10^6/UL (ref 4–5.4)
RBC # BLD AUTO: 3.06 10^6/UL (ref 4–5.4)
RBC # UR AUTO: 1 /HPF (ref 0–3)
SODIUM SERPL-SCNC: 133 MEQ/L (ref 136–145)
SODIUM UR-SCNC: < 10 MEQ/L
SP GR UR STRIP.AUTO: 1.01 (ref 1–1.03)
SQUAMOUS #/AREA URNS AUTO: 3 /HPF (ref 0–6)
UROBILINOGEN UR QL STRIP.AUTO: 2 MG/DL (ref 0–2)
WBC # BLD AUTO: 3.8 10^3/UL (ref 4–10)
WBC # BLD AUTO: 4.2 10^3/UL (ref 4–10)
WBC #/AREA URNS AUTO: 2 /HPF (ref 0–3)

## 2021-03-21 RX ADMIN — MIDODRINE HYDROCHLORIDE SCH MG: 5 TABLET ORAL at 08:25

## 2021-03-21 RX ADMIN — BETAXOLOL HYDROCHLORIDE SCH DROP: 2.8 SUSPENSION/ DROPS OPHTHALMIC at 20:43

## 2021-03-21 RX ADMIN — SODIUM CHLORIDE, PRESERVATIVE FREE SCH ML: 5 INJECTION INTRAVENOUS at 04:39

## 2021-03-21 RX ADMIN — MIDODRINE HYDROCHLORIDE SCH MG: 5 TABLET ORAL at 12:29

## 2021-03-21 RX ADMIN — SODIUM CHLORIDE, PRESERVATIVE FREE SCH ML: 5 INJECTION INTRAVENOUS at 14:01

## 2021-03-21 RX ADMIN — LATANOPROST SCH DROP: 50 SOLUTION OPHTHALMIC at 20:44

## 2021-03-21 RX ADMIN — CIPROFLOXACIN SCH MLS/HR: 2 INJECTION, SOLUTION INTRAVENOUS at 12:56

## 2021-03-21 RX ADMIN — DEXTROSE MONOHYDRATE SCH MLS/HR: 50 INJECTION, SOLUTION INTRAVENOUS at 04:59

## 2021-03-21 RX ADMIN — TORSEMIDE SCH MG: 20 TABLET ORAL at 12:00

## 2021-03-21 RX ADMIN — FERRIC OXIDE RED SCH DOSE: 8; 8 LOTION TOPICAL at 16:49

## 2021-03-21 RX ADMIN — FOLIC ACID SCH MG: 1 TABLET ORAL at 08:25

## 2021-03-21 RX ADMIN — FERRIC OXIDE RED SCH DOSE: 8; 8 LOTION TOPICAL at 20:44

## 2021-03-21 RX ADMIN — MORPHINE SULFATE PRN MG: 4 INJECTION INTRAVENOUS at 01:12

## 2021-03-21 RX ADMIN — BRIMONIDINE TARTRATE SCH DROP: 1.5 SOLUTION OPHTHALMIC at 08:27

## 2021-03-21 RX ADMIN — DIPHENHYDRAMINE HYDROCHLORIDE PRN MG: 25 CAPSULE ORAL at 20:46

## 2021-03-21 RX ADMIN — FERRIC OXIDE RED SCH DOSE: 8; 8 LOTION TOPICAL at 12:29

## 2021-03-21 RX ADMIN — MORPHINE SULFATE PRN MG: 4 INJECTION INTRAVENOUS at 16:48

## 2021-03-21 RX ADMIN — LEVOTHYROXINE SODIUM SCH MCG: 112 TABLET ORAL at 05:47

## 2021-03-21 RX ADMIN — MIDODRINE HYDROCHLORIDE SCH MG: 5 TABLET ORAL at 16:46

## 2021-03-21 RX ADMIN — SODIUM CHLORIDE SCH UNITS: 4.5 INJECTION, SOLUTION INTRAVENOUS at 20:43

## 2021-03-21 RX ADMIN — BRIMONIDINE TARTRATE SCH DROP: 1.5 SOLUTION OPHTHALMIC at 16:49

## 2021-03-21 RX ADMIN — DIPHENHYDRAMINE HYDROCHLORIDE PRN MG: 25 CAPSULE ORAL at 11:32

## 2021-03-21 RX ADMIN — SODIUM CHLORIDE, PRESERVATIVE FREE SCH ML: 5 INJECTION INTRAVENOUS at 22:14

## 2021-03-21 RX ADMIN — DEXTROSE MONOHYDRATE SCH MLS/HR: 50 INJECTION, SOLUTION INTRAVENOUS at 20:43

## 2021-03-21 RX ADMIN — MULTIPLE VITAMINS W/ MINERALS TAB SCH TAB: TAB at 08:25

## 2021-03-21 RX ADMIN — CIPROFLOXACIN SCH MLS/HR: 2 INJECTION, SOLUTION INTRAVENOUS at 00:12

## 2021-03-21 RX ADMIN — TORSEMIDE SCH MG: 20 TABLET ORAL at 12:10

## 2021-03-21 RX ADMIN — BETAXOLOL HYDROCHLORIDE SCH DROP: 2.8 SUSPENSION/ DROPS OPHTHALMIC at 08:27

## 2021-03-21 RX ADMIN — Medication SCH UNITS: at 08:25

## 2021-03-21 RX ADMIN — SODIUM CHLORIDE SCH UNITS: 4.5 INJECTION, SOLUTION INTRAVENOUS at 08:09

## 2021-03-21 RX ADMIN — BRIMONIDINE TARTRATE SCH DROP: 1.5 SOLUTION OPHTHALMIC at 20:43

## 2021-03-21 RX ADMIN — MORPHINE SULFATE PRN MG: 4 INJECTION INTRAVENOUS at 08:26

## 2021-03-21 NOTE — IPN
NEPHROLOGY PROGRESS NOTE



DATE:  03/21/2021



SUBJECTIVE:  Patient was seen and examined at the bedside today morning.  She

is afebrile.  She continues to be on intravenous (IV) antibiotics.  She is

currently getting albumin infusions.  Her renal function is stable since

yesterday.  Diuretics were briefly started; however, after looking at the urine

electrolytes which point more towards intravascular depletion and hepatorenal

syndrome, diuretics have been stopped.  



OBJECTIVE:

VITAL SIGNS:  Temperature 99.5 degrees Fahrenheit, blood pressure 109/55, pulse

82, respiratory rate 20, saturating 96% on room air.

INTAKE AND OUTPUT:  Urine output recorded as 725 mL yesterday, 930 mL so far

today.  Weight in the bed scale is 85.5 kg.



PHYSICAL EXAMINATION:

GENERAL:  Patient is awake, alert, oriented times three, sitting up in the bed,

mild respiratory distress.

HEAD AND NECK EXAM:  Extraocular muscles intact.  Pupils equally round and

reactive to light.  Mucous membranes are moist.  Neck is supple.  

CARDIOVASCULAR:  S1, S2.  Regular rate.  No significant edema of the bilateral

lower extremities.

RESPIRATORY:  Decreased breath sounds at the bases, worse on the left side as

compared with right.  There is decreased vocal resonance from the left base all

the way up to the left mid lung zone. 

ABDOMEN:  Soft.  Distended.  Mild dullness to percussion in the flank.  

MUSCULOSKELETAL:  No clubbing or cyanosis.  Pulses are 2+.

CENTRAL NERVOUS SYSTEM (CNS):  No focal deficits.  Mild tremors of the hands

were noted.



LABORATORY REVIEW:  

CBC showed WBC 4.2, hemoglobin 9.2, platelets 81.



BMP showed sodium 133, potassium 4.6, chloride 101, bicarbonate 24, BUN 50,

creatinine 2.2 (it was 2.2 yesterday as well), calcium 8.2, magnesium 2.2.



Total bilirubin 3.8, albumin 2.6.



MICROBIOLOGY:

Cultures are pending so far.



CURRENT INPATIENT MEDICATIONS:   Patient's medications were all reviewed by

myself.  She is currently getting albumin 25% 25 gram every 8 hours. 

Ciprofloxacin dose was decreased to 200 mg IV every 12 hours.  I have started

the patient on Sandostatin drip.  She is also on IV vancomycin and she

continues to be on midodrine 10 mg by mouth three times a day.



ASSESSMENT AND PLAN:  

1.  Acute renal failure.  Patient has spontaneous bacterial peritonitis (SBP)

and urine electrolytes point towards intravascular depletion.  Hepatorenal

syndrome is a diagnosis of exclusion.  At this point, I would call it infection

associated acute renal failure.  Continue ciprofloxacin and vancomycin at this

time.  No diuretics at this time.  Continue albumin, midodrine and octreotide

infusion.  



2.  Infection of the left hydrothorax and spontaneous bacterial peritonitis

(SBP).  Cultures are pending.  Continue ciprofloxacin and vancomycin.  Dose has

been adjusted according to renal function.  



3.  Decompensated cirrhosis with hepatic hydrothorax.  As mentioned above, she

is not a candidate for diuretics at this time because of acute renal failure. 

Once acute kidney injury (BAM) is resolved, she will be started on diuretics as

tolerated by her blood pressure.

## 2021-03-21 NOTE — ECGEPIP
[facility] - ED

                                       

                                       Test Date:    2021

Pat Name:     JAMEL HARVEY             Department:   

Patient ID:   [id number]                 Room:         [place] [address]

Gender:       Female                   Technician:   BRENDAN

:          1960               Requested By: MARKOS BOWER

Order Number: [id number]     Reading MD:   Saundra Urrutia

                                 Measurements

Intervals                              Axis          

Rate:         102                      P:            42

FL:           132                      QRS:          17

QRSD:         76                       T:            61

QT:           364                                    

QTc:          474                                    

                           Interpretive Statements

Sinus tachycardia

Cannot rule out Anterior infarct , age undetermined

NSTTW abnormalities

increased rate 20

Electronically Signed on 3- 8:11:28 EDT by Saundra Urrutia

## 2021-03-21 NOTE — IPN
PROGRESS NOTE



DATE:  03/21/2021



SUBJECTIVE:  Flora underwent a thoracentesis yesterday with Dr. Dunlap, 1.65

liters removed.  Looks to be a cloudy fluid suspicious for bacterial infection,

possible spontaneous bacterial peritonitis from a hepatic hydrothorax.  She is

on vancomycin and Cipro for this.  She also has acute kidney injury, probable

from hepatorenal syndrome and nephrology is involved as well.  Denies fever or

chills.  She says she "hurts everywhere."  



PHYSICAL EXAMINATION:  

Afebrile.  Blood pressure 117/60, pulse 93, 94% oxygen saturation.

She looks jaundiced.  No jugular venous distention (JVD).

LUNGS:  Decreased breath sounds on the left side.

HEART:  Regular rate and rhythm.

ABDOMEN:  Soft, distended.  A small amount of ascites palpable.

EXTREMITIES:  Trace peripheral edema.



LABORATORY DATA:  

White count 4.2, hemoglobin 9.2, platelets 81.



Sodium 133, potassium 4.6, BUN 50, creatinine 2.3.



Bilirubin 3.8.



IMPRESSION:  

1.  Suspected infected pleural effusion/spontaneous bacterial peritonitis from

hepatic hydrothorax.  Appreciate involvement of Dr. Dunlap.  We did discuss the

case today.  She plans another thoracentesis on the previously scheduled date

of 03/24/2021 and the patient will be kept in hospital until that point.  She

is on Cipro and vancomycin.  Cultures are pending.



2.  Suspected hepatorenal syndrome with acute kidney injury superimposed on

chronic kidney disease.  Appreciate nephrology's input.  They are adjusting

diuretics when necessary.



3.  History of hypotension.  She is no midodrine for pressure support.



4.  Pruritus.  Patient has pruritus.  Nursing staff has reported this.  This is

to be expected with both advanced kidney disease as well as cirrhosis and

typically there is no medication that makes much of a difference for this.  



5.  History of hepatic encephalopathy.  This might prevent approaching her

problems with a transjugular intrahepatic portosystemic shunt (TIPS) procedure.

 



Patient did receive albumin after the thoracentesis yesterday.

## 2021-03-22 VITALS — SYSTOLIC BLOOD PRESSURE: 127 MMHG | DIASTOLIC BLOOD PRESSURE: 85 MMHG

## 2021-03-22 VITALS — SYSTOLIC BLOOD PRESSURE: 90 MMHG | DIASTOLIC BLOOD PRESSURE: 57 MMHG

## 2021-03-22 VITALS — SYSTOLIC BLOOD PRESSURE: 108 MMHG | DIASTOLIC BLOOD PRESSURE: 62 MMHG

## 2021-03-22 VITALS — DIASTOLIC BLOOD PRESSURE: 56 MMHG | SYSTOLIC BLOOD PRESSURE: 98 MMHG

## 2021-03-22 VITALS — SYSTOLIC BLOOD PRESSURE: 88 MMHG | DIASTOLIC BLOOD PRESSURE: 52 MMHG

## 2021-03-22 VITALS — DIASTOLIC BLOOD PRESSURE: 54 MMHG | SYSTOLIC BLOOD PRESSURE: 96 MMHG

## 2021-03-22 VITALS — DIASTOLIC BLOOD PRESSURE: 51 MMHG | SYSTOLIC BLOOD PRESSURE: 114 MMHG

## 2021-03-22 VITALS — SYSTOLIC BLOOD PRESSURE: 103 MMHG | DIASTOLIC BLOOD PRESSURE: 65 MMHG

## 2021-03-22 VITALS — SYSTOLIC BLOOD PRESSURE: 90 MMHG | DIASTOLIC BLOOD PRESSURE: 53 MMHG

## 2021-03-22 VITALS — DIASTOLIC BLOOD PRESSURE: 55 MMHG | SYSTOLIC BLOOD PRESSURE: 105 MMHG

## 2021-03-22 VITALS — DIASTOLIC BLOOD PRESSURE: 68 MMHG | SYSTOLIC BLOOD PRESSURE: 111 MMHG

## 2021-03-22 VITALS — SYSTOLIC BLOOD PRESSURE: 115 MMHG | DIASTOLIC BLOOD PRESSURE: 70 MMHG

## 2021-03-22 VITALS — SYSTOLIC BLOOD PRESSURE: 105 MMHG | DIASTOLIC BLOOD PRESSURE: 58 MMHG

## 2021-03-22 VITALS — SYSTOLIC BLOOD PRESSURE: 98 MMHG | DIASTOLIC BLOOD PRESSURE: 74 MMHG

## 2021-03-22 VITALS — SYSTOLIC BLOOD PRESSURE: 90 MMHG | DIASTOLIC BLOOD PRESSURE: 50 MMHG

## 2021-03-22 VITALS — DIASTOLIC BLOOD PRESSURE: 72 MMHG | SYSTOLIC BLOOD PRESSURE: 96 MMHG

## 2021-03-22 VITALS — DIASTOLIC BLOOD PRESSURE: 54 MMHG | SYSTOLIC BLOOD PRESSURE: 99 MMHG

## 2021-03-22 VITALS — SYSTOLIC BLOOD PRESSURE: 98 MMHG | DIASTOLIC BLOOD PRESSURE: 55 MMHG

## 2021-03-22 VITALS — SYSTOLIC BLOOD PRESSURE: 94 MMHG | DIASTOLIC BLOOD PRESSURE: 56 MMHG

## 2021-03-22 VITALS — SYSTOLIC BLOOD PRESSURE: 76 MMHG | DIASTOLIC BLOOD PRESSURE: 38 MMHG

## 2021-03-22 VITALS — SYSTOLIC BLOOD PRESSURE: 89 MMHG | DIASTOLIC BLOOD PRESSURE: 51 MMHG

## 2021-03-22 VITALS — SYSTOLIC BLOOD PRESSURE: 99 MMHG | DIASTOLIC BLOOD PRESSURE: 50 MMHG

## 2021-03-22 VITALS — SYSTOLIC BLOOD PRESSURE: 115 MMHG | DIASTOLIC BLOOD PRESSURE: 65 MMHG

## 2021-03-22 VITALS — DIASTOLIC BLOOD PRESSURE: 59 MMHG | SYSTOLIC BLOOD PRESSURE: 102 MMHG

## 2021-03-22 VITALS — DIASTOLIC BLOOD PRESSURE: 55 MMHG | SYSTOLIC BLOOD PRESSURE: 94 MMHG

## 2021-03-22 VITALS — SYSTOLIC BLOOD PRESSURE: 92 MMHG | DIASTOLIC BLOOD PRESSURE: 52 MMHG

## 2021-03-22 VITALS — SYSTOLIC BLOOD PRESSURE: 82 MMHG | DIASTOLIC BLOOD PRESSURE: 54 MMHG

## 2021-03-22 VITALS — SYSTOLIC BLOOD PRESSURE: 94 MMHG | DIASTOLIC BLOOD PRESSURE: 55 MMHG

## 2021-03-22 VITALS — SYSTOLIC BLOOD PRESSURE: 91 MMHG | DIASTOLIC BLOOD PRESSURE: 52 MMHG

## 2021-03-22 VITALS — SYSTOLIC BLOOD PRESSURE: 100 MMHG | DIASTOLIC BLOOD PRESSURE: 60 MMHG

## 2021-03-22 VITALS — SYSTOLIC BLOOD PRESSURE: 74 MMHG | DIASTOLIC BLOOD PRESSURE: 39 MMHG

## 2021-03-22 VITALS — SYSTOLIC BLOOD PRESSURE: 87 MMHG | DIASTOLIC BLOOD PRESSURE: 52 MMHG

## 2021-03-22 VITALS — SYSTOLIC BLOOD PRESSURE: 98 MMHG | DIASTOLIC BLOOD PRESSURE: 54 MMHG

## 2021-03-22 VITALS — SYSTOLIC BLOOD PRESSURE: 87 MMHG | DIASTOLIC BLOOD PRESSURE: 53 MMHG

## 2021-03-22 VITALS — SYSTOLIC BLOOD PRESSURE: 94 MMHG | DIASTOLIC BLOOD PRESSURE: 54 MMHG

## 2021-03-22 VITALS — DIASTOLIC BLOOD PRESSURE: 52 MMHG | SYSTOLIC BLOOD PRESSURE: 100 MMHG

## 2021-03-22 VITALS — SYSTOLIC BLOOD PRESSURE: 78 MMHG | DIASTOLIC BLOOD PRESSURE: 47 MMHG

## 2021-03-22 VITALS — SYSTOLIC BLOOD PRESSURE: 87 MMHG | DIASTOLIC BLOOD PRESSURE: 50 MMHG

## 2021-03-22 VITALS — DIASTOLIC BLOOD PRESSURE: 61 MMHG | SYSTOLIC BLOOD PRESSURE: 121 MMHG

## 2021-03-22 VITALS — DIASTOLIC BLOOD PRESSURE: 54 MMHG | SYSTOLIC BLOOD PRESSURE: 92 MMHG

## 2021-03-22 VITALS — DIASTOLIC BLOOD PRESSURE: 53 MMHG | SYSTOLIC BLOOD PRESSURE: 109 MMHG

## 2021-03-22 VITALS — SYSTOLIC BLOOD PRESSURE: 75 MMHG | DIASTOLIC BLOOD PRESSURE: 60 MMHG

## 2021-03-22 VITALS — SYSTOLIC BLOOD PRESSURE: 91 MMHG | DIASTOLIC BLOOD PRESSURE: 53 MMHG

## 2021-03-22 VITALS — SYSTOLIC BLOOD PRESSURE: 107 MMHG | DIASTOLIC BLOOD PRESSURE: 71 MMHG

## 2021-03-22 VITALS — SYSTOLIC BLOOD PRESSURE: 105 MMHG | DIASTOLIC BLOOD PRESSURE: 57 MMHG

## 2021-03-22 VITALS — DIASTOLIC BLOOD PRESSURE: 54 MMHG | SYSTOLIC BLOOD PRESSURE: 95 MMHG

## 2021-03-22 VITALS — SYSTOLIC BLOOD PRESSURE: 153 MMHG | DIASTOLIC BLOOD PRESSURE: 74 MMHG

## 2021-03-22 VITALS — DIASTOLIC BLOOD PRESSURE: 36 MMHG | SYSTOLIC BLOOD PRESSURE: 72 MMHG

## 2021-03-22 VITALS — SYSTOLIC BLOOD PRESSURE: 107 MMHG | DIASTOLIC BLOOD PRESSURE: 55 MMHG

## 2021-03-22 VITALS — DIASTOLIC BLOOD PRESSURE: 54 MMHG | SYSTOLIC BLOOD PRESSURE: 88 MMHG

## 2021-03-22 VITALS — SYSTOLIC BLOOD PRESSURE: 115 MMHG | DIASTOLIC BLOOD PRESSURE: 62 MMHG

## 2021-03-22 VITALS — SYSTOLIC BLOOD PRESSURE: 91 MMHG | DIASTOLIC BLOOD PRESSURE: 54 MMHG

## 2021-03-22 VITALS — SYSTOLIC BLOOD PRESSURE: 91 MMHG | DIASTOLIC BLOOD PRESSURE: 51 MMHG

## 2021-03-22 VITALS — SYSTOLIC BLOOD PRESSURE: 109 MMHG | DIASTOLIC BLOOD PRESSURE: 71 MMHG

## 2021-03-22 VITALS — SYSTOLIC BLOOD PRESSURE: 101 MMHG | DIASTOLIC BLOOD PRESSURE: 67 MMHG

## 2021-03-22 VITALS — DIASTOLIC BLOOD PRESSURE: 51 MMHG | SYSTOLIC BLOOD PRESSURE: 85 MMHG

## 2021-03-22 VITALS — DIASTOLIC BLOOD PRESSURE: 60 MMHG | SYSTOLIC BLOOD PRESSURE: 97 MMHG

## 2021-03-22 VITALS — SYSTOLIC BLOOD PRESSURE: 98 MMHG | DIASTOLIC BLOOD PRESSURE: 59 MMHG

## 2021-03-22 LAB
ALBUMIN SERPL BCG-MCNC: 3 GM/DL (ref 3.2–5.2)
ALT SERPL W P-5'-P-CCNC: 20 U/L (ref 12–78)
APTT BLD: 41.5 SECONDS (ref 24.2–38.5)
BILIRUB SERPL-MCNC: 2.3 MG/DL (ref 0.2–1)
BUN SERPL-MCNC: 49 MG/DL (ref 7–18)
CALCIUM SERPL-MCNC: 8.1 MG/DL (ref 8.8–10.2)
CHLORIDE SERPL-SCNC: 102 MEQ/L (ref 98–107)
CO2 SERPL-SCNC: 24 MEQ/L (ref 21–32)
CREAT SERPL-MCNC: 2.25 MG/DL (ref 0.55–1.3)
FOLATE SERPL-MCNC: > 24 NG/ML
GFR SERPL CREATININE-BSD FRML MDRD: 23.5 ML/MIN/{1.73_M2} (ref 45–?)
GLUCOSE SERPL-MCNC: 93 MG/DL (ref 70–100)
HCT VFR BLD AUTO: 25.3 % (ref 36–47)
HGB BLD-MCNC: 8.3 G/DL (ref 12–15.5)
INR PPP: 1.67
MAGNESIUM SERPL-MCNC: 2.4 MG/DL (ref 1.8–2.4)
MCH RBC QN AUTO: 30.4 PG (ref 27–33)
MCHC RBC AUTO-ENTMCNC: 32.8 G/DL (ref 32–36.5)
MCV RBC AUTO: 92.7 FL (ref 80–96)
PLATELET # BLD AUTO: 66 10^3/UL (ref 150–450)
POTASSIUM SERPL-SCNC: 4.7 MEQ/L (ref 3.5–5.1)
PROT SERPL-MCNC: 5.8 GM/DL (ref 6.4–8.2)
PROTHROMBIN TIME: 20.1 SECONDS (ref 12.5–14.3)
RBC # BLD AUTO: 2.73 10^6/UL (ref 4–5.4)
SODIUM SERPL-SCNC: 131 MEQ/L (ref 136–145)
VIT B12 SERPL-MCNC: > 2000 PG/ML
WBC # BLD AUTO: 2.8 10^3/UL (ref 4–10)

## 2021-03-22 RX ADMIN — Medication SCH UNITS: at 09:04

## 2021-03-22 RX ADMIN — BRIMONIDINE TARTRATE SCH DROP: 1.5 SOLUTION OPHTHALMIC at 16:17

## 2021-03-22 RX ADMIN — FERRIC OXIDE RED SCH DOSE: 8; 8 LOTION TOPICAL at 09:05

## 2021-03-22 RX ADMIN — BRIMONIDINE TARTRATE SCH DROP: 1.5 SOLUTION OPHTHALMIC at 09:06

## 2021-03-22 RX ADMIN — MULTIPLE VITAMINS W/ MINERALS TAB SCH TAB: TAB at 09:05

## 2021-03-22 RX ADMIN — SENNOSIDES SCH TAB: 8.6 TABLET, FILM COATED ORAL at 20:40

## 2021-03-22 RX ADMIN — DEXTROSE MONOHYDRATE SCH MLS/HR: 50 INJECTION, SOLUTION INTRAVENOUS at 11:54

## 2021-03-22 RX ADMIN — MORPHINE SULFATE PRN MG: 4 INJECTION INTRAVENOUS at 19:44

## 2021-03-22 RX ADMIN — BRIMONIDINE TARTRATE SCH DROP: 1.5 SOLUTION OPHTHALMIC at 20:41

## 2021-03-22 RX ADMIN — BETAXOLOL HYDROCHLORIDE SCH DROP: 2.8 SUSPENSION/ DROPS OPHTHALMIC at 20:41

## 2021-03-22 RX ADMIN — FERRIC OXIDE RED SCH DOSE: 8; 8 LOTION TOPICAL at 16:17

## 2021-03-22 RX ADMIN — SODIUM CHLORIDE, PRESERVATIVE FREE SCH ML: 5 INJECTION INTRAVENOUS at 21:14

## 2021-03-22 RX ADMIN — SODIUM CHLORIDE, PRESERVATIVE FREE SCH ML: 5 INJECTION INTRAVENOUS at 06:25

## 2021-03-22 RX ADMIN — DOCUSATE SODIUM SCH MG: 100 CAPSULE, LIQUID FILLED ORAL at 11:39

## 2021-03-22 RX ADMIN — BETAXOLOL HYDROCHLORIDE SCH DROP: 2.8 SUSPENSION/ DROPS OPHTHALMIC at 09:06

## 2021-03-22 RX ADMIN — FERRIC OXIDE RED SCH DOSE: 8; 8 LOTION TOPICAL at 20:40

## 2021-03-22 RX ADMIN — DIPHENHYDRAMINE HYDROCHLORIDE PRN MG: 25 CAPSULE ORAL at 19:44

## 2021-03-22 RX ADMIN — DOCUSATE SODIUM SCH MG: 100 CAPSULE, LIQUID FILLED ORAL at 20:40

## 2021-03-22 RX ADMIN — CIPROFLOXACIN SCH MLS/HR: 2 INJECTION, SOLUTION INTRAVENOUS at 00:42

## 2021-03-22 RX ADMIN — SODIUM CHLORIDE, PRESERVATIVE FREE SCH ML: 5 INJECTION INTRAVENOUS at 14:00

## 2021-03-22 RX ADMIN — SENNOSIDES SCH TAB: 8.6 TABLET, FILM COATED ORAL at 11:39

## 2021-03-22 RX ADMIN — MORPHINE SULFATE PRN MG: 4 INJECTION INTRAVENOUS at 09:22

## 2021-03-22 RX ADMIN — DEXTROSE MONOHYDRATE SCH MLS/HR: 50 INJECTION, SOLUTION INTRAVENOUS at 19:44

## 2021-03-22 RX ADMIN — DOCUSATE SODIUM SCH MG: 100 CAPSULE, LIQUID FILLED ORAL at 16:17

## 2021-03-22 RX ADMIN — LATANOPROST SCH DROP: 50 SOLUTION OPHTHALMIC at 20:40

## 2021-03-22 RX ADMIN — MEROPENEM AND SODIUM CHLORIDE SCH MLS/HR: 500 INJECTION, SOLUTION INTRAVENOUS at 06:25

## 2021-03-22 RX ADMIN — FOLIC ACID SCH MG: 1 TABLET ORAL at 09:05

## 2021-03-22 RX ADMIN — LEVOTHYROXINE SODIUM SCH MCG: 112 TABLET ORAL at 06:25

## 2021-03-22 RX ADMIN — MEROPENEM AND SODIUM CHLORIDE SCH MLS/HR: 500 INJECTION, SOLUTION INTRAVENOUS at 17:25

## 2021-03-22 NOTE — RO
OPERATIVE NOTE



DATE OF OPERATION:  3/22 /21    



PREPROCEDURE DIAGNOSIS: Shock.



POSTPROCEDURE DIAGNOSIS: Shock. 



PROCEDURE: Internal jugular central line. 



SURGEON: Eveline Dunlap MD. 



INDICATION: Vasopressor administration. 



CONSENT: Consent was obtained from the patient prior to the procedure.

Indication, risks, and benefits were explained at length. 



PROCEDURE SUMMARY: A central insertion line practice form was completed by

independent . A time out was performed prior to the procedure. Full

sterile technique was maintained throughout the procedure including surgical

cap, mask, protective eyewear, full gown, and sterile gloves. 



The patient was placed in Trendelenburg position. The right neck region was

prepped using chlorhexidine scrub and draped in sterile fashion using a full

drape and a sterile probe cover employed. The right internal jugular vein was

identified using the ultrasound. Anesthesia was achieved over the vein using 1%

lidocaine. Using real-time out-of-plane guidance, the introducer needle was

inserted into the internal jugular vein under direct ultrasound visualization.

Venous blood was withdrawn. The syringe was removed, and a guidewire was

advanced into the introducer needle. The guidewire was visualized in the

internal jugular vein by ultrasound. The introducer needle was removed over the

guidewire. A small incision was made at the skin surface with a scalpel, and a

dilator was exchanged over the guidewire. After appropriate dilation was

obtained, the dilator was exchanged over the wire for a triple lumen central

venous catheter. The wire was removed, and the catheter was sutured in place at

18 cm. A sterile chlorhexidine impregnated dressing was placed over the

catheter at the insertion site. The patient tolerated the procedure without any

hemodynamic compromise. At the time of procedure completion, all ports

aspirated and flushed properly. 



Postprocedure chest x-ray showed no pneumothorax and the line in satisfactory

position. 



ESTIMATED BLOOD LOSS: Approximately 4 mL. 

MTDD

## 2021-03-22 NOTE — IPNPDOC
Text Note


Date of Service


The patient was seen on 3/22/21.





NOTE


I was informed by OBEY Phelps that the patient's MAP was 51 and she was a bit 

lethargic. She had produced about 200ml of urine.





Plan: will transfer her to ICU to start Norepinephrine  +/- vasopressin / will 

dc octreotide / I will touch base with  as well





VS,Ferine, I+O


VS, Fernie, I+O


Laboratory Tests


3/21/21 05:47








3/21/21 08:56











Vital Signs








  Date Time  Temp Pulse Resp B/P (MAP) Pulse Ox O2 Delivery O2 Flow Rate FiO2


 


3/22/21 04:38 99.2 82 20 76/38 92 Room Air  














I&O- Last 24 Hours up to 6 AM 


 


 3/22/21





 06:00


 


Intake Total 1730.0 ml


 


Output Total 900 ml


 


Balance 830.0 ml

















RG VLADOVINOS MD                Mar 22, 2021 04:51

## 2021-03-22 NOTE — REP
INDICATION:

pleural effusion.



COMPARISON:

Comparison chest x-ray 20th March 2021.



TECHNIQUE:

Portable upright AP chest radiograph.



FINDINGS:

There is a moderate to large left pleural effusion noted increased in size from the

portable chest x-ray on 20th March 2021.  There is shift of the mediastinum to the

right.  Right lung remains clear.  Cardiomegaly is observed.  Oxygen delivery tubing

and EKG monitoring electrodes are seen..



IMPRESSION:

Moderate to large left pleural effusion with mediastinal shift to the right, increased

from March 20, 2021..





<Electronically signed by Noam Deng > 03/22/21 0893

## 2021-03-22 NOTE — REP
INDICATION:

S/P Right IJ CVC Placement.



COMPARISON:

Portable chest at 7:05 a.m. earlier today.



TECHNIQUE:

Portable AP chest with the patient sitting.



FINDINGS:

There is a right IJ central venous catheter with the tip at the confluence of the

superior vena cava and right atrium as an interval change.

There is no pneumothorax or pleural fluid collection.  The right lung is otherwise

clear.

There is a large left pleural effusion, unchanged.

There is cardiomegaly, unchanged.





IMPRESSION:

Right IJ central venous catheters interval change without pneumothorax or pleural

fluid collection.

Cardiomegaly enlarged left pleural effusion are unchanged.





<Electronically signed by Sushant Haley > 03/22/21 3777

## 2021-03-22 NOTE — IPN
PROGRESS NOTE



DATE:  03/22/2021



SUBJECTIVE: Flora was transferred to the ICU last night for hypotension. She is

now on a Levophed drip. She denies chest pain or shortness of breath. She feels

the same as she did yesterday. 



OBJECTIVE: 

VITAL SIGNS: Blood pressure 92/52, pulse 79, 91% O2 saturation on 2 liters.

GENERAL APPEARANCE: She was on the commode, she looked the same as yesterday,

chronically ill-appearing. 

HEENT: Sclera are icteric. HEENT is otherwise unremarkable.

NECK: No JVD.

LUNGS: Decreased breath sounds particularly at the left base. 

HEART: Regular rate and rhythm. There is a 1/6 systolic ejection murmur.

ABDOMEN: Soft, mildly distended. Trace peripheral edema.



LABORATORY DATA:  Sodium 131, potassium 4.7, BUN 49, creatinine 2.2, glucose

93, bilirubin 2.3. White count 2.8, hemoglobin 8.3, platelets 66. 



IMPRESSION:

  1.  Hypotension, this is probably from sepsis. She is now on Meropenem and

      Vancomycin. She has left pleural effusion, essentially spontaneous

      bacterial peritonitis from hepatic hydrothorax. Antibiotics were changed

      yesterday. She is on IV fluids and pressors. Prognosis is poor. Cultures

      from her thoracentesis are pending today, might help direct antibiotic

      therapy.  

  2.  Acute kidney injury superimposed on chronic kidney disease, renal

      function is gradually improving. It could be hepatorenal syndrome but more

      likely acute kidney injury in the context of her sepsis. 

  3.  Chronic pruritus secondary to chronic kidney disease and endstage liver

      disease for which there is not much we can offer medically.

  4.  History of hepatic encephalopathy, mental status is at baseline. 

  5.  Hyponatremia, chronic, mild and she is not symptomatic.

  6.  Pancytopenia secondary to cirrhosis and hypersplenism. Platelets are

      slowly drifting down, stopping her Heparin and have ordered sequential

      TEDs.

## 2021-03-23 VITALS — SYSTOLIC BLOOD PRESSURE: 112 MMHG | DIASTOLIC BLOOD PRESSURE: 59 MMHG

## 2021-03-23 VITALS — DIASTOLIC BLOOD PRESSURE: 52 MMHG | SYSTOLIC BLOOD PRESSURE: 95 MMHG

## 2021-03-23 VITALS — SYSTOLIC BLOOD PRESSURE: 94 MMHG | DIASTOLIC BLOOD PRESSURE: 56 MMHG

## 2021-03-23 VITALS — SYSTOLIC BLOOD PRESSURE: 115 MMHG | DIASTOLIC BLOOD PRESSURE: 58 MMHG

## 2021-03-23 VITALS — SYSTOLIC BLOOD PRESSURE: 114 MMHG | DIASTOLIC BLOOD PRESSURE: 65 MMHG

## 2021-03-23 VITALS — SYSTOLIC BLOOD PRESSURE: 92 MMHG | DIASTOLIC BLOOD PRESSURE: 51 MMHG

## 2021-03-23 VITALS — SYSTOLIC BLOOD PRESSURE: 100 MMHG | DIASTOLIC BLOOD PRESSURE: 58 MMHG

## 2021-03-23 VITALS — DIASTOLIC BLOOD PRESSURE: 58 MMHG | SYSTOLIC BLOOD PRESSURE: 111 MMHG

## 2021-03-23 VITALS — SYSTOLIC BLOOD PRESSURE: 111 MMHG | DIASTOLIC BLOOD PRESSURE: 62 MMHG

## 2021-03-23 VITALS — SYSTOLIC BLOOD PRESSURE: 110 MMHG | DIASTOLIC BLOOD PRESSURE: 59 MMHG

## 2021-03-23 VITALS — DIASTOLIC BLOOD PRESSURE: 69 MMHG | SYSTOLIC BLOOD PRESSURE: 121 MMHG

## 2021-03-23 VITALS — DIASTOLIC BLOOD PRESSURE: 55 MMHG | SYSTOLIC BLOOD PRESSURE: 110 MMHG

## 2021-03-23 VITALS — DIASTOLIC BLOOD PRESSURE: 53 MMHG | SYSTOLIC BLOOD PRESSURE: 94 MMHG

## 2021-03-23 VITALS — SYSTOLIC BLOOD PRESSURE: 97 MMHG | DIASTOLIC BLOOD PRESSURE: 56 MMHG

## 2021-03-23 VITALS — DIASTOLIC BLOOD PRESSURE: 64 MMHG | SYSTOLIC BLOOD PRESSURE: 100 MMHG

## 2021-03-23 VITALS — SYSTOLIC BLOOD PRESSURE: 95 MMHG | DIASTOLIC BLOOD PRESSURE: 59 MMHG

## 2021-03-23 VITALS — SYSTOLIC BLOOD PRESSURE: 105 MMHG | DIASTOLIC BLOOD PRESSURE: 58 MMHG

## 2021-03-23 VITALS — DIASTOLIC BLOOD PRESSURE: 58 MMHG | SYSTOLIC BLOOD PRESSURE: 98 MMHG

## 2021-03-23 VITALS — DIASTOLIC BLOOD PRESSURE: 63 MMHG | SYSTOLIC BLOOD PRESSURE: 112 MMHG

## 2021-03-23 VITALS — SYSTOLIC BLOOD PRESSURE: 120 MMHG | DIASTOLIC BLOOD PRESSURE: 58 MMHG

## 2021-03-23 VITALS — SYSTOLIC BLOOD PRESSURE: 98 MMHG | DIASTOLIC BLOOD PRESSURE: 56 MMHG

## 2021-03-23 VITALS — SYSTOLIC BLOOD PRESSURE: 119 MMHG | DIASTOLIC BLOOD PRESSURE: 57 MMHG

## 2021-03-23 VITALS — DIASTOLIC BLOOD PRESSURE: 61 MMHG | SYSTOLIC BLOOD PRESSURE: 106 MMHG

## 2021-03-23 VITALS — SYSTOLIC BLOOD PRESSURE: 106 MMHG | DIASTOLIC BLOOD PRESSURE: 62 MMHG

## 2021-03-23 VITALS — DIASTOLIC BLOOD PRESSURE: 52 MMHG | SYSTOLIC BLOOD PRESSURE: 89 MMHG

## 2021-03-23 VITALS — SYSTOLIC BLOOD PRESSURE: 89 MMHG | DIASTOLIC BLOOD PRESSURE: 56 MMHG

## 2021-03-23 VITALS — SYSTOLIC BLOOD PRESSURE: 124 MMHG | DIASTOLIC BLOOD PRESSURE: 56 MMHG

## 2021-03-23 VITALS — SYSTOLIC BLOOD PRESSURE: 101 MMHG | DIASTOLIC BLOOD PRESSURE: 60 MMHG

## 2021-03-23 VITALS — DIASTOLIC BLOOD PRESSURE: 65 MMHG | SYSTOLIC BLOOD PRESSURE: 120 MMHG

## 2021-03-23 VITALS — SYSTOLIC BLOOD PRESSURE: 107 MMHG | DIASTOLIC BLOOD PRESSURE: 63 MMHG

## 2021-03-23 VITALS — DIASTOLIC BLOOD PRESSURE: 55 MMHG | SYSTOLIC BLOOD PRESSURE: 109 MMHG

## 2021-03-23 VITALS — SYSTOLIC BLOOD PRESSURE: 114 MMHG | DIASTOLIC BLOOD PRESSURE: 61 MMHG

## 2021-03-23 VITALS — DIASTOLIC BLOOD PRESSURE: 61 MMHG | SYSTOLIC BLOOD PRESSURE: 110 MMHG

## 2021-03-23 VITALS — DIASTOLIC BLOOD PRESSURE: 57 MMHG | SYSTOLIC BLOOD PRESSURE: 95 MMHG

## 2021-03-23 VITALS — SYSTOLIC BLOOD PRESSURE: 103 MMHG | DIASTOLIC BLOOD PRESSURE: 50 MMHG

## 2021-03-23 VITALS — DIASTOLIC BLOOD PRESSURE: 68 MMHG | SYSTOLIC BLOOD PRESSURE: 108 MMHG

## 2021-03-23 VITALS — SYSTOLIC BLOOD PRESSURE: 99 MMHG | DIASTOLIC BLOOD PRESSURE: 56 MMHG

## 2021-03-23 VITALS — SYSTOLIC BLOOD PRESSURE: 102 MMHG | DIASTOLIC BLOOD PRESSURE: 57 MMHG

## 2021-03-23 VITALS — DIASTOLIC BLOOD PRESSURE: 69 MMHG | SYSTOLIC BLOOD PRESSURE: 126 MMHG

## 2021-03-23 VITALS — SYSTOLIC BLOOD PRESSURE: 106 MMHG | DIASTOLIC BLOOD PRESSURE: 63 MMHG

## 2021-03-23 VITALS — SYSTOLIC BLOOD PRESSURE: 108 MMHG | DIASTOLIC BLOOD PRESSURE: 58 MMHG

## 2021-03-23 VITALS — SYSTOLIC BLOOD PRESSURE: 101 MMHG | DIASTOLIC BLOOD PRESSURE: 59 MMHG

## 2021-03-23 VITALS — SYSTOLIC BLOOD PRESSURE: 101 MMHG | DIASTOLIC BLOOD PRESSURE: 55 MMHG

## 2021-03-23 VITALS — DIASTOLIC BLOOD PRESSURE: 60 MMHG | SYSTOLIC BLOOD PRESSURE: 101 MMHG

## 2021-03-23 VITALS — DIASTOLIC BLOOD PRESSURE: 54 MMHG | SYSTOLIC BLOOD PRESSURE: 116 MMHG

## 2021-03-23 VITALS — SYSTOLIC BLOOD PRESSURE: 106 MMHG | DIASTOLIC BLOOD PRESSURE: 51 MMHG

## 2021-03-23 VITALS — SYSTOLIC BLOOD PRESSURE: 108 MMHG | DIASTOLIC BLOOD PRESSURE: 65 MMHG

## 2021-03-23 VITALS — DIASTOLIC BLOOD PRESSURE: 56 MMHG | SYSTOLIC BLOOD PRESSURE: 110 MMHG

## 2021-03-23 VITALS — SYSTOLIC BLOOD PRESSURE: 105 MMHG | DIASTOLIC BLOOD PRESSURE: 61 MMHG

## 2021-03-23 VITALS — DIASTOLIC BLOOD PRESSURE: 62 MMHG | SYSTOLIC BLOOD PRESSURE: 106 MMHG

## 2021-03-23 VITALS — DIASTOLIC BLOOD PRESSURE: 60 MMHG | SYSTOLIC BLOOD PRESSURE: 118 MMHG

## 2021-03-23 VITALS — SYSTOLIC BLOOD PRESSURE: 106 MMHG | DIASTOLIC BLOOD PRESSURE: 64 MMHG

## 2021-03-23 VITALS — SYSTOLIC BLOOD PRESSURE: 96 MMHG | DIASTOLIC BLOOD PRESSURE: 51 MMHG

## 2021-03-23 VITALS — DIASTOLIC BLOOD PRESSURE: 56 MMHG | SYSTOLIC BLOOD PRESSURE: 114 MMHG

## 2021-03-23 VITALS — DIASTOLIC BLOOD PRESSURE: 56 MMHG | SYSTOLIC BLOOD PRESSURE: 124 MMHG

## 2021-03-23 VITALS — DIASTOLIC BLOOD PRESSURE: 63 MMHG | SYSTOLIC BLOOD PRESSURE: 105 MMHG

## 2021-03-23 LAB
ALBUMIN SERPL BCG-MCNC: 3 GM/DL (ref 3.2–5.2)
ALT SERPL W P-5'-P-CCNC: 19 U/L (ref 12–78)
APPEARANCE FLD: (no result)
BILIRUB SERPL-MCNC: 3.4 MG/DL (ref 0.2–1)
BUN SERPL-MCNC: 46 MG/DL (ref 7–18)
CALCIUM SERPL-MCNC: 7.9 MG/DL (ref 8.8–10.2)
CHLORIDE SERPL-SCNC: 105 MEQ/L (ref 98–107)
CO2 SERPL-SCNC: 27 MEQ/L (ref 21–32)
COLOR PLR: (no result)
CREAT SERPL-MCNC: 1.88 MG/DL (ref 0.55–1.3)
GFR SERPL CREATININE-BSD FRML MDRD: 29 ML/MIN/{1.73_M2} (ref 45–?)
GLUCOSE SERPL-MCNC: 88 MG/DL (ref 70–100)
HCT VFR BLD AUTO: 27.8 % (ref 36–47)
HGB BLD-MCNC: 9.1 G/DL (ref 12–15.5)
INR PPP: 1.42
MAGNESIUM SERPL-MCNC: 2.4 MG/DL (ref 1.8–2.4)
MCH RBC QN AUTO: 30.3 PG (ref 27–33)
MCHC RBC AUTO-ENTMCNC: 32.7 G/DL (ref 32–36.5)
MCV RBC AUTO: 92.7 FL (ref 80–96)
PLATELET # BLD AUTO: 78 10^3/UL (ref 150–450)
POTASSIUM SERPL-SCNC: 4.8 MEQ/L (ref 3.5–5.1)
PROT SERPL-MCNC: 5.8 GM/DL (ref 6.4–8.2)
PROTHROMBIN TIME: 17.7 SECONDS (ref 12.5–14.3)
RBC # BLD AUTO: 3 10^6/UL (ref 4–5.4)
SODIUM SERPL-SCNC: 136 MEQ/L (ref 136–145)
SPECIMEN SOURCE FLD: (no result)
WBC # BLD AUTO: 4.1 10^3/UL (ref 4–10)

## 2021-03-23 PROCEDURE — 0W9B3ZZ DRAINAGE OF LEFT PLEURAL CAVITY, PERCUTANEOUS APPROACH: ICD-10-PCS | Performed by: RADIOLOGY

## 2021-03-23 RX ADMIN — SODIUM CHLORIDE, PRESERVATIVE FREE SCH ML: 5 INJECTION INTRAVENOUS at 13:20

## 2021-03-23 RX ADMIN — DOCUSATE SODIUM SCH MG: 100 CAPSULE, LIQUID FILLED ORAL at 16:51

## 2021-03-23 RX ADMIN — DOCUSATE SODIUM SCH MG: 100 CAPSULE, LIQUID FILLED ORAL at 09:58

## 2021-03-23 RX ADMIN — SENNOSIDES SCH TAB: 8.6 TABLET, FILM COATED ORAL at 09:58

## 2021-03-23 RX ADMIN — DEXTROSE MONOHYDRATE SCH MLS/HR: 50 INJECTION, SOLUTION INTRAVENOUS at 21:13

## 2021-03-23 RX ADMIN — BRIMONIDINE TARTRATE SCH DROP: 1.5 SOLUTION OPHTHALMIC at 16:51

## 2021-03-23 RX ADMIN — MEROPENEM AND SODIUM CHLORIDE SCH MLS/HR: 500 INJECTION, SOLUTION INTRAVENOUS at 18:10

## 2021-03-23 RX ADMIN — FOLIC ACID SCH MG: 1 TABLET ORAL at 09:58

## 2021-03-23 RX ADMIN — LACTULOSE SCH ML: 10 SOLUTION ORAL at 21:13

## 2021-03-23 RX ADMIN — SENNOSIDES SCH TAB: 8.6 TABLET, FILM COATED ORAL at 21:14

## 2021-03-23 RX ADMIN — MEROPENEM AND SODIUM CHLORIDE SCH MLS/HR: 500 INJECTION, SOLUTION INTRAVENOUS at 06:00

## 2021-03-23 RX ADMIN — BETAXOLOL HYDROCHLORIDE SCH DROP: 2.8 SUSPENSION/ DROPS OPHTHALMIC at 21:16

## 2021-03-23 RX ADMIN — MORPHINE SULFATE PRN MG: 4 INJECTION INTRAVENOUS at 04:18

## 2021-03-23 RX ADMIN — BETAXOLOL HYDROCHLORIDE SCH DROP: 2.8 SUSPENSION/ DROPS OPHTHALMIC at 09:58

## 2021-03-23 RX ADMIN — DOCUSATE SODIUM SCH MG: 100 CAPSULE, LIQUID FILLED ORAL at 21:15

## 2021-03-23 RX ADMIN — MORPHINE SULFATE PRN MG: 4 INJECTION INTRAVENOUS at 11:47

## 2021-03-23 RX ADMIN — MORPHINE SULFATE PRN MG: 4 INJECTION INTRAVENOUS at 18:05

## 2021-03-23 RX ADMIN — SODIUM CHLORIDE, PRESERVATIVE FREE SCH ML: 5 INJECTION INTRAVENOUS at 06:00

## 2021-03-23 RX ADMIN — Medication SCH UNITS: at 09:58

## 2021-03-23 RX ADMIN — LACTULOSE SCH ML: 10 SOLUTION ORAL at 13:20

## 2021-03-23 RX ADMIN — BRIMONIDINE TARTRATE SCH DROP: 1.5 SOLUTION OPHTHALMIC at 09:59

## 2021-03-23 RX ADMIN — LATANOPROST SCH DROP: 50 SOLUTION OPHTHALMIC at 21:15

## 2021-03-23 RX ADMIN — BRIMONIDINE TARTRATE SCH DROP: 1.5 SOLUTION OPHTHALMIC at 21:15

## 2021-03-23 RX ADMIN — FERRIC OXIDE RED SCH DOSE: 8; 8 LOTION TOPICAL at 21:17

## 2021-03-23 RX ADMIN — DEXTROSE MONOHYDRATE SCH MLS/HR: 50 INJECTION, SOLUTION INTRAVENOUS at 12:50

## 2021-03-23 RX ADMIN — MULTIPLE VITAMINS W/ MINERALS TAB SCH TAB: TAB at 09:57

## 2021-03-23 RX ADMIN — DIPHENHYDRAMINE HYDROCHLORIDE PRN MG: 25 CAPSULE ORAL at 04:18

## 2021-03-23 RX ADMIN — SODIUM CHLORIDE, PRESERVATIVE FREE SCH ML: 5 INJECTION INTRAVENOUS at 21:18

## 2021-03-23 RX ADMIN — FERRIC OXIDE RED SCH DOSE: 8; 8 LOTION TOPICAL at 09:58

## 2021-03-23 RX ADMIN — SODIUM CHLORIDE SCH UNITS: 4.5 INJECTION, SOLUTION INTRAVENOUS at 21:14

## 2021-03-23 RX ADMIN — FERRIC OXIDE RED SCH DOSE: 8; 8 LOTION TOPICAL at 16:52

## 2021-03-23 RX ADMIN — LEVOTHYROXINE SODIUM SCH MCG: 112 TABLET ORAL at 06:00

## 2021-03-23 NOTE — REP
INDICATION:

POST LEFT THORA, 2 VIEW.



COMPARISON:

03/22/2021.



TECHNIQUE:

PA and lateral chest.



FINDINGS:

There is placement of a pigtail drainage catheter in the posteroinferior left pleural

space.  Left pleural effusion is again noted with mild decrease compared to the prior

study.  Otherwise the lung fields are unchanged appearance.  There is no pneumothorax.

Heart mediastinum appear unchanged.  Right central venous catheter is unchanged.



IMPRESSION:

Placement of pigtail drainage catheter in the posteroinferior left pleural space.

Mild decreased left pleural fluid.  No pneumothorax.





<Electronically signed by Sushant Khan > 03/23/21 1147

## 2021-03-23 NOTE — REP
INDICATION:

Chest tube placement left side; recurrent hepatic hydrothorax.



COMPARISON:

None.



TECHNIQUE:

The procedure was performed under the direct supervision of Dr. Khan.



The risks and benefits of the procedure were explained to the patient and informed

consent was obtained.



The left pleural effusion was localized using ultrasound guidance.  The skin was

prepped and draped in a sterile fashion.  1% lidocaine was used as a local anesthetic.

Using ultrasound guidance a 10 German skater APDL catheter was inserted using trocar

technique.  1500 cc of low viscosity red colored fluid was withdrawn.  The catheter

was affixed to the skin and a sterile dressing was applied.  The catheter was

connected to a Pleur-evac.



The patient tolerated the procedure well and there were no immediate complications.

After the appropriate amount to monitor convalescence the patient was discharged from

the department.



FINDINGS:

None



IMPRESSION:

Ultrasound-guided left thoracentesis with catheter placement.  1500 cc of low

viscosity red colored fluid was withdrawn.



<Electronically signed by Surya Alvarado > 03/23/21 1713

<Electronically signed by Sushant Khan > 03/23/21 1817

## 2021-03-23 NOTE — IPNPDOC
Text Note


Date of Service


The patient was seen on 3/23/21.





NOTE


Subjective:


Patient was seen and examined this morning at bedside. Says she's doing about 

the same. I observed her walking to the bedside commode she appeared steady in 

her feet. Her blood pressure has improved she still on low amount of clear 

fluid.





Objective:


Constitutional: Awake and alert, in no apparent distress. Blood pressure 117/60.

Chronically ill appearing.


ENT: Sclera is icteric


Respiratory: Decreased breath sounds bilaterally more on the left base. No 

respiratory distress. No use of accessory muscles.


Cardiovascular: Regular rate and rhythm, systolic ejection murmur.


Gastrointestinal: Abdomen is soft, non distended, non tender, BS present. 


Musculoskeletal: Trace peripheral edema


Neurologic: No focal neurological deficit.  


Mental Status: A&O x3, normal affect 





Assessment/plan:





# SBP: Thoracocentesis 3/20/21 confirms SBP. She is on vancomycin and meropenem.

Pleural fluid cultures pending. Continues to be on pressor support. Her long-

term prognosis is poor.


# left pleural effusion: Also possibly infected. Pigtail placement 3/23/21. This

is likely SBP from hepatic hydrothorax. Dr Min pulmonologist is following. 

Will need 5-10 days of Abx. 


# hypotension: With underlying history of chronic hypotension. Usually takes 

Midodrine. Currently on Levofed. Likely from the underlying infection. Albumin.


# Decompensated alcoholic liver cirrhosis: She has recurrent left hepatic 

hydrothorax and is scheduled for therapeutic thoracocentesis with interventional

radiology outpatient every 2 weeks. Also has a history of portal hypertension, 

esophageal varices, recurrent ascites.


# BAM on CKD3: In the setting of sepsis and hypotension. Appears prerenal. Hold 

diuresis. Nephrology consult helping manage.


# Chronic pruritus: Likely secondary to her chronic kidney and end-stage liver 

disease. Supportive management.


# history of hepatic encephalopathy: Mentation appears to be at baseline at this

time. She is on rifaximin. Fu ammonia.


# Chronic hyponatremia: Asymptomatic. Improved. Monitor. 


# Pancytopenia: Likely secondary to cirrhosis and hypersplenism. Platelet count 

was downtrending. Heparin was stopped and sequential teds ordered.


# DVT prophylaxis: TEDs





A Yousef 


Hospitalist





VS,Fernie, I+O


VS, Fernie, I+O


Laboratory Tests


3/23/21 05:30











Vital Signs








  Date Time  Temp Pulse Resp B/P (MAP) Pulse Ox O2 Delivery O2 Flow Rate FiO2


 


3/23/21 06:00  87  110/59 (76) 94 Nasal Cannula 1.0 


 


3/23/21 05:30 99.2  18     














I&O- Last 24 Hours up to 6 AM 


 


 3/23/21





 06:00


 


Intake Total 2807.8 ml


 


Output Total 1725 ml


 


Balance 1082.8 ml

















TIFFANY SIDHU MD              Mar 23, 2021 07:15

## 2021-03-23 NOTE — CCN
CRITICAL CARE NOTE



DATE:  03/23/2021



SUBJECTIVE: Patient was seen and examined this morning during bedside rounds.

Patient was ordered yesterday for IR placement of a pigtail catheter for

drainage of her left-sided pleural effusion. She was given 2 units of FFP early

this morning as she does have mild coagulopathy in the setting of her

cirrhosis. Her repeat INR today was less than 1.5, and her platelets are

acceptable so she was sent for placement of the pigtail catheter. Patient

otherwise denies any worsening shortness of breath or dyspnea. She has not had

any fevers overnight although did have a low grade temperature. She does

continue to have some complaints of generalized pruritus. Patient has also been

continued on Levophed, although only 1 mcg/min. She did receive a unit of PRBC

transfusion yesterday, and she was restarted on her albumin infusion today. 



PHYSICAL EXAMINATION:

VITALS: Temperature 99.2, pulse 87, respirations 18, blood pressure 110/59, O2

sat 97% on 1 liter nasal cannula. Ins 2.1 liters, out 1.7 liters.

GENERAL: Patient is lying in bed. Appears awake and alert and not in any acute

respiratory distress. She is able to speak in complete sentences. 

HEENT: Normocephalic/atraumatic. Pupils reactive to light bilaterally. There is

scleral icterus noted. There are moist mucous membranes. 

NECK: Supple. Trachea is midline. No palpable cervical adenopathy. 

CARDIOVASCULAR: Regular rate and rhythm. Normal S1 and S2 with a faint systolic

murmur. No rubs or gallops.

LUNGS: Diminished breath sounds on the left side with occasional crackles at

the right base. 

ABDOMEN: Soft, mildly distended, nontender to palpation.

EXTREMITIES: There is increased +1-2 pitting edema in the bilateral lower

extremities. 



LABORATORY DATA: WBC 4.1, hemoglobin 9.1, platelets 78. Chemistry: Sodium 136,

potassium 4.8, chloride 105, bicarb 27, BUN 46, creatinine 1.88, glucose 88,

T-bili 3.4, magnesium 2.4, albumin 3.0, ammonia level 63. INR 1.42. 



ASSESSMENT AND PLAN: Ms. Taylor is a 61-year-old female with a past medical

history of alcoholic cirrhosis with portal hypertension, esophageal varices,

hepatic encephalopathy, and recurrent ascites with left-sided hepatic

hydrothorax requiring therapeutic thoracentesis every two weeks who presented

with worsening shortness of breath and dyspnea. Patient also found to have BAM

on CKD on admission. She did have a very large left-sided pleural effusion

which was drained with a thoracentesis. Fluid studies were consistent with SBP,

and she was started on antibiotics. Patient was transferred to the ICU with

worsening hypotension despite being on her chronic midodrine likely secondary

to her sepsis.



1.  Recurrent left-sided hepatic hydrothorax now with SBP.

   a.  Patient had thoracentesis initially on 3/20/21 with fluid studies

       consistent with SBP. She has had rapid reaccumulation of her pleural

       effusion, quicker than usual, likely due to the more rapid production of

       her ascites secondary to her infection and SBP. Her diuretics have also

       been on hold still given her worsening renal failure, and she is noted

       to have worsening fluid overload with edema as well likely contributing. 

   b.  Patient was ordered for IR placement of a pigtail catheter yesterday.

       Given her coagulopathy, however, she did require FFP transfusion prior to

       placement, and so she will be having her chest tube placed later today.

       Will continue to slowly drain her pleural effusion with close monitoring

       for hypotension and worsening respiratory distress. 

   c.  Patient is on broad spectrum antibiotics with vancomycin and meropenem.

       Will follow up the results of her fluid cultures. She would need at

       least 5 to 10 days of antibiotic treatment for SBP.

   d.  Will continue patient on nasal cannula oxygen supplementation if needed

       to maintain O2 sat above 90%. 



2.  BAM on CKD with possible hepatorenal syndrome versus worsening renal

    function from sepsis and septic shock.

   a.  Patient was initially on albumin and midodrine for her hepatorenal

       syndrome. She had worsening hypotension, however, and was transferred to

       the ICU. She also was noted to have worsening renal function, and she

       was, therefore, started on Levophed. 

   b.  Patient was also given a unit of packed red blood cells, and with the

       Levophed and volume repletion, she has had some improvement in her renal

       function today. 

   c.  Patient does have some worsening edema. However, given that today she

       has just started to have improvement in her kidney function would hold

       off on diuretics at this time and continue with albumin supplementation,

       particularly given her chest tube placement and drainage of her

       ascites/pleural effusion. 

   d.  Will continue to monitor electrolytes and replete as needed.



3.  History of cirrhosis with portal hypertension and a prior history of

    hepatic encephalopathy. 

   a.  Patient is on rifaximin, and her lactulose was on hold. She was started

       on bowel regimen, however, as she has not had any bowel movements, and

       she is on opioids. She will be restarted on her lactulose, however, at

       this point given her continued constipation. 

   b.  Will continue patient on opioids p.r.n. for her chronic pain.

   c.  Patient will need continued close monitoring after discharge for

       consideration of SBP prophylaxis if she has recurrent episodes. This is a

       temporizing measure for her in regards to her repeated therapeutic

       thoracenteses, and she is pending an evaluation for potential liver

       transplant.  



DVT prophylaxis. Heparin, although on hold due to thrombocytopenia. Since

    her platelet counts have improved, would restart heparin. 

Code status. Full code. 



Total critical care time spent not including procedures approximately 35

minutes. 

MTDD

## 2021-03-24 VITALS — SYSTOLIC BLOOD PRESSURE: 101 MMHG | OXYGEN SATURATION: 88 % | DIASTOLIC BLOOD PRESSURE: 52 MMHG

## 2021-03-24 VITALS — OXYGEN SATURATION: 91 % | DIASTOLIC BLOOD PRESSURE: 59 MMHG | SYSTOLIC BLOOD PRESSURE: 117 MMHG

## 2021-03-24 VITALS — SYSTOLIC BLOOD PRESSURE: 106 MMHG | DIASTOLIC BLOOD PRESSURE: 63 MMHG

## 2021-03-24 VITALS — SYSTOLIC BLOOD PRESSURE: 93 MMHG | DIASTOLIC BLOOD PRESSURE: 56 MMHG | OXYGEN SATURATION: 91 %

## 2021-03-24 VITALS — DIASTOLIC BLOOD PRESSURE: 59 MMHG | SYSTOLIC BLOOD PRESSURE: 109 MMHG | OXYGEN SATURATION: 94 %

## 2021-03-24 VITALS — SYSTOLIC BLOOD PRESSURE: 121 MMHG | DIASTOLIC BLOOD PRESSURE: 64 MMHG

## 2021-03-24 VITALS — SYSTOLIC BLOOD PRESSURE: 107 MMHG | DIASTOLIC BLOOD PRESSURE: 63 MMHG

## 2021-03-24 VITALS — DIASTOLIC BLOOD PRESSURE: 57 MMHG | OXYGEN SATURATION: 91 % | SYSTOLIC BLOOD PRESSURE: 85 MMHG

## 2021-03-24 VITALS — SYSTOLIC BLOOD PRESSURE: 113 MMHG | DIASTOLIC BLOOD PRESSURE: 59 MMHG

## 2021-03-24 VITALS — DIASTOLIC BLOOD PRESSURE: 56 MMHG | SYSTOLIC BLOOD PRESSURE: 100 MMHG | OXYGEN SATURATION: 93 %

## 2021-03-24 VITALS — SYSTOLIC BLOOD PRESSURE: 109 MMHG | DIASTOLIC BLOOD PRESSURE: 63 MMHG

## 2021-03-24 VITALS — SYSTOLIC BLOOD PRESSURE: 93 MMHG | DIASTOLIC BLOOD PRESSURE: 58 MMHG | OXYGEN SATURATION: 90 %

## 2021-03-24 VITALS — SYSTOLIC BLOOD PRESSURE: 101 MMHG | DIASTOLIC BLOOD PRESSURE: 62 MMHG

## 2021-03-24 VITALS — SYSTOLIC BLOOD PRESSURE: 91 MMHG | OXYGEN SATURATION: 91 % | DIASTOLIC BLOOD PRESSURE: 53 MMHG

## 2021-03-24 VITALS — DIASTOLIC BLOOD PRESSURE: 63 MMHG | SYSTOLIC BLOOD PRESSURE: 121 MMHG

## 2021-03-24 VITALS — OXYGEN SATURATION: 95 % | DIASTOLIC BLOOD PRESSURE: 55 MMHG | SYSTOLIC BLOOD PRESSURE: 93 MMHG

## 2021-03-24 VITALS — SYSTOLIC BLOOD PRESSURE: 114 MMHG | DIASTOLIC BLOOD PRESSURE: 57 MMHG

## 2021-03-24 VITALS — SYSTOLIC BLOOD PRESSURE: 112 MMHG | DIASTOLIC BLOOD PRESSURE: 63 MMHG

## 2021-03-24 LAB
ALBUMIN SERPL BCG-MCNC: 3.2 GM/DL (ref 3.2–5.2)
ALT SERPL W P-5'-P-CCNC: 16 U/L (ref 12–78)
BILIRUB SERPL-MCNC: 3.2 MG/DL (ref 0.2–1)
BUN SERPL-MCNC: 38 MG/DL (ref 7–18)
CALCIUM SERPL-MCNC: 7.8 MG/DL (ref 8.8–10.2)
CHLORIDE SERPL-SCNC: 111 MEQ/L (ref 98–107)
CO2 SERPL-SCNC: 25 MEQ/L (ref 21–32)
CREAT SERPL-MCNC: 1.56 MG/DL (ref 0.55–1.3)
GFR SERPL CREATININE-BSD FRML MDRD: 35.9 ML/MIN/{1.73_M2} (ref 45–?)
GLUCOSE SERPL-MCNC: 99 MG/DL (ref 70–100)
HCT VFR BLD AUTO: 26.4 % (ref 36–47)
HGB BLD-MCNC: 8.5 G/DL (ref 12–15.5)
MAGNESIUM SERPL-MCNC: 2.7 MG/DL (ref 1.8–2.4)
MCH RBC QN AUTO: 30.2 PG (ref 27–33)
MCHC RBC AUTO-ENTMCNC: 32.2 G/DL (ref 32–36.5)
MCV RBC AUTO: 94 FL (ref 80–96)
PLATELET # BLD AUTO: 71 10^3/UL (ref 150–450)
POTASSIUM SERPL-SCNC: 4.5 MEQ/L (ref 3.5–5.1)
PROT SERPL-MCNC: 5.5 GM/DL (ref 6.4–8.2)
RBC # BLD AUTO: 2.81 10^6/UL (ref 4–5.4)
SODIUM SERPL-SCNC: 141 MEQ/L (ref 136–145)
WBC # BLD AUTO: 3.4 10^3/UL (ref 4–10)

## 2021-03-24 RX ADMIN — MIDODRINE HYDROCHLORIDE SCH MG: 5 TABLET ORAL at 16:16

## 2021-03-24 RX ADMIN — DOCUSATE SODIUM SCH MG: 100 CAPSULE, LIQUID FILLED ORAL at 21:10

## 2021-03-24 RX ADMIN — MEROPENEM AND SODIUM CHLORIDE SCH MLS/HR: 500 INJECTION, SOLUTION INTRAVENOUS at 05:25

## 2021-03-24 RX ADMIN — Medication SCH UNITS: at 08:32

## 2021-03-24 RX ADMIN — LACTULOSE SCH ML: 10 SOLUTION ORAL at 21:07

## 2021-03-24 RX ADMIN — SODIUM CHLORIDE, PRESERVATIVE FREE SCH ML: 5 INJECTION INTRAVENOUS at 05:26

## 2021-03-24 RX ADMIN — FERRIC OXIDE RED SCH DOSE: 8; 8 LOTION TOPICAL at 16:17

## 2021-03-24 RX ADMIN — MULTIPLE VITAMINS W/ MINERALS TAB SCH TAB: TAB at 08:32

## 2021-03-24 RX ADMIN — GABAPENTIN SCH MG: 300 CAPSULE ORAL at 16:16

## 2021-03-24 RX ADMIN — SODIUM CHLORIDE, PRESERVATIVE FREE SCH ML: 5 INJECTION INTRAVENOUS at 16:16

## 2021-03-24 RX ADMIN — BRIMONIDINE TARTRATE SCH DROP: 1.5 SOLUTION OPHTHALMIC at 08:33

## 2021-03-24 RX ADMIN — MORPHINE SULFATE SCH MG: 30 TABLET, EXTENDED RELEASE ORAL at 21:10

## 2021-03-24 RX ADMIN — BRIMONIDINE TARTRATE SCH DROP: 1.5 SOLUTION OPHTHALMIC at 21:12

## 2021-03-24 RX ADMIN — DOCUSATE SODIUM SCH MG: 100 CAPSULE, LIQUID FILLED ORAL at 09:00

## 2021-03-24 RX ADMIN — FERRIC OXIDE RED SCH DOSE: 8; 8 LOTION TOPICAL at 21:12

## 2021-03-24 RX ADMIN — MORPHINE SULFATE SCH MG: 30 TABLET, EXTENDED RELEASE ORAL at 12:00

## 2021-03-24 RX ADMIN — FERRIC OXIDE RED SCH DOSE: 8; 8 LOTION TOPICAL at 08:34

## 2021-03-24 RX ADMIN — DEXTROSE MONOHYDRATE SCH MLS/HR: 50 INJECTION, SOLUTION INTRAVENOUS at 21:08

## 2021-03-24 RX ADMIN — FOLIC ACID SCH MG: 1 TABLET ORAL at 08:32

## 2021-03-24 RX ADMIN — DOCUSATE SODIUM SCH MG: 100 CAPSULE, LIQUID FILLED ORAL at 15:01

## 2021-03-24 RX ADMIN — MORPHINE SULFATE PRN MG: 4 INJECTION INTRAVENOUS at 00:34

## 2021-03-24 RX ADMIN — SODIUM CHLORIDE, PRESERVATIVE FREE SCH ML: 5 INJECTION INTRAVENOUS at 21:13

## 2021-03-24 RX ADMIN — SENNOSIDES SCH TAB: 8.6 TABLET, FILM COATED ORAL at 09:00

## 2021-03-24 RX ADMIN — SODIUM CHLORIDE SCH UNITS: 4.5 INJECTION, SOLUTION INTRAVENOUS at 08:33

## 2021-03-24 RX ADMIN — MORPHINE SULFATE PRN MG: 4 INJECTION INTRAVENOUS at 08:32

## 2021-03-24 RX ADMIN — LATANOPROST SCH DROP: 50 SOLUTION OPHTHALMIC at 21:11

## 2021-03-24 RX ADMIN — GABAPENTIN SCH MG: 300 CAPSULE ORAL at 21:15

## 2021-03-24 RX ADMIN — MIDODRINE HYDROCHLORIDE SCH MG: 5 TABLET ORAL at 12:01

## 2021-03-24 RX ADMIN — SENNOSIDES SCH TAB: 8.6 TABLET, FILM COATED ORAL at 21:10

## 2021-03-24 RX ADMIN — LEVOTHYROXINE SODIUM SCH MCG: 112 TABLET ORAL at 05:24

## 2021-03-24 RX ADMIN — BETAXOLOL HYDROCHLORIDE SCH DROP: 2.8 SUSPENSION/ DROPS OPHTHALMIC at 21:11

## 2021-03-24 RX ADMIN — SODIUM CHLORIDE SCH UNITS: 4.5 INJECTION, SOLUTION INTRAVENOUS at 21:08

## 2021-03-24 RX ADMIN — BETAXOLOL HYDROCHLORIDE SCH DROP: 2.8 SUSPENSION/ DROPS OPHTHALMIC at 08:33

## 2021-03-24 RX ADMIN — LACTULOSE SCH ML: 10 SOLUTION ORAL at 09:00

## 2021-03-24 RX ADMIN — BRIMONIDINE TARTRATE SCH DROP: 1.5 SOLUTION OPHTHALMIC at 16:17

## 2021-03-24 NOTE — CCN
CRITICAL CARE NOTE



DATE:  03/24/2021



SUBJECTIVE: The patient was seen and examined this morning during bedside

rounds. The patient had placement of a pigtail catheter by IR yesterday. She

has had 3.3 liters output from her chest tube in the past 24 hours with only

450 mL since this morning. She is complaining of pain in the left side of her

chest particularly with deep inspiration and coughing. She does feel it is more

painful now with the chest tube in place than it was previously and she did

have some of that pleuritic pain before as well. She does also have generalized

discomfort and chronic pain which is still present. She has been receiving

morphine which does give her some brief improvement. The patient otherwise has

been afebrile. She has been able to be weaned off of the Levophed this morning

and the blood pressures have remained stable. She is still on midodrine

although at 5 mg instead of her usual 10 mg dose.



OBJECTIVE:

Vitals: Temperature 98.8, pulse 96, respirations 22, blood pressure 102/63. O2

sat 92% on room air, in 2.4 liters, out 5 liters. 

General: The patient is lying in bed. She is awake and alert and is in any

acute respiratory distress. She is complaining of pain, however, and appears

uncomfortable.

HEENT: Normocephalic, atraumatic.  Pupils are reactive to light bilaterally.

There is scleral icterus noted. There are moist mucous membranes. 

Neck: Supple. Trachea is midline. No palpable cervical adenopathy.

Cardiovascular: Regular rate and rhythm. Normal  S-1, S-2 with a faint systolic

murmur. No rubs or gallops.

Lungs: Improved breath sounds on the left side with occasional crackles at the

bases. There are increased crackles on the right side now.

Abdomen: Soft, mildly distended. Nontender to palpation.

Extremities: There is +1 pitting edema in the bilateral lower extremities.



LABS: WBC 3.4, hemoglobin 8.5, platelets 71. Chemistries: Sodium is 141,

potassium 4.5. Chloride is 111. Bicarb is 25, BUN 38. Creatinine is 1.56,

glucose 99. Magnesium is 2.7. T bili is 3.2. Albumin is 3.2.



Pleural fluid cultures were positive for Staph epidermidis.



Chest x-ray this morning shows the left-sided pigtail catheter in place in the

base of the lung. There is almost complete resolution of the left pleural

effusions with mild atelectasis and mild blunting at the left base. There is

increased atelectasis and mild interstitial infiltrates in the mid to lower

right lung with a trace right pleural effusion. 



ASSESSMENT AND PLAN: Miss Taylor is a 61-year-old female with past medical

history of alcoholic cirrhosis with portal hypertension, esophageal varices,

hepatic encephalopathy, and a history of recurrent ascites with left-sided

hepatic hydrothorax requiring therapeutic thoracentesis every two weeks who

presented with worsening shortness of breath and dyspnea. Patient also found to

have BAM on CKD as well as a very large left-sided pleural effusion. The

patient was drained initially with a thoracentesis with fluid studies

consistent with SBP. The patient was started on antibiotics, however, had

developed worsening hypotension likely secondary to her sepsis and was

transferred to the ICU.



1.  Recurrent left-sided hepatic hydrothorax now with SBP.

Patient had thoracentesis on 3/20/21, had fluid studies consistent with SBP.

Her fluid cultures are now positive for Staph epidermidis. The patient is on

antibiotics currently with vancomycin and meropenem. With the positive for

Staph in the pleural fluid, we will discontinue the meropenem and continue

adjusting vancomycin for now as she has a penicillin and cephalosporin allergy.

The patient's pigtail placed yesterday by IR has had almost 3.3 liters output

in the past 24 hours. She has very minimal trace effusion on that left side now

on x-ray. She is, however, developing small trace right pleural effusion as

well as increased right atelectasis in the right lung. She does have more

crackles on exam on the right side today as well. The patient is having

increased chest pain and pleuritic discomfort with the chest tube placement on

the left. We will have her use incentive spirometer and will increase her pain

control with medications. 

The patient is on morphine p.r.n. IV but will start long-acting MS Contin 30 mg

b.i.d. as well. We will also start gabapentin to help with her pain control.

We will continue to monitor the patient's O2 sats and we will start her on

nasal cannula oxygen if needed to maintain O2 sat above 90%. 



2.  BAM on CKD with possible hepatorenal syndrome versus worsening renal

function from sepsis and septic shock.

The patient was able to be weaned off of the Levophed. She is midodrine 5 mg

but we will increase that to her home dose of 10 mg t.i.d. and attempt to

maintain a MAP above 70 if possible.

The patient was given albumin infusion yesterday given her chest tube drainage.

Her albumin is 3.2 today so we will hold off on albumin for today.

The patient does have some increased lower extremity edema and likely is having

accumulation of her ascites and subsequent hepatic hydrothorax. However, her

kidney function is improving now with the albumin and previous transfusion of

PRBCs as well as with the pressors increasing her MAP for her hepatorenal

syndrome. We will continue to follow up with nephrology about when to restart

her diuretics.

We will continue to monitor electrolytes and replete as needed.



3.  History of cirrhosis with portal hypertension and a prior history of

hepatic encephalopathy.

The patient is on rifaximin and lactulose currently. She does have bowel

regimen as well as she was also constipated and is on opioids. She did have

bowel movements overnight, however, so we will place hold parameters for

lactulose.

The patient is on opioids for her chronic pain but we will add long-acting

opioids to her morphine p.r.n. and we will also add gabapentin for improved

pain control.



4.  DVT prophylaxis. Heparin.



5.  Code status. FULL CODE. 



Total critical care time spent not including procedures approximately 35

minutes.

## 2021-03-24 NOTE — IPN
PROGRESS NOTE



DATE:  03/24/2021



SUBJECTIVE: Flora is seen and examined this morning at the bedside. She denies

any complaints except for pain at the site of left pigtail catheter placement.

She has been weaned off of Levophed. She is now on midodrine. She has improved

renal function and urine output the past 24 hours.



VITAL SIGNS: Temperature 99.3, pulse 89, respiratory rate 18, blood pressure

93/55, saturating 95% on room air. Intake yesterday was 3.4 liters. Urine

output was 1.7 liters. Chest tube drainage was 3.3 liters. Net negative 1.7

liters. Weight in the bed scale today is 88.4 kg.

GENERAL: Patient is seen awake, alert, oriented, and not in any acute distress. 

Extraocular muscles are intact. There is mild scleral icterus. Mucous membranes

are moist. Neck is supple. Jugular veins are  mildly elevated. 

HEART:  Sounds are regular, s1, S2. There is 1+ leg edema.

LUNGS: Show improved breath sounds on the left, and there is a pigtail catheter

to Pleur-evac.

ABDOMEN: Soft, mildly distended, and nontender.

EXTREMITIES: Show 1+ edema peripherally in the legs.

NEUROLOGIC: She is oriented times three at baseline mentation.



LABORATORY STUDIES:

Today show hemoglobin 8.5, albumin 3.2, platelets 71. Sodium 141, potassium

4.5, bicarbonate 25, BUN 38, creatinine 1.5. 



Chest x-ray done today shows left pleural effusion has almost completely

resolved.



INPATIENT MEDICATIONS: She is off of Levophed. She is back on midodrine 10 mg

by mouth three times a day. Her remainder of medications is unchanged as

compared to yesterday with the exception of MS Contin 30 mg twice daily being

started.



PROBLEMS:

1. Acute kidney injury (BAM) (nonoliguric) superimposed on chronic kidney

disease (CKD), stage IIIB in this patient with history of recurrent acute

kidney injury. Her present BAM was in the setting of sepsis and hypotension.

She is much improved now. Creatinine is back to baseline. She is off of

Levophed pressor support, and I have put her back on her home regimen of

midodrine 10 mg three times a day. Her albumin is greater than 3. There is no

need of albumin administration today, as I see that her pleural effusion has

mostly drained at this point. I would hold diuretics for today given recent 3.3

liter drainage of pleural fluid.



2. Alcoholic cirrhosis with decompensation with recurrent ascites and

left-sided hepatic hydrothorax requiring therapeutic drainage, presently with

left-sided pigtail catheter to Pleur-evac with improvement in chest x-ray this

morning. Effusion is likely to be infected, and she continues on vancomycin and

meropenem. Hemodynamically she is improved. She is off of Levophed. She is back

on midodrine.



3. Chronic hypotension. Continue midodrine 10 mg three times a day. Systolic is

stable in the 90s. 



4. History of hepatic encephalopathy. She is on rifaximin, Lactulose was

resumed yesterday, and I will get another ammonia level tomorrow.

## 2021-03-24 NOTE — REP
INDICATION:

s/p pigtail/CT.



COMPARISON:

03/23/2021 as well as other prior exams.



TECHNIQUE:

SINGLE PORTABLE AP VIEW OF THE CHEST WAS PERFORMED.



FINDINGS:

Left pigtail drainage catheter is again seen.  Left pleural effusion appears almost

completely resolved.  There is mild patchy parenchymal opacity in the left lung base.

There is a new small right pleural effusion.  There are mild interstitial and alveolar

infiltrates in the mid to lower right lung.  Heart is upper limits of normal in size.

Right central venous catheter is again noted.



IMPRESSION:

Left pleural effusion has almost completely resolved.  Mild atelectasis/infiltrate

left lung base.



Mild interstitial and alveolar infiltrates right mid and lower lung zones with new

small right effusion.





<Electronically signed by Sushant Khan > 03/24/21 [id number]

## 2021-03-24 NOTE — IPNPDOC
Text Note


Date of Service


The patient was seen on 3/24/21.





NOTE


Subjective:


Patient was seen and examined this morning at bedside. And the pigtail catheter 

placed yesterday. Says she's doing about the same has some back discomfort at 

the site of catheter placement. Her blood pressures improved and she's been off 

of pressors since last night. There was no acute overnight events.





Objective:


Constitutional: Awake and alert, in no apparent distress. Blood pressure 117/60.

Chronically ill appearing.


ENT: Sclera is icteric


Respiratory: Diminished breath sounds bilaterally. No respiratory distress. No 

use of accessory muscles. Left chest pigtail catheter in place


Cardiovascular: Regular rate and rhythm, systolic ejection murmur.


Gastrointestinal: Abdomen is soft, non distended, non tender, BS present. 


Musculoskeletal: Trace peripheral edema


Neurologic: No focal neurological deficit.  


Mental Status: A&O x3, normal affect 





Assessment/plan:





# SBP: Thoracocentesis 3/20/21 confirms SBP. She is on vancomycin and meropenem.

Pleural fluid cultures grew staph epi appears to be pan sensitive, I'd like to 

discuss with Dr Scott prior to narrowing Abx converge. Off of pressor support. 

Her long-term prognosis is poor.


# left pleural effusion: s/p pigtail catheter placement by IR 3/23/21. Also 

possibly infected. This is likely SBP from hepatic hydrothorax. Dr Min 

pulmonologist is following. Will need 5-10 days of Abx. 


# hypotension: With underlying history of chronic hypotension. Has now improved.

Usually takes Midodrine. Off of Levofed since 3/23/21 night. Likely from the 

underlying infection. Albumin.


# Decompensated alcoholic liver cirrhosis: She has recurrent left hepatic 

hydrothorax and is scheduled for therapeutic thoracocentesis with interventional

radiology outpatient every 2 weeks. Also has a history of portal hypertension, 

esophageal varices, recurrent ascites.


# BAM on CKD3: In the setting of sepsis and hypotension. Appears prerenal. Hold 

diuresis. Nephrology consult helping manage.


# Chronic pruritus: Likely secondary to her chronic kidney and end-stage liver 

disease. Supportive management.


# history of hepatic encephalopathy: Mentation appears to be at baseline at this

time. She is on rifaximin. Fu ammonia.


# Chronic hyponatremia: Asymptomatic. Improved. Monitor. 


# Pancytopenia: Likely secondary to cirrhosis and hypersplenism. Platelet count 

was downtrending. Heparin was stopped and sequential teds ordered.


# DVT prophylaxis: Janette Case 


Hospitalist





Fernie BROWN I+O


Fernie BROWN I+O


Laboratory Tests


3/24/21 04:20











Vital Signs








  Date Time  Temp Pulse Resp B/P (MAP) Pulse Ox O2 Delivery O2 Flow Rate FiO2


 


3/24/21 15:00     94 Room Air  


 


3/24/21 12:00 98.6 87 20 93/58 (70)    


 


3/23/21 16:00       1.0 














I&O- Last 24 Hours up to 6 AM 


 


 3/24/21





 06:00


 


Intake Total 3106.7 ml


 


Output Total 5525 ml


 


Balance -2418.3 ml

















TIFFANY CASE MD              Mar 24, 2021 16:17

## 2021-03-24 NOTE — IPN
INPATIENT PROGRESS NOTE



DATE:  03/23/2021



SUBJECTIVE: Patient is seen and examined this morning at the bedside just after

she had IR placement of pigtail catheter for drainage of her left sided pleural

effusion. Patient denies any complaints at the time of my visit, denies any

shortness of breath. She remains on Levophed infusion although only at 1 mcg

per minute. Her renal function has improved over the past 24 hours and her

ammonia level is noted to have risen today. Her urine output is satisfactory

and as she is having drainage of her pleural effusion she is back on albumin

today. 



PHYSICAL EXAMINATION: Vital signs: Temperature 99.2, pulse 83, respiratory rate

20, blood pressure 110/56, saturating 96% on room air. Intake yesterday was 3.1

liters of which 1400 was oral and the remainder was I.V. Urine output yesterday

was 1.8 liters. Weight on the bed scale today is 88.1 kg. Chest tube on the

left has already put out more than 3 liters today.

General: Patient is seen lying on the stretcher having just returned from

interventional radiology awake, alert, oriented and in no distress. 

HEENT: Pupils are round and reactive to light. There is mild scleral icterus.

Tongue is moist. 

Neck: Supple. Jugular veins are mildly elevated.

Heart: Sounds are regular S1 and S2. There is 1+ leg edema bilaterally. 

Lungs: Diminished breath sounds on the left side. There is a pigtail catheter

now as well.

Abdomen: Soft and mildly distended. 

Genitourinary: No Braun catheter. Patient is voiding without issue.

Neurologic: Oriented times 3, interactive, at baseline mentation.



LABORATORY DATA: Laboratory studies: Sodium 136, potassium 4.8, bicarbonate 27,

BUN 46, creatinine 1.8. Magnesium 2.4. Ammonia 63. Albumin 3. Hemoglobin 9.1,

platelets 78. 



IMAGING: Patient had an ultrasound guided left thoracentesis with pigtail

catheter placement and 1500 mL of fluid was withdrawn with the initial

procedure and her catheter was connected to a Pleur-evac. 



INPATIENT MEDICATIONS: She was started on lactulose 15 mL twice daily. Her

Levophed continues at 1 mcg per minute. I note she is to be resumed on heparin

5000 units subcutaneously twice a day. Her remainder of medications are

unchanged as compared to yesterday. 



PROBLEMS/PLAN:

1.  Non-oliguric acute renal failure superimposed on CKD stage 3B in this

    patient with history of recurrent acute kidney injuries: Her present BAM is

    improving and is in the setting of sepsis, hypotension and infected hepatic

    hydrothorax. Her renal function has improved nicely over the past 2 days

    with Levophed pressor support and blood product transfusion. Creatinine

    down to 1.8 today. She had a thoracentesis done and has a pigtail catheter

    in place to drain her large left sided pleural effusion and she is

    continued on albumin for maintenance of effective circulating volume.



2.  Acute on chronic hypotension: The patient is on Levophed presently at 1 mcg

    per minute. She is also receiving albumin around-the-clock which will help

    with intravascular volume and blood pressure support and I am also going to

    resume her on midodrine tomorrow 5 mg three times a day so we can work

    towards getting her off of the Levophed.



3.  Alcoholic cirrhosis with decompensation and left sided hepatic hydrothorax

    now status post thoracentesis with a pigtail catheter to Pleur-evac:

    Drainage is already more than 3 liters from the left chest tube. There is no

    need for diuretic at present. Continue with albumin. She is also on

    vancomycin and meropenem as effusion is likely infected. I note significant

    improvement in her repeat cell count from today.



4.  History of hepatic encephalopathy: She is on rifaximin. Her lactulose has

    been held the past several days. Her ammonia is greater than 60. Lactulose

    is being restarted at 15 mL twice a day. 



Plan of care was discussed with Dr. Dunlap.

## 2021-03-25 VITALS — SYSTOLIC BLOOD PRESSURE: 106 MMHG | DIASTOLIC BLOOD PRESSURE: 52 MMHG

## 2021-03-25 VITALS — DIASTOLIC BLOOD PRESSURE: 52 MMHG | SYSTOLIC BLOOD PRESSURE: 107 MMHG

## 2021-03-25 VITALS — SYSTOLIC BLOOD PRESSURE: 96 MMHG | DIASTOLIC BLOOD PRESSURE: 55 MMHG

## 2021-03-25 VITALS — DIASTOLIC BLOOD PRESSURE: 56 MMHG | SYSTOLIC BLOOD PRESSURE: 96 MMHG

## 2021-03-25 VITALS — DIASTOLIC BLOOD PRESSURE: 57 MMHG | SYSTOLIC BLOOD PRESSURE: 102 MMHG

## 2021-03-25 VITALS — SYSTOLIC BLOOD PRESSURE: 95 MMHG | DIASTOLIC BLOOD PRESSURE: 54 MMHG

## 2021-03-25 VITALS — DIASTOLIC BLOOD PRESSURE: 59 MMHG | SYSTOLIC BLOOD PRESSURE: 97 MMHG

## 2021-03-25 VITALS — SYSTOLIC BLOOD PRESSURE: 98 MMHG | DIASTOLIC BLOOD PRESSURE: 55 MMHG

## 2021-03-25 VITALS — DIASTOLIC BLOOD PRESSURE: 58 MMHG | SYSTOLIC BLOOD PRESSURE: 112 MMHG

## 2021-03-25 VITALS — DIASTOLIC BLOOD PRESSURE: 53 MMHG | SYSTOLIC BLOOD PRESSURE: 106 MMHG

## 2021-03-25 VITALS — DIASTOLIC BLOOD PRESSURE: 58 MMHG | SYSTOLIC BLOOD PRESSURE: 103 MMHG

## 2021-03-25 VITALS — SYSTOLIC BLOOD PRESSURE: 84 MMHG | DIASTOLIC BLOOD PRESSURE: 42 MMHG

## 2021-03-25 VITALS — DIASTOLIC BLOOD PRESSURE: 55 MMHG | SYSTOLIC BLOOD PRESSURE: 102 MMHG

## 2021-03-25 VITALS — DIASTOLIC BLOOD PRESSURE: 54 MMHG | SYSTOLIC BLOOD PRESSURE: 99 MMHG

## 2021-03-25 VITALS — SYSTOLIC BLOOD PRESSURE: 96 MMHG | DIASTOLIC BLOOD PRESSURE: 54 MMHG

## 2021-03-25 VITALS — DIASTOLIC BLOOD PRESSURE: 55 MMHG | SYSTOLIC BLOOD PRESSURE: 101 MMHG

## 2021-03-25 VITALS — DIASTOLIC BLOOD PRESSURE: 55 MMHG | SYSTOLIC BLOOD PRESSURE: 103 MMHG

## 2021-03-25 VITALS — DIASTOLIC BLOOD PRESSURE: 57 MMHG | SYSTOLIC BLOOD PRESSURE: 98 MMHG

## 2021-03-25 VITALS — DIASTOLIC BLOOD PRESSURE: 56 MMHG | SYSTOLIC BLOOD PRESSURE: 97 MMHG

## 2021-03-25 VITALS — SYSTOLIC BLOOD PRESSURE: 97 MMHG | DIASTOLIC BLOOD PRESSURE: 53 MMHG

## 2021-03-25 VITALS — DIASTOLIC BLOOD PRESSURE: 56 MMHG | SYSTOLIC BLOOD PRESSURE: 101 MMHG

## 2021-03-25 VITALS — SYSTOLIC BLOOD PRESSURE: 102 MMHG | DIASTOLIC BLOOD PRESSURE: 59 MMHG

## 2021-03-25 VITALS — SYSTOLIC BLOOD PRESSURE: 99 MMHG | DIASTOLIC BLOOD PRESSURE: 50 MMHG

## 2021-03-25 VITALS — SYSTOLIC BLOOD PRESSURE: 102 MMHG | DIASTOLIC BLOOD PRESSURE: 57 MMHG

## 2021-03-25 VITALS — SYSTOLIC BLOOD PRESSURE: 92 MMHG | DIASTOLIC BLOOD PRESSURE: 53 MMHG

## 2021-03-25 VITALS — DIASTOLIC BLOOD PRESSURE: 56 MMHG | SYSTOLIC BLOOD PRESSURE: 98 MMHG

## 2021-03-25 VITALS — SYSTOLIC BLOOD PRESSURE: 102 MMHG | DIASTOLIC BLOOD PRESSURE: 58 MMHG

## 2021-03-25 VITALS — DIASTOLIC BLOOD PRESSURE: 59 MMHG | SYSTOLIC BLOOD PRESSURE: 100 MMHG

## 2021-03-25 VITALS — SYSTOLIC BLOOD PRESSURE: 97 MMHG | DIASTOLIC BLOOD PRESSURE: 56 MMHG

## 2021-03-25 VITALS — SYSTOLIC BLOOD PRESSURE: 113 MMHG | DIASTOLIC BLOOD PRESSURE: 53 MMHG

## 2021-03-25 VITALS — SYSTOLIC BLOOD PRESSURE: 95 MMHG | DIASTOLIC BLOOD PRESSURE: 46 MMHG

## 2021-03-25 VITALS — DIASTOLIC BLOOD PRESSURE: 57 MMHG | SYSTOLIC BLOOD PRESSURE: 100 MMHG

## 2021-03-25 VITALS — SYSTOLIC BLOOD PRESSURE: 100 MMHG | DIASTOLIC BLOOD PRESSURE: 56 MMHG

## 2021-03-25 VITALS — SYSTOLIC BLOOD PRESSURE: 100 MMHG | DIASTOLIC BLOOD PRESSURE: 49 MMHG

## 2021-03-25 VITALS — DIASTOLIC BLOOD PRESSURE: 53 MMHG | SYSTOLIC BLOOD PRESSURE: 96 MMHG

## 2021-03-25 VITALS — DIASTOLIC BLOOD PRESSURE: 55 MMHG | SYSTOLIC BLOOD PRESSURE: 97 MMHG

## 2021-03-25 VITALS — DIASTOLIC BLOOD PRESSURE: 55 MMHG | SYSTOLIC BLOOD PRESSURE: 99 MMHG

## 2021-03-25 VITALS — SYSTOLIC BLOOD PRESSURE: 122 MMHG | DIASTOLIC BLOOD PRESSURE: 57 MMHG

## 2021-03-25 VITALS — SYSTOLIC BLOOD PRESSURE: 98 MMHG | DIASTOLIC BLOOD PRESSURE: 54 MMHG

## 2021-03-25 VITALS — SYSTOLIC BLOOD PRESSURE: 109 MMHG | DIASTOLIC BLOOD PRESSURE: 59 MMHG

## 2021-03-25 VITALS — DIASTOLIC BLOOD PRESSURE: 49 MMHG | SYSTOLIC BLOOD PRESSURE: 106 MMHG

## 2021-03-25 VITALS — DIASTOLIC BLOOD PRESSURE: 62 MMHG | SYSTOLIC BLOOD PRESSURE: 106 MMHG

## 2021-03-25 VITALS — SYSTOLIC BLOOD PRESSURE: 98 MMHG | DIASTOLIC BLOOD PRESSURE: 57 MMHG

## 2021-03-25 VITALS — DIASTOLIC BLOOD PRESSURE: 54 MMHG | SYSTOLIC BLOOD PRESSURE: 109 MMHG

## 2021-03-25 VITALS — SYSTOLIC BLOOD PRESSURE: 100 MMHG | DIASTOLIC BLOOD PRESSURE: 55 MMHG

## 2021-03-25 VITALS — DIASTOLIC BLOOD PRESSURE: 60 MMHG | SYSTOLIC BLOOD PRESSURE: 104 MMHG

## 2021-03-25 VITALS — DIASTOLIC BLOOD PRESSURE: 55 MMHG | SYSTOLIC BLOOD PRESSURE: 107 MMHG

## 2021-03-25 VITALS — SYSTOLIC BLOOD PRESSURE: 106 MMHG | DIASTOLIC BLOOD PRESSURE: 54 MMHG

## 2021-03-25 VITALS — SYSTOLIC BLOOD PRESSURE: 106 MMHG | DIASTOLIC BLOOD PRESSURE: 55 MMHG

## 2021-03-25 VITALS — DIASTOLIC BLOOD PRESSURE: 58 MMHG | SYSTOLIC BLOOD PRESSURE: 102 MMHG

## 2021-03-25 VITALS — DIASTOLIC BLOOD PRESSURE: 59 MMHG | SYSTOLIC BLOOD PRESSURE: 103 MMHG

## 2021-03-25 VITALS — DIASTOLIC BLOOD PRESSURE: 52 MMHG | SYSTOLIC BLOOD PRESSURE: 94 MMHG

## 2021-03-25 VITALS — SYSTOLIC BLOOD PRESSURE: 107 MMHG | DIASTOLIC BLOOD PRESSURE: 55 MMHG

## 2021-03-25 VITALS — DIASTOLIC BLOOD PRESSURE: 57 MMHG | SYSTOLIC BLOOD PRESSURE: 105 MMHG

## 2021-03-25 VITALS — SYSTOLIC BLOOD PRESSURE: 104 MMHG | DIASTOLIC BLOOD PRESSURE: 59 MMHG

## 2021-03-25 VITALS — SYSTOLIC BLOOD PRESSURE: 108 MMHG | DIASTOLIC BLOOD PRESSURE: 49 MMHG

## 2021-03-25 VITALS — DIASTOLIC BLOOD PRESSURE: 62 MMHG | SYSTOLIC BLOOD PRESSURE: 103 MMHG

## 2021-03-25 VITALS — SYSTOLIC BLOOD PRESSURE: 100 MMHG | DIASTOLIC BLOOD PRESSURE: 58 MMHG

## 2021-03-25 VITALS — DIASTOLIC BLOOD PRESSURE: 58 MMHG | SYSTOLIC BLOOD PRESSURE: 104 MMHG

## 2021-03-25 LAB
ALBUMIN SERPL BCG-MCNC: 3.1 GM/DL (ref 3.2–5.2)
ALT SERPL W P-5'-P-CCNC: 16 U/L (ref 12–78)
BASE EXCESS BLDA CALC-SCNC: -5.5 MMOL/L (ref -2–2)
BASE EXCESS BLDA CALC-SCNC: -7.7 MMOL/L (ref -2–2)
BILIRUB SERPL-MCNC: 2.9 MG/DL (ref 0.2–1)
BUN SERPL-MCNC: 36 MG/DL (ref 7–18)
CALCIUM SERPL-MCNC: 7.9 MG/DL (ref 8.8–10.2)
CHLORIDE SERPL-SCNC: 109 MEQ/L (ref 98–107)
CO2 BLDA CALC-SCNC: 19.6 MEQ/L (ref 23–31)
CO2 BLDA CALC-SCNC: 19.6 MEQ/L (ref 23–31)
CO2 SERPL-SCNC: 26 MEQ/L (ref 21–32)
CREAT SERPL-MCNC: 1.57 MG/DL (ref 0.55–1.3)
GFR SERPL CREATININE-BSD FRML MDRD: 35.7 ML/MIN/{1.73_M2} (ref 45–?)
GLUCOSE SERPL-MCNC: 79 MG/DL (ref 70–100)
HCO3 BLDA-SCNC: 18.4 MEQ/L (ref 22–26)
HCO3 BLDA-SCNC: 18.7 MEQ/L (ref 22–26)
HCO3 STD BLDA-SCNC: 18.2 MEQ/L (ref 22–26)
HCO3 STD BLDA-SCNC: 19.9 MEQ/L (ref 22–26)
HCT VFR BLD AUTO: 30 % (ref 36–47)
HGB BLD-MCNC: 9.6 G/DL (ref 12–15.5)
MAGNESIUM SERPL-MCNC: 2.5 MG/DL (ref 1.8–2.4)
MCH RBC QN AUTO: 30.6 PG (ref 27–33)
MCHC RBC AUTO-ENTMCNC: 32 G/DL (ref 32–36.5)
MCV RBC AUTO: 95.5 FL (ref 80–96)
PCO2 BLDA: 31.9 MMHG (ref 35–45)
PCO2 BLDA: 39.7 MMHG (ref 35–45)
PH BLDA: 7.28 UNITS (ref 7.35–7.45)
PH BLDA: 7.38 UNITS (ref 7.35–7.45)
PLATELET # BLD AUTO: 93 10^3/UL (ref 150–450)
PO2 BLDA: 102.8 MMHG (ref 75–100)
PO2 BLDA: 95.5 MMHG (ref 75–100)
POTASSIUM SERPL-SCNC: 4.7 MEQ/L (ref 3.5–5.1)
PROT SERPL-MCNC: 5.5 GM/DL (ref 6.4–8.2)
RBC # BLD AUTO: 3.14 10^6/UL (ref 4–5.4)
SAO2 % BLDA: 97.7 % (ref 95–99)
SAO2 % BLDA: 97.9 % (ref 95–99)
SODIUM SERPL-SCNC: 139 MEQ/L (ref 136–145)
WBC # BLD AUTO: 5.4 10^3/UL (ref 4–10)

## 2021-03-25 PROCEDURE — 5A1945Z RESPIRATORY VENTILATION, 24-96 CONSECUTIVE HOURS: ICD-10-PCS | Performed by: SPECIALIST

## 2021-03-25 RX ADMIN — LACTULOSE SCH ML: 10 SOLUTION ORAL at 12:36

## 2021-03-25 RX ADMIN — LEVOTHYROXINE SODIUM SCH MCG: 112 TABLET ORAL at 05:05

## 2021-03-25 RX ADMIN — SODIUM CHLORIDE SCH UNITS: 4.5 INJECTION, SOLUTION INTRAVENOUS at 12:38

## 2021-03-25 RX ADMIN — MIDODRINE HYDROCHLORIDE SCH MG: 5 TABLET ORAL at 10:16

## 2021-03-25 RX ADMIN — MIDODRINE HYDROCHLORIDE SCH MG: 5 TABLET ORAL at 16:21

## 2021-03-25 RX ADMIN — LACTULOSE SCH ML: 10 SOLUTION ORAL at 20:19

## 2021-03-25 RX ADMIN — LEVOTHYROXINE SODIUM SCH MCG: 112 TABLET ORAL at 10:41

## 2021-03-25 RX ADMIN — CHLORHEXIDINE GLUCONATE 0.12% ORAL RINSE SCH ML: 1.2 LIQUID ORAL at 20:18

## 2021-03-25 RX ADMIN — DEXTROSE MONOHYDRATE SCH MLS/HR: 50 INJECTION, SOLUTION INTRAVENOUS at 11:23

## 2021-03-25 RX ADMIN — MIDAZOLAM PRN MG: 1 INJECTION INTRAMUSCULAR; INTRAVENOUS at 21:28

## 2021-03-25 RX ADMIN — LATANOPROST SCH DROP: 50 SOLUTION OPHTHALMIC at 20:29

## 2021-03-25 RX ADMIN — BRIMONIDINE TARTRATE SCH DROP: 1.5 SOLUTION OPHTHALMIC at 16:21

## 2021-03-25 RX ADMIN — LEVETIRACETAM SCH MG: 500 SOLUTION ORAL at 20:19

## 2021-03-25 RX ADMIN — MIDAZOLAM PRN MG: 1 INJECTION INTRAMUSCULAR; INTRAVENOUS at 20:16

## 2021-03-25 RX ADMIN — GABAPENTIN SCH MG: 300 CAPSULE ORAL at 05:06

## 2021-03-25 RX ADMIN — MIDAZOLAM PRN MG: 1 INJECTION INTRAMUSCULAR; INTRAVENOUS at 12:53

## 2021-03-25 RX ADMIN — Medication SCH UNITS: at 12:37

## 2021-03-25 RX ADMIN — SODIUM CHLORIDE, PRESERVATIVE FREE SCH ML: 5 INJECTION INTRAVENOUS at 05:06

## 2021-03-25 RX ADMIN — BETAXOLOL HYDROCHLORIDE SCH DROP: 2.8 SUSPENSION/ DROPS OPHTHALMIC at 12:39

## 2021-03-25 RX ADMIN — SODIUM CHLORIDE, PRESERVATIVE FREE SCH ML: 5 INJECTION INTRAVENOUS at 14:57

## 2021-03-25 RX ADMIN — SODIUM CHLORIDE SCH UNITS: 4.5 INJECTION, SOLUTION INTRAVENOUS at 20:18

## 2021-03-25 RX ADMIN — BRIMONIDINE TARTRATE SCH DROP: 1.5 SOLUTION OPHTHALMIC at 12:39

## 2021-03-25 RX ADMIN — MIDAZOLAM PRN MG: 1 INJECTION INTRAMUSCULAR; INTRAVENOUS at 18:26

## 2021-03-25 RX ADMIN — BETAXOLOL HYDROCHLORIDE SCH DROP: 2.8 SUSPENSION/ DROPS OPHTHALMIC at 20:29

## 2021-03-25 RX ADMIN — DEXTROSE MONOHYDRATE SCH MLS/HR: 50 INJECTION, SOLUTION INTRAVENOUS at 16:21

## 2021-03-25 RX ADMIN — SODIUM CHLORIDE, PRESERVATIVE FREE SCH ML: 5 INJECTION INTRAVENOUS at 21:35

## 2021-03-25 RX ADMIN — CHLORHEXIDINE GLUCONATE 0.12% ORAL RINSE SCH ML: 1.2 LIQUID ORAL at 12:40

## 2021-03-25 RX ADMIN — FOLIC ACID SCH MG: 1 TABLET ORAL at 12:37

## 2021-03-25 RX ADMIN — MIDODRINE HYDROCHLORIDE SCH MG: 5 TABLET ORAL at 12:40

## 2021-03-25 RX ADMIN — MULTIPLE VITAMINS W/ MINERALS TAB SCH TAB: TAB at 12:37

## 2021-03-25 RX ADMIN — BRIMONIDINE TARTRATE SCH DROP: 1.5 SOLUTION OPHTHALMIC at 20:29

## 2021-03-25 NOTE — REP
INDICATION:

questionable seizure.



COMPARISON:

11/30/2020.



TECHNIQUE:

CT BRAIN PERFORMED IN THE AXIAL PLANE.  CORONAL RECONSTRUCTION IMAGES ARE PERFORMED.



FINDINGS:

THE VENTRICLES ARE NORMAL IN SIZE AND POSITION.  THERE IS NO MIDLINE SHIFT OR MASS

EFFECT.  GRAY-WHITE DIFFERENTIATION IS WELL MAINTAINED.  THERE IS NO ACUTE

INTRACRANIAL HEMORRHAGE OR EXTRA-AXIAL FLUID COLLECTION.  BONE WINDOW EXAMINATION IS

UNREMARKABLE.  VISUALIZED MASTOID AIR CELLS AND PARANASAL SINUSES ARE CLEAR.



IMPRESSION:

NEGATIVE NONCONTRAST CT BRAIN.





<Electronically signed by Sushant Khan > 03/25/21 [id number]

## 2021-03-25 NOTE — REP
INDICATION:

endo tube placement. 9:12 a.m. film.



COMPARISON:

Comparison radiograph is from March 25, 2021 at 6:04 a.m...



TECHNIQUE:

Semi-erect AP portable exam, time stamp 9:12 a.m..



FINDINGS:

Endotracheal tube is seen in good position just above the level of the transverse

aorta.  A right internal jugular central venous line terminates in the expected

location of superior vena cava.  There is a pigtail drainage catheter in place in the

left base.  Some left pleural thickening is seen diffusely unchanged.  There may be a

tiny pleural air collection at the apex on the left also unchanged.  No acute

infiltrate is seen.  Heart size is borderline.



IMPRESSION:

Endotracheal tube in good position.





<Electronically signed by Noam Deng > 03/25/21 [id number]

## 2021-03-25 NOTE — RO
OPERATIVE NOTE



DATE OF OPERATION: 03/25/2021



PROCEDURE: Endotracheal tube intubation. 



INDICATIONS: Acute respiratory failure. 



PRE-PROCEDURE DIAGNOSIS: Cirrhosis. 



POST-PROCEDURE DIAGNOSIS: Cirrhosis. 



ATTENDING PHYSICIAN: Eveline Dunlap MD 



CONSENT: Due to the emergent nature of the procedure, consent was implied. The

patient is a previously noted full code.



PROCEDURE SUMMARY:  The patient was placed on cardiac monitor including

continuous pulse oximetry. Respiratory therapy, as well as the critical care

nurse was in attendance. The procedure was assisted as well by the Resident

Physician, Dr. Taj Dangelo. Rapid sequence intubation was conducted. The

patient received 20 mg of etomidate for induction and 60 mg of succinylcholine

for adequate paralysis. Using a size 3 GlideScope and a size 8 endotracheal

tube with stylet, the patient was intubated on the first pass attempt. The

stylet was removed and the cuff balloon was inflated. Appropriate endotracheal

tube position was confirmed by direct visualization of vocal cord passage,

flopping of the tube, CO2 colorimetric indicator, and symmetric breath sounds.

The tube was secured at 24 cm at the lips. Post-intubation chest x-ray showed

endotracheal tube in satisfactory position.

## 2021-03-25 NOTE — REP
INDICATION:

intubated, OGT.



COMPARISON:

03/25/2021, 9:13 a.m.



TECHNIQUE:

SINGLE PORTABLE AP VIEW OF THE CHEST WAS PERFORMED.



FINDINGS:

There has been placement of a nasogastric tube.  The distal portion is in the stomach,

specifically the side port is in the antrum of the stomach.  The remainder of the

study is unchanged.



IMPRESSION:

Nasogastric tube side port in the antrum of the stomach.





<Electronically signed by Sushant Khan > 03/25/21 0927

## 2021-03-25 NOTE — CCN
CRITICAL CARE NOTE



DATE:  03/25/2021



SUBJECTIVE: The patient was seen and examined this morning.  After I was called

to the bedside to examine the patient, she was noted to be unresponsive and

having seizure like activity.  The patient had reportedly had no events noted

overnight.  She was alert and oriented x3 as per the overnight nurse.  She was

able to get up and ambulate to the bathroom on her own without assistance. 

This morning, however, the patient was found to be lying in bed unresponsive to

verbal or painful stimuli.  She was having myoclonic jerking movements in all

four extremities, as well as twitching in her face and around her eyes.  She

was also noted to have some audible gurgling sounds and appeared to be having

difficulty with her respirations.  She was Ambu bagged and orally suctioned

with improvement in her respirations.  She was given 1 mg of Ativan as well and

did have some slight improvement in her myoclonic movements; however, was still

having some brief twitches still in her extremities.  The patient was also able

to verbalize noise, but was not responding appropriately.  She was noted to

have more agonal breathing and so was intubated emergently.  The patient was

given rapid sequence intubation with etomidate 20 mg and succinylcholine 60 mg

for intubation.  Post intubation, the patient was placed on a propofol drip for

sedation, although she is on very minimal propofol currently and still only

very minimally responsive to painful stimuli.  The patient does not have any

previous known history of seizure disorder.  She has been receiving morphine IV

as needed for pain medication.  She was on 4 mg of morphine previously every 6

hours and then was started on long-acting morphine yesterday and had not

required any further as needed doses since yesterday morning.  She was also

started on gabapentin given persistent pain throughout the afternoon yesterday,

which was renally dosed.  



OBJECTIVE: Vitals:  Temperature 98.6, pulse 97, respirations 20, blood pressure

112/58, O2 saturation 92 to 94% on room air.  Input 1.7 liters, output 1.9

liters with 1.1 liter output from the chest tube.   

General:  The patient is lying in bed.  She is currently intubated and sedated.

 She has very minimal twitching noted in her upper extremities, as well as

around her eyes.  There is no nystagmus.  

HEENT:  Normocephalic, atraumatic.  Her pupils are small, but not pinpoint and

they are responsive to light bilaterally.  There is scleral icterus noted. 

There is moist mucous membranes.  

Neck:  Supple, trachea is midline. There is no palpable cervical adenopathy.

Cardiovascular:  Regular rate and rhythm.  Normal S1,  S2 with asystolic

murmur.  No rubs or gallops.  

Lungs:  Coarse ventilator breath sounds bilaterally with improvement in the

previously noted right-sided crackles.  There is mildly diminished breath

sounds at the left base with few crackles there.  

Abdomen:  Soft, mildly distended, nontender to palpation.

Extremities:  There is +1 pitting edema in the bilateral lower extremities. 

The patient has very significant inducible clonus activities in the feet with

examination.  



LABORATORY:  WBC 5.4, hemoglobin 9.6, platelets are 93.  Chemistry:  Sodium

139, potassium 4.7, chloride is 109, bicarbonate is 20, BUN 36, creatinine

1.57, glucose 79, magnesium is 2.5, total bilirubin is 2.9, AST and ALT 17 and

16, ammonia 46, albumin is 2.1.  



IMAGING:  Initially chest x-ray this morning showed decreased lung volumes. 

There was small right pleural effusion, as well as small amount of fluid in the

left base with left atelectasis and left-sided chest tube.  The previously

noted right upper lobe atelectasis and alveolar opacity has improved.  There is

some suggestion of some pulmonary vascular congestion.  



Chest x-ray status post intubation shows the endotracheal tube in satisfactory

position. There is a right IJ triple lumen in place still.  There is an OG tube

coursing below the diaphragm.  There is a left pigtail noted.  There is trace

right pleural effusion.  There is left lower lobe atelectasis and pleural

thickening.  There is questionable air collection at the apex on the left. 

There appears to be improved lung volumes with improvement in the previously

noted suspicion of mild pulmonary vascular congestion. 



Head CT showed no acute intracranial abnormalities.  



ASSESSMENT/PLAN: Ms. Taylor is a 61-year-old female with a past medical history

of alcoholic cirrhosis with portal hypertension, esophageal varices, hepatic

encephalopathy, history of recurrent ascites with left-sided hydrothorax

requiring therapeutic thoracentesis frequently who presented with worsening

shortness of breath and dyspnea.  On admission, the patient was found to have a

very large left-sided pleural effusion.  Her fluid  studies were consistent

with spontaneous bacterial peritonitis (SBP)  and she was also noted to be

septic with worsening hypotension likely secondary to her sepsis and with her

chronic liver failure.  She also had acute kidney injury (BAM) on chronic

kidney disease (CKD) and suspected hepatorenal syndrome.  The patient was

improving clinically with her renal function and improving with antibiotics as

well.  She did have a left chest tube pigtail placed for drainage of the

effusion with almost complete evacuation noted.  Overnight, the patient was in

her usual mental status.  She yesterday had been complaining of very

significant pain and discomfort from her chest tube site with a pleuritic

component.  There was concern for atelectasis developing as she was having

decreased inspiration due to the pain.  She was given long-acting morphine as

she was getting frequent short-acting IV morphine.  She was also started on

gabapentin and she did have improvement with her pain.  She was also given

incentive spirometer and her morning chest x-ray had shown improvement in the

previously noted atelectasis. She was in her usual mental status this morning

as per the night team.  However, the patient then had episode of

unresponsiveness.  She was noted to have seizure-like activity as well and

having audible gurgling sounds with her oral secretions and difficulty with her

respirations.  She was given 1 mg of Ativan with some improvement in the

seizure-like activity. The patient, however, continued to be unresponsive and

with agonal breathing.  She was therefore intubated for her airway protection

with her altered mental status. 



1. Possible seizures.  The patient does not have a prior history of seizure

disorder.  She does have a history of hepatic encephalopathy, although her

ammonia level had been improving with the lactulose that she was restarted on

previously.  She did have four bowel movements reported overnight as well.  



The patient does have inducible clonus on examination and she does have

intermittent myoclonic jerking movements in her extremities.  She does not have

evidence of nystagmus.  She is a former alcoholic, but she has been sober for

more than 6 months reportedly. We did check a prolactin level, as well as a

lactic acid level.  Her lactic acid was normal, but her prolactin level was

significantly elevated suggesting that the patient did have possible seizure.  



The patient had a stat head CT ordered, which did not show any acute findings. 

Will also get a stat electroencephalogram (EEG) for evaluation. 



The patient will be started on Keppra, given 1 gm load than by mouth Keppra.

Would also continue with Versed as needed for agitation and seizure activity.



The patient is also on propofol now that she is intubated and will continue

that for sedation with close monitoring of her mental status. 



The patient was on morphine for pain control, as well as gabapentin. The

medications were discontinued earlier and will hold off on opioids for now.  If

she does not have improvement in her mental status, can also consider giving a

dose of Narcan; although with the seizure history, would be very cautious about

withdrawal and she has been on morphine now throughout her admission.  



2. Acute respiratory failure in the setting of possible seizure disorder with

concern for aspiration.  



The patient is now intubated.  She is on mechanical ventilation with PRVC.  Her

ventilator settings were adjusted to 420/18/30 and 5 and repeat arterial blood

gas is pending.  



Will continue with ventilator, but with head of bed elevation and chlorhexidine

mouthwash.



Will continue with daily arterial blood gas and chest x-rays while intubated.  



Will perform a sedation vacation and weaning trial in the a.m. to assess the

patient's  possibility for extubation in the a.m. 



2. Recurrent left-sided hepatic hydrothorax now with spontaneous bacterial

peritonitis and with prior septic shock.  The patient was weaned off of

pressors last yesterday and continued on her home midodrine.  With sedation

however her blood pressures did decrease and so she will be restarted on

Levophed to maintain a MAP above 65 to 70.  



The patient was on vancomycin and meropenem; however, her fluid studies had

come back yesterday for Staphylococcus epidermitis.  She was deescalated then

to just vancomycin.  Will recheck a procalcitonin.  They continue to trend. 

Her chest x-ray did not show any significant aspiration, although she was

having some difficulty with secretions during her acute event and so would have

a low suspicion for restarting antibiotics for aspiration pneumonia.  Her

initial chest x-ray at least did not show significant aspiration,



The patient has a left pigtail in place, which is still draining a significant

amount of fluid.  She does also have a small right pleural effusion now, which

I suspected also a hepatic hydrothorax.  She is still off of her diuretics, but

will consider restarting and she does have increased evidence of fluid overload

with edema on examination. 



3. Acute kidney injury (BAM) on chronic kidney disease (CKD) with hepatorenal

syndrome versus worsening renal function from sepsis and septic shock. 



The patient has been restarted on her Levophed.  Will attempt to maintain a MAP

of 65 to 70 if possible.



The patient has been getting albumin infusions.  Albumin is 3.1, so likely will

be able to discontinue.  Will discuss with renal about discontinuing her

albumin. 



The patient's renal function was improving.  She does have a history of chronic

kidney disease (CKD).  She does have some increased edema and suspect she is

having re-accumulation of her ascites with continuous drainage from her chest

tube secondary to her hepatic hydrothorax.  Will likely give her a small dose

of Lasix IV.   I would like to remove her chest tube when she is not having as

rapid drainage.  



Will continue to monitor electrolytes and replete as needed.  



4. History of cirrhosis with portal hypertension and a prior history of hepatic

encephalopathy.



The patient is on rifaximin and Lactulose.  Her senna and Colace were

discontinued as she was having many bowel movements overnight.  Her ammonia

level has improved.



The patient has mild coagulopathy in the setting of her cirrhosis.  Her

platelets had trended down, but they are slowly improving.



Deep venous thrombosis (DVT) prophylaxis on heparin.



CODE STATUS:  Full code,.



Total critical time spent, not including procedures, approximately 1 hour and

15 minutes.

## 2021-03-25 NOTE — IPNPDOC
Text Note


Date of Service


The patient was seen on 3/25/21.





NOTE


Subjective:


This morning patient was witnessed to have seizure-like activity and was 

unresponsive and had to be emergently intubated. Patient was seen at bedside she

was intubated and mechanically ventilated. She was on propofol drip and levofed.

It is not clear what incited this event there was no reports of acute events 

overnight.





Objective:


Constitutional: Intubated mechanically ventilated. Chronically ill appearing.


ENT: Sclera is icteric


Respiratory: Mechanical breath sounds. Left chest pigtail catheter in place


Cardiovascular: Regular rate and rhythm, systolic ejection murmur.


Gastrointestinal: Abdomen is soft, non distended, non tender, BS present. 


Musculoskeletal: Trace peripheral edema


Neurologic: Noted to have some minor twitches infrequently 


Mental Status: RASS -4





Assessment/plan:


# Acute respiratory failure: Patient is intubated. This might have been 

triggered by seizure, she is receiving gabapentin and morphine yesterday but 

this seems like an unlikely trigger at the doses and frequency given.


# Suspected seizure: Unknown trigger possibly from underlying infection. EEG 

ordered. Started on IV Keppra.


# SBP: Thoracocentesis 3/20/21 confirms SBP. She is on vancomycin and meropenem.

Pleural fluid cultures grew staph epi appears to be pan sensitive. Given her 

clinical deterioration I will keep her on broad-spectrum antibiotics. Her long-

term prognosis is poor.


# left pleural effusion: s/p pigtail catheter placement by IR 3/23/21. Also 

possibly infected. This is likely SBP from hepatic hydrothorax. Dr Min 

pulmonologist is following. Will need 5-10 days of Abx. 


# hypotension: With underlying history of chronic hypotension. Back on pressor 

support once intubated. Usually takes Midodrine. 


# Decompensated alcoholic liver cirrhosis: She has recurrent left hepatic 

hydrothorax and is scheduled for therapeutic thoracocentesis with interventional

radiology outpatient every 2 weeks. Also has a history of portal hypertension, 

esophageal varices, recurrent ascites.


# BAM on CKD3: In the setting of sepsis and hypotension. Appears prerenal. 

Nephrology consult helping manage.


# Chronic pruritus: Likely secondary to her chronic kidney and end-stage liver 

disease. Supportive management.


# history of hepatic encephalopathy: on rifaximin. ammonia down trended.


# Chronic hyponatremia: Asymptomatic. Improved. Monitor. 


# Pancytopenia: Likely secondary to cirrhosis and hypersplenism. If platelets 

less than 50 or any signs of bleeding discontinue heparin and start sequentials


# DVT prophylaxis: Heparin





I tried to call her daughter to give her an update but I was unable to reach her

and left her a voice message.





A Yousef 


Hospitalist





Fernie BROWN, I+O


Fernie BROWN I+O


Laboratory Tests


3/25/21 03:20











Vital Signs








  Date Time  Temp Pulse Resp B/P (MAP) Pulse Ox O2 Delivery O2 Flow Rate FiO2


 


3/25/21 11:23    106/55    


 


3/25/21 04:00 98.6 97 20  92 Room Air  


 


3/23/21 16:00       1.0 














I&O- Last 24 Hours up to 6 AM 


 


 3/25/21





 05:59


 


Intake Total 1650 ml


 


Output Total 1635 ml


 


Balance 15 ml

















TIFFANY SIDHU MD              Mar 25, 2021 12:20

## 2021-03-25 NOTE — IPN
CRITICAL CARE PROGRESS NOTE



DATE:  03/25/2021



SUBJECTIVE: Ms. Taylor is seen this morning on her bedside in the Intensive

Care Unit. She just returned back from radiology after getting CAT scan of her

head. Apparently she was intubated this morning as she developed respiratory

failure and became totally unresponsive. She is still unresponsive at this

point and a CAT scan of the head was done from where she just returned. She has

been on Levophed due to worsening hypotension and blood pressure is just about

100 mmHg. She still has good urine output and her left-sided chest tube is

draining her pleural effusion, which is translocated ascites. 



PHYSICAL EXAMINATION: 

VITALS: Temperature 100 degrees Fahrenheit, heart rate 106 per minute,

respiratory rate 20 per minute. She is currently on the ventilator. Blood

pressure 104/59 mmHg and oxygen saturation 97% on 30% FIO2.

INTAKE AND OUTPUT: Records from yesterday showed total intake 1,730 and output

1,945 with a negative fluid balance of about 215 mL. 

GENERAL: Patient is completely unresponsive at this point, however, there is

minimal spontaneous movement of her toes noted and at times some localized

twitching movement is noticed.

HEENT: Head is atraumatic. Pupils are equal and reactive to light. 

LUNGS: Good bilateral air entry. She has a left-sided chest tube in place.

HEART: Sounds are slightly tachycardic.

ABDOMEN: Distended with ascites and bowel sounds are present.

EXTREMITIES: Without any cyanosis or clubbing. 

NEUROLOGIC: She is completely unresponsive at this point. 



LABORATORY STUDIES: WBC 5.4, hemoglobin 9.6, hematocrit 30, platelets 93,000.

Last evening, random vancomycin level 13.7. Blood gas this morning showed pH

7.28, pCO2 39.7, pO2 102.8 and bicarb 18.2. Chemistries this morning showed

sodium 139, potassium 4.7, chloride 109, CO2 26, BUN 36, creatinine 1.57,

glucose 79 and lactic acid 1.9. Calcium level 7.9 and magnesium 2.5. Total

bilirubin 2.9. Ammonia level 46. Total protein 5.5 and albumin 3.1.



PROBLEMS:  

  1.  Acute kidney injury superimposed on chronic kidney disease: Patient

      remains nonoliguric and without any significant change in her kidney

      function over the last 24 hours. At this point, she still has good urine

      output and we will continue to monitor her kidney function closely.

  2.  Hepatic encephalopathy with ascites and pleural effusion: Left chest tube

      is draining her pleural fluid. Ascites is moderate at least in amount.

      She is now completely unresponsive and CAT scan of head is done this

      morning. Will wait for an official report. Her ammonia level is only 46. 

  3.  Hypotension: She is currently requiring pressors and probably this is a

      result of sepsis. She remains on Midodrine and pressors in addition to

      antibiotics. Her vancomycin level has been appropriate.

  4.  Anemia and thrombocytopenia: Her anemia is stable and thrombocytopenia is

      slightly improved. She has no active bleeding.

  5.  Respiratory failure: Patient has been intubated this morning and she is

      doing well on just 30% FIO2. We will hold off on any plan for aggressive

      diuresis as she is hypotensive requiring pressors and kidney function is

      stable so far with good urine output.



NOTE:  33 minutes of critical care time spent on the bedside, during which no

procedures were performed.

MTDD

## 2021-03-25 NOTE — REP
INDICATION:

chest tube.



COMPARISON:

03/24/2021.



TECHNIQUE:

Portable AP chest with the patient semi upright.



FINDINGS:

The small patchy parenchymal density in the left costophrenic angle previously has

improved.

The small right pleural effusion is unchanged.

The alveolar and interstitial infiltrates have improved.

Right IJ central venous catheter is unchanged.





IMPRESSION:

Changes as described above.





<Electronically signed by Sushant Haley > 03/25/21 075

## 2021-03-26 VITALS — SYSTOLIC BLOOD PRESSURE: 92 MMHG | DIASTOLIC BLOOD PRESSURE: 53 MMHG

## 2021-03-26 VITALS — DIASTOLIC BLOOD PRESSURE: 58 MMHG | SYSTOLIC BLOOD PRESSURE: 105 MMHG

## 2021-03-26 VITALS — SYSTOLIC BLOOD PRESSURE: 92 MMHG | DIASTOLIC BLOOD PRESSURE: 54 MMHG

## 2021-03-26 VITALS — DIASTOLIC BLOOD PRESSURE: 55 MMHG | SYSTOLIC BLOOD PRESSURE: 92 MMHG

## 2021-03-26 VITALS — SYSTOLIC BLOOD PRESSURE: 98 MMHG | DIASTOLIC BLOOD PRESSURE: 56 MMHG

## 2021-03-26 VITALS — DIASTOLIC BLOOD PRESSURE: 54 MMHG | SYSTOLIC BLOOD PRESSURE: 95 MMHG

## 2021-03-26 VITALS — DIASTOLIC BLOOD PRESSURE: 51 MMHG | SYSTOLIC BLOOD PRESSURE: 92 MMHG

## 2021-03-26 VITALS — DIASTOLIC BLOOD PRESSURE: 51 MMHG | SYSTOLIC BLOOD PRESSURE: 87 MMHG

## 2021-03-26 VITALS — DIASTOLIC BLOOD PRESSURE: 57 MMHG | SYSTOLIC BLOOD PRESSURE: 103 MMHG

## 2021-03-26 VITALS — SYSTOLIC BLOOD PRESSURE: 97 MMHG | DIASTOLIC BLOOD PRESSURE: 54 MMHG

## 2021-03-26 VITALS — SYSTOLIC BLOOD PRESSURE: 102 MMHG | DIASTOLIC BLOOD PRESSURE: 58 MMHG

## 2021-03-26 VITALS — DIASTOLIC BLOOD PRESSURE: 55 MMHG | SYSTOLIC BLOOD PRESSURE: 96 MMHG

## 2021-03-26 VITALS — SYSTOLIC BLOOD PRESSURE: 92 MMHG | DIASTOLIC BLOOD PRESSURE: 52 MMHG

## 2021-03-26 VITALS — DIASTOLIC BLOOD PRESSURE: 53 MMHG | SYSTOLIC BLOOD PRESSURE: 92 MMHG

## 2021-03-26 VITALS — SYSTOLIC BLOOD PRESSURE: 104 MMHG | DIASTOLIC BLOOD PRESSURE: 68 MMHG

## 2021-03-26 VITALS — DIASTOLIC BLOOD PRESSURE: 55 MMHG | SYSTOLIC BLOOD PRESSURE: 99 MMHG

## 2021-03-26 VITALS — SYSTOLIC BLOOD PRESSURE: 98 MMHG | DIASTOLIC BLOOD PRESSURE: 57 MMHG

## 2021-03-26 VITALS — DIASTOLIC BLOOD PRESSURE: 53 MMHG | SYSTOLIC BLOOD PRESSURE: 85 MMHG

## 2021-03-26 VITALS — SYSTOLIC BLOOD PRESSURE: 101 MMHG | DIASTOLIC BLOOD PRESSURE: 57 MMHG

## 2021-03-26 VITALS — SYSTOLIC BLOOD PRESSURE: 92 MMHG | DIASTOLIC BLOOD PRESSURE: 51 MMHG

## 2021-03-26 VITALS — DIASTOLIC BLOOD PRESSURE: 59 MMHG | SYSTOLIC BLOOD PRESSURE: 103 MMHG

## 2021-03-26 VITALS — DIASTOLIC BLOOD PRESSURE: 45 MMHG | SYSTOLIC BLOOD PRESSURE: 79 MMHG

## 2021-03-26 VITALS — DIASTOLIC BLOOD PRESSURE: 55 MMHG | SYSTOLIC BLOOD PRESSURE: 95 MMHG

## 2021-03-26 VITALS — SYSTOLIC BLOOD PRESSURE: 100 MMHG | DIASTOLIC BLOOD PRESSURE: 59 MMHG

## 2021-03-26 VITALS — SYSTOLIC BLOOD PRESSURE: 96 MMHG | DIASTOLIC BLOOD PRESSURE: 55 MMHG

## 2021-03-26 VITALS — DIASTOLIC BLOOD PRESSURE: 56 MMHG | SYSTOLIC BLOOD PRESSURE: 98 MMHG

## 2021-03-26 VITALS — DIASTOLIC BLOOD PRESSURE: 55 MMHG | SYSTOLIC BLOOD PRESSURE: 90 MMHG

## 2021-03-26 VITALS — SYSTOLIC BLOOD PRESSURE: 98 MMHG | DIASTOLIC BLOOD PRESSURE: 55 MMHG

## 2021-03-26 VITALS — DIASTOLIC BLOOD PRESSURE: 52 MMHG | SYSTOLIC BLOOD PRESSURE: 89 MMHG

## 2021-03-26 VITALS — DIASTOLIC BLOOD PRESSURE: 58 MMHG | SYSTOLIC BLOOD PRESSURE: 97 MMHG

## 2021-03-26 VITALS — DIASTOLIC BLOOD PRESSURE: 49 MMHG | SYSTOLIC BLOOD PRESSURE: 105 MMHG

## 2021-03-26 VITALS — DIASTOLIC BLOOD PRESSURE: 55 MMHG | SYSTOLIC BLOOD PRESSURE: 94 MMHG

## 2021-03-26 VITALS — SYSTOLIC BLOOD PRESSURE: 85 MMHG | DIASTOLIC BLOOD PRESSURE: 52 MMHG

## 2021-03-26 VITALS — SYSTOLIC BLOOD PRESSURE: 91 MMHG | DIASTOLIC BLOOD PRESSURE: 52 MMHG

## 2021-03-26 VITALS — SYSTOLIC BLOOD PRESSURE: 104 MMHG | DIASTOLIC BLOOD PRESSURE: 57 MMHG

## 2021-03-26 VITALS — SYSTOLIC BLOOD PRESSURE: 94 MMHG | DIASTOLIC BLOOD PRESSURE: 53 MMHG

## 2021-03-26 VITALS — DIASTOLIC BLOOD PRESSURE: 55 MMHG | SYSTOLIC BLOOD PRESSURE: 93 MMHG

## 2021-03-26 VITALS — DIASTOLIC BLOOD PRESSURE: 55 MMHG | SYSTOLIC BLOOD PRESSURE: 102 MMHG

## 2021-03-26 VITALS — DIASTOLIC BLOOD PRESSURE: 56 MMHG | SYSTOLIC BLOOD PRESSURE: 96 MMHG

## 2021-03-26 VITALS — DIASTOLIC BLOOD PRESSURE: 50 MMHG | SYSTOLIC BLOOD PRESSURE: 93 MMHG

## 2021-03-26 VITALS — DIASTOLIC BLOOD PRESSURE: 58 MMHG | SYSTOLIC BLOOD PRESSURE: 94 MMHG

## 2021-03-26 VITALS — DIASTOLIC BLOOD PRESSURE: 56 MMHG | SYSTOLIC BLOOD PRESSURE: 91 MMHG

## 2021-03-26 VITALS — DIASTOLIC BLOOD PRESSURE: 48 MMHG | SYSTOLIC BLOOD PRESSURE: 87 MMHG

## 2021-03-26 VITALS — SYSTOLIC BLOOD PRESSURE: 102 MMHG | DIASTOLIC BLOOD PRESSURE: 55 MMHG

## 2021-03-26 VITALS — DIASTOLIC BLOOD PRESSURE: 57 MMHG | SYSTOLIC BLOOD PRESSURE: 104 MMHG

## 2021-03-26 VITALS — SYSTOLIC BLOOD PRESSURE: 100 MMHG | DIASTOLIC BLOOD PRESSURE: 51 MMHG

## 2021-03-26 VITALS — SYSTOLIC BLOOD PRESSURE: 101 MMHG | DIASTOLIC BLOOD PRESSURE: 59 MMHG

## 2021-03-26 VITALS — DIASTOLIC BLOOD PRESSURE: 60 MMHG | SYSTOLIC BLOOD PRESSURE: 100 MMHG

## 2021-03-26 VITALS — SYSTOLIC BLOOD PRESSURE: 93 MMHG | DIASTOLIC BLOOD PRESSURE: 50 MMHG

## 2021-03-26 VITALS — SYSTOLIC BLOOD PRESSURE: 101 MMHG | DIASTOLIC BLOOD PRESSURE: 50 MMHG

## 2021-03-26 VITALS — DIASTOLIC BLOOD PRESSURE: 54 MMHG | SYSTOLIC BLOOD PRESSURE: 100 MMHG

## 2021-03-26 VITALS — SYSTOLIC BLOOD PRESSURE: 103 MMHG | DIASTOLIC BLOOD PRESSURE: 58 MMHG

## 2021-03-26 VITALS — SYSTOLIC BLOOD PRESSURE: 106 MMHG | DIASTOLIC BLOOD PRESSURE: 58 MMHG

## 2021-03-26 VITALS — DIASTOLIC BLOOD PRESSURE: 44 MMHG | SYSTOLIC BLOOD PRESSURE: 93 MMHG

## 2021-03-26 VITALS — SYSTOLIC BLOOD PRESSURE: 104 MMHG | DIASTOLIC BLOOD PRESSURE: 56 MMHG

## 2021-03-26 VITALS — DIASTOLIC BLOOD PRESSURE: 53 MMHG | SYSTOLIC BLOOD PRESSURE: 99 MMHG

## 2021-03-26 VITALS — DIASTOLIC BLOOD PRESSURE: 60 MMHG | SYSTOLIC BLOOD PRESSURE: 103 MMHG

## 2021-03-26 VITALS — DIASTOLIC BLOOD PRESSURE: 54 MMHG | SYSTOLIC BLOOD PRESSURE: 102 MMHG

## 2021-03-26 VITALS — DIASTOLIC BLOOD PRESSURE: 52 MMHG | SYSTOLIC BLOOD PRESSURE: 96 MMHG

## 2021-03-26 VITALS — SYSTOLIC BLOOD PRESSURE: 97 MMHG | DIASTOLIC BLOOD PRESSURE: 52 MMHG

## 2021-03-26 VITALS — DIASTOLIC BLOOD PRESSURE: 61 MMHG | SYSTOLIC BLOOD PRESSURE: 101 MMHG

## 2021-03-26 VITALS — SYSTOLIC BLOOD PRESSURE: 108 MMHG | DIASTOLIC BLOOD PRESSURE: 56 MMHG

## 2021-03-26 VITALS — DIASTOLIC BLOOD PRESSURE: 54 MMHG | SYSTOLIC BLOOD PRESSURE: 92 MMHG

## 2021-03-26 VITALS — SYSTOLIC BLOOD PRESSURE: 93 MMHG | DIASTOLIC BLOOD PRESSURE: 52 MMHG

## 2021-03-26 VITALS — DIASTOLIC BLOOD PRESSURE: 49 MMHG | SYSTOLIC BLOOD PRESSURE: 86 MMHG

## 2021-03-26 VITALS — DIASTOLIC BLOOD PRESSURE: 55 MMHG | SYSTOLIC BLOOD PRESSURE: 114 MMHG

## 2021-03-26 VITALS — DIASTOLIC BLOOD PRESSURE: 52 MMHG | SYSTOLIC BLOOD PRESSURE: 94 MMHG

## 2021-03-26 VITALS — SYSTOLIC BLOOD PRESSURE: 97 MMHG | DIASTOLIC BLOOD PRESSURE: 56 MMHG

## 2021-03-26 VITALS — DIASTOLIC BLOOD PRESSURE: 53 MMHG | SYSTOLIC BLOOD PRESSURE: 93 MMHG

## 2021-03-26 VITALS — SYSTOLIC BLOOD PRESSURE: 100 MMHG | DIASTOLIC BLOOD PRESSURE: 55 MMHG

## 2021-03-26 VITALS — SYSTOLIC BLOOD PRESSURE: 101 MMHG | DIASTOLIC BLOOD PRESSURE: 51 MMHG

## 2021-03-26 VITALS — DIASTOLIC BLOOD PRESSURE: 54 MMHG | SYSTOLIC BLOOD PRESSURE: 99 MMHG

## 2021-03-26 VITALS — DIASTOLIC BLOOD PRESSURE: 54 MMHG | SYSTOLIC BLOOD PRESSURE: 91 MMHG

## 2021-03-26 VITALS — DIASTOLIC BLOOD PRESSURE: 52 MMHG | SYSTOLIC BLOOD PRESSURE: 97 MMHG

## 2021-03-26 VITALS — DIASTOLIC BLOOD PRESSURE: 50 MMHG | SYSTOLIC BLOOD PRESSURE: 95 MMHG

## 2021-03-26 VITALS — SYSTOLIC BLOOD PRESSURE: 82 MMHG | DIASTOLIC BLOOD PRESSURE: 42 MMHG

## 2021-03-26 LAB
ALBUMIN SERPL BCG-MCNC: 3.2 GM/DL (ref 3.2–5.2)
ALT SERPL W P-5'-P-CCNC: 15 U/L (ref 12–78)
BASE EXCESS BLDA CALC-SCNC: -2.6 MMOL/L (ref -2–2)
BASOPHILS # BLD AUTO: 0 10^3/UL (ref 0–0.2)
BASOPHILS NFR BLD AUTO: 0.6 % (ref 0–1)
BILIRUB SERPL-MCNC: 3.4 MG/DL (ref 0.2–1)
BUN SERPL-MCNC: 40 MG/DL (ref 7–18)
CALCIUM SERPL-MCNC: 8 MG/DL (ref 8.8–10.2)
CHLORIDE SERPL-SCNC: 109 MEQ/L (ref 98–107)
CHOLEST SERPL-MCNC: 103 MG/DL (ref ?–200)
CK SERPL-CCNC: 115 U/L (ref 26–192)
CO2 BLDA CALC-SCNC: 21 MEQ/L (ref 23–31)
CO2 SERPL-SCNC: 22 MEQ/L (ref 21–32)
CREAT SERPL-MCNC: 1.8 MG/DL (ref 0.55–1.3)
EOSINOPHIL # BLD AUTO: 0.4 10^3/UL (ref 0–0.5)
EOSINOPHIL NFR BLD AUTO: 5.8 % (ref 0–3)
GFR SERPL CREATININE-BSD FRML MDRD: 30.5 ML/MIN/{1.73_M2} (ref 45–?)
GLUCOSE SERPL-MCNC: 82 MG/DL (ref 70–100)
HCO3 BLDA-SCNC: 20.1 MEQ/L (ref 22–26)
HCO3 STD BLDA-SCNC: 22.3 MEQ/L (ref 22–26)
HCT VFR BLD AUTO: 30.7 % (ref 36–47)
HGB BLD-MCNC: 10.2 G/DL (ref 12–15.5)
LDH SERPL L TO P-CCNC: 171 U/L (ref 84–246)
LYMPHOCYTES # BLD AUTO: 1.2 10^3/UL (ref 1.5–5)
LYMPHOCYTES NFR BLD AUTO: 19.2 % (ref 24–44)
MAGNESIUM SERPL-MCNC: 2.5 MG/DL (ref 1.8–2.4)
MCH RBC QN AUTO: 30.4 PG (ref 27–33)
MCHC RBC AUTO-ENTMCNC: 33.2 G/DL (ref 32–36.5)
MCV RBC AUTO: 91.4 FL (ref 80–96)
MONOCYTES # BLD AUTO: 0.9 10^3/UL (ref 0–0.8)
MONOCYTES NFR BLD AUTO: 13.5 % (ref 2–8)
NEUTROPHILS # BLD AUTO: 3.9 10^3/UL (ref 1.5–8.5)
NEUTROPHILS NFR BLD AUTO: 60.7 % (ref 36–66)
PCO2 BLDA: 28.7 MMHG (ref 35–45)
PH BLDA: 7.46 UNITS (ref 7.35–7.45)
PHOSPHATE SERPL-MCNC: 3.4 MG/DL (ref 2.5–4.9)
PLATELET # BLD AUTO: 100 10^3/UL (ref 150–450)
PO2 BLDA: 75.7 MMHG (ref 75–100)
POTASSIUM SERPL-SCNC: 4.1 MEQ/L (ref 3.5–5.1)
PROT SERPL-MCNC: 5.4 GM/DL (ref 6.4–8.2)
RBC # BLD AUTO: 3.36 10^6/UL (ref 4–5.4)
SAO2 % BLDA: 96 % (ref 95–99)
SODIUM SERPL-SCNC: 140 MEQ/L (ref 136–145)
TRIGL SERPL-MCNC: 96 MG/DL (ref ?–150)
WBC # BLD AUTO: 6.4 10^3/UL (ref 4–10)

## 2021-03-26 RX ADMIN — LATANOPROST SCH DROP: 50 SOLUTION OPHTHALMIC at 21:08

## 2021-03-26 RX ADMIN — CHLORHEXIDINE GLUCONATE 0.12% ORAL RINSE SCH ML: 1.2 LIQUID ORAL at 08:12

## 2021-03-26 RX ADMIN — LACTULOSE SCH ML: 10 SOLUTION ORAL at 08:12

## 2021-03-26 RX ADMIN — MULTIPLE VITAMINS W/ MINERALS TAB SCH TAB: TAB at 08:13

## 2021-03-26 RX ADMIN — SODIUM CHLORIDE, PRESERVATIVE FREE SCH ML: 5 INJECTION INTRAVENOUS at 21:09

## 2021-03-26 RX ADMIN — DEXTROSE MONOHYDRATE SCH MLS/HR: 50 INJECTION, SOLUTION INTRAVENOUS at 15:29

## 2021-03-26 RX ADMIN — Medication SCH UNITS: at 08:13

## 2021-03-26 RX ADMIN — DEXTROSE MONOHYDRATE SCH MLS/HR: 50 INJECTION, SOLUTION INTRAVENOUS at 17:36

## 2021-03-26 RX ADMIN — SODIUM CHLORIDE SCH UNITS: 4.5 INJECTION, SOLUTION INTRAVENOUS at 21:07

## 2021-03-26 RX ADMIN — MIDODRINE HYDROCHLORIDE SCH MG: 5 TABLET ORAL at 08:11

## 2021-03-26 RX ADMIN — LEVETIRACETAM SCH MG: 500 SOLUTION ORAL at 08:12

## 2021-03-26 RX ADMIN — SODIUM CHLORIDE SCH UNITS: 4.5 INJECTION, SOLUTION INTRAVENOUS at 08:13

## 2021-03-26 RX ADMIN — BETAXOLOL HYDROCHLORIDE SCH DROP: 2.8 SUSPENSION/ DROPS OPHTHALMIC at 21:08

## 2021-03-26 RX ADMIN — MIDAZOLAM PRN MG: 1 INJECTION INTRAMUSCULAR; INTRAVENOUS at 01:24

## 2021-03-26 RX ADMIN — LEVOTHYROXINE SODIUM SCH MCG: 112 TABLET ORAL at 06:00

## 2021-03-26 RX ADMIN — SODIUM CHLORIDE, PRESERVATIVE FREE SCH ML: 5 INJECTION INTRAVENOUS at 06:00

## 2021-03-26 RX ADMIN — MIDODRINE HYDROCHLORIDE SCH MG: 5 TABLET ORAL at 16:26

## 2021-03-26 RX ADMIN — MIDAZOLAM PRN MG: 1 INJECTION INTRAMUSCULAR; INTRAVENOUS at 00:45

## 2021-03-26 RX ADMIN — MIDAZOLAM PRN MG: 1 INJECTION INTRAMUSCULAR; INTRAVENOUS at 04:38

## 2021-03-26 RX ADMIN — BRIMONIDINE TARTRATE SCH DROP: 1.5 SOLUTION OPHTHALMIC at 21:07

## 2021-03-26 RX ADMIN — FOLIC ACID SCH MG: 1 TABLET ORAL at 08:12

## 2021-03-26 RX ADMIN — BRIMONIDINE TARTRATE SCH DROP: 1.5 SOLUTION OPHTHALMIC at 16:26

## 2021-03-26 RX ADMIN — SODIUM CHLORIDE, PRESERVATIVE FREE SCH ML: 5 INJECTION INTRAVENOUS at 14:00

## 2021-03-26 RX ADMIN — LEVETIRACETAM SCH MG: 500 SOLUTION ORAL at 21:06

## 2021-03-26 RX ADMIN — LACTULOSE SCH ML: 10 SOLUTION ORAL at 21:06

## 2021-03-26 RX ADMIN — BETAXOLOL HYDROCHLORIDE SCH DROP: 2.8 SUSPENSION/ DROPS OPHTHALMIC at 08:14

## 2021-03-26 RX ADMIN — BRIMONIDINE TARTRATE SCH DROP: 1.5 SOLUTION OPHTHALMIC at 08:14

## 2021-03-26 RX ADMIN — MIDODRINE HYDROCHLORIDE SCH MG: 5 TABLET ORAL at 12:00

## 2021-03-26 NOTE — IPNPDOC
Text Note


Date of Service


The patient was seen on 3/26/21.





NOTE


Subjective:


Patient was seen and examined this morning she is still intubated on pressor 

support. She is able to briefly open her eyes to command and follow basic 

commands. No acute overnight events were reported to me.





Objective:


Constitutional: Intubated mechanically ventilated. Chronically ill appearing. 

Able to follow basic commands


ENT: Sclera is icteric


Respiratory: Mechanical breath sounds. Left chest pigtail catheter in place


Cardiovascular: Regular rate and rhythm, systolic ejection murmur.


Gastrointestinal: Abdomen is soft, non distended, non tender, BS present. 


Musculoskeletal: Trace peripheral edema


Neurologic: Still having occasional twitches


Mental Status: RASS -2/3





Assessment/plan:


# Acute respiratory failure: Patient is intubated. This might have been 

triggered by seizure. 


# Suspected seizure: Unknown trigger possibly from underlying infection. Fu EEG.

Keppra.


# SBP: Thoracocentesis 3/20/21 confirms SBP. She is on vancomycin. Pleural fluid

cultures grew staph epi appears to be pan sensitive. Her long-term prognosis is 

poor.


# left pleural effusion: s/p pigtail catheter placement by IR 3/23/21. This is 

likely from SBP, hepatic hydrothorax. pulmonology is following. Will need 5-10 

days of Abx. 


# hypotension: With underlying history of chronic hypotension. Back on pressor 

support. Usually takes Midodrine. 


# Decompensated alcoholic liver cirrhosis: She has recurrent left hepatic 

hydrothorax and is scheduled for therapeutic thoracocentesis with interventional

radiology outpatient every 2 weeks. Also has a history of portal hypertension, 

esophageal varices, recurrent ascites.


# BAM on CKD3: In the setting of sepsis and hypotension. Appears prerenal. 

Nephrology consult helping manage.


# Chronic pruritus: Likely secondary to her chronic kidney and end-stage liver 

disease. Supportive management.


# history of hepatic encephalopathy: on rifaximin. ammonia down trended.


# Chronic hyponatremia: Asymptomatic. Improved. Monitor. 


# Pancytopenia: Likely secondary to cirrhosis and hypersplenism. If platelets 

less than 50 or any signs of bleeding discontinue heparin and start sequentials


# DVT prophylaxis: Heparin





A Yousef 


Hospitalist





VS,Fishbone, I+O


VS, Fishbone, I+O


Laboratory Tests


3/26/21 04:15











Vital Signs








  Date Time  Temp Pulse Resp B/P (MAP) Pulse Ox O2 Delivery O2 Flow Rate FiO2


 


3/26/21 08:30  92  108/56 (73) 97 Ventilator  21


 


3/26/21 08:00   19     


 


3/26/21 08:00 97.7       


 


3/23/21 16:00       1.0 














I&O- Last 24 Hours up to 6 AM 


 


 3/26/21





 05:59


 


Intake Total 1580.6 ml


 


Output Total 2845 ml


 


Balance -1264.4 ml

















TIFFANY SIDHU MD              Mar 26, 2021 09:21

## 2021-03-26 NOTE — CCN
CRITICAL CARE NOTE



DATE:  03/26/2021



SUBJECTIVE: The patient is seen in the intensive care unit, intubated,

mechanically ventilated, and sedate with propofol to a Dave 2 level on

pressor therapy. This is hospital day #6; endotracheal tube day  #2. Over the

last 12 hours, her chest tube output had been increased, prompting an

additional dose of albumin. 



OBJECTIVE:

VITAL SIGNS: At bedside, her temperature is 97, pulse rate 85, respirations 25,

blood pressure 97/54 on Levophed and midodrine. 

INTAKE AND OUTPUT: For the past 24 hours, 1638 in and 2805 out of which 1150

was from the chest tube. Since midnight, 137 in and 430 of which 140 was from

the chest tube.

GENERAL APPEARANCE: At bedside, she is ill-appearing and cachectic. 

HEENT: Her oral mucosa is pink. Teeth are in poor repair. There is an

endotracheal tube at 23 cm #8 size endotracheal. Orogastric tube is in good

position.

NECK: Supple. No meningismus. 

HEART: Sounds are regular without appreciable murmur.

LUNGS: Breath sounds are diminished with coarse large airway rhonchi. There is

no accessory muscle use on the current ventilator settings.

ABDOMEN: Soft with ascites.

EXTREMITIES: Show some edema. Muscle tone is diminished. Pulses are palpable x4.



DIAGNOSTIC STUDIES:

Her white cell count is up slightly at 6.4; differential white cell count shows

60% neutrophils. Hemoglobin is also up at 10.2, hematocrit 30.7, platelet count

is up to 100,000.



Electrolytes are sodium 140, potassium 4.1, chloride 109, CO2 of 22, BUN 40,

creatinine is up to 1.8, glucose is 82, calcium is 8, albumin of 3.2,

phosphorus 3.4, magnesium 2.5, bilirubin 3.4, AST is 23, ALT 15, alkaline

phosphatase is 68. LDH is 171. The protein is 5.4. 



Arterial blood gases were performed with a pH of 7.46, pCO2 of 28, pO2 of 75. 



The vancomycin level is 24.4. 



DIAGNOSTIC IMAGING STUDIES: Reviewed.

Head CT shows no significant intracranial pathology. 



I have reviewed her chest x-ray. The interstitial markings are coarse. There is

a pigtail catheter in the left costophrenic angle. The endotracheal tube is in

good position in the trachea. The NG tube is in the stomach. No new infiltrates

are appreciated.



MEDICATIONS: On review, she is receiving vancomycin 1 gram q. 24 hours. This

appears to be day #4 of vancomycin. Sedation with propofol drip and

intermittent Versed of which she has received little. She is receiving Levophed

with a targeted mean arterial pressure of 65 to 70. Midodrine 10 mg, lactulose

15 mL b.i.d., rifaximin 400 b.i.d., and thyroid replacement. 



ASSESSMENT AND PLAN:

1.  The primary problem requiring critical attention is acute respiratory

    failure. Her arterial blood gases are acceptable. We will proceed with

    weaning and check vent mechanics. If acceptable, we will proceed with

    extubation.  

2.  Seizure. The etiology is unclear. She has had no further seizure activity

    and EEG is planned for today. Head CT was negative. Possibly related to

    medications. The medication narcotics and gabapentin have been discontinued,

    and she did get a dose of Keppra. 

3.  Cirrhosis with portal hypertension, ascites, and hepatic hydrothorax. Chest

    tube is draining some less today. When drainage exceeds a liter, we will add

    albumin.

4.  Hypotension. This is acute on chronic condition. Levophed is at low dose at

    this point and she is receiving midodrine. We will confer with nephrology

    regarding an ideal mean arterial pressure target.

5.  Infectious disease. Her white cell count is not particularly elevated. She

    has had no fever. Vancomycin will continue for the time being.

6.  Acute kidney injury. Creatinine is up some. Urine output is adequate.

    Nephrology is following closely. 

7.  Liver failure. The patient will eventually need transplant. She is

    receiving lactulose to prevent hepatic encephalopathy.

8.  Coagulopathy. Her platelet count is up some and there is no evidence of

    active bleeding. 

9.  Deep vein thrombosis (DVT) is being addressed with heparin 5000 q. 12 hours.

10.  Ulcer prophylaxis will be initiated if she is unable to wean from

     mechanical ventilation today. 

11.  Nutritional support. I am in hopes that she will wean and be able to be

     extubated and begin oral intake. If not, this will need to be addressed as

     well within the next 24 hours. 



CRITICAL CARE TIME: 96 minutes spent in the provision of bedside critical care

and coordination among the various services.



I have reviewed the case and the treatment plans with the critical care team.

There were no procedures performed today.

## 2021-03-26 NOTE — REP
INDICATION:

intubated.



COMPARISON:

Portable chest 03/25/2021.



TECHNIQUE:

Portable AP chest with the patient semi upright.



FINDINGS:

There is mild bilateral interstitial coarsening, unchanged.

The left costophrenic angle is obscured.  This could be artifact from portable

positioning or could represent and left lower lobe infiltrate or effusion.

There is a pigtail drainage catheter inferiorly on the left, unchanged.

The endotracheal tube tip remains in satisfactory position above the edvin at the

level of the aortic arch.

The nasogastric tube terminates satisfactorily beneath the left hemidiaphragm.  The

precise location of the distal tip is excluded at the inferior film margin.





IMPRESSION:

Bilateral interstitial coarsening.

Findings compatible with lower lobe infiltrate/effusion.

Lines and tubes as discussed.





<Electronically signed by Sushant Haley > 03/26/21 0756

## 2021-03-27 VITALS — SYSTOLIC BLOOD PRESSURE: 140 MMHG | DIASTOLIC BLOOD PRESSURE: 72 MMHG

## 2021-03-27 VITALS — DIASTOLIC BLOOD PRESSURE: 51 MMHG | SYSTOLIC BLOOD PRESSURE: 93 MMHG

## 2021-03-27 VITALS — DIASTOLIC BLOOD PRESSURE: 55 MMHG | SYSTOLIC BLOOD PRESSURE: 81 MMHG

## 2021-03-27 VITALS — DIASTOLIC BLOOD PRESSURE: 63 MMHG | SYSTOLIC BLOOD PRESSURE: 106 MMHG

## 2021-03-27 VITALS — DIASTOLIC BLOOD PRESSURE: 62 MMHG | SYSTOLIC BLOOD PRESSURE: 104 MMHG

## 2021-03-27 VITALS — DIASTOLIC BLOOD PRESSURE: 55 MMHG | SYSTOLIC BLOOD PRESSURE: 110 MMHG

## 2021-03-27 VITALS — SYSTOLIC BLOOD PRESSURE: 108 MMHG | DIASTOLIC BLOOD PRESSURE: 68 MMHG

## 2021-03-27 VITALS — DIASTOLIC BLOOD PRESSURE: 64 MMHG | SYSTOLIC BLOOD PRESSURE: 97 MMHG

## 2021-03-27 VITALS — DIASTOLIC BLOOD PRESSURE: 50 MMHG | SYSTOLIC BLOOD PRESSURE: 85 MMHG

## 2021-03-27 VITALS — DIASTOLIC BLOOD PRESSURE: 51 MMHG | SYSTOLIC BLOOD PRESSURE: 82 MMHG

## 2021-03-27 VITALS — SYSTOLIC BLOOD PRESSURE: 94 MMHG | DIASTOLIC BLOOD PRESSURE: 55 MMHG

## 2021-03-27 VITALS — DIASTOLIC BLOOD PRESSURE: 56 MMHG | SYSTOLIC BLOOD PRESSURE: 95 MMHG

## 2021-03-27 VITALS — DIASTOLIC BLOOD PRESSURE: 62 MMHG | SYSTOLIC BLOOD PRESSURE: 102 MMHG

## 2021-03-27 VITALS — SYSTOLIC BLOOD PRESSURE: 94 MMHG | DIASTOLIC BLOOD PRESSURE: 57 MMHG

## 2021-03-27 VITALS — DIASTOLIC BLOOD PRESSURE: 53 MMHG | SYSTOLIC BLOOD PRESSURE: 87 MMHG

## 2021-03-27 VITALS — DIASTOLIC BLOOD PRESSURE: 57 MMHG | SYSTOLIC BLOOD PRESSURE: 88 MMHG

## 2021-03-27 VITALS — SYSTOLIC BLOOD PRESSURE: 71 MMHG | DIASTOLIC BLOOD PRESSURE: 51 MMHG

## 2021-03-27 VITALS — SYSTOLIC BLOOD PRESSURE: 93 MMHG | DIASTOLIC BLOOD PRESSURE: 55 MMHG

## 2021-03-27 VITALS — DIASTOLIC BLOOD PRESSURE: 65 MMHG | SYSTOLIC BLOOD PRESSURE: 100 MMHG

## 2021-03-27 VITALS — SYSTOLIC BLOOD PRESSURE: 110 MMHG | DIASTOLIC BLOOD PRESSURE: 63 MMHG

## 2021-03-27 VITALS — SYSTOLIC BLOOD PRESSURE: 92 MMHG | DIASTOLIC BLOOD PRESSURE: 51 MMHG

## 2021-03-27 VITALS — DIASTOLIC BLOOD PRESSURE: 50 MMHG | SYSTOLIC BLOOD PRESSURE: 79 MMHG

## 2021-03-27 VITALS — DIASTOLIC BLOOD PRESSURE: 56 MMHG | SYSTOLIC BLOOD PRESSURE: 103 MMHG

## 2021-03-27 VITALS — SYSTOLIC BLOOD PRESSURE: 115 MMHG | DIASTOLIC BLOOD PRESSURE: 54 MMHG

## 2021-03-27 VITALS — SYSTOLIC BLOOD PRESSURE: 88 MMHG | DIASTOLIC BLOOD PRESSURE: 53 MMHG

## 2021-03-27 VITALS — SYSTOLIC BLOOD PRESSURE: 89 MMHG | DIASTOLIC BLOOD PRESSURE: 55 MMHG

## 2021-03-27 VITALS — DIASTOLIC BLOOD PRESSURE: 55 MMHG | SYSTOLIC BLOOD PRESSURE: 118 MMHG

## 2021-03-27 VITALS — SYSTOLIC BLOOD PRESSURE: 98 MMHG | DIASTOLIC BLOOD PRESSURE: 52 MMHG

## 2021-03-27 VITALS — DIASTOLIC BLOOD PRESSURE: 59 MMHG | SYSTOLIC BLOOD PRESSURE: 97 MMHG

## 2021-03-27 VITALS — DIASTOLIC BLOOD PRESSURE: 52 MMHG | SYSTOLIC BLOOD PRESSURE: 84 MMHG

## 2021-03-27 VITALS — DIASTOLIC BLOOD PRESSURE: 55 MMHG | SYSTOLIC BLOOD PRESSURE: 88 MMHG

## 2021-03-27 VITALS — DIASTOLIC BLOOD PRESSURE: 59 MMHG | SYSTOLIC BLOOD PRESSURE: 96 MMHG

## 2021-03-27 VITALS — DIASTOLIC BLOOD PRESSURE: 53 MMHG | SYSTOLIC BLOOD PRESSURE: 91 MMHG

## 2021-03-27 VITALS — DIASTOLIC BLOOD PRESSURE: 58 MMHG | SYSTOLIC BLOOD PRESSURE: 96 MMHG

## 2021-03-27 VITALS — SYSTOLIC BLOOD PRESSURE: 106 MMHG | DIASTOLIC BLOOD PRESSURE: 55 MMHG

## 2021-03-27 VITALS — DIASTOLIC BLOOD PRESSURE: 53 MMHG | SYSTOLIC BLOOD PRESSURE: 93 MMHG

## 2021-03-27 VITALS — SYSTOLIC BLOOD PRESSURE: 88 MMHG | DIASTOLIC BLOOD PRESSURE: 50 MMHG

## 2021-03-27 VITALS — SYSTOLIC BLOOD PRESSURE: 112 MMHG | DIASTOLIC BLOOD PRESSURE: 59 MMHG

## 2021-03-27 VITALS — SYSTOLIC BLOOD PRESSURE: 101 MMHG | DIASTOLIC BLOOD PRESSURE: 61 MMHG

## 2021-03-27 VITALS — DIASTOLIC BLOOD PRESSURE: 56 MMHG | SYSTOLIC BLOOD PRESSURE: 107 MMHG

## 2021-03-27 VITALS — SYSTOLIC BLOOD PRESSURE: 115 MMHG | DIASTOLIC BLOOD PRESSURE: 63 MMHG

## 2021-03-27 VITALS — DIASTOLIC BLOOD PRESSURE: 57 MMHG | SYSTOLIC BLOOD PRESSURE: 107 MMHG

## 2021-03-27 VITALS — SYSTOLIC BLOOD PRESSURE: 102 MMHG | DIASTOLIC BLOOD PRESSURE: 56 MMHG

## 2021-03-27 VITALS — SYSTOLIC BLOOD PRESSURE: 101 MMHG | DIASTOLIC BLOOD PRESSURE: 55 MMHG

## 2021-03-27 VITALS — SYSTOLIC BLOOD PRESSURE: 101 MMHG | DIASTOLIC BLOOD PRESSURE: 53 MMHG

## 2021-03-27 VITALS — SYSTOLIC BLOOD PRESSURE: 87 MMHG | DIASTOLIC BLOOD PRESSURE: 53 MMHG

## 2021-03-27 VITALS — SYSTOLIC BLOOD PRESSURE: 82 MMHG | DIASTOLIC BLOOD PRESSURE: 48 MMHG

## 2021-03-27 VITALS — DIASTOLIC BLOOD PRESSURE: 61 MMHG | SYSTOLIC BLOOD PRESSURE: 102 MMHG

## 2021-03-27 VITALS — DIASTOLIC BLOOD PRESSURE: 55 MMHG | SYSTOLIC BLOOD PRESSURE: 91 MMHG

## 2021-03-27 VITALS — DIASTOLIC BLOOD PRESSURE: 59 MMHG | SYSTOLIC BLOOD PRESSURE: 105 MMHG

## 2021-03-27 VITALS — SYSTOLIC BLOOD PRESSURE: 102 MMHG | DIASTOLIC BLOOD PRESSURE: 52 MMHG

## 2021-03-27 VITALS — DIASTOLIC BLOOD PRESSURE: 61 MMHG | SYSTOLIC BLOOD PRESSURE: 104 MMHG

## 2021-03-27 VITALS — DIASTOLIC BLOOD PRESSURE: 45 MMHG | SYSTOLIC BLOOD PRESSURE: 102 MMHG

## 2021-03-27 VITALS — DIASTOLIC BLOOD PRESSURE: 50 MMHG | SYSTOLIC BLOOD PRESSURE: 90 MMHG

## 2021-03-27 VITALS — SYSTOLIC BLOOD PRESSURE: 97 MMHG | DIASTOLIC BLOOD PRESSURE: 53 MMHG

## 2021-03-27 VITALS — SYSTOLIC BLOOD PRESSURE: 95 MMHG | DIASTOLIC BLOOD PRESSURE: 53 MMHG

## 2021-03-27 VITALS — SYSTOLIC BLOOD PRESSURE: 88 MMHG | DIASTOLIC BLOOD PRESSURE: 55 MMHG

## 2021-03-27 VITALS — SYSTOLIC BLOOD PRESSURE: 105 MMHG | DIASTOLIC BLOOD PRESSURE: 61 MMHG

## 2021-03-27 VITALS — DIASTOLIC BLOOD PRESSURE: 54 MMHG | SYSTOLIC BLOOD PRESSURE: 90 MMHG

## 2021-03-27 VITALS — SYSTOLIC BLOOD PRESSURE: 76 MMHG | DIASTOLIC BLOOD PRESSURE: 56 MMHG

## 2021-03-27 VITALS — SYSTOLIC BLOOD PRESSURE: 89 MMHG | DIASTOLIC BLOOD PRESSURE: 51 MMHG

## 2021-03-27 VITALS — DIASTOLIC BLOOD PRESSURE: 55 MMHG | SYSTOLIC BLOOD PRESSURE: 94 MMHG

## 2021-03-27 VITALS — SYSTOLIC BLOOD PRESSURE: 97 MMHG | DIASTOLIC BLOOD PRESSURE: 55 MMHG

## 2021-03-27 VITALS — DIASTOLIC BLOOD PRESSURE: 54 MMHG | SYSTOLIC BLOOD PRESSURE: 92 MMHG

## 2021-03-27 VITALS — SYSTOLIC BLOOD PRESSURE: 117 MMHG | DIASTOLIC BLOOD PRESSURE: 58 MMHG

## 2021-03-27 VITALS — SYSTOLIC BLOOD PRESSURE: 94 MMHG | DIASTOLIC BLOOD PRESSURE: 58 MMHG

## 2021-03-27 VITALS — DIASTOLIC BLOOD PRESSURE: 56 MMHG | SYSTOLIC BLOOD PRESSURE: 90 MMHG

## 2021-03-27 VITALS — DIASTOLIC BLOOD PRESSURE: 58 MMHG | SYSTOLIC BLOOD PRESSURE: 110 MMHG

## 2021-03-27 VITALS — SYSTOLIC BLOOD PRESSURE: 113 MMHG | DIASTOLIC BLOOD PRESSURE: 55 MMHG

## 2021-03-27 VITALS — DIASTOLIC BLOOD PRESSURE: 55 MMHG | SYSTOLIC BLOOD PRESSURE: 87 MMHG

## 2021-03-27 VITALS — DIASTOLIC BLOOD PRESSURE: 61 MMHG | SYSTOLIC BLOOD PRESSURE: 103 MMHG

## 2021-03-27 VITALS — SYSTOLIC BLOOD PRESSURE: 81 MMHG | DIASTOLIC BLOOD PRESSURE: 53 MMHG

## 2021-03-27 VITALS — SYSTOLIC BLOOD PRESSURE: 116 MMHG | DIASTOLIC BLOOD PRESSURE: 62 MMHG

## 2021-03-27 VITALS — SYSTOLIC BLOOD PRESSURE: 90 MMHG | DIASTOLIC BLOOD PRESSURE: 51 MMHG

## 2021-03-27 VITALS — SYSTOLIC BLOOD PRESSURE: 73 MMHG | DIASTOLIC BLOOD PRESSURE: 45 MMHG

## 2021-03-27 VITALS — DIASTOLIC BLOOD PRESSURE: 53 MMHG | SYSTOLIC BLOOD PRESSURE: 96 MMHG

## 2021-03-27 VITALS — SYSTOLIC BLOOD PRESSURE: 113 MMHG | DIASTOLIC BLOOD PRESSURE: 66 MMHG

## 2021-03-27 VITALS — DIASTOLIC BLOOD PRESSURE: 54 MMHG | SYSTOLIC BLOOD PRESSURE: 84 MMHG

## 2021-03-27 VITALS — SYSTOLIC BLOOD PRESSURE: 91 MMHG | DIASTOLIC BLOOD PRESSURE: 51 MMHG

## 2021-03-27 VITALS — SYSTOLIC BLOOD PRESSURE: 86 MMHG | DIASTOLIC BLOOD PRESSURE: 53 MMHG

## 2021-03-27 VITALS — SYSTOLIC BLOOD PRESSURE: 75 MMHG | DIASTOLIC BLOOD PRESSURE: 46 MMHG

## 2021-03-27 VITALS — DIASTOLIC BLOOD PRESSURE: 58 MMHG | SYSTOLIC BLOOD PRESSURE: 109 MMHG

## 2021-03-27 VITALS — DIASTOLIC BLOOD PRESSURE: 56 MMHG | SYSTOLIC BLOOD PRESSURE: 92 MMHG

## 2021-03-27 VITALS — SYSTOLIC BLOOD PRESSURE: 98 MMHG | DIASTOLIC BLOOD PRESSURE: 53 MMHG

## 2021-03-27 VITALS — DIASTOLIC BLOOD PRESSURE: 54 MMHG | SYSTOLIC BLOOD PRESSURE: 87 MMHG

## 2021-03-27 VITALS — SYSTOLIC BLOOD PRESSURE: 87 MMHG | DIASTOLIC BLOOD PRESSURE: 50 MMHG

## 2021-03-27 VITALS — SYSTOLIC BLOOD PRESSURE: 73 MMHG | DIASTOLIC BLOOD PRESSURE: 43 MMHG

## 2021-03-27 VITALS — DIASTOLIC BLOOD PRESSURE: 61 MMHG | SYSTOLIC BLOOD PRESSURE: 124 MMHG

## 2021-03-27 VITALS — SYSTOLIC BLOOD PRESSURE: 86 MMHG | DIASTOLIC BLOOD PRESSURE: 51 MMHG

## 2021-03-27 VITALS — DIASTOLIC BLOOD PRESSURE: 58 MMHG | SYSTOLIC BLOOD PRESSURE: 103 MMHG

## 2021-03-27 LAB
ALBUMIN SERPL BCG-MCNC: 2.9 GM/DL (ref 3.2–5.2)
ALT SERPL W P-5'-P-CCNC: 15 U/L (ref 12–78)
BASOPHILS # BLD AUTO: 0 10^3/UL (ref 0–0.2)
BASOPHILS NFR BLD AUTO: 0.4 % (ref 0–1)
BILIRUB SERPL-MCNC: 2.9 MG/DL (ref 0.2–1)
BUN SERPL-MCNC: 44 MG/DL (ref 7–18)
CALCIUM SERPL-MCNC: 8.1 MG/DL (ref 8.8–10.2)
CHLORIDE SERPL-SCNC: 110 MEQ/L (ref 98–107)
CHOLEST SERPL-MCNC: 101 MG/DL (ref ?–200)
CK SERPL-CCNC: 108 U/L (ref 26–192)
CO2 SERPL-SCNC: 21 MEQ/L (ref 21–32)
CREAT SERPL-MCNC: 1.86 MG/DL (ref 0.55–1.3)
EOSINOPHIL # BLD AUTO: 0.3 10^3/UL (ref 0–0.5)
EOSINOPHIL NFR BLD AUTO: 6.2 % (ref 0–3)
GFR SERPL CREATININE-BSD FRML MDRD: 29.3 ML/MIN/{1.73_M2} (ref 45–?)
GLUCOSE SERPL-MCNC: 80 MG/DL (ref 70–100)
HCT VFR BLD AUTO: 30.6 % (ref 36–47)
HGB BLD-MCNC: 9.9 G/DL (ref 12–15.5)
LDH SERPL L TO P-CCNC: 178 U/L (ref 84–246)
LYMPHOCYTES # BLD AUTO: 0.9 10^3/UL (ref 1.5–5)
LYMPHOCYTES NFR BLD AUTO: 18.7 % (ref 24–44)
MAGNESIUM SERPL-MCNC: 2.6 MG/DL (ref 1.8–2.4)
MCH RBC QN AUTO: 29.7 PG (ref 27–33)
MCHC RBC AUTO-ENTMCNC: 32.4 G/DL (ref 32–36.5)
MCV RBC AUTO: 91.9 FL (ref 80–96)
MONOCYTES # BLD AUTO: 0.6 10^3/UL (ref 0–0.8)
MONOCYTES NFR BLD AUTO: 12.8 % (ref 2–8)
NEUTROPHILS # BLD AUTO: 2.8 10^3/UL (ref 1.5–8.5)
NEUTROPHILS NFR BLD AUTO: 61.5 % (ref 36–66)
PHOSPHATE SERPL-MCNC: 4 MG/DL (ref 2.5–4.9)
PLATELET # BLD AUTO: 103 10^3/UL (ref 150–450)
POTASSIUM SERPL-SCNC: 4.2 MEQ/L (ref 3.5–5.1)
PROT SERPL-MCNC: 5.2 GM/DL (ref 6.4–8.2)
RBC # BLD AUTO: 3.33 10^6/UL (ref 4–5.4)
SODIUM SERPL-SCNC: 139 MEQ/L (ref 136–145)
TRIGL SERPL-MCNC: 80 MG/DL (ref ?–150)
WBC # BLD AUTO: 4.5 10^3/UL (ref 4–10)

## 2021-03-27 RX ADMIN — SODIUM CHLORIDE SCH UNITS: 4.5 INJECTION, SOLUTION INTRAVENOUS at 20:06

## 2021-03-27 RX ADMIN — SODIUM CHLORIDE, PRESERVATIVE FREE SCH ML: 5 INJECTION INTRAVENOUS at 06:00

## 2021-03-27 RX ADMIN — LACTULOSE SCH ML: 10 SOLUTION ORAL at 20:08

## 2021-03-27 RX ADMIN — DEXTROSE MONOHYDRATE SCH MLS/HR: 50 INJECTION, SOLUTION INTRAVENOUS at 17:23

## 2021-03-27 RX ADMIN — BETAXOLOL HYDROCHLORIDE SCH DROP: 2.8 SUSPENSION/ DROPS OPHTHALMIC at 09:57

## 2021-03-27 RX ADMIN — Medication SCH UNITS: at 09:56

## 2021-03-27 RX ADMIN — DEXTROSE MONOHYDRATE SCH MLS/HR: 50 INJECTION, SOLUTION INTRAVENOUS at 02:20

## 2021-03-27 RX ADMIN — BRIMONIDINE TARTRATE SCH DROP: 1.5 SOLUTION OPHTHALMIC at 15:30

## 2021-03-27 RX ADMIN — MULTIPLE VITAMINS W/ MINERALS TAB SCH TAB: TAB at 09:56

## 2021-03-27 RX ADMIN — SODIUM CHLORIDE, PRESERVATIVE FREE SCH ML: 5 INJECTION INTRAVENOUS at 13:30

## 2021-03-27 RX ADMIN — FOLIC ACID SCH MG: 1 TABLET ORAL at 09:57

## 2021-03-27 RX ADMIN — MIDODRINE HYDROCHLORIDE SCH MG: 5 TABLET ORAL at 15:30

## 2021-03-27 RX ADMIN — BRIMONIDINE TARTRATE SCH DROP: 1.5 SOLUTION OPHTHALMIC at 09:58

## 2021-03-27 RX ADMIN — BRIMONIDINE TARTRATE SCH DROP: 1.5 SOLUTION OPHTHALMIC at 20:08

## 2021-03-27 RX ADMIN — MIDODRINE HYDROCHLORIDE SCH MG: 5 TABLET ORAL at 08:22

## 2021-03-27 RX ADMIN — MIDODRINE HYDROCHLORIDE SCH MG: 5 TABLET ORAL at 12:51

## 2021-03-27 RX ADMIN — LEVOTHYROXINE SODIUM SCH MCG: 112 TABLET ORAL at 06:40

## 2021-03-27 RX ADMIN — LEVETIRACETAM SCH MG: 500 SOLUTION ORAL at 20:08

## 2021-03-27 RX ADMIN — LACTULOSE SCH ML: 10 SOLUTION ORAL at 09:00

## 2021-03-27 RX ADMIN — LEVETIRACETAM SCH MG: 500 SOLUTION ORAL at 09:57

## 2021-03-27 RX ADMIN — LATANOPROST SCH DROP: 50 SOLUTION OPHTHALMIC at 20:09

## 2021-03-27 RX ADMIN — SODIUM CHLORIDE SCH UNITS: 4.5 INJECTION, SOLUTION INTRAVENOUS at 09:57

## 2021-03-27 RX ADMIN — BETAXOLOL HYDROCHLORIDE SCH DROP: 2.8 SUSPENSION/ DROPS OPHTHALMIC at 20:08

## 2021-03-27 RX ADMIN — SODIUM CHLORIDE, PRESERVATIVE FREE SCH ML: 5 INJECTION INTRAVENOUS at 22:00

## 2021-03-27 NOTE — REP
INDICATION:

intubated.



COMPARISON:

Comparison chest x-ray March 26, 2021.



TECHNIQUE:

Portable upright AP chest radiograph.



FINDINGS:

A right IJ line is again noted to terminate in the region of the right atrial SVC

junction.  Endotracheal and nasogastric tubes have been withdrawn.  EKG electrodes are

seen.  There is left pleural effusion which appears a little more prominent.

Atelectasis is suspected in the lower lobe.  No infiltrate is noted on the right.

Mild cardiomegaly is observed.  There is a drainage catheter in the left upper

quadrant of the abdomen..



IMPRESSION:

Endotracheal and nasogastric tubes withdrawn.  Increased left pleural effusion..





<Electronically signed by Noam Deng > 03/27/21 0824

## 2021-03-27 NOTE — IPN
PROGRESS NOTE



DATE:  03/27/2021



Ms. Taylor is seen this morning on her bedside in intensive care unit. She is

lying in the bed and feels okay .she was extubated yesterday and has been doing

well on room air. She is currently not on pressors, and blood pressure is only

76/56 mmHg. 



PHYSICAL EXAMINATION: Temperature is 99.1 degrees Fahrenheit, heart rate 95 per

minute, respiratory rate is 20 per minute, blood pressure 76/56 mmHg,  ad

oxygen saturation 85% on room air. Head is atraumatic. Neck supple, and jugular

venous distention (JVD) difficult to be assessed. She has a central line in

right internal jugular vein. Her heart sounds are irregular and lungs with

slightly diminished breath sounds at the bases, more on the left side than the

right. She has a chest tube on the left. Abdomen is moderately distended, and

bowel sounds are normal. Extremities without any cyanosis or clubbing.

Neurologically, she has no focal deficit.



Today's labs show WBC count 4.5, hemoglobin 9.9, hematocrit 30.6, platelets

103. 



Sodium 139, potassium 4.2, CO2 of 21, BUN 44, creatinine 1.86, calcium 8.1, and

phosphorus 4.0. Serum ammonia level is 45 today.



PROBLEMS:

1. Acute kidney injury superimposed on chronic kidney disease. Kidney function

more or less stable. Her blood pressure is low, and she remains at risk for

worsening kidney function. I will recommend intravenous (IV) albumin or

Levophed if her blood pressure does not improve. Her serum albumin is 2.9,

which is reasonable.



2. Left-sided hydrothorax, most likely translocated ascites into her chest. She

still has chest tube draining.



3. Cirrhosis of liver with ascites. Her ascites is mild to moderate at this

point, and there is no emergent need for a paracentesis. 



4. Anemia. Anemia is stable and does not need any urgent intervention.

## 2021-03-27 NOTE — CCN
CRITICAL CARE NOTE



DATE:  03/27/2021



The patient is seen in the intensive care unit. This is hospital day #7. She

was weaned from mechanical ventilation last evening.



At bedside her temperature is 99, maximum temperature for the past 24 hours

100.4, pulse rate 77, respirations 18, blood pressure 76/56. Intake and output

for the past 24 hours 1075 in, 1063 out, of which 387 came out the chest tube.

Since midnight, 684 in, 370 out, of which 95 mL was chest tube drainage. He is

ill appearing. Responds to voice. Mucosa is pink. There is no stridor. No

adenopathy in the neck. Heart sounds are regular. Breath sounds diminished but

clear. No focal sounds today. Abdomen is soft with ascites. Extremities show

some edema.



DIAGNOSTIC STUDIES:

Sodium is 139, potassium 4.2, chloride 110, CO2 of 21, BUN 44, creatinine 1.86,

glucose 80.



White cell count is down to 4.5, hemoglobin 9.9, hemoglobin 30.6, platelet

count 103,000.



Chest x-ray shows blunting of the left hemidiaphragm.



On medications review, she is receiving vancomycin, day #5, heparin 5000 units

every 12 hours, lactulose, rifaximin, and a very low dose of Levophed.



The primary problem requiring critical attention acute respiratory failure. The

patient has been weaned and extubated with gas exchanges acceptable at this

point.



Seizure. Etiology is unclear. She has had no further seizure activity, and the

electroencephalogram (EEG) is pending.



Cirrhosis with hepatic hydrothorax. Chest tube drainage is down. 



Hypotension. The patient is all but weaned off of Levophed. I anticipate the

Levophed will be off within the next few hours.



From an infectious disease standpoint, white cell count is down. She does have

a low-grade fever, and vancomycin continues. 



Acute kidney injury. Creatinine is elevated but stable, and urine output is

good. 



Deep venous thrombosis (DVT) prophylaxis is in place.



The patient's condition is improved at this point; however, it remains quite

tenuous given her multi-system disease.



There was 47 minutes spent in the provision of bedside critical care and

coordination.

## 2021-03-27 NOTE — CCN
CRITICAL CARE NOTE



DATE:  03/26/2021



SUBJECTIVE: Ms. Taylor is seen this morning at her bedside in the Intensive

Care Unit. She is still intubated, however her mentation has improved

significantly over the last 24 hours. Yesterday during my visit she was

complaining of unresponsive, however today she is able to respond to verbal

commands and follow any commands. She is still on the ventilator. She continues

with low dose Levophed, however blood pressure has improved. Her nasogastric

tube remains hooked to movement suctioning and no tube feeding has been

started. Her left chest tube continues to drain clear fluid. Abdomen remains

distended with ascites which is essentially unchanged over the last 24 hours. 



OBJECTIVE:

PHYSICAL EXAMINATION: 

GENERAL APPEARANCE: The patient is awake, responds to verbal stimulus and

follows commands. 

VITAL SIGNS: Temperature is 98 degrees Fahrenheit, heart rate 96 per minute and

respiratory rate is 18 per minute. Blood pressure is 95/50 mm of mercury and

oxygen saturation is 95%. 

HEENT: Head is atraumatic. Endotracheal tube is in place. 

NECK: Central line is present in right internal jugular vein. Jugular venous

distention is difficult to be assessed. Her CVP is just checked and reported to

be 12. 

HEART: Regular and without a pericardial friction rub.

LUNGS: Diminished breath sounds at dependent parts, particularly on the side. 

ABDOMEN: Distended with ascites and soft and nontender. Bowel sounds are

present. EXTREMITIES:  Without any cyanosis or clubbing.

NEUROLOGICAL: The patient is improved significantly over the last 24 hours and

is now responsive to verbal stimuli and answers questions by opening her eyes

and nodding her head. 



LABORATORY STUDIES: Today's labs show a white blood cell count of 6.4,

hemoglobin 10.2 and hematocrit 30.7.



Vancomycin level this morning is 24.4. 



Sodium is 140, potassium 4.1, CO2 22, BUN 40 and creatinine 1.8. Glucose 82 and

calcium 8.0. Total bilirubin is 3.4, AST 23, ALT 15 and alkaline phosphatase

68. Albumin is 3.2 and total protein 5.4.    





PROBLEMS:

1.  Acute kidney injury superimposed on chronic kidney disease  slight

    worsening in kidney function is noticed. She still has good urine output

    with total output of 2.8 liters over the last 24 hours and negative fluid

    balance of 1.16 liters. She is on no diuretic at this point. 



2.  Hypotension - The patient remains on Midodrine and low dose Levophed.



3.  Encephalopathy - yesterday the patient was totally unresponsive. Today she

    is improved significantly and is likely to be extubated later today. 



4.  Sepsis - The patient remains on intravenous Vancomycin with trough levels

    slightly on the high side. We will continue to monitor Vancomycin levels

    closely. 



5.  Left pleural effusion  she continues to drain from her chest tube. 



6.  Alcoholic cirrhosis with recurrent ascites  her ascites is essentially

    unchanged and has not required any paracentesis at this point. 



Twenty-seven minutes of critical care time spent during which no procedures

were performed.

## 2021-03-27 NOTE — IPNPDOC
Text Note


Date of Service


The patient was seen on 3/27/21.





NOTE


Subjective:


Patient was seen and examined at bedside this morning. She was extubated 

yesterday. Today she appears to be doing better she is able to answer some 

questions appropriately for me but she still appears a little confused. No acute

overnight events reported to me. She still on low-dose Levophed.





Objective:


Constitutional: Awake but somewhat drowsy, in no apparent distress. Chronically 

ill appearing.


ENT: Sclera is icteric


Respiratory: Diminished breath sounds bilaterally. No respiratory distress. No 

use of accessory muscles. Left chest pigtail catheter in place continues to 

drain


Cardiovascular: Regular rate and rhythm, systolic ejection murmur.


Gastrointestinal: Abdomen is soft, non distended, non tender, BS present. 


Musculoskeletal: Trace peripheral edema


Neurologic: No focal neurological deficit.  





Assessment/plan:





# Acute respiratory failure: Patient briefly intubated 3/25 and extubation on 

3/26. This might have been triggered by seizure. On low-dose Levothroid.


# Possible seizure: Unknown trigger possibly from underlying infection. Fu EEG. 

Keppra.


# SBP: Thoracocentesis 3/20/21 confirms SBP. She is on vancomycin. Pleural fluid

cultures grew staph epi appears to be pan sensitive. Her long-term prognosis is 

poor.


# left pleural effusion: s/p pigtail catheter placement by IR 3/23/21. This is 

likely from SBP, hepatic hydrothorax. pulmonology is following. Will need 5-10 

days of Abx. 


# hypotension: With underlying history of chronic hypotension. Back on pressor 

support. Usually takes Midodrine. 


# Decompensated alcoholic liver cirrhosis: She has recurrent left hepatic 

hydrothorax and is scheduled for therapeutic thoracocentesis with interventional

radiology outpatient every 2 weeks. Also has a history of portal hypertension, 

esophageal varices, recurrent ascites.


# BAM on CKD3: In the setting of sepsis and hypotension. Appears prerenal. 

Nephrology consult helping manage.


# Chronic pruritus: Likely secondary to her chronic kidney and end-stage liver 

disease. Supportive management.


# history of hepatic encephalopathy: on rifaximin. ammonia down trended. Repeat 

ammonia today


# Chronic hyponatremia: Asymptomatic. Improved. Monitor. 


# Pancytopenia: Likely secondary to cirrhosis and hypersplenism. If platelets 

less than 50 or any signs of bleeding discontinue heparin and start sequentials


# DVT prophylaxis: Heparin





A Yousef 


Hospitalist





VS,Fishbone, I+O


Fernie BROWN I+O


Laboratory Tests


3/27/21 05:57











Vital Signs








  Date Time  Temp Pulse Resp B/P (MAP) Pulse Ox O2 Delivery O2 Flow Rate FiO2


 


3/27/21 06:15  77  76/56 95   


 


3/27/21 06:15      Room Air  


 


3/27/21 04:00 99.1  18     


 


3/26/21 14:15       8.0 28














I&O- Last 24 Hours up to 6 AM 


 


 3/27/21





 06:00


 


Intake Total 1608 ml


 


Output Total 1003 ml


 


Balance 605 ml

















TIFFANY SIDHU MD              Mar 27, 2021 10:30

## 2021-03-28 VITALS — DIASTOLIC BLOOD PRESSURE: 54 MMHG | SYSTOLIC BLOOD PRESSURE: 86 MMHG

## 2021-03-28 VITALS — SYSTOLIC BLOOD PRESSURE: 90 MMHG | DIASTOLIC BLOOD PRESSURE: 54 MMHG

## 2021-03-28 VITALS — DIASTOLIC BLOOD PRESSURE: 60 MMHG | SYSTOLIC BLOOD PRESSURE: 100 MMHG

## 2021-03-28 VITALS — DIASTOLIC BLOOD PRESSURE: 71 MMHG | SYSTOLIC BLOOD PRESSURE: 151 MMHG

## 2021-03-28 VITALS — DIASTOLIC BLOOD PRESSURE: 52 MMHG | SYSTOLIC BLOOD PRESSURE: 90 MMHG

## 2021-03-28 VITALS — SYSTOLIC BLOOD PRESSURE: 87 MMHG | DIASTOLIC BLOOD PRESSURE: 50 MMHG

## 2021-03-28 VITALS — SYSTOLIC BLOOD PRESSURE: 88 MMHG | DIASTOLIC BLOOD PRESSURE: 54 MMHG

## 2021-03-28 VITALS — DIASTOLIC BLOOD PRESSURE: 55 MMHG | SYSTOLIC BLOOD PRESSURE: 88 MMHG

## 2021-03-28 VITALS — SYSTOLIC BLOOD PRESSURE: 88 MMHG | DIASTOLIC BLOOD PRESSURE: 50 MMHG

## 2021-03-28 VITALS — SYSTOLIC BLOOD PRESSURE: 95 MMHG | DIASTOLIC BLOOD PRESSURE: 54 MMHG

## 2021-03-28 VITALS — SYSTOLIC BLOOD PRESSURE: 106 MMHG | DIASTOLIC BLOOD PRESSURE: 57 MMHG

## 2021-03-28 VITALS — DIASTOLIC BLOOD PRESSURE: 51 MMHG | SYSTOLIC BLOOD PRESSURE: 90 MMHG

## 2021-03-28 VITALS — DIASTOLIC BLOOD PRESSURE: 54 MMHG | SYSTOLIC BLOOD PRESSURE: 88 MMHG

## 2021-03-28 VITALS — SYSTOLIC BLOOD PRESSURE: 108 MMHG | DIASTOLIC BLOOD PRESSURE: 53 MMHG

## 2021-03-28 VITALS — DIASTOLIC BLOOD PRESSURE: 50 MMHG | SYSTOLIC BLOOD PRESSURE: 87 MMHG

## 2021-03-28 VITALS — SYSTOLIC BLOOD PRESSURE: 102 MMHG | DIASTOLIC BLOOD PRESSURE: 59 MMHG

## 2021-03-28 VITALS — SYSTOLIC BLOOD PRESSURE: 93 MMHG | DIASTOLIC BLOOD PRESSURE: 53 MMHG

## 2021-03-28 VITALS — DIASTOLIC BLOOD PRESSURE: 51 MMHG | SYSTOLIC BLOOD PRESSURE: 84 MMHG

## 2021-03-28 VITALS — SYSTOLIC BLOOD PRESSURE: 102 MMHG | DIASTOLIC BLOOD PRESSURE: 56 MMHG

## 2021-03-28 VITALS — SYSTOLIC BLOOD PRESSURE: 82 MMHG | DIASTOLIC BLOOD PRESSURE: 52 MMHG

## 2021-03-28 VITALS — SYSTOLIC BLOOD PRESSURE: 90 MMHG | DIASTOLIC BLOOD PRESSURE: 53 MMHG

## 2021-03-28 VITALS — SYSTOLIC BLOOD PRESSURE: 98 MMHG | DIASTOLIC BLOOD PRESSURE: 57 MMHG

## 2021-03-28 VITALS — SYSTOLIC BLOOD PRESSURE: 97 MMHG | DIASTOLIC BLOOD PRESSURE: 57 MMHG

## 2021-03-28 VITALS — SYSTOLIC BLOOD PRESSURE: 86 MMHG | DIASTOLIC BLOOD PRESSURE: 52 MMHG

## 2021-03-28 VITALS — DIASTOLIC BLOOD PRESSURE: 51 MMHG | SYSTOLIC BLOOD PRESSURE: 86 MMHG

## 2021-03-28 VITALS — DIASTOLIC BLOOD PRESSURE: 50 MMHG | SYSTOLIC BLOOD PRESSURE: 90 MMHG

## 2021-03-28 VITALS — SYSTOLIC BLOOD PRESSURE: 87 MMHG | DIASTOLIC BLOOD PRESSURE: 54 MMHG

## 2021-03-28 VITALS — DIASTOLIC BLOOD PRESSURE: 62 MMHG | SYSTOLIC BLOOD PRESSURE: 92 MMHG

## 2021-03-28 VITALS — DIASTOLIC BLOOD PRESSURE: 74 MMHG | SYSTOLIC BLOOD PRESSURE: 140 MMHG

## 2021-03-28 VITALS — SYSTOLIC BLOOD PRESSURE: 84 MMHG | DIASTOLIC BLOOD PRESSURE: 48 MMHG

## 2021-03-28 VITALS — SYSTOLIC BLOOD PRESSURE: 86 MMHG | DIASTOLIC BLOOD PRESSURE: 49 MMHG

## 2021-03-28 VITALS — DIASTOLIC BLOOD PRESSURE: 79 MMHG | SYSTOLIC BLOOD PRESSURE: 143 MMHG

## 2021-03-28 VITALS — SYSTOLIC BLOOD PRESSURE: 91 MMHG | DIASTOLIC BLOOD PRESSURE: 50 MMHG

## 2021-03-28 VITALS — DIASTOLIC BLOOD PRESSURE: 51 MMHG | SYSTOLIC BLOOD PRESSURE: 92 MMHG

## 2021-03-28 VITALS — DIASTOLIC BLOOD PRESSURE: 56 MMHG | SYSTOLIC BLOOD PRESSURE: 97 MMHG

## 2021-03-28 VITALS — DIASTOLIC BLOOD PRESSURE: 56 MMHG | SYSTOLIC BLOOD PRESSURE: 98 MMHG

## 2021-03-28 VITALS — SYSTOLIC BLOOD PRESSURE: 117 MMHG | DIASTOLIC BLOOD PRESSURE: 59 MMHG

## 2021-03-28 VITALS — DIASTOLIC BLOOD PRESSURE: 44 MMHG | SYSTOLIC BLOOD PRESSURE: 84 MMHG

## 2021-03-28 VITALS — DIASTOLIC BLOOD PRESSURE: 59 MMHG | SYSTOLIC BLOOD PRESSURE: 98 MMHG

## 2021-03-28 VITALS — DIASTOLIC BLOOD PRESSURE: 61 MMHG | SYSTOLIC BLOOD PRESSURE: 104 MMHG

## 2021-03-28 VITALS — DIASTOLIC BLOOD PRESSURE: 61 MMHG | SYSTOLIC BLOOD PRESSURE: 101 MMHG

## 2021-03-28 VITALS — SYSTOLIC BLOOD PRESSURE: 83 MMHG | DIASTOLIC BLOOD PRESSURE: 48 MMHG

## 2021-03-28 VITALS — SYSTOLIC BLOOD PRESSURE: 89 MMHG | DIASTOLIC BLOOD PRESSURE: 50 MMHG

## 2021-03-28 VITALS — DIASTOLIC BLOOD PRESSURE: 54 MMHG | SYSTOLIC BLOOD PRESSURE: 85 MMHG

## 2021-03-28 VITALS — SYSTOLIC BLOOD PRESSURE: 86 MMHG | DIASTOLIC BLOOD PRESSURE: 54 MMHG

## 2021-03-28 VITALS — SYSTOLIC BLOOD PRESSURE: 92 MMHG | DIASTOLIC BLOOD PRESSURE: 62 MMHG

## 2021-03-28 VITALS — DIASTOLIC BLOOD PRESSURE: 50 MMHG | SYSTOLIC BLOOD PRESSURE: 85 MMHG

## 2021-03-28 VITALS — SYSTOLIC BLOOD PRESSURE: 91 MMHG | DIASTOLIC BLOOD PRESSURE: 54 MMHG

## 2021-03-28 VITALS — SYSTOLIC BLOOD PRESSURE: 98 MMHG | DIASTOLIC BLOOD PRESSURE: 56 MMHG

## 2021-03-28 VITALS — SYSTOLIC BLOOD PRESSURE: 86 MMHG | DIASTOLIC BLOOD PRESSURE: 53 MMHG

## 2021-03-28 VITALS — DIASTOLIC BLOOD PRESSURE: 55 MMHG | SYSTOLIC BLOOD PRESSURE: 94 MMHG

## 2021-03-28 VITALS — SYSTOLIC BLOOD PRESSURE: 94 MMHG | DIASTOLIC BLOOD PRESSURE: 56 MMHG

## 2021-03-28 VITALS — SYSTOLIC BLOOD PRESSURE: 90 MMHG | DIASTOLIC BLOOD PRESSURE: 51 MMHG

## 2021-03-28 VITALS — DIASTOLIC BLOOD PRESSURE: 51 MMHG | SYSTOLIC BLOOD PRESSURE: 88 MMHG

## 2021-03-28 VITALS — DIASTOLIC BLOOD PRESSURE: 53 MMHG | SYSTOLIC BLOOD PRESSURE: 80 MMHG

## 2021-03-28 VITALS — SYSTOLIC BLOOD PRESSURE: 91 MMHG | DIASTOLIC BLOOD PRESSURE: 55 MMHG

## 2021-03-28 VITALS — SYSTOLIC BLOOD PRESSURE: 97 MMHG | DIASTOLIC BLOOD PRESSURE: 58 MMHG

## 2021-03-28 VITALS — DIASTOLIC BLOOD PRESSURE: 49 MMHG | SYSTOLIC BLOOD PRESSURE: 83 MMHG

## 2021-03-28 VITALS — SYSTOLIC BLOOD PRESSURE: 89 MMHG | DIASTOLIC BLOOD PRESSURE: 55 MMHG

## 2021-03-28 VITALS — SYSTOLIC BLOOD PRESSURE: 100 MMHG | DIASTOLIC BLOOD PRESSURE: 59 MMHG

## 2021-03-28 VITALS — DIASTOLIC BLOOD PRESSURE: 53 MMHG | SYSTOLIC BLOOD PRESSURE: 96 MMHG

## 2021-03-28 VITALS — DIASTOLIC BLOOD PRESSURE: 53 MMHG | SYSTOLIC BLOOD PRESSURE: 95 MMHG

## 2021-03-28 VITALS — DIASTOLIC BLOOD PRESSURE: 52 MMHG | SYSTOLIC BLOOD PRESSURE: 97 MMHG

## 2021-03-28 VITALS — SYSTOLIC BLOOD PRESSURE: 91 MMHG | DIASTOLIC BLOOD PRESSURE: 52 MMHG

## 2021-03-28 VITALS — DIASTOLIC BLOOD PRESSURE: 61 MMHG | SYSTOLIC BLOOD PRESSURE: 98 MMHG

## 2021-03-28 VITALS — DIASTOLIC BLOOD PRESSURE: 105 MMHG | SYSTOLIC BLOOD PRESSURE: 139 MMHG

## 2021-03-28 VITALS — DIASTOLIC BLOOD PRESSURE: 55 MMHG | SYSTOLIC BLOOD PRESSURE: 97 MMHG

## 2021-03-28 VITALS — DIASTOLIC BLOOD PRESSURE: 54 MMHG | SYSTOLIC BLOOD PRESSURE: 94 MMHG

## 2021-03-28 VITALS — SYSTOLIC BLOOD PRESSURE: 85 MMHG | DIASTOLIC BLOOD PRESSURE: 50 MMHG

## 2021-03-28 VITALS — DIASTOLIC BLOOD PRESSURE: 56 MMHG | SYSTOLIC BLOOD PRESSURE: 99 MMHG

## 2021-03-28 VITALS — SYSTOLIC BLOOD PRESSURE: 92 MMHG | DIASTOLIC BLOOD PRESSURE: 58 MMHG

## 2021-03-28 VITALS — DIASTOLIC BLOOD PRESSURE: 48 MMHG | SYSTOLIC BLOOD PRESSURE: 83 MMHG

## 2021-03-28 VITALS — SYSTOLIC BLOOD PRESSURE: 107 MMHG | DIASTOLIC BLOOD PRESSURE: 65 MMHG

## 2021-03-28 VITALS — DIASTOLIC BLOOD PRESSURE: 55 MMHG | SYSTOLIC BLOOD PRESSURE: 89 MMHG

## 2021-03-28 VITALS — DIASTOLIC BLOOD PRESSURE: 55 MMHG | SYSTOLIC BLOOD PRESSURE: 99 MMHG

## 2021-03-28 LAB
ALBUMIN SERPL BCG-MCNC: 2.6 GM/DL (ref 3.2–5.2)
ALT SERPL W P-5'-P-CCNC: 16 U/L (ref 12–78)
BASOPHILS # BLD AUTO: 0 10^3/UL (ref 0–0.2)
BASOPHILS NFR BLD AUTO: 0.7 % (ref 0–1)
BILIRUB SERPL-MCNC: 2.4 MG/DL (ref 0.2–1)
BUN SERPL-MCNC: 37 MG/DL (ref 7–18)
CALCIUM SERPL-MCNC: 7.7 MG/DL (ref 8.8–10.2)
CHLORIDE SERPL-SCNC: 110 MEQ/L (ref 98–107)
CHOLEST SERPL-MCNC: 98 MG/DL (ref ?–200)
CK SERPL-CCNC: 86 U/L (ref 26–192)
CO2 SERPL-SCNC: 23 MEQ/L (ref 21–32)
CREAT SERPL-MCNC: 1.67 MG/DL (ref 0.55–1.3)
EOSINOPHIL # BLD AUTO: 0.4 10^3/UL (ref 0–0.5)
EOSINOPHIL NFR BLD AUTO: 8.3 % (ref 0–3)
GFR SERPL CREATININE-BSD FRML MDRD: 33.2 ML/MIN/{1.73_M2} (ref 45–?)
GLUCOSE SERPL-MCNC: 99 MG/DL (ref 70–100)
HCT VFR BLD AUTO: 31.5 % (ref 36–47)
HGB BLD-MCNC: 10.5 G/DL (ref 12–15.5)
LDH SERPL L TO P-CCNC: 183 U/L (ref 84–246)
LYMPHOCYTES # BLD AUTO: 0.7 10^3/UL (ref 1.5–5)
LYMPHOCYTES NFR BLD AUTO: 17 % (ref 24–44)
MAGNESIUM SERPL-MCNC: 2.6 MG/DL (ref 1.8–2.4)
MCH RBC QN AUTO: 30.6 PG (ref 27–33)
MCHC RBC AUTO-ENTMCNC: 33.3 G/DL (ref 32–36.5)
MCV RBC AUTO: 91.8 FL (ref 80–96)
MONOCYTES # BLD AUTO: 0.5 10^3/UL (ref 0–0.8)
MONOCYTES NFR BLD AUTO: 12.2 % (ref 2–8)
NEUTROPHILS # BLD AUTO: 2.7 10^3/UL (ref 1.5–8.5)
NEUTROPHILS NFR BLD AUTO: 61.6 % (ref 36–66)
PHOSPHATE SERPL-MCNC: 3.1 MG/DL (ref 2.5–4.9)
PLATELET # BLD AUTO: 107 10^3/UL (ref 150–450)
POTASSIUM SERPL-SCNC: 4.1 MEQ/L (ref 3.5–5.1)
PROT SERPL-MCNC: 4.9 GM/DL (ref 6.4–8.2)
RBC # BLD AUTO: 3.43 10^6/UL (ref 4–5.4)
SODIUM SERPL-SCNC: 140 MEQ/L (ref 136–145)
TRIGL SERPL-MCNC: 80 MG/DL (ref ?–150)
WBC # BLD AUTO: 4.4 10^3/UL (ref 4–10)

## 2021-03-28 RX ADMIN — MULTIPLE VITAMINS W/ MINERALS TAB SCH TAB: TAB at 08:27

## 2021-03-28 RX ADMIN — LEVETIRACETAM SCH MG: 500 SOLUTION ORAL at 08:28

## 2021-03-28 RX ADMIN — MIDODRINE HYDROCHLORIDE SCH MG: 5 TABLET ORAL at 16:48

## 2021-03-28 RX ADMIN — LEVOTHYROXINE SODIUM SCH MCG: 112 TABLET ORAL at 05:04

## 2021-03-28 RX ADMIN — MIDODRINE HYDROCHLORIDE SCH MG: 5 TABLET ORAL at 08:27

## 2021-03-28 RX ADMIN — BRIMONIDINE TARTRATE SCH DROP: 1.5 SOLUTION OPHTHALMIC at 08:28

## 2021-03-28 RX ADMIN — DEXTROSE MONOHYDRATE SCH MLS/HR: 50 INJECTION, SOLUTION INTRAVENOUS at 22:35

## 2021-03-28 RX ADMIN — SODIUM CHLORIDE, PRESERVATIVE FREE SCH ML: 5 INJECTION INTRAVENOUS at 05:05

## 2021-03-28 RX ADMIN — BETAXOLOL HYDROCHLORIDE SCH DROP: 2.8 SUSPENSION/ DROPS OPHTHALMIC at 20:30

## 2021-03-28 RX ADMIN — LACTULOSE SCH ML: 10 SOLUTION ORAL at 08:28

## 2021-03-28 RX ADMIN — LACTULOSE SCH ML: 10 SOLUTION ORAL at 20:29

## 2021-03-28 RX ADMIN — LATANOPROST SCH DROP: 50 SOLUTION OPHTHALMIC at 20:30

## 2021-03-28 RX ADMIN — BRIMONIDINE TARTRATE SCH DROP: 1.5 SOLUTION OPHTHALMIC at 20:29

## 2021-03-28 RX ADMIN — SODIUM CHLORIDE, PRESERVATIVE FREE SCH ML: 5 INJECTION INTRAVENOUS at 22:39

## 2021-03-28 RX ADMIN — SODIUM CHLORIDE SCH UNITS: 4.5 INJECTION, SOLUTION INTRAVENOUS at 08:28

## 2021-03-28 RX ADMIN — SODIUM CHLORIDE, PRESERVATIVE FREE SCH ML: 5 INJECTION INTRAVENOUS at 14:00

## 2021-03-28 RX ADMIN — SODIUM CHLORIDE SCH UNITS: 4.5 INJECTION, SOLUTION INTRAVENOUS at 20:29

## 2021-03-28 RX ADMIN — FOLIC ACID SCH MG: 1 TABLET ORAL at 08:26

## 2021-03-28 RX ADMIN — BRIMONIDINE TARTRATE SCH DROP: 1.5 SOLUTION OPHTHALMIC at 16:48

## 2021-03-28 RX ADMIN — DEXTROSE MONOHYDRATE SCH MLS/HR: 50 INJECTION, SOLUTION INTRAVENOUS at 23:54

## 2021-03-28 RX ADMIN — Medication SCH UNITS: at 08:27

## 2021-03-28 RX ADMIN — BETAXOLOL HYDROCHLORIDE SCH DROP: 2.8 SUSPENSION/ DROPS OPHTHALMIC at 08:28

## 2021-03-28 RX ADMIN — MIDODRINE HYDROCHLORIDE SCH MG: 5 TABLET ORAL at 11:22

## 2021-03-28 NOTE — REP
INDICATION:

intubated.



COMPARISON:

Comparison chest x-ray March 27, 2021.



TECHNIQUE:

Portable upright AP chest radiograph.



FINDINGS:

A right-sided internal jugular central venous line is noted in place unchanged.  A

pigtail catheter is noted in the left base.  Monitoring electrodes are present.  There

is blunting of the left lateral pleural angle and the left hemidiaphragm is partially

obscured consistent with left effusion.  This has actually is improved somewhat from

yesterday's portable chest x-ray.  There is some pleural thickening at the apex and a

tiny bubble of apical pleural air is visible on the left..  There is a small air

collection overlying the left hilus in the left pleural space as well.  Right lung

remains clear.



IMPRESSION:

Hydropneumothorax on the left decreased from yesterday's chest x-ray.  No new

infiltrate..





<Electronically signed by Noam Deng > 03/28/21 0842

## 2021-03-28 NOTE — IPNPDOC
Text Note


Date of Service


The patient was seen on 3/28/21.





NOTE


Subjective:


The patient is awake and conversant this morning, although she is somewhat 

lethargic. She does not really have any complaints at this time, she certainly 

would like to go home, but knows that she is not safe to do so at this time. She

is not complaining of any pain anywhere. She still requiring Levophed off and on

every few hours.





Objective:


Constitutional: Awake but somewhat drowsy, in no apparent distress. Chronically 

ill appearing.


ENT: Sclera is icteric


Respiratory: Diminished breath sounds bilaterally. No respiratory distress. No 

use of accessory muscles. Left chest pigtail catheter in place continues to 

drain


Cardiovascular: Regular rate and rhythm, systolic ejection murmur.


Gastrointestinal: Abdomen is soft, non distended, non tender, BS present. 


Musculoskeletal: Trace peripheral edema


Neurologic: No focal neurological deficit.  





Assessment/plan:





# Spontaneous bacterial peritonitis: Thoracocentesis 3/20/21 confirms SBP. She 

is on vancomycin. Pleural fluid cultures grew staph epi appears to be pan 

sensitive. Her long-term prognosis continues to be poor.


# left pleural effusion: s/p pigtail catheter placement by IR 3/23/21. This is 

likely from SBP, hepatic hydrothorax. pulmonology is following. Will need 5-10 

days of Abx. 


# hypotension: With underlying history of chronic hypotension. Back on pressor 

support. Usually takes Midodrine. 


# Decompensated alcoholic liver cirrhosis: She has recurrent left hepatic 

hydrothorax and is scheduled for therapeutic thoracocentesis with interventional

radiology outpatient every 2 weeks. Also has a history of portal hypertension, 

esophageal varices, recurrent ascites.


# BAM on CKD3: In the setting of sepsis and hypotension. Appears prerenal. 

Nephrology consult helping manage.


# Chronic pruritus: Likely secondary to her chronic kidney and end-stage liver 

disease. Supportive management.


# history of hepatic encephalopathy: on rifaximin. ammonia down trended.


# Chronic hyponatremia: Asymptomatic. Improved. Monitor. 


# Pancytopenia: Likely secondary to cirrhosis and hypersplenism. If platelets 

less than 50 or any signs of bleeding discontinue heparin and start sequentials


# Acute respiratory failure: Patient briefly intubated 3/25 and extubation on 

3/26. Continues to maintain oxygen saturations on room air.


# Possible seizure: EEG was negative for any epileptiform waves. Is unclear as 

to whether or not this was even a seizure at all. She was prophylactically 

started on Keppra, will discontinue at this time in light of findings on EEG.


# DVT prophylaxis: Heparin





DISPOSITION:


Her overall prognosis continues to be guarded/poor.  Acutely her blood pressures

continue to be soft requiring intermittent administration of levophed, which is 

superimposed on the fact that she has chronic liver failure, ascites, 

hydrothorax, CKD, etc.





VS,Fishbone, I+O


VS, Fishbone, I+O


Laboratory Tests


3/28/21 04:45











Vital Signs








  Date Time  Temp Pulse Resp B/P (MAP) Pulse Ox O2 Delivery O2 Flow Rate FiO2


 


3/28/21 11:58 97.7 77 18 92/62 95 Room Air  


 


3/26/21 14:15       8.0 28














I&O- Last 24 Hours up to 6 AM 


 


 3/28/21





 06:00


 


Intake Total 1955.25 ml


 


Output Total 2145 ml


 


Balance -189.75 ml

















TIANNA BLAS DO               Mar 28, 2021 13:20

## 2021-03-28 NOTE — EEG
ELECTROENCEPHALOGRAM



DATE: 03/26/2021



DIAGNOSIS: New onset seizure.  



EEG# [id number]. 



REFERRING PHYSICIAN: Jayne Cade M.D. 



HISTORY: Patient is a 61-year-old woman with history of alcoholic cirrhosis,

chronic kidney disease, hypertension, anxiety, and depression who was admitted

to [facility] due to ascites, kidney failure, and pleural

effusion. The patient had a seizure-like episode with unresponsiveness and

needed emergent intubation and was put on propofol, Levophed, and ventilator.

She had myoclonic jerks of all four extremities with twitching of face and her

eyes. She is currently on Levophed, vancomycin, Keppra, propofol, folic acid,

multivitamin, rifaximin, midodrine, morphine, gabapentin, etc.  



TECHNICAL DESCRIPTION: This digital EEG was recorded by 21-scalp, ear, and two

EKG electrodes and was reviewed in bipolar and referential montages following

reformatting in 10-20 international electrode placement system. 



INTERPRETATION: Patient was noted to be in mostly drowsy state during this EEG.

Resting background rhythm consisted of 3-4 Hz delta activity measuring 15-80

microvolts in amplitude, which was symmetric bilaterally. No clear sleep stages

were identified. Hyperventilation could not be performed. Photic stimulation

remained unremarkable. EKG revealed normal sinus rhythm. No focal,

lateralizing, or epileptiform abnormalities were seen. No relevant clinical

activity was noted. 



CONCLUSION:  This EEG in mostly drowsy state in an intubated patient on

mechanical ventilator is abnormal due to presence of generalized  slowing and

disorganization  of background  consistent  with moderate-to-severe diffuse

nonspecific cerebral dysfunction  such as seen in encephalopathy due to

multiple potential causes, including toxic, metabolic, medication related,

infectious, or multifocal structure brain abnormalities. No clear epileptiform

abnormalities were seen. Clinical correlation is recommended.   

[facility]

## 2021-03-28 NOTE — CCN
CRITICAL CARE NOTE



DATE:  03/28/2021



SUBJECTIVE: The patient is seen in the intensive care unit. She remains on

pressor support. Somewhat less confused this morning. This is hospital day #8.



OBJECTIVE:

VITAL SIGNS: Her temperature is 99.2, pulse rate 74, respirations 28, blood

pressure 86/54.

INTAKE AND OUTPUT: For the past 24 hours, 2165 in and 1150 out of which 450 was

from the chest tube. Since midnight, there is 472 mL in and 1365 mL out of

which 1075 was from the chest tube. 

GENERAL APPEARANCE: At bedside, she is ill-appearing, cachectic, somnolent. 

HEENT: Oral mucosa is pink. 

NECK: Supple. 

HEART: Sounds are regular. 

LUNGS: Breath sounds diminished in the bases. There is a pigtail catheter in

the left draining yellow fluid into a Pleur-Evac. 

ABDOMEN: Soft with ascites.

EXTREMITIES: Cool. Pulses diminished. 



DIAGNOSTIC STUDIES: Assessed include a CBC showing a white count of 4.4,

hemoglobin 10.5, hematocrit is up to 31.5, and platelet count is holding at

107,000. Differential white cell count shows 61% neutrophils. 



We assessed the electrolytes. Her sodium is 140, potassium 4.1, chloride 110,

CO2 of 23, BUN 37, creatinine 1.67, glucose is 99. Albumin remains low at 2.6,

AST 25, ALT 16, alkaline phosphatase 67. Ammonia level was 45 yesterday

afternoon. 



DIAGNOSTIC IMAGING STUDIES: Chest imaging was reviewed and continues to show

some basilar effusions, little change. There is a question of some pleural air

on the left. Small pockets noted. 



The patient's EEG showed no evidence of seizure activity. 



MEDICATIONS: On review, she is on Levophed IV through the right internal

jugular catheter, rifaximin, midodrine, vancomycin, heparin.



ASSESSMENT/PLAN:

1.  The primary problem requiring critical attention is hypotension. The

    patient has been unable to wean effectively from the Levophed. We will

    continue efforts to wean away from the pressor support, targeting a mean

    arterial pressure of 65.

2.  Fluid volume status. I have rechecked the central venous pressure (CVP) and

    it is 8 this morning down from previous measure of 12. We will give a dose

    of albumin in light of this and chest tube output of greater than a liter

    over the past 24 hours.

3.  Seizure disorder. Etiology is unclear. The patient has had no further

    seizure activity and her EEG is negative. We will stop the Keppra. 

4.  Cirrhosis with hepatic hydrothorax. Chest tube continues to drain actively.

    Pending reevaluation by nephrology, it may be helpful to reintroduce

    diuretic therapy.

5.  Infectious disease. This is day #6 of vancomycin for presumed infected

    ascites and pleural effusion. Cultures showed Staphylococcus epidermidis. We

    will repeat a culture today.

6.  Acute kidney injury. Creatinine is better today. Nephrology is following.

7.  Deep vein thrombosis (DVT) and ulcer prophylaxis are in place. 



The patient's condition remains critical. In light of the pressors, she will

remain in the intensive care unit for the time being. Prognosis is poor given

her underlying liver disease. 



CRITICAL CARE TIME: 62 minutes was spent in the provision of bedside critical

care and coordination, exclusive of any time spent in the performance of

procedures.

## 2021-03-29 VITALS — DIASTOLIC BLOOD PRESSURE: 66 MMHG | SYSTOLIC BLOOD PRESSURE: 97 MMHG

## 2021-03-29 VITALS — SYSTOLIC BLOOD PRESSURE: 93 MMHG | DIASTOLIC BLOOD PRESSURE: 55 MMHG

## 2021-03-29 VITALS — SYSTOLIC BLOOD PRESSURE: 101 MMHG | DIASTOLIC BLOOD PRESSURE: 58 MMHG

## 2021-03-29 VITALS — SYSTOLIC BLOOD PRESSURE: 119 MMHG | DIASTOLIC BLOOD PRESSURE: 88 MMHG

## 2021-03-29 VITALS — DIASTOLIC BLOOD PRESSURE: 59 MMHG | SYSTOLIC BLOOD PRESSURE: 103 MMHG

## 2021-03-29 VITALS — SYSTOLIC BLOOD PRESSURE: 90 MMHG | DIASTOLIC BLOOD PRESSURE: 54 MMHG

## 2021-03-29 VITALS — SYSTOLIC BLOOD PRESSURE: 85 MMHG | DIASTOLIC BLOOD PRESSURE: 54 MMHG

## 2021-03-29 VITALS — SYSTOLIC BLOOD PRESSURE: 103 MMHG | DIASTOLIC BLOOD PRESSURE: 64 MMHG

## 2021-03-29 VITALS — DIASTOLIC BLOOD PRESSURE: 66 MMHG | SYSTOLIC BLOOD PRESSURE: 87 MMHG

## 2021-03-29 VITALS — DIASTOLIC BLOOD PRESSURE: 55 MMHG | SYSTOLIC BLOOD PRESSURE: 93 MMHG

## 2021-03-29 VITALS — DIASTOLIC BLOOD PRESSURE: 60 MMHG | SYSTOLIC BLOOD PRESSURE: 101 MMHG

## 2021-03-29 VITALS — SYSTOLIC BLOOD PRESSURE: 94 MMHG | DIASTOLIC BLOOD PRESSURE: 52 MMHG

## 2021-03-29 VITALS — SYSTOLIC BLOOD PRESSURE: 113 MMHG | DIASTOLIC BLOOD PRESSURE: 63 MMHG

## 2021-03-29 VITALS — SYSTOLIC BLOOD PRESSURE: 101 MMHG | DIASTOLIC BLOOD PRESSURE: 61 MMHG

## 2021-03-29 VITALS — SYSTOLIC BLOOD PRESSURE: 97 MMHG | DIASTOLIC BLOOD PRESSURE: 62 MMHG

## 2021-03-29 VITALS — SYSTOLIC BLOOD PRESSURE: 96 MMHG | DIASTOLIC BLOOD PRESSURE: 56 MMHG

## 2021-03-29 VITALS — SYSTOLIC BLOOD PRESSURE: 79 MMHG | DIASTOLIC BLOOD PRESSURE: 61 MMHG

## 2021-03-29 VITALS — SYSTOLIC BLOOD PRESSURE: 98 MMHG | DIASTOLIC BLOOD PRESSURE: 55 MMHG

## 2021-03-29 VITALS — DIASTOLIC BLOOD PRESSURE: 56 MMHG | SYSTOLIC BLOOD PRESSURE: 88 MMHG

## 2021-03-29 VITALS — DIASTOLIC BLOOD PRESSURE: 58 MMHG | SYSTOLIC BLOOD PRESSURE: 105 MMHG

## 2021-03-29 VITALS — SYSTOLIC BLOOD PRESSURE: 101 MMHG | DIASTOLIC BLOOD PRESSURE: 54 MMHG

## 2021-03-29 VITALS — DIASTOLIC BLOOD PRESSURE: 55 MMHG | SYSTOLIC BLOOD PRESSURE: 96 MMHG

## 2021-03-29 VITALS — SYSTOLIC BLOOD PRESSURE: 106 MMHG | DIASTOLIC BLOOD PRESSURE: 61 MMHG

## 2021-03-29 VITALS — SYSTOLIC BLOOD PRESSURE: 149 MMHG | DIASTOLIC BLOOD PRESSURE: 67 MMHG

## 2021-03-29 VITALS — SYSTOLIC BLOOD PRESSURE: 81 MMHG | DIASTOLIC BLOOD PRESSURE: 53 MMHG

## 2021-03-29 VITALS — SYSTOLIC BLOOD PRESSURE: 135 MMHG | DIASTOLIC BLOOD PRESSURE: 62 MMHG

## 2021-03-29 VITALS — SYSTOLIC BLOOD PRESSURE: 96 MMHG | DIASTOLIC BLOOD PRESSURE: 52 MMHG

## 2021-03-29 VITALS — DIASTOLIC BLOOD PRESSURE: 46 MMHG | SYSTOLIC BLOOD PRESSURE: 108 MMHG

## 2021-03-29 VITALS — DIASTOLIC BLOOD PRESSURE: 51 MMHG | SYSTOLIC BLOOD PRESSURE: 92 MMHG

## 2021-03-29 VITALS — SYSTOLIC BLOOD PRESSURE: 99 MMHG | DIASTOLIC BLOOD PRESSURE: 58 MMHG

## 2021-03-29 VITALS — SYSTOLIC BLOOD PRESSURE: 92 MMHG | DIASTOLIC BLOOD PRESSURE: 51 MMHG

## 2021-03-29 VITALS — SYSTOLIC BLOOD PRESSURE: 101 MMHG | DIASTOLIC BLOOD PRESSURE: 59 MMHG

## 2021-03-29 VITALS — DIASTOLIC BLOOD PRESSURE: 62 MMHG | SYSTOLIC BLOOD PRESSURE: 103 MMHG

## 2021-03-29 VITALS — SYSTOLIC BLOOD PRESSURE: 90 MMHG | DIASTOLIC BLOOD PRESSURE: 53 MMHG

## 2021-03-29 VITALS — DIASTOLIC BLOOD PRESSURE: 57 MMHG | SYSTOLIC BLOOD PRESSURE: 103 MMHG

## 2021-03-29 LAB
ALBUMIN SERPL BCG-MCNC: 2.7 GM/DL (ref 3.2–5.2)
ALT SERPL W P-5'-P-CCNC: 18 U/L (ref 12–78)
BASOPHILS # BLD AUTO: 0 10^3/UL (ref 0–0.2)
BASOPHILS NFR BLD AUTO: 0.7 % (ref 0–1)
BILIRUB SERPL-MCNC: 2.3 MG/DL (ref 0.2–1)
BUN SERPL-MCNC: 32 MG/DL (ref 7–18)
CALCIUM SERPL-MCNC: 7.6 MG/DL (ref 8.8–10.2)
CHLORIDE SERPL-SCNC: 110 MEQ/L (ref 98–107)
CHOLEST SERPL-MCNC: 98 MG/DL (ref ?–200)
CK SERPL-CCNC: 59 U/L (ref 26–192)
CO2 SERPL-SCNC: 23 MEQ/L (ref 21–32)
CREAT SERPL-MCNC: 1.61 MG/DL (ref 0.55–1.3)
EOSINOPHIL # BLD AUTO: 0.4 10^3/UL (ref 0–0.5)
EOSINOPHIL NFR BLD AUTO: 9.2 % (ref 0–3)
GFR SERPL CREATININE-BSD FRML MDRD: 34.6 ML/MIN/{1.73_M2} (ref 45–?)
GLUCOSE SERPL-MCNC: 86 MG/DL (ref 70–100)
HCT VFR BLD AUTO: 32.6 % (ref 36–47)
HGB BLD-MCNC: 10.6 G/DL (ref 12–15.5)
LDH SERPL L TO P-CCNC: 182 U/L (ref 84–246)
LYMPHOCYTES # BLD AUTO: 0.9 10^3/UL (ref 1.5–5)
LYMPHOCYTES NFR BLD AUTO: 20.9 % (ref 24–44)
MCH RBC QN AUTO: 29.9 PG (ref 27–33)
MCHC RBC AUTO-ENTMCNC: 32.5 G/DL (ref 32–36.5)
MCV RBC AUTO: 91.8 FL (ref 80–96)
MONOCYTES # BLD AUTO: 0.5 10^3/UL (ref 0–0.8)
MONOCYTES NFR BLD AUTO: 11 % (ref 2–8)
NEUTROPHILS # BLD AUTO: 2.6 10^3/UL (ref 1.5–8.5)
NEUTROPHILS NFR BLD AUTO: 58.2 % (ref 36–66)
PHOSPHATE SERPL-MCNC: 2.8 MG/DL (ref 2.5–4.9)
PLATELET # BLD AUTO: 102 10^3/UL (ref 150–450)
POTASSIUM SERPL-SCNC: 3.8 MEQ/L (ref 3.5–5.1)
PROT SERPL-MCNC: 5.1 GM/DL (ref 6.4–8.2)
RBC # BLD AUTO: 3.55 10^6/UL (ref 4–5.4)
SODIUM SERPL-SCNC: 139 MEQ/L (ref 136–145)
TRIGL SERPL-MCNC: 79 MG/DL (ref ?–150)
WBC # BLD AUTO: 4.4 10^3/UL (ref 4–10)

## 2021-03-29 RX ADMIN — DEXTROSE MONOHYDRATE SCH MLS/HR: 50 INJECTION, SOLUTION INTRAVENOUS at 22:00

## 2021-03-29 RX ADMIN — FOLIC ACID SCH MG: 1 TABLET ORAL at 08:46

## 2021-03-29 RX ADMIN — MIDODRINE HYDROCHLORIDE SCH MG: 2.5 TABLET ORAL at 12:58

## 2021-03-29 RX ADMIN — BETAXOLOL HYDROCHLORIDE SCH DROP: 2.8 SUSPENSION/ DROPS OPHTHALMIC at 08:46

## 2021-03-29 RX ADMIN — ACETAMINOPHEN PRN MG: 325 TABLET ORAL at 05:19

## 2021-03-29 RX ADMIN — LACTULOSE SCH ML: 10 SOLUTION ORAL at 08:45

## 2021-03-29 RX ADMIN — LATANOPROST SCH DROP: 50 SOLUTION OPHTHALMIC at 20:06

## 2021-03-29 RX ADMIN — FERRIC OXIDE RED PRN DOSE: 8; 8 LOTION TOPICAL at 09:00

## 2021-03-29 RX ADMIN — LACTULOSE SCH ML: 10 SOLUTION ORAL at 20:06

## 2021-03-29 RX ADMIN — BRIMONIDINE TARTRATE SCH DROP: 1.5 SOLUTION OPHTHALMIC at 16:19

## 2021-03-29 RX ADMIN — Medication SCH UNITS: at 08:46

## 2021-03-29 RX ADMIN — SODIUM CHLORIDE, PRESERVATIVE FREE SCH ML: 5 INJECTION INTRAVENOUS at 05:24

## 2021-03-29 RX ADMIN — MULTIPLE VITAMINS W/ MINERALS TAB SCH TAB: TAB at 08:46

## 2021-03-29 RX ADMIN — DIPHENHYDRAMINE HYDROCHLORIDE PRN MG: 25 CAPSULE ORAL at 17:34

## 2021-03-29 RX ADMIN — LEVOTHYROXINE SODIUM SCH MCG: 112 TABLET ORAL at 05:23

## 2021-03-29 RX ADMIN — BRIMONIDINE TARTRATE SCH DROP: 1.5 SOLUTION OPHTHALMIC at 20:06

## 2021-03-29 RX ADMIN — SODIUM CHLORIDE SCH UNITS: 4.5 INJECTION, SOLUTION INTRAVENOUS at 08:45

## 2021-03-29 RX ADMIN — DEXTROSE MONOHYDRATE SCH MLS/HR: 50 INJECTION, SOLUTION INTRAVENOUS at 23:55

## 2021-03-29 RX ADMIN — SODIUM CHLORIDE SCH UNITS: 4.5 INJECTION, SOLUTION INTRAVENOUS at 20:06

## 2021-03-29 RX ADMIN — BETAXOLOL HYDROCHLORIDE SCH DROP: 2.8 SUSPENSION/ DROPS OPHTHALMIC at 20:06

## 2021-03-29 RX ADMIN — BRIMONIDINE TARTRATE SCH DROP: 1.5 SOLUTION OPHTHALMIC at 08:46

## 2021-03-29 RX ADMIN — ACETAMINOPHEN PRN MG: 325 TABLET ORAL at 17:35

## 2021-03-29 RX ADMIN — MIDODRINE HYDROCHLORIDE SCH MG: 2.5 TABLET ORAL at 08:46

## 2021-03-29 RX ADMIN — MIDODRINE HYDROCHLORIDE SCH MG: 5 TABLET ORAL at 16:19

## 2021-03-29 RX ADMIN — FERRIC OXIDE RED PRN DOSE: 8; 8 LOTION TOPICAL at 16:00

## 2021-03-29 NOTE — CCN
CRITICAL CARE NOTE



DATE:  03/22/2021



SUBJECTIVE:  The patient was transferred to the ICU overnight with hypotension.

 She was started initially on vasopressin peripherally. After her central line

was placed this morning, she was started on Levophed. The patient was seen and

examined this morning during bedside rounds.  In the evening, the patient was

noted to be hypotensive with systolic blood pressures reportedly in the 70s. 

At the time, she was asymptomatic and denied any significant lightheadedness or

dizziness.  No chest pain or worsening shortness of breath. She did continue to

have some episodes of night sweats overnight, which she has had during this

admission.  She does have a history however, she states of menopause and so she

is unsure of some of those symptoms are also related.  She did not have any

fevers overnight.  She denies any chest pain currently, except when coughing

and she will have some discomfort in the left side of the chest. She denies any

abdominal pain currently and has not had any nausea or vomiting.  She does

continue to have generalized pruritus.  She is using calamine lotion now with

some mild improvement.  



OBJECTIVE: Vital signs:  Temperature 98, pulse 71, respirations 20, blood

pressure is 98/54, O2 saturation 98% on 2 liters nasal cannula and 96% on room

air.  In 2.0 liters, out 1.3 liters.  General:  The patient is lying in bed

flat.  She does not appear to be in acute respiratory distress.  She is able to

speak in full sentence and is not using any accessory muscles for respirations.

 HEENT:  Normocephalic, atraumatic. Pupils reactive to light. There is scleral

icterus noted.  There are moist mucous membranes. Neck is supple.  Trachea is

midline.  No palpable cervical adenopathy. Cardiovascular:  Regular rate and

rhythm. Normal S1, S2 with a questionable extra heart sounds.  No rubs or

gallops. Lungs:  Diminished breath sounds on the left, slightly increased from

prior.  There are some occasional crackles on the right with no rhonchi or

wheezing.  Abdomen:  Soft, mildly distended, is nontender to palpation.

Extremities: There is increased +1 to 2 pitting edema in the bilateral lower

extremities.  There is evidence of excoriation and scab lesions on her arms and

legs.



LABORATORY DATA:  WBC 2.8, hemoglobin 8.3, platelets are 66.  Chemistry: 

Sodium is 131, potassium is 4.7, chloride is 102, bicarbonate is 24, BUN is 49,

creatinine 2.25, glucose 93, total bilirubin is 2.3, AST 21 and ALT 20, albumin

is 2.0. INR is 1.67.  



IMAGING:  Chest x-ray today showed increasing left pleural effusion with now a

moderate to large effusion. This is increased compared to the x-ray from

03-20,which was done post thoracentesis.  It is quite to the degree that it was

on presentation.  There is some shift of the mediastinum to the right. 



ASSESSMENT: Ms. Taylor is a 61-year-old female with a history of alcoholic

cirrhosis with portal hypertension, esophageal varices, history of hepatic

encephalopathy and recurrent ascites with a left-sided hepatic hydrothorax

requiring therapeutic thoracentesis and drainage every two weeks. The patient

presented with worsening shortness of breath and dyspnea and was found to have

a large left-sided pleural effusion with mediastinal shift to the right, which

appeared larger than her usual pleural effusion compared to previous imaging. 

She did also have worsening acute kidney injury (BAM) on chronic kidney disease

(CKD) and her diuretic dose had been decreased as an outpatient for a few weeks

prior to presentation as well. 



1. Recurrent left-sided hepatic hydrothorax requiring repeat therapeutic

thoracentesis every two weeks as an outpatient with IR.  The patient presented

with increasing left pleural effusion with worsening shortness of breath and

dyspnea compared to her baseline.  She was not due for another thoracentesis

procedure until 03-24.  However on x-ray, her effusion was now large and

causing significant mediastinal shift to the right.  



The patient had a thoracentesis performed on 03-20-21 with removal of almost

1.7 liters of cloudy, orange-red tinged fluid. There was still some fluid

remaining, but she did not have further fluid taken out with the thoracentesis

given concern for reexpansion pulmonary edema.  



The patient's pleural fluid studies were consistent with SBP.  The pleural

fluid was transudative, but she did have significantly increased WBC and the

PMC is consistent with the diagnosis of spontaneous bacterial peritonitis,

particularly as her pleural effusion is actually thought to be ascites with

hepatic hydrothorax.  The patient was started initially on vancomycin and

ciprofloxacin given her Penicillin allergy.  With her worsening hypotension

overnight, her antibiotics were brought in this morning to meropenem and she

was continued on vancomycin. 



The patient's chest x-ray this morning also shows more rapid re-accumulation of

her left pleural effusion.  Given her concern for hypotension related to sepsis

and the more rapid re-accumulation of her effusion, will get IR to place a

pigtail cathete likely tomorrow for periodic drainage of her pleural effusion.



The patient is currently on room air.  Will continue monitoring and maintain O2

saturations above 90%. 



2. Acute kidney injury on chronic kidney disease (CKD) with possible

hepatorenal syndrome.  The patient was initially given albumin, as well as

midodrine for her hepatorenal syndrome.  Her creatinine however has not

improved, although she is still making urine.  She did also continue to receive

albumin after her thoracentesis procedure.  Her albumin level today is 3.0; and

after discussion with nephrology, given her anemia, will transfuse one unit of

packed red blood cells (PRBC) to help with volume repletion and renal

perfusion. 



Given the patient's hypotension,  I suspect she does have a mild degree of

shock related to her sepsis on top of her chronic hypotension with her chronic

liver failure.  Will discontinue the vasopressin and start the patient on

Levophed instead for her hypotension.  She did have a right IJ triple lumen

placed. will titrate to maintain a MAP above 70 



Will continue to monitor her renal function and input and output. She does have

some increased edema today. However, would hold off on diuresis at this time

given her worsening renal function. 



Will continue to monitor electrolytes and replete as needed.  



3. History of cirrhosis with portal hypertension. No prior history of hepatic

encephalopathy. The patient had a previous history of mild thrombocytopenia,

although today she appears to be more pancytopenic.  Her worsening

thrombocytopenic is likely in the setting of sepsis. She does also have more

leukopenia as well, likely secondary to sepsis.



Will continue to monitor her platelets. Her heparin for deep venous thrombosis

(DVT) prophylaxis was on hold today. Will continue to monitor her anemia. She

does not appear to have any active acute bleeding at this time, but will give

her one unit of packed red blood cells (PRBC), more for volume repletion with

her hepatorenal syndrome. 



The patient is on rifaximin still for her hepatic encephalopathy.  Her

lactulose is on hold. 



The patient is on opioids for her pain.  



The patient is pending evaluation when she arrive at [place] for potential

transplant.  She has been sober now for almost 9 months.



Deep venous thrombosis (DVT) prophylaxis: CAYLA and SCDs.



CODE STATUS:  Full code.  

MTDD

## 2021-03-29 NOTE — CCN
CRITICAL CARE NOTE



DATE:  03/29/2021



SUBJECTIVE: The patient is seen in the intensive care unit. This is hospital

day #9. Continues to experience copious chest tube drainage. She is somnolent

and uncomfortable. 



OBJECTIVE:

VITAL SIGNS: At bedside, her temperature is 98, pulse rate 74, respirations 18,

blood pressure 103/62, saturation of oxygen 95%. 

INTAKE AND OUTPUT: For the past 24 hours, 1362 in and 1880 out. Since midnight,

zero in and 260 out. Central venous pressure is 7. Chest tube drainage for the

past 24 hours 1125 mL. 

GENERAL APPEARANCE: She is ill-appearing and cachectic. 

HEENT: Her mucosa is dry. 

NECK: Supple. No meningismus. 

HEART: Sounds are regular without appreciable murmur. 

LUNGS: Breath sounds are diminished with chest tube draining yellow fluid in

the Pleur-Evac.

ABDOMEN: Soft with ascites. 

EXTREMITIES: Cool. Muscle weakness is noted. Pulses are diminished, but

palpable. 



DIAGNOSTIC STUDIES: Assessed include a CBC showing a white count that is stable

at 4.4, hemoglobin stable at 10.6, hematocrit 32.6, platelet count remains low

at 102,000. Differential white cell count shows 58% neutrophils. The

electrolytes were sodium 139, potassium 3.8, chloride 110, CO2 of 23, BUN 32,

creatinine down to 1.6, glucose 86. Calcium is 7.6 on an albumin of 2.7.

Phosphorus 2.8. Bilirubin is 2.3. AST 25, ALT 18, alkaline phosphatase 68. The

LDH is 182. 



MEDICATIONS: On review, she is receiving lactulose 15 b.i.d. and rifaximin.

Continues on a Levophed drip and midodrine. This is day #7 of vancomycin and

subcutaneous heparin is 5000 q. 12 hours. 



ASSESSMENT AND PLAN:

1.  The primary problem requiring critical attention is hypotension. Despite

    midodrine, her blood pressure is low requiring Levophed to achieve a mean

    arterial pressure of 65. All things considered, we will reduce her target

    to 60 in an effort to wean away from the Levophed.

2.  Fluid volume. The patient's central venous pressure (CVP) is just 7. Chest

    tube output was greater than a liter. I will give another 25 grams of 25%

    albumin and recheck the CVP. 

3.  Cirrhosis with hydrothorax. Perhaps we will need to consider a PleurX

    catheter.

4.  Infectious disease. This is day #7 of vancomycin. Reculture of the pleural

    fluid is pending.

5.  Acute kidney injury. The patient's creatinine is better at 1.6 today.

6.  Deep vein thrombosis (DVT) prophylaxis being addressed with subcutaneous

    heparin.

7.  Ulcer prophylaxis being addressed with Protonix.  

8.  Nutritional support. The patient is taking nutrition orally at this point. 



Her overall condition is critical. Prognosis is guarded. 



CRITICAL CARE TIME: 65 minutes was spent in the provision of bedside critical

care and coordination, exclusive of any time spent in the performance of

procedures.

## 2021-03-29 NOTE — IPNPDOC
Text Note


Date of Service


The patient was seen on 3/29/21.





NOTE


Subjective:


She is once again awake and conversant. She is a good historian.  She is not 

complaining of any pain at this time, but continues to feel tired/fatigued. 

Otherwise, the remainder of her review systems is negative.





Objective:


Constitutional: Awake, in no apparent distress. Chronically ill appearing.


ENT: Sclera is icteric


Respiratory: She has a gurgling noise at the left inferior base with minimal 

crackles at the bases bilaterally, otherwise remainder of the lung fields are 

clear. No respiratory distress. No use of accessory muscles. Left chest pigtail 

catheter in place continues to drain


Cardiovascular: Regular rate and rhythm, systolic ejection murmur.


Gastrointestinal: Abdomen is soft, mildy distended, non tender, BS present. 


Musculoskeletal: Trace peripheral edema


Neurologic: No focal neurological deficit.  





Assessment/plan:





# Spontaneous bacterial peritonitis: Thoracocentesis 3/20/21 confirms SBP. She 

is on vancomycin (Started 3/20/21). Pleural fluid cultures grew staph epi 

appears to be pan sensitive. Repeat fluid cultures are pending.


# left pleural effusion: s/p pigtail catheter placement by IR 3/23/21. This is 

likely from SBP, hepatic hydrothorax. pulmonology is following. She put out over

1 L in the last 24 hours. 


# hypotension: With underlying history of chronic hypotension. Continues to take

midodrine. She was weaned off of levophed just this morning at approximately 8 

AM or so. Critical care team has readjusted the target MAP to 60, in the hopes 

that we can keep her off.


# Decompensated alcoholic liver cirrhosis: She has recurrent left hepatic 

hydrothorax and had been scheduled for therapeutic thoracocentesis with [facility] radiology outpatient every 2 weeks. Also has a history of portal 

hypertension, esophageal varices, recurrent ascites.


# BAM on CKD3: In the setting of sepsis and hypotension. Appears prerenal. 

Nephrology consult helping manage.


# Chronic pruritus: Likely secondary to her chronic kidney and end-stage liver 

disease. Supportive management.


# history of hepatic encephalopathy: on rifaximin. ammonia down trended.


# Chronic hyponatremia: Asymptomatic. Improved. Monitor. 


# Pancytopenia: Likely secondary to cirrhosis and hypersplenism. If platelets 

less than 50 or any signs of bleeding discontinue heparin and start sequentials


# Acute respiratory failure: Patient briefly intubated 3/25 and extubation on 

3/26. Continues to maintain oxygen saturations on room air.


# Possible seizure: EEG was negative for any epileptiform waves. Is unclear as 

to whether or not this was even a seizure at all. She was prophylactically 

started on Keppra, will discontinue at this time in light of findings on EEG.


# DVT prophylaxis: Heparin





DISPOSITION:


Her overall prognosis continues to be guarded/poor.  Ultimately, that which 

would be of most benefit to her would be a liver transplant. Once she is stable 

enough for transfer, then it would likely be appropriate to call around and see 

if a transplant facility would be willing to accept her.





VS,Fishbone, I+O


VS, Fishbone, I+O


Laboratory Tests


3/29/21 04:25











Vital Signs








  Date Time  Temp Pulse Resp B/P (MAP) Pulse Ox O2 Delivery O2 Flow Rate FiO2


 


3/29/21 15:00  66  90/53 (65)    


 


3/29/21 14:00     95 Room Air  


 


3/29/21 11:45 98.4  18     


 


3/26/21 14:15       8.0 28














I&O- Last 24 Hours up to 6 AM 


 


 3/29/21





 06:00


 


Intake Total 1022.35 ml


 


Output Total 850 ml


 


Balance 172.35 ml

















TIANNA BLAS DO               Mar 29, 2021 16:35

## 2021-03-29 NOTE — IPN
PROGRESS NOTE



DATE:  03/29/2021



SUBJECTIVE: Mrs. Taylor is seen this morning on her bedside in the Intensive

Care Unit. She is currently receiving IV albumin. Her blood pressure has

improved compared with yesterday. The patient is laying in her bed without any

acute distress. Her chest tube continues to drain fluid. 



OBJECTIVE: 

VITAL SIGNS: Temperature is 98.4 degrees Fahrenheit, heart rate is 70 per

minute and respiratory rate is 18 per minute. Blood pressure is 96/56 mmHg and

oxygen saturation 99% on room air. 

HEENT: Head is atraumatic.

NECK: Supple. JVD is difficult to be assessed.

HEART: Heart sounds are regular.

LUNGS: Slightly diminished breath sounds at the left base where she had a chest

tube.

ABDOMEN: Soft, distended with ascites and bowel sounds are normal. 

EXTREMITIES: Without any cyanosis or clubbing. 

NEUROLOGIC: She is awake and without any focal deficit.



LABORATORY DATA: Today's labs showed a WBC count of 4.4, hemoglobin 10.6 and

hematocrit 32.6. Sodium 139, potassium 3.8, CO2 23, BUN 32 and creatinine 1.61.

Glucose is 86. Calcium is 7.6. 



PROBLEMS:

  1.  Acute kidney injury superimposed on chronic kidney disease. Kidney

      function is probably close to baseline. At this point, she has no uremic

      symptoms. 

  2.  Anemia. Her anemia is also stable at present and does not need any urgent

      intervention. 

  3.  Left sided pleural effusion with translocated ascites. Her chest tube is

      draining fluid and lungs sound much clear now. Motivated for chest tube

      removal. 

  4.  Cirrhosis of liver with ascites. Her ascites has been stable at this

      point does not need any urgent paracentesis. 



From a renal standpoint, the patient has been doing reasonably well. Her kidney

function is stable at present.

## 2021-03-30 VITALS — SYSTOLIC BLOOD PRESSURE: 85 MMHG | DIASTOLIC BLOOD PRESSURE: 52 MMHG

## 2021-03-30 VITALS — SYSTOLIC BLOOD PRESSURE: 93 MMHG | DIASTOLIC BLOOD PRESSURE: 53 MMHG

## 2021-03-30 VITALS — DIASTOLIC BLOOD PRESSURE: 52 MMHG | SYSTOLIC BLOOD PRESSURE: 106 MMHG

## 2021-03-30 VITALS — DIASTOLIC BLOOD PRESSURE: 52 MMHG | SYSTOLIC BLOOD PRESSURE: 83 MMHG

## 2021-03-30 VITALS — DIASTOLIC BLOOD PRESSURE: 57 MMHG | SYSTOLIC BLOOD PRESSURE: 102 MMHG

## 2021-03-30 VITALS — DIASTOLIC BLOOD PRESSURE: 51 MMHG | SYSTOLIC BLOOD PRESSURE: 88 MMHG

## 2021-03-30 VITALS — DIASTOLIC BLOOD PRESSURE: 50 MMHG | SYSTOLIC BLOOD PRESSURE: 81 MMHG

## 2021-03-30 VITALS — SYSTOLIC BLOOD PRESSURE: 82 MMHG | DIASTOLIC BLOOD PRESSURE: 51 MMHG

## 2021-03-30 LAB
ALBUMIN SERPL BCG-MCNC: 2.8 GM/DL (ref 3.2–5.2)
ALT SERPL W P-5'-P-CCNC: 17 U/L (ref 12–78)
BASOPHILS # BLD AUTO: 0 10^3/UL (ref 0–0.2)
BASOPHILS NFR BLD AUTO: 0.7 % (ref 0–1)
BILIRUB SERPL-MCNC: 2 MG/DL (ref 0.2–1)
BUN SERPL-MCNC: 29 MG/DL (ref 7–18)
CALCIUM SERPL-MCNC: 7.6 MG/DL (ref 8.8–10.2)
CHLORIDE SERPL-SCNC: 107 MEQ/L (ref 98–107)
CHOLEST SERPL-MCNC: 88 MG/DL (ref ?–200)
CK SERPL-CCNC: 54 U/L (ref 26–192)
CO2 SERPL-SCNC: 21 MEQ/L (ref 21–32)
CREAT SERPL-MCNC: 1.86 MG/DL (ref 0.55–1.3)
EOSINOPHIL # BLD AUTO: 0.3 10^3/UL (ref 0–0.5)
EOSINOPHIL NFR BLD AUTO: 8.2 % (ref 0–3)
GFR SERPL CREATININE-BSD FRML MDRD: 29.3 ML/MIN/{1.73_M2} (ref 45–?)
GLUCOSE SERPL-MCNC: 73 MG/DL (ref 70–100)
HCT VFR BLD AUTO: 31 % (ref 36–47)
HGB BLD-MCNC: 10 G/DL (ref 12–15.5)
LDH SERPL L TO P-CCNC: 182 U/L (ref 84–246)
LYMPHOCYTES # BLD AUTO: 1 10^3/UL (ref 1.5–5)
LYMPHOCYTES NFR BLD AUTO: 23.2 % (ref 24–44)
MCH RBC QN AUTO: 30 PG (ref 27–33)
MCHC RBC AUTO-ENTMCNC: 32.3 G/DL (ref 32–36.5)
MCV RBC AUTO: 93.1 FL (ref 80–96)
MONOCYTES # BLD AUTO: 0.5 10^3/UL (ref 0–0.8)
MONOCYTES NFR BLD AUTO: 11.8 % (ref 2–8)
NEUTROPHILS # BLD AUTO: 2.3 10^3/UL (ref 1.5–8.5)
NEUTROPHILS NFR BLD AUTO: 55.6 % (ref 36–66)
PHOSPHATE SERPL-MCNC: 3.2 MG/DL (ref 2.5–4.9)
PLATELET # BLD AUTO: 102 10^3/UL (ref 150–450)
POTASSIUM SERPL-SCNC: 3.9 MEQ/L (ref 3.5–5.1)
PROT SERPL-MCNC: 4.9 GM/DL (ref 6.4–8.2)
RBC # BLD AUTO: 3.33 10^6/UL (ref 4–5.4)
SODIUM SERPL-SCNC: 137 MEQ/L (ref 136–145)
TRIGL SERPL-MCNC: 67 MG/DL (ref ?–150)
WBC # BLD AUTO: 4.1 10^3/UL (ref 4–10)

## 2021-03-30 RX ADMIN — BRIMONIDINE TARTRATE SCH DROP: 1.5 SOLUTION OPHTHALMIC at 08:45

## 2021-03-30 RX ADMIN — BRIMONIDINE TARTRATE SCH DROP: 1.5 SOLUTION OPHTHALMIC at 22:21

## 2021-03-30 RX ADMIN — FERRIC OXIDE RED PRN DOSE: 8; 8 LOTION TOPICAL at 03:05

## 2021-03-30 RX ADMIN — FERRIC OXIDE RED PRN DOSE: 8; 8 LOTION TOPICAL at 14:00

## 2021-03-30 RX ADMIN — LACTULOSE SCH ML: 10 SOLUTION ORAL at 08:44

## 2021-03-30 RX ADMIN — FOLIC ACID SCH MG: 1 TABLET ORAL at 08:44

## 2021-03-30 RX ADMIN — BETAXOLOL HYDROCHLORIDE SCH DROP: 2.8 SUSPENSION/ DROPS OPHTHALMIC at 22:21

## 2021-03-30 RX ADMIN — SODIUM CHLORIDE SCH UNITS: 4.5 INJECTION, SOLUTION INTRAVENOUS at 22:21

## 2021-03-30 RX ADMIN — FERRIC OXIDE RED PRN DOSE: 8; 8 LOTION TOPICAL at 22:59

## 2021-03-30 RX ADMIN — BETAXOLOL HYDROCHLORIDE SCH DROP: 2.8 SUSPENSION/ DROPS OPHTHALMIC at 08:45

## 2021-03-30 RX ADMIN — MIDODRINE HYDROCHLORIDE SCH MG: 5 TABLET ORAL at 13:14

## 2021-03-30 RX ADMIN — BRIMONIDINE TARTRATE SCH DROP: 1.5 SOLUTION OPHTHALMIC at 16:02

## 2021-03-30 RX ADMIN — DIPHENHYDRAMINE HYDROCHLORIDE PRN MG: 25 CAPSULE ORAL at 03:05

## 2021-03-30 RX ADMIN — MULTIPLE VITAMINS W/ MINERALS TAB SCH TAB: TAB at 08:45

## 2021-03-30 RX ADMIN — ACETAMINOPHEN PRN MG: 325 TABLET ORAL at 22:31

## 2021-03-30 RX ADMIN — SODIUM CHLORIDE SCH UNITS: 4.5 INJECTION, SOLUTION INTRAVENOUS at 08:44

## 2021-03-30 RX ADMIN — ACETAMINOPHEN PRN MG: 325 TABLET ORAL at 03:05

## 2021-03-30 RX ADMIN — Medication SCH UNITS: at 08:44

## 2021-03-30 RX ADMIN — MIDODRINE HYDROCHLORIDE SCH MG: 5 TABLET ORAL at 08:44

## 2021-03-30 RX ADMIN — LEVOTHYROXINE SODIUM SCH MCG: 112 TABLET ORAL at 05:53

## 2021-03-30 RX ADMIN — MIDODRINE HYDROCHLORIDE SCH MG: 5 TABLET ORAL at 16:09

## 2021-03-30 RX ADMIN — LACTULOSE SCH ML: 10 SOLUTION ORAL at 22:20

## 2021-03-30 RX ADMIN — LATANOPROST SCH DROP: 50 SOLUTION OPHTHALMIC at 22:21

## 2021-03-30 NOTE — CCN
PULMONARY CRITICAL CARE NOTE



DATE:  03/30/2021



SUBJECTIVE:  The patient is seen in the Intensive Care Unit. She remains

somewhat somnolent but has been off Levophed for the past 24 hours. 



OBJECTIVE: Her temperature is 98, pulse rate is 74, respirations are 18, blood

pressure is 81/51. I and O for the past 24 hours is 1518 in and 935 out, only

100 out the chest tube, since midnight 600 in, 165 out. She is ill-appearing

and cachectic. Her neck is supple. Heart sounds are regular. Breath sounds are

diminished, dull in the bases. There is a left pigtail catheter hooked to a

Pleur-Evac. Abdomen is soft with ascites. Extremities: Pulses are palpable x4. 



DIAGNOSTIC STUDIES:  Her white cell count is down to 4.1, hemoglobin is 10,

hematocrit is 31, platelet count is 106,000. Sodium is 137, potassium is 3.8,

chloride 107, CO2 21, BUN 29, creatinine 1.86. Glucose 73. AST is 23, ALT is

17, albumin is 2.8. Bilirubin is 2. Pleural fluid sampling showed no bacteria. 



ASSESSMENT:

  1.  Hypotension. Patient's blood pressure is adequate, albeit it low with

      just Midodrine. I will discontinue the Levophed. 

  2.  Cirrhosis with hepatic hydrothorax, drainage from the chest tube has been

      down significantly, perhaps the chest tube could be removed. 

  3.  Acute kidney injury, patient's function is approximately at baseline. 



From an Infectious Disease standpoint we could discontinue the Vancomycin at

any time as the fluid is no longer showing bacteria. 



The patient's underlying liver failure is of significant concern. I understand

she is to [facility]. From my perspective, she

could be moved out of the Intensive Care Unit at this point.

## 2021-03-30 NOTE — IPN
PROGRESS NOTE



DATE:  03/30/2021



SUBJECTIVE: Ms. Taylor is seen this morning on her bedside. She is feeling

discouraged today as she was told by hospitalist that her prognosis remains

poor. Patient is asking about liver transplant, why she is not being

transferred for liver transplant. She remains hypotensive and mostly lying in

the bed. She denies any dyspnea or chest pain. 



PHYSICAL EXAMINATION: 

VITALS: Temperature 98.0 degrees Fahrenheit, heart rate 74 per minute,

respiratory rate 18 per minute, blood pressure 81/50 mmHg and oxygen saturation

97% on room air.

INTAKE AND OUTPUT: Records from yesterday show a positive balance of 583 mL. 

HEENT: Head is atraumatic. Neck supple and without JVD or thyroid enlargement. 

LUNGS: Diminished breath sounds on the left side. She has a left-sided chest

tube in place.

HEART: Sounds are irregular at present.

ABDOMEN: Soft and nontender. Bowel sounds normal.

EXTREMITIES: Without any cyanosis or clubbing. 

NEUROLOGIC: She is awake and alert, without a focal deficit. 



LABORATORY STUDIES: Today's labs show WBC 4.1, hemoglobin 10.0, hematocrit 31,

platelets 102,000. Sodium 137, potassium 3.9, CO2 21, BUN 29, creatinine 1.86.

Total bilirubin 2.0, total protein 4.9 and albumin 2.8. 



PROBLEMS:  

  1.  Acute kidney injury superimposed on chronic kidney disease: Patient has

      mild fluctuations in her kidney function, but overall she has been stable

      with GFR between 29 and 33 mL for the last four days. Electrolytes are

      stable. She is currently not on any diuretic. 

  2.  Hepatic insufficiency: Patient has cirrhosis of liver with ascites. She

      is hoping to get liver transplant. Her blood pressure has been

      persistently low despite high dosed Midodrine. At present, we will

      continue with supportive care.

  3.  Hypotension: Patient has chronic persistent hypotension. She is receiving

      Midodrine 10 mg t.i.d. Her serum albumin is reasonable at 2.8 at this

      time.

## 2021-03-30 NOTE — REP
INDICATION:

hydropneumothorax chest tube .check resolution



COMPARISON:

03/28/2021



TECHNIQUE:

Portable AP view of the chest



FINDINGS:

Right IJ line in satisfactory position.

Pigtail catheter at the left base is again noted.



Opacity involving the left lower lobe is consistent with airspace disease and moderate

layering effusion which may be relatively similar to prior examination allowing for

current decreased inspiratory effort.  Subtle early right lower lobe atelectasis and

trace pleural reaction cannot be excluded.



Visualized portions of the mediastinum and cardiac silhouette are stable.  Skeletal

structures are intact.



IMPRESSION:

1. Opacity in the left lower lobe relatively similar to prior examination and again

consistent with areas of airspace disease and moderate pleural effusion.

2. Subtle early right basilar atelectasis cannot be excluded.







<Electronically signed by Yogi Glass > 03/30/21 0800

## 2021-03-30 NOTE — IPN
PROGRESS NOTE



DATE:  03/30/2021



SUBJECTIVE: The patient is seen and examined at the bedside. Chart has been

reviewed. She had no fever or chills. She complains of pruritus this morning

without trouble breathing, chest pain, pressure tightness, abdominal

distention. Patient denies nausea, vomiting, or abdominal pain. No other issues

per nursing overnight. She has been off Levophed drip since 11 a.m. yesterday.

Mean arterial pressure remains at 60 to 61. 



OBJECTIVE: 

VITAL SIGNS: Temperature is 97.4, pulse is 65, respiratory rate 22, blood

pressure 82/51. Mean arterial pressure is 61, 96% on room air.

GENERAL: The patient is awake, alert and oriented x3, answering questions

appropriately. 

HEENT: Anicteric. No jaundice. No use of respiratory accessory muscles. Moist

mucous membranes. Mild scleral icterus. 

NECK: No JVD. No thyromegaly. No cervical lymphadenopathy. 

HEART: S1 and S2 sinus rhythm. No rubs, murmurs or gallops.

LUNGS: Diminished with fine crackles at the right base.

ABDOMEN: Soft, slightly distended. Obese. Nontender. Positive bowel sounds. No

hepatosplenomegaly. No abdominal bruit. 

EXTREMITIES: 1+ pitting edema. 

SKIN: Multiple excoriated areas of bilateral upper extremities. 



LABORATORY DATA:  White count 4.1, hemoglobin is 10, hematocrit 31, platelet

count 102,000, previous white count of 102,000. Sodium of 137, potassium is

3.9, bicarbonate 21, chloride 107, BUN 29, creatinine 1.86, glucose of 73. 



INPUT AND OUTPUT: 1518 in, output of 935, positive 583. Chest tube drainage:

100 left posterior chest. Current weight is 81.6 kilos from previous 85.2

kilograms.



Chest x-ray on 3/28/21: Hydropneumothorax on the left decreased from previous.

No new infiltrate.



ASSESSMENT AND PLAN: This is a 61-year-old female with a history of alcohol

abuse, the last drink was nine months ago with decompensated liver cirrhosis

with portal hypertension, ascites, recurrent, admitted for spontaneous

bacterial peritonitis and ascites. Treated with Vancomycin, found to have left

hydropneumothorax secondary to renal failure status post pigtail catheter on

03/23, still draining. Had a seizure of unknown origin with negative EEG with

Keppra being discontinued. Patient was extubated on 03/26 after being intubated

for 24 hours status post seizure. She is currently off Levophed drip for

hypotension with acute on chronic kidney disease Stage III secondary to sepsis

and hypotension. Current issues are as follows:



  1.  Decompensated alcoholic liver cirrhosis with recurrent ascites and

      hypotension requiring Midodrine and Levophed drip. Patient had an acute on

      chronic renal failure managed by Nephrology for fluid balance, now off

      Levophed for the past 24 hours. Patient is referred to [facility] in [place]. Currently on maintenance lactulose, Rifaximin, not

      on diuretics due to low blood pressure.

  2.  Acute kidney injury on chronic kidney disease Stage III due to

      hypotensive from decompensated liver cirrhosis and third spacing with

      anasarca and sepsis most likely due to prerenal decreased circulating

      volume managed by nephrologist, Dr. Olivia.

  3.  Spontaneous bacterial peritonitis, on Vancomycin which was started on

      03/20/2021. Microbiology was reviewed and shows Staph Epi in the pleural

      fluid. 

  4.  Left hydropneumothorax, currently with Staph Epi on IV Vancomycin.

  5.  Chronic anemia, currently stable, asymptomatic and does not require blood

      transfusion. 

  6.  Acute hypoxic respiratory failure secondary to postictal and seizure

      activity of unknown etiology status post Keppra, status post intubation

      and extubation for 24 hours that has been stable on room air. 

  7.  Hepatic encephalopathy, resolved, currently on Rifaximin, IV Vancomycin

      for spontaneous bacterial peritonitis with Staph Epi found on pleural

      fluid.  

  8.  History of esophageal varices with no acute bleeding. 

  9.  Alcohol abuse. The last drink was about nine months ago.

  10.DVT prophylaxis, compression stockings.

  11.Chronic thrombocytopenia due to liver disease. 

  12.Chronic pruritus due to liver disease.

  13.History of possible seizure. EEG was negative. Patient was on Keppra, now

     off Keppra. 

  14.Chronic hyponatremia secondary to liver disease and decompensated liver

     cirrhosis, stable. 



   Patient may be transferred to PCU since she has been off Levophed for 24

   hours.  

MTDD

## 2021-03-31 VITALS — SYSTOLIC BLOOD PRESSURE: 87 MMHG | DIASTOLIC BLOOD PRESSURE: 52 MMHG

## 2021-03-31 VITALS — SYSTOLIC BLOOD PRESSURE: 98 MMHG | DIASTOLIC BLOOD PRESSURE: 60 MMHG

## 2021-03-31 VITALS — SYSTOLIC BLOOD PRESSURE: 89 MMHG | DIASTOLIC BLOOD PRESSURE: 51 MMHG

## 2021-03-31 VITALS — DIASTOLIC BLOOD PRESSURE: 56 MMHG | SYSTOLIC BLOOD PRESSURE: 92 MMHG

## 2021-03-31 VITALS — SYSTOLIC BLOOD PRESSURE: 86 MMHG | DIASTOLIC BLOOD PRESSURE: 54 MMHG

## 2021-03-31 VITALS — DIASTOLIC BLOOD PRESSURE: 59 MMHG | SYSTOLIC BLOOD PRESSURE: 111 MMHG

## 2021-03-31 VITALS — DIASTOLIC BLOOD PRESSURE: 52 MMHG | SYSTOLIC BLOOD PRESSURE: 89 MMHG

## 2021-03-31 LAB
ALBUMIN SERPL BCG-MCNC: 2.5 GM/DL (ref 3.2–5.2)
ALT SERPL W P-5'-P-CCNC: 18 U/L (ref 12–78)
BASOPHILS # BLD AUTO: 0 10^3/UL (ref 0–0.2)
BASOPHILS NFR BLD AUTO: 0.6 % (ref 0–1)
BILIRUB SERPL-MCNC: 1.7 MG/DL (ref 0.2–1)
BUN SERPL-MCNC: 31 MG/DL (ref 7–18)
CALCIUM SERPL-MCNC: 7.9 MG/DL (ref 8.8–10.2)
CHLORIDE SERPL-SCNC: 107 MEQ/L (ref 98–107)
CHOLEST SERPL-MCNC: 86 MG/DL (ref ?–200)
CK SERPL-CCNC: 49 U/L (ref 26–192)
CO2 SERPL-SCNC: 22 MEQ/L (ref 21–32)
CREAT SERPL-MCNC: 1.96 MG/DL (ref 0.55–1.3)
EOSINOPHIL # BLD AUTO: 0.3 10^3/UL (ref 0–0.5)
EOSINOPHIL NFR BLD AUTO: 6.6 % (ref 0–3)
GFR SERPL CREATININE-BSD FRML MDRD: 27.6 ML/MIN/{1.73_M2} (ref 45–?)
GLUCOSE SERPL-MCNC: 81 MG/DL (ref 70–100)
HCT VFR BLD AUTO: 30.7 % (ref 36–47)
HGB BLD-MCNC: 10.1 G/DL (ref 12–15.5)
LDH SERPL L TO P-CCNC: 168 U/L (ref 84–246)
LYMPHOCYTES # BLD AUTO: 1 10^3/UL (ref 1.5–5)
LYMPHOCYTES NFR BLD AUTO: 21.2 % (ref 24–44)
MCH RBC QN AUTO: 30.5 PG (ref 27–33)
MCHC RBC AUTO-ENTMCNC: 32.9 G/DL (ref 32–36.5)
MCV RBC AUTO: 92.7 FL (ref 80–96)
MONOCYTES # BLD AUTO: 0.5 10^3/UL (ref 0–0.8)
MONOCYTES NFR BLD AUTO: 10.1 % (ref 2–8)
NEUTROPHILS # BLD AUTO: 2.9 10^3/UL (ref 1.5–8.5)
NEUTROPHILS NFR BLD AUTO: 61.3 % (ref 36–66)
PHOSPHATE SERPL-MCNC: 3.6 MG/DL (ref 2.5–4.9)
PLATELET # BLD AUTO: 101 10^3/UL (ref 150–450)
POTASSIUM SERPL-SCNC: 4.2 MEQ/L (ref 3.5–5.1)
PROT SERPL-MCNC: 5.1 GM/DL (ref 6.4–8.2)
RBC # BLD AUTO: 3.31 10^6/UL (ref 4–5.4)
SODIUM SERPL-SCNC: 136 MEQ/L (ref 136–145)
TRIGL SERPL-MCNC: 57 MG/DL (ref ?–150)
WBC # BLD AUTO: 4.7 10^3/UL (ref 4–10)

## 2021-03-31 RX ADMIN — FERRIC OXIDE RED PRN DOSE: 8; 8 LOTION TOPICAL at 21:22

## 2021-03-31 RX ADMIN — BETAXOLOL HYDROCHLORIDE SCH DROP: 2.8 SUSPENSION/ DROPS OPHTHALMIC at 21:15

## 2021-03-31 RX ADMIN — Medication SCH UNITS: at 09:07

## 2021-03-31 RX ADMIN — LEVOTHYROXINE SODIUM SCH MCG: 112 TABLET ORAL at 06:37

## 2021-03-31 RX ADMIN — BRIMONIDINE TARTRATE SCH DROP: 1.5 SOLUTION OPHTHALMIC at 21:15

## 2021-03-31 RX ADMIN — SODIUM CHLORIDE SCH ML: 9 INJECTION, SOLUTION INTRAMUSCULAR; INTRAVENOUS; SUBCUTANEOUS at 14:00

## 2021-03-31 RX ADMIN — ACETAMINOPHEN PRN MG: 325 TABLET ORAL at 12:07

## 2021-03-31 RX ADMIN — BETAXOLOL HYDROCHLORIDE SCH DROP: 2.8 SUSPENSION/ DROPS OPHTHALMIC at 09:08

## 2021-03-31 RX ADMIN — MIDODRINE HYDROCHLORIDE SCH MG: 5 TABLET ORAL at 09:07

## 2021-03-31 RX ADMIN — LACTULOSE SCH ML: 10 SOLUTION ORAL at 21:14

## 2021-03-31 RX ADMIN — SODIUM CHLORIDE SCH UNITS: 4.5 INJECTION, SOLUTION INTRAVENOUS at 09:00

## 2021-03-31 RX ADMIN — LATANOPROST SCH DROP: 50 SOLUTION OPHTHALMIC at 21:15

## 2021-03-31 RX ADMIN — BRIMONIDINE TARTRATE SCH DROP: 1.5 SOLUTION OPHTHALMIC at 15:24

## 2021-03-31 RX ADMIN — LACTULOSE SCH ML: 10 SOLUTION ORAL at 09:06

## 2021-03-31 RX ADMIN — FOLIC ACID SCH MG: 1 TABLET ORAL at 09:07

## 2021-03-31 RX ADMIN — SODIUM CHLORIDE SCH UNITS: 4.5 INJECTION, SOLUTION INTRAVENOUS at 21:17

## 2021-03-31 RX ADMIN — SODIUM CHLORIDE SCH ML: 9 INJECTION, SOLUTION INTRAMUSCULAR; INTRAVENOUS; SUBCUTANEOUS at 21:17

## 2021-03-31 RX ADMIN — MIDODRINE HYDROCHLORIDE SCH MG: 5 TABLET ORAL at 12:01

## 2021-03-31 RX ADMIN — BRIMONIDINE TARTRATE SCH DROP: 1.5 SOLUTION OPHTHALMIC at 09:08

## 2021-03-31 RX ADMIN — SODIUM CHLORIDE SCH UNITS: 4.5 INJECTION, SOLUTION INTRAVENOUS at 21:40

## 2021-03-31 RX ADMIN — MULTIPLE VITAMINS W/ MINERALS TAB SCH TAB: TAB at 09:07

## 2021-03-31 RX ADMIN — FERRIC OXIDE RED PRN DOSE: 8; 8 LOTION TOPICAL at 14:37

## 2021-03-31 RX ADMIN — MIDODRINE HYDROCHLORIDE SCH MG: 5 TABLET ORAL at 15:24

## 2021-03-31 NOTE — IPN
PROGRESS NOTE



DATE:  03/31/2021



SUBJECTIVE: Ms. Taylor is seen this morning on her bedside. She is laying in

the bed without any acute distress. Her chest tube on the left side is still

present. Patient reports that she is still draining quite a bit of fluid. She

denies any dyspnea, chest pain, nausea or vomiting. Her remains low despite

Midodrine use. 



OBJECTIVE: 

VITAL SIGNS: Temperature is 99.5 degrees Fahrenheit, heart rate is 75 per

minute and respiratory rate is 14 per minute. Blood pressure is 87/52 mmHg and

oxygen saturation 96% on room air.

HEAD AND NECK: Head is atraumatic. Neck is supple. JVD is not abnormally

elevated. 

HEART: Heart sounds are regular.

LUNGS: Diminished breath sounds on the left side. Chest tube is present on the

left posterior chest.

ABDOMEN: Soft and nontender. Bowel sounds are normal.

EXTREMITIES: Without any cyanosis or clubbing. She does not have any peripheral

edema.



LABORATORY DATA: Today's labs shows a WBC count of 4.7, hemoglobin 10.1 and

hematocrit 30.7. Sodium is 136, potassium is 4.2, CO2 is 22, BUN 31 and

creatinine 1.96. Glucose is 81 and calcium is 7.9. Total bilirubin was 1.7, AST

is 18, ALT is 18 and alkaline phosphatase is 83. Total protein is 2.5 and

albumin is 5.1. 



PROBLEMS:

  1.  Acute kidney injury superimposed on chronic kidney disease. Her kidney

      function did improve and she was receiving IV albumin, now it is slightly

      worse, probably related to intravascular volume depletion. Overall, her

      electrolytes are stable and we will continue to monitor. 

  2.  Anemia. Her anemia has been stable and there is no need for a

      transfusion. 

  3.  Hypotension, this is a chronic issue and she should continue with

      Midodrine 10 mg t.i.d. 

  4.  Cirrhosis of liver with recurrent ascites and left pleural effusion. She

      is still draining from her left chest tube which is mostly likely ascites

      translocating to her chest. She does not have any significant

      accumulation of abdominal ascites at this time.

## 2021-03-31 NOTE — IPNPDOC
Date Seen


The patient was seen on 3/31/21.





Progress Note


please have unit clerk


call [employer] at [phone] 


to stat transcribed progress note by Dr. Covarrubias if needed urgently.





Job#[id number]





VS, I&O, 24H, Fishbone


Vital Signs/I&O





Vital Signs








  Date Time  Temp Pulse Resp B/P (MAP) Pulse Ox O2 Delivery O2 Flow Rate FiO2


 


3/31/21 08:00 99.5 75 14 87/52 (64) 96 Room Air  


 


3/26/21 14:15       8.0 28














I&O- Last 24 Hours up to 6 AM 


 


 3/31/21





 06:00


 


Intake Total 1020 ml


 


Output Total 1215 ml


 


Balance -195 ml











Laboratory Data


24H LABS


Laboratory Tests 2


3/31/21 06:42: 


Immature Granulocyte % (Auto) 0.2, Neutrophils (%) (Auto) 61.3, Lymphocytes (%) 

(Auto) 21.2L, Monocytes (%) (Auto) 10.1H, Eosinophils (%) (Auto) 6.6H, Basophils

(%) (Auto) 0.6, Neutrophils # (Auto) 2.9, Lymphocytes # (Auto) 1.0L, Monocytes #

(Auto) 0.5, Eosinophils # (Auto) 0.3, Basophils # (Auto) 0.0, Nucleated Red 

Blood Cells % (auto) 0.0, Anion Gap 7L, Glomerular Filtration Rate 27.6L, 

Calcium Level 7.9L, Phosphorus Level 3.6, Total Bilirubin 1.7H, Aspartate Amino 

Transf (AST/SGOT) 18, Alanine Aminotransferase (ALT/SGPT) 18, Alkaline 

Phosphatase 83, Lactate Dehydrogenase 168, Total Creatine Kinase 49, Total 

Protein 5.1L, Albumin 2.5L, Albumin/Globulin Ratio 1.0L, Triglycerides Level 57,

Cholesterol Level 86


CBC/BMP


Laboratory Tests


3/31/21 06:42








Microbiology





Microbiology


3/28/21 Gram Stain - Final, Complete











MARKIE COVARRUBIAS MD            Mar 31, 2021 10:40

## 2021-03-31 NOTE — IPN
PROGRESS NOTE



DATE:  03/31/2021



SUBJECTIVE: The patient is seen and examined at the bedside. Chart has been

reviewed. She complains of insomnia unable to sleep all night long. Denies

nausea, vomiting, abdominal pain, worsening abdominal distention. She complains

of decrease in urine output. Is and Os reviewed shows output of 1.015 mL

overnight. She remains in positive fluid balance. Current weight is 82.4 kg

with peak weight of 89.6. She denies any shortness of breath, chest pain,

pressure, tightness, fever, chills, or cough. No other issues per nursing

overnight. 



OBJECTIVE: 

VITAL SIGNS: Temperature 99.5, pulse 75, respiratory rate 14, blood pressure

87/52, mean arterial pressure of 64, 96% on room air. 

GENERAL: The patient is awake, alert, oriented x3, slightly anxious, and

tearful. No respiratory distress or use of respiratory accessory muscles. Able

to speak in full sentences. 

LUNGS: Diminished breath sounds. Left chest tube in place. No wheezing or rales.

HEART: S1, S2. Regular rate and rhythm.

ABDOMEN: Soft, nontender, and nondistended with positive bowel sounds x4

quadrants. No rebound or guarding. No fluid wave.

EXTREMITIES: No cyanosis or clubbing.

SKIN: Warm, dry, and well perfuse. Pink in color.

NEUROLOGIC: The patient is appropriate, awake, alert, and oriented to person,

place, and time. No focal deficits. Motor function is 5/5 x4 extremities. No

sensory disturbance. 



LABORATORY DATA: CBC, metabolic panel, liver function tests, and microbiology

have been reviewed. 



ASSESSMENT AND PLAN: This is a 61-year-old female with decompensated liver

cirrhosis secondary to alcohol, chronic kidney disease stage 3, chronic

hypotension, recurrent ascites, and portal hypertension admitted due to

complaints of chills and tremors found to have spontaneous bacterial

peritonitis, acute hepatic encephalopathy, and ascites with left-sided

hydropneumothorax requiring chest tube placement with pigtail cath placed on

03/23, complicated by seizure of unknown origin with negative EEG previously

started on Keppra, extubated 03/26 after post-seizure intubation for 24 hours.

The patient required Levophed drop, but maintained mean arterial pressure at 60

to 64 on midodrine and transferred out of the ICU on 03/30/2021. Current issues

are decompensated alcohol liver cirrhosis with recurrent ascites, hypotension,

and portal hypertension requiring midodrine status post Levophed drip. Fluid

management per nephrology due to acute on chronic renal failure. The patient

has an appointment on 04/15 at [facility] in [place] currently

on maintenance lactulose, rifaximin, not on diuretics due to low blood

pressure, and acute kidney injury on chronic kidney disease stage 3. 

1.  Acute kidney injury on chronic kidney disease stage 3 due to decompensated

    liver cirrhosis with third spacing, anasarca, and hypotension. The patient

    is managed by nephrologist, Dr. Olivia. Avoiding nephrotoxins and renally

    dosing all medication. 

2.  Spontaneous bacterial peritonitis status post antibiotics completed a full

    course.

3.  Left hydropneumothorax with Staph epidermidis on pleural fluid completed 10

    days of vancomycin. Still with a chest tube managed by pulmonary.

4.  Chronic anemia, stable and asymptomatic. Does not require any immediate

    blood transfusion.

5.  Acute hypoxic respiratory failure secondary to postictal state from seizure

    activity status post Keppra, intubation, and extubation for 24 hours has

    been stable on room air since.

6.  Hepatic encephalopathy secondary to spontaneous bacterial peritonitis

    (SBP), acute on chronic renal failure, and decompensating liver cirrhosis

    with hydropneumothorax resolved. The patient is currently back to her

    baseline mentation.

7.  History of esophageal varices without acute bleeding. 

8.  History of alcohol abuse. Last drink was nine months ago.

9.  Chronic thrombocytopenia due to liver cirrhosis. 

10.  Chronic pruritus due to chronic liver disease. 

11.  Possible seizure. EEG was negative. Previously on Keppra and off Keppra

     now with no recurrent episodes.

12.  Chronic hyponatremia due to liver disease and decompensating liver

     cirrhosis. 



Disposition: Await removal of the chest tube and discharge home once euvolemic.

Defer to nephrology for diuretic needs. 

MTDD

## 2021-04-01 VITALS — SYSTOLIC BLOOD PRESSURE: 92 MMHG | DIASTOLIC BLOOD PRESSURE: 51 MMHG

## 2021-04-01 VITALS — SYSTOLIC BLOOD PRESSURE: 84 MMHG | DIASTOLIC BLOOD PRESSURE: 40 MMHG

## 2021-04-01 VITALS — SYSTOLIC BLOOD PRESSURE: 84 MMHG | DIASTOLIC BLOOD PRESSURE: 60 MMHG

## 2021-04-01 VITALS — SYSTOLIC BLOOD PRESSURE: 95 MMHG | DIASTOLIC BLOOD PRESSURE: 51 MMHG

## 2021-04-01 VITALS — SYSTOLIC BLOOD PRESSURE: 138 MMHG | DIASTOLIC BLOOD PRESSURE: 78 MMHG

## 2021-04-01 VITALS — SYSTOLIC BLOOD PRESSURE: 96 MMHG | DIASTOLIC BLOOD PRESSURE: 54 MMHG

## 2021-04-01 LAB
ALBUMIN SERPL BCG-MCNC: 2.8 GM/DL (ref 3.2–5.2)
ALT SERPL W P-5'-P-CCNC: 18 U/L (ref 12–78)
BASOPHILS # BLD AUTO: 0 10^3/UL (ref 0–0.2)
BASOPHILS NFR BLD AUTO: 0.7 % (ref 0–1)
BILIRUB SERPL-MCNC: 2 MG/DL (ref 0.2–1)
BUN SERPL-MCNC: 30 MG/DL (ref 7–18)
CALCIUM SERPL-MCNC: 7.9 MG/DL (ref 8.8–10.2)
CHLORIDE SERPL-SCNC: 106 MEQ/L (ref 98–107)
CO2 SERPL-SCNC: 20 MEQ/L (ref 21–32)
CREAT SERPL-MCNC: 1.96 MG/DL (ref 0.55–1.3)
EOSINOPHIL # BLD AUTO: 0.2 10^3/UL (ref 0–0.5)
EOSINOPHIL NFR BLD AUTO: 4 % (ref 0–3)
GFR SERPL CREATININE-BSD FRML MDRD: 27.6 ML/MIN/{1.73_M2} (ref 45–?)
GLUCOSE SERPL-MCNC: 106 MG/DL (ref 70–100)
HCT VFR BLD AUTO: 31.8 % (ref 36–47)
HGB BLD-MCNC: 10.5 G/DL (ref 12–15.5)
LYMPHOCYTES # BLD AUTO: 1.1 10^3/UL (ref 1.5–5)
LYMPHOCYTES NFR BLD AUTO: 20.5 % (ref 24–44)
MAGNESIUM SERPL-MCNC: 2.7 MG/DL (ref 1.8–2.4)
MCH RBC QN AUTO: 30.1 PG (ref 27–33)
MCHC RBC AUTO-ENTMCNC: 33 G/DL (ref 32–36.5)
MCV RBC AUTO: 91.1 FL (ref 80–96)
MONOCYTES # BLD AUTO: 0.5 10^3/UL (ref 0–0.8)
MONOCYTES NFR BLD AUTO: 9.1 % (ref 2–8)
NEUTROPHILS # BLD AUTO: 3.6 10^3/UL (ref 1.5–8.5)
NEUTROPHILS NFR BLD AUTO: 65.2 % (ref 36–66)
PLATELET # BLD AUTO: 108 10^3/UL (ref 150–450)
POTASSIUM SERPL-SCNC: 4.2 MEQ/L (ref 3.5–5.1)
PROT SERPL-MCNC: 5.3 GM/DL (ref 6.4–8.2)
RBC # BLD AUTO: 3.49 10^6/UL (ref 4–5.4)
SODIUM SERPL-SCNC: 136 MEQ/L (ref 136–145)
TROPONIN I SERPL-MCNC: < 0.02 NG/ML (ref ?–0.1)
WBC # BLD AUTO: 5.5 10^3/UL (ref 4–10)

## 2021-04-01 RX ADMIN — BRIMONIDINE TARTRATE SCH DROP: 1.5 SOLUTION OPHTHALMIC at 21:32

## 2021-04-01 RX ADMIN — LACTULOSE SCH ML: 10 SOLUTION ORAL at 08:18

## 2021-04-01 RX ADMIN — SODIUM CHLORIDE SCH ML: 9 INJECTION, SOLUTION INTRAMUSCULAR; INTRAVENOUS; SUBCUTANEOUS at 06:00

## 2021-04-01 RX ADMIN — BRIMONIDINE TARTRATE SCH DROP: 1.5 SOLUTION OPHTHALMIC at 08:20

## 2021-04-01 RX ADMIN — SODIUM CHLORIDE SCH ML: 9 INJECTION, SOLUTION INTRAMUSCULAR; INTRAVENOUS; SUBCUTANEOUS at 21:36

## 2021-04-01 RX ADMIN — LIDOCAINE SCH PATCH: 50 PATCH CUTANEOUS at 08:19

## 2021-04-01 RX ADMIN — FOLIC ACID SCH MG: 1 TABLET ORAL at 08:19

## 2021-04-01 RX ADMIN — SODIUM CHLORIDE SCH ML: 9 INJECTION, SOLUTION INTRAMUSCULAR; INTRAVENOUS; SUBCUTANEOUS at 14:46

## 2021-04-01 RX ADMIN — Medication SCH: at 21:35

## 2021-04-01 RX ADMIN — MIDODRINE HYDROCHLORIDE SCH MG: 5 TABLET ORAL at 08:19

## 2021-04-01 RX ADMIN — MIDODRINE HYDROCHLORIDE SCH MG: 5 TABLET ORAL at 11:47

## 2021-04-01 RX ADMIN — MIDODRINE HYDROCHLORIDE SCH MG: 5 TABLET ORAL at 15:21

## 2021-04-01 RX ADMIN — LEVOTHYROXINE SODIUM SCH MCG: 112 TABLET ORAL at 06:00

## 2021-04-01 RX ADMIN — Medication SCH UNITS: at 08:19

## 2021-04-01 RX ADMIN — BRIMONIDINE TARTRATE SCH DROP: 1.5 SOLUTION OPHTHALMIC at 15:21

## 2021-04-01 RX ADMIN — BETAXOLOL HYDROCHLORIDE SCH DROP: 2.8 SUSPENSION/ DROPS OPHTHALMIC at 21:33

## 2021-04-01 RX ADMIN — BETAXOLOL HYDROCHLORIDE SCH DROP: 2.8 SUSPENSION/ DROPS OPHTHALMIC at 08:20

## 2021-04-01 RX ADMIN — MULTIPLE VITAMINS W/ MINERALS TAB SCH TAB: TAB at 08:19

## 2021-04-01 RX ADMIN — LACTULOSE SCH ML: 10 SOLUTION ORAL at 21:34

## 2021-04-01 RX ADMIN — LATANOPROST SCH DROP: 50 SOLUTION OPHTHALMIC at 21:33

## 2021-04-01 NOTE — IPN
PROGRESS NOTE







DATE:  04/01/2021



SUBJECTIVE: The patient complained of chest pain yesterday.  Chest CT repeated

showed no pulmonary embolism, trace left pneumothorax, small left pleural

effusion with pigtail chest tube in place, cirrhosis and portal venous

hypertension and ascites.  She denies any fevers or chills. She has occasional

cough nonproductive. No dizziness, lightheadedness despite blood pressure of 84

systolic. The patient is on Midodrine 10 mg three times a day with a mean

arterial pressure of 55-68 at the bedside.



OBJECTIVE:  

Vital signs: Temperature 98.1, pulse 87, respiratory rate 18, blood pressure

96/54, 100% on room air. 

Generally the patient is awake, alert and oriented times three, tearful,

agitated.  

No conversational dyspnea or use of respiratory accessory muscles.

No jugular venous distention (JVD), thyromegaly or cervical lymphadenopathy. 

Chest tube in place. Lungs are diminished but clear.   

Heart: S1, S2 sinus rhythm. 

Abdomen is obese, soft, nontender, nondistended. Positive bowel sounds. 

Extremities:  No cyanosis, clubbing, or pitting edema.



LABORATORY DATA: White count 5, hemoglobin 10, hematocrit 31, platelet count

108. Sodium 136, potassium 4, chloride 106, bicarb 20, BUN 30, creatinine 1.96,

glucose of 106.



ASSESSMENT AND PLAN: This is a 61-year-old with a history of alcoholic liver

cirrhosis. Last drink was nine months ago. Chronic kidney disease stage III,

portal hypertension with recurrent ascites requiring multiple paracenteses,

complaint of chills and tremors presented to the ER, found to have spontaneous

bacterial peritonitis, acute hepatic encephalopathy, left-sided

hydropneumothorax requiring chest tube placement and pigtail catheters,

seizure, given Keppra, intubated post-seizure and extubated on 03/26, chronic

hypotension requiring Levophed drip, maintained now on midodrine and off

Levophed and transferred out of the ICU. 



CURRENT ISSUES:

1.  Decompensated alcohol liver cirrhosis with recurrent ascites.

2.  Hypertension. 

3.  Portal hypertension requiring midodrine.

4.  Fluid management per Nephrology due to low blood pressure. The patient's

    diuretics have not been resumed.

5.  Acute-on-chronic kidney disease stage III due to decompensated liver

    cirrhosis.

6.  Third spacing anasarca.

7.  Hypotension management by Dr. Ne, still off diuretics.

8.  Spontaneous bacterial peritonitis, improved and resolved.

9.  Left hydropneumothorax with Staph epi in the pleural fluid, completed ten

    days of Vancomycin.  Still with chest tube. 

10.  Chronic anemia not requiring blood transfusion.

11.  Chronic thrombocytopenia due to liver cirrhosis. Avoid Heparin. 

     Compression stockings for now.

12.  Hepatic encephalopathy, resolved on chronic Rifaximin and lactulose.

13.  History of esophageal varices without acute bleeding.

14.  History of alcohol abuse.  Last drink was nine months ago.

15.  Possible seizure. EEG was negative. Status post Keppra intubation and

     extubation for 24 hours.

MTDD

## 2021-04-01 NOTE — REPVR
PROCEDURE INFORMATION: 

Exam: CT Chest Without Contrast; Diagnostic 

Exam date and time: 4/1/2021 5:10 AM 

Age: 61 years old 

Clinical indication: Left-sided chest pain; Additional info: 10/10 L sided 

chest wall pain 



TECHNIQUE: 

Imaging protocol: Diagnostic computed tomography of the chest without contrast. 

3D rendering (Not supervised by radiologist): MIP and/or 3D reconstructed 

images were created by the technologist. 

Radiation optimization: All CT scans at this facility use at least one of these 

dose optimization techniques: automated exposure control; mA and/or kV 

adjustment per patient size (includes targeted exams where dose is matched to 

clinical indication); or iterative reconstruction. 



COMPARISON: 

CT Chest without contrast 11/25/2020 5:21 PM 



FINDINGS: 

Tubes, catheters and devices: Right internal jugular central venous catheter 

terminates in the distal superior vena cava. 



Lungs: Compressive atelectasis at the left base. Calcified granuloma in the 

right upper lobe. Linear atelectasis or scarring in the right middle lobe and 

lingula. 

Pleural spaces: Trace left pneumothorax and small left pleural effusion with 

with a pigtail chest tube place. 

Heart: Atherosclerotic disease of coronary arteries. 

Mediastinal space: Small hiatal hernia. 

Aorta: Unremarkable. No aortic aneurysm. 

Veins: Cirrhosis and evidence of portal venous hypertension. 

Lymph nodes: Unremarkable. No enlarged lymph nodes. 



Gallbladder and bile ducts: Cholelithiasis. 

Spleen: Splenomegaly. 

Intraperitoneal space: Ascites. 

Bones/joints: Multilevel degenerative disease of the thoracic spine. Mild 

dextroscoliosis. 

Soft tissues: Unremarkable. 



IMPRESSION: 

Trace left pneumothorax and small left pleural effusion with with a pigtail 

chest tube place. 



Cirrhosis and portal venous hypertension.  Ascites.



Electronically signed by: Amado Perez On 04/01/2021  07:19:46 AM

## 2021-04-01 NOTE — IPNPDOC
Date Seen


The patient was seen on 4/1/21.





Progress Note


Called to bedside by RN to assess patient for 10/10 sharp L sided chest pain, 

worse with breathing. Pain began suddenly overnight, worse with breathing. 

Saturating 98% on RA. Blood pressures 84/60 symmetrical bilaterally and 

consistent with reading during admissions. Patient has L sided chest tube in 

place, draining adequately. CXR stable. 





Patient has BAM on CKD which precludes from CTA with contrast. Low suspicion for

PE normal O2 saturation. Will check CT chest wo contrast.





VS, I&O, 24H, Fishbone


Vital Signs/I&O





Vital Signs








  Date Time  Temp Pulse Resp B/P (MAP) Pulse Ox O2 Delivery O2 Flow Rate FiO2


 


4/1/21 04:31    84/40 (55)    


 


4/1/21 00:00 97.4 72 19  98 Room Air  


 


3/26/21 14:15       8.0 28














I&O- Last 24 Hours up to 6 AM 


 


 4/1/21





 05:59


 


Intake Total 1040 ml


 


Output Total 1350 ml


 


Balance -310 ml











Laboratory Data


24H LABS


Laboratory Tests 2


4/1/21 05:35: 


Immature Granulocyte % (Auto) 0.5, Neutrophils (%) (Auto) 65.2, Lymphocytes (%) 

(Auto) 20.5L, Monocytes (%) (Auto) 9.1H, Eosinophils (%) (Auto) 4.0H, Basophils 

(%) (Auto) 0.7, Neutrophils # (Auto) 3.6, Lymphocytes # (Auto) 1.1L, Monocytes #

(Auto) 0.5, Eosinophils # (Auto) 0.2, Basophils # (Auto) 0.0, Nucleated Red 

Blood Cells % (auto) 0.0, Anion Gap 10, Glomerular Filtration Rate 27.6L, Lactic

Acid Level 1.9, Calcium Level 7.9L, Magnesium Level 2.7H, Total Bilirubin 2.0H, 

Aspartate Amino Transf (AST/SGOT) 24, Alanine Aminotransferase (ALT/SGPT) 18, 

Alkaline Phosphatase 86, Troponin I < 0.02, Total Protein 5.3L, Albumin 2.8L, 

Albumin/Globulin Ratio 1.1L


CBC/BMP


Laboratory Tests


4/1/21 05:35








Microbiology





Microbiology


3/28/21 Gram Stain - Final, Complete











ARVIN LEVIN MD        Apr 1, 2021 07:09

## 2021-04-01 NOTE — IPN
PROGRESS NOTE



DATE:  04/01/2021



Ms. Taylor is seen this morning on her bedside. She is resting in the bed

comfortably. She reports that she did not have a good night last night due to

some incidents with nursing staff. She still has left-sided chest tube in

place, which is draining light yellow-colored fluid. Patient had a chest CT

scan repeated just this morning, which did show a small amount of left pleural

effusion and ascites. She also has a small amount of left pneumothorax. 



PHYSICAL EXAMINATION: Temperature 98 degrees Fahrenheit,  heart rate 88 per

minute, respiratory rate 18 per minute, blood pressure 96/54 mmHg,  and oxygen

saturation 100% on room air. Head is atraumatic. Neck supple, and jugular

venous distention (JVD) difficult to be assessed. Heart sounds are regular and

lungs with diminished breath sounds on left side. Abdomen soft, distended with

ascites, nontender. Bowel sounds are normal. Extremities without any cyanosis

or clubbing. Neurologic: She is awake and at her baseline mentation.



Today's labs show WBC count 5.5, hemoglobin 10.5, hematocrit 31.8, platelets

108. Sodium 136, potassium 4.2, CO2 of 20, BUN 30, and creatinine 1.96. 



PROBLEMS:

1. Acute kidney injury superimposed on chronic kidney disease. No change in

kidney function noticed in last 24 hours. Initially she did have some

improvement in kidney function when she was receiving intravenous (IV) albumin,

however, seems to be leveled off now. Electrolytes are stable.



2. Anemia. Her anemia has been stable and does not need any urgent intervention.



3. Cirrhosis of liver with recurrent ascites and left pleural effusion. Chest

tube is draining left-sided pleural effusion. Her ascites is recurring and

likely to require paracentesis again. Patient is hoping to go to [place] for

evaluation for liver transplant.



While now from a renal standpoint patient has been doing well, and I am not

contributing much to her care, I am signing off her case. Please do not

hesitate to call me back should you need any further assistance.

## 2021-04-01 NOTE — REPVR
PROCEDURE INFORMATION: 

Exam: XR Chest 

Exam date and time: 4/1/2021 2:53 AM 

Age: 61 years old 

Clinical indication: Other: Pigtail; Additional info: Pigtail catether and SOB 



TECHNIQUE: 

Imaging protocol: XR of the chest 

Views: 1 view. 



COMPARISON: 

CR PORTABLE CHEST X-RAY 3/30/2021 7:50 AM 



FINDINGS: 

Tubes, catheters and devices: Right internal jugular central venous catheter 

terminates in the distal superior vena cava.  Percutaneous pigtail catheter 

projects over the left base.



Lungs: Unremarkable. No consolidation. 

Pleural spaces: Small left pleural effusion. 

Heart/Mediastinum: Unremarkable. No cardiomegaly. 

Bones/joints: Osteopenia. Degenerative changes in the bilateral 

acromioclavicular joints. 



IMPRESSION: 

Small left pleural effusion. Percutaneous pigtail catheter projects over the 

left base.



Electronically signed by: Amado Perez On 04/01/2021  03:53:10 AM

## 2021-04-01 NOTE — ECGEPIP
[facility]

                                       

                                       Test Date:    2021

Pat Name:     JAMEL HARVEY             Department:   

Patient ID:   [id number]                 Room:         [place] [address]

Gender:       Female                   Technician:   GIULIA PAUL RN

:          1960               Requested By: ARVIN LEVIN 

Order Number: [id number]     Reading MD:   Eila Theodore

                                 Measurements

Intervals                              Axis          

Rate:         80                       P:            70

IL:           144                      QRS:          63

QRSD:         84                       T:            68

QT:           408                                    

QTc:          470                                    

                           Interpretive Statements

Normal sinus rhythm

Delayed anterior R wave progression

Nonspecific ST-T wave abnormalities

Similar to tracing done 3-20-21

Electronically Signed on 2021 11:05:51 EDT by Elia Theodore

## 2021-04-02 VITALS — DIASTOLIC BLOOD PRESSURE: 61 MMHG | SYSTOLIC BLOOD PRESSURE: 104 MMHG

## 2021-04-02 VITALS — SYSTOLIC BLOOD PRESSURE: 101 MMHG | DIASTOLIC BLOOD PRESSURE: 52 MMHG

## 2021-04-02 VITALS — DIASTOLIC BLOOD PRESSURE: 58 MMHG | SYSTOLIC BLOOD PRESSURE: 100 MMHG

## 2021-04-02 RX ADMIN — SODIUM CHLORIDE SCH ML: 9 INJECTION, SOLUTION INTRAMUSCULAR; INTRAVENOUS; SUBCUTANEOUS at 14:42

## 2021-04-02 RX ADMIN — Medication SCH: at 21:54

## 2021-04-02 RX ADMIN — MIDODRINE HYDROCHLORIDE SCH MG: 5 TABLET ORAL at 12:37

## 2021-04-02 RX ADMIN — LACTULOSE SCH ML: 10 SOLUTION ORAL at 21:54

## 2021-04-02 RX ADMIN — BRIMONIDINE TARTRATE SCH DROP: 1.5 SOLUTION OPHTHALMIC at 16:55

## 2021-04-02 RX ADMIN — SODIUM CHLORIDE SCH ML: 9 INJECTION, SOLUTION INTRAMUSCULAR; INTRAVENOUS; SUBCUTANEOUS at 06:41

## 2021-04-02 RX ADMIN — MULTIPLE VITAMINS W/ MINERALS TAB SCH TAB: TAB at 08:05

## 2021-04-02 RX ADMIN — BRIMONIDINE TARTRATE SCH DROP: 1.5 SOLUTION OPHTHALMIC at 08:06

## 2021-04-02 RX ADMIN — LATANOPROST SCH DROP: 50 SOLUTION OPHTHALMIC at 21:55

## 2021-04-02 RX ADMIN — BETAXOLOL HYDROCHLORIDE SCH DROP: 2.8 SUSPENSION/ DROPS OPHTHALMIC at 21:55

## 2021-04-02 RX ADMIN — DIPHENHYDRAMINE HYDROCHLORIDE PRN MG: 25 CAPSULE ORAL at 21:54

## 2021-04-02 RX ADMIN — SODIUM CHLORIDE SCH ML: 9 INJECTION, SOLUTION INTRAMUSCULAR; INTRAVENOUS; SUBCUTANEOUS at 21:56

## 2021-04-02 RX ADMIN — LEVOTHYROXINE SODIUM SCH MCG: 112 TABLET ORAL at 06:41

## 2021-04-02 RX ADMIN — LACTULOSE SCH ML: 10 SOLUTION ORAL at 08:06

## 2021-04-02 RX ADMIN — MIDODRINE HYDROCHLORIDE SCH MG: 5 TABLET ORAL at 08:05

## 2021-04-02 RX ADMIN — FOLIC ACID SCH MG: 1 TABLET ORAL at 08:05

## 2021-04-02 RX ADMIN — MIDODRINE HYDROCHLORIDE SCH MG: 5 TABLET ORAL at 16:55

## 2021-04-02 RX ADMIN — Medication SCH UNITS: at 08:05

## 2021-04-02 RX ADMIN — DIPHENHYDRAMINE HYDROCHLORIDE PRN MG: 25 CAPSULE ORAL at 08:19

## 2021-04-02 RX ADMIN — BRIMONIDINE TARTRATE SCH DROP: 1.5 SOLUTION OPHTHALMIC at 21:55

## 2021-04-02 RX ADMIN — LIDOCAINE SCH PATCH: 50 PATCH CUTANEOUS at 08:06

## 2021-04-02 RX ADMIN — BETAXOLOL HYDROCHLORIDE SCH DROP: 2.8 SUSPENSION/ DROPS OPHTHALMIC at 08:06

## 2021-04-02 NOTE — IPN
PROGRESS NOTE



DATE:  04/02/2021



SUBJECTIVE: The patient denies fever, chills, or cough. She complains of slight

pain at the chest tube site on the back left. She also has noticed increased

abdominal distention and feeling slightly heavier, but no nausea or vomiting. 



OBJECTIVE: 

VITAL SIGNS: Temperature 98.3, pulse 73, respiratory rate 17, blood pressure

101/52, 95% on room air.

GENERAL: Awake, alert, and oriented x3.

HEENT: No jaundice, pallor, cyanosis, or use of respiratory accessory muscles.

LUNGS: Clear to auscultation with no wheezing or rales. Left-sided thoracotomy

tube is noted.

HEART: S1, S2, sinus rhythm. 

ABDOMEN: Soft, obese, and just slightly distended. No rebound or guarding.

Positive fluid wave. 

EXTREMITIES: Positive edema 1+. 



LABORATORY DATA: White count 5.5, hemoglobin 10, hematocrit 31, platelet count

108,000 with previous platelet count of 101,000. Sodium 136, potassium 4.2,

chloride 106, bicarb 20, BUN 30, creatinine 1.96, glucose of 106. 



ASSESSMENT AND PLAN: This is a 61-year-old female with history of alcoholic

liver cirrhosis, portal hypertension, recurrent ascites requiring paracentesis,

chronic kidney disease stage 3, last drink was nine months ago. Presented to

the emergency room with acute hepatic encephalopathy, spontaneous bacterial

peritonitis, and left-sided hydropneumothorax secondary to liver cirrhosis with

anasarca. The patient had a seizure of unknown etiology and was intubated for

one day, extubated on 03/26, and was given Keppra, which has since been

discontinued. She had chronic hypotension requiring intensive care unit (ICU)

admission with Levophed drip, now off Levophed drip and now on midodrine with

stable mean arterial pressure of 60 to 68. 



Current issues:

1.  Decompensated alcoholic liver cirrhosis with recurrent ascites and portal

    hypertension status post spontaneous bacterial peritonitis (SBP) requiring

    intravenous (IV) antibiotics, which she has completed.

2.  Chronic low blood pressure still on midodrine 10 mg t.i.d. 

3.  Acute on chronic stage 3 to 4 renal failure secondary to decompensated

    liver cirrhosis, third spacing, and anasarca.

4.  Left-sided hydropneumothorax with Staphylococcus epidermidis in fluid.

    Completed 10 days of vancomycin still with chest tube managed by pulmonary.

5.  Anemia of chronic disease. 

6.  Chronic thrombocytopenia due to liver cirrhosis avoiding heparin due to

    coagulopathy from liver cirrhosis with elevated INR and thrombocytopenia.

    Compression stockings for deep vein thrombosis (DVT) prophylaxis for now.

7.  Hepatic encephalopathy has resolved. Still on rifaximin.

8.  History of esophageal varices without acute gastrointestinal (GI) bleed,

    stable. 

9.  History of alcohol abuse. Last drink was nine months ago.

10.  Seizure activity requiring intubation and extubation on 03/26. EEG was

     negative. Off of Keppra. 



PLAN: The patient is to be continued on present management with fluid managed

by nephrology and pigtain cath placed by Radiology but chest tube managed 

by pulmonary. The patient is currently

still medically unstable. Right internal jugular (IJ) had no signs of any acute

infection, cellulitis, induration, or crepitus. At this time, we are waiting

for the chest tube to be removed prior to discharge. She is off of her

diuretics due to low blood pressure, which she is maintaining with midodrine. 

Pt has intractable recurrent hydrothorax and usually has every 2wk 
thoracentesis.

 

MTDD

## 2021-04-02 NOTE — IPNPDOC
Date Seen


The patient was seen on 4/2/21.





Progress Note


Please  have unit clerk  call [facility] at [phone] for stat transcription 

if discharge summary is needed urgently.


Hospitalist discharge summary has been dictated 


Job number [id number]





VS, I&O, 24H, Fishbone


Vital Signs/I&O





Vital Signs








  Date Time  Temp Pulse Resp B/P (MAP) Pulse Ox O2 Delivery O2 Flow Rate FiO2


 


4/2/21 06:00 98.3 73 17 101/52 (68) 95 Room Air  














I&O- Last 24 Hours up to 6 AM 


 


 4/2/21





 05:59


 


Intake Total 2145 ml


 


Output Total 1800 ml


 


Balance 345 ml











Laboratory Data


Microbiology





Microbiology


3/28/21 Gram Stain - Final, Complete











MARKIE RAMOS MD             Apr 2, 2021 10:21

## 2021-04-03 VITALS — SYSTOLIC BLOOD PRESSURE: 124 MMHG | DIASTOLIC BLOOD PRESSURE: 68 MMHG

## 2021-04-03 VITALS — DIASTOLIC BLOOD PRESSURE: 59 MMHG | SYSTOLIC BLOOD PRESSURE: 107 MMHG

## 2021-04-03 VITALS — SYSTOLIC BLOOD PRESSURE: 131 MMHG | DIASTOLIC BLOOD PRESSURE: 62 MMHG

## 2021-04-03 VITALS — DIASTOLIC BLOOD PRESSURE: 68 MMHG | SYSTOLIC BLOOD PRESSURE: 100 MMHG

## 2021-04-03 LAB
ALBUMIN SERPL BCG-MCNC: 2.5 GM/DL (ref 3.2–5.2)
ALT SERPL W P-5'-P-CCNC: 22 U/L (ref 12–78)
BASOPHILS # BLD AUTO: 0 10^3/UL (ref 0–0.2)
BASOPHILS NFR BLD AUTO: 0.6 % (ref 0–1)
BILIRUB SERPL-MCNC: 1.7 MG/DL (ref 0.2–1)
BUN SERPL-MCNC: 37 MG/DL (ref 7–18)
CALCIUM SERPL-MCNC: 7.9 MG/DL (ref 8.8–10.2)
CHLORIDE SERPL-SCNC: 104 MEQ/L (ref 98–107)
CO2 SERPL-SCNC: 21 MEQ/L (ref 21–32)
CREAT SERPL-MCNC: 2.19 MG/DL (ref 0.55–1.3)
EOSINOPHIL # BLD AUTO: 0.3 10^3/UL (ref 0–0.5)
EOSINOPHIL NFR BLD AUTO: 5.3 % (ref 0–3)
GFR SERPL CREATININE-BSD FRML MDRD: 24.3 ML/MIN/{1.73_M2} (ref 45–?)
GLUCOSE SERPL-MCNC: 129 MG/DL (ref 70–100)
HCT VFR BLD AUTO: 30.1 % (ref 36–47)
HGB BLD-MCNC: 9.8 G/DL (ref 12–15.5)
LYMPHOCYTES # BLD AUTO: 1.1 10^3/UL (ref 1.5–5)
LYMPHOCYTES NFR BLD AUTO: 22.4 % (ref 24–44)
MCH RBC QN AUTO: 30.5 PG (ref 27–33)
MCHC RBC AUTO-ENTMCNC: 32.6 G/DL (ref 32–36.5)
MCV RBC AUTO: 93.8 FL (ref 80–96)
MONOCYTES # BLD AUTO: 0.7 10^3/UL (ref 0–0.8)
MONOCYTES NFR BLD AUTO: 14.6 % (ref 2–8)
NEUTROPHILS # BLD AUTO: 2.7 10^3/UL (ref 1.5–8.5)
NEUTROPHILS NFR BLD AUTO: 56.9 % (ref 36–66)
NT-PRO BNP: 387 PG/ML (ref ?–125)
PLATELET # BLD AUTO: 112 10^3/UL (ref 150–450)
POTASSIUM SERPL-SCNC: 3.7 MEQ/L (ref 3.5–5.1)
PROT SERPL-MCNC: 5.2 GM/DL (ref 6.4–8.2)
RBC # BLD AUTO: 3.21 10^6/UL (ref 4–5.4)
SODIUM SERPL-SCNC: 135 MEQ/L (ref 136–145)
WBC # BLD AUTO: 4.7 10^3/UL (ref 4–10)

## 2021-04-03 RX ADMIN — BRIMONIDINE TARTRATE SCH DROP: 1.5 SOLUTION OPHTHALMIC at 20:01

## 2021-04-03 RX ADMIN — FOLIC ACID SCH MG: 1 TABLET ORAL at 08:57

## 2021-04-03 RX ADMIN — Medication SCH: at 20:02

## 2021-04-03 RX ADMIN — BETAXOLOL HYDROCHLORIDE SCH DROP: 2.8 SUSPENSION/ DROPS OPHTHALMIC at 08:56

## 2021-04-03 RX ADMIN — SODIUM CHLORIDE SCH ML: 9 INJECTION, SOLUTION INTRAMUSCULAR; INTRAVENOUS; SUBCUTANEOUS at 20:03

## 2021-04-03 RX ADMIN — BRIMONIDINE TARTRATE SCH DROP: 1.5 SOLUTION OPHTHALMIC at 08:56

## 2021-04-03 RX ADMIN — DIPHENHYDRAMINE HYDROCHLORIDE PRN MG: 25 CAPSULE ORAL at 05:47

## 2021-04-03 RX ADMIN — BETAXOLOL HYDROCHLORIDE SCH DROP: 2.8 SUSPENSION/ DROPS OPHTHALMIC at 20:01

## 2021-04-03 RX ADMIN — LACTULOSE SCH ML: 10 SOLUTION ORAL at 08:56

## 2021-04-03 RX ADMIN — LACTULOSE SCH ML: 10 SOLUTION ORAL at 20:01

## 2021-04-03 RX ADMIN — MIDODRINE HYDROCHLORIDE SCH MG: 5 TABLET ORAL at 16:25

## 2021-04-03 RX ADMIN — Medication SCH UNITS: at 08:57

## 2021-04-03 RX ADMIN — MIDODRINE HYDROCHLORIDE SCH MG: 5 TABLET ORAL at 12:56

## 2021-04-03 RX ADMIN — DIPHENHYDRAMINE HYDROCHLORIDE PRN MG: 25 CAPSULE ORAL at 05:38

## 2021-04-03 RX ADMIN — MIDODRINE HYDROCHLORIDE SCH MG: 5 TABLET ORAL at 08:57

## 2021-04-03 RX ADMIN — BRIMONIDINE TARTRATE SCH DROP: 1.5 SOLUTION OPHTHALMIC at 16:26

## 2021-04-03 RX ADMIN — SODIUM CHLORIDE SCH ML: 9 INJECTION, SOLUTION INTRAMUSCULAR; INTRAVENOUS; SUBCUTANEOUS at 12:56

## 2021-04-03 RX ADMIN — LIDOCAINE SCH PATCH: 50 PATCH CUTANEOUS at 08:56

## 2021-04-03 RX ADMIN — LEVOTHYROXINE SODIUM SCH MCG: 112 TABLET ORAL at 05:38

## 2021-04-03 RX ADMIN — MULTIPLE VITAMINS W/ MINERALS TAB SCH TAB: TAB at 08:57

## 2021-04-03 RX ADMIN — LATANOPROST SCH DROP: 50 SOLUTION OPHTHALMIC at 20:01

## 2021-04-03 RX ADMIN — SODIUM CHLORIDE SCH ML: 9 INJECTION, SOLUTION INTRAMUSCULAR; INTRAVENOUS; SUBCUTANEOUS at 05:39

## 2021-04-03 NOTE — REP
INDICATION:

hydrothorax left hydroptx.



COMPARISON:

Chest CT dated 04/01/2021.



TECHNIQUE:

Chest CT without IV contrast.



FINDINGS:

The pigtail drainage catheter is again identified in the deep posterior sulcus of the

left hemithorax, as previously.

There is no pneumothorax.

The volume of pleural fluid appears increased from the comparison CT.

There is a right IJ central venous catheter with the tip in the superior vena cava,

unchanged.

The unenhanced thoracic aorta is unremarkable.  Cardiac size is upper normal.  There

is no pericardial effusion.

Upper abdomen:



Ascites is again identified surrounding the liver and spleen.  Spleen measures 14 cm

transversely and is enlarged.  This is unchanged.

The margin of the liver appears nodular it suggestive of cirrhosis.  These a sports of

the pancreas are unremarkable.  The adrenals are unremarkable.



IMPRESSION:

Pigtail drainage catheter in the deep posterior sulcus of the left hemithorax is

unchanged.

The volume of left pleural fluid appears  increased.

The right IJ central venous catheter is unchanged.

Upper abdominal ascites, hepatic cirrhosis and splenomegaly, unchanged.





<Electronically signed by Sushant Haley > 04/03/21 0965

## 2021-04-03 NOTE — CR
CONSULTATION

DATE: 04/03/2021



Patient seen at the request of Dr. Covarrubias of the hospitalist service for

management of a pigtail catheter placed by x-ray on 03/23/2021. 



HISTORY OF PRESENT ILLNESS:  Patient is known to me as a 61-year-old white

female who was in the hospital the beginning of December with a large

left-sided ascitic pleural effusion. She has known liver cirrhosis secondary to

alcohol. It should be noted that she has not had alcohol in about 9 months.

When she was in the hospital in September she was also again found to have a

large pleural effusion. At that point in time I placed a PleurX catheter

reluctantly, as I did not want to nutritionly deplete her. In any case, the

catheter became quite problematic with continued pain, excruciating, and I had

to eventually remove it. I then placed a pigtail catheter in her in December

and eventually removed that after her cirrhosis was brought under medical

control. She has not reaccumulated the pleural effusion and was admitted to

this hospital with spontaneous bacterial peritonitis (SBP). Pleural fluid

drained on March 20 grew a few Staphylococcus epidermidis. This is felt to be

consistent with SBP. 



Another pigtail catheter was then placed by radiology this admission and has

been shown to be in good position with the left chest fairly well drained on

chest x-ray. Over the last few days it has begun leaking around the tract. 



The latest chest x-ray on 04/01/2021 showed the left chest completely drained.

The costophrenic angle was off the film, but it looks fairly well evacuated. It

was done portably. 



When we discharged her in January after placing the pigtail catheter and then

removing it, the plan of management was to get her to [place] for a

transplant evaluation and to periodically drain her chest by thoracentesis over

ultrasonic control every 2 weeks. Now that her bacterial peritonitis has been

treated and brought under control, there is no need to continue the catheter,

although it is draining 750 mL a day. This will no doubt reaccumulate, and she

will need another thoracentesis every 2 weeks. I will therefore remove the

catheter. I expect it to leak from the tract in the next couple days. From a

thoracic surgery perspective, as her SBP is now under control, there is no

reason she should not be able to go home for [facility]. Her course has been

complicated by hypotension, although in the last few days she has been

normotensive. I do think diuretics ought to be restarted.

## 2021-04-03 NOTE — IPN
PROGRESS NOTE



DATE:  04/03/2021



SUBJECTIVE: Per nursing patient's pigtail catheter is leaking onto the

dressing. Patient denies any pleuritic chest pain, shortness of breath,

lightheadedness or dizziness. With midodrine patient's mean arterial pressure

has remained 72-85. She has no complaints of abdominal distention, nausea,

vomiting. 



OBJECTIVE: Vital signs: Temperature 97.2, pulse 81, respiratory rate 18, blood

pressure 131/62, 99 % on room air.

General: Awake, alert, oriented to person, place and time. No use of

respiratory accessory muscles. 

HEENT: Anicteric, no jaundice. 

Neck: No JVD, no thyromegaly or cervical lymphadenopathy. 

Lungs: No stridor. Lungs are diminished, but clear to auscultation. Left sided

pigtail catheter noted.

Heart: S1 and S2 sinus rhythm. 

Abdomen: Obese, soft, nontender, nondistended, positive bowel sounds.

Extremities: Positive edema 1+. 



LABORATORY DATA: Pending.



ASSESSMENT: This is a 51-year-old female with history of alcoholic liver

cirrhosis, portal hypertension, recurrent ascites requiring multiple

paracentesis, chronic kidney disease stage 3, last drink was 9 months ago,

admitted due to fever/chills at home, found to have spontaneous bacterial

peritonitis, acute hepatic encephalopathy with elevated ammonia, hydrothorax

secondary to decompensated liver cirrhosis with anasarca and acute on chronic

renal failure stage 3-4. Patient was intubated status post seizure on

03/25/2021 and extubated on 03/26/2021. Chest tube was placed with persistent

pigtail catheter to suction. Patient was given Keppra initially. Post seizure

EEG was negative and Keppra was discontinued. She has chronic hypotension on

chronic midodrine, but required Levophed drip in the ICU. Patient has been

stabilized and had been treated with 10 days of vancomycin for Staph

epidermidis in the pleural fluid thought to be secondary to ascites and

hydrothorax. 



CURRENT ISSUES:

1.  Left sided hydropneumothorax secondary to ascites.

2.  Decompensated alcoholic liver cirrhosis with recurrent ascites.

3.  Portal hypertension. 

4.  Spontaneous bacterial peritonitis, resolved.

5.  Chronic hypotension on chronic midodrine.

6.  Acute on chronic stage 3-4 kidney disease secondary to liver cirrhosis. 

7.  Anemia of chronic disease. 

8.  Chronic thrombocytopenia due to liver cirrhosis. 

9.  Acute hepatic encephalopathy secondary to SBP and decompensated liver

    cirrhosis, resolved.

10.  History of esophageal varices without acute GI bleed.

11.  History of alcohol abuse, last drink was 9 and 1/2 months ago.

12.  Seizure activity requiring intubation and extubation. On 03/26/2021 the

     EEG was negative off of Keppra. 



PLAN: Patient's pigtail catheter is leaking at the site. We will recheck a CT

chest. Pulmonary is not managing the chest tube and we will request thoracic

surgery for any new recommendations. She has completed antibiotics for SBP as

well as the Staph epidermidis found in the pleural fluid. She is currently

awaiting removal of the chest tube in order to be discharged home. She has not

been on any diuretics due to chronic low blood pressure. On midodrine. Defer to

nephrology for diuretic dose and frequency. Continue with present management

for now.

## 2021-04-03 NOTE — IPNPDOC
Date Seen


The patient was seen on 4/3/21.





Progress Note


PLS HAVE UNIT CLERK CALL [facility] [phone] TO STAT TRANSCRIBE 

HOSPITALIST PROGRESS NOTE DICTATION.


JOB #[id number]





VS, I&O, 24H, Fishbone


Vital Signs/I&O





Vital Signs








  Date Time  Temp Pulse Resp B/P (MAP) Pulse Ox O2 Delivery O2 Flow Rate FiO2


 


4/3/21 06:00 97.2 81 18 131/62 (85) 99 Room Air  














I&O- Last 24 Hours up to 6 AM 


 


 4/3/21





 06:00


 


Intake Total 2070 ml


 


Output Total 480 ml


 


Balance 1590 ml











Laboratory Data


Microbiology





Microbiology


3/28/21 Gram Stain - Final, Complete











MARKIE RAMOS MD             Apr 3, 2021 09:35

## 2021-04-04 VITALS — SYSTOLIC BLOOD PRESSURE: 100 MMHG | DIASTOLIC BLOOD PRESSURE: 50 MMHG

## 2021-04-04 VITALS — SYSTOLIC BLOOD PRESSURE: 126 MMHG | DIASTOLIC BLOOD PRESSURE: 81 MMHG

## 2021-04-04 VITALS — DIASTOLIC BLOOD PRESSURE: 54 MMHG | SYSTOLIC BLOOD PRESSURE: 135 MMHG

## 2021-04-04 RX ADMIN — FOLIC ACID SCH MG: 1 TABLET ORAL at 08:08

## 2021-04-04 RX ADMIN — LIDOCAINE SCH PATCH: 50 PATCH CUTANEOUS at 08:09

## 2021-04-04 RX ADMIN — DIPHENHYDRAMINE HYDROCHLORIDE PRN MG: 25 CAPSULE ORAL at 05:30

## 2021-04-04 RX ADMIN — LACTULOSE SCH ML: 10 SOLUTION ORAL at 08:08

## 2021-04-04 RX ADMIN — MULTIPLE VITAMINS W/ MINERALS TAB SCH TAB: TAB at 08:09

## 2021-04-04 RX ADMIN — MIDODRINE HYDROCHLORIDE SCH MG: 5 TABLET ORAL at 08:08

## 2021-04-04 RX ADMIN — BETAXOLOL HYDROCHLORIDE SCH DROP: 2.8 SUSPENSION/ DROPS OPHTHALMIC at 08:07

## 2021-04-04 RX ADMIN — LEVOTHYROXINE SODIUM SCH MCG: 112 TABLET ORAL at 05:30

## 2021-04-04 RX ADMIN — BRIMONIDINE TARTRATE SCH DROP: 1.5 SOLUTION OPHTHALMIC at 08:08

## 2021-04-04 RX ADMIN — SODIUM CHLORIDE SCH ML: 9 INJECTION, SOLUTION INTRAMUSCULAR; INTRAVENOUS; SUBCUTANEOUS at 05:32

## 2021-04-04 RX ADMIN — FERRIC OXIDE RED PRN DOSE: 8; 8 LOTION TOPICAL at 05:31

## 2021-04-04 RX ADMIN — Medication SCH UNITS: at 08:08

## 2021-04-04 NOTE — DS.PDOC
Discharge Summary


General


Date of Admission


Mar 20, 2021 at 05:45


Date of Discharge


4/4/21





Discharge Summary


Hospitalist discharge summary dictated job #[id number]. Please have the unit clerk 

call [facility] at [phone]  if summary is needed urgently for stat 

transcription





Vital Signs/I&Os





Vital Signs








  Date Time  Temp Pulse Resp B/P (MAP) Pulse Ox O2 Delivery O2 Flow Rate FiO2


 


4/4/21 07:48 98.8 90 18 100/50 (67) 100 Room Air  














I&O- Last 24 Hours up to 6 AM 


 


 4/4/21





 06:00


 


Intake Total 680 ml


 


Output Total 300 ml


 


Balance 380 ml











Laboratory Data


Labs 24H


Laboratory Tests 2


4/3/21 10:37: 


Immature Granulocyte % (Auto) 0.2, Neutrophils (%) (Auto) 56.9, Lymphocytes (%) 

(Auto) 22.4L, Monocytes (%) (Auto) 14.6H, Eosinophils (%) (Auto) 5.3H, Basophils

(%) (Auto) 0.6, Neutrophils # (Auto) 2.7, Lymphocytes # (Auto) 1.1L, Monocytes #

(Auto) 0.7, Eosinophils # (Auto) 0.3, Basophils # (Auto) 0.0, Nucleated Red 

Blood Cells % (auto) 0.0, Anion Gap 10, Glomerular Filtration Rate 24.3L, 

Calcium Level 7.9L, Total Bilirubin 1.7H, Aspartate Amino Transf (AST/SGOT) 29, 

Alanine Aminotransferase (ALT/SGPT) 22, Alkaline Phosphatase 102, NT-Pro-B-Type 

Natriuretic Peptide 387H, Total Protein 5.2L, Albumin 2.5L, Albumin/Globulin 

Ratio 0.9L


CBC/BMP


Laboratory Tests


4/3/21 10:37











Microbiology





Microbiology


3/28/21 Gram Stain - Final, Complete





Discharge Medications


Scheduled


Betaxolol Hcl (Betaxolol HCl) 0.5 % Sol, 1 DROP OU BID, (Reported)


Brimonidine Tartrate (Brimonidine Tartrate) 0.2% 5ML Drops, 1 DROP OU BID, 

(Reported)


Cholecalciferol (Vitamin D3) (Vitamin D3) 1,000 Unit Tablet, 1,000 UNITS PO D

AILY, (Reported)


Folic Acid (Folic Acid) 1 Mg Tablet, 1 MG PO DAILY, (Reported)


Lactulose (Lactulose) 10 Gm/15 Ml Solution, 30 ML PO BID, (Reported)


Latanoprost (Xalatan) 0.005% 2.5ML Drops, 1 DROP OU QHS, (Reported)


Levothyroxine Sodium (Synthroid) 112 Mcg Tablet, 112 MCG PO DAILY, (Reported)


Midodrine HCl (Midodrine HCl) 5 Mg Tablet, 10 MG PO TID


   0800/1200/1600 


Multivitamins (Thera M Plus Tablet) 1 Each Tablet, 1 TAB PO DAILY, (Reported)





Scheduled PRN


Hydroxyzine HCl (Hydroxyzine HCl) 25 Mg Tablet, 25 MG PO QHS PRN for SLEEP, 

(Reported)


Loratadine (Loratadine) 10 Mg Tablet, 10 MG PO DAILY PRN for ALLERGIES, 

(Reported)





Allergies


Coded Allergies:  


     cefdinir (Verified  Allergy, Severe, THROAT CLOSES, 5/27/20)


     Penicillins (Verified  Allergy, Unknown, UNKNOWN CHILDHOOD REACTION, 

5/27/20)











MARKIE RAMOS MD             Apr 4, 2021 08:15

## 2021-04-04 NOTE — DSES
DISCHARGE SUMMARY



DATE OF ADMISSION:  03/20/2021

DATE OF DISCHARGE:  04/04/2021



PRIMARY DISCHARGE DISAGNOSES:

1.  Decompensated liver cirrhosis with portal hypertension, anasarca.

2.  Left-sided hydropneumothorax.

3.  Acute-on-chronic renal failure, stage 4.

4.  Acute hypoxic respiratory failure requiring intubation and extubation on

    3/26/2021.

5.  History of esophageal varices.

6.  Recurrent left-sided hepatic hydrothorax, status post pigtail catheter with

    palliative left-sided thoracentesis every two weeks.

7.  Hypothyroidism. 

8.  Hyperlipidemia.

9.  Anxiety, depression.

10.  History of alcohol abuse. Last drink was 9-1/2 months ago.

11.  Anemia of chronic disease.

12.  Hyponatremia.

13.  Hypoalbuminemia.

14.  Staph epidermidis in pleural fluid.

15.  Spontaneous bacterial peritonitis.

16.  Acute hepatic encephalopathy.

17.  Chronic thrombocytopenia due to liver cirrhosis and decompensated liver

     disease.

18.  Seizure activity requiring intubation and extubation on 3/25, extubated on

     3/26/2021. EEG was negative. Currently not on anti-seizure medications.

19.  Chronic hypotension on chronic midodrine.



DISCHARGE MEDICATIONS:

1.  Betaxolol one drop, OU b.i.d. 

2.  Brimonidine one drop, OU b.i.d.

3.  Vitamin D 1000 units daily.

4.  Folic acid 1 mg daily.

5.  Hydroxyzine 25 mg q.h.s. as needed.

6.  Lactulose 30 mL p.o. b.i.d.

7.  Xalatan one drop, OU q.h.s.

8.  Synthroid 112 mcg daily.

9.  Loratadine 10 mg as needed.

10.  Midodrine 10 mg t.i.d. 

11.  Multivitamin one tablet daily.



The patient's spironolactone, torsemide and potassium have been discontinued.



DISCHARGE INSTRUCTIONS:

1.  Daily weights, 2 lb or more weight gain call nephrologist, Dr. Olivia, two

    liter fluid restriction, strict I&Os.

2.  One week followup with Dr. Olivia, Dr. Dunlap, primary care physician and

    GI, [facility] liver transplant facility as previously

    scheduled on April 15th.



PROCEDURES DURING THIS ADMISSION: 

1.  Right internal jugular vein triple lumen catheter insertion.

2.  Paracentesis on 3/23/2021 with pigtail catheter placed for the left-sided

    hydrothorax secondary to liver ascites.



CONSULTANTS DURING THIS ADMISSION:

1.  Pulmonologists, Dr. Dunlap, Dr. Sorto.

2.  Nephrologists, Dr. Olivia, Dr. Coreas.

3.  Thoracic surgeon, Dr. Manas Wiseman.



HOSPITAL COURSE: This is a 61-year-old  female with history of alcohol

abuse, last drink was 9-1/2 months ago, alcoholic liver cirrhosis with portal

hypertension, recurrent ascites requiring paracentesis and thoracentesis every

two weeks with hydrothorax secondary to liver cirrhosis with ascites and portal

hypertension, chronic kidney disease, stage 3, baseline creatinine of 1.5,

anxiety, depression, admitted to the hospital due to complaints of chills,

found to have spontaneous bacterial peritonitis that grew Staph epidermidis and

a left hydrothorax secondary to decompensated liver disease with portal

hypertension and ascites. The patient was treated with vancomycin and completed

antibiotics for both spontaneous bacterial peritonitis and Staph epidermidis on

pleural fluid, requiring a pigtail catheter placed by radiology and was placed

in good position. She had a seizure and was intubated for one day from 3/25 to

3/26, extubated successfully, was placed on Keppra initially but was

discontinued after a normal EEG. The patient had acute on chronic renal

failure. Baseline creatinine is 1.5, peaked at 2.19 with nephrology consulted

for fluid management. The patient had chronic low blood pressure despite

midodrine, required IV Levophed drip in the ICU for better perfusion. Her

diuretics were held. She was treated also for acute hepatic encephalopathy with

elevated ammonia level which improved with treatment for SBP and renal failure.

On 3/23/2021, the patient underwent ultrasound-guided left thoracentesis with

pigtail catheter placement with 1.5 liters of fluid withdrawn. She had a peak

weight, highest weight of 92.4 kilos and current weight of 83.4 kilograms on

the day of discharge. Creatinine remained at stage 4 with discharge creatinine

of 2.19 managed by nephrology. She has been off diuretics due to chronic low

blood pressure. Microbiology grew out Staph epidermidis on the pleural fluid.

Anaerobic culture was negative. Repeat chest CT on 4/3/2021 showed slight

increase in left pleural fluid. Right IJ central venous catheter was unchanged.

Pigtail drainage catheter was in the deep posterior sulcus of the left

hemithorax, remains unchanged, ascites, liver cirrhosis unchanged. Dr. Manas Wiseman discontinued the pigtail catheter and suggested schedule thoracentesis

as an outpatient and treatment for her chronic liver cirrhosis to decrease

frequency of decompensation with recurrent ascites. Throughout the entire

admission, the patient received 21 packs of 25% albumin, two fresh frozen

plasma and one RBC transfusion.



Laboratory data: See below. Imaging studies: See below.



PHYSICAL EXAMINATION ON DISCHARGE: 

Vital signs: Temperature 98.8, pulse 99, respiratory rate 18, blood pressure

100/50, 100% on room air. 

General: The patient is awake, alert, oriented to person, place and time,

slightly icteric, no jaundice, no use of respiratory accessory muscles, no JVD

or thyromegaly.

Lungs: Diminished, slight crackles at the left base. Upper lung fields are

clear.

Heart: S1, S2, sinus rhythm. 

Abdomen: Obese, soft, nontender, slightly distended. No rebound or guarding. No

fluid wave.

Extremities: 1+ pitting edema.



LABORATORY DATA: White count 4.7, hemoglobin 9.8, hematocrit 30, platelet count

112. Sodium 135, potassium 3.7, chloride 104, bicarbonate 21, BUN 37,

creatinine 2.19, glucose 129. 



Time spent on discharge: 30 minutes.

## 2021-04-04 NOTE — REP
INDICATION:

HYDROTHORAX FROM ASCITES/LIVER CIRRHOSIS S/P PIGTAIL CATH.



COMPARISON:

Portable chest dated 04/01/2021 and chest CT dated 04/03/2021



TECHNIQUE:

Upright PA and lateral chest.



FINDINGS:

The left pleural pigtail drainage catheter is no longer identified.

There is a tiny left apical pneumothorax.

The there is a left pleural effusion, not significantly changed.  The remainder of the

left lung is clear.

Right lung is clear.  Cardiac size is normal.

The right IJ central venous catheter tip remains in satisfactory position in the

superior vena cava.

Thoracic scoliosis convex right, unchanged.





IMPRESSION:

The left pleural pigtail drainage catheter has been removed.

There is a tiny left apical pneumothorax.

There is a persisting left pleural effusion.

The central venous catheter remains in satisfactory position, unchanged.





<Electronically signed by Sushant Haley > 04/04/21 0838

## 2021-04-08 ENCOUNTER — HOSPITAL ENCOUNTER (OUTPATIENT)
Dept: HOSPITAL 53 - M IRPRO | Age: 61
End: 2021-04-08
Attending: INTERNAL MEDICINE
Payer: SELF-PAY

## 2021-04-08 VITALS — SYSTOLIC BLOOD PRESSURE: 121 MMHG | DIASTOLIC BLOOD PRESSURE: 67 MMHG

## 2021-04-08 DIAGNOSIS — J90: Primary | ICD-10-CM

## 2021-04-08 LAB
APPEARANCE FLD: (no result)
COLOR PLR: YELLOW
SPECIMEN SOURCE FLD: (no result)

## 2021-04-08 PROCEDURE — 89051 BODY FLUID CELL COUNT: CPT

## 2021-04-08 PROCEDURE — 32555 ASPIRATE PLEURA W/ IMAGING: CPT

## 2021-04-08 PROCEDURE — 87205 SMEAR GRAM STAIN: CPT

## 2021-04-08 PROCEDURE — 87070 CULTURE OTHR SPECIMN AEROBIC: CPT

## 2021-04-08 NOTE — REP
INDICATION:

POST THORA, 2 VIEW.



COMPARISON:

04/04/2020.



TECHNIQUE:

Two views of the chest are performed status post left thoracentesis.



FINDINGS:

There is no pneumothorax.  There is a decreased amount of left pleural fluid with mild

residual pleural fluid/thickening.  There is mild dependent left basilar parenchymal

opacity.  Right lung appears clear.  The heart is not enlarged.  The mediastinal

silhouette is unchanged.



IMPRESSION:

No pneumothorax status post left thoracentesis, there is a decreased amount of left

pleural fluid.





<Electronically signed by Sushant Khan > 04/08/21 [id number]

## 2021-04-08 NOTE — REP
INDICATION:

PLEURAL EFFUSION.



COMPARISON:

None.



TECHNIQUE:

The procedure was performed under the direct supervision of Dr. Khan.



The risks and benefits of the procedure were explained to the patient and informed

consent was obtained.



The left pleural effusion was localized using ultrasound guidance.  The skin was

prepped and draped in a sterile fashion.  1% lidocaine was used as a local anesthetic.

An 8 Syriac multi side hole catheter was inserted using trocar technique.  1500 cc of

sotero colored fluid was withdrawn with a sample sent to the lab for analysis.



The patient tolerated the procedure well and there were no immediate complications.

After the appropriate amount to monitor convalescence the patient was discharged from

the department.



FINDINGS:

None



IMPRESSION:

Ultrasound-guided left thoracentesis yielding 1500 cc of sotero colored fluid.



<Electronically signed by Surya Alvarado > 04/08/21 [id number]

<Electronically signed by Sushant Khan > 04/08/21 1706

## 2021-04-16 ENCOUNTER — HOSPITAL ENCOUNTER (OUTPATIENT)
Dept: HOSPITAL 53 - M IRPRO | Age: 61
End: 2021-04-16
Attending: INTERNAL MEDICINE
Payer: COMMERCIAL

## 2021-04-16 VITALS — SYSTOLIC BLOOD PRESSURE: 100 MMHG | DIASTOLIC BLOOD PRESSURE: 57 MMHG

## 2021-04-16 DIAGNOSIS — K70.31: Primary | ICD-10-CM

## 2021-04-16 PROCEDURE — 49083 ABD PARACENTESIS W/IMAGING: CPT

## 2021-04-16 NOTE — REP
INDICATION:

CIRRHOSIS OF LIVER W/ ASCITES



The patient has a history of ascites



COMPARISON:

None.



TECHNIQUE:

The procedure was performed by Miladis Angulo [facility],  under the direct

supervision of Dr. Khan



The risks and benefits of the procedure were explained to the patient and an informed

consent was obtained both verbally and written.  Directly prior to the start of the

procedure a formal time-out was completed in the procedure room.



The largest pocket of fluid was localized in the left flank using ultrasound guidance.

The skin was prepped and draped in a sterile fashion.  Eleven ML of buffered lidocaine

was used as a local anesthetic. An 8-Sami multi side-hole catheter was inserted

using trocar technique.



FINDINGS:

4600 mL of solid yellow ascites was removed and discarded.



The patient tolerated the procedure well and there were no immediate complications.

After the appropriate amount of monitored convalescence, the patient was discharged

from the department.



IMPRESSION:

Ultrasound-guided paracentesis with removal of 4600 mL of ascites.





<Electronically signed by Miladis Angulo > 04/16/21 1328

<Electronically signed by Sushant Khan > 04/16/21 4956

## 2021-04-22 ENCOUNTER — HOSPITAL ENCOUNTER (OUTPATIENT)
Dept: HOSPITAL 53 - M IRPRO | Age: 61
End: 2021-04-22
Attending: INTERNAL MEDICINE
Payer: COMMERCIAL

## 2021-04-22 VITALS — DIASTOLIC BLOOD PRESSURE: 56 MMHG | SYSTOLIC BLOOD PRESSURE: 101 MMHG

## 2021-04-22 DIAGNOSIS — J90: Primary | ICD-10-CM

## 2021-04-22 LAB
APPEARANCE FLD: (no result)
COLOR PLR: YELLOW
SPECIMEN SOURCE FLD: (no result)

## 2021-04-22 PROCEDURE — 89051 BODY FLUID CELL COUNT: CPT

## 2021-04-22 PROCEDURE — 32555 ASPIRATE PLEURA W/ IMAGING: CPT

## 2021-04-22 PROCEDURE — 87205 SMEAR GRAM STAIN: CPT

## 2021-04-22 PROCEDURE — 87070 CULTURE OTHR SPECIMN AEROBIC: CPT

## 2021-04-22 NOTE — REP
INDICATION:

POST THORA, 2 VIEW.



COMPARISON:

Comparison chest x-ray is from April 8, 2021..



TECHNIQUE:

PA and lateral views of the chest are obtained.  Patient is status post left

thoracentesis.



FINDINGS:

High there is blunting of the left lateral pleural angle similar to the prior study

with some pleural thickening along the left chest wall.  There is no evidence of

pneumothorax or other complication.  Right hemidiaphragm is somewhat elevated

unchanged.  There is a dextroconvex curve in the thoracic spine.  Heart is not felt to

be enlarged.



IMPRESSION:

Small left pleural effusion.  No evidence of complication.





<Electronically signed by Noam Deng > 04/22/21 8024

## 2021-04-22 NOTE — REP
INDICATION:

PLEURAL EFFUSION.



COMPARISON:

None.



TECHNIQUE:

The procedure was performed under the direct supervision of Dr. Deng.



The risks and benefits of the procedure were explained to the patient and informed

consent was obtained.



The left pleural effusion was localized using ultrasound guidance.  The skin was

prepped and draped in a sterile fashion.  1% lidocaine was used as a local anesthetic.

An 8 Mexican multi side hole catheter was inserted using trocar technique.  1000 cc of

yellow fluid was withdrawn with a sample sent to the lab for analysis.



The patient tolerated the procedure well and there were no immediate complications.

After the appropriate amount to monitor convalescence the patient was discharged from

the department.



FINDINGS:

None



IMPRESSION:

Ultrasound-guided left thoracentesis yielding 1000 cc of yellow fluid.



<Electronically signed by Surya Alvarado > 04/22/21 9121

<Electronically signed by Noam Deng > 04/22/21 7362

## 2021-04-29 ENCOUNTER — HOSPITAL ENCOUNTER (OUTPATIENT)
Dept: HOSPITAL 53 - M IRPRO | Age: 61
End: 2021-04-29
Attending: INTERNAL MEDICINE
Payer: COMMERCIAL

## 2021-04-29 VITALS — DIASTOLIC BLOOD PRESSURE: 59 MMHG | SYSTOLIC BLOOD PRESSURE: 113 MMHG

## 2021-04-29 DIAGNOSIS — N83.202: ICD-10-CM

## 2021-04-29 DIAGNOSIS — K57.30: ICD-10-CM

## 2021-04-29 DIAGNOSIS — J90: ICD-10-CM

## 2021-04-29 DIAGNOSIS — R16.1: ICD-10-CM

## 2021-04-29 DIAGNOSIS — K74.60: ICD-10-CM

## 2021-04-29 DIAGNOSIS — R18.8: Primary | ICD-10-CM

## 2021-04-29 PROCEDURE — 49083 ABD PARACENTESIS W/IMAGING: CPT

## 2021-04-29 NOTE — REP
INDICATION:

R/O FREE AIR BOWEL INJURY



COMPARISON:

01/16/2021.



TECHNIQUE:

CT Scan of the abdomen and pelvis was performed without intravenous contrast. Sagittal

and coronal reconstruction images performed.



FINDINGS:

Lung bases: There is a large left pleural effusion with adjacent patchy

atelectasis/infiltrate in the left lung, similar to the prior study.

Liver:  The liver is cirrhotic..  There is a large recanalized umbilical vein.

Gallbladder:  Unremarkable.

Spleen:  The spleen is enlarged measuring approximately 14.8 cm in length.

Adrenals:  Normal.

Pancreas:  Grossly unremarkable..

Kidneys:  No hydronephrosis or nephrolithiasis.  Ureters demonstrate no dilatation or

calculus.

Small and large bowel: There is diverticulosis of the sigmoid colon.  No bowel wall

thickening is seen.  There is no free intraperitoneal air or obstruction.

Free fluid:  There is mild perihepatic ascites and pelvic ascites.  There is diffuse

mesenteric edema.  There is diffuse edema in the abdominal wall..

Abdominal aorta: No aneurysm.

Adenopathy:  None.

Appendix: Not inflamed.

Osseous structures:  Unremarkable.

Pelvis:  There are a few cystic structures in the left ovary, the largest measures 4

cm in maximum diameter.  This is unchanged since the prior study..  No bladder

calculus seen.



IMPRESSION:

No bowel wall thickening.  No free intraperitoneal air or obstruction.



Stable large left pleural effusion with adjacent patchy atelectasis or infiltrate in

the left lung.



Cirrhotic liver.  Mild splenomegaly.  Mild abdominal and pelvic ascites.  Stable

cystic structures left ovary.





<Electronically signed by Sushant Khan > 04/29/21 [id number]

## 2021-04-29 NOTE — REP
INDICATION:

ASCITES



The patient has a history of ascites



COMPARISON:

None.



TECHNIQUE:

The procedure was performed by YONO Christy,  under the direct

supervision of Dr. Deng



The risks and benefits of the procedure were explained to the patient and an informed

consent was obtained both verbally and written.  Directly prior to the start of the

procedure a formal time-out was completed in the procedure room.



The largest pocket of fluid was localized in the left flank using ultrasound guidance.

The skin was prepped and draped in a sterile fashion.  Eleven ML of buffered lidocaine

was used as a local anesthetic. An 8-Albanian multi side-hole catheter was inserted

using trocar technique.  Toward the end of the procedure the catheter fell out.  A 8

Albanian pigtail catheter was inserted into the same area.  No fluid was draining so

ultrasound was utilized to evaluate the left flank.  No fluid was seen in the area and

it was unclear if the catheter interfered with the bowel at all.



FINDINGS:

5600 mL of cloudy yellow ascites was removed and discarded.



The patient tolerated the procedure well and there were no immediate complications.

As an additional precaution a stat CT of the abdomen and pelvis was obtained and the

patient was held for additional time after the procedure.  Patient's vital signs were

stable and the patient had no complaints of pain or discomfort, and was therefore

discharged from the department.



IMPRESSION:

Ultrasound-guided paracentesis with removal of 5600 mL of cloudy yellow ascites.





<Electronically signed by Miladis Angulo > 04/29/21 1622

<Electronically signed by Noam Deng > 04/29/21 4061

## 2021-05-05 ENCOUNTER — HOSPITAL ENCOUNTER (OUTPATIENT)
Dept: HOSPITAL 53 - M LAB REF | Age: 61
End: 2021-05-05
Attending: NURSE PRACTITIONER
Payer: COMMERCIAL

## 2021-05-05 DIAGNOSIS — E03.9: Primary | ICD-10-CM

## 2021-05-05 DIAGNOSIS — I10: ICD-10-CM

## 2021-05-05 LAB
ALBUMIN SERPL BCG-MCNC: 3.1 GM/DL (ref 3.2–5.2)
ALT SERPL W P-5'-P-CCNC: 23 U/L (ref 12–78)
BILIRUB SERPL-MCNC: 1.3 MG/DL (ref 0.2–1)
BUN SERPL-MCNC: 21 MG/DL (ref 7–18)
CALCIUM SERPL-MCNC: 8.2 MG/DL (ref 8.8–10.2)
CHLORIDE SERPL-SCNC: 108 MEQ/L (ref 98–107)
CHOLEST SERPL-MCNC: 106 MG/DL (ref ?–200)
CHOLEST/HDLC SERPL: 2.86 {RATIO} (ref ?–5)
CO2 SERPL-SCNC: 22 MEQ/L (ref 21–32)
CREAT SERPL-MCNC: 1.22 MG/DL (ref 0.55–1.3)
GFR SERPL CREATININE-BSD FRML MDRD: 47.7 ML/MIN/{1.73_M2} (ref 45–?)
GLUCOSE SERPL-MCNC: 101 MG/DL (ref 70–100)
HDLC SERPL-MCNC: 37 MG/DL (ref 40–?)
LDLC SERPL CALC-MCNC: 57 MG/DL (ref ?–100)
NONHDLC SERPL-MCNC: 69 MG/DL
POTASSIUM SERPL-SCNC: 4.6 MEQ/L (ref 3.5–5.1)
PROT SERPL-MCNC: 6.4 GM/DL (ref 6.4–8.2)
SODIUM SERPL-SCNC: 136 MEQ/L (ref 136–145)
T4 FREE SERPL-MCNC: 1.47 NG/DL (ref 0.76–1.46)
TRIGL SERPL-MCNC: 59 MG/DL (ref ?–150)
TSH SERPL DL<=0.005 MIU/L-ACNC: 1.56 UIU/ML (ref 0.36–3.74)

## 2021-05-06 ENCOUNTER — HOSPITAL ENCOUNTER (OUTPATIENT)
Dept: HOSPITAL 53 - M IRPRO | Age: 61
End: 2021-05-06
Attending: INTERNAL MEDICINE
Payer: COMMERCIAL

## 2021-05-06 VITALS — DIASTOLIC BLOOD PRESSURE: 61 MMHG | SYSTOLIC BLOOD PRESSURE: 133 MMHG

## 2021-05-06 DIAGNOSIS — J90: Primary | ICD-10-CM

## 2021-05-06 LAB
APPEARANCE FLD: (no result)
COLOR PLR: YELLOW
SPECIMEN SOURCE FLD: (no result)

## 2021-05-06 PROCEDURE — 89051 BODY FLUID CELL COUNT: CPT

## 2021-05-06 PROCEDURE — 87205 SMEAR GRAM STAIN: CPT

## 2021-05-06 PROCEDURE — 32555 ASPIRATE PLEURA W/ IMAGING: CPT

## 2021-05-06 PROCEDURE — 96365 THER/PROPH/DIAG IV INF INIT: CPT

## 2021-05-06 PROCEDURE — 87070 CULTURE OTHR SPECIMN AEROBIC: CPT

## 2021-05-14 ENCOUNTER — HOSPITAL ENCOUNTER (OUTPATIENT)
Dept: HOSPITAL 53 - M IRPRO | Age: 61
End: 2021-05-14
Attending: INTERNAL MEDICINE
Payer: COMMERCIAL

## 2021-05-14 VITALS — SYSTOLIC BLOOD PRESSURE: 116 MMHG | DIASTOLIC BLOOD PRESSURE: 73 MMHG

## 2021-05-14 DIAGNOSIS — Z53.8: ICD-10-CM

## 2021-05-14 DIAGNOSIS — K70.31: Primary | ICD-10-CM

## 2021-05-14 PROCEDURE — 96365 THER/PROPH/DIAG IV INF INIT: CPT

## 2021-05-14 PROCEDURE — 76705 ECHO EXAM OF ABDOMEN: CPT

## 2021-05-14 NOTE — REP
INDICATION:

ASCITES.



TECHNIQUE:

Limited exam 3 images obtained to assess for ascites



FINDINGS:

There is a tiny amount of free fluid identified on 1 image.



IMPRESSION:

As above



Accredited by the [employer] in General Ultrasound.





<Electronically signed by Ace Delgado > 05/14/21 9457

## 2021-05-20 ENCOUNTER — HOSPITAL ENCOUNTER (OUTPATIENT)
Dept: HOSPITAL 53 - M IRPRO | Age: 61
End: 2021-05-20
Attending: INTERNAL MEDICINE
Payer: COMMERCIAL

## 2021-05-20 VITALS — DIASTOLIC BLOOD PRESSURE: 64 MMHG | SYSTOLIC BLOOD PRESSURE: 114 MMHG

## 2021-05-20 DIAGNOSIS — J90: Primary | ICD-10-CM

## 2021-05-20 LAB
APPEARANCE FLD: (no result)
COLOR PLR: YELLOW
SPECIMEN SOURCE FLD: (no result)

## 2021-05-20 PROCEDURE — 96374 THER/PROPH/DIAG INJ IV PUSH: CPT

## 2021-05-20 PROCEDURE — 32555 ASPIRATE PLEURA W/ IMAGING: CPT

## 2021-05-20 PROCEDURE — 87070 CULTURE OTHR SPECIMN AEROBIC: CPT

## 2021-05-20 PROCEDURE — 89051 BODY FLUID CELL COUNT: CPT

## 2021-05-20 PROCEDURE — 87205 SMEAR GRAM STAIN: CPT

## 2021-05-20 NOTE — REP
INDICATION:

PLEURAL EFFUSION



The patient has a history of left pleural effusion



COMPARISON:

None.



TECHNIQUE:

The procedure was performed by YOON Christy,  under the direct

supervision of Dr. Khan



The risks and benefits of the procedure were explained to the patient and an informed

consent was obtained both verbally and written.  Directly prior to the start of the

procedure a formal time-out was completed in the procedure room.



Pleural fluid in left lung zone was localized using ultrasound guidance.  The skin was

prepped and draped in a sterile fashion.  Eleven ML of buffered lidocaine was used as

a local anesthetic. Using ultrasound guidance an 8-Panamanian multi side-hole catheter was

inserted using trocar technique.



FINDINGS:

Eight hundred mL of sotero colored fluid was withdrawn and sent to the laboratory for

further analysis.



The patient tolerated the procedure well and there were no immediate complications.

After the appropriate amount of monitored convalescence, the patient was discharged

from the department.



IMPRESSION:

Ultrasound-guided thoracentesis of the left lung zone with removal of 800 mL of fluid.





<Electronically signed by Miladis Angulo > 05/20/21 1619

<Electronically signed by Sushant Khan > 05/20/21 1621

## 2021-05-20 NOTE — REP
INDICATION:

POST THORA, 2 VIEW.



COMPARISON:

05/06/2021.



TECHNIQUE:

Two views chest.



FINDINGS:

There is no pneumothorax status post left thoracentesis.  There is residual left

pleural fluid.  Right lung appears clear.  Heart mediastinum are unchanged.



IMPRESSION:

No pneumothorax status post left thoracentesis.  Residual left pleural fluid.





<Electronically signed by Sushant Khan > 05/20/21 1840

## 2021-05-28 ENCOUNTER — HOSPITAL ENCOUNTER (OUTPATIENT)
Dept: HOSPITAL 53 - M IRPRO | Age: 61
End: 2021-05-28
Attending: INTERNAL MEDICINE
Payer: COMMERCIAL

## 2021-05-28 DIAGNOSIS — Z53.8: ICD-10-CM

## 2021-05-28 DIAGNOSIS — K70.31: Primary | ICD-10-CM

## 2021-05-28 NOTE — REP
INDICATION:

ASCITES



COMPARISON:

05/14/2021



TECHNIQUE:

Real time gray scale ultrasound examination using curved array transducer.



FINDINGS:

Generalized ultrasound examination through the 4 quadrants of the abdomen and pelvis

demonstrates no ascites.









IMPRESSION:

No ascites.





<Electronically signed by Yogi Glass > 05/28/21 1214
Clothing

## 2021-06-04 ENCOUNTER — HOSPITAL ENCOUNTER (OUTPATIENT)
Dept: HOSPITAL 53 - M IRPRO | Age: 61
End: 2021-06-04
Attending: INTERNAL MEDICINE
Payer: COMMERCIAL

## 2021-06-04 VITALS — DIASTOLIC BLOOD PRESSURE: 82 MMHG | SYSTOLIC BLOOD PRESSURE: 107 MMHG

## 2021-06-04 DIAGNOSIS — J90: Primary | ICD-10-CM

## 2021-06-04 LAB
APPEARANCE FLD: (no result)
COLOR PLR: (no result)
SPECIMEN SOURCE FLD: (no result)

## 2021-06-04 PROCEDURE — 87205 SMEAR GRAM STAIN: CPT

## 2021-06-04 PROCEDURE — 32555 ASPIRATE PLEURA W/ IMAGING: CPT

## 2021-06-04 PROCEDURE — 87070 CULTURE OTHR SPECIMN AEROBIC: CPT

## 2021-06-04 PROCEDURE — 96374 THER/PROPH/DIAG INJ IV PUSH: CPT

## 2021-06-04 PROCEDURE — 89051 BODY FLUID CELL COUNT: CPT

## 2021-06-04 NOTE — REP
INDICATION:

PLEURAL EFFUSION.



COMPARISON:

None.



TECHNIQUE:

The procedure was performed under the direct supervision of Dr. Khan.



The risks and benefits of the procedure were explained to the patient and informed

consent was obtained.



The left pleural effusion was localized using ultrasound guidance.  The skin was

prepped and draped in a sterile fashion.  1% lidocaine was used as a local anesthetic.

An 8 Thai multi side hole catheter was inserted using trocar technique.  700 cc of

sotero colored fluid was withdrawn with a sample sent to the lab for analysis.



The patient tolerated the procedure well and there were no immediate complications.

After the appropriate amount to monitor convalescence patient was discharged from the

department.



FINDINGS:

None



IMPRESSION:

Ultrasound-guided left thoracentesis.



<Electronically signed by Surya Alvarado > 06/04/21 1624

<Electronically signed by Sushant Khan > 06/04/21 6480

## 2021-06-04 NOTE — REP
INDICATION:

POST THORA, 2 VIEW.



COMPARISON:

05/20/2021.



TECHNIQUE:

Two views chest.



FINDINGS:

No pneumothorax is seen status post left thoracentesis.  There is a decreased amount

of left pleural fluid, with mild residual pleural fluid and parenchymal opacity in the

left base.  Right lung remains clear.  The cardiomediastinal silhouette appears

unchanged.



IMPRESSION:

No pneumothorax status post left thoracentesis.  Decreased amount of left pleural

fluid.





<Electronically signed by Sushant Khan > 06/04/21 7595

## 2021-06-11 ENCOUNTER — HOSPITAL ENCOUNTER (OUTPATIENT)
Dept: HOSPITAL 53 - M IRPRO | Age: 61
End: 2021-06-11
Attending: INTERNAL MEDICINE
Payer: COMMERCIAL

## 2021-06-11 DIAGNOSIS — K70.31: Primary | ICD-10-CM

## 2021-06-11 DIAGNOSIS — Z53.8: ICD-10-CM

## 2021-06-12 ENCOUNTER — HOSPITAL ENCOUNTER (EMERGENCY)
Dept: HOSPITAL 53 - M ED | Age: 61
LOS: 1 days | Discharge: HOME | End: 2021-06-13
Payer: COMMERCIAL

## 2021-06-12 VITALS — WEIGHT: 177.36 LBS | BODY MASS INDEX: 27.84 KG/M2 | HEIGHT: 67 IN

## 2021-06-12 DIAGNOSIS — Z87.448: ICD-10-CM

## 2021-06-12 DIAGNOSIS — Z98.890: ICD-10-CM

## 2021-06-12 DIAGNOSIS — F10.11: ICD-10-CM

## 2021-06-12 DIAGNOSIS — Z79.899: ICD-10-CM

## 2021-06-12 DIAGNOSIS — Z88.1: ICD-10-CM

## 2021-06-12 DIAGNOSIS — R94.31: ICD-10-CM

## 2021-06-12 DIAGNOSIS — G47.00: Primary | ICD-10-CM

## 2021-06-12 DIAGNOSIS — Z88.0: ICD-10-CM

## 2021-06-12 DIAGNOSIS — E03.9: ICD-10-CM

## 2021-06-12 DIAGNOSIS — E78.5: ICD-10-CM

## 2021-06-12 DIAGNOSIS — Z79.890: ICD-10-CM

## 2021-06-12 LAB
ALBUMIN SERPL BCG-MCNC: 2.4 GM/DL (ref 3.2–5.2)
ALT SERPL W P-5'-P-CCNC: 20 U/L (ref 12–78)
APTT BLD: 35.7 SECONDS (ref 24.2–38.5)
BASOPHILS # BLD AUTO: 0 10^3/UL (ref 0–0.2)
BASOPHILS NFR BLD AUTO: 0.9 % (ref 0–1)
BILIRUB CONJ SERPL-MCNC: 0.9 MG/DL (ref 0–0.2)
BILIRUB SERPL-MCNC: 1.5 MG/DL (ref 0.2–1)
BUN SERPL-MCNC: 20 MG/DL (ref 7–18)
CALCIUM SERPL-MCNC: 8 MG/DL (ref 8.8–10.2)
CHLORIDE SERPL-SCNC: 99 MEQ/L (ref 98–107)
CO2 SERPL-SCNC: 31 MEQ/L (ref 21–32)
CREAT SERPL-MCNC: 1.75 MG/DL (ref 0.55–1.3)
EOSINOPHIL # BLD AUTO: 0.3 10^3/UL (ref 0–0.5)
EOSINOPHIL NFR BLD AUTO: 5.4 % (ref 0–3)
ETHANOL SERPL-MCNC: < 0.003 % (ref 0–0.01)
GFR SERPL CREATININE-BSD FRML MDRD: 31.5 ML/MIN/{1.73_M2} (ref 45–?)
GLUCOSE SERPL-MCNC: 98 MG/DL (ref 70–100)
HCT VFR BLD AUTO: 27.5 % (ref 36–47)
HGB BLD-MCNC: 8.6 G/DL (ref 12–15.5)
INR PPP: 1.39
LIPASE SERPL-CCNC: 390 U/L (ref 73–393)
LYMPHOCYTES # BLD AUTO: 1.1 10^3/UL (ref 1.5–5)
LYMPHOCYTES NFR BLD AUTO: 22.7 % (ref 24–44)
MCH RBC QN AUTO: 27.3 PG (ref 27–33)
MCHC RBC AUTO-ENTMCNC: 31.3 G/DL (ref 32–36.5)
MCV RBC AUTO: 87.3 FL (ref 80–96)
MONOCYTES # BLD AUTO: 0.6 10^3/UL (ref 0–0.8)
MONOCYTES NFR BLD AUTO: 12.1 % (ref 2–8)
NEUTROPHILS # BLD AUTO: 2.7 10^3/UL (ref 1.5–8.5)
NEUTROPHILS NFR BLD AUTO: 58.7 % (ref 36–66)
PLATELET # BLD AUTO: 146 10^3/UL (ref 150–450)
POTASSIUM SERPL-SCNC: 3.3 MEQ/L (ref 3.5–5.1)
PROT SERPL-MCNC: 6.4 GM/DL (ref 6.4–8.2)
PROTHROMBIN TIME: 17.4 SECONDS (ref 12.5–14.3)
RBC # BLD AUTO: 3.15 10^6/UL (ref 4–5.4)
SODIUM SERPL-SCNC: 135 MEQ/L (ref 136–145)
WBC # BLD AUTO: 4.6 10^3/UL (ref 4–10)

## 2021-06-12 PROCEDURE — 96374 THER/PROPH/DIAG INJ IV PUSH: CPT

## 2021-06-12 PROCEDURE — 82140 ASSAY OF AMMONIA: CPT

## 2021-06-12 PROCEDURE — 80076 HEPATIC FUNCTION PANEL: CPT

## 2021-06-12 PROCEDURE — 82077 ASSAY SPEC XCP UR&BREATH IA: CPT

## 2021-06-12 PROCEDURE — 85025 COMPLETE CBC W/AUTO DIFF WBC: CPT

## 2021-06-12 PROCEDURE — 81001 URINALYSIS AUTO W/SCOPE: CPT

## 2021-06-12 PROCEDURE — 99285 EMERGENCY DEPT VISIT HI MDM: CPT

## 2021-06-12 PROCEDURE — 85610 PROTHROMBIN TIME: CPT

## 2021-06-12 PROCEDURE — 93005 ELECTROCARDIOGRAM TRACING: CPT

## 2021-06-12 PROCEDURE — 83690 ASSAY OF LIPASE: CPT

## 2021-06-12 PROCEDURE — 80048 BASIC METABOLIC PNL TOTAL CA: CPT

## 2021-06-12 PROCEDURE — 85730 THROMBOPLASTIN TIME PARTIAL: CPT

## 2021-06-12 PROCEDURE — 87086 URINE CULTURE/COLONY COUNT: CPT

## 2021-06-12 PROCEDURE — 93041 RHYTHM ECG TRACING: CPT

## 2021-06-12 PROCEDURE — 96375 TX/PRO/DX INJ NEW DRUG ADDON: CPT

## 2021-06-13 VITALS — SYSTOLIC BLOOD PRESSURE: 124 MMHG | DIASTOLIC BLOOD PRESSURE: 60 MMHG

## 2021-06-13 NOTE — REP
INDICATION:

ASCITES



COMPARISON:

None.



TECHNIQUE:

Real time gray scale ultrasound examination using curved array transducer.



FINDINGS:

Limited ultrasound examination through the 4 quadrants of the abdomen and pelvis

demonstrates no ascites.









IMPRESSION:

No ascites noted.





<Electronically signed by Yogi Glass > 06/13/21 1112

## 2021-06-14 NOTE — ECGEPIP
[facility] - ED

                                       

                                       Test Date:    2021

Pat Name:     JAMEL HARVEY             Department:   

Patient ID:   [id number]                 Room:         -

Gender:       Female                   Technician:   PIYUSH GARCIA

:          1960               Requested By: Saundra Urrutia 

Order Number: [id number]     Reading MD:   Saundra Urrutia

                                 Measurements

Intervals                              Axis          

Rate:         84                       P:            34

NJ:           134                      QRS:          15

QRSD:         84                       T:            29

QT:           434                                    

QTc:          512                                    

                           Interpretive Statements

Normal sinus rhythm

NSTTW abnormalities

delayed r progression

similar 20

Electronically Signed on 2021 16:41:36 EDT by Saundra Urrutia

## 2021-07-02 ENCOUNTER — HOSPITAL ENCOUNTER (OUTPATIENT)
Dept: HOSPITAL 53 - M IRPRO | Age: 61
End: 2021-07-02
Attending: INTERNAL MEDICINE
Payer: COMMERCIAL

## 2021-07-02 VITALS — DIASTOLIC BLOOD PRESSURE: 52 MMHG | SYSTOLIC BLOOD PRESSURE: 108 MMHG

## 2021-07-02 DIAGNOSIS — J90: Primary | ICD-10-CM

## 2021-07-02 DIAGNOSIS — K70.31: ICD-10-CM

## 2021-07-02 NOTE — REP
INDICATION:

PLEURAL EFFUSION



The patient has a history of left pleural effusion



COMPARISON:

None.



TECHNIQUE:

The procedure was performed by YOON Christy,  under the direct

supervision of Dr. Deng



The risks and benefits of the procedure were explained to the patient and an informed

consent was obtained both verbally and written.  Directly prior to the start of the

procedure a formal time-out was completed in the procedure room.



Pleural fluid in left lung zone was localized using ultrasound guidance.  The skin was

prepped and draped in a sterile fashion.  Eleven ML of buffered lidocaine was used as

a local anesthetic. A small skin nick was made and an 8-Ugandan multi side-hole

catheter was inserted using trocar technique.



FINDINGS:

Eight hundred mL of reddish colored fluid was withdrawn and sent to the laboratory for

further analysis.



The patient tolerated the procedure well and there were no immediate complications.

After the appropriate amount of monitored convalescence, the patient was discharged

from the department.



IMPRESSION:

Ultrasound-guided thoracentesis with removal of 800 mL of pleural fluid.





<Electronically signed by Miladis Angulo > 07/02/21 1510

<Electronically signed by Noam Deng > 07/02/21 1511

## 2021-07-02 NOTE — REP
INDICATION:

POST THORA, 2 VIEW.



COMPARISON:

06/04/2021 the latest prior



TECHNIQUE:

PA and lateral views



FINDINGS:

There are persistent left basilar opacities which have increased slightly from the

prior exam.  The right lung is clear and stable.  The cardiomediastinal silhouette is

unchanged.  The osseous structures are unchanged.



IMPRESSION:

Increased left basilar opacity suggesting and increased left pleural effusion some of

which appears loculated.  Consider follow-up with chest CT.





<Electronically signed by Ace Delgado > 07/02/21 5803

## 2021-07-20 ENCOUNTER — HOSPITAL ENCOUNTER (EMERGENCY)
Dept: HOSPITAL 53 - M ED | Age: 61
Discharge: ANOTHER HOSPITAL | End: 2021-07-20
Payer: COMMERCIAL

## 2021-07-20 VITALS — SYSTOLIC BLOOD PRESSURE: 126 MMHG | DIASTOLIC BLOOD PRESSURE: 62 MMHG

## 2021-07-20 VITALS — SYSTOLIC BLOOD PRESSURE: 117 MMHG | DIASTOLIC BLOOD PRESSURE: 62 MMHG

## 2021-07-20 VITALS — SYSTOLIC BLOOD PRESSURE: 144 MMHG | DIASTOLIC BLOOD PRESSURE: 65 MMHG

## 2021-07-20 VITALS — BODY MASS INDEX: 29.1 KG/M2 | WEIGHT: 185.39 LBS | HEIGHT: 67 IN

## 2021-07-20 VITALS — SYSTOLIC BLOOD PRESSURE: 134 MMHG | DIASTOLIC BLOOD PRESSURE: 65 MMHG

## 2021-07-20 VITALS — SYSTOLIC BLOOD PRESSURE: 112 MMHG | DIASTOLIC BLOOD PRESSURE: 55 MMHG

## 2021-07-20 DIAGNOSIS — Z79.899: ICD-10-CM

## 2021-07-20 DIAGNOSIS — K70.31: ICD-10-CM

## 2021-07-20 DIAGNOSIS — Z87.891: ICD-10-CM

## 2021-07-20 DIAGNOSIS — J90: ICD-10-CM

## 2021-07-20 DIAGNOSIS — F32.9: ICD-10-CM

## 2021-07-20 DIAGNOSIS — Z88.8: ICD-10-CM

## 2021-07-20 DIAGNOSIS — Z88.0: ICD-10-CM

## 2021-07-20 DIAGNOSIS — K92.2: Primary | ICD-10-CM

## 2021-07-20 DIAGNOSIS — N18.30: ICD-10-CM

## 2021-07-20 DIAGNOSIS — I85.01: ICD-10-CM

## 2021-07-20 LAB
ALBUMIN SERPL BCG-MCNC: 2.5 GM/DL (ref 3.2–5.2)
ALT SERPL W P-5'-P-CCNC: 27 U/L (ref 12–78)
BASOPHILS # BLD AUTO: 0.1 10^3/UL (ref 0–0.2)
BASOPHILS NFR BLD AUTO: 0.8 % (ref 0–1)
BILIRUB CONJ SERPL-MCNC: 0.9 MG/DL (ref 0–0.2)
BILIRUB SERPL-MCNC: 2 MG/DL (ref 0.2–1)
BUN SERPL-MCNC: 40 MG/DL (ref 7–18)
CALCIUM SERPL-MCNC: 8.9 MG/DL (ref 8.8–10.2)
CHLORIDE SERPL-SCNC: 103 MEQ/L (ref 98–107)
CO2 SERPL-SCNC: 26 MEQ/L (ref 21–32)
CREAT SERPL-MCNC: 1.66 MG/DL (ref 0.55–1.3)
EOSINOPHIL # BLD AUTO: 0.1 10^3/UL (ref 0–0.5)
EOSINOPHIL NFR BLD AUTO: 2 % (ref 0–3)
GFR SERPL CREATININE-BSD FRML MDRD: 33.4 ML/MIN/{1.73_M2} (ref 45–?)
GLUCOSE SERPL-MCNC: 98 MG/DL (ref 70–100)
HCT VFR BLD AUTO: 25.4 % (ref 36–47)
HCT VFR BLD AUTO: 26 % (ref 36–47)
HGB BLD-MCNC: 8.1 G/DL (ref 12–15.5)
HGB BLD-MCNC: 8.3 G/DL (ref 12–15.5)
INR PPP: 1.35
LIPASE SERPL-CCNC: 319 U/L (ref 73–393)
LYMPHOCYTES # BLD AUTO: 1.1 10^3/UL (ref 1.5–5)
LYMPHOCYTES NFR BLD AUTO: 16.6 % (ref 24–44)
MCH RBC QN AUTO: 26.8 PG (ref 27–33)
MCH RBC QN AUTO: 28.1 PG (ref 27–33)
MCHC RBC AUTO-ENTMCNC: 31.2 G/DL (ref 32–36.5)
MCHC RBC AUTO-ENTMCNC: 32.7 G/DL (ref 32–36.5)
MCV RBC AUTO: 86.1 FL (ref 80–96)
MCV RBC AUTO: 86.1 FL (ref 80–96)
MONOCYTES # BLD AUTO: 0.5 10^3/UL (ref 0–0.8)
MONOCYTES NFR BLD AUTO: 7.3 % (ref 2–8)
NEUTROPHILS # BLD AUTO: 4.7 10^3/UL (ref 1.5–8.5)
NEUTROPHILS NFR BLD AUTO: 73 % (ref 36–66)
PLATELET # BLD AUTO: 137 10^3/UL (ref 150–450)
PLATELET # BLD AUTO: 168 10^3/UL (ref 150–450)
POTASSIUM SERPL-SCNC: 3.9 MEQ/L (ref 3.5–5.1)
PROT SERPL-MCNC: 7.1 GM/DL (ref 6.4–8.2)
PROTHROMBIN TIME: 17 SECONDS (ref 12.5–14.3)
RBC # BLD AUTO: 2.95 10^6/UL (ref 4–5.4)
RBC # BLD AUTO: 3.02 10^6/UL (ref 4–5.4)
RSV RNA NPH QL NAA+PROBE: NEGATIVE
SODIUM SERPL-SCNC: 136 MEQ/L (ref 136–145)
WBC # BLD AUTO: 5.9 10^3/UL (ref 4–10)
WBC # BLD AUTO: 6.5 10^3/UL (ref 4–10)

## 2021-07-20 PROCEDURE — 71045 X-RAY EXAM CHEST 1 VIEW: CPT

## 2021-07-20 PROCEDURE — 86900 BLOOD TYPING SEROLOGIC ABO: CPT

## 2021-07-20 PROCEDURE — 83690 ASSAY OF LIPASE: CPT

## 2021-07-20 PROCEDURE — 80048 BASIC METABOLIC PNL TOTAL CA: CPT

## 2021-07-20 PROCEDURE — 36430 TRANSFUSION BLD/BLD COMPNT: CPT

## 2021-07-20 PROCEDURE — 80076 HEPATIC FUNCTION PANEL: CPT

## 2021-07-20 PROCEDURE — 99285 EMERGENCY DEPT VISIT HI MDM: CPT

## 2021-07-20 PROCEDURE — 96366 THER/PROPH/DIAG IV INF ADDON: CPT

## 2021-07-20 PROCEDURE — 85027 COMPLETE CBC AUTOMATED: CPT

## 2021-07-20 PROCEDURE — 96375 TX/PRO/DX INJ NEW DRUG ADDON: CPT

## 2021-07-20 PROCEDURE — 96365 THER/PROPH/DIAG IV INF INIT: CPT

## 2021-07-20 PROCEDURE — 87631 RESP VIRUS 3-5 TARGETS: CPT

## 2021-07-20 PROCEDURE — 86901 BLOOD TYPING SEROLOGIC RH(D): CPT

## 2021-07-20 PROCEDURE — 86850 RBC ANTIBODY SCREEN: CPT

## 2021-07-20 PROCEDURE — 86920 COMPATIBILITY TEST SPIN: CPT

## 2021-07-20 PROCEDURE — 93005 ELECTROCARDIOGRAM TRACING: CPT

## 2021-07-20 PROCEDURE — 85025 COMPLETE CBC W/AUTO DIFF WBC: CPT

## 2021-07-20 PROCEDURE — 85610 PROTHROMBIN TIME: CPT

## 2021-07-20 RX ADMIN — DEXTROSE MONOHYDRATE SCH MLS/HR: 50 INJECTION, SOLUTION INTRAVENOUS at 19:01

## 2021-07-20 RX ADMIN — DEXTROSE MONOHYDRATE SCH MLS/HR: 50 INJECTION, SOLUTION INTRAVENOUS at 22:56

## 2021-07-20 NOTE — REP
INDICATION:

UGI bleed.



COMPARISON:

07/02/2021.



TECHNIQUE:

Single portable AP view of the chest was performed.



FINDINGS:

Mild-to-moderate left pleural fluid has mildly increased compared to the prior study.

There is mild adjacent left basilar atelectasis/infiltrate.  Right lung remains clear.

The heart and mediastinum are grossly unchanged.



IMPRESSION:

Mild increase in mild-to-moderate left pleural fluid.





<Electronically signed by Sushant Khan > 07/20/21 3464

## 2021-07-21 NOTE — ECGEPIP
[facility] - ED

                                       

                                       Test Date:    2021

Pat Name:     JAMEL HARVEY             Department:   

Patient ID:   [id number]                 Room:         -

Gender:       Female                   Technician:   

:          1960               Requested By: Wilder QUARLES

Order Number: [id number]     Reading MD:   Wilder Murphy

                                 Measurements

Intervals                              Axis          

Rate:         86                       P:            49

SC:           144                      QRS:          28

QRSD:         80                       T:            48

QT:           438                                    

QTc:          524                                    

                           Interpretive Statements

Normal sinus rhythm

POOR R WAVE PROGRESSION

Prolonged QT

SIMILAR TO 21

Electronically Signed on 2021 5:22:35 EDT by Wilder Murphy

## 2021-08-04 ENCOUNTER — HOSPITAL ENCOUNTER (OUTPATIENT)
Dept: HOSPITAL 53 - M IRPRO | Age: 61
End: 2021-08-04
Attending: INTERNAL MEDICINE
Payer: COMMERCIAL

## 2021-08-04 VITALS — DIASTOLIC BLOOD PRESSURE: 69 MMHG | SYSTOLIC BLOOD PRESSURE: 129 MMHG

## 2021-08-04 DIAGNOSIS — K70.31: ICD-10-CM

## 2021-08-04 DIAGNOSIS — J90: Primary | ICD-10-CM

## 2021-08-04 NOTE — REP
INDICATION:

PLEURAL EFFUSION.



COMPARISON:

None.



TECHNIQUE:

The procedure was performed under the direct supervision of Dr. Khan.



The risks and benefits of the procedure were explained to the patient and informed

consent was obtained.



The left pleural effusion was localized using ultrasound guidance.  The skin was

prepped and draped in a sterile fashion.  5 mL of 1% lidocaine was used as a local

anesthetic.  An 8 Japanese multi side hole catheter was inserted using trocar technique.

950 mL of sotero colored fluid was withdrawn and discarded.



The patient tolerated the procedure well and there were no immediate complications.

After the appropriate amount to monitor convalescence the patient was discharged from

the department.



FINDINGS:

None



IMPRESSION:

Ultrasound-guided left thoracentesis yielding 950 mL of sotero colored fluid.



<Electronically signed by Surya Alvarado > 08/04/21 0063

<Electronically signed by Sushant Khan > 08/04/21 1507

## 2021-08-04 NOTE — REP
INDICATION:

POST THORA, 2 VIEW.



COMPARISON:

07/20/2021, 06/04/2021.



TECHNIQUE:

Two views chest.



FINDINGS:

There is no pneumothorax status post left thoracentesis.  Mild residual pleural and

parenchymal opacities again seen in the left lung base.  The heart and mediastinum are

unchanged.



IMPRESSION:

No pneumothorax status post left thoracentesis.





<Electronically signed by Sushant Khan > 08/04/21 1202

## 2021-09-02 ENCOUNTER — HOSPITAL ENCOUNTER (OUTPATIENT)
Dept: HOSPITAL 53 - M IRPRO | Age: 61
End: 2021-09-02
Attending: INTERNAL MEDICINE
Payer: COMMERCIAL

## 2021-09-02 VITALS — DIASTOLIC BLOOD PRESSURE: 61 MMHG | SYSTOLIC BLOOD PRESSURE: 117 MMHG

## 2021-09-02 DIAGNOSIS — J90: Primary | ICD-10-CM

## 2021-09-02 DIAGNOSIS — K70.31: ICD-10-CM

## 2021-09-02 NOTE — REP
INDICATION:

LEFT THORA, 2 VIEW.



COMPARISON:

08/04/2021.



TECHNIQUE:

Two views of the chest are performed following left thoracentesis.



FINDINGS:

There is residual pleural fluid again seen in the left lung base with mild adjacent

parenchymal opacity, similar to the prior exam.  There is no pneumothorax.  The heart

and mediastinum are unremarkable and unchanged.



IMPRESSION:

No pneumothorax status post left thoracentesis, with mild residual left pleural fluid.





<Electronically signed by Sushant Khan > 09/02/21 [id number]

## 2021-09-03 NOTE — REP
INDICATION:

PLEURAL EFFUSION.



COMPARISON:

None.



TECHNIQUE:

The procedure was performed under the direct supervision of Dr. Khan.



The risks and benefits of the procedure were explained to the patient and informed

consent was obtained.



The left pleural effusion was localized using ultrasound guidance.  The skin was

prepped and draped in a sterile fashion.  6 mL of 1% lidocaine was used as a local

anesthetic.  An 8 Macedonian multi side hole catheter was inserted using trocar technique.

570 mL of sotero colored fluid was withdrawn and discarded.



Estimated blood loss: Less than 1 mL



The patient tolerated the procedure well and there were no immediate complications.

After the appropriate amount to monitor convalescence the patient was discharged from

the department



FINDINGS:

None



IMPRESSION:

Ultrasound-guided left thoracentesis yielding 570 mL of sotero colored fluid.



<Electronically signed by Surya Alvarado > 09/03/21 1631

<Electronically signed by Sushant Khan > 09/03/21 7055

## 2021-09-09 ENCOUNTER — HOSPITAL ENCOUNTER (OUTPATIENT)
Dept: HOSPITAL 53 - M LAB REF | Age: 61
End: 2021-09-09
Attending: INTERNAL MEDICINE
Payer: COMMERCIAL

## 2021-09-09 DIAGNOSIS — E83.42: Primary | ICD-10-CM

## 2021-09-20 ENCOUNTER — HOSPITAL ENCOUNTER (OUTPATIENT)
Dept: HOSPITAL 53 - M WHC | Age: 61
End: 2021-09-20
Payer: COMMERCIAL

## 2021-09-20 DIAGNOSIS — M85.89: ICD-10-CM

## 2021-09-20 DIAGNOSIS — R92.8: ICD-10-CM

## 2021-09-20 DIAGNOSIS — Z12.31: Primary | ICD-10-CM

## 2021-09-20 NOTE — DEXAMM
INDICATION:

DISORDER OF BONE.



COMPARISON:

06/11/2015.



TECHNIQUE:

Bone density was measured using dual-energy x-ray absorptiometry (DEXA).



FINDINGS:

AP SPINE L1-L4

BMD 0.985 g/cm2

Young Adult T-Score -1.7

Age Matched Z-Score 0.4.



LT FEMUR, TOTAL

BMD 0.777 g/cm2

Young Adult T-Score -1.8

Age Matched Z-Score -0.8.



LT NECK

BMD 0.705 g/cm2

Young Adult T-Score -2.4

Age Matched Z-Score -1.1.



RT FEMUR, TOTAL

BMD 0.830 g/cm2

Young Adult T-Score -1.4

Age Matched Z-Score -0.4.



RT NECK

BMD 0.784 g/cm2

Young Adult T-Score -1.8

Age Matched Z-Score -0.5.



IMPRESSION:

There is low bone density of the spine.



There is low bone density of the left hip.





There is low bone density of the right hip.



The density of the spine has decreased 17.2% since the initial exam on 06/11/2015.











The density of the left hip has decreased 24.4% since initial exam on 06/11/2015.











The density of the right hip has decreased 20.6% since the initial exam on 06/11/2015.







FOLLOW-UP:

Recommendation for the next bone density exam: 2 years.





<Electronically signed by Sushant Khan > 09/20/21 8694

## 2021-09-20 NOTE — REP
INDICATION:

SCREEN MAMMO FOR MALIGNANT NEOPLASM OF BREAST.



COMPARISON:

Multiple prior screening examinations, the most recent, 01/23/2012



TECHNIQUE:

Digital screening (2D) mammography was performed bilaterally in the CC and MLO

projections. Additionally, breast tomosynthesis (3D mammography) was performed

bilaterally in the CC and MLO projections.



FINDINGS:

The patient is a 61-year-old with no personal or family history of breast cancer.

Patient has no history of breast surgery.  The patient has no specific breast

complaints at this time.



The Volpara volumetric breast density pattern is B, there are scattered areas of

fibroglandular density



In the anterior 3rd of the left breast, above and lateral to the nipple, in the upper

outer quadrant, there is an area of architectural distortion.



The right breast has a stable appearance.



IMPRESSION:

BIRADS/ACR category 0: Incomplete, need additional imaging evaluation.



This patient's Tyrer-Cuzick lifetime breast cancer risk assessment score is 4.8%.



This mammogram was interpreted with the aid of an FDA-approved computer-aided

detection system.



The patient states she has not had a clinical breast exam in over a year.



The patient letter being requested is M0.



RECOMMENDATION:

3D focal compression of the left breast in the CC and MLO orientations, and ultrasound.





<Electronically signed by Orlando Mcneal > 09/20/21 6066

## 2021-10-01 ENCOUNTER — HOSPITAL ENCOUNTER (OUTPATIENT)
Dept: HOSPITAL 53 - M IRPRO | Age: 61
End: 2021-10-01
Attending: INTERNAL MEDICINE
Payer: COMMERCIAL

## 2021-10-01 ENCOUNTER — HOSPITAL ENCOUNTER (OUTPATIENT)
Dept: HOSPITAL 53 - M LAB | Age: 61
End: 2021-10-01
Attending: NURSE PRACTITIONER
Payer: COMMERCIAL

## 2021-10-01 VITALS — DIASTOLIC BLOOD PRESSURE: 65 MMHG | SYSTOLIC BLOOD PRESSURE: 140 MMHG

## 2021-10-01 DIAGNOSIS — E03.9: ICD-10-CM

## 2021-10-01 DIAGNOSIS — I85.01: Primary | ICD-10-CM

## 2021-10-01 DIAGNOSIS — K70.31: ICD-10-CM

## 2021-10-01 DIAGNOSIS — J90: Primary | ICD-10-CM

## 2021-10-01 LAB
BASOPHILS # BLD AUTO: 0 10^3/UL (ref 0–0.2)
BASOPHILS NFR BLD AUTO: 0.8 % (ref 0–1)
EOSINOPHIL # BLD AUTO: 0.2 10^3/UL (ref 0–0.5)
EOSINOPHIL NFR BLD AUTO: 3.3 % (ref 0–3)
HCT VFR BLD AUTO: 34.1 % (ref 36–47)
HGB BLD-MCNC: 10.7 G/DL (ref 12–15.5)
LYMPHOCYTES # BLD AUTO: 0.8 10^3/UL (ref 1.5–5)
LYMPHOCYTES NFR BLD AUTO: 16 % (ref 24–44)
MCH RBC QN AUTO: 26.6 PG (ref 27–33)
MCHC RBC AUTO-ENTMCNC: 31.4 G/DL (ref 32–36.5)
MCV RBC AUTO: 84.8 FL (ref 80–96)
MONOCYTES # BLD AUTO: 0.5 10^3/UL (ref 0–0.8)
MONOCYTES NFR BLD AUTO: 10.2 % (ref 2–8)
NEUTROPHILS # BLD AUTO: 3.3 10^3/UL (ref 1.5–8.5)
NEUTROPHILS NFR BLD AUTO: 69.5 % (ref 36–66)
PLATELET # BLD AUTO: 138 10^3/UL (ref 150–450)
RBC # BLD AUTO: 4.02 10^6/UL (ref 4–5.4)
T4 FREE SERPL-MCNC: 1.27 NG/DL (ref 0.76–1.46)
TSH SERPL DL<=0.005 MIU/L-ACNC: 4.19 UIU/ML (ref 0.36–3.74)
WBC # BLD AUTO: 4.8 10^3/UL (ref 4–10)

## 2021-10-01 NOTE — REP
INDICATION:

PLEURAL EFFUSION  PT HAVING LABS FIRST.



COMPARISON:

None.



TECHNIQUE:

The procedure was performed under the direct supervision of Dr. Deng.



The risks and benefits of the procedure were explained to the patient and informed

consent was obtained.



The left pleural effusion was localized using ultrasound guidance.  The skin was

prepped and draped in a sterile fashion.  11 mL of buffered lidocaine was used as a

local anesthetic.  An 8 Chadian multi side hole catheter was inserted using trocar

technique.  750 mL of low viscosity red colored fluid was withdrawn and discarded.



The patient tolerated the procedure well and there were no immediate complications.

After the appropriate amount to monitor convalescence the patient was discharged from

the department.



Estimated blood loss: Less than 1 mL.



FINDINGS:

None



IMPRESSION:

Ultrasound guidance for left thoracentesis.



<Electronically signed by Surya Alvarado > 10/01/21 1516

<Electronically signed by Noam Deng > 10/01/21 1531

## 2021-10-01 NOTE — REP
INDICATION:

POST THORA, 2 VIEW.



COMPARISON:

Comparison chest x-ray September 2, 2021.



TECHNIQUE:

PA and lateral views.



FINDINGS:

There is blunting of the left lateral pleural angle and partially obscured left

hemidiaphragm consistent with small left effusion.  This is similar to its appearance

on September 2, 2021.  The patient is status post left thoracentesis during which 750

mL was removed.  There is no evidence of pneumothorax or other complication.  Right

lung is clear.  Heart is not enlarged.  There is a dextroconvex thoracic curvature in

the thoracic spine.  The comparison chest x-ray was also post thoracentesis.



IMPRESSION:

No complications seen.  Small residual left effusion post left thoracentesis.





<Electronically signed by Noam Deng > 10/01/21 9498

## 2021-11-01 ENCOUNTER — HOSPITAL ENCOUNTER (OUTPATIENT)
Dept: HOSPITAL 53 - M IRPRO | Age: 61
End: 2021-11-01
Attending: INTERNAL MEDICINE
Payer: COMMERCIAL

## 2021-11-01 VITALS — SYSTOLIC BLOOD PRESSURE: 94 MMHG | DIASTOLIC BLOOD PRESSURE: 55 MMHG

## 2021-11-01 DIAGNOSIS — J90: Primary | ICD-10-CM

## 2021-11-01 DIAGNOSIS — K70.31: ICD-10-CM

## 2021-11-01 NOTE — REP
INDICATION:

PLEURAL EFFUSION.



COMPARISON:

None.



TECHNIQUE:

The procedure was performed under the direct supervision of Dr. Khan.



The risks and benefits of the procedure were explained to the patient and informed

consent was obtained.



The left pleural effusion was localized using ultrasound guidance.  The skin was

prepped and draped in a sterile fashion.  11 mL of buffered 1% lidocaine was used as a

local anesthetic.  Using ultrasound guidance an 8 Lao multi side hole catheter was

inserted using trocar technique.  330 mL of low viscosity red colored fluid was

withdrawn and discarded.



Estimated blood loss: Less than 1 mL



The patient tolerated the procedure well and there were no immediate complications.

After the appropriate amount to monitor convalescence the patient was discharged from

the department.



FINDINGS:

None



IMPRESSION:

Ultrasound-guided left thoracentesis.



<Electronically signed by Surya Alvarado > 11/01/21 1511

<Electronically signed by Sushant Khan > 11/01/21 3395

## 2021-11-01 NOTE — REP
INDICATION:

POST LEFT THORA, 2 VIEW.



COMPARISON:

10/01/2021.



TECHNIQUE:

Two views chest.



FINDINGS:

There is no pneumothorax status post left thoracentesis.  There is mild pleural and

parenchymal opacity again seen in the left lung base.  The right lung is clear.  The

heart and mediastinum are unchanged.



IMPRESSION:

No pneumothorax status post left thoracentesis.





<Electronically signed by Sushant Khan > 11/01/21 0918

## 2021-11-23 ENCOUNTER — HOSPITAL ENCOUNTER (OUTPATIENT)
Dept: HOSPITAL 53 - M WHC | Age: 61
End: 2021-11-23
Attending: NURSE PRACTITIONER
Payer: COMMERCIAL

## 2021-11-23 DIAGNOSIS — R92.8: Primary | ICD-10-CM

## 2021-11-23 PROCEDURE — 77065 DX MAMMO INCL CAD UNI: CPT

## 2021-11-23 NOTE — REP
INDICATION:

LEFT BREAST ADD VIEWS.



COMPARISON:

09/20/2021, 12/14/2018, 06/14/2017.



TECHNIQUE:

Spot compression views left breast with tomosynthesis.



FINDINGS:

The suspected architectural distortion in the upper-outer quadrant of the left breast

compresses out to an unchanged appearance compared to prior studies.  There is no new

abnormality.



IMPRESSION:

BIRADS/ACR category 1, negative.  No persistent abnormality on today's additional

images.





This mammogram was interpreted with the aid of an FDA-approved computer-aided

detection system.





The patient letter being requested is M 1.



RECOMMENDATION:

Repeat screening mammography recommended 1 year (for women over 40).





<Electronically signed by Sushant Khan > 11/23/21 4918

## 2021-12-01 ENCOUNTER — HOSPITAL ENCOUNTER (OUTPATIENT)
Dept: HOSPITAL 53 - M IRPRO | Age: 61
End: 2021-12-01
Attending: INTERNAL MEDICINE
Payer: COMMERCIAL

## 2021-12-01 DIAGNOSIS — J90: Primary | ICD-10-CM

## 2021-12-01 DIAGNOSIS — K70.31: ICD-10-CM

## 2021-12-07 ENCOUNTER — HOSPITAL ENCOUNTER (OUTPATIENT)
Dept: HOSPITAL 53 - M LAB REF | Age: 61
End: 2021-12-07
Attending: NURSE PRACTITIONER
Payer: COMMERCIAL

## 2021-12-07 DIAGNOSIS — Z12.4: Primary | ICD-10-CM

## 2021-12-07 DIAGNOSIS — R87.615: ICD-10-CM

## 2022-01-03 ENCOUNTER — HOSPITAL ENCOUNTER (OUTPATIENT)
Dept: HOSPITAL 53 - M IRPRO | Age: 62
End: 2022-01-03
Attending: INTERNAL MEDICINE
Payer: COMMERCIAL

## 2022-01-03 VITALS — SYSTOLIC BLOOD PRESSURE: 114 MMHG | DIASTOLIC BLOOD PRESSURE: 57 MMHG

## 2022-01-03 DIAGNOSIS — J90: Primary | ICD-10-CM

## 2022-01-03 LAB
HCT VFR BLD AUTO: 37.2 % (ref 36–47)
HGB BLD-MCNC: 11.7 G/DL (ref 12–15.5)
MCH RBC QN AUTO: 26.9 PG (ref 27–33)
MCHC RBC AUTO-ENTMCNC: 31.5 G/DL (ref 32–36.5)
MCV RBC AUTO: 85.5 FL (ref 80–96)
PLATELET # BLD AUTO: 134 10^3/UL (ref 150–450)
RBC # BLD AUTO: 4.35 10^6/UL (ref 4–5.4)
WBC # BLD AUTO: 4.7 10^3/UL (ref 4–10)

## 2022-01-03 NOTE — REP
INDICATION:

PAIN



The patient has a history of left pleural effusion



COMPARISON:

None.



TECHNIQUE:

The procedure was performed by YOON Linda,  under the direct supervision

of Dr. Khan



The risks and benefits of the procedure were explained to the patient and an informed

consent was obtained both verbally and written.  Directly prior to the start of the

procedure a formal time-out was completed in the procedure room.



Pleural fluid in the left lung zone was localized using ultrasound guidance.  The skin

was prepped and draped in a sterile fashion.  Ten ML of 1% lidocaine 10 mg/ml was used

as a local anesthetic. Using ultrasound guidance an 8-Telugu multi side-hole catheter

was inserted using trocar technique.



FINDINGS:

Four hundred thirty mL of red colored fluid was withdrawn and sent to the laboratory

for further analysis.



The patient tolerated the procedure well and there were no immediate complications.

After the appropriate amount of monitored convalescence, the patient was discharged

from the department.



IMPRESSION:

Technically successful left thoracentesis yielding 430 mL of red fluid.





<Electronically signed by Mattie Mello > 01/03/22 1500

<Electronically signed by Sushant Khan > 01/03/22 8654

## 2022-01-03 NOTE — REP
INDICATION:

POST LEFT THORA, 2 VIEW.



COMPARISON:

12/01/2021.



TECHNIQUE:

Two views chest.



FINDINGS:

There is no pneumothorax status post left thoracentesis.  There is a decreased amount

of left pleural fluid, with mild residual pleural and parenchymal opacity the left

lung base.  The heart and mediastinum are unchanged.



IMPRESSION:

No pneumothorax status post left thoracentesis.





<Electronically signed by Sushant Khan > 01/03/22 0362

## 2022-01-17 ENCOUNTER — HOSPITAL ENCOUNTER (EMERGENCY)
Dept: HOSPITAL 53 - M ED | Age: 62
LOS: 1 days | Discharge: HOME | End: 2022-01-18
Payer: COMMERCIAL

## 2022-01-17 VITALS — HEIGHT: 64 IN | WEIGHT: 155.32 LBS | BODY MASS INDEX: 26.52 KG/M2

## 2022-01-17 DIAGNOSIS — Z53.21: Primary | ICD-10-CM

## 2022-01-18 VITALS — DIASTOLIC BLOOD PRESSURE: 70 MMHG | SYSTOLIC BLOOD PRESSURE: 123 MMHG

## 2022-02-14 ENCOUNTER — HOSPITAL ENCOUNTER (OUTPATIENT)
Dept: HOSPITAL 53 - M IRPRO | Age: 62
End: 2022-02-14
Attending: INTERNAL MEDICINE
Payer: COMMERCIAL

## 2022-02-14 VITALS — SYSTOLIC BLOOD PRESSURE: 140 MMHG | DIASTOLIC BLOOD PRESSURE: 60 MMHG

## 2022-02-14 DIAGNOSIS — J90: Primary | ICD-10-CM

## 2022-03-01 ENCOUNTER — HOSPITAL ENCOUNTER (INPATIENT)
Dept: HOSPITAL 53 - M ED | Age: 62
LOS: 2 days | Discharge: HOME HEALTH | DRG: 279 | End: 2022-03-03
Attending: FAMILY MEDICINE | Admitting: FAMILY MEDICINE
Payer: COMMERCIAL

## 2022-03-01 VITALS — WEIGHT: 161.16 LBS | HEIGHT: 68 IN | BODY MASS INDEX: 24.42 KG/M2

## 2022-03-01 VITALS — SYSTOLIC BLOOD PRESSURE: 113 MMHG | DIASTOLIC BLOOD PRESSURE: 58 MMHG

## 2022-03-01 DIAGNOSIS — K76.6: ICD-10-CM

## 2022-03-01 DIAGNOSIS — I85.10: ICD-10-CM

## 2022-03-01 DIAGNOSIS — F32.A: ICD-10-CM

## 2022-03-01 DIAGNOSIS — N39.0: ICD-10-CM

## 2022-03-01 DIAGNOSIS — E87.2: ICD-10-CM

## 2022-03-01 DIAGNOSIS — Z88.0: ICD-10-CM

## 2022-03-01 DIAGNOSIS — K72.90: Primary | ICD-10-CM

## 2022-03-01 DIAGNOSIS — J45.909: ICD-10-CM

## 2022-03-01 DIAGNOSIS — F41.9: ICD-10-CM

## 2022-03-01 DIAGNOSIS — K70.31: ICD-10-CM

## 2022-03-01 DIAGNOSIS — J94.8: ICD-10-CM

## 2022-03-01 DIAGNOSIS — K76.7: ICD-10-CM

## 2022-03-01 DIAGNOSIS — E03.9: ICD-10-CM

## 2022-03-01 DIAGNOSIS — Z79.899: ICD-10-CM

## 2022-03-01 DIAGNOSIS — I12.9: ICD-10-CM

## 2022-03-01 DIAGNOSIS — Z20.822: ICD-10-CM

## 2022-03-01 DIAGNOSIS — H40.9: ICD-10-CM

## 2022-03-01 DIAGNOSIS — N17.9: ICD-10-CM

## 2022-03-01 DIAGNOSIS — Z88.8: ICD-10-CM

## 2022-03-01 DIAGNOSIS — D69.6: ICD-10-CM

## 2022-03-01 DIAGNOSIS — F10.11: ICD-10-CM

## 2022-03-01 DIAGNOSIS — E78.5: ICD-10-CM

## 2022-03-01 DIAGNOSIS — N18.4: ICD-10-CM

## 2022-03-01 DIAGNOSIS — Z91.14: ICD-10-CM

## 2022-03-01 LAB
ALBUMIN SERPL BCG-MCNC: 2.8 GM/DL (ref 3.2–5.2)
ALT SERPL W P-5'-P-CCNC: 35 U/L (ref 12–78)
AMPHETAMINES UR QL SCN: NEGATIVE
APAP SERPL-MCNC: < 2 UG/ML (ref 10–30)
BARBITURATES UR QL SCN: NEGATIVE
BASE EXCESS BLDV CALC-SCNC: -1.7 MMOL/L (ref -2–2)
BASOPHILS # BLD AUTO: 0 10^3/UL (ref 0–0.2)
BASOPHILS NFR BLD AUTO: 0.6 % (ref 0–1)
BENZODIAZ UR QL SCN: NEGATIVE
BILIRUB CONJ SERPL-MCNC: 0.8 MG/DL (ref 0–0.2)
BILIRUB SERPL-MCNC: 1.4 MG/DL (ref 0.2–1)
BUN SERPL-MCNC: 26 MG/DL (ref 7–18)
BZE UR QL SCN: NEGATIVE
CALCIUM SERPL-MCNC: 8.5 MG/DL (ref 8.8–10.2)
CANNABINOIDS UR QL SCN: NEGATIVE
CHLORIDE SERPL-SCNC: 108 MEQ/L (ref 98–107)
CK MB CFR.DF SERPL CALC: 0.9
CK MB SERPL-MCNC: 1.9 NG/ML (ref ?–3.6)
CK SERPL-CCNC: 211 U/L (ref 26–192)
CO2 BLDV CALC-SCNC: 23.4 MEQ/L (ref 24–28)
CO2 SERPL-SCNC: 24 MEQ/L (ref 21–32)
CREAT SERPL-MCNC: 2.08 MG/DL (ref 0.55–1.3)
EOSINOPHIL # BLD AUTO: 0.2 10^3/UL (ref 0–0.5)
EOSINOPHIL NFR BLD AUTO: 3.5 % (ref 0–3)
ETHANOL SERPL-MCNC: 0 % (ref 0–0.01)
GFR SERPL CREATININE-BSD FRML MDRD: 25.8 ML/MIN/{1.73_M2} (ref 45–?)
GLUCOSE SERPL-MCNC: 113 MG/DL (ref 70–100)
HCO3 BLDV-SCNC: 22.3 MEQ/L (ref 23–27)
HCO3 STD BLDV-SCNC: 22.8 MEQ/L
HCT VFR BLD AUTO: 36 % (ref 36–47)
HGB BLD-MCNC: 11.8 G/DL (ref 12–15.5)
LYMPHOCYTES # BLD AUTO: 1 10^3/UL (ref 1.5–5)
LYMPHOCYTES NFR BLD AUTO: 20.1 % (ref 24–44)
MCH RBC QN AUTO: 27.8 PG (ref 27–33)
MCHC RBC AUTO-ENTMCNC: 32.8 G/DL (ref 32–36.5)
MCV RBC AUTO: 84.7 FL (ref 80–96)
METHADONE UR QL SCN: NEGATIVE
MONOCYTES # BLD AUTO: 0.5 10^3/UL (ref 0–0.8)
MONOCYTES NFR BLD AUTO: 9.4 % (ref 2–8)
NEUTROPHILS # BLD AUTO: 3.2 10^3/UL (ref 1.5–8.5)
NEUTROPHILS NFR BLD AUTO: 66.2 % (ref 36–66)
OPIATES UR QL SCN: POSITIVE
OSMOLALITY SERPL: 316 MOSM/KG (ref 280–301)
PCO2 BLDV: 35.4 MMHG (ref 38–50)
PCP UR QL SCN: NEGATIVE
PH BLDV: 7.42 UNITS (ref 7.33–7.43)
PLATELET # BLD AUTO: 123 10^3/UL (ref 150–450)
PO2 BLDV: 56.7 MMHG (ref 30–50)
POTASSIUM SERPL-SCNC: 3.7 MEQ/L (ref 3.5–5.1)
PROT SERPL-MCNC: 7.1 GM/DL (ref 6.4–8.2)
RBC # BLD AUTO: 4.25 10^6/UL (ref 4–5.4)
SALICYLATES SERPL-MCNC: < 1.7 MG/DL (ref 5–30)
SAO2 % BLDV: 87.5 % (ref 60–80)
SODIUM SERPL-SCNC: 140 MEQ/L (ref 136–145)
T4 FREE SERPL-MCNC: 1.2 NG/DL (ref 0.76–1.46)
TSH SERPL DL<=0.005 MIU/L-ACNC: 8.61 UIU/ML (ref 0.36–3.74)
WBC # BLD AUTO: 4.9 10^3/UL (ref 4–10)

## 2022-03-01 RX ADMIN — CIPROFLOXACIN SCH MLS/HR: 2 INJECTION, SOLUTION INTRAVENOUS at 20:55

## 2022-03-02 VITALS — DIASTOLIC BLOOD PRESSURE: 58 MMHG | SYSTOLIC BLOOD PRESSURE: 107 MMHG

## 2022-03-02 VITALS — DIASTOLIC BLOOD PRESSURE: 56 MMHG | SYSTOLIC BLOOD PRESSURE: 115 MMHG

## 2022-03-02 VITALS — SYSTOLIC BLOOD PRESSURE: 106 MMHG | DIASTOLIC BLOOD PRESSURE: 55 MMHG

## 2022-03-02 VITALS — SYSTOLIC BLOOD PRESSURE: 112 MMHG | DIASTOLIC BLOOD PRESSURE: 55 MMHG

## 2022-03-02 LAB
ALBUMIN SERPL BCG-MCNC: 2.9 GM/DL (ref 3.2–5.2)
ALT SERPL W P-5'-P-CCNC: 27 U/L (ref 12–78)
AMYLASE FLD-CCNC: 23 U/L
APPEARANCE FLD: (no result)
BASOPHILS # BLD AUTO: 0 10^3/UL (ref 0–0.2)
BASOPHILS NFR BLD AUTO: 1 % (ref 0–1)
BILIRUB SERPL-MCNC: 1.6 MG/DL (ref 0.2–1)
BUN SERPL-MCNC: 24 MG/DL (ref 7–18)
CALCIUM SERPL-MCNC: 8.4 MG/DL (ref 8.8–10.2)
CHLORIDE SERPL-SCNC: 114 MEQ/L (ref 98–107)
CO2 SERPL-SCNC: 25 MEQ/L (ref 21–32)
COLOR PLR: YELLOW
CREAT SERPL-MCNC: 1.72 MG/DL (ref 0.55–1.3)
EOSINOPHIL # BLD AUTO: 0.2 10^3/UL (ref 0–0.5)
EOSINOPHIL NFR BLD AUTO: 5.2 % (ref 0–3)
GFR SERPL CREATININE-BSD FRML MDRD: 32 ML/MIN/{1.73_M2} (ref 45–?)
GLUCOSE SERPL-MCNC: 77 MG/DL (ref 70–100)
HCT VFR BLD AUTO: 29.9 % (ref 36–47)
HGB BLD-MCNC: 9.4 G/DL (ref 12–15.5)
LDH FLD L TO P-CCNC: 41 U/L
LYMPHOCYTES # BLD AUTO: 0.8 10^3/UL (ref 1.5–5)
LYMPHOCYTES NFR BLD AUTO: 20.3 % (ref 24–44)
MAGNESIUM SERPL-MCNC: 2.3 MG/DL (ref 1.8–2.4)
MCH RBC QN AUTO: 27 PG (ref 27–33)
MCHC RBC AUTO-ENTMCNC: 31.4 G/DL (ref 32–36.5)
MCV RBC AUTO: 85.9 FL (ref 80–96)
MONOCYTES # BLD AUTO: 0.4 10^3/UL (ref 0–0.8)
MONOCYTES NFR BLD AUTO: 10.9 % (ref 2–8)
NEUTROPHILS # BLD AUTO: 2.4 10^3/UL (ref 1.5–8.5)
NEUTROPHILS NFR BLD AUTO: 62.3 % (ref 36–66)
PH FLD: 7.71 UNITS
PLATELET # BLD AUTO: 77 10^3/UL (ref 150–450)
POTASSIUM SERPL-SCNC: 3.4 MEQ/L (ref 3.5–5.1)
PROT SERPL-MCNC: 6 GM/DL (ref 6.4–8.2)
RBC # BLD AUTO: 3.48 10^6/UL (ref 4–5.4)
SODIUM SERPL-SCNC: 145 MEQ/L (ref 136–145)
SPECIMEN SOURCE FLD: (no result)
WBC # BLD AUTO: 3.9 10^3/UL (ref 4–10)

## 2022-03-02 PROCEDURE — 0W9B3ZZ DRAINAGE OF LEFT PLEURAL CAVITY, PERCUTANEOUS APPROACH: ICD-10-PCS | Performed by: RADIOLOGY PRACTITIONER ASSISTANT

## 2022-03-02 RX ADMIN — MIDODRINE HYDROCHLORIDE SCH MG: 5 TABLET ORAL at 16:18

## 2022-03-02 RX ADMIN — BETAXOLOL HYDROCHLORIDE SCH DROP: 2.8 SUSPENSION/ DROPS OPHTHALMIC at 20:30

## 2022-03-02 RX ADMIN — LACTULOSE SCH ML: 10 SOLUTION ORAL at 09:00

## 2022-03-02 RX ADMIN — CIPROFLOXACIN SCH MLS/HR: 2 INJECTION, SOLUTION INTRAVENOUS at 08:58

## 2022-03-02 RX ADMIN — BETAXOLOL HYDROCHLORIDE SCH DROP: 2.8 SUSPENSION/ DROPS OPHTHALMIC at 08:58

## 2022-03-02 RX ADMIN — LATANOPROST SCH DROP: 50 SOLUTION OPHTHALMIC at 20:29

## 2022-03-02 RX ADMIN — SULFAMETHOXAZOLE AND TRIMETHOPRIM SCH TAB: 800; 160 TABLET ORAL at 09:00

## 2022-03-02 RX ADMIN — BRIMONIDINE TARTRATE SCH DROP: 1.5 SOLUTION OPHTHALMIC at 20:29

## 2022-03-02 RX ADMIN — BRIMONIDINE TARTRATE SCH DROP: 1.5 SOLUTION OPHTHALMIC at 08:58

## 2022-03-02 RX ADMIN — MIDODRINE HYDROCHLORIDE SCH MG: 5 TABLET ORAL at 12:00

## 2022-03-02 RX ADMIN — SPIRONOLACTONE SCH MG: 50 TABLET ORAL at 20:30

## 2022-03-02 RX ADMIN — TORSEMIDE SCH MG: 20 TABLET ORAL at 16:19

## 2022-03-02 RX ADMIN — DEXTROSE MONOHYDRATE SCH MG: 50 INJECTION, SOLUTION INTRAVENOUS at 09:00

## 2022-03-02 RX ADMIN — MIDODRINE HYDROCHLORIDE SCH MG: 5 TABLET ORAL at 08:00

## 2022-03-02 RX ADMIN — LACTULOSE SCH ML: 10 SOLUTION ORAL at 16:18

## 2022-03-02 RX ADMIN — FOLIC ACID SCH MG: 1 TABLET ORAL at 09:00

## 2022-03-02 RX ADMIN — LATANOPROST SCH DROP: 50 SOLUTION OPHTHALMIC at 01:06

## 2022-03-02 RX ADMIN — LEVOTHYROXINE SODIUM SCH MCG: 112 TABLET ORAL at 05:45

## 2022-03-02 RX ADMIN — BRIMONIDINE TARTRATE SCH DROP: 1.5 SOLUTION OPHTHALMIC at 01:06

## 2022-03-02 RX ADMIN — LACTULOSE SCH ML: 10 SOLUTION ORAL at 20:30

## 2022-03-02 RX ADMIN — SPIRONOLACTONE SCH MG: 50 TABLET ORAL at 13:02

## 2022-03-02 RX ADMIN — TORSEMIDE SCH MG: 20 TABLET ORAL at 09:00

## 2022-03-02 RX ADMIN — BETAXOLOL HYDROCHLORIDE SCH DROP: 2.8 SUSPENSION/ DROPS OPHTHALMIC at 01:06

## 2022-03-02 RX ADMIN — SULFAMETHOXAZOLE AND TRIMETHOPRIM SCH TAB: 800; 160 TABLET ORAL at 20:30

## 2022-03-03 VITALS — DIASTOLIC BLOOD PRESSURE: 48 MMHG | SYSTOLIC BLOOD PRESSURE: 93 MMHG

## 2022-03-03 VITALS — DIASTOLIC BLOOD PRESSURE: 56 MMHG | SYSTOLIC BLOOD PRESSURE: 114 MMHG

## 2022-03-03 VITALS — DIASTOLIC BLOOD PRESSURE: 57 MMHG | SYSTOLIC BLOOD PRESSURE: 115 MMHG

## 2022-03-03 VITALS — SYSTOLIC BLOOD PRESSURE: 120 MMHG | DIASTOLIC BLOOD PRESSURE: 58 MMHG

## 2022-03-03 LAB
ALBUMIN SERPL BCG-MCNC: 2.7 GM/DL (ref 3.2–5.2)
ALT SERPL W P-5'-P-CCNC: 29 U/L (ref 12–78)
BASOPHILS # BLD AUTO: 0 10^3/UL (ref 0–0.2)
BASOPHILS NFR BLD AUTO: 0.9 % (ref 0–1)
BILIRUB SERPL-MCNC: 1.6 MG/DL (ref 0.2–1)
BUN SERPL-MCNC: 20 MG/DL (ref 7–18)
CALCIUM SERPL-MCNC: 7.9 MG/DL (ref 8.8–10.2)
CHLORIDE SERPL-SCNC: 109 MEQ/L (ref 98–107)
CO2 SERPL-SCNC: 23 MEQ/L (ref 21–32)
CREAT SERPL-MCNC: 1.74 MG/DL (ref 0.55–1.3)
EOSINOPHIL # BLD AUTO: 0.1 10^3/UL (ref 0–0.5)
EOSINOPHIL NFR BLD AUTO: 4.1 % (ref 0–3)
GFR SERPL CREATININE-BSD FRML MDRD: 31.6 ML/MIN/{1.73_M2} (ref 45–?)
GLUCOSE SERPL-MCNC: 103 MG/DL (ref 70–100)
HCT VFR BLD AUTO: 30.1 % (ref 36–47)
HGB BLD-MCNC: 9.7 G/DL (ref 12–15.5)
LYMPHOCYTES # BLD AUTO: 0.8 10^3/UL (ref 1.5–5)
LYMPHOCYTES NFR BLD AUTO: 22.2 % (ref 24–44)
MAGNESIUM SERPL-MCNC: 1.8 MG/DL (ref 1.8–2.4)
MCH RBC QN AUTO: 27.6 PG (ref 27–33)
MCHC RBC AUTO-ENTMCNC: 32.2 G/DL (ref 32–36.5)
MCV RBC AUTO: 85.5 FL (ref 80–96)
MONOCYTES # BLD AUTO: 0.5 10^3/UL (ref 0–0.8)
MONOCYTES NFR BLD AUTO: 15.1 % (ref 2–8)
NEUTROPHILS # BLD AUTO: 2 10^3/UL (ref 1.5–8.5)
NEUTROPHILS NFR BLD AUTO: 57.7 % (ref 36–66)
PLATELET # BLD AUTO: 85 10^3/UL (ref 150–450)
POTASSIUM SERPL-SCNC: 3.1 MEQ/L (ref 3.5–5.1)
PROT SERPL-MCNC: 6.3 GM/DL (ref 6.4–8.2)
RBC # BLD AUTO: 3.52 10^6/UL (ref 4–5.4)
SODIUM SERPL-SCNC: 139 MEQ/L (ref 136–145)
WBC # BLD AUTO: 3.4 10^3/UL (ref 4–10)

## 2022-03-03 RX ADMIN — SPIRONOLACTONE SCH MG: 50 TABLET ORAL at 10:12

## 2022-03-03 RX ADMIN — FOLIC ACID SCH MG: 1 TABLET ORAL at 10:12

## 2022-03-03 RX ADMIN — LEVOTHYROXINE SODIUM SCH MCG: 112 TABLET ORAL at 06:13

## 2022-03-03 RX ADMIN — TORSEMIDE SCH MG: 20 TABLET ORAL at 10:12

## 2022-03-03 RX ADMIN — SULFAMETHOXAZOLE AND TRIMETHOPRIM SCH TAB: 800; 160 TABLET ORAL at 10:11

## 2022-03-03 RX ADMIN — BRIMONIDINE TARTRATE SCH DROP: 1.5 SOLUTION OPHTHALMIC at 10:14

## 2022-03-03 RX ADMIN — LACTULOSE SCH ML: 10 SOLUTION ORAL at 10:10

## 2022-03-03 RX ADMIN — MIDODRINE HYDROCHLORIDE SCH MG: 5 TABLET ORAL at 12:00

## 2022-03-03 RX ADMIN — DEXTROSE MONOHYDRATE SCH MG: 50 INJECTION, SOLUTION INTRAVENOUS at 10:10

## 2022-03-03 RX ADMIN — BETAXOLOL HYDROCHLORIDE SCH DROP: 2.8 SUSPENSION/ DROPS OPHTHALMIC at 10:14

## 2022-03-03 RX ADMIN — MIDODRINE HYDROCHLORIDE SCH MG: 5 TABLET ORAL at 10:11

## 2022-03-21 ENCOUNTER — HOSPITAL ENCOUNTER (OUTPATIENT)
Dept: HOSPITAL 53 - M IRPRO | Age: 62
End: 2022-03-21
Attending: INTERNAL MEDICINE
Payer: COMMERCIAL

## 2022-03-21 VITALS — DIASTOLIC BLOOD PRESSURE: 59 MMHG | SYSTOLIC BLOOD PRESSURE: 115 MMHG

## 2022-03-21 DIAGNOSIS — J90: Primary | ICD-10-CM

## 2022-03-30 ENCOUNTER — HOSPITAL ENCOUNTER (INPATIENT)
Dept: HOSPITAL 53 - M ED | Age: 62
LOS: 3 days | Discharge: HOME | DRG: 280 | End: 2022-04-02
Attending: FAMILY MEDICINE | Admitting: FAMILY MEDICINE
Payer: COMMERCIAL

## 2022-03-30 VITALS — HEIGHT: 68 IN | WEIGHT: 166.67 LBS | BODY MASS INDEX: 25.26 KG/M2

## 2022-03-30 DIAGNOSIS — G43.909: ICD-10-CM

## 2022-03-30 DIAGNOSIS — I85.00: ICD-10-CM

## 2022-03-30 DIAGNOSIS — E72.20: ICD-10-CM

## 2022-03-30 DIAGNOSIS — Z79.891: ICD-10-CM

## 2022-03-30 DIAGNOSIS — Z53.8: ICD-10-CM

## 2022-03-30 DIAGNOSIS — N18.9: ICD-10-CM

## 2022-03-30 DIAGNOSIS — I12.9: ICD-10-CM

## 2022-03-30 DIAGNOSIS — Z88.8: ICD-10-CM

## 2022-03-30 DIAGNOSIS — K72.90: ICD-10-CM

## 2022-03-30 DIAGNOSIS — Z88.0: ICD-10-CM

## 2022-03-30 DIAGNOSIS — E78.5: ICD-10-CM

## 2022-03-30 DIAGNOSIS — K70.31: Primary | ICD-10-CM

## 2022-03-30 DIAGNOSIS — K21.9: ICD-10-CM

## 2022-03-30 DIAGNOSIS — Z79.899: ICD-10-CM

## 2022-03-30 DIAGNOSIS — Z20.822: ICD-10-CM

## 2022-03-30 DIAGNOSIS — Z91.14: ICD-10-CM

## 2022-03-30 DIAGNOSIS — E03.9: ICD-10-CM

## 2022-03-30 LAB
ALBUMIN SERPL BCG-MCNC: 3 GM/DL (ref 3.2–5.2)
ALT SERPL W P-5'-P-CCNC: 31 U/L (ref 12–78)
AMPHETAMINES UR QL SCN: NEGATIVE
APAP SERPL-MCNC: < 2 UG/ML (ref 10–30)
BARBITURATES UR QL SCN: NEGATIVE
BASE EXCESS BLDV CALC-SCNC: -1.9 MMOL/L (ref -2–2)
BASOPHILS # BLD AUTO: 0 10^3/UL (ref 0–0.2)
BASOPHILS NFR BLD AUTO: 0.5 % (ref 0–1)
BENZODIAZ UR QL SCN: NEGATIVE
BILIRUB CONJ SERPL-MCNC: 0.9 MG/DL (ref 0–0.2)
BILIRUB SERPL-MCNC: 2.2 MG/DL (ref 0.2–1)
BUN SERPL-MCNC: 15 MG/DL (ref 7–18)
BZE UR QL SCN: NEGATIVE
CALCIUM SERPL-MCNC: 8.7 MG/DL (ref 8.8–10.2)
CANNABINOIDS UR QL SCN: NEGATIVE
CHLORIDE SERPL-SCNC: 108 MEQ/L (ref 98–107)
CO2 BLDV CALC-SCNC: 24.6 MEQ/L (ref 24–28)
CO2 SERPL-SCNC: 25 MEQ/L (ref 21–32)
CREAT SERPL-MCNC: 1.68 MG/DL (ref 0.55–1.3)
EOSINOPHIL # BLD AUTO: 0.1 10^3/UL (ref 0–0.5)
EOSINOPHIL NFR BLD AUTO: 1.5 % (ref 0–3)
ETHANOL SERPL-MCNC: 0 % (ref 0–0.01)
GFR SERPL CREATININE-BSD FRML MDRD: 32.9 ML/MIN/{1.73_M2} (ref 45–?)
GLUCOSE SERPL-MCNC: 114 MG/DL (ref 70–100)
HCO3 BLDV-SCNC: 23.3 MEQ/L (ref 23–27)
HCO3 STD BLDV-SCNC: 22.6 MEQ/L
HCT VFR BLD AUTO: 36.7 % (ref 36–47)
HGB BLD-MCNC: 11.7 G/DL (ref 12–15.5)
LYMPHOCYTES # BLD AUTO: 0.6 10^3/UL (ref 1.5–5)
LYMPHOCYTES NFR BLD AUTO: 10.6 % (ref 24–44)
MCH RBC QN AUTO: 27.6 PG (ref 27–33)
MCHC RBC AUTO-ENTMCNC: 31.9 G/DL (ref 32–36.5)
MCV RBC AUTO: 86.6 FL (ref 80–96)
METHADONE UR QL SCN: NEGATIVE
MONOCYTES # BLD AUTO: 0.3 10^3/UL (ref 0–0.8)
MONOCYTES NFR BLD AUTO: 6.2 % (ref 2–8)
NEUTROPHILS # BLD AUTO: 4.4 10^3/UL (ref 1.5–8.5)
NEUTROPHILS NFR BLD AUTO: 80.8 % (ref 36–66)
OPIATES UR QL SCN: POSITIVE
OSMOLALITY SERPL: 302 MOSM/KG (ref 280–301)
PCO2 BLDV: 41.4 MMHG (ref 38–50)
PCP UR QL SCN: NEGATIVE
PH BLDV: 7.37 UNITS (ref 7.33–7.43)
PLATELET # BLD AUTO: 127 10^3/UL (ref 150–450)
PO2 BLDV: 55 MMHG (ref 30–50)
POTASSIUM SERPL-SCNC: 3.6 MEQ/L (ref 3.5–5.1)
PROT SERPL-MCNC: 7.4 GM/DL (ref 6.4–8.2)
RBC # BLD AUTO: 4.24 10^6/UL (ref 4–5.4)
RSV RNA NPH QL NAA+PROBE: NEGATIVE
SALICYLATES SERPL-MCNC: < 1.7 MG/DL (ref 5–30)
SAO2 % BLDV: 86.6 % (ref 60–80)
SODIUM SERPL-SCNC: 143 MEQ/L (ref 136–145)
T4 SERPL-MCNC: 9.4 UG/DL (ref 4.5–12)
TSH SERPL DL<=0.005 MIU/L-ACNC: 13.5 UIU/ML (ref 0.36–3.74)
WBC # BLD AUTO: 5.5 10^3/UL (ref 4–10)

## 2022-03-30 RX ADMIN — CIPROFLOXACIN SCH MLS/HR: 2 INJECTION, SOLUTION INTRAVENOUS at 23:56

## 2022-03-31 VITALS — DIASTOLIC BLOOD PRESSURE: 67 MMHG | SYSTOLIC BLOOD PRESSURE: 137 MMHG

## 2022-03-31 VITALS — DIASTOLIC BLOOD PRESSURE: 58 MMHG | SYSTOLIC BLOOD PRESSURE: 121 MMHG

## 2022-03-31 VITALS — SYSTOLIC BLOOD PRESSURE: 170 MMHG | DIASTOLIC BLOOD PRESSURE: 72 MMHG

## 2022-03-31 VITALS — SYSTOLIC BLOOD PRESSURE: 132 MMHG | DIASTOLIC BLOOD PRESSURE: 76 MMHG

## 2022-03-31 VITALS — DIASTOLIC BLOOD PRESSURE: 64 MMHG | SYSTOLIC BLOOD PRESSURE: 131 MMHG

## 2022-03-31 VITALS — SYSTOLIC BLOOD PRESSURE: 142 MMHG | DIASTOLIC BLOOD PRESSURE: 67 MMHG

## 2022-03-31 LAB
ALBUMIN SERPL BCG-MCNC: 2.4 GM/DL (ref 3.2–5.2)
ALT SERPL W P-5'-P-CCNC: 25 U/L (ref 12–78)
APTT BLD: 36.2 SECONDS (ref 25.9–37)
BASOPHILS # BLD AUTO: 0 10^3/UL (ref 0–0.2)
BASOPHILS NFR BLD AUTO: 0.5 % (ref 0–1)
BILIRUB SERPL-MCNC: 1.8 MG/DL (ref 0.2–1)
BUN SERPL-MCNC: 18 MG/DL (ref 7–18)
CALCIUM SERPL-MCNC: 8.2 MG/DL (ref 8.8–10.2)
CHLORIDE SERPL-SCNC: 111 MEQ/L (ref 98–107)
CO2 SERPL-SCNC: 28 MEQ/L (ref 21–32)
CREAT SERPL-MCNC: 1.44 MG/DL (ref 0.55–1.3)
EOSINOPHIL # BLD AUTO: 0.1 10^3/UL (ref 0–0.5)
EOSINOPHIL NFR BLD AUTO: 2.9 % (ref 0–3)
GFR SERPL CREATININE-BSD FRML MDRD: 39.3 ML/MIN/{1.73_M2} (ref 45–?)
GLUCOSE SERPL-MCNC: 78 MG/DL (ref 70–100)
HCT VFR BLD AUTO: 29.2 % (ref 36–47)
HGB BLD-MCNC: 9.5 G/DL (ref 12–15.5)
INR PPP: 1.4
LYMPHOCYTES # BLD AUTO: 0.8 10^3/UL (ref 1.5–5)
LYMPHOCYTES NFR BLD AUTO: 20.6 % (ref 24–44)
MAGNESIUM SERPL-MCNC: 2 MG/DL (ref 1.8–2.4)
MCH RBC QN AUTO: 28 PG (ref 27–33)
MCHC RBC AUTO-ENTMCNC: 32.5 G/DL (ref 32–36.5)
MCV RBC AUTO: 86.1 FL (ref 80–96)
MONOCYTES # BLD AUTO: 0.5 10^3/UL (ref 0–0.8)
MONOCYTES NFR BLD AUTO: 13.2 % (ref 2–8)
NEUTROPHILS # BLD AUTO: 2.4 10^3/UL (ref 1.5–8.5)
NEUTROPHILS NFR BLD AUTO: 62.5 % (ref 36–66)
PLATELET # BLD AUTO: 93 10^3/UL (ref 150–450)
POTASSIUM SERPL-SCNC: 3.4 MEQ/L (ref 3.5–5.1)
PROT SERPL-MCNC: 5.8 GM/DL (ref 6.4–8.2)
PROTHROMBIN TIME: 17.6 SECONDS (ref 12.7–14.5)
RBC # BLD AUTO: 3.39 10^6/UL (ref 4–5.4)
SODIUM SERPL-SCNC: 143 MEQ/L (ref 136–145)
WBC # BLD AUTO: 3.8 10^3/UL (ref 4–10)

## 2022-03-31 RX ADMIN — CIPROFLOXACIN SCH MLS/HR: 2 INJECTION, SOLUTION INTRAVENOUS at 22:17

## 2022-03-31 RX ADMIN — FOLIC ACID SCH MG: 1 TABLET ORAL at 12:33

## 2022-03-31 RX ADMIN — RIFAXIMIN SCH MG: 550 TABLET ORAL at 08:14

## 2022-03-31 RX ADMIN — SPIRONOLACTONE SCH MG: 50 TABLET ORAL at 22:16

## 2022-03-31 RX ADMIN — TORSEMIDE SCH MG: 20 TABLET ORAL at 20:46

## 2022-03-31 RX ADMIN — LACTULOSE SCH ML: 10 SOLUTION ORAL at 16:30

## 2022-03-31 RX ADMIN — PANTOPRAZOLE SODIUM SCH MG: 40 TABLET, DELAYED RELEASE ORAL at 22:16

## 2022-03-31 RX ADMIN — RIFAXIMIN SCH MG: 550 TABLET ORAL at 22:15

## 2022-03-31 RX ADMIN — CIPROFLOXACIN SCH MLS/HR: 2 INJECTION, SOLUTION INTRAVENOUS at 12:04

## 2022-03-31 RX ADMIN — LACTULOSE SCH ML: 10 SOLUTION ORAL at 12:32

## 2022-03-31 RX ADMIN — LEVOTHYROXINE SODIUM SCH MCG: 20 INJECTION, SOLUTION INTRAVENOUS at 08:14

## 2022-03-31 RX ADMIN — LATANOPROST SCH DROP: 50 SOLUTION OPHTHALMIC at 22:16

## 2022-03-31 RX ADMIN — LACTULOSE SCH ML: 10 SOLUTION ORAL at 08:13

## 2022-03-31 RX ADMIN — LACTULOSE SCH ML: 10 SOLUTION ORAL at 22:15

## 2022-03-31 RX ADMIN — LEVOTHYROXINE SODIUM SCH MCG: 20 INJECTION, SOLUTION INTRAVENOUS at 09:40

## 2022-03-31 RX ADMIN — SPIRONOLACTONE SCH MG: 50 TABLET ORAL at 12:33

## 2022-03-31 RX ADMIN — MULTIPLE VITAMINS W/ MINERALS TAB SCH TAB: TAB at 12:32

## 2022-04-01 VITALS — DIASTOLIC BLOOD PRESSURE: 68 MMHG | SYSTOLIC BLOOD PRESSURE: 118 MMHG

## 2022-04-01 VITALS — SYSTOLIC BLOOD PRESSURE: 139 MMHG | DIASTOLIC BLOOD PRESSURE: 73 MMHG

## 2022-04-01 VITALS — SYSTOLIC BLOOD PRESSURE: 120 MMHG | DIASTOLIC BLOOD PRESSURE: 59 MMHG

## 2022-04-01 VITALS — DIASTOLIC BLOOD PRESSURE: 67 MMHG | SYSTOLIC BLOOD PRESSURE: 127 MMHG

## 2022-04-01 VITALS — SYSTOLIC BLOOD PRESSURE: 128 MMHG | DIASTOLIC BLOOD PRESSURE: 61 MMHG

## 2022-04-01 VITALS — SYSTOLIC BLOOD PRESSURE: 109 MMHG | DIASTOLIC BLOOD PRESSURE: 60 MMHG

## 2022-04-01 LAB
ALBUMIN SERPL BCG-MCNC: 2.4 GM/DL (ref 3.2–5.2)
ALT SERPL W P-5'-P-CCNC: 38 U/L (ref 12–78)
BASOPHILS # BLD AUTO: 0 10^3/UL (ref 0–0.2)
BASOPHILS NFR BLD AUTO: 1 % (ref 0–1)
BILIRUB SERPL-MCNC: 1.5 MG/DL (ref 0.2–1)
BUN SERPL-MCNC: 17 MG/DL (ref 7–18)
CALCIUM SERPL-MCNC: 7.9 MG/DL (ref 8.8–10.2)
CHLORIDE SERPL-SCNC: 107 MEQ/L (ref 98–107)
CO2 SERPL-SCNC: 25 MEQ/L (ref 21–32)
CREAT SERPL-MCNC: 1.53 MG/DL (ref 0.55–1.3)
EOSINOPHIL # BLD AUTO: 0.2 10^3/UL (ref 0–0.5)
EOSINOPHIL NFR BLD AUTO: 5 % (ref 0–3)
GFR SERPL CREATININE-BSD FRML MDRD: 36.6 ML/MIN/{1.73_M2} (ref 45–?)
GLUCOSE SERPL-MCNC: 115 MG/DL (ref 70–100)
HCT VFR BLD AUTO: 27.5 % (ref 36–47)
HGB BLD-MCNC: 9 G/DL (ref 12–15.5)
LYMPHOCYTES # BLD AUTO: 0.8 10^3/UL (ref 1.5–5)
LYMPHOCYTES NFR BLD AUTO: 19.8 % (ref 24–44)
MAGNESIUM SERPL-MCNC: 1.9 MG/DL (ref 1.8–2.4)
MCH RBC QN AUTO: 28.2 PG (ref 27–33)
MCHC RBC AUTO-ENTMCNC: 32.7 G/DL (ref 32–36.5)
MCV RBC AUTO: 86.2 FL (ref 80–96)
MONOCYTES # BLD AUTO: 0.5 10^3/UL (ref 0–0.8)
MONOCYTES NFR BLD AUTO: 12 % (ref 2–8)
NEUTROPHILS # BLD AUTO: 2.4 10^3/UL (ref 1.5–8.5)
NEUTROPHILS NFR BLD AUTO: 61.7 % (ref 36–66)
PLATELET # BLD AUTO: 93 10^3/UL (ref 150–450)
POTASSIUM SERPL-SCNC: 3.2 MEQ/L (ref 3.5–5.1)
PROT SERPL-MCNC: 6 GM/DL (ref 6.4–8.2)
RBC # BLD AUTO: 3.19 10^6/UL (ref 4–5.4)
SODIUM SERPL-SCNC: 138 MEQ/L (ref 136–145)
WBC # BLD AUTO: 3.8 10^3/UL (ref 4–10)

## 2022-04-01 RX ADMIN — LATANOPROST SCH DROP: 50 SOLUTION OPHTHALMIC at 20:33

## 2022-04-01 RX ADMIN — CHOLESTYRAMINE SCH GM: 4 POWDER, FOR SUSPENSION ORAL at 00:13

## 2022-04-01 RX ADMIN — CIPROFLOXACIN SCH MLS/HR: 2 INJECTION, SOLUTION INTRAVENOUS at 11:17

## 2022-04-01 RX ADMIN — RIFAXIMIN SCH MG: 550 TABLET ORAL at 20:33

## 2022-04-01 RX ADMIN — CHOLESTYRAMINE SCH GM: 4 POWDER, FOR SUSPENSION ORAL at 09:44

## 2022-04-01 RX ADMIN — CHOLESTYRAMINE SCH GM: 4 POWDER, FOR SUSPENSION ORAL at 20:33

## 2022-04-01 RX ADMIN — PANTOPRAZOLE SODIUM SCH MG: 40 TABLET, DELAYED RELEASE ORAL at 08:30

## 2022-04-01 RX ADMIN — SPIRONOLACTONE SCH MG: 50 TABLET ORAL at 20:33

## 2022-04-01 RX ADMIN — CHOLESTYRAMINE SCH GM: 4 POWDER, FOR SUSPENSION ORAL at 08:31

## 2022-04-01 RX ADMIN — LEVOTHYROXINE SODIUM SCH MCG: 112 TABLET ORAL at 05:43

## 2022-04-01 RX ADMIN — TORSEMIDE SCH MG: 20 TABLET ORAL at 08:31

## 2022-04-01 RX ADMIN — LACTULOSE SCH ML: 10 SOLUTION ORAL at 20:33

## 2022-04-01 RX ADMIN — MULTIPLE VITAMINS W/ MINERALS TAB SCH TAB: TAB at 08:30

## 2022-04-01 RX ADMIN — RIFAXIMIN SCH MG: 550 TABLET ORAL at 08:30

## 2022-04-01 RX ADMIN — SPIRONOLACTONE SCH MG: 50 TABLET ORAL at 08:30

## 2022-04-01 RX ADMIN — LACTULOSE SCH ML: 10 SOLUTION ORAL at 13:29

## 2022-04-01 RX ADMIN — FOLIC ACID SCH MG: 1 TABLET ORAL at 08:30

## 2022-04-01 RX ADMIN — TORSEMIDE SCH MG: 20 TABLET ORAL at 17:03

## 2022-04-01 RX ADMIN — LACTULOSE SCH ML: 10 SOLUTION ORAL at 08:30

## 2022-04-01 RX ADMIN — PANTOPRAZOLE SODIUM SCH MG: 40 TABLET, DELAYED RELEASE ORAL at 20:33

## 2022-04-01 RX ADMIN — LACTULOSE SCH ML: 10 SOLUTION ORAL at 17:03

## 2022-04-01 RX ADMIN — CIPROFLOXACIN SCH MLS/HR: 2 INJECTION, SOLUTION INTRAVENOUS at 23:27

## 2022-04-02 VITALS — DIASTOLIC BLOOD PRESSURE: 92 MMHG | SYSTOLIC BLOOD PRESSURE: 116 MMHG

## 2022-04-02 VITALS — DIASTOLIC BLOOD PRESSURE: 60 MMHG | SYSTOLIC BLOOD PRESSURE: 115 MMHG

## 2022-04-02 VITALS — SYSTOLIC BLOOD PRESSURE: 112 MMHG | DIASTOLIC BLOOD PRESSURE: 59 MMHG

## 2022-04-02 LAB
ALBUMIN SERPL BCG-MCNC: 2.5 GM/DL (ref 3.2–5.2)
ALT SERPL W P-5'-P-CCNC: 36 U/L (ref 12–78)
BASOPHILS # BLD AUTO: 0 10^3/UL (ref 0–0.2)
BASOPHILS NFR BLD AUTO: 0.5 % (ref 0–1)
BILIRUB SERPL-MCNC: 1.2 MG/DL (ref 0.2–1)
BUN SERPL-MCNC: 16 MG/DL (ref 7–18)
CALCIUM SERPL-MCNC: 7.8 MG/DL (ref 8.8–10.2)
CHLORIDE SERPL-SCNC: 107 MEQ/L (ref 98–107)
CO2 SERPL-SCNC: 26 MEQ/L (ref 21–32)
CREAT SERPL-MCNC: 1.46 MG/DL (ref 0.55–1.3)
EOSINOPHIL # BLD AUTO: 0.2 10^3/UL (ref 0–0.5)
EOSINOPHIL NFR BLD AUTO: 4.5 % (ref 0–3)
GFR SERPL CREATININE-BSD FRML MDRD: 38.6 ML/MIN/{1.73_M2} (ref 45–?)
GLUCOSE SERPL-MCNC: 92 MG/DL (ref 70–100)
HCT VFR BLD AUTO: 30 % (ref 36–47)
HGB BLD-MCNC: 9.7 G/DL (ref 12–15.5)
LYMPHOCYTES # BLD AUTO: 0.8 10^3/UL (ref 1.5–5)
LYMPHOCYTES NFR BLD AUTO: 19.1 % (ref 24–44)
MAGNESIUM SERPL-MCNC: 1.9 MG/DL (ref 1.8–2.4)
MCH RBC QN AUTO: 27.6 PG (ref 27–33)
MCHC RBC AUTO-ENTMCNC: 32.3 G/DL (ref 32–36.5)
MCV RBC AUTO: 85.5 FL (ref 80–96)
MONOCYTES # BLD AUTO: 0.6 10^3/UL (ref 0–0.8)
MONOCYTES NFR BLD AUTO: 14.5 % (ref 2–8)
NEUTROPHILS # BLD AUTO: 2.7 10^3/UL (ref 1.5–8.5)
NEUTROPHILS NFR BLD AUTO: 61.2 % (ref 36–66)
PLATELET # BLD AUTO: 102 10^3/UL (ref 150–450)
POTASSIUM SERPL-SCNC: 3.3 MEQ/L (ref 3.5–5.1)
PROT SERPL-MCNC: 6.3 GM/DL (ref 6.4–8.2)
RBC # BLD AUTO: 3.51 10^6/UL (ref 4–5.4)
SODIUM SERPL-SCNC: 141 MEQ/L (ref 136–145)
WBC # BLD AUTO: 4.4 10^3/UL (ref 4–10)

## 2022-04-02 RX ADMIN — LEVOTHYROXINE SODIUM SCH MCG: 112 TABLET ORAL at 06:21

## 2022-04-02 RX ADMIN — PANTOPRAZOLE SODIUM SCH MG: 40 TABLET, DELAYED RELEASE ORAL at 08:27

## 2022-04-02 RX ADMIN — LACTULOSE SCH ML: 10 SOLUTION ORAL at 08:14

## 2022-04-02 RX ADMIN — CHOLESTYRAMINE SCH GM: 4 POWDER, FOR SUSPENSION ORAL at 09:48

## 2022-04-02 RX ADMIN — MULTIPLE VITAMINS W/ MINERALS TAB SCH TAB: TAB at 08:27

## 2022-04-02 RX ADMIN — RIFAXIMIN SCH MG: 550 TABLET ORAL at 08:26

## 2022-04-02 RX ADMIN — SPIRONOLACTONE SCH MG: 50 TABLET ORAL at 08:26

## 2022-04-02 RX ADMIN — FOLIC ACID SCH MG: 1 TABLET ORAL at 08:27

## 2022-04-02 RX ADMIN — TORSEMIDE SCH MG: 20 TABLET ORAL at 08:27

## 2022-04-21 ENCOUNTER — HOSPITAL ENCOUNTER (OUTPATIENT)
Dept: HOSPITAL 53 - M LAB | Age: 62
End: 2022-04-21
Attending: INTERNAL MEDICINE
Payer: COMMERCIAL

## 2022-04-21 ENCOUNTER — HOSPITAL ENCOUNTER (OUTPATIENT)
Dept: HOSPITAL 53 - M IRPRO | Age: 62
End: 2022-04-21
Attending: INTERNAL MEDICINE
Payer: COMMERCIAL

## 2022-04-21 VITALS — DIASTOLIC BLOOD PRESSURE: 65 MMHG | SYSTOLIC BLOOD PRESSURE: 133 MMHG

## 2022-04-21 DIAGNOSIS — K70.31: Primary | ICD-10-CM

## 2022-04-21 DIAGNOSIS — Z53.8: ICD-10-CM

## 2022-04-21 DIAGNOSIS — J90: Primary | ICD-10-CM

## 2022-04-21 LAB
ALBUMIN SERPL BCG-MCNC: 3.5 GM/DL (ref 3.2–5.2)
ALT SERPL W P-5'-P-CCNC: 25 U/L (ref 12–78)
APTT BLD: 37.7 SECONDS (ref 25.9–37)
BASOPHILS # BLD AUTO: 0 10^3/UL (ref 0–0.2)
BASOPHILS NFR BLD AUTO: 0.7 % (ref 0–1)
BILIRUB SERPL-MCNC: 1.4 MG/DL (ref 0.2–1)
BUN SERPL-MCNC: 23 MG/DL (ref 7–18)
CALCIUM SERPL-MCNC: 9.4 MG/DL (ref 8.8–10.2)
CHLORIDE SERPL-SCNC: 101 MEQ/L (ref 98–107)
CO2 SERPL-SCNC: 29 MEQ/L (ref 21–32)
CREAT SERPL-MCNC: 1.83 MG/DL (ref 0.55–1.3)
EOSINOPHIL # BLD AUTO: 0.1 10^3/UL (ref 0–0.5)
EOSINOPHIL NFR BLD AUTO: 3.2 % (ref 0–3)
GFR SERPL CREATININE-BSD FRML MDRD: 29.8 ML/MIN/{1.73_M2} (ref 45–?)
GLUCOSE SERPL-MCNC: 69 MG/DL (ref 70–100)
HCT VFR BLD AUTO: 32.7 % (ref 36–47)
HGB BLD-MCNC: 10.6 G/DL (ref 12–15.5)
INR PPP: 1.37
LYMPHOCYTES # BLD AUTO: 0.8 10^3/UL (ref 1.5–5)
LYMPHOCYTES NFR BLD AUTO: 19.4 % (ref 24–44)
MCH RBC QN AUTO: 27.7 PG (ref 27–33)
MCHC RBC AUTO-ENTMCNC: 32.4 G/DL (ref 32–36.5)
MCV RBC AUTO: 85.4 FL (ref 80–96)
MONOCYTES # BLD AUTO: 0.7 10^3/UL (ref 0–0.8)
MONOCYTES NFR BLD AUTO: 15 % (ref 2–8)
NEUTROPHILS # BLD AUTO: 2.7 10^3/UL (ref 1.5–8.5)
NEUTROPHILS NFR BLD AUTO: 61.7 % (ref 36–66)
PLATELET # BLD AUTO: 129 10^3/UL (ref 150–450)
POTASSIUM SERPL-SCNC: 3.9 MEQ/L (ref 3.5–5.1)
PROT SERPL-MCNC: 7.6 GM/DL (ref 6.4–8.2)
PROTHROMBIN TIME: 17.3 SECONDS (ref 12.7–14.5)
RBC # BLD AUTO: 3.83 10^6/UL (ref 4–5.4)
SODIUM SERPL-SCNC: 140 MEQ/L (ref 136–145)
WBC # BLD AUTO: 4.3 10^3/UL (ref 4–10)

## 2022-04-22 ENCOUNTER — HOSPITAL ENCOUNTER (EMERGENCY)
Dept: HOSPITAL 53 - M ED | Age: 62
Discharge: HOME | End: 2022-04-22
Payer: COMMERCIAL

## 2022-04-22 VITALS — SYSTOLIC BLOOD PRESSURE: 120 MMHG | DIASTOLIC BLOOD PRESSURE: 66 MMHG

## 2022-04-22 DIAGNOSIS — Z88.0: ICD-10-CM

## 2022-04-22 DIAGNOSIS — F32.9: ICD-10-CM

## 2022-04-22 DIAGNOSIS — Z88.8: ICD-10-CM

## 2022-04-22 DIAGNOSIS — F41.9: ICD-10-CM

## 2022-04-22 DIAGNOSIS — M54.50: ICD-10-CM

## 2022-04-22 DIAGNOSIS — J45.909: ICD-10-CM

## 2022-04-22 DIAGNOSIS — F10.10: ICD-10-CM

## 2022-04-22 DIAGNOSIS — R41.82: Primary | ICD-10-CM

## 2022-04-22 DIAGNOSIS — Z79.899: ICD-10-CM

## 2022-04-22 DIAGNOSIS — E78.5: ICD-10-CM

## 2022-04-22 DIAGNOSIS — I10: ICD-10-CM

## 2022-04-22 LAB
ALBUMIN SERPL BCG-MCNC: 3.5 GM/DL (ref 3.2–5.2)
ALT SERPL W P-5'-P-CCNC: 28 U/L (ref 12–78)
AMPHETAMINES UR QL SCN: NEGATIVE
APAP SERPL-MCNC: < 2 UG/ML (ref 10–30)
BARBITURATES UR QL SCN: NEGATIVE
BASE EXCESS BLDV CALC-SCNC: -1.2 MMOL/L (ref -2–2)
BASOPHILS # BLD AUTO: 0 10^3/UL (ref 0–0.2)
BASOPHILS NFR BLD AUTO: 0.6 % (ref 0–1)
BENZODIAZ UR QL SCN: NEGATIVE
BILIRUB CONJ SERPL-MCNC: 0.8 MG/DL (ref 0–0.2)
BILIRUB SERPL-MCNC: 1.6 MG/DL (ref 0.2–1)
BUN SERPL-MCNC: 27 MG/DL (ref 7–18)
BZE UR QL SCN: NEGATIVE
CALCIUM SERPL-MCNC: 9.1 MG/DL (ref 8.8–10.2)
CANNABINOIDS UR QL SCN: NEGATIVE
CHLORIDE SERPL-SCNC: 106 MEQ/L (ref 98–107)
CO2 BLDV CALC-SCNC: 24.4 MEQ/L (ref 24–28)
CO2 SERPL-SCNC: 27 MEQ/L (ref 21–32)
CREAT SERPL-MCNC: 1.98 MG/DL (ref 0.55–1.3)
EOSINOPHIL # BLD AUTO: 0.1 10^3/UL (ref 0–0.5)
EOSINOPHIL NFR BLD AUTO: 2.3 % (ref 0–3)
ETHANOL SERPL-MCNC: < 0.003 % (ref 0–0.01)
GFR SERPL CREATININE-BSD FRML MDRD: 27.2 ML/MIN/{1.73_M2} (ref 45–?)
GLUCOSE SERPL-MCNC: 108 MG/DL (ref 70–100)
HCO3 BLDV-SCNC: 23.2 MEQ/L (ref 23–27)
HCO3 STD BLDV-SCNC: 22.9 MEQ/L
HCT VFR BLD AUTO: 33.1 % (ref 36–47)
HGB BLD-MCNC: 10.6 G/DL (ref 12–15.5)
LYMPHOCYTES # BLD AUTO: 0.5 10^3/UL (ref 1.5–5)
LYMPHOCYTES NFR BLD AUTO: 16.4 % (ref 24–44)
MCH RBC QN AUTO: 28.1 PG (ref 27–33)
MCHC RBC AUTO-ENTMCNC: 32 G/DL (ref 32–36.5)
MCV RBC AUTO: 87.8 FL (ref 80–96)
METHADONE UR QL SCN: NEGATIVE
MONOCYTES # BLD AUTO: 0.3 10^3/UL (ref 0–0.8)
MONOCYTES NFR BLD AUTO: 10 % (ref 2–8)
NEUTROPHILS # BLD AUTO: 2.2 10^3/UL (ref 1.5–8.5)
NEUTROPHILS NFR BLD AUTO: 70.4 % (ref 36–66)
OPIATES UR QL SCN: NEGATIVE
OSMOLALITY SERPL: 299 MOSM/KG (ref 280–301)
PCO2 BLDV: 38 MMHG (ref 38–50)
PCP UR QL SCN: NEGATIVE
PH BLDV: 7.4 UNITS (ref 7.33–7.43)
PLATELET # BLD AUTO: 118 10^3/UL (ref 150–450)
PO2 BLDV: 40.8 MMHG (ref 30–50)
POTASSIUM SERPL-SCNC: 4.8 MEQ/L (ref 3.5–5.1)
PROT SERPL-MCNC: 7.5 GM/DL (ref 6.4–8.2)
RBC # BLD AUTO: 3.77 10^6/UL (ref 4–5.4)
SALICYLATES SERPL-MCNC: < 1.7 MG/DL (ref 5–30)
SAO2 % BLDV: 72.2 % (ref 60–80)
SODIUM SERPL-SCNC: 140 MEQ/L (ref 136–145)
TSH SERPL DL<=0.005 MIU/L-ACNC: 4.74 UIU/ML (ref 0.36–3.74)
WBC # BLD AUTO: 3.1 10^3/UL (ref 4–10)

## 2022-05-10 ENCOUNTER — HOSPITAL ENCOUNTER (EMERGENCY)
Dept: HOSPITAL 53 - M ED | Age: 62
LOS: 1 days | Discharge: HOME | End: 2022-05-11
Payer: COMMERCIAL

## 2022-05-10 VITALS — WEIGHT: 130.95 LBS | BODY MASS INDEX: 20.55 KG/M2 | HEIGHT: 67 IN

## 2022-05-10 DIAGNOSIS — R41.0: Primary | ICD-10-CM

## 2022-05-10 DIAGNOSIS — Z88.0: ICD-10-CM

## 2022-05-10 DIAGNOSIS — I10: ICD-10-CM

## 2022-05-10 DIAGNOSIS — F10.10: ICD-10-CM

## 2022-05-10 DIAGNOSIS — Z88.1: ICD-10-CM

## 2022-05-10 DIAGNOSIS — G43.909: ICD-10-CM

## 2022-05-10 DIAGNOSIS — K70.30: ICD-10-CM

## 2022-05-10 DIAGNOSIS — K72.90: ICD-10-CM

## 2022-05-10 DIAGNOSIS — Z79.899: ICD-10-CM

## 2022-05-10 LAB
AMPHETAMINES UR QL SCN: NEGATIVE
BARBITURATES UR QL SCN: NEGATIVE
BENZODIAZ UR QL SCN: NEGATIVE
BZE UR QL SCN: NEGATIVE
CANNABINOIDS UR QL SCN: NEGATIVE
METHADONE UR QL SCN: NEGATIVE
OPIATES UR QL SCN: NEGATIVE
PCP UR QL SCN: NEGATIVE

## 2022-05-11 VITALS — DIASTOLIC BLOOD PRESSURE: 98 MMHG | SYSTOLIC BLOOD PRESSURE: 137 MMHG

## 2022-05-11 LAB
ALBUMIN SERPL BCG-MCNC: 3.6 GM/DL (ref 3.2–5.2)
ALT SERPL W P-5'-P-CCNC: 77 U/L (ref 12–78)
APAP SERPL-MCNC: < 2 UG/ML (ref 10–30)
APTT BLD: 38.3 SECONDS (ref 25.9–37)
BASE EXCESS BLDV CALC-SCNC: -3.4 MMOL/L (ref -2–2)
BASOPHILS # BLD AUTO: 0 10^3/UL (ref 0–0.2)
BASOPHILS NFR BLD AUTO: 0.6 % (ref 0–1)
BILIRUB CONJ SERPL-MCNC: 1.4 MG/DL (ref 0–0.2)
BILIRUB SERPL-MCNC: 3.2 MG/DL (ref 0.2–1)
BUN SERPL-MCNC: 45 MG/DL (ref 7–18)
CALCIUM SERPL-MCNC: 8.9 MG/DL (ref 8.8–10.2)
CHLORIDE SERPL-SCNC: 105 MEQ/L (ref 98–107)
CK MB CFR.DF SERPL CALC: 0.59
CK MB SERPL-MCNC: 8.6 NG/ML (ref ?–3.6)
CK SERPL-CCNC: 1465 U/L (ref 26–192)
CO2 BLDV CALC-SCNC: 22.1 MEQ/L (ref 24–28)
CO2 SERPL-SCNC: 23 MEQ/L (ref 21–32)
CREAT SERPL-MCNC: 3.43 MG/DL (ref 0.55–1.3)
EOSINOPHIL # BLD AUTO: 0.2 10^3/UL (ref 0–0.5)
EOSINOPHIL NFR BLD AUTO: 2.8 % (ref 0–3)
ETHANOL SERPL-MCNC: 0.01 % (ref 0–0.01)
GFR SERPL CREATININE-BSD FRML MDRD: 14.4 ML/MIN/{1.73_M2} (ref 45–?)
GLUCOSE SERPL-MCNC: 106 MG/DL (ref 70–100)
HCO3 BLDV-SCNC: 21 MEQ/L (ref 23–27)
HCO3 STD BLDV-SCNC: 21.6 MEQ/L
HCT VFR BLD AUTO: 34.5 % (ref 36–47)
HGB BLD-MCNC: 11.2 G/DL (ref 12–15.5)
INR PPP: 1.61
LYMPHOCYTES # BLD AUTO: 1.4 10^3/UL (ref 1.5–5)
LYMPHOCYTES NFR BLD AUTO: 19.4 % (ref 24–44)
MCH RBC QN AUTO: 27.6 PG (ref 27–33)
MCHC RBC AUTO-ENTMCNC: 32.5 G/DL (ref 32–36.5)
MCV RBC AUTO: 85 FL (ref 80–96)
MONOCYTES # BLD AUTO: 0.8 10^3/UL (ref 0–0.8)
MONOCYTES NFR BLD AUTO: 10.6 % (ref 2–8)
NEUTROPHILS # BLD AUTO: 4.7 10^3/UL (ref 1.5–8.5)
NEUTROPHILS NFR BLD AUTO: 65.9 % (ref 36–66)
OSMOLALITY SERPL: 305 MOSM/KG (ref 280–301)
PCO2 BLDV: 35.7 MMHG (ref 38–50)
PH BLDV: 7.39 UNITS (ref 7.33–7.43)
PLATELET # BLD AUTO: 165 10^3/UL (ref 150–450)
PO2 BLDV: 69.4 MMHG (ref 30–50)
POTASSIUM SERPL-SCNC: 4.5 MEQ/L (ref 3.5–5.1)
PROT SERPL-MCNC: 7.6 GM/DL (ref 6.4–8.2)
PROTHROMBIN TIME: 19.6 SECONDS (ref 12.7–14.5)
RBC # BLD AUTO: 4.06 10^6/UL (ref 4–5.4)
RSV RNA NPH QL NAA+PROBE: NEGATIVE
SALICYLATES SERPL-MCNC: < 1.7 MG/DL (ref 5–30)
SAO2 % BLDV: 92.4 % (ref 60–80)
SODIUM SERPL-SCNC: 137 MEQ/L (ref 136–145)
TSH SERPL DL<=0.005 MIU/L-ACNC: 6.92 UIU/ML (ref 0.36–3.74)
WBC # BLD AUTO: 7.1 10^3/UL (ref 4–10)

## 2022-05-13 ENCOUNTER — HOSPITAL ENCOUNTER (OUTPATIENT)
Dept: HOSPITAL 53 - M RAD | Age: 62
End: 2022-05-13
Attending: INTERNAL MEDICINE
Payer: COMMERCIAL

## 2022-05-13 DIAGNOSIS — J90: Primary | ICD-10-CM

## 2022-05-16 ENCOUNTER — HOSPITAL ENCOUNTER (OUTPATIENT)
Dept: HOSPITAL 53 - M LAB | Age: 62
End: 2022-05-16
Attending: NURSE PRACTITIONER
Payer: COMMERCIAL

## 2022-05-16 DIAGNOSIS — K76.7: Primary | ICD-10-CM

## 2022-05-16 LAB
BUN SERPL-MCNC: 34 MG/DL (ref 7–18)
CALCIUM SERPL-MCNC: 9.2 MG/DL (ref 8.8–10.2)
CHLORIDE SERPL-SCNC: 101 MEQ/L (ref 98–107)
CO2 SERPL-SCNC: 24 MEQ/L (ref 21–32)
CREAT SERPL-MCNC: 2.42 MG/DL (ref 0.55–1.3)
GFR SERPL CREATININE-BSD FRML MDRD: 21.6 ML/MIN/{1.73_M2} (ref 45–?)
GLUCOSE SERPL-MCNC: 88 MG/DL (ref 70–100)
POTASSIUM SERPL-SCNC: 4.5 MEQ/L (ref 3.5–5.1)
SODIUM SERPL-SCNC: 133 MEQ/L (ref 136–145)

## 2022-06-07 ENCOUNTER — HOSPITAL ENCOUNTER (OUTPATIENT)
Dept: HOSPITAL 53 - M RAD | Age: 62
End: 2022-06-07
Attending: INTERNAL MEDICINE
Payer: COMMERCIAL

## 2022-06-07 DIAGNOSIS — R91.8: ICD-10-CM

## 2022-06-07 DIAGNOSIS — J90: Primary | ICD-10-CM

## 2022-06-07 DIAGNOSIS — I51.7: ICD-10-CM

## 2022-07-01 ENCOUNTER — HOSPITAL ENCOUNTER (EMERGENCY)
Dept: HOSPITAL 53 - M ED | Age: 62
Discharge: LEFT BEFORE DISCHARGE | End: 2022-07-01
Payer: COMMERCIAL

## 2022-07-01 VITALS — SYSTOLIC BLOOD PRESSURE: 138 MMHG | DIASTOLIC BLOOD PRESSURE: 86 MMHG

## 2022-07-01 VITALS — WEIGHT: 144.84 LBS | HEIGHT: 65 IN | BODY MASS INDEX: 24.13 KG/M2

## 2022-07-01 DIAGNOSIS — Z53.21: Primary | ICD-10-CM

## 2022-07-05 ENCOUNTER — HOSPITAL ENCOUNTER (OUTPATIENT)
Dept: HOSPITAL 53 - M LAB | Age: 62
End: 2022-07-05
Attending: NURSE PRACTITIONER
Payer: COMMERCIAL

## 2022-07-05 DIAGNOSIS — E03.9: Primary | ICD-10-CM

## 2022-07-05 LAB
CHOLEST SERPL-MCNC: 169 MG/DL (ref ?–200)
CHOLEST/HDLC SERPL: 1.9 {RATIO} (ref ?–5)
HDLC SERPL-MCNC: 89 MG/DL (ref 40–?)
LDLC SERPL CALC-MCNC: 67 MG/DL (ref ?–100)
NONHDLC SERPL-MCNC: 80 MG/DL
T4 FREE SERPL-MCNC: 1.59 NG/DL (ref 0.76–1.46)
TRIGL SERPL-MCNC: 63 MG/DL (ref ?–150)
TSH SERPL DL<=0.005 MIU/L-ACNC: 8.79 UIU/ML (ref 0.36–3.74)

## 2022-07-07 ENCOUNTER — HOSPITAL ENCOUNTER (EMERGENCY)
Dept: HOSPITAL 53 - M ED | Age: 62
LOS: 1 days | Discharge: ANOTHER HOSPITAL | End: 2022-07-08
Payer: COMMERCIAL

## 2022-07-07 VITALS — HEIGHT: 60 IN | BODY MASS INDEX: 25.58 KG/M2 | WEIGHT: 130.27 LBS

## 2022-07-07 DIAGNOSIS — Z88.1: ICD-10-CM

## 2022-07-07 DIAGNOSIS — Z79.899: ICD-10-CM

## 2022-07-07 DIAGNOSIS — Z88.0: ICD-10-CM

## 2022-07-07 DIAGNOSIS — K92.2: Primary | ICD-10-CM

## 2022-07-07 DIAGNOSIS — Z79.890: ICD-10-CM

## 2022-07-07 DIAGNOSIS — I10: ICD-10-CM

## 2022-07-07 DIAGNOSIS — N18.30: ICD-10-CM

## 2022-07-07 DIAGNOSIS — K70.30: ICD-10-CM

## 2022-07-07 DIAGNOSIS — K72.90: ICD-10-CM

## 2022-07-07 DIAGNOSIS — E78.5: ICD-10-CM

## 2022-07-07 LAB
ALBUMIN SERPL BCG-MCNC: 2.2 GM/DL (ref 3.2–5.2)
ALT SERPL W P-5'-P-CCNC: 29 U/L (ref 12–78)
APTT BLD: 35.5 SECONDS (ref 25.9–37)
BASOPHILS # BLD AUTO: 0.1 10^3/UL (ref 0–0.2)
BASOPHILS NFR BLD AUTO: 0.4 % (ref 0–1)
BILIRUB CONJ SERPL-MCNC: 0.6 MG/DL (ref 0–0.2)
BILIRUB SERPL-MCNC: 1.1 MG/DL (ref 0.2–1)
BUN SERPL-MCNC: 47 MG/DL (ref 7–18)
CALCIUM SERPL-MCNC: 7.6 MG/DL (ref 8.8–10.2)
CHLORIDE SERPL-SCNC: 101 MEQ/L (ref 98–107)
CO2 SERPL-SCNC: 18 MEQ/L (ref 21–32)
CREAT SERPL-MCNC: 2.03 MG/DL (ref 0.55–1.3)
EOSINOPHIL # BLD AUTO: 0.4 10^3/UL (ref 0–0.5)
EOSINOPHIL NFR BLD AUTO: 3.1 % (ref 0–3)
ETHANOL SERPL-MCNC: 0.01 % (ref 0–0.01)
GFR SERPL CREATININE-BSD FRML MDRD: 26.4 ML/MIN/{1.73_M2} (ref 45–?)
GLUCOSE SERPL-MCNC: 157 MG/DL (ref 70–100)
HCT VFR BLD AUTO: 24.5 % (ref 36–47)
HGB BLD-MCNC: 7.6 G/DL (ref 12–15.5)
INR PPP: 1.73
LIPASE SERPL-CCNC: 315 U/L (ref 73–393)
LYMPHOCYTES # BLD AUTO: 1.7 10^3/UL (ref 1.5–5)
LYMPHOCYTES NFR BLD AUTO: 12.3 % (ref 24–44)
MCH RBC QN AUTO: 26.9 PG (ref 27–33)
MCHC RBC AUTO-ENTMCNC: 31 G/DL (ref 32–36.5)
MCV RBC AUTO: 86.6 FL (ref 80–96)
MONOCYTES # BLD AUTO: 1.1 10^3/UL (ref 0–0.8)
MONOCYTES NFR BLD AUTO: 8.3 % (ref 2–8)
NEUTROPHILS # BLD AUTO: 9.8 10^3/UL (ref 1.5–8.5)
NEUTROPHILS NFR BLD AUTO: 72.1 % (ref 36–66)
PLATELET # BLD AUTO: 211 10^3/UL (ref 150–450)
POTASSIUM SERPL-SCNC: 5.8 MEQ/L (ref 3.5–5.1)
PROT SERPL-MCNC: 5.7 GM/DL (ref 6.4–8.2)
PROTHROMBIN TIME: 20.7 SECONDS (ref 12.7–14.5)
RBC # BLD AUTO: 2.83 10^6/UL (ref 4–5.4)
RSV RNA NPH QL NAA+PROBE: NEGATIVE
SODIUM SERPL-SCNC: 128 MEQ/L (ref 136–145)
WBC # BLD AUTO: 13.5 10^3/UL (ref 4–10)

## 2022-07-07 PROCEDURE — 36430 TRANSFUSION BLD/BLD COMPNT: CPT

## 2022-07-07 PROCEDURE — 74021 RADEX ABDOMEN 3+ VIEWS: CPT

## 2022-07-07 PROCEDURE — 87631 RESP VIRUS 3-5 TARGETS: CPT

## 2022-07-07 PROCEDURE — 85610 PROTHROMBIN TIME: CPT

## 2022-07-07 PROCEDURE — 82248 BILIRUBIN DIRECT: CPT

## 2022-07-07 PROCEDURE — 96365 THER/PROPH/DIAG IV INF INIT: CPT

## 2022-07-07 PROCEDURE — 96375 TX/PRO/DX INJ NEW DRUG ADDON: CPT

## 2022-07-07 PROCEDURE — 82077 ASSAY SPEC XCP UR&BREATH IA: CPT

## 2022-07-07 PROCEDURE — 83690 ASSAY OF LIPASE: CPT

## 2022-07-07 PROCEDURE — 86900 BLOOD TYPING SEROLOGIC ABO: CPT

## 2022-07-07 PROCEDURE — 96368 THER/DIAG CONCURRENT INF: CPT

## 2022-07-07 PROCEDURE — 86920 COMPATIBILITY TEST SPIN: CPT

## 2022-07-07 PROCEDURE — 80053 COMPREHEN METABOLIC PANEL: CPT

## 2022-07-07 PROCEDURE — 96366 THER/PROPH/DIAG IV INF ADDON: CPT

## 2022-07-07 PROCEDURE — 86850 RBC ANTIBODY SCREEN: CPT

## 2022-07-07 PROCEDURE — 99285 EMERGENCY DEPT VISIT HI MDM: CPT

## 2022-07-07 PROCEDURE — 85025 COMPLETE CBC W/AUTO DIFF WBC: CPT

## 2022-07-07 PROCEDURE — 86901 BLOOD TYPING SEROLOGIC RH(D): CPT

## 2022-07-07 PROCEDURE — 93041 RHYTHM ECG TRACING: CPT

## 2022-07-07 PROCEDURE — 94760 N-INVAS EAR/PLS OXIMETRY 1: CPT

## 2022-07-07 PROCEDURE — 93005 ELECTROCARDIOGRAM TRACING: CPT

## 2022-07-07 PROCEDURE — 85730 THROMBOPLASTIN TIME PARTIAL: CPT

## 2022-07-08 VITALS — DIASTOLIC BLOOD PRESSURE: 55 MMHG | SYSTOLIC BLOOD PRESSURE: 98 MMHG

## 2022-07-08 VITALS — DIASTOLIC BLOOD PRESSURE: 41 MMHG | SYSTOLIC BLOOD PRESSURE: 74 MMHG

## 2022-07-08 VITALS — DIASTOLIC BLOOD PRESSURE: 46 MMHG | SYSTOLIC BLOOD PRESSURE: 78 MMHG

## 2022-07-08 VITALS — DIASTOLIC BLOOD PRESSURE: 52 MMHG | SYSTOLIC BLOOD PRESSURE: 86 MMHG

## 2022-07-08 VITALS — SYSTOLIC BLOOD PRESSURE: 80 MMHG | DIASTOLIC BLOOD PRESSURE: 50 MMHG

## 2023-10-18 NOTE — REP
INDICATION:

THORACENTESIS



The patient has a history of left pleural effusion



COMPARISON:

None.



TECHNIQUE:

The procedure was performed by YOON Christy,  under the direct

supervision of Dr. Khan



The risks and benefits of the procedure were explained to the patient and an informed

consent was obtained both verbally and written.  Directly prior to the start of the

procedure a formal time-out was completed in the procedure room.



Pleural fluid in left lung zone was localized using ultrasound guidance.  The skin was

prepped and draped in a sterile fashion.  10 ML of buffered lidocaine was used as a

local anesthetic. Using ultrasound guidance an 8-Lebanese multi side-hole catheter was

inserted using trocar technique.



FINDINGS:

One thousand six hundred fifty mL of red colored fluid was withdrawn and sent to the

laboratory for further analysis.



The patient tolerated the procedure well and there were no immediate complications.

After the appropriate amount of monitored convalescence, the patient was discharged

from the department.



IMPRESSION:

Ultrasound-guided thoracentesis with removal of 1650 mL of red colored pleural fluid.





<Electronically signed by Miladis Angulo > 03/10/21 1500

<Electronically signed by Sushant Khan > 03/10/21 1606 5

## 2023-11-21 NOTE — REP
INDICATION:

pleural effusion



COMPARISON:

11/28/2020



TECHNIQUE:

PA and lateral.



FINDINGS:

Pigtail catheter at the left base unchanged in position.  Pleural fluid has

essentially resolved and a small pneumothorax along with left lower lobe and subtle

right perihilar atelectasis are again noted.



Mediastinum and cardiac silhouette normal.  Right IJ line with tip in the SVC.

Skeletal structures intact.  Trace subcutaneous emphysema along the left lateral chest

base.



IMPRESSION:

1.  Small right apical pneumothorax with essentially complete resolution to the left

pleural fluid.

2.   Bilateral perihilar and left lower lobe atelectasis again suggested and similar

to prior examination.





<Electronically signed by Yogi Glass > 11/29/20 0839 Left message for return call from Manpreet Lyons. ECO: Please reach out to the [phone] GI Triage pool with patient call back.

## 2024-02-16 NOTE — REP
INDICATION:

pleural effusion.



COMPARISON:

December 4, 2020.



TECHNIQUE:

Two views..



FINDINGS:

There is extensive pleuroparenchymal opacity throughout the perihilar region and the

lower half of the left hemithorax unchanged.  There is a dextroconvex curve in the

thoracic spine.  A right internal jugular central venous catheter seen in place.

Monitoring electrodes are noted.  There is a tiny apical pleural air collection on the

left again noted unchanged.



IMPRESSION:

Extensive pleuroparenchymal opacity persists on the left.  Small apical pneumothorax

on the left again seen unchanged..





<Electronically signed by Noam Deng > 12/05/20 0889 Yes